# Patient Record
Sex: FEMALE | Race: WHITE | Employment: OTHER | URBAN - NONMETROPOLITAN AREA
[De-identification: names, ages, dates, MRNs, and addresses within clinical notes are randomized per-mention and may not be internally consistent; named-entity substitution may affect disease eponyms.]

---

## 2017-01-25 ENCOUNTER — CARE COORDINATION (OUTPATIENT)
Dept: CARE COORDINATION | Age: 67
End: 2017-01-25

## 2017-02-22 ENCOUNTER — CARE COORDINATION (OUTPATIENT)
Dept: CARE COORDINATION | Age: 67
End: 2017-02-22

## 2017-03-23 ENCOUNTER — CARE COORDINATION (OUTPATIENT)
Dept: CARE COORDINATION | Age: 67
End: 2017-03-23

## 2017-04-25 ENCOUNTER — CARE COORDINATION (OUTPATIENT)
Dept: CARE COORDINATION | Age: 67
End: 2017-04-25

## 2017-05-17 ENCOUNTER — TELEPHONE (OUTPATIENT)
Dept: FAMILY MEDICINE CLINIC | Age: 67
End: 2017-05-17

## 2017-05-22 ENCOUNTER — OFFICE VISIT (OUTPATIENT)
Dept: FAMILY MEDICINE CLINIC | Age: 67
End: 2017-05-22

## 2017-05-22 VITALS
WEIGHT: 156.5 LBS | BODY MASS INDEX: 26.08 KG/M2 | RESPIRATION RATE: 12 BRPM | HEIGHT: 65 IN | HEART RATE: 80 BPM | DIASTOLIC BLOOD PRESSURE: 68 MMHG | TEMPERATURE: 97.7 F | SYSTOLIC BLOOD PRESSURE: 138 MMHG

## 2017-05-22 DIAGNOSIS — F32.0 MILD SINGLE CURRENT EPISODE OF MAJOR DEPRESSIVE DISORDER (HCC): ICD-10-CM

## 2017-05-22 DIAGNOSIS — F17.210 HEAVY SMOKER (MORE THAN 20 CIGARETTES PER DAY): ICD-10-CM

## 2017-05-22 DIAGNOSIS — Z13.31 POSITIVE DEPRESSION SCREENING: ICD-10-CM

## 2017-05-22 DIAGNOSIS — R22.1 NECK MASS: ICD-10-CM

## 2017-05-22 DIAGNOSIS — G45.9 TRANSIENT CEREBRAL ISCHEMIA, UNSPECIFIED TYPE: Primary | ICD-10-CM

## 2017-05-22 DIAGNOSIS — Z11.4 SCREENING FOR HIV WITHOUT PRESENCE OF RISK FACTORS: ICD-10-CM

## 2017-05-22 DIAGNOSIS — Z12.31 ENCOUNTER FOR SCREENING MAMMOGRAM FOR BREAST CANCER: ICD-10-CM

## 2017-05-22 DIAGNOSIS — Z11.59 NEED FOR HEPATITIS C SCREENING TEST: ICD-10-CM

## 2017-05-22 DIAGNOSIS — Z91.81 AT HIGH RISK FOR FALLS: ICD-10-CM

## 2017-05-22 PROCEDURE — 99495 TRANSJ CARE MGMT MOD F2F 14D: CPT | Performed by: FAMILY MEDICINE

## 2017-05-22 PROCEDURE — G8431 POS CLIN DEPRES SCRN F/U DOC: HCPCS | Performed by: FAMILY MEDICINE

## 2017-05-22 PROCEDURE — G0444 DEPRESSION SCREEN ANNUAL: HCPCS | Performed by: FAMILY MEDICINE

## 2017-05-22 RX ORDER — ESCITALOPRAM OXALATE 10 MG/1
10 TABLET ORAL DAILY
Qty: 30 TABLET | Refills: 3 | Status: SHIPPED | OUTPATIENT
Start: 2017-05-22 | End: 2017-07-25

## 2017-05-22 ASSESSMENT — PATIENT HEALTH QUESTIONNAIRE - PHQ9
5. POOR APPETITE OR OVEREATING: 2
6. FEELING BAD ABOUT YOURSELF - OR THAT YOU ARE A FAILURE OR HAVE LET YOURSELF OR YOUR FAMILY DOWN: 3
4. FEELING TIRED OR HAVING LITTLE ENERGY: 3
1. LITTLE INTEREST OR PLEASURE IN DOING THINGS: 3
10. IF YOU CHECKED OFF ANY PROBLEMS, HOW DIFFICULT HAVE THESE PROBLEMS MADE IT FOR YOU TO DO YOUR WORK, TAKE CARE OF THINGS AT HOME, OR GET ALONG WITH OTHER PEOPLE: 3
7. TROUBLE CONCENTRATING ON THINGS, SUCH AS READING THE NEWSPAPER OR WATCHING TELEVISION: 1
SUM OF ALL RESPONSES TO PHQ QUESTIONS 1-9: 20
SUM OF ALL RESPONSES TO PHQ9 QUESTIONS 1 & 2: 6
8. MOVING OR SPEAKING SO SLOWLY THAT OTHER PEOPLE COULD HAVE NOTICED. OR THE OPPOSITE, BEING SO FIGETY OR RESTLESS THAT YOU HAVE BEEN MOVING AROUND A LOT MORE THAN USUAL: 1
2. FEELING DOWN, DEPRESSED OR HOPELESS: 3
3. TROUBLE FALLING OR STAYING ASLEEP: 3
9. THOUGHTS THAT YOU WOULD BE BETTER OFF DEAD, OR OF HURTING YOURSELF: 1

## 2017-05-30 ENCOUNTER — CARE COORDINATION (OUTPATIENT)
Dept: CARE COORDINATION | Age: 67
End: 2017-05-30

## 2017-06-05 ENCOUNTER — TELEPHONE (OUTPATIENT)
Dept: FAMILY MEDICINE CLINIC | Age: 67
End: 2017-06-05

## 2017-06-14 ENCOUNTER — TELEPHONE (OUTPATIENT)
Dept: FAMILY MEDICINE CLINIC | Age: 67
End: 2017-06-14

## 2017-06-14 DIAGNOSIS — J98.4 LUNG ABNORMALITY: ICD-10-CM

## 2017-06-14 DIAGNOSIS — R22.1 MASS IN NECK: Primary | ICD-10-CM

## 2017-06-15 ENCOUNTER — TELEPHONE (OUTPATIENT)
Dept: OTOLARYNGOLOGY | Age: 67
End: 2017-06-15

## 2017-06-15 ENCOUNTER — TELEPHONE (OUTPATIENT)
Dept: FAMILY MEDICINE CLINIC | Age: 67
End: 2017-06-15

## 2017-06-16 ENCOUNTER — TELEPHONE (OUTPATIENT)
Dept: FAMILY MEDICINE CLINIC | Age: 67
End: 2017-06-16

## 2017-06-16 ENCOUNTER — OFFICE VISIT (OUTPATIENT)
Dept: OTOLARYNGOLOGY | Age: 67
End: 2017-06-16

## 2017-06-16 VITALS
RESPIRATION RATE: 16 BRPM | HEIGHT: 64 IN | HEART RATE: 78 BPM | TEMPERATURE: 98.3 F | WEIGHT: 153.8 LBS | BODY MASS INDEX: 26.26 KG/M2 | SYSTOLIC BLOOD PRESSURE: 126 MMHG | DIASTOLIC BLOOD PRESSURE: 78 MMHG

## 2017-06-16 DIAGNOSIS — R13.12 OROPHARYNGEAL DYSPHAGIA: ICD-10-CM

## 2017-06-16 DIAGNOSIS — K21.9 GASTROESOPHAGEAL REFLUX DISEASE WITHOUT ESOPHAGITIS: ICD-10-CM

## 2017-06-16 DIAGNOSIS — E89.0 H/O THYROIDECTOMY: ICD-10-CM

## 2017-06-16 DIAGNOSIS — R22.1 NECK MASS: Primary | ICD-10-CM

## 2017-06-16 PROCEDURE — 99203 OFFICE O/P NEW LOW 30 MIN: CPT | Performed by: PHYSICIAN ASSISTANT

## 2017-06-16 PROCEDURE — G8598 ASA/ANTIPLAT THER USED: HCPCS | Performed by: PHYSICIAN ASSISTANT

## 2017-06-16 PROCEDURE — 4040F PNEUMOC VAC/ADMIN/RCVD: CPT | Performed by: PHYSICIAN ASSISTANT

## 2017-06-16 PROCEDURE — 3014F SCREEN MAMMO DOC REV: CPT | Performed by: PHYSICIAN ASSISTANT

## 2017-06-16 PROCEDURE — G8400 PT W/DXA NO RESULTS DOC: HCPCS | Performed by: PHYSICIAN ASSISTANT

## 2017-06-16 PROCEDURE — 1123F ACP DISCUSS/DSCN MKR DOCD: CPT | Performed by: PHYSICIAN ASSISTANT

## 2017-06-16 PROCEDURE — G8419 CALC BMI OUT NRM PARAM NOF/U: HCPCS | Performed by: PHYSICIAN ASSISTANT

## 2017-06-16 PROCEDURE — 4004F PT TOBACCO SCREEN RCVD TLK: CPT | Performed by: PHYSICIAN ASSISTANT

## 2017-06-16 PROCEDURE — 1090F PRES/ABSN URINE INCON ASSESS: CPT | Performed by: PHYSICIAN ASSISTANT

## 2017-06-16 PROCEDURE — G8427 DOCREV CUR MEDS BY ELIG CLIN: HCPCS | Performed by: PHYSICIAN ASSISTANT

## 2017-06-16 PROCEDURE — 1111F DSCHRG MED/CURRENT MED MERGE: CPT | Performed by: PHYSICIAN ASSISTANT

## 2017-06-16 PROCEDURE — 3017F COLORECTAL CA SCREEN DOC REV: CPT | Performed by: PHYSICIAN ASSISTANT

## 2017-06-16 ASSESSMENT — ENCOUNTER SYMPTOMS
SHORTNESS OF BREATH: 0
VOMITING: 0
RECTAL PAIN: 0
EYE ITCHING: 0
EYE REDNESS: 0
RHINORRHEA: 0
SORE THROAT: 0
VOICE CHANGE: 0
DIARRHEA: 0
COUGH: 0
ABDOMINAL PAIN: 0
SINUS PRESSURE: 0
CHOKING: 0
COLOR CHANGE: 0
ANAL BLEEDING: 0
WHEEZING: 0
BLOOD IN STOOL: 0
TROUBLE SWALLOWING: 0
APNEA: 0
EYE PAIN: 0
STRIDOR: 0
ABDOMINAL DISTENTION: 0
CHEST TIGHTNESS: 0
EYE DISCHARGE: 0
CONSTIPATION: 0
BACK PAIN: 0
PHOTOPHOBIA: 0
FACIAL SWELLING: 0
NAUSEA: 0

## 2017-06-20 ENCOUNTER — TELEPHONE (OUTPATIENT)
Dept: OTOLARYNGOLOGY | Age: 67
End: 2017-06-20

## 2017-06-21 ENCOUNTER — TELEPHONE (OUTPATIENT)
Dept: PULMONOLOGY | Age: 67
End: 2017-06-21

## 2017-06-21 ENCOUNTER — CARE COORDINATION (OUTPATIENT)
Dept: CARE COORDINATION | Age: 67
End: 2017-06-21

## 2017-06-21 ENCOUNTER — TELEPHONE (OUTPATIENT)
Dept: FAMILY MEDICINE CLINIC | Age: 67
End: 2017-06-21

## 2017-06-21 ASSESSMENT — ENCOUNTER SYMPTOMS: DYSPNEA ASSOCIATED WITH: EXERTION

## 2017-07-19 ENCOUNTER — CARE COORDINATION (OUTPATIENT)
Dept: CARE COORDINATION | Age: 67
End: 2017-07-19

## 2017-07-19 ASSESSMENT — ENCOUNTER SYMPTOMS: DYSPNEA ASSOCIATED WITH: EXERTION

## 2017-07-20 ENCOUNTER — TELEPHONE (OUTPATIENT)
Dept: ENT CLINIC | Age: 67
End: 2017-07-20

## 2017-07-20 ENCOUNTER — OFFICE VISIT (OUTPATIENT)
Dept: ENT CLINIC | Age: 67
End: 2017-07-20
Payer: MEDICARE

## 2017-07-20 VITALS
HEIGHT: 64 IN | HEART RATE: 72 BPM | SYSTOLIC BLOOD PRESSURE: 120 MMHG | DIASTOLIC BLOOD PRESSURE: 70 MMHG | RESPIRATION RATE: 18 BRPM | WEIGHT: 155.8 LBS | BODY MASS INDEX: 26.6 KG/M2 | TEMPERATURE: 97.8 F

## 2017-07-20 DIAGNOSIS — E89.0 POSTOPERATIVE HYPOTHYROIDISM: ICD-10-CM

## 2017-07-20 DIAGNOSIS — E04.1 THYROID NODULE: Primary | ICD-10-CM

## 2017-07-20 DIAGNOSIS — E89.0 S/P PARTIAL THYROIDECTOMY: ICD-10-CM

## 2017-07-20 PROCEDURE — G8427 DOCREV CUR MEDS BY ELIG CLIN: HCPCS | Performed by: OTOLARYNGOLOGY

## 2017-07-20 PROCEDURE — 1123F ACP DISCUSS/DSCN MKR DOCD: CPT | Performed by: OTOLARYNGOLOGY

## 2017-07-20 PROCEDURE — 3014F SCREEN MAMMO DOC REV: CPT | Performed by: OTOLARYNGOLOGY

## 2017-07-20 PROCEDURE — 4040F PNEUMOC VAC/ADMIN/RCVD: CPT | Performed by: OTOLARYNGOLOGY

## 2017-07-20 PROCEDURE — 99212 OFFICE O/P EST SF 10 MIN: CPT | Performed by: OTOLARYNGOLOGY

## 2017-07-20 PROCEDURE — G8419 CALC BMI OUT NRM PARAM NOF/U: HCPCS | Performed by: OTOLARYNGOLOGY

## 2017-07-20 PROCEDURE — G8598 ASA/ANTIPLAT THER USED: HCPCS | Performed by: OTOLARYNGOLOGY

## 2017-07-20 PROCEDURE — 1090F PRES/ABSN URINE INCON ASSESS: CPT | Performed by: OTOLARYNGOLOGY

## 2017-07-20 PROCEDURE — 3017F COLORECTAL CA SCREEN DOC REV: CPT | Performed by: OTOLARYNGOLOGY

## 2017-07-20 PROCEDURE — G8400 PT W/DXA NO RESULTS DOC: HCPCS | Performed by: OTOLARYNGOLOGY

## 2017-07-20 PROCEDURE — 4004F PT TOBACCO SCREEN RCVD TLK: CPT | Performed by: OTOLARYNGOLOGY

## 2017-07-20 ASSESSMENT — ENCOUNTER SYMPTOMS
CHEST TIGHTNESS: 0
COUGH: 0
SHORTNESS OF BREATH: 0
VOICE CHANGE: 0
SORE THROAT: 0
TROUBLE SWALLOWING: 0
VOMITING: 0
APNEA: 0
CHOKING: 0
NAUSEA: 0
STRIDOR: 0
WHEEZING: 0
RHINORRHEA: 0
ABDOMINAL PAIN: 0
SINUS PRESSURE: 0
DIARRHEA: 0
FACIAL SWELLING: 0
COLOR CHANGE: 0

## 2017-07-25 ENCOUNTER — OFFICE VISIT (OUTPATIENT)
Dept: FAMILY MEDICINE CLINIC | Age: 67
End: 2017-07-25
Payer: MEDICARE

## 2017-07-25 VITALS
BODY MASS INDEX: 26.03 KG/M2 | OXYGEN SATURATION: 96 % | HEART RATE: 67 BPM | HEIGHT: 64 IN | TEMPERATURE: 97.7 F | DIASTOLIC BLOOD PRESSURE: 62 MMHG | SYSTOLIC BLOOD PRESSURE: 154 MMHG | WEIGHT: 152.5 LBS | RESPIRATION RATE: 10 BRPM

## 2017-07-25 DIAGNOSIS — Z13.6 SCREENING FOR CARDIOVASCULAR CONDITION: ICD-10-CM

## 2017-07-25 DIAGNOSIS — Z23 NEED FOR SHINGLES VACCINE: ICD-10-CM

## 2017-07-25 DIAGNOSIS — Z00.00 ROUTINE GENERAL MEDICAL EXAMINATION AT A HEALTH CARE FACILITY: ICD-10-CM

## 2017-07-25 DIAGNOSIS — Z23 NEED FOR PROPHYLACTIC VACCINATION WITH COMBINED DIPHTHERIA-TETANUS-PERTUSSIS (DTP) VACCINE: ICD-10-CM

## 2017-07-25 DIAGNOSIS — Z13.1 SCREENING FOR DIABETES MELLITUS (DM): ICD-10-CM

## 2017-07-25 DIAGNOSIS — Z78.0 ASYMPTOMATIC MENOPAUSAL STATE: ICD-10-CM

## 2017-07-25 PROCEDURE — G0438 PPPS, INITIAL VISIT: HCPCS | Performed by: FAMILY MEDICINE

## 2017-07-25 PROCEDURE — 93000 ELECTROCARDIOGRAM COMPLETE: CPT | Performed by: FAMILY MEDICINE

## 2017-07-25 RX ORDER — LEVOTHYROXINE SODIUM 0.07 MG/1
75 TABLET ORAL DAILY
Qty: 30 TABLET | Refills: 3 | Status: CANCELLED | OUTPATIENT
Start: 2017-07-25

## 2017-07-25 RX ORDER — ATORVASTATIN CALCIUM 40 MG/1
40 TABLET, FILM COATED ORAL NIGHTLY
Qty: 30 TABLET | Refills: 5 | Status: SHIPPED | OUTPATIENT
Start: 2017-07-25 | End: 2021-03-08

## 2017-07-25 RX ORDER — ESCITALOPRAM OXALATE 20 MG/1
20 TABLET ORAL DAILY
Qty: 30 TABLET | Refills: 5 | Status: SHIPPED | OUTPATIENT
Start: 2017-07-25 | End: 2021-03-08

## 2017-07-25 RX ORDER — ISOSORBIDE DINITRATE 10 MG/1
10 TABLET ORAL 2 TIMES DAILY
Qty: 90 TABLET | Refills: 3 | Status: CANCELLED | OUTPATIENT
Start: 2017-07-25

## 2017-07-25 RX ORDER — LEVOTHYROXINE SODIUM 88 UG/1
88 TABLET ORAL DAILY
Qty: 30 TABLET | Refills: 5 | Status: SHIPPED | OUTPATIENT
Start: 2017-07-25 | End: 2022-08-16 | Stop reason: DRUGHIGH

## 2017-07-25 RX ORDER — ESCITALOPRAM OXALATE 10 MG/1
10 TABLET ORAL DAILY
Qty: 30 TABLET | Refills: 3 | Status: CANCELLED | OUTPATIENT
Start: 2017-07-25

## 2017-07-25 ASSESSMENT — PATIENT HEALTH QUESTIONNAIRE - PHQ9: SUM OF ALL RESPONSES TO PHQ QUESTIONS 1-9: 2

## 2017-07-25 ASSESSMENT — LIFESTYLE VARIABLES: HOW OFTEN DO YOU HAVE A DRINK CONTAINING ALCOHOL: 0

## 2017-07-25 ASSESSMENT — ANXIETY QUESTIONNAIRES: GAD7 TOTAL SCORE: 6

## 2017-07-31 ENCOUNTER — TELEPHONE (OUTPATIENT)
Dept: ENT CLINIC | Age: 67
End: 2017-07-31

## 2017-08-08 ENCOUNTER — TELEPHONE (OUTPATIENT)
Dept: ENT CLINIC | Age: 67
End: 2017-08-08

## 2017-08-30 ENCOUNTER — CARE COORDINATION (OUTPATIENT)
Dept: CARE COORDINATION | Age: 67
End: 2017-08-30

## 2017-08-30 ASSESSMENT — ENCOUNTER SYMPTOMS: DYSPNEA ASSOCIATED WITH: EXERTION

## 2017-09-14 ENCOUNTER — TELEPHONE (OUTPATIENT)
Dept: PULMONOLOGY | Age: 67
End: 2017-09-14

## 2017-10-02 ENCOUNTER — CARE COORDINATION (OUTPATIENT)
Dept: CARE COORDINATION | Age: 67
End: 2017-10-02

## 2017-10-05 ENCOUNTER — HOSPITAL ENCOUNTER (EMERGENCY)
Age: 67
Discharge: HOME | End: 2017-10-05
Attending: FAMILY MEDICINE
Payer: MEDICARE

## 2017-10-05 ENCOUNTER — APPOINTMENT (OUTPATIENT)
Dept: CT IMAGING | Age: 67
End: 2017-10-05
Payer: MEDICARE

## 2017-10-05 VITALS
SYSTOLIC BLOOD PRESSURE: 141 MMHG | WEIGHT: 154 LBS | DIASTOLIC BLOOD PRESSURE: 78 MMHG | BODY MASS INDEX: 26.29 KG/M2 | RESPIRATION RATE: 16 BRPM | HEART RATE: 73 BPM | HEIGHT: 64 IN | TEMPERATURE: 97.9 F | OXYGEN SATURATION: 96 %

## 2017-10-05 DIAGNOSIS — S09.90XA HEAD TRAUMA, INITIAL ENCOUNTER: Primary | ICD-10-CM

## 2017-10-05 DIAGNOSIS — S06.0X0A CONCUSSION, WITHOUT LOC, INITIAL ENCOUNTER: ICD-10-CM

## 2017-10-05 PROCEDURE — 70450 CT HEAD/BRAIN W/O DYE: CPT

## 2017-10-05 PROCEDURE — 6360000002 HC RX W HCPCS: Performed by: FAMILY MEDICINE

## 2017-10-05 PROCEDURE — 99284 EMERGENCY DEPT VISIT MOD MDM: CPT

## 2017-10-05 PROCEDURE — 72125 CT NECK SPINE W/O DYE: CPT

## 2017-10-05 RX ORDER — ONDANSETRON 4 MG/1
4 TABLET, ORALLY DISINTEGRATING ORAL ONCE
Status: COMPLETED | OUTPATIENT
Start: 2017-10-05 | End: 2017-10-05

## 2017-10-05 RX ADMIN — ONDANSETRON 4 MG: 4 TABLET, ORALLY DISINTEGRATING ORAL at 16:05

## 2017-10-05 ASSESSMENT — ENCOUNTER SYMPTOMS
COUGH: 0
ABDOMINAL PAIN: 0
VOMITING: 0
DIARRHEA: 0
EYE DISCHARGE: 0
SHORTNESS OF BREATH: 0
WHEEZING: 0
EYE PAIN: 0
BACK PAIN: 0
SORE THROAT: 0
NAUSEA: 0
RHINORRHEA: 0

## 2017-10-05 ASSESSMENT — PAIN DESCRIPTION - LOCATION: LOCATION: HEAD

## 2017-10-05 ASSESSMENT — PAIN DESCRIPTION - DESCRIPTORS: DESCRIPTORS: THROBBING

## 2017-10-05 ASSESSMENT — PAIN DESCRIPTION - FREQUENCY: FREQUENCY: CONTINUOUS

## 2017-10-05 ASSESSMENT — PAIN SCALES - GENERAL: PAINLEVEL_OUTOF10: 10

## 2017-10-05 ASSESSMENT — PAIN DESCRIPTION - PAIN TYPE: TYPE: ACUTE PAIN

## 2017-10-05 ASSESSMENT — PAIN DESCRIPTION - ORIENTATION: ORIENTATION: UPPER
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
## 2017-10-10 ENCOUNTER — OFFICE VISIT (OUTPATIENT)
Dept: FAMILY MEDICINE CLINIC | Age: 67
End: 2017-10-10
Payer: MEDICARE

## 2017-10-10 VITALS
DIASTOLIC BLOOD PRESSURE: 102 MMHG | OXYGEN SATURATION: 94 % | BODY MASS INDEX: 24.01 KG/M2 | HEART RATE: 78 BPM | TEMPERATURE: 97.5 F | WEIGHT: 140.6 LBS | HEIGHT: 64 IN | SYSTOLIC BLOOD PRESSURE: 156 MMHG

## 2017-10-10 DIAGNOSIS — I10 ESSENTIAL HYPERTENSION: ICD-10-CM

## 2017-10-10 DIAGNOSIS — Z87.820 HX OF CONCUSSION: ICD-10-CM

## 2017-10-10 DIAGNOSIS — F17.210 HEAVY SMOKER (MORE THAN 20 CIGARETTES PER DAY): ICD-10-CM

## 2017-10-10 DIAGNOSIS — Z87.828 HX OF HEAD INJURY: Primary | ICD-10-CM

## 2017-10-10 DIAGNOSIS — R11.0 NAUSEA: ICD-10-CM

## 2017-10-10 DIAGNOSIS — J43.9 PULMONARY EMPHYSEMA, UNSPECIFIED EMPHYSEMA TYPE (HCC): ICD-10-CM

## 2017-10-10 PROCEDURE — 4040F PNEUMOC VAC/ADMIN/RCVD: CPT | Performed by: NURSE PRACTITIONER

## 2017-10-10 PROCEDURE — 99214 OFFICE O/P EST MOD 30 MIN: CPT | Performed by: NURSE PRACTITIONER

## 2017-10-10 PROCEDURE — 4004F PT TOBACCO SCREEN RCVD TLK: CPT | Performed by: NURSE PRACTITIONER

## 2017-10-10 PROCEDURE — 1123F ACP DISCUSS/DSCN MKR DOCD: CPT | Performed by: NURSE PRACTITIONER

## 2017-10-10 PROCEDURE — G8926 SPIRO NO PERF OR DOC: HCPCS | Performed by: NURSE PRACTITIONER

## 2017-10-10 PROCEDURE — 3017F COLORECTAL CA SCREEN DOC REV: CPT | Performed by: NURSE PRACTITIONER

## 2017-10-10 PROCEDURE — G8427 DOCREV CUR MEDS BY ELIG CLIN: HCPCS | Performed by: NURSE PRACTITIONER

## 2017-10-10 PROCEDURE — G8420 CALC BMI NORM PARAMETERS: HCPCS | Performed by: NURSE PRACTITIONER

## 2017-10-10 PROCEDURE — 3023F SPIROM DOC REV: CPT | Performed by: NURSE PRACTITIONER

## 2017-10-10 PROCEDURE — 1090F PRES/ABSN URINE INCON ASSESS: CPT | Performed by: NURSE PRACTITIONER

## 2017-10-10 PROCEDURE — G8484 FLU IMMUNIZE NO ADMIN: HCPCS | Performed by: NURSE PRACTITIONER

## 2017-10-10 PROCEDURE — 3014F SCREEN MAMMO DOC REV: CPT | Performed by: NURSE PRACTITIONER

## 2017-10-10 PROCEDURE — G8400 PT W/DXA NO RESULTS DOC: HCPCS | Performed by: NURSE PRACTITIONER

## 2017-10-10 PROCEDURE — G8598 ASA/ANTIPLAT THER USED: HCPCS | Performed by: NURSE PRACTITIONER

## 2017-10-10 RX ORDER — ONDANSETRON 4 MG/1
4 TABLET, ORALLY DISINTEGRATING ORAL EVERY 8 HOURS PRN
Qty: 30 TABLET | Refills: 2 | Status: SHIPPED | OUTPATIENT
Start: 2017-10-10 | End: 2018-01-22 | Stop reason: SDUPTHER

## 2017-10-10 NOTE — PATIENT INSTRUCTIONS
stop-smoking programs and medicines. These can increase your chances of quitting for good. · Avoid colds and flu. Get a pneumococcal vaccine shot. If you have had one before, ask your doctor whether you need a second dose. Get the flu vaccine every fall. If you must be around people with colds or the flu, wash your hands often. · Avoid secondhand smoke, air pollution, and high altitudes. Also avoid cold, dry air and hot, humid air. Stay at home with your windows closed when air pollution is bad. Medicines and oxygen therapy  · Take your medicines exactly as prescribed. Call your doctor if you think you are having a problem with your medicine. · You may be taking medicines such as:  ¨ Bronchodilators. These help open your airways and make breathing easier. Bronchodilators are either short-acting (work for 6 to 9 hours) or long-acting (work for 24 hours). You inhale most bronchodilators, so they start to act quickly. Always carry your quick-relief inhaler with you in case you need it while you are away from home. ¨ Corticosteroids (prednisone, budesonide). These reduce airway inflammation. They come in pill or inhaled form. You must take these medicines every day for them to work well. · A spacer may help you get more inhaled medicine to your lungs. Ask your doctor or pharmacist if a spacer is right for you. If it is, ask how to use it properly. · Do not take any vitamins, over-the-counter medicine, or herbal products without talking to your doctor first.  · If your doctor prescribed antibiotics, take them as directed. Do not stop taking them just because you feel better. You need to take the full course of antibiotics. · Oxygen therapy boosts the amount of oxygen in your blood and helps you breathe easier. Use the flow rate your doctor has recommended, and do not change it without talking to your doctor first.  Activity  · Get regular exercise. Walking is an easy way to get exercise.  Start out slowly, and walk a little more each day. · Pay attention to your breathing. You are exercising too hard if you cannot talk while you are exercising. · Take short rest breaks when doing household chores and other activities. · Learn breathing methods--such as breathing through pursed lips--to help you become less short of breath. · If your doctor has not set you up with a pulmonary rehabilitation program, talk to him or her about whether rehab is right for you. Rehab includes exercise programs, education about your disease and how to manage it, help with diet and other changes, and emotional support. Diet  · Eat regular, healthy meals. Use bronchodilators about 1 hour before you eat to make it easier to eat. Eat several small meals instead of three large ones. Drink beverages at the end of the meal. Avoid foods that are hard to chew. · Eat foods that contain protein so that you do not lose muscle mass. · Talk with your doctor if you gain too much weight or if you lose weight without trying. Mental health  · Talk to your family, friends, or a therapist about your feelings. It is normal to feel frightened, angry, hopeless, helpless, and even guilty. Talking openly about bad feelings can help you cope. If these feelings last, talk to your doctor. When should you call for help? Call 911 anytime you think you may need emergency care. For example, call if:  · You have severe trouble breathing. Call your doctor now or seek immediate medical care if:  · You have new or worse trouble breathing. · You cough up blood. · You have a fever. Watch closely for changes in your health, and be sure to contact your doctor if:  · You cough more deeply or more often, especially if you notice more mucus or a change in the color of your mucus. · You have new or worse swelling in your legs or belly. · You are not getting better as expected. Where can you learn more? Go to https://chpemarceb.health-partners. org and sign in to your Jobyourlife account. Enter H707 in the [facility] box to learn more about \"Chronic Obstructive Pulmonary Disease (COPD): Care Instructions. \"     If you do not have an account, please click on the \"Sign Up Now\" link. Current as of: March 25, 2017  Content Version: 11.3  © 4911-7550 [employer]. Care instructions adapted under license by [facility] ([facility]). If you have questions about a medical condition or this instruction, always ask your healthcare professional. [address] any warranty or liability for your use of this information. Patient Education        Chronic Obstructive Pulmonary Disease (COPD) Flare-Ups: Care Instructions  Your Care Instructions    Chronic obstructive pulmonary disease (COPD) is a lung disease that makes it hard to breathe. It is caused by damage to the lungs over many years, usually from smoking. COPD is often a mix of two diseases:  · Chronic bronchitis: The airways that carry air to the lungs (bronchial tubes) get inflamed and make a lot of mucus. This can narrow or block the airways. · Emphysema: In a healthy person, the tiny air sacs in the lungs are like balloons. As you breathe in and out, they get bigger and smaller to move air through your lungs. But with emphysema, these air sacs are damaged and lose their stretch. Less air gets in and out of the lungs. Many people with COPD have attacks called flare-ups or exacerbations. This is when your usual symptoms quickly get worse and stay worse. The doctor has checked you carefully. But problems can develop later. If you notice any problems or new symptoms, get medical treatment right away. Follow-up care is a key part of your treatment and safety. Be sure to make and go to all appointments, and call your doctor if you are having problems. It's also a good idea to know your test results and keep a list of the medicines you take. How can you care for yourself at home?   · Be safe with medicines. Take your medicines exactly as prescribed. Call your doctor if you think you are having a problem with your medicine. You may be taking medicines such as:  ¨ Bronchodilators. These help open your airways and make breathing easier. ¨ Corticosteroids. These reduce airway inflammation. They may be given as pills, in a vein, or in an inhaled form. You may go home with pills in addition to an inhaler that you already use. · A spacer may help you get more inhaled medicine to your lungs. Ask your doctor or pharmacist if a spacer is right for you. If it is, ask how to use it properly. · If your doctor prescribed antibiotics, take them as directed. Do not stop taking them just because you feel better. You need to take the full course of antibiotics. · If your doctor prescribed oxygen, use the flow rate your doctor has recommended. Do not change it without talking to your doctor first.  · Do not smoke. Smoking makes COPD worse. If you need help quitting, talk to your doctor about stop-smoking programs and medicines. These can increase your chances of quitting for good. When should you call for help? Call 911 anytime you think you may need emergency care. For example, call if:  · You have severe trouble breathing. Call your doctor now or seek immediate medical care if:  · You have new or worse trouble breathing. · Your coughing or wheezing gets worse. · You cough up dark brown or bloody mucus (sputum). · You have a new or higher fever. Watch closely for changes in your health, and be sure to contact your doctor if:  · You notice more mucus or a change in the color of your mucus. · You need to use your antibiotic or steroid pills. · You do not get better as expected. Where can you learn more? Go to https://irene.Textingly. org and sign in to your Crack account.  Enter F313 in the OpenChime box to learn more about \"Chronic Obstructive Pulmonary Disease (COPD) Flare-Ups: Care \"egg-ROSENDO-mariah-BAY-shun\"). When this happens, your usual symptoms quickly get worse and stay bad. This can be dangerous. You may have to go to the hospital.  How can you keep COPD from getting worse? Don't smoke. That is the best way to keep COPD from getting worse. If you already smoke, it is never too late to stop. If you need help quitting, talk to your doctor about stop-smoking programs and medicines. These can increase your chances of quitting for good. You can do other things to keep COPD from getting worse:  · Avoid bad air. Air pollution, chemical fumes, and dust also can make COPD worse. · Get a flu shot every year. A shot may keep the flu from turning into something more serious, like pneumonia. A flu shot also may lower your chances of having a COPD flare-up. · Get a pneumococcal shot. Most people need only one shot to prevent pneumonia, but doctors sometimes recommend a second shot for some people who got their first shot before they turned 72. Talk with your doctor about whether you need a second shot. How is COPD treated? COPD is treated with medicines and oxygen. You also can take steps at home to stay healthy and keep your COPD from getting worse. Medicines and oxygen therapy  · You may be taking medicines such as:  ¨ Bronchodilators. These help open your airways and make breathing easier. Bronchodilators are either short-acting (work for 6 to 9 hours) or long-acting (work for 24 hours). You inhale most bronchodilators, so they start to act quickly. Always carry your quick-relief inhaler with you in case you need it while you are away from home. ¨ Corticosteroids. These reduce airway inflammation. They come in pill or inhaled form. You must take these medicines every day for them to work well. · Take your medicines exactly as prescribed. Call your doctor if you think you are having a problem with your medicine.   · Oxygen therapy boosts the amount of oxygen in your blood and helps you breathe easier. Use the flow rate your doctor has recommended, and do not change it without talking to your doctor first.  Other care at home  · If your doctor recommends it, get more exercise. Walking is a good choice. Bit by bit, increase the amount you walk every day. Try for at least 30 minutes on most days of the week. · Learn breathing methods--such as breathing through pursed lips--to help you become less short of breath. · If your doctor has not set you up with a pulmonary rehabilitation program, talk to him or her about whether rehab is right for you. Rehab includes exercise programs, education about your disease and how to manage it, help with diet and other changes, and emotional support. · Eat regular, healthy meals. Use bronchodilators about 1 hour before you eat to make it easier to eat. Eat several small meals instead of three large ones. Drink beverages at the end of the meal. Avoid foods that are hard to chew. Follow-up care is a key part of your treatment and safety. Be sure to make and go to all appointments, and call your doctor if you are having problems. It's also a good idea to know your test results and keep a list of the medicines you take. Where can you learn more? Go to https://FittingRoompeThe One World Doll Project.afterBOT. org and sign in to your Multimedia Plus | QuizScore account. Enter V314 in the [facility] box to learn more about \"Learning About COPD. \"     If you do not have an account, please click on the \"Sign Up Now\" link. Current as of: March 25, 2017  Content Version: 11.3  © 1859-3168 [employer]ISS[employer]. Care instructions adapted under license by [facility] [place] ([facility]). If you have questions about a medical condition or this instruction, always ask your healthcare professional. [place] [address] any warranty or liability for your use of this information.        Patient Education        Low Sodium Diet (2,000 Milligram): Care Instructions  Your Care Instructions  Too much sodium causes your body to hold on to extra water. This can raise your blood pressure and force your heart and kidneys to work harder. In very serious cases, this could cause you to be put in the hospital. It might even be life-threatening. By limiting sodium, you will feel better and lower your risk of serious problems. The most common source of sodium is salt. People get most of the salt in their diet from canned, prepared, and packaged foods. Fast food and restaurant meals also are very high in sodium. Your doctor will probably limit your sodium to less than 2,000 milligrams (mg) a day. This limit counts all the sodium in prepared and packaged foods and any salt you add to your food. Follow-up care is a key part of your treatment and safety. Be sure to make and go to all appointments, and call your doctor if you are having problems. It's also a good idea to know your test results and keep a list of the medicines you take. How can you care for yourself at home? Read food labels  · Read labels on cans and food packages. The labels tell you how much sodium is in each serving. Make sure that you look at the serving size. If you eat more than the serving size, you have eaten more sodium. · Food labels also tell you the Percent Daily Value for sodium. Choose products with low Percent Daily Values for sodium. · Be aware that sodium can come in forms other than salt, including monosodium glutamate (MSG), sodium citrate, and sodium bicarbonate (baking soda). MSG is often added to Asian food. When you eat out, you can sometimes ask for food without MSG or added salt. Buy low-sodium foods  · Buy foods that are labeled \"unsalted\" (no salt added), \"sodium-free\" (less than 5 mg of sodium per serving), or \"low-sodium\" (less than 140 mg of sodium per serving). Foods labeled \"reduced-sodium\" and \"light sodium\" may still have too much sodium. Be sure to read the label to see how much sodium you are getting.   · Buy fresh vegetables, or frozen vegetables without added sauces. Buy low-sodium versions of canned vegetables, soups, and other canned goods. Prepare low-sodium meals  · Cut back on the amount of salt you use in cooking. This will help you adjust to the taste. Do not add salt after cooking. One teaspoon of salt has about 2,300 mg of sodium. · Take the salt shaker off the table. · Flavor your food with garlic, lemon juice, onion, vinegar, herbs, and spices. Do not use soy sauce, lite soy sauce, steak sauce, onion salt, garlic salt, celery salt, mustard, or ketchup on your food. · Use low-sodium salad dressings, sauces, and ketchup. Or make your own salad dressings and sauces without adding salt. · Use less salt (or none) when recipes call for it. You can often use half the salt a recipe calls for without losing flavor. Other foods such as rice, pasta, and grains do not need added salt. · Rinse canned vegetables, and cook them in fresh water. This removes some--but not all--of the salt. · Avoid water that is naturally high in sodium or that has been treated with water softeners, which add sodium. Call your local water company to find out the sodium content of your water supply. If you buy bottled water, read the label and choose a sodium-free brand. Avoid high-sodium foods  · Avoid eating:  ¨ Smoked, cured, salted, and canned meat, fish, and poultry. ¨ Ham, gandhi, hot dogs, and luncheon meats. ¨ Regular, hard, and processed cheese and regular peanut butter. ¨ Crackers with salted tops, and other salted snack foods such as pretzels, chips, and salted popcorn. ¨ Frozen prepared meals, unless labeled low-sodium. ¨ Canned and dried soups, broths, and bouillon, unless labeled sodium-free or low-sodium. ¨ Canned vegetables, unless labeled sodium-free or low-sodium. ¨ Western Carla fries, pizza, tacos, and other fast foods. ¨ Pickles, olives, ketchup, and other condiments, especially soy sauce, unless labeled sodium-free or low-sodium.   Where can you learn more? Go to https://chpepiceweb.Seafarer [email or web address] org and sign in to your Mentegram account. Enter T034 in the [facility] box to learn more about \"Low Sodium Diet (2,000 Milligram): Care Instructions. \"     If you do not have an account, please click on the \"Sign Up Now\" link. Current as of: July 26, 2016  Content Version: 11.3  © 0560-6932 [employer]. Care instructions adapted under license by [facility] ([facility]). If you have questions about a medical condition or this instruction, always ask your healthcare professional. [address] any warranty or liability for your use of this information. Patient Education        DASH Diet: Care Instructions  Your Care Instructions  The DASH diet is an eating plan that can help lower your blood pressure. DASH stands for Dietary Approaches to Stop Hypertension. Hypertension is high blood pressure. The DASH diet focuses on eating foods that are high in calcium, potassium, and magnesium. These nutrients can lower blood pressure. The foods that are highest in these nutrients are fruits, vegetables, low-fat dairy products, nuts, seeds, and legumes. But taking calcium, potassium, and magnesium supplements instead of eating foods that are high in those nutrients does not have the same effect. The DASH diet also includes whole grains, fish, and poultry. The DASH diet is one of several lifestyle changes your doctor may recommend to lower your high blood pressure. Your doctor may also want you to decrease the amount of sodium in your diet. Lowering sodium while following the DASH diet can lower blood pressure even further than just the DASH diet alone. Follow-up care is a key part of your treatment and safety. Be sure to make and go to all appointments, and call your doctor if you are having problems. It's also a good idea to know your test results and keep a list of the medicines you take.   How can you care for yourself at home?  Following the DASH diet  · Eat 4 to 5 servings of fruit each day. A serving is 1 medium-sized piece of fruit, ½ cup chopped or canned fruit, 1/4 cup dried fruit, or 4 ounces (½ cup) of fruit juice. Choose fruit more often than fruit juice. · Eat 4 to 5 servings of vegetables each day. A serving is 1 cup of lettuce or raw leafy vegetables, ½ cup of chopped or cooked vegetables, or 4 ounces (½ cup) of vegetable juice. Choose vegetables more often than vegetable juice. · Get 2 to 3 servings of low-fat and fat-free dairy each day. A serving is 8 ounces of milk, 1 cup of yogurt, or 1 ½ ounces of cheese. · Eat 6 to 8 servings of grains each day. A serving is 1 slice of bread, 1 ounce of dry cereal, or ½ cup of cooked rice, pasta, or cooked cereal. Try to choose whole-grain products as much as possible. · Limit lean meat, poultry, and fish to 2 servings each day. A serving is 3 ounces, about the size of a deck of cards. · Eat 4 to 5 servings of nuts, seeds, and legumes (cooked dried beans, lentils, and split peas) each week. A serving is 1/3 cup of nuts, 2 tablespoons of seeds, or ½ cup of cooked beans or peas. · Limit fats and oils to 2 to 3 servings each day. A serving is 1 teaspoon of vegetable oil or 2 tablespoons of salad dressing. · Limit sweets and added sugars to 5 servings or less a week. A serving is 1 tablespoon jelly or jam, ½ cup sorbet, or 1 cup of lemonade. · Eat less than 2,300 milligrams (mg) of sodium a day. If you limit your sodium to 1,500 mg a day, you can lower your blood pressure even more. Tips for success  · Start small. Do not try to make dramatic changes to your diet all at once. You might feel that you are missing out on your favorite foods and then be more likely to not follow the plan. Make small changes, and stick with them. Once those changes become habit, add a few more changes.   · Try some of the following:  ¨ Make it a goal to eat a fruit or vegetable at every meal and at sure to make and go to all appointments, and call your doctor if you are having problems. It's also a good idea to know your test results and keep a list of the medicines you take. Where can you learn more? Go to https://chnkechi.Coopkanics. org and sign in to your AxisMobile account. Enter [id number] in the [facility] box to learn more about \"Learning About a Closed Head Injury. \"     If you do not have an account, please click on the \"Sign Up Now\" link. Current as of: October 14, 2016  Content Version: 11.3  © 9322-5057 [employer]. Care instructions adapted under license by [facility] ([facility]). If you have questions about a medical condition or this instruction, always ask your healthcare professional. [address] any warranty or liability for your use of this information. Patient Education        Head Injury: Care Instructions  Your Care Instructions  Most injuries to the head are minor. Bumps, cuts, and scrapes on the head and face usually heal well and can be treated the same as injuries to other parts of the body. Although it's rare, once in a while a more serious problem shows up after you are home. So it's good to be on the lookout for symptoms for a day or two. Follow-up care is a key part of your treatment and safety. Be sure to make and go to all appointments, and call your doctor if you are having problems. It's also a good idea to know your test results and keep a list of the medicines you take. How can you care for yourself at home? · Follow your doctor's instructions. He or she will tell you if you need someone to watch you closely for the next 24 hours or longer. · Take it easy for the next few days or more if you are not feeling well. · Ask your doctor when it's okay for you to go back to activities like driving a car, riding a bike, or operating machinery. When should you call for help?   Call 911 anytime you think you may need emergency care. For example, call if:  · You have a seizure. · You passed out (lost consciousness). · You are confused or can't stay awake. Call your doctor now or seek immediate medical care if:  · You have new or worse vomiting. · You feel less alert. · You have new weakness or numbness in any part of your body. Watch closely for changes in your health, and be sure to contact your doctor if:  · You do not get better as expected. · You have new symptoms, such as headaches, trouble concentrating, or changes in mood. Where can you learn more? Go to https://Sirnaomicspepiceweb.IdenTrust. org and sign in to your menuvox account. Enter R520 in the [place] box to learn more about \"Head Injury: Care Instructions. \"     If you do not have an account, please click on the \"Sign Up Now\" link. Current as of: October 14, 2016  Content Version: 11.3  © 0887-8165 [employer], [employer]. Care instructions adapted under license by [facility] ([facility]). If you have questions about a medical condition or this instruction, always ask your healthcare professional. [place] [address] any warranty or liability for your use of this information.
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
## 2017-10-11 ASSESSMENT — ENCOUNTER SYMPTOMS
DIARRHEA: 0
ABDOMINAL PAIN: 0
SORE THROAT: 0
BLOOD IN STOOL: 0
COLOR CHANGE: 0
CONSTIPATION: 0
ANAL BLEEDING: 0
EYE DISCHARGE: 0
SHORTNESS OF BREATH: 0
RHINORRHEA: 0
NAUSEA: 0
EYE REDNESS: 0
ABDOMINAL DISTENTION: 0
COUGH: 0

## 2017-11-13 ENCOUNTER — CARE COORDINATION (OUTPATIENT)
Dept: CARE COORDINATION | Age: 67
End: 2017-11-13

## 2017-11-17 ENCOUNTER — TELEPHONE (OUTPATIENT)
Dept: PULMONOLOGY | Age: 67
End: 2017-11-17

## 2017-11-17 NOTE — TELEPHONE ENCOUNTER
Kat Claude was a no show today 11/17/17. I called and spoke with patient, she was no aware of appointment. Rescheduled for 12/12/17.

## 2017-12-12 ENCOUNTER — TELEPHONE (OUTPATIENT)
Dept: PULMONOLOGY | Age: 67
End: 2017-12-12

## 2017-12-12 ENCOUNTER — OFFICE VISIT (OUTPATIENT)
Dept: PULMONOLOGY | Age: 67
End: 2017-12-12
Payer: MEDICARE

## 2017-12-12 VITALS
HEIGHT: 64 IN | DIASTOLIC BLOOD PRESSURE: 86 MMHG | TEMPERATURE: 97.7 F | HEART RATE: 75 BPM | WEIGHT: 155.4 LBS | OXYGEN SATURATION: 97 % | SYSTOLIC BLOOD PRESSURE: 136 MMHG | BODY MASS INDEX: 26.53 KG/M2

## 2017-12-12 DIAGNOSIS — R91.1 INCIDENTAL LUNG NODULE, > 3MM AND < 8MM: ICD-10-CM

## 2017-12-12 DIAGNOSIS — Z72.0 TOBACCO ABUSE: ICD-10-CM

## 2017-12-12 DIAGNOSIS — R91.1 INCIDENTAL LUNG NODULE, > 3MM AND < 8MM: Primary | ICD-10-CM

## 2017-12-12 DIAGNOSIS — J43.9 PULMONARY EMPHYSEMA, UNSPECIFIED EMPHYSEMA TYPE (HCC): Primary | ICD-10-CM

## 2017-12-12 PROCEDURE — 99214 OFFICE O/P EST MOD 30 MIN: CPT | Performed by: NURSE PRACTITIONER

## 2017-12-12 PROCEDURE — 99406 BEHAV CHNG SMOKING 3-10 MIN: CPT | Performed by: NURSE PRACTITIONER

## 2017-12-12 RX ORDER — FLUTICASONE FUROATE AND VILANTEROL 100; 25 UG/1; UG/1
1 POWDER RESPIRATORY (INHALATION) DAILY
Qty: 30 EACH | Refills: 2 | Status: SHIPPED | OUTPATIENT
Start: 2017-12-12 | End: 2018-03-05 | Stop reason: SDUPTHER

## 2017-12-12 ASSESSMENT — ENCOUNTER SYMPTOMS
COUGH: 1
VOMITING: 0
DIARRHEA: 0
BLURRED VISION: 0
HEMOPTYSIS: 1
SHORTNESS OF BREATH: 1
DOUBLE VISION: 0
SPUTUM PRODUCTION: 1
NAUSEA: 0
WHEEZING: 1

## 2017-12-12 NOTE — PROGRESS NOTES
[facility]    Patient: Arturo Leo, 74 y.o.   : 2017    Pt of Dr. Griselda Ventura   Patient presents with    Abnormal CT     f/u from [facility] CT Chest per AL Ricketts, CT 17    Medication Refill     Spir[employer] Handihaler        Shortness of Breath   This is a chronic problem. The current episode started more than 1 year ago. The problem occurs daily. The problem has been gradually worsening. Associated symptoms include hemoptysis, sputum production and wheezing. Pertinent negatives include no chest pain, fever or vomiting. The symptoms are aggravated by fumes, lying flat, weather changes and any activity. Associated symptoms comments: Intermittent chest pain not currently having any chest pain last time was on 17  2 weeks ago patient reports having brought up small flecks of blood in her sputum when coughing . Risk factors include smoking. She has tried rest and beta agonist inhalers (has used husbands albuterol nebulizer and noticed improvement in SOB) for the symptoms. The treatment provided moderate relief. Her past medical history is significant for COPD and DVT. Alan Denton is here for follow up for Abnormal CT. Down to 1.5 PPD as of 17    Progress History:   Since last visit any new medical issues? Yes  New ER or hospital visits? Yes  For SOB and chest pain  Any new or changes in medicines? No  Using inhalers? Yes   Are they helpful? Yes   Flu vaccine? No does not want   Pneumonia vaccine?  No does not want   Past Medical hx   PMH:  Past Medical History:   Diagnosis Date    Anxiety 2007    Arthritis     Breast cancer [facility]) 2005    right mastectomy, chemo and radiation history    CAD (coronary artery disease) 2001    Cancer of the skin 2004    Cerebral artery occlusion with cerebral infarction ([address]     Chronic UTI     COPD [facility] disease) [facility]     CVA (cerebral infarction) 2007, 2008    Depression 2007    DVT of lower extremity (deep venous thrombosis) [facility] 1976    Facial injury 2005    Fall on greasy ground, striking left periorbital region    History of stroke 2007, 2008, 2009    Patient relates a stroke with right weakness and speech in 2007; another in 2010 or 2012 (unsure), both with need to rehabilitation.   Also \"2 mini-strokes\" (?)    Hyperlipidemia 2005    Hypertension 2005    Hypothyroidism     IBS (irritable bowel syndrome)     Low HDL (under 40) 2014    Recurrent UTI (urinary tract infection) 2015    Dr Edin Waldrop finding difficulty 05/16/2017    work-up in progress     SURGICAL HISTORY:  Past Surgical History:   Procedure Laterality Date   [address]    COLONOSCOPY  2009   [facility] HIP SURGERY  jan 19, 2015    HYSTERECTOMY  1976    JOINT REPLACEMENT Left 2011    HIP    JOINT REPLACEMENT Right 1/19/15    Right Total Hip Replacement - Dr. Olimpia Baum, PARTIAL  2005    right    [address], 2001    ovaries    THYROID SURGERY  2007    right lobe     SOCIAL HISTORY:  Social History   Substance Use Topics    Smoking status: Current Every Day Smoker     Packs/day: 2.00     Years: 48.00     Types: Cigarettes     Start date: 11/13/1967    Smokeless tobacco: Never Used      Comment: 1.5ppd 12/12/17    Alcohol use No     ALLERGIES:  Allergies   Allergen Reactions    Cipro Xr     Vicodin [Hydrocodone-Acetaminophen]      Weird dreams       FAMILY HISTORY:  Family History   Problem Relation Age of Onset    Cancer Mother     Osteoporosis Mother     Hypertension Mother     High Cholesterol Mother     Stroke Mother     Cancer Father      lung cancer    Heart Disease Father     Hypertension Father     High Cholesterol Father     Heart Disease Sister     High Cholesterol Sister     Hypertension Sister     Asthma Sister     Other Other      high arched feet    Lung Cancer Son      CURRENT MEDICATIONS:  Current Outpatient Prescriptions   Medication Sig Dispense Refill    fluticasone-vilanterol (BREO ELLIPTA) 100-25 MCG/INH AEPB inhaler Inhale 1 puff into the lungs daily Rinse mouth after its use. 30 each 2    ondansetron (ZOFRAN-ODT) 4 MG disintegrating tablet Take 1 tablet by mouth every 8 hours as needed for Nausea or Vomiting 30 tablet 2    metoprolol tartrate (LOPRESSOR) 25 MG tablet Take 1 tablet by mouth 2 times daily 60 tablet 5    Blood Pressure Monitoring (BLOOD PRESSURE MONITOR/M CUFF) MISC Check blood pressure twice daily 1 each 0    atorvastatin (LIPITOR) 40 MG tablet Take 1 tablet by mouth nightly 30 tablet 5    escitalopram (LEXAPRO) 20 MG tablet Take 1 tablet by mouth daily 30 tablet 5    levothyroxine (LEVOTHROID) 88 MCG tablet Take 1 tablet by mouth daily 30 tablet 5    aspirin 81 MG EC tablet Take 1 tablet by mouth daily 30 tablet 3    isosorbide dinitrate (ISORDIL) 10 MG tablet Take 1 tablet by mouth 2 times daily 90 tablet 3    tiotropium (SPIRIVA HANDIHALER) 18 MCG inhalation capsule Inhale 1 capsule into the lungs daily 30 capsule 3    clopidogrel (PLAVIX) 75 MG tablet Take 1 tablet by mouth daily 30 tablet 3     No current facility-administered medications for this visit. Jignesh MACDONALD   Review of Systems   Constitutional: Positive for weight loss. Negative for chills and fever. HENT: Negative for hearing loss and tinnitus. Eyes: Negative for blurred vision and double vision. Respiratory: Positive for cough, hemoptysis, sputum production, shortness of breath and wheezing. Cardiovascular: Negative for chest pain. Gastrointestinal: Negative for diarrhea, nausea and vomiting. Genitourinary: Negative for dysuria.         Physical exam   /86   Pulse 75   Temp 97.7 °F (36.5 °C) (Oral)   Ht 5' 4\" (1.626 m)   Wt 155 lb 6.4 oz (70.5 kg)   SpO2 97% Comment: RA at rest  BMI 26.67 kg/m²    Used to have weigh around 178 currently weighing 155 today 23lb

## 2017-12-12 NOTE — PATIENT INSTRUCTIONS
Health Maintenance Due   Topic Date Due    DTaP/Tdap/Td vaccine (1 - Tdap) 01/17/1971    Colon cancer screen colonoscopy  01/17/2002    Flu vaccine (1) 09/01/2017       Patient Education        Learning About Benefits From Quitting Smoking  How does quitting smoking make you healthier? If you're thinking about quitting smoking, you may have a few reasons to be smoke-free. Your health may be one of them. · When you quit smoking, you lower your risks for cancer, lung disease, heart attack, stroke, blood vessel disease, and blindness from macular degeneration. · When you're smoke-free, you get sick less often, and you heal faster. You are less likely to get colds, flu, bronchitis, and pneumonia. · As a nonsmoker, you may find that your mood is better and you are less stressed. When and how will you feel healthier? Quitting has real health benefits that start from day 1 of being smoke-free. And the longer you stay smoke-free, the healthier you get and the better you feel. The first hours  · After just 20 minutes, your blood pressure and heart rate go down. That means there's less stress on your heart and blood vessels. · Within 12 hours, the level of carbon monoxide in your blood drops back to normal. That makes room for more oxygen. With more oxygen in your body, you may notice that you have more energy than when you smoked. After 2 weeks  · Your lungs start to work better. · Your risk of heart attack starts to drop. After 1 month  · When your lungs are clear, you cough less and breathe deeper, so it's easier to be active. · Your sense of taste and smell return. That means you can enjoy food more than you have since you started smoking. Over the years  · After 1 year, your risk of heart disease is half what it would be if you kept smoking. · After 5 years, your risk of stroke starts to shrink. Within a few years after that, it's about the same as if you'd never smoked.   · After 10 years, your risk of dying from lung cancer is cut by about half. And your risk for many other types of cancer is lower too. How would quitting help others in your life? When you quit smoking, you improve the health of everyone who now breathes in your smoke. · Their heart, lung, and cancer risks drop, much like yours. · They are sick less. For babies and small children, living smoke-free means they're less likely to have ear infections, pneumonia, and bronchitis. · If you're a woman who is or will be pregnant someday, quitting smoking means a healthier . · Children who are close to you are less likely to become adult smokers. Where can you learn more? Go to https://[employer]. org and sign in to your [employer] account. Enter [phone] 72 44 in the [employer] box to learn more about \"Learning About Benefits From Quitting Smoking. \"     If you do not have an account, please click on the \"Sign Up Now\" link. Current as of: 2017  Content Version: 11.4  © 9938-1879 [employer]wise, Incorporated. Care instructions adapted under license by [facility] ([facility]). If you have questions about a medical condition or this instruction, always ask your healthcare professional. [place] [address] any warranty or liability for your use of this information.

## 2017-12-27 ENCOUNTER — CARE COORDINATION (OUTPATIENT)
Dept: CARE COORDINATION | Age: 67
End: 2017-12-27

## 2017-12-27 ASSESSMENT — ENCOUNTER SYMPTOMS: DYSPNEA ASSOCIATED WITH: EXERTION

## 2017-12-27 NOTE — CARE COORDINATION
Spoke with [place]. Blood tinged sputum, increase cough, SOB, wheezing increasing over last couple weeks. Declined appointment today as states back hurting and doesn't feel well. No coughing or SOB during call. Denies fever. Agreed to appointment tomorrow. Scheduled with Brian Gates at 1 pm.  Was seen at pulmonology office 12/12/17. Started inhaler [employer], daily.

## 2017-12-28 ENCOUNTER — OFFICE VISIT (OUTPATIENT)
Dept: FAMILY MEDICINE CLINIC | Age: 67
End: 2017-12-28
Payer: MEDICARE

## 2017-12-28 ENCOUNTER — TELEPHONE (OUTPATIENT)
Dept: FAMILY MEDICINE CLINIC | Age: 67
End: 2017-12-28

## 2017-12-28 VITALS
SYSTOLIC BLOOD PRESSURE: 131 MMHG | WEIGHT: 154.6 LBS | HEART RATE: 85 BPM | BODY MASS INDEX: 26.4 KG/M2 | OXYGEN SATURATION: 95 % | TEMPERATURE: 97.5 F | DIASTOLIC BLOOD PRESSURE: 60 MMHG | HEIGHT: 64 IN

## 2017-12-28 DIAGNOSIS — M54.41 ACUTE RIGHT-SIDED LOW BACK PAIN WITH RIGHT-SIDED SCIATICA: ICD-10-CM

## 2017-12-28 DIAGNOSIS — Z72.0 TOBACCO ABUSE: ICD-10-CM

## 2017-12-28 DIAGNOSIS — J44.1 CHRONIC OBSTRUCTIVE PULMONARY DISEASE WITH ACUTE EXACERBATION (HCC): Primary | ICD-10-CM

## 2017-12-28 PROCEDURE — 3023F SPIROM DOC REV: CPT | Performed by: NURSE PRACTITIONER

## 2017-12-28 PROCEDURE — 3014F SCREEN MAMMO DOC REV: CPT | Performed by: NURSE PRACTITIONER

## 2017-12-28 PROCEDURE — G8484 FLU IMMUNIZE NO ADMIN: HCPCS | Performed by: NURSE PRACTITIONER

## 2017-12-28 PROCEDURE — G8926 SPIRO NO PERF OR DOC: HCPCS | Performed by: NURSE PRACTITIONER

## 2017-12-28 PROCEDURE — G8598 ASA/ANTIPLAT THER USED: HCPCS | Performed by: NURSE PRACTITIONER

## 2017-12-28 PROCEDURE — G8417 CALC BMI ABV UP PARAM F/U: HCPCS | Performed by: NURSE PRACTITIONER

## 2017-12-28 PROCEDURE — G8400 PT W/DXA NO RESULTS DOC: HCPCS | Performed by: NURSE PRACTITIONER

## 2017-12-28 PROCEDURE — 3017F COLORECTAL CA SCREEN DOC REV: CPT | Performed by: NURSE PRACTITIONER

## 2017-12-28 PROCEDURE — G8427 DOCREV CUR MEDS BY ELIG CLIN: HCPCS | Performed by: NURSE PRACTITIONER

## 2017-12-28 PROCEDURE — 99214 OFFICE O/P EST MOD 30 MIN: CPT | Performed by: NURSE PRACTITIONER

## 2017-12-28 PROCEDURE — 1090F PRES/ABSN URINE INCON ASSESS: CPT | Performed by: NURSE PRACTITIONER

## 2017-12-28 PROCEDURE — 4040F PNEUMOC VAC/ADMIN/RCVD: CPT | Performed by: NURSE PRACTITIONER

## 2017-12-28 PROCEDURE — 1123F ACP DISCUSS/DSCN MKR DOCD: CPT | Performed by: NURSE PRACTITIONER

## 2017-12-28 PROCEDURE — 4004F PT TOBACCO SCREEN RCVD TLK: CPT | Performed by: NURSE PRACTITIONER

## 2017-12-28 RX ORDER — ACETAMINOPHEN 500 MG
500 TABLET ORAL EVERY 6 HOURS PRN
Qty: 120 TABLET | Refills: 3 | Status: SHIPPED | OUTPATIENT
Start: 2017-12-28 | End: 2021-03-08

## 2017-12-28 RX ORDER — FLUTICASONE FUROATE AND VILANTEROL 100; 25 UG/1; UG/1
1 POWDER RESPIRATORY (INHALATION) DAILY
Qty: 2 EACH | Refills: 0 | COMMUNITY
Start: 2017-12-28 | End: 2018-03-05 | Stop reason: SDUPTHER

## 2017-12-28 RX ORDER — AMOXICILLIN AND CLAVULANATE POTASSIUM 875; 125 MG/1; MG/1
1 TABLET, FILM COATED ORAL 2 TIMES DAILY
Qty: 20 TABLET | Refills: 0 | Status: SHIPPED | OUTPATIENT
Start: 2017-12-28 | End: 2018-01-07

## 2017-12-28 RX ORDER — IPRATROPIUM BROMIDE AND ALBUTEROL SULFATE 2.5; .5 MG/3ML; MG/3ML
1 SOLUTION RESPIRATORY (INHALATION) EVERY 6 HOURS PRN
Qty: 360 ML | Refills: 2 | Status: SHIPPED | OUTPATIENT
Start: 2017-12-28 | End: 2020-07-24

## 2017-12-28 RX ORDER — NEBULIZER ACCESSORIES
1 KIT MISCELLANEOUS DAILY PRN
Qty: 1 KIT | Refills: 0 | Status: SHIPPED | OUTPATIENT
Start: 2017-12-28 | End: 2018-04-09

## 2017-12-28 ASSESSMENT — ENCOUNTER SYMPTOMS
ABDOMINAL DISTENTION: 0
ANAL BLEEDING: 0
EYE REDNESS: 0
CONSTIPATION: 0
SORE THROAT: 0
WHEEZING: 1
EYE DISCHARGE: 0
DIARRHEA: 0
COLOR CHANGE: 0
SHORTNESS OF BREATH: 1
BLOOD IN STOOL: 0
NAUSEA: 0
RHINORRHEA: 0
BACK PAIN: 1
ABDOMINAL PAIN: 0
COUGH: 1

## 2017-12-28 NOTE — PATIENT INSTRUCTIONS
You may receive a survey about your visit with us today. The feedback from our patients helps us identify what is working well and where the service to all patients can be enhanced. Thank you! · Will give Breo samples  · Will start Augmentin - take twice daily for 10 days  · Will send in Duoneb for the nebulizer machine  · Will order you your own tubing for the machine  · Get plenty of rest  · Increase fluids  · Avoid secondhand smoke  · Can use the [place] to rinse the sinuses as needed  · Cool-mist humidifier at night   · Robitussin liquid for congestion  · Low back stretching   · Moist heat locally. · Back protective techniques reviewed, along with sleep positioning. · Will get XRay studies if not improving. · Call or return to clinic as needed if these symptoms worsen or fail to improve as anticipated. Patient Education        Chronic Obstructive Pulmonary Disease (COPD): Care Instructions  Your Care Instructions    Chronic obstructive pulmonary disease (COPD) is a general term for a group of lung diseases, including emphysema and chronic bronchitis. People with COPD have decreased airflow in and out of the lungs, which makes it hard to breathe. The airways also can get clogged with thick mucus. Cigarette smoking is a major cause of COPD. Although there is no cure for COPD, you can slow its progress. Following your treatment plan and taking care of yourself can help you feel better and live longer. Follow-up care is a key part of your treatment and safety. Be sure to make and go to all appointments, and call your doctor if you are having problems. It's also a good idea to know your test results and keep a list of the medicines you take. How can you care for yourself at home? ?Staying healthy  ? · Do not smoke. This is the most important step you can take to prevent more damage to your lungs. If you need help quitting, talk to your doctor about stop-smoking programs and medicines.  These can increase your chances of quitting for good. ? · Avoid colds and flu. Get a pneumococcal vaccine shot. If you have had one before, ask your doctor whether you need a second dose. Get the flu vaccine every fall. If you must be around people with colds or the flu, wash your hands often. ? · Avoid secondhand smoke, air pollution, and high altitudes. Also avoid cold, dry air and hot, humid air. Stay at home with your windows closed when air pollution is bad. ?Medicines and oxygen therapy  ? · Take your medicines exactly as prescribed. Call your doctor if you think you are having a problem with your medicine. ? · You may be taking medicines such as:  ¨ Bronchodilators. These help open your airways and make breathing easier. Bronchodilators are either short-acting (work for 6 to 9 hours) or long-acting (work for 24 hours). You inhale most bronchodilators, so they start to act quickly. Always carry your quick-relief inhaler with you in case you need it while you are away from home. ¨ Corticosteroids (prednisone, budesonide). These reduce airway inflammation. They come in pill or inhaled form. You must take these medicines every day for them to work well. ? · A spacer may help you get more inhaled medicine to your lungs. Ask your doctor or pharmacist if a spacer is right for you. If it is, ask how to use it properly. ? · Do not take any vitamins, over-the-counter medicine, or herbal products without talking to your doctor first.   ? · If your doctor prescribed antibiotics, take them as directed. Do not stop taking them just because you feel better. You need to take the full course of antibiotics. ? · Oxygen therapy boosts the amount of oxygen in your blood and helps you breathe easier. Use the flow rate your doctor has recommended, and do not change it without talking to your doctor first.   Activity  ? · Get regular exercise. Walking is an easy way to get exercise.  Start out slowly, and walk a little more This is a called an exacerbation (say \"egg-ZASS-er-BAY-shun\"). When this happens, your usual symptoms quickly get worse and stay bad. This can be dangerous. You may have to go to the hospital.  How can you keep chronic bronchitis from getting worse? Don't smoke. That is the best way to keep chronic bronchitis from getting worse. If you already smoke, it is never too late to stop. If you need help quitting, talk to your doctor about stop-smoking programs and medicines. These can increase your chances of quitting for good. You can do other things to keep chronic bronchitis from getting worse:  · Avoid bad air. Air pollution, chemical fumes, and dust also can make chronic bronchitis worse. · Get a flu shot every year. A shot may keep the flu from turning into something more serious, like pneumonia. A flu shot also may lower your chances of having a flare-up. · Get a pneumococcal shot. A shot can prevent some of the serious complications of pneumonia. Ask your doctor how often you should get this shot. How is chronic bronchitis treated? Chronic bronchitis is treated with medicines and oxygen. You also can take steps at home to stay healthy and keep your condition from getting worse. Medicines and oxygen therapy  · You may be taking medicines such as:  ¨ Bronchodilators. These help open your airways and make breathing easier. Bronchodilators are either short-acting (work for 6 to 9 hours) or long-acting (work for 24 hours). You inhale most bronchodilators, so they start to act quickly. Always carry your quick-relief inhaler with you in case you need it while you are away from home. ¨ Corticosteroids. These reduce airway inflammation. They come in pill or inhaled form. You must take these medicines every day for them to work well. ¨ Antibiotics. These medicines are used when you have a bacterial lung infection. · Take your medicines exactly as prescribed.  Call your doctor if you think you are having a problem with your medicine. · Oxygen therapy boosts the amount of oxygen in your blood and helps you breathe easier. Use the flow rate your doctor has recommended, and do not change it without talking to your doctor first.  Other care at home  · If your doctor recommends it, get more exercise. Walking is a good choice. Bit by bit, increase the amount you walk every day. Try for at least 30 minutes on most days of the week. · Learn breathing methods-such as breathing through pursed lips-to help you become less short of breath. · If your doctor has not set you up with a pulmonary rehabilitation program, talk to him or her about whether rehab is right for you. Rehab includes exercise programs, education about your disease and how to manage it, help with diet and other changes, and emotional support. · Eat regular, healthy meals. Use bronchodilators about 1 hour before you eat to make it easier to eat. Eat several small meals instead of three large ones. Drink beverages at the end of the meal. Avoid foods that are hard to chew. Follow-up care is a key part of your treatment and safety. Be sure to make and go to all appointments, and call your doctor if you are having problems. It's also a good idea to know your test results and keep a list of the medicines you take. Where can you learn more? Go to https://Excelera.ShopSavvy. org and sign in to your enercast account. Enter Y628 in the [facility] box to learn more about \"Learning About Chronic Bronchitis. \"     If you do not have an account, please click on the \"Sign Up Now\" link. Current as of: May 12, 2017  Content Version: 11.4  © 8996-6465 Healthwise, Incorporated. Care instructions adapted under license by [facility] ([facility]). If you have questions about a medical condition or this instruction, always ask your healthcare professional. [place] [address] any warranty or liability for your use of this information.        Patient Education Back Pain: Care Instructions  Your Care Instructions    Back pain has many possible causes. It is often related to problems with muscles and ligaments of the back. It may also be related to problems with the nerves, discs, or bones of the back. Moving, lifting, standing, sitting, or sleeping in an awkward way can strain the back. Sometimes you don't notice the injury until later. Arthritis is another common cause of back pain. Although it may hurt a lot, back pain usually improves on its own within several weeks. Most people recover in 12 weeks or less. Using good home treatment and being careful not to stress your back can help you feel better sooner. Follow-up care is a key part of your treatment and safety. Be sure to make and go to all appointments, and call your doctor if you are having problems. It's also a good idea to know your test results and keep a list of the medicines you take. How can you care for yourself at home? · Sit or lie in positions that are most comfortable and reduce your pain. Try one of these positions when you lie down:  ¨ Lie on your back with your knees bent and supported by large pillows. ¨ Lie on the floor with your legs on the seat of a sofa or chair. Cherylyn [place] on your side with your knees and hips bent and a pillow between your legs. ¨ Lie on your stomach if it does not make pain worse. · Do not sit up in bed, and avoid soft couches and twisted positions. Bed rest can help relieve pain at first, but it delays healing. Avoid bed rest after the first day of back pain. · Change positions every 30 minutes. If you must sit for long periods of time, take breaks from sitting. Get up and walk around, or lie in a comfortable position. · Try using a heating pad on a low or medium setting for 15 to 20 minutes every 2 or 3 hours. Try a warm shower in place of one session with the heating pad. · You can also try an ice pack for 10 to 15 minutes every 2 to 3 hours.  Put a thin cloth Instructions  Your Care Instructions    If you have chronic obstructive pulmonary disease (COPD), your usual shortness of breath could suddenly get worse. You may start coughing more and have more mucus. This flare-up is called a COPD exacerbation (say \"de-KKI-jt-BAY-shun\"). A lung infection or air pollution could set off an exacerbation. Sometimes it can happen after a quick change in temperature or being around chemicals. Work with your doctor to make a plan for dealing with an exacerbation. You can better manage it if you plan ahead. Follow-up care is a key part of your treatment and safety. Be sure to make and go to all appointments, and call your doctor if you are having problems. It's also a good idea to know your test results and keep a list of the medicines you take. How can you care for yourself at home? During an exacerbation  · Do not panic if you start to have one. Quick treatment at home may help you prevent serious breathing problems. If you have a COPD exacerbation plan that you developed with your doctor, follow it. · Take your medicines exactly as your doctor tells you. ¨ Use your inhaler as directed by your doctor. If your symptoms do not get better after you use your medicine, have someone take you to the emergency room. Call an ambulance if necessary. ¨ With inhaled medicines, a spacer or a nebulizer may help you get more medicine to your lungs. Ask your doctor or pharmacist how to use them properly. Practice using the spacer in front of a mirror before you have an exacerbation. This may help you get the medicine into your lungs quickly. ¨ If your doctor has given you steroid pills, take them as directed. ¨ Your doctor may have given you a prescription for antibiotics, which you can fill if you need it. ¨ Talk to your doctor if you have any problems with your medicine. And call your doctor if you have to use your antibiotic or steroid pills. Preventing an exacerbation  · Do not smoke. This is the most important step you can take to prevent more damage to your lungs and prevent problems. If you already smoke, it is never too late to stop. If you need help quitting, talk to your doctor about stop-smoking programs and medicines. These can increase your chances of quitting for good. · Take your daily medicines as prescribed. · Avoid colds and flu. ¨ Get a pneumococcal vaccine. ¨ Get a flu vaccine each year, as soon as it is available. Ask those you live or work with to do the same, so they will not get the flu and infect you. ¨ Try to stay away from people with colds or the flu. ¨ Wash your hands often. · Avoid secondhand smoke; air pollution; cold, dry air; hot, humid air; and high altitudes. Stay at home with your windows closed when air pollution is bad. · Learn breathing techniques for COPD, such as breathing through pursed lips. These techniques can help you breathe easier during an exacerbation. When should you call for help? Call 911 anytime you think you may need emergency care. For example, call if:  ? · You have severe trouble breathing. ? · You have severe chest pain. ?Call your doctor now or seek immediate medical care if:  ? · You have new or worse shortness of breath. ? · You develop new chest pain. ? · You are coughing more deeply or more often, especially if you notice more mucus or a change in the color of your mucus. ? · You cough up blood. ? · You have new or increased swelling in your legs or belly. ? · You have a fever. ? Watch closely for changes in your health, and be sure to contact your doctor if:  ? · You need to use your antibiotic or steroid pills. ? · Your symptoms are getting worse. Where can you learn more? Go to https://AstroloMenkcehi.51fanli. org and sign in to your Lawdingo account. Enter U280 in the [employer] box to learn more about \"COPD Exacerbation Plan: Care Instructions. \"     If you do not have an account, please should feel the hand on your belly move out, while the hand on your chest does not move. ¨ When you breathe out, you should feel the hand on your belly move in. When you can do this type of breathing well while lying down, learn to do it while sitting or standing. Many people with COPD find this breathing method helpful. ¨ Practice diaphragmatic breathing for 20 minutes, 2 or 3 times a day. · Breathing while bending forward at the waist may make breathing easier. It can reduce shortness of breath while you exercise or rest. It helps the diaphragm move more easily. The diaphragm is a large muscle that separates your lungs from your belly. It helps draw air into your lungs as you breathe. · Do not smoke. Smoking makes COPD worse. If you need help quitting, talk to your doctor about stop-smoking programs and medicines. These can increase your chances of quitting for good. When should you call for help? Call your doctor now or seek immediate medical care if:  ? · Your breathing methods do not help. ? · Your shortness of breath gets worse. ? · You cough up blood. ? · You have swelling in your belly and legs. ? · You have severe chest pain. ? Watch closely for changes in your health, and be sure to contact your doctor if you have any problems. Where can you learn more? Go to https://Phico Therapeutics.Synthetic Biologics. org and sign in to your [employer] account. Enter X172 in the [employer] box to learn more about \"Breathing Techniques for COPD: Care Instructions. \"     If you do not have an account, please click on the \"Sign Up Now\" link. Current as of: May 12, 2017  Content Version: 11.4  © 7729-5493 Healthwise, Incorporated. Care instructions adapted under license by [facility] ([facility]). If you have questions about a medical condition or this instruction, always ask your healthcare professional. [place] [address] any warranty or liability for your use of this information.        Patient hips. Use a small pillow under your head. Keep your arms at your sides. Then follow these instructions for breathing:  ¨ Breathe in: With one hand on your belly and the other on your chest, breathe in. Push your belly out as far as possible. You should be able to feel the hand on your belly move out, while the hand on your chest should not move. ¨ Breathe out: When you breathe out, you should be able to feel the hand on your belly move in. This is called diaphragmatic breathing. You will use it in the other drainage positions too. 2. To drain the sides of your lungs: Place two or three pillows under your hips. Use a small pillow under your head. Make sure your chest is lower than your hips. Use diaphragmatic breathing. After 5 or 10 minutes, switch sides. 3. To drain the back of your lungs: Place two or three pillows under your hips. Use a small pillow under your head. Kneel over the pillows. Place your arms by your head. Use diaphragmatic breathing. How do you do chest percussion? Chest percussion means that you lightly tap your chest and back. The tapping loosens the mucus in your lungs. · Cup your hand, and lightly tap your chest and back. · Ask your doctor where the best spots are to tap. Avoid your spine and breastbone. · It may be easier to have someone do the tapping for you. Follow-up care is a key part of your treatment and safety. Be sure to make and go to all appointments, and call your doctor if you are having problems. It's also a good idea to know your test results and keep a list of the medicines you take. Where can you learn more? Go to https://Blueheath Holdingspeflorencioeweb.Viroblock. org and sign in to your OvaScience account. Enter G974 in the [employer] box to learn more about \"Learning About COPD and Clearing Your Lungs. \"     If you do not have an account, please click on the \"Sign Up Now\" link. Current as of: May 12, 2017  Content Version: 11.4  © 4164-2754 Healthwise, SMX. sleep. Follow-up care is a key part of your treatment and safety. Be sure to make and go to all appointments, and call your doctor if you are having problems. It's also a good idea to know your test results and keep a list of the medicines you take. Where can you learn more? Go to https://chpepiceweb.Nobex Technologies. org and sign in to your Altruik account. Enter Y037 in the [employer] box to learn more about \"Learning About COPD and How to Prevent Lung Infections. \"     If you do not have an account, please click on the \"Sign Up Now\" link. Current as of: May 12, 2017  Content Version: 11.4  © 1289-7854 [employer]. Care instructions adapted under license by [facility] ([facility]). If you have questions about a medical condition or this instruction, always ask your healthcare professional. [place] [address] any warranty or liability for your use of this information. Patient Education        Low Back Pain: Exercises  Your Care Instructions  Here are some examples of typical rehabilitation exercises for your condition. Start each exercise slowly. Ease off the exercise if you start to have pain. Your doctor or physical therapist will tell you when you can start these exercises and which ones will work best for you. How to do the exercises  Press-up    6. Lie on your stomach, supporting your body with your forearms. 7. Press your elbows down into the floor to raise your upper back. As you do this, relax your stomach muscles and allow your back to arch without using your back muscles. As your press up, do not let your hips or pelvis come off the floor. 8. Hold for 15 to 30 seconds, then relax. 9. Repeat 2 to 4 times. Alternate arm and leg (bird dog) exercise    Do this exercise slowly. Try to keep your body straight at all times, and do not let one hip drop lower than the other. 11. Start on the floor, on your hands and knees. 12. Tighten your belly muscles.   13. Raise one leg off the floor, and hold it straight out behind you. Be careful not to let your hip drop down, because that will twist your trunk. 14. Hold for about 6 seconds, then lower your leg and switch to the other leg. 15. Repeat 8 to 12 times on each leg. 16. Over time, work up to holding for 10 to 30 seconds each time. 17. If you feel stable and secure with your leg raised, try raising the opposite arm straight out in front of you at the same time. Knee-to-chest exercise    6. Lie on your back with your knees bent and your feet flat on the floor. 7. Bring one knee to your chest, keeping the other foot flat on the floor (or keeping the other leg straight, whichever feels better on your lower back). 8. Keep your lower back pressed to the floor. Hold for at least 15 to 30 seconds. 9. Relax, and lower the knee to the starting position. 10. Repeat with the other leg. Repeat 2 to 4 times with each leg. 11. To get more stretch, put your other leg flat on the floor while pulling your knee to your chest.  Curl-ups    1. Lie on the floor on your back with your knees bent at a 90-degree angle. Your feet should be flat on the floor, about 12 inches from your buttocks. 2. Cross your arms over your chest. If this bothers your neck, try putting your hands behind your neck (not your head), with your elbows spread apart. 3. Slowly tighten your belly muscles and raise your shoulder blades off the floor. 4. Keep your head in line with your body, and do not press your chin to your chest.  5. Hold this position for 1 or 2 seconds, then slowly lower yourself back down to the floor. 6. Repeat 8 to 12 times. Pelvic tilt exercise    1. Lie on your back with your knees bent. 2. \"Brace\" your stomach. This means to tighten your muscles by pulling in and imagining your belly button moving toward your spine. You should feel like your back is pressing to the floor and your hips and pelvis are rocking back.   3. Hold for about 6 seconds while you breathe smoothly. 4. Repeat 8 to 12 times. Heel dig bridging    1. Lie on your back with both knees bent and your ankles bent so that only your heels are digging into the floor. Your knees should be bent about 90 degrees. 2. Then push your heels into the floor, squeeze your buttocks, and lift your hips off the floor until your shoulders, hips, and knees are all in a straight line. 3. Hold for about 6 seconds as you continue to breathe normally, and then slowly lower your hips back down to the floor and rest for up to 10 seconds. 4. Do 8 to 12 repetitions. Hamstring stretch in doorway    1. Lie on your back in a doorway, with one leg through the open door. 2. Slide your leg up the wall to straighten your knee. You should feel a gentle stretch down the back of your leg. 3. Hold the stretch for at least 15 to 30 seconds. Do not arch your back, point your toes, or bend either knee. Keep one heel touching the floor and the other heel touching the wall. 4. Repeat with your other leg. 5. Do 2 to 4 times for each leg. Hip flexor stretch    1. Kneel on the floor with one knee bent and one leg behind you. Place your forward knee over your foot. Keep your other knee touching the floor. 2. Slowly push your hips forward until you feel a stretch in the upper thigh of your rear leg. 3. Hold the stretch for at least 15 to 30 seconds. Repeat with your other leg. 4. Do 2 to 4 times on each side. Wall sit    2. Stand with your back 10 to 12 inches away from a wall. 3. Lean into the wall until your back is flat against it. 4. Slowly slide down until your knees are slightly bent, pressing your lower back into the wall. 5. Hold for about 6 seconds, then slide back up the wall. 6. Repeat 8 to 12 times. Follow-up care is a key part of your treatment and safety. Be sure to make and go to all appointments, and call your doctor if you are having problems.  It's also a good idea to know your test results hands and knees. 13. Let your back sway by pressing your stomach toward the floor. Lift your buttocks toward the ceiling. 14. Hold this position for 15 to 30 seconds. 15. Repeat 2 to 4 times. Follow-up care is a key part of your treatment and safety. Be sure to make and go to all appointments, and call your doctor if you are having problems. It's also a good idea to know your test results and keep a list of the medicines you take. Where can you learn more? Go to https://NeuralievepeAppNexus.Earth Renewable [email or web address] org and sign in to your Squabbler account. Enter B329 in the [employer] box to learn more about \"Acute Low Back Pain: Exercises. \"     If you do not have an account, please click on the \"Sign Up Now\" link. Current as of: March 21, 2017  Content Version: 11.4  © 8200-9871 [employer]. Care instructions adapted under license by [facility] ([facility]). If you have questions about a medical condition or this instruction, always ask your healthcare professional. [place] [address] any warranty or liability for your use of this information. Patient Education        Getting Back to Normal After Low Back Pain: Care Instructions  Your Care Instructions  Almost everyone has low back pain at some time. The good news is that most low back pain will go away in a few days or weeks with some basic self-care. Some people are afraid that doing too much may make their pain worse. In the past, people stayed in bed, thinking this would help their backs. Now doctors think that, in most cases, getting back to your normal activities is good for your back, as long as you avoid doing things that make your pain worse. Follow-up care is a key part of your treatment and safety. Be sure to make and go to all appointments, and call your doctor if you are having problems. It's also a good idea to know your test results and keep a list of the medicines you take. How can you care for yourself at home?   Ease back into daily activities  · For the first day or two of pain, take it easy. But as soon as possible, get back to your normal daily life and activities. · Get gentle exercise, such as walking. Movement keeps your spine flexible and helps your muscles stay strong. · If you are an athlete, return to your activity carefully. Choose a low-impact option until your pain is under control. Avoid or change activities that cause pain  · Try to avoid too much bending, heavy lifting, or reaching. These movements put extra stress on your back. · In bed, try lying on your side with a pillow between your knees. Or lie on your back on the floor with a pillow under your knees. · When you sit, place a small pillow, a rolled-up towel, or a lumbar roll in the curve of your back for extra support. · Try putting one foot up on a stool or changing positions every few minutes if you have to stand still for a period of time. Pay attention to body mechanics and posture  Body mechanics are the way you use your body. Posture is the way you sit or stand. · Take extra care when you lift. When you must lift, bend your knees and keep your back straight. Avoid twisting, and keep the load close to your body. · Stand or sit tall, with your shoulders back and your stomach pulled in to support your back. Get support when you need it  · Let people know when you need a helping hand. Get family members or friends to help out with tasks you cannot do right now. · Be honest with your doctor about how the pain affects you. · If you have had to take time off work, talk to your doctor and boss about a gradual [id number] plan. Find out if there are other ways you could do your job to avoid hurting your back again. Reduce stress  Worrying about the pain can cause you to tense the muscles in your lower back. This in turn causes more pain.  Here are a few things you can do to relax your mind and your muscles:  · Take 10 to 15 minutes to sit quietly and breathe deeply. Try to focus only on your breathing. If you cannot keep thoughts away, think about things that make you feel good. · Get involved in your favorite hobby, or try something new. · Talk to a friend, read a book, or listen to your favorite music. · Find a counselor you like and trust. Talk openly and honestly about your problems. Be willing to make some changes. When should you call for help? Call 911 anytime you think you may need emergency care. For example, call if:  ? · You are unable to move a leg at all. ?Call your doctor now or seek immediate medical care if:  ? · You have new or worse symptoms in your legs, belly, or buttocks. Symptoms may include:  ¨ Numbness or tingling. ¨ Weakness. ¨ Pain. ? · You lose bladder or bowel control. ? Watch closely for changes in your health, and be sure to contact your doctor if you are not getting better as expected. Where can you learn more? Go to [email or web address] and sign in to your [employer] account. Enter C760 in the [employer] box to learn more about \"Getting Back to Normal After Low Back Pain: Care Instructions. \"     If you do not have an account, please click on the \"Sign Up Now\" link. Current as of: March 21, 2017  Content Version: 11.4  © 6110-1980 [employer]. Care instructions adapted under license by [facility] ([facility]). If you have questions about a medical condition or this instruction, always ask your healthcare professional. [place] [address] any warranty or liability for your use of this information. Patient Education        Stopping Smoking: Care Instructions  Your Care Instructions    Cigarette smokers crave the nicotine in cigarettes. Giving it up is much harder than simply changing a habit. Your body has to stop craving the nicotine. It is hard to quit, but you can do it. There are many tools that people use to quit smoking.  You may find that combining Instructions. \"     If you do not have an account, please click on the \"Sign Up Now\" link. Current as of: March 20, 2017  Content Version: 11.4  © 0395-1510 [employer]. Care instructions adapted under license by [facility] ([facility]). If you have questions about a medical condition or this instruction, always ask your healthcare professional. [address] any warranty or liability for your use of this information.

## 2017-12-28 NOTE — PROGRESS NOTES
[address]  [facility]  [facility]  [address] [place] [address] [address] [address]  Dept: [phone]  Dept Fax: [phone]: [phone]    Visit Date: 12/28/2017    Tristin Salazar is a 74 y.o. female who presents today for:  Chief Complaint   Patient presents with    Cough     3 weeks / yellow and bloody / smoker    Shortness of Breath    Wheezing     HPI:     Essence Wilkes is a 74year old female who presents today with complaints of a cough that is productive of thick yellow mucus that has some blood in it. She is having some shortness of breath ans wheezing. [facility], last seen 12/12/17. She reported similar symptoms and was prescribed Breo but was unable to fill it due to cost. Cough is still present, has been progressively getting worse over the last 3 weeks. Essence Wilkes is still smoking, 2 packs per day. She has been a smoker for 48 years. She has been using her husbands albuterol which seems to help. CT of the chest 6/2017:  FINDINGS: Lung volumes are satisfactory. There are similar compared to both prior CT scans of the chest.       Once again there are small nodular densities present in the lungs, predominantly along the peripheral aspects of both lungs, symmetric, with relative sparing of the perihilar aspects of the lungs. These are very similar to the appearance of the chest CT    of 2016, though they have developed in the interval since 2007. They represent the areas of concern on the recent soft tissue neck CT of June 2017.       There is an area of focal nodularity in the right lower lobe, axial image 25, 5 mm diameter. This has developed since the prior CT of 2016. The remaining nodules are very similar overall. No evidence of interval improvement.       There is no pneumothorax or pleural effusion.       Perihilar bronchi have mildly prominent wall. Characteristics suspicious for chronic bronchitis change.  Poorly defined minimal centrilobular emphysematous characteristics are present along the anterior bilateral upper lobes.       There are no definite pathologically enlarged hilar or mediastinal lymph nodes. Thoracic aorta is of normal caliber, with very minimal amounts of grossly stable atherosclerotic disease. No acute cardiac process.       Images of the superior abdomen suggest possible mild hepatic steatosis. No discrete abnormal mass. Likely Bosniak class I cyst of the left kidney. Prior cholecystectomy again demonstrated.       Osseous framework has no evidence of acute or aggressive findings. Mild degenerative changes are present. Overlying soft tissues are satisfactory.           Impression   FINDINGS ARE AGAIN CONSISTENT WITH MULTIPLE NODULAR DENSITIES OF BOTH LUNGS, SOME HAVING A CHARACTERISTIC \"TREE-IN-BUD\" APPEARANCE. DIFFERENTIAL DIAGNOSIS IS WIDE, INCLUDING BOTH NEOPLASTIC AND INFECTIOUS-INFLAMMATORY PROCESSES AS WELL AS    GRANULOMATOUS DISEASE. STABILITY IMPLIES A BENIGN PROCESS, THOUGH THERE HAS BEEN INTERVAL DEVELOPMENT OF A 5 MM NODULE IN THE RIGHT MID LUNG. SHORT-TERM PROGRESS IMAGING IS RECOMMENDED, WITHIN 6-12 MONTHS, TO FURTHER CONFIRM STABILITY.       BACKGROUND COPD CHARACTERISTICS, MILD.       OTHER INCIDENTAL FINDINGS AS DISCUSSED. She is scheduled to have a repeat CT of the chest 2/26/17 along with a repeat PFT. Another complaint is lower back pain x 2 weeks. She feels that the forceful coughing has triggered a \"pulled muscle\". Pain is mostly on the right side and radiates into her buttocks. She has not done anything at home for the pain. Denies loss of bowel or bladder control, denies numbness or tingling in the extremities.      HPI  Health Maintenance   Topic Date Due    DTaP/Tdap/Td vaccine (1 - Tdap) 01/17/1971    Colon cancer screen colonoscopy  01/17/2002    Flu vaccine (1) 09/01/2017    Breast cancer screen  06/13/2018    Smoker: low dose lung CT screening  06/20/2018    Pneumococcal low/med risk (2 of 2  High Cholesterol Sister     Hypertension Sister     Asthma Sister     Other Other      high arched feet    Lung Cancer Son      Social History   Substance Use Topics    Smoking status: Current Every Day Smoker     Packs/day: 2.00     Years: 48.00     Types: Cigarettes     Start date: 11/13/1967    Smokeless tobacco: Never Used      Comment: 1.5ppd 12/12/17    Alcohol use No      Current Outpatient Prescriptions   Medication Sig Dispense Refill    amoxicillin-clavulanate (AUGMENTIN) 875-125 MG per tablet Take 1 tablet by mouth 2 times daily for 10 days 20 tablet 0    ipratropium-albuterol (DUONEB) 0.5-2.5 (3) MG/3ML SOLN nebulizer solution Inhale 3 mLs into the lungs every 6 hours as needed for Shortness of Breath or Other (Wheezing) 360 mL 2    Respiratory Therapy Supplies (NEBULIZER/TUBING/MOUTHPIECE) KIT 1 kit by Does not apply route daily as needed (wheezing) 1 kit 0    acetaminophen (TYLENOL) 500 MG tablet Take 1 tablet by mouth every 6 hours as needed for Pain 120 tablet 3    guaiFENesin (ROBITUSSIN) 100 MG/5ML liquid Take 10 mLs by mouth 3 times daily as needed for Cough 1 Bottle 1    fluticasone-vilanterol (BREO ELLIPTA) 100-25 MCG/INH AEPB inhaler Inhale 1 puff into the lungs daily Rinse mouth after its use.  30 each 2    ondansetron (ZOFRAN-ODT) 4 MG disintegrating tablet Take 1 tablet by mouth every 8 hours as needed for Nausea or Vomiting 30 tablet 2    metoprolol tartrate (LOPRESSOR) 25 MG tablet Take 1 tablet by mouth 2 times daily 60 tablet 5    Blood Pressure Monitoring (BLOOD PRESSURE MONITOR/M CUFF) MISC Check blood pressure twice daily 1 each 0    atorvastatin (LIPITOR) 40 MG tablet Take 1 tablet by mouth nightly 30 tablet 5    escitalopram (LEXAPRO) 20 MG tablet Take 1 tablet by mouth daily 30 tablet 5    levothyroxine (LEVOTHROID) 88 MCG tablet Take 1 tablet by mouth daily 30 tablet 5    aspirin 81 MG EC tablet Take 1 tablet by mouth daily 30 tablet 3    isosorbide oriented to person, place, and time. She appears well-developed and well-nourished. No distress. HENT:   Head: Normocephalic and atraumatic. Right Ear: External ear normal.   Left Ear: External ear normal.   Eyes: Conjunctivae are normal. Right eye exhibits no discharge. Left eye exhibits no discharge. Neck: Normal range of motion. Pulmonary/Chest: Effort normal. No respiratory distress. She has wheezes (Fine expiratory wheeze). She has rales. Musculoskeletal: She exhibits no deformity. Lumbar back: She exhibits tenderness. Neurological: She is alert and oriented to person, place, and time. Skin: Skin is warm and dry. No rash noted. She is not diaphoretic. Psychiatric: She has a normal mood and affect. Her speech is normal and behavior is normal. Judgment and thought content normal. Cognition and memory are normal.     Lab Results   Component Value Date    WBC 6.2 05/17/2017    HGB 13.8 05/17/2017    HCT 41.4 05/17/2017     05/17/2017    CHOL 185 05/17/2017    TRIG 122 05/17/2017    HDL 33 05/17/2017    LDLCALC 128 05/17/2017    AST 12 05/16/2017     05/17/2017    K 4.3 05/17/2017     05/17/2017    CREATININE 0.8 05/17/2017    BUN 8 05/17/2017    CO2 21 (L) 05/17/2017    TSH 3.630 05/16/2017    INR 0.94 10/04/2015    LABA1C 5.5 10/12/2012    LABGLOM 72 (A) 05/17/2017    MG 2.1 11/04/2012    CALCIUM 8.1 (L) 05/17/2017    VITD25 8 (L) 08/04/2014       Assessment:      1. Chronic obstructive pulmonary disease with acute exacerbation (HCC)  amoxicillin-clavulanate (AUGMENTIN) 875-125 MG per tablet    ipratropium-albuterol (DUONEB) 0.5-2.5 (3) MG/3ML SOLN nebulizer solution    Respiratory Therapy Supplies (NEBULIZER/TUBING/MOUTHPIECE) KIT   2. Tobacco abuse     3.  Acute right-sided low back pain with right-sided sciatica  acetaminophen (TYLENOL) 500 MG tablet     Plan:   · Will give Breo samples  · Will start Augmentin - take twice daily for 10 days  · Will send in Duoneb for the nebulizer machine  · Will order you your own tubing for the machine  · Get plenty of rest  · Increase fluids  · Avoid secondhand smoke  · Can use the [place] to rinse the sinuses as needed  · Cool-mist humidifier at night   · Robitussin liquid for congestion  · Low back stretching   · Moist heat locally. · Back protective techniques reviewed, along with sleep positioning. · Will get XRay studies if not improving. · Call or return to clinic as needed if these symptoms worsen or fail to improve as anticipated. Return if symptoms worsen or fail to improve. Orders Placed:  No orders of the defined types were placed in this encounter. Medications Prescribed:  Orders Placed This Encounter   Medications    amoxicillin-clavulanate (AUGMENTIN) 875-125 MG per tablet     Sig: Take 1 tablet by mouth 2 times daily for 10 days     Dispense:  20 tablet     Refill:  0    ipratropium-albuterol (DUONEB) 0.5-2.5 (3) MG/3ML SOLN nebulizer solution     Sig: Inhale 3 mLs into the lungs every 6 hours as needed for Shortness of Breath or Other (Wheezing)     Dispense:  360 mL     Refill:  2    Respiratory Therapy Supplies (NEBULIZER/TUBING/MOUTHPIECE) KIT     Si kit by Does not apply route daily as needed (wheezing)     Dispense:  1 kit     Refill:  0    acetaminophen (TYLENOL) 500 MG tablet     Sig: Take 1 tablet by mouth every 6 hours as needed for Pain     Dispense:  120 tablet     Refill:  3    guaiFENesin (ROBITUSSIN) 100 MG/5ML liquid     Sig: Take 10 mLs by mouth 3 times daily as needed for Cough     Dispense:  1 Bottle     Refill:  1        Patient given educational materials - see patient instructions. Discussed use, benefit, and side effects of prescribed medications. All patient questions answered. Pt voiced understanding. Reviewed health maintenance. Instructed to continue current medications, diet and exercise. Patient agreed with treatment plan. Follow up as directed.      Electronically signed

## 2018-01-02 NOTE — TELEPHONE ENCOUNTER
Prior Authorization: Denied      Other Medication not covered under Part D. Please bill Part B. Case ID: [id number]      Payer: [employer] ([employer]) - Medicare          Electronic appeal:  Not supported   View History   ipratropium-albuterol (DUONEB) 0.5-2.5 (3) MG/3ML SOLN nebulizer solution  Inhale 3 mLs into the lungs every 6 hours as needed for Shortness of Breath or Other (Wheezing)  Dispense:  360 mL   Refills:  2  Start:  12/28/2017     Class:  Normal  Diagnoses:  Chronic obstructive pulmonary disease with acute exacerbation ([facility] [address]  This order has been released to its destination. To be filled at: [place] [address], [address] [facility] [phone] - F [phone]: [phone]      Called and LM for patient to return call. PA was denied. States it is not covered under part D and to bill part B. Patient will have to give that ok to pharmacy if she wants it billed that way.

## 2018-01-18 ENCOUNTER — TELEPHONE (OUTPATIENT)
Dept: PHARMACY | Facility: CLINIC | Age: 68
End: 2018-01-18

## 2018-01-18 NOTE — TELEPHONE ENCOUNTER
CLINICAL PHARMACY NOTE - Medication Review    Josie Bundy is a 79 y.o. female referred to a clinical pharmacy specialist given their history of COPD and number of home medications. Attempt made to reach patient by telephone for medication review. Patient answered, but asked for me to call back this afternoon. Will continue to attempt to contact patient by telephone as appropriate.     Yannick Mcgraw, PharmD  [address]

## 2018-01-22 ENCOUNTER — HOSPITAL ENCOUNTER (OUTPATIENT)
Dept: ULTRASOUND IMAGING | Age: 68
Discharge: HOME | End: 2018-01-22
Payer: MEDICARE

## 2018-01-22 ENCOUNTER — OFFICE VISIT (OUTPATIENT)
Dept: FAMILY MEDICINE CLINIC | Age: 68
End: 2018-01-22
Payer: MEDICARE

## 2018-01-22 VITALS
SYSTOLIC BLOOD PRESSURE: 136 MMHG | HEART RATE: 73 BPM | RESPIRATION RATE: 16 BRPM | BODY MASS INDEX: 27.01 KG/M2 | HEIGHT: 64 IN | WEIGHT: 158.2 LBS | OXYGEN SATURATION: 97 % | DIASTOLIC BLOOD PRESSURE: 60 MMHG | TEMPERATURE: 97.6 F

## 2018-01-22 DIAGNOSIS — F17.210 HEAVY SMOKER (MORE THAN 20 CIGARETTES PER DAY): ICD-10-CM

## 2018-01-22 DIAGNOSIS — E53.9 VITAMIN B DEFICIENCY: ICD-10-CM

## 2018-01-22 DIAGNOSIS — M51.36 LUMBAR DEGENERATIVE DISC DISEASE: Primary | ICD-10-CM

## 2018-01-22 DIAGNOSIS — E89.0 S/P PARTIAL THYROIDECTOMY: ICD-10-CM

## 2018-01-22 DIAGNOSIS — I25.10 MILD CAD: ICD-10-CM

## 2018-01-22 DIAGNOSIS — E78.00 HYPERCHOLESTEROLEMIA: ICD-10-CM

## 2018-01-22 DIAGNOSIS — I10 ESSENTIAL HYPERTENSION: ICD-10-CM

## 2018-01-22 DIAGNOSIS — E04.1 THYROID NODULE: ICD-10-CM

## 2018-01-22 DIAGNOSIS — E89.0 POSTOPERATIVE HYPOTHYROIDISM: ICD-10-CM

## 2018-01-22 DIAGNOSIS — E03.9 ACQUIRED HYPOTHYROIDISM: ICD-10-CM

## 2018-01-22 PROCEDURE — 90662 IIV NO PRSV INCREASED AG IM: CPT | Performed by: FAMILY MEDICINE

## 2018-01-22 PROCEDURE — 99214 OFFICE O/P EST MOD 30 MIN: CPT | Performed by: FAMILY MEDICINE

## 2018-01-22 PROCEDURE — 76536 US EXAM OF HEAD AND NECK: CPT

## 2018-01-22 PROCEDURE — 4040F PNEUMOC VAC/ADMIN/RCVD: CPT | Performed by: FAMILY MEDICINE

## 2018-01-22 PROCEDURE — G8484 FLU IMMUNIZE NO ADMIN: HCPCS | Performed by: FAMILY MEDICINE

## 2018-01-22 PROCEDURE — G0008 ADMIN INFLUENZA VIRUS VAC: HCPCS | Performed by: FAMILY MEDICINE

## 2018-01-22 PROCEDURE — G8427 DOCREV CUR MEDS BY ELIG CLIN: HCPCS | Performed by: FAMILY MEDICINE

## 2018-01-22 PROCEDURE — G8417 CALC BMI ABV UP PARAM F/U: HCPCS | Performed by: FAMILY MEDICINE

## 2018-01-22 PROCEDURE — 3014F SCREEN MAMMO DOC REV: CPT | Performed by: FAMILY MEDICINE

## 2018-01-22 PROCEDURE — 3017F COLORECTAL CA SCREEN DOC REV: CPT | Performed by: FAMILY MEDICINE

## 2018-01-22 PROCEDURE — G8400 PT W/DXA NO RESULTS DOC: HCPCS | Performed by: FAMILY MEDICINE

## 2018-01-22 PROCEDURE — G8599 NO ASA/ANTIPLAT THER USE RNG: HCPCS | Performed by: FAMILY MEDICINE

## 2018-01-22 PROCEDURE — 4004F PT TOBACCO SCREEN RCVD TLK: CPT | Performed by: FAMILY MEDICINE

## 2018-01-22 PROCEDURE — 1090F PRES/ABSN URINE INCON ASSESS: CPT | Performed by: FAMILY MEDICINE

## 2018-01-22 PROCEDURE — 1123F ACP DISCUSS/DSCN MKR DOCD: CPT | Performed by: FAMILY MEDICINE

## 2018-01-22 RX ORDER — ONDANSETRON 4 MG/1
4 TABLET, ORALLY DISINTEGRATING ORAL EVERY 8 HOURS PRN
Qty: 30 TABLET | Refills: 2 | Status: SHIPPED | OUTPATIENT
Start: 2018-01-22 | End: 2020-07-24

## 2018-01-22 NOTE — PROGRESS NOTES
After obtaining consent, and per orders of Dr. Juan Jenkins, injection of flu vaccine given in Left deltoid by Aster Hamilton. Patient instructed to remain in clinic for 20 minutes afterwards, and to report any adverse reaction to me immediately.

## 2018-01-22 NOTE — PROGRESS NOTES
the skin 2004    Cerebral artery occlusion with cerebral infarction ([facility] [address]     Chronic UTI     COPD (chronic obstructive pulmonary disease) ([facility] [address]     CVA (cerebral infarction) 2007, 2008    Depression 2007    DVT of lower extremity (deep venous thrombosis) ([facility] [address] 1976    Facial injury 2005    Fall on greasy ground, striking left periorbital region    History of stroke 2007, 2008, 2009    Patient relates a stroke with right weakness and speech in 2007; another in 2010 or 2012 (unsure), both with need to rehabilitation. Also \"2 mini-strokes\" (?)    Hyperlipidemia 2005    Hypertension 2005    Hypothyroidism     IBS (irritable bowel syndrome)     Low HDL (under 40) 2014    Recurrent UTI (urinary tract infection) 2015    Dr Jong Bean finding difficulty 05/16/2017    work-up in progress           Allergies:  is allergic to cipro xr and vicodin [hydrocodone-acetaminophen]. Medications:   Current Outpatient Prescriptions   Medication Sig Dispense Refill    ondansetron (ZOFRAN-ODT) 4 MG disintegrating tablet Take 1 tablet by mouth every 8 hours as needed for Nausea or Vomiting 30 tablet 2    ipratropium-albuterol (DUONEB) 0.5-2.5 (3) MG/3ML SOLN nebulizer solution Inhale 3 mLs into the lungs every 6 hours as needed for Shortness of Breath or Other (Wheezing) 360 mL 2    Respiratory Therapy Supplies (NEBULIZER/TUBING/MOUTHPIECE) KIT 1 kit by Does not apply route daily as needed (wheezing) 1 kit 0    acetaminophen (TYLENOL) 500 MG tablet Take 1 tablet by mouth every 6 hours as needed for Pain 120 tablet 3    fluticasone-vilanterol (BREO ELLIPTA) 100-25 MCG/INH AEPB inhaler Inhale 1 puff into the lungs daily LOT # [id number] EXP # 10/30/18 2 each 0    fluticasone-vilanterol (BREO ELLIPTA) 100-25 MCG/INH AEPB inhaler Inhale 1 puff into the lungs daily Rinse mouth after its use.  30 each 2    metoprolol tartrate (LOPRESSOR) 25 MG tablet Take 1 tablet by mouth 2 times daily 60 tablet 5    Blood Pressure PERRLA, EOMI, anicteric sclerae  Ears: Normal tympanic membranes bilaterally  Nose: patent, no lesions  Mouth: no lesions, moist mucosas  Neck - supple, no significant adenopathy, no thyromegaly, no JVD  Chest - clear to auscultation, no wheezes, rales or rhonchi, symmetric air entry  Heart - normal rate, regular rhythm, normal S1, S2, no murmurs, rubs, clicks or gallops  Abdomen - soft, nontender, nondistended, no masses or organomegaly  Back - normal inspection. Range of motion limited secondary to the pain, straight leg mildly positive bilaterally  Extremities - peripheral pulses normal, no pedal edema, no clubbing or cyanosis, normal upper and lower extremities  Skin - normal, no rashes    Impression:  1. Lumbar degenerative disc disease  MRI LUMBAR SPINE WO CONTRAST   2. Essential hypertension  Noy Perez MD    Urinalysis with Microscopic    CBC Auto Differential    Comprehensive Metabolic Panel   3. Hypercholesterolemia  Noy Desai MD    Lipid Panel   4. Acquired hypothyroidism  TSH without Reflex   5. Heavy smoker (more than 20 cigarettes per day)  Noy Perze MD   6. Vitamin B deficiency  Vitamin B12   7.  Mild CAD  [facility] Desai MD       Plan:  Orders Placed This Encounter   Procedures    MRI LUMBAR SPINE WO CONTRAST     Standing Status:   Future     Standing Expiration Date:   1/22/2019    INFLUENZA, HIGH DOSE, 65 YRS +, IM, PF, PREFILL SYR, 0.5ML (FLUZONE HD)    Lipid Panel     Standing Status:   Future     Standing Expiration Date:   1/22/2019     Order Specific Question:   Is Patient Fasting?/# of Hours     Answer:   12 hours    TSH without Reflex     Standing Status:   Future     Standing Expiration Date:   1/22/2019    Urinalysis with Microscopic     Standing Status:   Future     Standing Expiration Date:   1/22/2019     Order

## 2018-01-26 ENCOUNTER — OFFICE VISIT (OUTPATIENT)
Dept: ENT CLINIC | Age: 68
End: 2018-01-26
Payer: MEDICARE

## 2018-01-26 VITALS
HEART RATE: 76 BPM | BODY MASS INDEX: 26.72 KG/M2 | DIASTOLIC BLOOD PRESSURE: 64 MMHG | RESPIRATION RATE: 16 BRPM | WEIGHT: 156.5 LBS | TEMPERATURE: 97.8 F | SYSTOLIC BLOOD PRESSURE: 128 MMHG | HEIGHT: 64 IN

## 2018-01-26 DIAGNOSIS — E89.0 S/P PARTIAL THYROIDECTOMY: ICD-10-CM

## 2018-01-26 DIAGNOSIS — J02.9 ACUTE PHARYNGITIS, UNSPECIFIED ETIOLOGY: ICD-10-CM

## 2018-01-26 DIAGNOSIS — R49.0 HOARSENESS: ICD-10-CM

## 2018-01-26 DIAGNOSIS — E04.1 THYROID NODULE: Primary | ICD-10-CM

## 2018-01-26 DIAGNOSIS — R53.83 FATIGUE, UNSPECIFIED TYPE: ICD-10-CM

## 2018-01-26 DIAGNOSIS — R13.12 OROPHARYNGEAL DYSPHAGIA: ICD-10-CM

## 2018-01-26 DIAGNOSIS — K21.9 GASTROESOPHAGEAL REFLUX DISEASE WITHOUT ESOPHAGITIS: ICD-10-CM

## 2018-01-26 DIAGNOSIS — E89.0 POSTOPERATIVE HYPOTHYROIDISM: ICD-10-CM

## 2018-01-26 PROCEDURE — 4040F PNEUMOC VAC/ADMIN/RCVD: CPT | Performed by: OTOLARYNGOLOGY

## 2018-01-26 PROCEDURE — 3017F COLORECTAL CA SCREEN DOC REV: CPT | Performed by: OTOLARYNGOLOGY

## 2018-01-26 PROCEDURE — 31575 DIAGNOSTIC LARYNGOSCOPY: CPT | Performed by: OTOLARYNGOLOGY

## 2018-01-26 PROCEDURE — 3014F SCREEN MAMMO DOC REV: CPT | Performed by: OTOLARYNGOLOGY

## 2018-01-26 PROCEDURE — 1123F ACP DISCUSS/DSCN MKR DOCD: CPT | Performed by: OTOLARYNGOLOGY

## 2018-01-26 PROCEDURE — G8484 FLU IMMUNIZE NO ADMIN: HCPCS | Performed by: OTOLARYNGOLOGY

## 2018-01-26 PROCEDURE — G8399 PT W/DXA RESULTS DOCUMENT: HCPCS | Performed by: OTOLARYNGOLOGY

## 2018-01-26 PROCEDURE — G8427 DOCREV CUR MEDS BY ELIG CLIN: HCPCS | Performed by: OTOLARYNGOLOGY

## 2018-01-26 PROCEDURE — G8417 CALC BMI ABV UP PARAM F/U: HCPCS | Performed by: OTOLARYNGOLOGY

## 2018-01-26 PROCEDURE — 99213 OFFICE O/P EST LOW 20 MIN: CPT | Performed by: OTOLARYNGOLOGY

## 2018-01-26 PROCEDURE — 1090F PRES/ABSN URINE INCON ASSESS: CPT | Performed by: OTOLARYNGOLOGY

## 2018-01-26 PROCEDURE — G8598 ASA/ANTIPLAT THER USED: HCPCS | Performed by: OTOLARYNGOLOGY

## 2018-01-26 PROCEDURE — 4004F PT TOBACCO SCREEN RCVD TLK: CPT | Performed by: OTOLARYNGOLOGY

## 2018-01-26 RX ORDER — AMOXICILLIN 875 MG/1
875 TABLET, COATED ORAL 2 TIMES DAILY
Qty: 20 TABLET | Refills: 0 | Status: SHIPPED | OUTPATIENT
Start: 2018-01-26 | End: 2018-02-05

## 2018-01-26 ASSESSMENT — ENCOUNTER SYMPTOMS
FACIAL SWELLING: 0
NAUSEA: 0
SINUS PRESSURE: 0
VOMITING: 0
TROUBLE SWALLOWING: 0
DIARRHEA: 0
VOICE CHANGE: 0
WHEEZING: 0
COLOR CHANGE: 0
COUGH: 0
RHINORRHEA: 0
APNEA: 0
CHEST TIGHTNESS: 0
STRIDOR: 0
SHORTNESS OF BREATH: 0
ABDOMINAL PAIN: 0
CHOKING: 0
SORE THROAT: 0

## 2018-01-26 NOTE — PROGRESS NOTES
[facility]  [facility]  [address] [facility]  [address] [place] [address] Road [address]  Dept: [phone]  Dept Fax: [phone]  Loc: [phone]    Erick Peacock is a 79 y.o. female who was referred by No ref. provider found for:  Chief Complaint   Patient presents with    Results     Patient is here for ultrasound thyroid results   . HPI:     Erick Peacock is a 79 y.o. female who presents today for Thyroid Ultrasound results. Results were reviewed with patient. Thyroid Ultrasound:       Impression   1. Prior thyroidectomy. 2. Several small ovoid nodules both sides of the neck, consistent with lymph nodes. 3. The previously biopsied nodule in the anterior neck midline is again seen and has not changed appreciably in size from prior study. States that she is having a sore throat but her neck area on the outside does not hurt. She has had a sore throat this whole week. She does not have aches or fever. She has smoked for quite a while. She had breast cancer and carcinoma. She has been feeling tired and weak. She sleeps a lot. Nettie Sparks has been checking her thyroid levels. She is taking Lipitor. History:      Allergies   Allergen Reactions    Cipro Xr     Vicodin [Hydrocodone-Acetaminophen]      Weird dreams       Current Outpatient Prescriptions   Medication Sig Dispense Refill    ondansetron (ZOFRAN-ODT) 4 MG disintegrating tablet Take 1 tablet by mouth every 8 hours as needed for Nausea or Vomiting 30 tablet 2    ipratropium-albuterol (DUONEB) 0.5-2.5 (3) MG/3ML SOLN nebulizer solution Inhale 3 mLs into the lungs every 6 hours as needed for Shortness of Breath or Other (Wheezing) 360 mL 2    Respiratory Therapy Supplies (NEBULIZER/TUBING/MOUTHPIECE) KIT 1 kit by Does not apply route daily as needed (wheezing) 1 kit 0    acetaminophen (TYLENOL) 500 MG tablet Take 1 tablet by mouth every 6 hours as needed for Pain 120 tablet 3    for chest pain, palpitations and leg swelling. Gastrointestinal: Negative for abdominal pain, diarrhea, nausea and vomiting. Endocrine: Negative for cold intolerance, heat intolerance, polydipsia and polyuria. Genitourinary: Negative for dysuria, enuresis and hematuria. Musculoskeletal: Negative for arthralgias, gait problem, neck pain and neck stiffness. Skin: Negative for color change and rash. Allergic/Immunologic: Negative for environmental allergies, food allergies and immunocompromised state. Neurological: Negative for dizziness, syncope, facial asymmetry, speech difficulty, light-headedness and headaches. Hematological: Negative for adenopathy. Does not bruise/bleed easily. Psychiatric/Behavioral: Negative for confusion and sleep disturbance. The patient is not nervous/anxious. Objective:     /64   Pulse 76   Temp 97.8 °F (36.6 °C) (Oral)   Resp 16   Ht 5' 4.02\" (1.626 m)   Wt 156 lb 8 oz (71 kg)   BMI 26.85 kg/m²     Physical Exam  Neck:Nodule in the isthmus is palpated. The other nodules are too small to feel. Throat:erythema of posterior pharynx  throat culture taken    PROCEDURE: FIBEROPTIC LARYNGOSCOPY    A fiberoptic laryngoscopy was performed under topical anesthesia, after using Afrin and Lidocaine spray in the nasal fossa. The nasal fossa, nasopharynx, hypopharynx and larynx were carefully examined. Base of tongue was symmetrical.  There was erythema of the oropharynx and hypopharynx. Epiglottis appeared normal and was not retrodisplaced. True vocal cords had normal mobility. There was erythema of the arytenoid area bilaterally. No masses or mucosal lesions were noted. No pooling in the pyriform sinuses. Data:  All of the past medical history, past surgical history, family history, social history, allergies and current medications were reviewed with the patient. Assessment & Plan   Diagnoses and all orders for this visit:    1.  Thyroid nodule  NV LARYNGOSCOPY FLEXIBLE DIAGNOSTIC    US Thyroid   2. S/P partial thyroidectomy     3. Postoperative hypothyroidism  T3, Free    T4, Free    TSH without Reflex   4. Gastroesophageal reflux disease without esophagitis     5. Oropharyngeal dysphagia     6. Hoarseness  CO LARYNGOSCOPY FLEXIBLE DIAGNOSTIC   7. Fatigue, unspecified type  T3, Free    T4, Free    TSH without Reflex   8. Acute pharyngitis, unspecified etiology  Throat culture    amoxicillin (AMOXIL) 875 MG tablet       The findings were explained and her questions were answered. Options were discussed including getting a thyroid ultrasound in a year, We will check thyroid functions andsee her in a year. I will send an antibiotic for her sore throat. She agreed. ADDENDUM: The throat culture grew Candida tropicalis and we dispensed Mycelex troches for 2 weeks. Thyroid function testing shows that she was hypothyroid. She is taking levothyroxine 88 µg daily. Suggest she be switched to Synthroid and the dose increased. I assume Dr. Tyrel Moralez is handling that and we will call that to her attention. Because of a variability in dosing of generic medications of % per the FDA, I suggest perhaps Synthroid 125 µg daily with follow-up labs 6 weeks later. This might explain her extreme fatigue. Return in about 1 year (around 1/26/2019). I, Marget Lennox, CMA ([facility]), am scribing for, and in the presence of Dr. Joyce Davis. Electronically signed by Saranya Mueller CMA ([facility]) on 1/26/18 at 1:20 PM.     (Please note that portions of this note were completed with a voice recognition program. Efforts were made to edit the dictations but occasionally words are mis-transcribed.)    I agree to the above documentation placed by my scribe. I have personally evaluated this patient. Additional findings are as noted. I reviewed the scribe's note and agree with the documented findings and plan of care. Any areas of disagreement are corrected.  I agree with

## 2018-01-26 NOTE — LETTER
[facility]  [facility]  [address]  [address]  Phone: [phone]  Fax: [phone]    Abilio Medina MD        March 4, 2018    Yanni Velarde, [address] [place] [address] Road [facility]    Patient: Shyla Joseph   MR Number: [id number]   YOB: 1950   Date of Visit: 1/26/2018     Dear Yanni Velarde,    I recently saw your patient, Shyla Joseph, regarding Her thyroid ultrasound. She is extremely tired and had a sore throat. This turned out to be a yeast infection and I prescribed Mycelex troches. Her thyroid ultrasound is stable and she does not need another one for about a year. Labs did show she was hypothyroid. See my recommendations below. I have made a suggestion for an action on your part. Below are the relevant portions of my assessment and plan of care. Assessment & Plan   Diagnoses and all orders for this visit:    1. Thyroid nodule  NM LARYNGOSCOPY FLEXIBLE DIAGNOSTIC    US Thyroid   2. S/P partial thyroidectomy     3. Postoperative hypothyroidism  T3, Free    T4, Free    TSH without Reflex   4. Gastroesophageal reflux disease without esophagitis     5. Oropharyngeal dysphagia     6. Hoarseness  NM LARYNGOSCOPY FLEXIBLE DIAGNOSTIC   7. Fatigue, unspecified type  T3, Free    T4, Free    TSH without Reflex   8. Acute pharyngitis, unspecified etiology  Throat culture    amoxicillin (AMOXIL) 875 MG tablet       The findings were explained and her questions were answered. Options were discussed including getting a thyroid ultrasound in a year, We will check thyroid functions andsee her in a year. I will send an antibiotic for her sore throat. She agreed. ADDENDUM: The throat culture grew Candida tropicalis and we dispensed Mycelex troches for 2 weeks. Thyroid function testing shows that she was hypothyroid. She is taking levothyroxine 88 µg daily.   Suggest she be switched to Synthroid and the dose increased. I assume Dr. Gabriel Zuniga is handling that and we will call that to her attention. Because of a variability in dosing of generic medications of % per the FDA, I suggest perhaps Synthroid 125 µg daily with follow-up labs 6 weeks later. This might explain her extreme fatigue. Return in about 1 year (around 1/26/2019). If you have questions, please do not hesitate to call me. I look forward to following Jenelle Wellington along with you.     Sincerely,          Wagner Guevara MD

## 2018-01-28 LAB — THROAT/NOSE CULTURE: NORMAL

## 2018-02-01 ENCOUNTER — CARE COORDINATION (OUTPATIENT)
Dept: CARE COORDINATION | Age: 68
End: 2018-02-01

## 2018-02-01 DIAGNOSIS — B37.0 CANDIDA INFECTION, ORAL: Primary | ICD-10-CM

## 2018-02-01 LAB
FUNGUS SMEAR: ABNORMAL
ORGANISM: ABNORMAL

## 2018-02-01 RX ORDER — CLOTRIMAZOLE 10 MG/1
10 LOZENGE ORAL; TOPICAL 4 TIMES DAILY
Qty: 28 TABLET | Refills: 0 | Status: SHIPPED | OUTPATIENT
Start: 2018-02-01 | End: 2018-02-08

## 2018-02-01 ASSESSMENT — ENCOUNTER SYMPTOMS: DYSPNEA ASSOCIATED WITH: EXERTION

## 2018-02-01 NOTE — CARE COORDINATION
Erum Bender MD   Respiratory Therapy Supplies (NEBULIZER/TUBING/MOUTHPIECE) KIT 1 kit by Does not apply route daily as needed (wheezing) 12/28/17   Thelma Larsen CNP   acetaminophen (TYLENOL) 500 MG tablet Take 1 tablet by mouth every 6 hours as needed for Pain 12/28/17   Thelma Larsen CNP   Blood Pressure Monitoring (BLOOD PRESSURE MONITOR/M CUFF) MISC Check blood pressure twice daily 10/10/17   Thelma Larsen CNP       Future Appointments  Date Time Provider Zayra Alexsandra   2/2/2018 11:30 AM STR MRI RM1 STRZ MRI STR Radiolog   2/6/2018 1:15 PM Ainbal Gamino MD SRPX Heart [facility] [place] [address]   2/26/2018 2:00 PM STR PULMONARY FUNCTION ROOM 1 STRZ PFT None   2/26/2018 3:00 PM STR CT IMAGING RM1  OP EXPRESS STRZ OUT EXP STR Radiolog   3/5/2018 2:30 PM Truong Simmons CNP Pulm Med [facility] Lim   4/25/2018 9:30 AM Frances Quinteros MD SRPX [facility] [place] [address]   1/9/2019 12:30 PM STR ULTRASOUND RM 2 STRZ US STR Radiolog   1/15/2019 2:00 PM Aloysius Severe, MD ENT [facility] ELIJAH [address]

## 2018-02-02 ENCOUNTER — HOSPITAL ENCOUNTER (OUTPATIENT)
Age: 68
Discharge: HOME | End: 2018-02-02
Payer: MEDICARE

## 2018-02-02 ENCOUNTER — HOSPITAL ENCOUNTER (OUTPATIENT)
Dept: MRI IMAGING | Age: 68
Discharge: HOME | End: 2018-02-02
Payer: MEDICARE

## 2018-02-02 DIAGNOSIS — R53.83 FATIGUE, UNSPECIFIED TYPE: ICD-10-CM

## 2018-02-02 DIAGNOSIS — E78.00 HYPERCHOLESTEROLEMIA: ICD-10-CM

## 2018-02-02 DIAGNOSIS — E53.9 VITAMIN B DEFICIENCY: ICD-10-CM

## 2018-02-02 DIAGNOSIS — E89.0 POSTOPERATIVE HYPOTHYROIDISM: ICD-10-CM

## 2018-02-02 DIAGNOSIS — M51.36 LUMBAR DEGENERATIVE DISC DISEASE: ICD-10-CM

## 2018-02-02 DIAGNOSIS — I10 ESSENTIAL HYPERTENSION: ICD-10-CM

## 2018-02-02 LAB
ALBUMIN SERPL-MCNC: 3.9 G/DL (ref 3.5–5.1)
ALP BLD-CCNC: 125 U/L (ref 38–126)
ALT SERPL-CCNC: 6 U/L (ref 11–66)
ANION GAP SERPL CALCULATED.3IONS-SCNC: 11 MEQ/L (ref 8–16)
ANISOCYTOSIS: ABNORMAL
AST SERPL-CCNC: 11 U/L (ref 5–40)
BACTERIA: ABNORMAL
BASOPHILS # BLD: 0.7 %
BASOPHILS ABSOLUTE: 0.1 THOU/MM3 (ref 0–0.1)
BILIRUB SERPL-MCNC: 0.3 MG/DL (ref 0.3–1.2)
BILIRUBIN URINE: NEGATIVE
BLOOD, URINE: ABNORMAL
BUN BLDV-MCNC: 8 MG/DL (ref 7–22)
CALCIUM SERPL-MCNC: 8.8 MG/DL (ref 8.5–10.5)
CASTS: ABNORMAL /LPF
CASTS: ABNORMAL /LPF
CHARACTER, URINE: ABNORMAL
CHLORIDE BLD-SCNC: 100 MEQ/L (ref 98–111)
CHOLESTEROL, TOTAL: 185 MG/DL (ref 100–199)
CO2: 28 MEQ/L (ref 23–33)
COLOR: YELLOW
CREAT SERPL-MCNC: 0.9 MG/DL (ref 0.4–1.2)
CRYSTALS: ABNORMAL
EOSINOPHIL # BLD: 2.5 %
EOSINOPHILS ABSOLUTE: 0.2 THOU/MM3 (ref 0–0.4)
EPITHELIAL CELLS, UA: ABNORMAL /HPF
GFR SERPL CREATININE-BSD FRML MDRD: 63 ML/MIN/1.73M2
GLUCOSE BLD-MCNC: 109 MG/DL (ref 70–108)
GLUCOSE, URINE: NEGATIVE MG/DL
HCT VFR BLD CALC: 45.4 % (ref 37–47)
HDLC SERPL-MCNC: 37 MG/DL
HEMOGLOBIN: 15.4 GM/DL (ref 12–16)
KETONES, URINE: NEGATIVE
LDL CHOLESTEROL CALCULATED: 114 MG/DL
LEUKOCYTE ESTERASE, URINE: ABNORMAL
LYMPHOCYTES # BLD: 21.7 %
LYMPHOCYTES ABSOLUTE: 1.7 THOU/MM3 (ref 1–4.8)
MCH RBC QN AUTO: 33.6 PG (ref 27–31)
MCHC RBC AUTO-ENTMCNC: 34 GM/DL (ref 33–37)
MCV RBC AUTO: 98.9 FL (ref 81–99)
MISCELLANEOUS LAB TEST RESULT: ABNORMAL
MONOCYTES # BLD: 3.8 %
MONOCYTES ABSOLUTE: 0.3 THOU/MM3 (ref 0.4–1.3)
NITRITE, URINE: NEGATIVE
NUCLEATED RED BLOOD CELLS: 0 /100 WBC
PDW BLD-RTO: 14.7 % (ref 11.5–14.5)
PH UA: 6.5
PLATELET # BLD: 221 THOU/MM3 (ref 130–400)
PMV BLD AUTO: 9.6 FL (ref 7.4–10.4)
POTASSIUM SERPL-SCNC: 4.2 MEQ/L (ref 3.5–5.2)
PROTEIN UA: NEGATIVE MG/DL
RBC # BLD: 4.59 MILL/MM3 (ref 4.2–5.4)
RBC URINE: ABNORMAL /HPF
RENAL EPITHELIAL, UA: ABNORMAL
SEG NEUTROPHILS: 71.3 %
SEGMENTED NEUTROPHILS ABSOLUTE COUNT: 5.6 THOU/MM3 (ref 1.8–7.7)
SODIUM BLD-SCNC: 139 MEQ/L (ref 135–145)
SPECIFIC GRAVITY UA: < 1.005 (ref 1–1.03)
T3 FREE: 3 PG/ML (ref 2.02–4.43)
T4 FREE: 0.83 NG/DL (ref 0.93–1.76)
TOTAL PROTEIN: 7.3 G/DL (ref 6.1–8)
TRIGL SERPL-MCNC: 172 MG/DL (ref 0–199)
TSH SERPL DL<=0.05 MIU/L-ACNC: 4.14 UIU/ML (ref 0.4–4.2)
UROBILINOGEN, URINE: 0.2 EU/DL
VITAMIN B-12: 117 PG/ML (ref 211–911)
WBC # BLD: 7.9 THOU/MM3 (ref 4.8–10.8)
WBC UA: ABNORMAL /HPF
YEAST: ABNORMAL

## 2018-02-02 PROCEDURE — 81001 URINALYSIS AUTO W/SCOPE: CPT

## 2018-02-02 PROCEDURE — 84439 ASSAY OF FREE THYROXINE: CPT

## 2018-02-02 PROCEDURE — 85025 COMPLETE CBC W/AUTO DIFF WBC: CPT

## 2018-02-02 PROCEDURE — 36415 COLL VENOUS BLD VENIPUNCTURE: CPT

## 2018-02-02 PROCEDURE — 80061 LIPID PANEL: CPT

## 2018-02-02 PROCEDURE — 84481 FREE ASSAY (FT-3): CPT

## 2018-02-02 PROCEDURE — 84443 ASSAY THYROID STIM HORMONE: CPT

## 2018-02-02 PROCEDURE — 82607 VITAMIN B-12: CPT

## 2018-02-02 PROCEDURE — 72148 MRI LUMBAR SPINE W/O DYE: CPT

## 2018-02-02 PROCEDURE — 80053 COMPREHEN METABOLIC PANEL: CPT

## 2018-02-05 ENCOUNTER — TELEPHONE (OUTPATIENT)
Dept: FAMILY MEDICINE CLINIC | Age: 68
End: 2018-02-05

## 2018-02-05 DIAGNOSIS — R93.7 ABNORMAL MRI, LUMBAR SPINE: Primary | ICD-10-CM

## 2018-02-05 DIAGNOSIS — M54.5 LOW BACK PAIN, UNSPECIFIED BACK PAIN LATERALITY, UNSPECIFIED CHRONICITY, WITH SCIATICA PRESENCE UNSPECIFIED: ICD-10-CM

## 2018-02-05 DIAGNOSIS — E53.8 LOW VITAMIN B12 LEVEL: Primary | ICD-10-CM

## 2018-02-05 NOTE — TELEPHONE ENCOUNTER
----- Message from Cheri Bloch, MD sent at 2/2/2018  5:56 PM EST -----  Her vitamin B 12 is very low  Start replacement with cyanocobalamin 1 g IM dauly x 1 week, weekly for 4 weeks then monthly. Repeat level in 3 months.

## 2018-02-05 NOTE — TELEPHONE ENCOUNTER
Called and advised patient on result note. Verbalized understanding. Aware Pain Management will call her to schedule, patient would like Dexa scan scheduled at [facility] whenever.

## 2018-02-06 ENCOUNTER — TELEPHONE (OUTPATIENT)
Dept: FAMILY MEDICINE CLINIC | Age: 68
End: 2018-02-06

## 2018-02-06 ENCOUNTER — OFFICE VISIT (OUTPATIENT)
Dept: CARDIOLOGY CLINIC | Age: 68
End: 2018-02-06
Payer: MEDICARE

## 2018-02-06 VITALS
HEART RATE: 68 BPM | HEIGHT: 64 IN | SYSTOLIC BLOOD PRESSURE: 122 MMHG | BODY MASS INDEX: 27.93 KG/M2 | WEIGHT: 163.6 LBS | DIASTOLIC BLOOD PRESSURE: 62 MMHG

## 2018-02-06 DIAGNOSIS — E78.00 PURE HYPERCHOLESTEROLEMIA: ICD-10-CM

## 2018-02-06 DIAGNOSIS — I10 ESSENTIAL HYPERTENSION: ICD-10-CM

## 2018-02-06 DIAGNOSIS — R07.89 CHEST PAIN, ATYPICAL: Primary | ICD-10-CM

## 2018-02-06 DIAGNOSIS — J44.9 CHRONIC OBSTRUCTIVE PULMONARY DISEASE, UNSPECIFIED COPD TYPE (HCC): ICD-10-CM

## 2018-02-06 DIAGNOSIS — Z72.0 TOBACCO ABUSE: ICD-10-CM

## 2018-02-06 DIAGNOSIS — R06.02 SOB (SHORTNESS OF BREATH) ON EXERTION: ICD-10-CM

## 2018-02-06 PROCEDURE — 3023F SPIROM DOC REV: CPT | Performed by: INTERNAL MEDICINE

## 2018-02-06 PROCEDURE — 3014F SCREEN MAMMO DOC REV: CPT | Performed by: INTERNAL MEDICINE

## 2018-02-06 PROCEDURE — 93000 ELECTROCARDIOGRAM COMPLETE: CPT | Performed by: INTERNAL MEDICINE

## 2018-02-06 PROCEDURE — G8417 CALC BMI ABV UP PARAM F/U: HCPCS | Performed by: INTERNAL MEDICINE

## 2018-02-06 PROCEDURE — 4004F PT TOBACCO SCREEN RCVD TLK: CPT | Performed by: INTERNAL MEDICINE

## 2018-02-06 PROCEDURE — G8400 PT W/DXA NO RESULTS DOC: HCPCS | Performed by: INTERNAL MEDICINE

## 2018-02-06 PROCEDURE — 1123F ACP DISCUSS/DSCN MKR DOCD: CPT | Performed by: INTERNAL MEDICINE

## 2018-02-06 PROCEDURE — 99204 OFFICE O/P NEW MOD 45 MIN: CPT | Performed by: INTERNAL MEDICINE

## 2018-02-06 PROCEDURE — 3017F COLORECTAL CA SCREEN DOC REV: CPT | Performed by: INTERNAL MEDICINE

## 2018-02-06 PROCEDURE — 4040F PNEUMOC VAC/ADMIN/RCVD: CPT | Performed by: INTERNAL MEDICINE

## 2018-02-06 PROCEDURE — G8926 SPIRO NO PERF OR DOC: HCPCS | Performed by: INTERNAL MEDICINE

## 2018-02-06 PROCEDURE — G8484 FLU IMMUNIZE NO ADMIN: HCPCS | Performed by: INTERNAL MEDICINE

## 2018-02-06 PROCEDURE — G8599 NO ASA/ANTIPLAT THER USE RNG: HCPCS | Performed by: INTERNAL MEDICINE

## 2018-02-06 PROCEDURE — 1090F PRES/ABSN URINE INCON ASSESS: CPT | Performed by: INTERNAL MEDICINE

## 2018-02-06 PROCEDURE — G8427 DOCREV CUR MEDS BY ELIG CLIN: HCPCS | Performed by: INTERNAL MEDICINE

## 2018-02-06 RX ORDER — CLOPIDOGREL BISULFATE 75 MG/1
75 TABLET ORAL DAILY
Qty: 30 TABLET | Refills: 0 | Status: SHIPPED | OUTPATIENT
Start: 2018-02-06 | End: 2021-03-08

## 2018-02-06 NOTE — TELEPHONE ENCOUNTER
Left message with  to please have patient call our office back in regards to dexa scan that is scheduled on 2/9/18. Patient is scheduled for DEXA scan on 2/9/18 @ 1:30 p.m. At [facility] [address]. Patient is to hold multi vitamin and calcium tablet for 24 hours prior to testing.

## 2018-02-06 NOTE — PROGRESS NOTES
every 8 hours as needed for Nausea or Vomiting 30 tablet 2    ipratropium-albuterol (DUONEB) 0.5-2.5 (3) MG/3ML SOLN nebulizer solution Inhale 3 mLs into the lungs every 6 hours as needed for Shortness of Breath or Other (Wheezing) 360 mL 2    Respiratory Therapy Supplies (NEBULIZER/TUBING/MOUTHPIECE) KIT 1 kit by Does not apply route daily as needed (wheezing) 1 kit 0    acetaminophen (TYLENOL) 500 MG tablet Take 1 tablet by mouth every 6 hours as needed for Pain 120 tablet 3    fluticasone-vilanterol (BREO ELLIPTA) 100-25 MCG/INH AEPB inhaler Inhale 1 puff into the lungs daily LOT # [id number] EXP # 10/30/18 2 each 0    fluticasone-vilanterol (BREO ELLIPTA) 100-25 MCG/INH AEPB inhaler Inhale 1 puff into the lungs daily Rinse mouth after its use. 30 each 2    metoprolol tartrate (LOPRESSOR) 25 MG tablet Take 1 tablet by mouth 2 times daily 60 tablet 5    Blood Pressure Monitoring (BLOOD PRESSURE MONITOR/M CUFF) MISC Check blood pressure twice daily 1 each 0    atorvastatin (LIPITOR) 40 MG tablet Take 1 tablet by mouth nightly 30 tablet 5    escitalopram (LEXAPRO) 20 MG tablet Take 1 tablet by mouth daily 30 tablet 5    levothyroxine (LEVOTHROID) 88 MCG tablet Take 1 tablet by mouth daily 30 tablet 5    aspirin 81 MG EC tablet Take 1 tablet by mouth daily 30 tablet 3    isosorbide dinitrate (ISORDIL) 10 MG tablet Take 1 tablet by mouth 2 times daily 90 tablet 3    tiotropium (SPIRIVA HANDIHALER) 18 MCG inhalation capsule Inhale 1 capsule into the lungs daily 30 capsule 3    clopidogrel (PLAVIX) 75 MG tablet Take 1 tablet by mouth daily 30 tablet 3     No current facility-administered medications for this visit. Review of Systems -     General ROS: negative  Psychological ROS: negative  Hematological and Lymphatic ROS: No history of blood clots or bleeding disorder.    Respiratory ROS: no cough, shortness of breath, or wheezing  Cardiovascular ROS: no chest pain or dyspnea on Essential hypertension  ECHO Complete 2D W Doppler W Color    Stress test, lexiscan   4. Pure hypercholesterolemia  ECHO Complete 2D W Doppler W Color    Stress test, lexiscan   5. Tobacco abuse  ECHO Complete 2D W Doppler W Color    Stress test, lexiscan   6. Chronic obstructive pulmonary disease, unspecified COPD type ([address]  ECHO Complete 2D W Doppler W Color    Stress test, lexiscan       Plan   Continue the current treatment and with constant vigilance to changes in symptoms and also any potential side effects. Return for care or seek medical attention immediately if symptoms got worse and/or develop new symptoms. Hypertension, on medical treatment. Seems to be under good control. Patient is compliant with medical treatment. Hyperlipidemia: on statins, followed periodically. Patient need periodic lipid and liver profile. Chest pain and sob  Need echo  nuc stress    Smoking: discussed with the patient the importance of smoke cessation especially with the risk of CAD.   Anirudh Bruce does not want to quit and she stated she love to smoke       rtc in 3 weeks        Aden Owens

## 2018-02-19 ENCOUNTER — TELEPHONE (OUTPATIENT)
Dept: CARDIOLOGY CLINIC | Age: 68
End: 2018-02-19

## 2018-02-19 ENCOUNTER — HOSPITAL ENCOUNTER (OUTPATIENT)
Dept: WOMENS IMAGING | Age: 68
Discharge: HOME | End: 2018-02-19
Payer: MEDICARE

## 2018-02-19 DIAGNOSIS — R93.7 ABNORMAL MRI, LUMBAR SPINE: ICD-10-CM

## 2018-02-19 PROCEDURE — 77080 DXA BONE DENSITY AXIAL: CPT

## 2018-02-20 ENCOUNTER — TELEPHONE (OUTPATIENT)
Dept: FAMILY MEDICINE CLINIC | Age: 68
End: 2018-02-20

## 2018-02-20 NOTE — TELEPHONE ENCOUNTER
----- Message from Luis Felipe Liang MD sent at 2/20/2018  5:13 PM EST -----  Her bone density showed osteopenia which places her at intermediate risk for fracture.   Make sure she is taking vitamin D 2000 IU daily and calcium 1200 mg PO daily

## 2018-02-26 ENCOUNTER — HOSPITAL ENCOUNTER (OUTPATIENT)
Dept: PULMONOLOGY | Age: 68
Discharge: HOME | End: 2018-02-26
Payer: MEDICARE

## 2018-02-26 ENCOUNTER — HOSPITAL ENCOUNTER (OUTPATIENT)
Dept: CT IMAGING | Age: 68
Discharge: HOME | End: 2018-02-26
Payer: MEDICARE

## 2018-02-26 DIAGNOSIS — J43.9 PULMONARY EMPHYSEMA, UNSPECIFIED EMPHYSEMA TYPE (HCC): ICD-10-CM

## 2018-02-26 DIAGNOSIS — R91.1 INCIDENTAL LUNG NODULE, > 3MM AND < 8MM: ICD-10-CM

## 2018-02-26 PROCEDURE — 94010 BREATHING CAPACITY TEST: CPT

## 2018-02-26 PROCEDURE — 6360000004 HC RX CONTRAST MEDICATION: Performed by: NURSE PRACTITIONER

## 2018-02-26 PROCEDURE — 71260 CT THORAX DX C+: CPT

## 2018-02-26 RX ADMIN — IOPAMIDOL 85 ML: 755 INJECTION, SOLUTION INTRAVENOUS at 15:23

## 2018-03-02 ENCOUNTER — HOSPITAL ENCOUNTER (OUTPATIENT)
Dept: NON INVASIVE DIAGNOSTICS | Age: 68
Discharge: HOME | End: 2018-03-02
Payer: MEDICARE

## 2018-03-02 VITALS — WEIGHT: 158 LBS | BODY MASS INDEX: 26.98 KG/M2 | HEIGHT: 64 IN

## 2018-03-02 DIAGNOSIS — J44.9 CHRONIC OBSTRUCTIVE PULMONARY DISEASE, UNSPECIFIED COPD TYPE (HCC): ICD-10-CM

## 2018-03-02 DIAGNOSIS — Z72.0 TOBACCO ABUSE: ICD-10-CM

## 2018-03-02 DIAGNOSIS — R07.89 CHEST PAIN, ATYPICAL: ICD-10-CM

## 2018-03-02 DIAGNOSIS — E78.00 PURE HYPERCHOLESTEROLEMIA: ICD-10-CM

## 2018-03-02 DIAGNOSIS — I10 ESSENTIAL HYPERTENSION: ICD-10-CM

## 2018-03-02 DIAGNOSIS — R06.02 SOB (SHORTNESS OF BREATH) ON EXERTION: ICD-10-CM

## 2018-03-02 LAB
LV EF: 58 %
LVEF MODALITY: NORMAL

## 2018-03-02 PROCEDURE — A9500 TC99M SESTAMIBI: HCPCS | Performed by: INTERNAL MEDICINE

## 2018-03-02 PROCEDURE — 93017 CV STRESS TEST TRACING ONLY: CPT | Performed by: INTERNAL MEDICINE

## 2018-03-02 PROCEDURE — 93306 TTE W/DOPPLER COMPLETE: CPT

## 2018-03-02 PROCEDURE — 78452 HT MUSCLE IMAGE SPECT MULT: CPT

## 2018-03-02 PROCEDURE — 3430000000 HC RX DIAGNOSTIC RADIOPHARMACEUTICAL: Performed by: INTERNAL MEDICINE

## 2018-03-02 PROCEDURE — 6360000002 HC RX W HCPCS

## 2018-03-02 RX ADMIN — Medication 35 MILLICURIE: at 12:55

## 2018-03-02 RX ADMIN — Medication 11 MILLICURIE: at 12:00

## 2018-03-05 ENCOUNTER — OFFICE VISIT (OUTPATIENT)
Dept: PULMONOLOGY | Age: 68
End: 2018-03-05
Payer: MEDICARE

## 2018-03-05 VITALS
OXYGEN SATURATION: 98 % | BODY MASS INDEX: 28 KG/M2 | SYSTOLIC BLOOD PRESSURE: 138 MMHG | TEMPERATURE: 99.8 F | HEART RATE: 72 BPM | DIASTOLIC BLOOD PRESSURE: 72 MMHG | HEIGHT: 64 IN | RESPIRATION RATE: 18 BRPM | WEIGHT: 164 LBS

## 2018-03-05 DIAGNOSIS — R91.8 MULTIPLE LUNG NODULES ON CT: Primary | ICD-10-CM

## 2018-03-05 DIAGNOSIS — J43.9 PULMONARY EMPHYSEMA, UNSPECIFIED EMPHYSEMA TYPE (HCC): ICD-10-CM

## 2018-03-05 DIAGNOSIS — Z72.0 TOBACCO ABUSE: ICD-10-CM

## 2018-03-05 PROCEDURE — 99406 BEHAV CHNG SMOKING 3-10 MIN: CPT | Performed by: NURSE PRACTITIONER

## 2018-03-05 PROCEDURE — G8417 CALC BMI ABV UP PARAM F/U: HCPCS | Performed by: NURSE PRACTITIONER

## 2018-03-05 PROCEDURE — G8926 SPIRO NO PERF OR DOC: HCPCS | Performed by: NURSE PRACTITIONER

## 2018-03-05 PROCEDURE — G8399 PT W/DXA RESULTS DOCUMENT: HCPCS | Performed by: NURSE PRACTITIONER

## 2018-03-05 PROCEDURE — G8484 FLU IMMUNIZE NO ADMIN: HCPCS | Performed by: NURSE PRACTITIONER

## 2018-03-05 PROCEDURE — 4040F PNEUMOC VAC/ADMIN/RCVD: CPT | Performed by: NURSE PRACTITIONER

## 2018-03-05 PROCEDURE — G8427 DOCREV CUR MEDS BY ELIG CLIN: HCPCS | Performed by: NURSE PRACTITIONER

## 2018-03-05 PROCEDURE — 3017F COLORECTAL CA SCREEN DOC REV: CPT | Performed by: NURSE PRACTITIONER

## 2018-03-05 PROCEDURE — 1090F PRES/ABSN URINE INCON ASSESS: CPT | Performed by: NURSE PRACTITIONER

## 2018-03-05 PROCEDURE — 3023F SPIROM DOC REV: CPT | Performed by: NURSE PRACTITIONER

## 2018-03-05 PROCEDURE — 1123F ACP DISCUSS/DSCN MKR DOCD: CPT | Performed by: NURSE PRACTITIONER

## 2018-03-05 PROCEDURE — 4004F PT TOBACCO SCREEN RCVD TLK: CPT | Performed by: NURSE PRACTITIONER

## 2018-03-05 PROCEDURE — 99214 OFFICE O/P EST MOD 30 MIN: CPT | Performed by: NURSE PRACTITIONER

## 2018-03-05 PROCEDURE — G8598 ASA/ANTIPLAT THER USED: HCPCS | Performed by: NURSE PRACTITIONER

## 2018-03-05 PROCEDURE — 3014F SCREEN MAMMO DOC REV: CPT | Performed by: NURSE PRACTITIONER

## 2018-03-05 RX ORDER — FLUTICASONE FUROATE AND VILANTEROL 100; 25 UG/1; UG/1
1 POWDER RESPIRATORY (INHALATION) DAILY
Qty: 30 EACH | Refills: 2 | Status: SHIPPED | OUTPATIENT
Start: 2018-03-05 | End: 2020-07-24

## 2018-03-05 ASSESSMENT — ENCOUNTER SYMPTOMS
BLURRED VISION: 0
VOMITING: 0
DIARRHEA: 0
NAUSEA: 0
SPUTUM PRODUCTION: 1
SHORTNESS OF BREATH: 1
COUGH: 1
WHEEZING: 1
HEMOPTYSIS: 0
DOUBLE VISION: 0

## 2018-03-05 ASSESSMENT — COPD QUESTIONNAIRES: COPD: 1

## 2018-03-05 NOTE — PROGRESS NOTES
[facility]    Patient: Will Ang, 79 y.o.   : 1950  3/5/2018    Pt of Dr. Trev Lawrence   Patient presents with    COPD     3 mo pulm f/u with Aaliyah Ballesteros 18, CT Chest 18        COPD   She complains of cough, shortness of breath, sputum production and wheezing. There is no hemoptysis. This is a chronic problem. The current episode started more than 1 year ago. The problem occurs 2 to 4 times per day. The problem has been unchanged. The cough is productive of sputum. Associated symptoms include dyspnea on exertion and orthopnea. Pertinent negatives include no chest pain, fever, PND or weight loss. Associated symptoms comments: Lower rib pain occasionally. Her symptoms are aggravated by strenuous activity, exercise, change in weather and climbing stairs. Her symptoms are alleviated by beta-agonist and rest. She reports significant improvement on treatment. Risk factors for lung disease include smoking/tobacco exposure. Her past medical history is significant for COPD. Jordon Loredo is here for follow up for COPD and to review spirometry and CT of chest. She continues to smoke 2 PPD. Progress History:   Since last visit any new medical issues? Ear pain scheduled to see Dr. Pablo Rangel on Wednesday  New ER or hospital visits? No  Any new or changes in medicines? No  Using inhalers? Yes spiriva, and breo  Are they helpful? Yes using albuterol 2 times per day has not needed rescue inhaler at all between visits  Flu vaccine? Yes  Pneumonia vaccine?  Yes within the past 5 years  Past Medical hx   PMH:  Past Medical History:   Diagnosis Date    Anxiety 2007    Arthritis     Breast cancer [facility]) 2005    right mastectomy, chemo and radiation history    CAD (coronary artery disease) 2001    Cancer of the skin 2004    [facility]bral [facility] occlusion [facility]     Chronic UTI     COPD (chronic obstructive pulmonary disease) ([facility]  CVA (cerebral infarction) 2007, 2008    Depression 2007    DVT of lower extremity (deep venous thrombosis) ([facility] 1976    Facial injury 2005    Fall on greasy ground, striking left periorbital region    History of stroke 2007, 2008, 2009    Patient relates a stroke with right weakness and speech in 2007; another in 2010 or 2012 (unsure), both with need to rehabilitation.   Also \"2 mini-strokes\" (?)    Hyperlipidemia 2005    Hypertension 2005    Hypothyroidism     IBS (irritable bowel syndrome)     Low HDL (under 40) 2014    Recurrent UTI (urinary tract infection) 2015    Dr Dayanara Munoz finding difficulty 05/16/2017    work-up in progress     SURGICAL HISTORY:  Past Surgical History:   Procedure Laterality Date   [address]    COLONOSCOPY  2009   [facility] HIP SURGERY  jan 19, 2015    HYSTERECTOMY  1976    JOINT REPLACEMENT Left 2011    HIP    JOINT REPLACEMENT Right 1/19/15    Right Total Hip Replacement - Dr. Kel Lopez, PARTIAL  2005    right    [address], 2001    ovaries    THYROID SURGERY  2007    right lobe     SOCIAL HISTORY:  Social History   Substance Use Topics    Smoking status: Current Every Day Smoker     Packs/day: 2.00     Years: 48.00     Types: Cigarettes     Start date: 11/13/1967    Smokeless tobacco: Never Used      Comment: 1.5ppd 12/12/17    Alcohol use No     ALLERGIES:  Allergies   Allergen Reactions    Cipro Xr     Vicodin [Hydrocodone-Acetaminophen]      Weird dreams       FAMILY HISTORY:  Family History   Problem Relation Age of Onset    Cancer Mother     Osteoporosis Mother     Hypertension Mother     High Cholesterol Mother     Stroke Mother     Cancer Father      lung cancer    Heart Disease Father     Hypertension Father     High Cholesterol Father     Heart Disease Sister     High Cholesterol Sister     Hypertension Sister     Asthma Sister     Other Other      high arched feet    Lung Cancer Son      CURRENT MEDICATIONS:  Current Outpatient Prescriptions   Medication Sig Dispense Refill    fluticasone-vilanterol (BREO ELLIPTA) 100-25 MCG/INH AEPB inhaler Inhale 1 puff into the lungs daily Rinse mouth after its use. 30 each 2    tiotropium (SPIRIVA HANDIHALER) 18 MCG inhalation capsule Inhale 1 capsule into the lungs daily 30 capsule 3    clopidogrel (PLAVIX) 75 MG tablet Take 1 tablet by mouth daily 30 tablet 0    ondansetron (ZOFRAN-ODT) 4 MG disintegrating tablet Take 1 tablet by mouth every 8 hours as needed for Nausea or Vomiting 30 tablet 2    ipratropium-albuterol (DUONEB) 0.5-2.5 (3) MG/3ML SOLN nebulizer solution Inhale 3 mLs into the lungs every 6 hours as needed for Shortness of Breath or Other (Wheezing) 360 mL 2    Respiratory Therapy Supplies (NEBULIZER/TUBING/MOUTHPIECE) KIT 1 kit by Does not apply route daily as needed (wheezing) 1 kit 0    acetaminophen (TYLENOL) 500 MG tablet Take 1 tablet by mouth every 6 hours as needed for Pain 120 tablet 3    metoprolol tartrate (LOPRESSOR) 25 MG tablet Take 1 tablet by mouth 2 times daily 60 tablet 5    atorvastatin (LIPITOR) 40 MG tablet Take 1 tablet by mouth nightly 30 tablet 5    escitalopram (LEXAPRO) 20 MG tablet Take 1 tablet by mouth daily 30 tablet 5    levothyroxine (LEVOTHROID) 88 MCG tablet Take 1 tablet by mouth daily 30 tablet 5    aspirin 81 MG EC tablet Take 1 tablet by mouth daily 30 tablet 3    isosorbide dinitrate (ISORDIL) 10 MG tablet Take 1 tablet by mouth 2 times daily 90 tablet 3    Blood Pressure Monitoring (BLOOD PRESSURE MONITOR/M CUFF) MISC Check blood pressure twice daily 1 each 0     No current facility-administered medications for this visit. Jennifer MACDONALD   Review of Systems   Constitutional: Negative for chills, fever and weight loss. HENT: Negative for tinnitus. Eyes: Negative for blurred vision and double vision.    Respiratory: Positive for cough, sputum production, shortness of breath

## 2018-03-15 ENCOUNTER — OFFICE VISIT (OUTPATIENT)
Dept: CARDIOLOGY CLINIC | Age: 68
End: 2018-03-15
Payer: MEDICARE

## 2018-03-15 ENCOUNTER — CARE COORDINATION (OUTPATIENT)
Dept: CARE COORDINATION | Age: 68
End: 2018-03-15

## 2018-03-15 VITALS
HEART RATE: 68 BPM | DIASTOLIC BLOOD PRESSURE: 62 MMHG | SYSTOLIC BLOOD PRESSURE: 142 MMHG | WEIGHT: 163 LBS | HEIGHT: 64 IN | BODY MASS INDEX: 27.83 KG/M2

## 2018-03-15 DIAGNOSIS — Z87.898 HISTORY OF CHEST PAIN: ICD-10-CM

## 2018-03-15 DIAGNOSIS — Z72.0 TOBACCO ABUSE: ICD-10-CM

## 2018-03-15 DIAGNOSIS — R06.02 SOB (SHORTNESS OF BREATH) ON EXERTION: ICD-10-CM

## 2018-03-15 DIAGNOSIS — E78.00 PURE HYPERCHOLESTEROLEMIA: Primary | ICD-10-CM

## 2018-03-15 DIAGNOSIS — I10 ESSENTIAL HYPERTENSION: ICD-10-CM

## 2018-03-15 DIAGNOSIS — Z98.890 S/P CARDIAC CATH: ICD-10-CM

## 2018-03-15 PROCEDURE — 4040F PNEUMOC VAC/ADMIN/RCVD: CPT | Performed by: INTERNAL MEDICINE

## 2018-03-15 PROCEDURE — G8598 ASA/ANTIPLAT THER USED: HCPCS | Performed by: INTERNAL MEDICINE

## 2018-03-15 PROCEDURE — 3014F SCREEN MAMMO DOC REV: CPT | Performed by: INTERNAL MEDICINE

## 2018-03-15 PROCEDURE — G8399 PT W/DXA RESULTS DOCUMENT: HCPCS | Performed by: INTERNAL MEDICINE

## 2018-03-15 PROCEDURE — 1123F ACP DISCUSS/DSCN MKR DOCD: CPT | Performed by: INTERNAL MEDICINE

## 2018-03-15 PROCEDURE — G8482 FLU IMMUNIZE ORDER/ADMIN: HCPCS | Performed by: INTERNAL MEDICINE

## 2018-03-15 PROCEDURE — G8427 DOCREV CUR MEDS BY ELIG CLIN: HCPCS | Performed by: INTERNAL MEDICINE

## 2018-03-15 PROCEDURE — 3017F COLORECTAL CA SCREEN DOC REV: CPT | Performed by: INTERNAL MEDICINE

## 2018-03-15 PROCEDURE — 4004F PT TOBACCO SCREEN RCVD TLK: CPT | Performed by: INTERNAL MEDICINE

## 2018-03-15 PROCEDURE — G8417 CALC BMI ABV UP PARAM F/U: HCPCS | Performed by: INTERNAL MEDICINE

## 2018-03-15 PROCEDURE — 1090F PRES/ABSN URINE INCON ASSESS: CPT | Performed by: INTERNAL MEDICINE

## 2018-03-15 PROCEDURE — 99213 OFFICE O/P EST LOW 20 MIN: CPT | Performed by: INTERNAL MEDICINE

## 2018-03-15 RX ORDER — AMLODIPINE BESYLATE 5 MG/1
5 TABLET ORAL DAILY
Qty: 30 TABLET | Refills: 5 | Status: SHIPPED | OUTPATIENT
Start: 2018-03-15 | End: 2021-03-08

## 2018-03-15 NOTE — PROGRESS NOTES
Vomiting 30 tablet 2    ipratropium-albuterol (DUONEB) 0.5-2.5 (3) MG/3ML SOLN nebulizer solution Inhale 3 mLs into the lungs every 6 hours as needed for Shortness of Breath or Other (Wheezing) 360 mL 2    Respiratory Therapy Supplies (NEBULIZER/TUBING/MOUTHPIECE) KIT 1 kit by Does not apply route daily as needed (wheezing) 1 kit 0    acetaminophen (TYLENOL) 500 MG tablet Take 1 tablet by mouth every 6 hours as needed for Pain 120 tablet 3    metoprolol tartrate (LOPRESSOR) 25 MG tablet Take 1 tablet by mouth 2 times daily 60 tablet 5    Blood Pressure Monitoring (BLOOD PRESSURE MONITOR/M CUFF) MISC Check blood pressure twice daily 1 each 0    atorvastatin (LIPITOR) 40 MG tablet Take 1 tablet by mouth nightly 30 tablet 5    escitalopram (LEXAPRO) 20 MG tablet Take 1 tablet by mouth daily 30 tablet 5    levothyroxine (LEVOTHROID) 88 MCG tablet Take 1 tablet by mouth daily 30 tablet 5    aspirin 81 MG EC tablet Take 1 tablet by mouth daily 30 tablet 3    isosorbide dinitrate (ISORDIL) 10 MG tablet Take 1 tablet by mouth 2 times daily 90 tablet 3     No current facility-administered medications for this visit. Review of Systems -     General ROS: negative  Psychological ROS: negative  Hematological and Lymphatic ROS: No history of blood clots or bleeding disorder. Respiratory ROS: no cough, shortness of breath, or wheezing  Cardiovascular ROS: no chest pain or dyspnea on exertion  Gastrointestinal ROS: negative  Genito-Urinary ROS: no dysuria, trouble voiding, or hematuria  Musculoskeletal ROS: negative  Neurological ROS: no TIA or stroke symptoms  Dermatological ROS: negative      Blood pressure (!) 142/62, pulse 68, height 5' 4\" (1.626 m), weight 163 lb (73.9 kg), not currently breastfeeding.         Physical Examination:    General appearance - alert, well appearing, and in no distress  Mental status - alert, oriented to person, place, and time  Neck - supple, no significant adenopathy, no JVD, or 02/02/2018     CONCLUSION:    1. Left main is a large caliber vessel, distally has about 20 percent          disease distally before the bifurcation. 2.    The circumflex about 20 percent proximal lesion and appears to be a          dominant vessel. 3.    The LAD has about 20 percent diffuse disease and large caliber          vessel. 4.    Right coronary artery is medium caliber vessel, nondominant vessel,          at least based on my views and there is no __________ critical          lesion, has mild lumen irregularity in it. 5.    Left ventriculography demonstrates ejection fraction of about 50 to          55 percent with mild global hypokinesia. 6.    No complications occurred. Overall impression now is that it is possible that the patient has evidence  of syndrome X because of the nonobstructive disease, but the LVEDP was 19,  indicative of mild impaired relaxation of ventricle. Again, aggressive risk  factor modification is strongly encouraged. The patient is to avoid driving,  lifting for the next two days. Veronica Loaiza D.O.        D: 03/07/2014 12:15       EKG 2/6/18  NSR, no acute abn nonsp T wave abn                                       Assessment    1. Pure hypercholesterolemia     2. Essential hypertension     3. SOB (shortness of breath) on exertion     4. History of chest pain atypical     5. Tobacco abuse     6. S/P cardiac cath nonobstructive lesion 2014         Plan   Continue the current treatment and with constant vigilance to changes in symptoms and also any potential side effects. Return for care or seek medical attention immediately if symptoms got worse and/or develop new symptoms. Hypertension, on medical treatment. Seems to be under poor control. Patient is compliant with medical treatment. Need better control  norvasc 5 mg po qd     Hyperlipidemia: on statins, followed periodically. Patient need periodic lipid and liver profile.       Has been free of

## 2018-03-23 ENCOUNTER — TELEPHONE (OUTPATIENT)
Dept: CARDIOLOGY CLINIC | Age: 68
End: 2018-03-23

## 2018-03-23 ENCOUNTER — OFFICE VISIT (OUTPATIENT)
Dept: PHYSICAL MEDICINE AND REHAB | Age: 68
End: 2018-03-23
Payer: MEDICARE

## 2018-03-23 VITALS
HEIGHT: 64 IN | HEART RATE: 59 BPM | WEIGHT: 159.4 LBS | BODY MASS INDEX: 27.21 KG/M2 | SYSTOLIC BLOOD PRESSURE: 113 MMHG | DIASTOLIC BLOOD PRESSURE: 54 MMHG

## 2018-03-23 DIAGNOSIS — G89.4 CHRONIC PAIN SYNDROME: ICD-10-CM

## 2018-03-23 DIAGNOSIS — M47.816 LUMBAR FACET ARTHROPATHY: ICD-10-CM

## 2018-03-23 DIAGNOSIS — M47.816 LUMBAR SPONDYLOSIS: ICD-10-CM

## 2018-03-23 DIAGNOSIS — M54.16 LUMBAR RADICULITIS: Primary | ICD-10-CM

## 2018-03-23 DIAGNOSIS — M48.061 SPINAL STENOSIS OF LUMBAR REGION WITHOUT NEUROGENIC CLAUDICATION: ICD-10-CM

## 2018-03-23 PROCEDURE — 4040F PNEUMOC VAC/ADMIN/RCVD: CPT | Performed by: PAIN MEDICINE

## 2018-03-23 PROCEDURE — 1123F ACP DISCUSS/DSCN MKR DOCD: CPT | Performed by: PAIN MEDICINE

## 2018-03-23 PROCEDURE — G8399 PT W/DXA RESULTS DOCUMENT: HCPCS | Performed by: PAIN MEDICINE

## 2018-03-23 PROCEDURE — 3017F COLORECTAL CA SCREEN DOC REV: CPT | Performed by: PAIN MEDICINE

## 2018-03-23 PROCEDURE — G8417 CALC BMI ABV UP PARAM F/U: HCPCS | Performed by: PAIN MEDICINE

## 2018-03-23 PROCEDURE — 1090F PRES/ABSN URINE INCON ASSESS: CPT | Performed by: PAIN MEDICINE

## 2018-03-23 PROCEDURE — G8482 FLU IMMUNIZE ORDER/ADMIN: HCPCS | Performed by: PAIN MEDICINE

## 2018-03-23 PROCEDURE — 4004F PT TOBACCO SCREEN RCVD TLK: CPT | Performed by: PAIN MEDICINE

## 2018-03-23 PROCEDURE — 3014F SCREEN MAMMO DOC REV: CPT | Performed by: PAIN MEDICINE

## 2018-03-23 PROCEDURE — G8427 DOCREV CUR MEDS BY ELIG CLIN: HCPCS | Performed by: PAIN MEDICINE

## 2018-03-23 PROCEDURE — G8598 ASA/ANTIPLAT THER USED: HCPCS | Performed by: PAIN MEDICINE

## 2018-03-23 PROCEDURE — 99205 OFFICE O/P NEW HI 60 MIN: CPT | Performed by: PAIN MEDICINE

## 2018-03-23 RX ORDER — ACETAMINOPHEN AND CODEINE PHOSPHATE 300; 30 MG/1; MG/1
1 TABLET ORAL 2 TIMES DAILY PRN
Qty: 30 TABLET | Refills: 0 | Status: SHIPPED | OUTPATIENT
Start: 2018-03-23 | End: 2018-04-07

## 2018-03-23 ASSESSMENT — ENCOUNTER SYMPTOMS
BACK PAIN: 1
COUGH: 0
SORE THROAT: 0
VOMITING: 0
RHINORRHEA: 0
CHEST TIGHTNESS: 0
CONSTIPATION: 0
WHEEZING: 0
SHORTNESS OF BREATH: 1
COLOR CHANGE: 0
ABDOMINAL PAIN: 0
SINUS PRESSURE: 0
PHOTOPHOBIA: 0
DIARRHEA: 1
NAUSEA: 0
EYE PAIN: 0

## 2018-03-23 NOTE — TELEPHONE ENCOUNTER
Pre op Risk Assessment    Procedure LESI  Physician Dr. Blair Crocker  Date of surgery/procedure TBD    Last OV 03/15/18  Last Stress 03/02/18  Last Echo 03/02/18  Last Cath 03/11/14  Last Stent None in EPIC  Is patient on blood thinners Plavix and ASA  Hold Meds/how many days Plavix 5 days and ASA 5 days

## 2018-03-23 NOTE — PROGRESS NOTES
[facility] PROFESSIONAL SERVS  [facility] PM[facility]  [address]  Dept: [phone]  Dept Fax: [phone]: [phone]    Visit Date: 3/23/2018    Isa Villanueva is a 79 y.o. female who is referred for pain management evaluation and treatment per Dr. Vikas Jordan. CAGE and CAGE-AID Questions   1. In the last three months, have you felt you should cut down or stop drinking or using drugs? Yes []        No [x]     2. In the last three months, has anyone annoyed you or gotten on your nerves by telling you to cut down or stop drinking or using drugs? Yes []        No [x]     3. In the last three months, have you felt guilty or bad about how much you drink or use drugs? Yes []        No [x]     4. In the last three months, have you been waking up wanting to have an alcoholic drink or use drugs? Yes []        No [x]        Opioid Risk Tool:  Clinician Form       1. Family History of Substance Abuse: Female Male    Alcohol   []1   []3    Illegal drugs   []2   []3    Prescription drugs     []4   []4   2. Personal History of Substance Abuse:          Alcohol   []3   []3    Illegal drugs   []4   []4    Prescription drugs     []5   []5   3. Age (jose box if between 12 and 39):     []1   []1   4. History of Preadolescent Sexual Abuse:     []3   []0   5. Psychological Disease:      Attention deficit disorder, obsessive-compulsive disorder, bipolar, schizophrenia   []2   []2      Depression     []1   []1    Scoring Totals 0 0     Total Score  Low Risk  Moderate Risk  High Risk   Risk Category   0 - 3   4 - 7   8 or Above      Patient states symptoms interfere with:  A.  General Activity:  yes   B. Mood: yes    C. Walking Ability:   yes   D. Normal Work (Includes both work outside the home and housework):   yes    E.  Relations with Other People:  no   F. Sleep:   no   G.  Enjoyment of Life:  no       HPI:     Chief Complaint:     Lower back pain    HPI      Patient is a 79 neg   Skin: Skin is warm. No rash noted. She is not diaphoretic. No erythema. No pallor. Psychiatric: Her mood appears not anxious. Her affect is not angry, not blunt, not labile and not inappropriate. Her speech is not rapid and/or pressured, not delayed, not tangential and not slurred. She is not agitated, not aggressive, not hyperactive, not slowed, not withdrawn, not actively hallucinating and not combative. Thought content is not paranoid and not delusional. Cognition and memory are not impaired. She does not express impulsivity or inappropriate judgment. She does not exhibit a depressed mood. She expresses no homicidal and no suicidal ideation. She expresses no suicidal plans and no homicidal plans. She is communicative. She exhibits normal recent memory and normal remote memory. She is attentive. Nursing note and vitals reviewed. STU test: neg  Yeoman's test: neg  Gaenslen test: neg     Assessment:     1. Lumbar radiculitis    2. Spinal stenosis of lumbar region without neurogenic claudication    3. Lumbar spondylosis    4. Lumbar facet arthropathy    5. Chronic pain syndrome            Plan:      · Patient read and signed orientation and opioid agreement. · OARRS reviewed. · Discussed long term side effects of medications. · Discussed tolerance, dependency and addiction. .UDS preformed today. · Patient told can not receive any pain medications from any other source. · Discussed with pt.may not be pain free. · No evidence of abuse, diversion or aberrant behavior. · Medications and/or procedures improve function and quality of life. · MRI Lumbar Spine reviewed in detail with patient. Model used to explain pathology  · Recommend LESI under fluoroscopy guidance. Risks and procedure reviewed in detail. · In good donato will give Rx of Tylenol # 3 until UDS results back.   · Need Clearance from Dr Tyler Garrison to stop Plavix and ASA for 5 days prior to LESI    Previous Treatments tried:  · PT: No,

## 2018-04-08 NOTE — TELEPHONE ENCOUNTER
Called and advised patient on result note. Verbalized understanding. Patient will start coming into office \"hopefully tomorrow\" to start B12 injections. Will mail lab order to get B12 lab draw completed again in 3 months. Oanh Walker

## 2018-04-10 ENCOUNTER — HOSPITAL ENCOUNTER (OUTPATIENT)
Age: 68
Setting detail: OUTPATIENT SURGERY
Discharge: HOME | End: 2018-04-10
Attending: PAIN MEDICINE | Admitting: PAIN MEDICINE
Payer: MEDICARE

## 2018-04-10 ENCOUNTER — APPOINTMENT (OUTPATIENT)
Dept: GENERAL RADIOLOGY | Age: 68
End: 2018-04-10
Attending: PAIN MEDICINE
Payer: MEDICARE

## 2018-04-10 ENCOUNTER — ANESTHESIA EVENT (OUTPATIENT)
Dept: OPERATING ROOM | Age: 68
End: 2018-04-10
Payer: MEDICARE

## 2018-04-10 ENCOUNTER — ANESTHESIA (OUTPATIENT)
Dept: OPERATING ROOM | Age: 68
End: 2018-04-10
Payer: MEDICARE

## 2018-04-10 VITALS
TEMPERATURE: 98.6 F | HEART RATE: 65 BPM | OXYGEN SATURATION: 97 % | SYSTOLIC BLOOD PRESSURE: 131 MMHG | HEIGHT: 64 IN | DIASTOLIC BLOOD PRESSURE: 62 MMHG | RESPIRATION RATE: 14 BRPM | WEIGHT: 161.2 LBS | BODY MASS INDEX: 27.52 KG/M2

## 2018-04-10 VITALS
OXYGEN SATURATION: 100 % | RESPIRATION RATE: 8 BRPM | DIASTOLIC BLOOD PRESSURE: 65 MMHG | SYSTOLIC BLOOD PRESSURE: 146 MMHG

## 2018-04-10 PROCEDURE — 2500000003 HC RX 250 WO HCPCS: Performed by: PAIN MEDICINE

## 2018-04-10 PROCEDURE — 3600000054 HC PAIN LEVEL 3 BASE: Performed by: PAIN MEDICINE

## 2018-04-10 PROCEDURE — 7100000011 HC PHASE II RECOVERY - ADDTL 15 MIN: Performed by: PAIN MEDICINE

## 2018-04-10 PROCEDURE — 2580000003 HC RX 258: Performed by: PAIN MEDICINE

## 2018-04-10 PROCEDURE — 6360000004 HC RX CONTRAST MEDICATION: Performed by: PAIN MEDICINE

## 2018-04-10 PROCEDURE — 7100000010 HC PHASE II RECOVERY - FIRST 15 MIN: Performed by: PAIN MEDICINE

## 2018-04-10 PROCEDURE — 3700000000 HC ANESTHESIA ATTENDED CARE: Performed by: PAIN MEDICINE

## 2018-04-10 PROCEDURE — 6360000002 HC RX W HCPCS: Performed by: PAIN MEDICINE

## 2018-04-10 PROCEDURE — 62323 NJX INTERLAMINAR LMBR/SAC: CPT | Performed by: PAIN MEDICINE

## 2018-04-10 PROCEDURE — 6360000002 HC RX W HCPCS: Performed by: ANESTHESIOLOGY

## 2018-04-10 PROCEDURE — 3209999900 FLUORO FOR SURGICAL PROCEDURES

## 2018-04-10 RX ORDER — 0.9 % SODIUM CHLORIDE 0.9 %
VIAL (ML) INJECTION PRN
Status: DISCONTINUED | OUTPATIENT
Start: 2018-04-10 | End: 2018-04-10 | Stop reason: HOSPADM

## 2018-04-10 RX ORDER — DEXAMETHASONE SODIUM PHOSPHATE 4 MG/ML
INJECTION, SOLUTION INTRA-ARTICULAR; INTRALESIONAL; INTRAMUSCULAR; INTRAVENOUS; SOFT TISSUE PRN
Status: DISCONTINUED | OUTPATIENT
Start: 2018-04-10 | End: 2018-04-10 | Stop reason: HOSPADM

## 2018-04-10 RX ORDER — LIDOCAINE HYDROCHLORIDE 10 MG/ML
INJECTION, SOLUTION INFILTRATION; PERINEURAL PRN
Status: DISCONTINUED | OUTPATIENT
Start: 2018-04-10 | End: 2018-04-10 | Stop reason: HOSPADM

## 2018-04-10 RX ADMIN — PROPOFOL 30 MG: 10 INJECTION, EMULSION INTRAVENOUS at 12:44

## 2018-04-10 RX ADMIN — PROPOFOL 20 MG: 10 INJECTION, EMULSION INTRAVENOUS at 12:46

## 2018-04-10 ASSESSMENT — PAIN SCALES - GENERAL: PAINLEVEL_OUTOF10: 0

## 2018-04-10 ASSESSMENT — PULMONARY FUNCTION TESTS
PIF_VALUE: 0

## 2018-04-10 ASSESSMENT — PAIN - FUNCTIONAL ASSESSMENT: PAIN_FUNCTIONAL_ASSESSMENT: 0-10

## 2018-04-10 ASSESSMENT — LIFESTYLE VARIABLES: SMOKING_STATUS: 1

## 2018-04-25 ENCOUNTER — OFFICE VISIT (OUTPATIENT)
Dept: FAMILY MEDICINE CLINIC | Age: 68
End: 2018-04-25
Payer: MEDICARE

## 2018-04-25 ENCOUNTER — CARE COORDINATION (OUTPATIENT)
Dept: CARE COORDINATION | Age: 68
End: 2018-04-25

## 2018-04-25 VITALS
RESPIRATION RATE: 12 BRPM | OXYGEN SATURATION: 98 % | HEIGHT: 64 IN | HEART RATE: 71 BPM | WEIGHT: 163 LBS | SYSTOLIC BLOOD PRESSURE: 126 MMHG | BODY MASS INDEX: 27.83 KG/M2 | DIASTOLIC BLOOD PRESSURE: 78 MMHG

## 2018-04-25 DIAGNOSIS — R42 DIZZINESS OF UNKNOWN CAUSE: ICD-10-CM

## 2018-04-25 DIAGNOSIS — E86.0 DEHYDRATION, MILD: ICD-10-CM

## 2018-04-25 DIAGNOSIS — R19.7 NAUSEA VOMITING AND DIARRHEA: Primary | ICD-10-CM

## 2018-04-25 DIAGNOSIS — F43.9 STRESS AT HOME: ICD-10-CM

## 2018-04-25 DIAGNOSIS — E53.9 VITAMIN B DEFICIENCY: ICD-10-CM

## 2018-04-25 DIAGNOSIS — Z63.9 FAMILY DISTRESS: ICD-10-CM

## 2018-04-25 DIAGNOSIS — R11.2 NAUSEA VOMITING AND DIARRHEA: Primary | ICD-10-CM

## 2018-04-25 PROCEDURE — 4040F PNEUMOC VAC/ADMIN/RCVD: CPT | Performed by: FAMILY MEDICINE

## 2018-04-25 PROCEDURE — G8427 DOCREV CUR MEDS BY ELIG CLIN: HCPCS | Performed by: FAMILY MEDICINE

## 2018-04-25 PROCEDURE — 4004F PT TOBACCO SCREEN RCVD TLK: CPT | Performed by: FAMILY MEDICINE

## 2018-04-25 PROCEDURE — 1123F ACP DISCUSS/DSCN MKR DOCD: CPT | Performed by: FAMILY MEDICINE

## 2018-04-25 PROCEDURE — 3014F SCREEN MAMMO DOC REV: CPT | Performed by: FAMILY MEDICINE

## 2018-04-25 PROCEDURE — G8598 ASA/ANTIPLAT THER USED: HCPCS | Performed by: FAMILY MEDICINE

## 2018-04-25 PROCEDURE — 99214 OFFICE O/P EST MOD 30 MIN: CPT | Performed by: FAMILY MEDICINE

## 2018-04-25 PROCEDURE — G8399 PT W/DXA RESULTS DOCUMENT: HCPCS | Performed by: FAMILY MEDICINE

## 2018-04-25 PROCEDURE — 96372 THER/PROPH/DIAG INJ SC/IM: CPT | Performed by: FAMILY MEDICINE

## 2018-04-25 PROCEDURE — 3017F COLORECTAL CA SCREEN DOC REV: CPT | Performed by: FAMILY MEDICINE

## 2018-04-25 PROCEDURE — G8417 CALC BMI ABV UP PARAM F/U: HCPCS | Performed by: FAMILY MEDICINE

## 2018-04-25 PROCEDURE — 1090F PRES/ABSN URINE INCON ASSESS: CPT | Performed by: FAMILY MEDICINE

## 2018-04-25 RX ORDER — CYANOCOBALAMIN 1000 UG/ML
1000 INJECTION INTRAMUSCULAR; SUBCUTANEOUS ONCE
Status: COMPLETED | OUTPATIENT
Start: 2018-04-25 | End: 2018-04-25

## 2018-04-25 RX ORDER — CYANOCOBALAMIN 1000 UG/ML
INJECTION INTRAMUSCULAR; SUBCUTANEOUS
Qty: 10 ML | Refills: 1 | Status: SHIPPED | OUTPATIENT
Start: 2018-04-25 | End: 2019-02-22 | Stop reason: SDUPTHER

## 2018-04-25 RX ADMIN — CYANOCOBALAMIN 1000 MCG: 1000 INJECTION INTRAMUSCULAR; SUBCUTANEOUS at 11:20

## 2018-04-25 SDOH — SOCIAL STABILITY - SOCIAL INSECURITY: PROBLEM RELATED TO PRIMARY SUPPORT GROUP, UNSPECIFIED: Z63.9

## 2018-04-25 ASSESSMENT — ENCOUNTER SYMPTOMS: DYSPNEA ASSOCIATED WITH: EXERTION

## 2018-04-26 ENCOUNTER — TELEPHONE (OUTPATIENT)
Dept: FAMILY MEDICINE CLINIC | Age: 68
End: 2018-04-26

## 2018-04-30 ENCOUNTER — NURSE ONLY (OUTPATIENT)
Dept: FAMILY MEDICINE CLINIC | Age: 68
End: 2018-04-30
Payer: MEDICARE

## 2018-04-30 DIAGNOSIS — E53.8 VITAMIN B12 DEFICIENCY: Primary | ICD-10-CM

## 2018-04-30 PROCEDURE — 96372 THER/PROPH/DIAG INJ SC/IM: CPT | Performed by: FAMILY MEDICINE

## 2018-04-30 RX ORDER — CYANOCOBALAMIN 1000 UG/ML
1000 INJECTION INTRAMUSCULAR; SUBCUTANEOUS ONCE
Status: COMPLETED | OUTPATIENT
Start: 2018-04-30 | End: 2018-04-30

## 2018-04-30 RX ADMIN — CYANOCOBALAMIN 1000 MCG: 1000 INJECTION INTRAMUSCULAR; SUBCUTANEOUS at 14:26

## 2018-05-02 ENCOUNTER — NURSE ONLY (OUTPATIENT)
Dept: FAMILY MEDICINE CLINIC | Age: 68
End: 2018-05-02
Payer: MEDICARE

## 2018-05-02 DIAGNOSIS — E53.8 VITAMIN B12 DEFICIENCY: Primary | ICD-10-CM

## 2018-05-02 PROCEDURE — 96372 THER/PROPH/DIAG INJ SC/IM: CPT | Performed by: FAMILY MEDICINE

## 2018-05-02 RX ORDER — CYANOCOBALAMIN 1000 UG/ML
1000 INJECTION INTRAMUSCULAR; SUBCUTANEOUS ONCE
Status: COMPLETED | OUTPATIENT
Start: 2018-05-02 | End: 2018-05-02

## 2018-05-02 RX ADMIN — CYANOCOBALAMIN 1000 MCG: 1000 INJECTION INTRAMUSCULAR; SUBCUTANEOUS at 12:01

## 2018-05-03 ENCOUNTER — NURSE ONLY (OUTPATIENT)
Dept: FAMILY MEDICINE CLINIC | Age: 68
End: 2018-05-03
Payer: MEDICARE

## 2018-05-03 DIAGNOSIS — E53.8 VITAMIN B12 DEFICIENCY: Primary | ICD-10-CM

## 2018-05-03 PROCEDURE — 96372 THER/PROPH/DIAG INJ SC/IM: CPT | Performed by: FAMILY MEDICINE

## 2018-05-03 RX ORDER — CYANOCOBALAMIN 1000 UG/ML
1000 INJECTION INTRAMUSCULAR; SUBCUTANEOUS ONCE
Status: COMPLETED | OUTPATIENT
Start: 2018-05-03 | End: 2018-05-03

## 2018-05-03 RX ADMIN — CYANOCOBALAMIN 1000 MCG: 1000 INJECTION INTRAMUSCULAR; SUBCUTANEOUS at 09:11

## 2018-05-04 ENCOUNTER — NURSE ONLY (OUTPATIENT)
Dept: FAMILY MEDICINE CLINIC | Age: 68
End: 2018-05-04

## 2018-05-04 DIAGNOSIS — E53.9 VITAMIN B DEFICIENCY: Primary | ICD-10-CM

## 2018-05-04 RX ORDER — CYANOCOBALAMIN 1000 UG/ML
1000 INJECTION INTRAMUSCULAR; SUBCUTANEOUS ONCE
Qty: 1 ML | Refills: 0 | Status: SHIPPED | OUTPATIENT
Start: 2018-05-04 | End: 2018-06-07 | Stop reason: ALTCHOICE

## 2018-05-24 ENCOUNTER — CARE COORDINATION (OUTPATIENT)
Dept: CARE COORDINATION | Age: 68
End: 2018-05-24

## 2018-05-24 ASSESSMENT — ENCOUNTER SYMPTOMS: DYSPNEA ASSOCIATED WITH: EXERTION

## 2018-06-04 ENCOUNTER — OFFICE VISIT (OUTPATIENT)
Dept: FAMILY MEDICINE CLINIC | Age: 68
End: 2018-06-04
Payer: MEDICARE

## 2018-06-04 ENCOUNTER — TELEPHONE (OUTPATIENT)
Dept: FAMILY MEDICINE CLINIC | Age: 68
End: 2018-06-04

## 2018-06-04 VITALS
WEIGHT: 163 LBS | RESPIRATION RATE: 20 BRPM | HEART RATE: 68 BPM | TEMPERATURE: 97.7 F | BODY MASS INDEX: 27.83 KG/M2 | HEIGHT: 64 IN | SYSTOLIC BLOOD PRESSURE: 159 MMHG | DIASTOLIC BLOOD PRESSURE: 66 MMHG | OXYGEN SATURATION: 97 %

## 2018-06-04 DIAGNOSIS — Z12.11 COLON CANCER SCREENING: ICD-10-CM

## 2018-06-04 DIAGNOSIS — Z12.31 ENCOUNTER FOR SCREENING MAMMOGRAM FOR BREAST CANCER: ICD-10-CM

## 2018-06-04 DIAGNOSIS — R21 RASH OF NECK: ICD-10-CM

## 2018-06-04 DIAGNOSIS — Z23 NEED FOR PROPHYLACTIC VACCINATION AND INOCULATION AGAINST VARICELLA: ICD-10-CM

## 2018-06-04 DIAGNOSIS — Z13.1 ENCOUNTER FOR SCREENING FOR DIABETES MELLITUS: ICD-10-CM

## 2018-06-04 DIAGNOSIS — L29.9 PRURITUS: Primary | ICD-10-CM

## 2018-06-04 PROCEDURE — 1123F ACP DISCUSS/DSCN MKR DOCD: CPT | Performed by: FAMILY MEDICINE

## 2018-06-04 PROCEDURE — G8427 DOCREV CUR MEDS BY ELIG CLIN: HCPCS | Performed by: FAMILY MEDICINE

## 2018-06-04 PROCEDURE — 3014F SCREEN MAMMO DOC REV: CPT | Performed by: FAMILY MEDICINE

## 2018-06-04 PROCEDURE — 4004F PT TOBACCO SCREEN RCVD TLK: CPT | Performed by: FAMILY MEDICINE

## 2018-06-04 PROCEDURE — G8417 CALC BMI ABV UP PARAM F/U: HCPCS | Performed by: FAMILY MEDICINE

## 2018-06-04 PROCEDURE — 4040F PNEUMOC VAC/ADMIN/RCVD: CPT | Performed by: FAMILY MEDICINE

## 2018-06-04 PROCEDURE — G8598 ASA/ANTIPLAT THER USED: HCPCS | Performed by: FAMILY MEDICINE

## 2018-06-04 PROCEDURE — 99214 OFFICE O/P EST MOD 30 MIN: CPT | Performed by: FAMILY MEDICINE

## 2018-06-04 PROCEDURE — G8399 PT W/DXA RESULTS DOCUMENT: HCPCS | Performed by: FAMILY MEDICINE

## 2018-06-04 PROCEDURE — 1090F PRES/ABSN URINE INCON ASSESS: CPT | Performed by: FAMILY MEDICINE

## 2018-06-04 PROCEDURE — 3017F COLORECTAL CA SCREEN DOC REV: CPT | Performed by: FAMILY MEDICINE

## 2018-06-05 RX ORDER — PANTOPRAZOLE SODIUM 40 MG/1
40 TABLET, DELAYED RELEASE ORAL DAILY
Qty: 30 TABLET | Refills: 3 | Status: SHIPPED | OUTPATIENT
Start: 2018-06-05 | End: 2020-07-24

## 2018-06-07 RX ORDER — SYRINGE W-NEEDLE,DISPOSAB,3 ML 25GX5/8"
SYRINGE, EMPTY DISPOSABLE MISCELLANEOUS
Qty: 30 EACH | Refills: 1 | Status: SHIPPED | OUTPATIENT
Start: 2018-06-07 | End: 2020-07-24

## 2018-06-13 ENCOUNTER — HOSPITAL ENCOUNTER (OUTPATIENT)
Dept: WOMENS IMAGING | Age: 68
Discharge: HOME | End: 2018-06-13
Payer: MEDICARE

## 2018-06-13 DIAGNOSIS — Z12.31 ENCOUNTER FOR SCREENING MAMMOGRAM FOR BREAST CANCER: ICD-10-CM

## 2018-06-13 PROCEDURE — 77063 BREAST TOMOSYNTHESIS BI: CPT

## 2018-06-14 ENCOUNTER — TELEPHONE (OUTPATIENT)
Dept: CARDIOLOGY CLINIC | Age: 68
End: 2018-06-14

## 2018-06-28 ENCOUNTER — CARE COORDINATION (OUTPATIENT)
Dept: CARE COORDINATION | Age: 68
End: 2018-06-28

## 2018-07-18 ENCOUNTER — CARE COORDINATION (OUTPATIENT)
Dept: CARE COORDINATION | Age: 68
End: 2018-07-18

## 2018-07-18 ASSESSMENT — ENCOUNTER SYMPTOMS: DYSPNEA ASSOCIATED WITH: EXERTION

## 2018-07-18 NOTE — CARE COORDINATION
tool to seek urgent or emergent care. I will notify my provider of any symptoms that indicate a worsening of my condition. Barriers: none  Plan for overcoming my barriers: N/A  Confidence: 8/10  Anticipated Goal Completion Date: 8/21/17         Stop Cigarette/Tobacco use   Not on track (7/18/2018)     Use a nicotine replacement product or medication   Not on track (7/18/2018)          Prior to Admission medications    Medication Sig Start Date End Date Taking? Authorizing Provider   fluticasone-vilanterol (BREO ELLIPTA) 100-25 MCG/INH AEPB inhaler Inhale 1 puff into the lungs daily Rinse mouth after its use. 3/5/18 3/5/19 Yes NITO Sunshine CNP   tiotropium (SPIRIVA HANDIHALER) 18 MCG inhalation capsule Inhale 1 capsule into the lungs daily 3/5/18  Yes NITO Sunshine CNP   ipratropium-albuterol (DUONEB) 0.5-2.5 (3) MG/3ML SOLN nebulizer solution Inhale 3 mLs into the lungs every 6 hours as needed for Shortness of Breath or Other (Wheezing) 12/28/17  Yes NITO Argueta CNP   polyethylene glycol (GLYCOLAX) powder Colonoscopy Prep Dispense 255 Gram Bottle.   Use as Directed 6/7/18   NITO Cain CNP   Syringe/Needle, Disp, (SYRINGE 3CC/25GX1\") 25G X 1\" 3 ML MISC To be used with B12 injections 6/7/18   Rosana Lawrence MD   pantoprazole (PROTONIX) 40 MG tablet Take 1 tablet by mouth daily 6/5/18   Rosana Lawrence MD   cyanocobalamin 1000 MCG/ML injection Inject 1 CC IM daily x 7 days, then 1 CC IM weekly for 1 months, then 1 CC monthly 4/25/18   Rosana Lawrence MD   amLODIPine (NORVASC) 5 MG tablet Take 1 tablet by mouth daily 3/15/18   Jason Palomino MD   clopidogrel (PLAVIX) 75 MG tablet Take 1 tablet by mouth daily 2/6/18   Jason Palomino MD   ondansetron (ZOFRAN-ODT) 4 MG disintegrating tablet Take 1 tablet by mouth every 8 hours as needed for Nausea or Vomiting 1/22/18   Rosana Lawrence MD   acetaminophen (TYLENOL) 500 MG tablet Take 1 tablet by mouth every 6 hours as needed for Pain 12/28/17   NITO Dumont CNP   metoprolol tartrate (LOPRESSOR) 25 MG tablet Take 1 tablet by mouth 2 times daily 10/10/17   NITO Dumont CNP   atorvastatin (LIPITOR) 40 MG tablet Take 1 tablet by mouth nightly 7/25/17   Sadia Hodges MD   escitalopram (LEXAPRO) 20 MG tablet Take 1 tablet by mouth daily 7/25/17   Sadia Hodges MD   levothyroxine (LEVOTHROID) 88 MCG tablet Take 1 tablet by mouth daily 7/25/17   Sadia Hodges MD   aspirin 81 MG EC tablet Take 1 tablet by mouth daily 5/17/17   Zehra Quiñones MD   isosorbide dinitrate (ISORDIL) 10 MG tablet Take 1 tablet by mouth 2 times daily 5/17/17   Zehra Quiñones MD       Future Appointments  Date Time Provider Zayra Cheryi   7/23/2018 1:45 PM NITO Sorenson CNP [facility]   9/5/2018 1:00 PM STR CT IMAGING RM1 STRZ CT SCAN STR Radiolog   9/17/2018 2:00 PM NITO Velazquez CNP Pulm Med [facility] [address]   1/9/2019 12:30 PM STR ULTRASOUND RM 2 STRZ US STR Radiolog   1/15/2019 2:00 PM [facility] [place] [address]

## 2018-08-17 ENCOUNTER — CARE COORDINATION (OUTPATIENT)
Dept: CARE COORDINATION | Age: 68
End: 2018-08-17

## 2018-08-17 ASSESSMENT — ENCOUNTER SYMPTOMS: DYSPNEA ASSOCIATED WITH: EXERTION

## 2018-08-17 NOTE — CARE COORDINATION
none  Plan for overcoming my barriers: N/A  Confidence: 8/10  Anticipated Goal Completion Date: 8/21/17         Stop Cigarette/Tobacco use   Not on track (8/17/2018)     Use a nicotine replacement product or medication   Not on track (8/17/2018)          Prior to Admission medications    Medication Sig Start Date End Date Taking? Authorizing Provider   pantoprazole (PROTONIX) 40 MG tablet Take 1 tablet by mouth daily 6/5/18  Yes Elieser Turpin MD   cyanocobalamin 1000 MCG/ML injection Inject 1 CC IM daily x 7 days, then 1 CC IM weekly for 1 months, then 1 CC monthly 4/25/18  Yes Elieser Turpin MD   amLODIPine (NORVASC) 5 MG tablet Take 1 tablet by mouth daily 3/15/18  Yes Sandee Zhou MD   fluticasone-vilanterol (BREO ELLIPTA) 100-25 MCG/INH AEPB inhaler Inhale 1 puff into the lungs daily Rinse mouth after its use.  3/5/18 3/5/19 Yes NITO Sunshine CNP   tiotropium (Marc Keep) 18 MCG inhalation capsule Inhale 1 capsule into the lungs daily 3/5/18  Yes NITO Olivarez CNP   clopidogrel (PLAVIX) 75 MG tablet Take 1 tablet by mouth daily 2/6/18  Yes Sandee Zhou MD   ondansetron (ZOFRAN-ODT) 4 MG disintegrating tablet Take 1 tablet by mouth every 8 hours as needed for Nausea or Vomiting 1/22/18  Yes Elieser Turpin MD   ipratropium-albuterol (DUONEB) 0.5-2.5 (3) MG/3ML SOLN nebulizer solution Inhale 3 mLs into the lungs every 6 hours as needed for Shortness of Breath or Other (Wheezing) 12/28/17  Yes NITO Nicholas CNP   metoprolol tartrate (LOPRESSOR) 25 MG tablet Take 1 tablet by mouth 2 times daily 10/10/17  Yes NITO Nicholas CNP   atorvastatin (LIPITOR) 40 MG tablet Take 1 tablet by mouth nightly 7/25/17  Yes Elieser Turpin MD   escitalopram (LEXAPRO) 20 MG tablet Take 1 tablet by mouth daily 7/25/17  Yes Elieser Turpin MD   levothyroxine (LEVOTHROID) 88 MCG tablet Take 1 tablet by mouth daily 7/25/17  Yes Elieser Turpin MD   aspirin 81 MG EC tablet Take 1 tablet by mouth daily 5/17/17  Yes Karime Hebert MD   isosorbide dinitrate (ISORDIL) 10 MG tablet Take 1 tablet by mouth 2 times daily 5/17/17  Yes Karime Hebert MD   polyethylene glycol (GLYCOLAX) powder Colonoscopy Prep Dispense 255 Gram Bottle.   Use as Directed 6/7/18   NITO Lucas CNP   Syringe/Needle, Disp, (SYRINGE 3CC/25GX1\") 25G X 1\" 3 ML MISC To be used with B12 injections 6/7/18   April Roberts MD   acetaminophen (TYLENOL) 500 MG tablet Take 1 tablet by mouth every 6 hours as needed for Pain 12/28/17   NITO Cisneros CNP       Future Appointments  Date Time Provider Zayra Upton   9/5/2018 1:00 PM STR CT IMAGING RM1 STRZ CT SCAN STR Radiolog   10/2/2018 1:15 PM [facility] [place]   1/9/2019 12:30 PM STR ULTRASOUND RM 2 STRZ US STR Radiolog   1/15/2019 2:00 PM Pedro Luis Wilson MD ENT [facility] [place]

## 2018-09-25 ENCOUNTER — CARE COORDINATION (OUTPATIENT)
Dept: CARE COORDINATION | Age: 68
End: 2018-09-25

## 2018-09-25 ASSESSMENT — ENCOUNTER SYMPTOMS: DYSPNEA ASSOCIATED WITH: EXERTION

## 2018-09-25 NOTE — CARE COORDINATION
 Stop Cigarette/Tobacco use   Not on track (9/25/2018)     Use a nicotine replacement product or medication   Not on track (9/25/2018)          Prior to Admission medications    Medication Sig Start Date End Date Taking? Authorizing Provider   polyethylene glycol (GLYCOLAX) powder Colonoscopy Prep Dispense 255 Gram Bottle. Use as Directed 6/7/18  Yes NITO Cain CNP   pantoprazole (PROTONIX) 40 MG tablet Take 1 tablet by mouth daily 6/5/18  Yes Rosana Lawrence MD   cyanocobalamin 1000 MCG/ML injection Inject 1 CC IM daily x 7 days, then 1 CC IM weekly for 1 months, then 1 CC monthly 4/25/18  Yes Rosana Lawrence MD   amLODIPine (NORVASC) 5 MG tablet Take 1 tablet by mouth daily 3/15/18  Yes Jason Palomino MD   fluticasone-vilanterol (BREO ELLIPTA) 100-25 MCG/INH AEPB inhaler Inhale 1 puff into the lungs daily Rinse mouth after its use.  3/5/18 3/5/19 Yes NITO Sunshine CNP   tiotropium (SPIRIVA HANDIHALER) 18 MCG inhalation capsule Inhale 1 capsule into the lungs daily 3/5/18  Yes NITO Granados CNP   clopidogrel (PLAVIX) 75 MG tablet Take 1 tablet by mouth daily 2/6/18  Yes Jason Palomino MD   ondansetron (ZOFRAN-ODT) 4 MG disintegrating tablet Take 1 tablet by mouth every 8 hours as needed for Nausea or Vomiting 1/22/18  Yes Rosana Lawrence MD   ipratropium-albuterol (DUONEB) 0.5-2.5 (3) MG/3ML SOLN nebulizer solution Inhale 3 mLs into the lungs every 6 hours as needed for Shortness of Breath or Other (Wheezing) 12/28/17  Yes NITO Smallwood CNP   acetaminophen (TYLENOL) 500 MG tablet Take 1 tablet by mouth every 6 hours as needed for Pain 12/28/17  Yes NITO Smallwood CNP   metoprolol tartrate (LOPRESSOR) 25 MG tablet Take 1 tablet by mouth 2 times daily 10/10/17  Yes NITO Smallwood CNP   atorvastatin (LIPITOR) 40 MG tablet Take 1 tablet by mouth nightly 7/25/17  Yes Rosana Lawrence MD   escitalopram (LEXAPRO) 20 MG tablet Take 1 tablet by mouth daily 7/25/17  Yes Elia Rosado MD   levothyroxine (LEVOTHROID) 88 MCG tablet Take 1 tablet by mouth daily 7/25/17  Yes Elia Rosado MD   aspirin 81 MG EC tablet Take 1 tablet by mouth daily 5/17/17  Yes Dg Emerson MD   isosorbide dinitrate (ISORDIL) 10 MG tablet Take 1 tablet by mouth 2 times daily 5/17/17  Yes Dg Emerson MD   Syringe/Needle, Disp, (SYRINGE 3CC/25GX1\") 25G X 1\" 3 ML MISC To be used with B12 injections 6/7/18   Elia Rosado MD       Future Appointments  Date Time Provider Zayra Alexsandra   10/2/2018 1:15 PM Janet Simmons, APRN - CNP Pul Med [facility] - [place] [address]   1/9/2019 12:30 PM STR ULTRASOUND RM 2 STRZ US STR Radiolog   1/15/2019 2:00 PM Haile Valdovinos MD ENT [facility] - [place] [address]

## 2018-10-01 ENCOUNTER — TELEPHONE (OUTPATIENT)
Dept: PULMONOLOGY | Age: 68
End: 2018-10-01

## 2018-10-06 ENCOUNTER — CARE COORDINATION (OUTPATIENT)
Dept: CASE MANAGEMENT | Age: 68
End: 2018-10-06

## 2018-10-31 ENCOUNTER — CARE COORDINATION (OUTPATIENT)
Dept: CARE COORDINATION | Age: 68
End: 2018-10-31

## 2018-10-31 ASSESSMENT — ENCOUNTER SYMPTOMS: DYSPNEA ASSOCIATED WITH: EXERTION

## 2018-10-31 NOTE — CARE COORDINATION
MCG tablet Take 1 tablet by mouth daily 7/25/17  Yes Rc Hawkins MD   aspirin 81 MG EC tablet Take 1 tablet by mouth daily 5/17/17  Yes Meghan Kramer MD   isosorbide dinitrate (ISORDIL) 10 MG tablet Take 1 tablet by mouth 2 times daily 5/17/17  Yes Meghan Kramer MD   polyethylene glycol (GLYCOLAX) powder Colonoscopy Prep Dispense 255 Gram Bottle.   Use as Directed 6/7/18   NITO Kingston CNP   Syringe/Needle, Disp, (SYRINGE 3CC/25GX1\") 25G X 1\" 3 ML MISC To be used with B12 injections 6/7/18   Rc Hawkins MD   acetaminophen (TYLENOL) 500 MG tablet Take 1 tablet by mouth every 6 hours as needed for Pain 12/28/17   NITO Anthony CNP       Future Appointments  Date Time Provider Zayra Upton   11/6/2018 9:15 AM Rc Hawkins MD [facility] [place] [address]   1/9/2019 12:30 PM STR ULTRASOUND RM 2 STRZ US STR Radiolog   1/15/2019 2:00 PM Evelyn Go MD ENT [facility] - [place] [address]

## 2018-12-06 ENCOUNTER — CARE COORDINATION (OUTPATIENT)
Dept: CARE COORDINATION | Age: 68
End: 2018-12-06

## 2019-01-08 ENCOUNTER — CARE COORDINATION (OUTPATIENT)
Dept: CARE COORDINATION | Age: 69
End: 2019-01-08

## 2019-01-09 ENCOUNTER — HOSPITAL ENCOUNTER (OUTPATIENT)
Age: 69
Discharge: HOME | End: 2019-01-09
Payer: MEDICARE

## 2019-01-09 DIAGNOSIS — Z13.1 ENCOUNTER FOR SCREENING FOR DIABETES MELLITUS: ICD-10-CM

## 2019-01-09 DIAGNOSIS — L29.9 PRURITUS: ICD-10-CM

## 2019-01-09 LAB
ALBUMIN SERPL-MCNC: 3.8 G/DL (ref 3.5–5.1)
ALP BLD-CCNC: 132 U/L (ref 38–126)
ALT SERPL-CCNC: 8 U/L (ref 11–66)
ANION GAP SERPL CALCULATED.3IONS-SCNC: 12 MEQ/L (ref 8–16)
AST SERPL-CCNC: 12 U/L (ref 5–40)
BASOPHILS # BLD: 0.4 %
BASOPHILS ABSOLUTE: 0 THOU/MM3 (ref 0–0.1)
BILIRUB SERPL-MCNC: 0.5 MG/DL (ref 0.3–1.2)
BUN BLDV-MCNC: 6 MG/DL (ref 7–22)
CALCIUM SERPL-MCNC: 9 MG/DL (ref 8.5–10.5)
CHLORIDE BLD-SCNC: 105 MEQ/L (ref 98–111)
CO2: 25 MEQ/L (ref 23–33)
CREAT SERPL-MCNC: 1 MG/DL (ref 0.4–1.2)
EOSINOPHIL # BLD: 2.1 %
EOSINOPHILS ABSOLUTE: 0.2 THOU/MM3 (ref 0–0.4)
ERYTHROCYTE [DISTWIDTH] IN BLOOD BY AUTOMATED COUNT: 14.1 % (ref 11.5–14.5)
ERYTHROCYTE [DISTWIDTH] IN BLOOD BY AUTOMATED COUNT: 53.1 FL (ref 35–45)
GFR SERPL CREATININE-BSD FRML MDRD: 55 ML/MIN/1.73M2
GLUCOSE BLD-MCNC: 99 MG/DL (ref 70–108)
GLUCOSE FASTING: 99 MG/DL (ref 70–108)
HCT VFR BLD CALC: 47.7 % (ref 37–47)
HEMOGLOBIN: 15.4 GM/DL (ref 12–16)
IMMATURE GRANS (ABS): 0.03 THOU/MM3 (ref 0–0.07)
IMMATURE GRANULOCYTES: 0.4 %
LYMPHOCYTES # BLD: 21.1 %
LYMPHOCYTES ABSOLUTE: 1.7 THOU/MM3 (ref 1–4.8)
MCH RBC QN AUTO: 32.9 PG (ref 26–33)
MCHC RBC AUTO-ENTMCNC: 32.3 GM/DL (ref 32.2–35.5)
MCV RBC AUTO: 101.9 FL (ref 81–99)
MONOCYTES # BLD: 4.3 %
MONOCYTES ABSOLUTE: 0.3 THOU/MM3 (ref 0.4–1.3)
NUCLEATED RED BLOOD CELLS: 0 /100 WBC
PLATELET # BLD: 226 THOU/MM3 (ref 130–400)
PMV BLD AUTO: 10.9 FL (ref 9.4–12.4)
POTASSIUM SERPL-SCNC: 4.4 MEQ/L (ref 3.5–5.2)
RBC # BLD: 4.68 MILL/MM3 (ref 4.2–5.4)
SEG NEUTROPHILS: 71.7 %
SEGMENTED NEUTROPHILS ABSOLUTE COUNT: 5.7 THOU/MM3 (ref 1.8–7.7)
SODIUM BLD-SCNC: 142 MEQ/L (ref 135–145)
TOTAL PROTEIN: 7.1 G/DL (ref 6.1–8)
TSH SERPL DL<=0.05 MIU/L-ACNC: 4.51 UIU/ML (ref 0.4–4.2)
WBC # BLD: 7.9 THOU/MM3 (ref 4.8–10.8)

## 2019-01-09 PROCEDURE — 83516 IMMUNOASSAY NONANTIBODY: CPT

## 2019-01-09 PROCEDURE — 80053 COMPREHEN METABOLIC PANEL: CPT

## 2019-01-09 PROCEDURE — 82947 ASSAY GLUCOSE BLOOD QUANT: CPT

## 2019-01-09 PROCEDURE — 36415 COLL VENOUS BLD VENIPUNCTURE: CPT

## 2019-01-09 PROCEDURE — 85025 COMPLETE CBC W/AUTO DIFF WBC: CPT

## 2019-01-09 PROCEDURE — 84443 ASSAY THYROID STIM HORMONE: CPT

## 2019-01-10 ENCOUNTER — OFFICE VISIT (OUTPATIENT)
Dept: FAMILY MEDICINE CLINIC | Age: 69
End: 2019-01-10
Payer: MEDICARE

## 2019-01-10 VITALS
BODY MASS INDEX: 27.14 KG/M2 | WEIGHT: 159 LBS | RESPIRATION RATE: 10 BRPM | HEART RATE: 72 BPM | SYSTOLIC BLOOD PRESSURE: 169 MMHG | DIASTOLIC BLOOD PRESSURE: 72 MMHG | HEIGHT: 64 IN | TEMPERATURE: 97.8 F

## 2019-01-10 DIAGNOSIS — R74.8 ELEVATED ALKALINE PHOSPHATASE LEVEL: ICD-10-CM

## 2019-01-10 DIAGNOSIS — G62.9 PERIPHERAL POLYNEUROPATHY: ICD-10-CM

## 2019-01-10 DIAGNOSIS — R93.89 ABNORMAL CT OF THE CHEST: ICD-10-CM

## 2019-01-10 DIAGNOSIS — E55.9 VITAMIN D DEFICIENCY: ICD-10-CM

## 2019-01-10 DIAGNOSIS — E53.8 VITAMIN B 12 DEFICIENCY: ICD-10-CM

## 2019-01-10 DIAGNOSIS — E56.8 DEFICIENCY OF OTHER VITAMINS: ICD-10-CM

## 2019-01-10 DIAGNOSIS — L29.9 PRURITUS: Primary | ICD-10-CM

## 2019-01-10 PROCEDURE — 4004F PT TOBACCO SCREEN RCVD TLK: CPT | Performed by: FAMILY MEDICINE

## 2019-01-10 PROCEDURE — 90662 IIV NO PRSV INCREASED AG IM: CPT | Performed by: FAMILY MEDICINE

## 2019-01-10 PROCEDURE — G8599 NO ASA/ANTIPLAT THER USE RNG: HCPCS | Performed by: FAMILY MEDICINE

## 2019-01-10 PROCEDURE — G0008 ADMIN INFLUENZA VIRUS VAC: HCPCS | Performed by: FAMILY MEDICINE

## 2019-01-10 PROCEDURE — 1090F PRES/ABSN URINE INCON ASSESS: CPT | Performed by: FAMILY MEDICINE

## 2019-01-10 PROCEDURE — 3017F COLORECTAL CA SCREEN DOC REV: CPT | Performed by: FAMILY MEDICINE

## 2019-01-10 PROCEDURE — 99214 OFFICE O/P EST MOD 30 MIN: CPT | Performed by: FAMILY MEDICINE

## 2019-01-10 PROCEDURE — 1123F ACP DISCUSS/DSCN MKR DOCD: CPT | Performed by: FAMILY MEDICINE

## 2019-01-10 PROCEDURE — 1101F PT FALLS ASSESS-DOCD LE1/YR: CPT | Performed by: FAMILY MEDICINE

## 2019-01-10 PROCEDURE — G8399 PT W/DXA RESULTS DOCUMENT: HCPCS | Performed by: FAMILY MEDICINE

## 2019-01-10 PROCEDURE — G8427 DOCREV CUR MEDS BY ELIG CLIN: HCPCS | Performed by: FAMILY MEDICINE

## 2019-01-10 PROCEDURE — G8417 CALC BMI ABV UP PARAM F/U: HCPCS | Performed by: FAMILY MEDICINE

## 2019-01-10 PROCEDURE — G8482 FLU IMMUNIZE ORDER/ADMIN: HCPCS | Performed by: FAMILY MEDICINE

## 2019-01-10 PROCEDURE — 4040F PNEUMOC VAC/ADMIN/RCVD: CPT | Performed by: FAMILY MEDICINE

## 2019-01-10 RX ORDER — CETIRIZINE HYDROCHLORIDE 10 MG/1
TABLET ORAL
Qty: 60 TABLET | Refills: 2 | Status: SHIPPED | OUTPATIENT
Start: 2019-01-10 | End: 2021-03-08

## 2019-01-10 RX ORDER — CHOLECALCIFEROL (VITAMIN D3) 50 MCG
4000 TABLET ORAL DAILY
Qty: 60 TABLET | Refills: 5 | Status: SHIPPED | OUTPATIENT
Start: 2019-01-10 | End: 2021-03-08

## 2019-01-10 ASSESSMENT — PATIENT HEALTH QUESTIONNAIRE - PHQ9
SUM OF ALL RESPONSES TO PHQ9 QUESTIONS 1 & 2: 1
1. LITTLE INTEREST OR PLEASURE IN DOING THINGS: 0
SUM OF ALL RESPONSES TO PHQ QUESTIONS 1-9: 1
SUM OF ALL RESPONSES TO PHQ QUESTIONS 1-9: 1
2. FEELING DOWN, DEPRESSED OR HOPELESS: 1

## 2019-01-11 LAB — MITOCHONDRIAL ANTIBODY: 3.7 UNITS (ref 0–20)

## 2019-02-11 ENCOUNTER — CARE COORDINATION (OUTPATIENT)
Dept: CARE COORDINATION | Age: 69
End: 2019-02-11

## 2019-02-21 ENCOUNTER — HOSPITAL ENCOUNTER (OUTPATIENT)
Dept: CT IMAGING | Age: 69
Discharge: HOME | End: 2019-02-21
Payer: MEDICARE

## 2019-02-21 ENCOUNTER — HOSPITAL ENCOUNTER (OUTPATIENT)
Age: 69
Discharge: HOME | End: 2019-02-21
Payer: MEDICARE

## 2019-02-21 DIAGNOSIS — E53.8 VITAMIN B 12 DEFICIENCY: ICD-10-CM

## 2019-02-21 DIAGNOSIS — E55.9 VITAMIN D DEFICIENCY: ICD-10-CM

## 2019-02-21 DIAGNOSIS — L29.9 PRURITUS: ICD-10-CM

## 2019-02-21 DIAGNOSIS — R93.89 ABNORMAL CT OF THE CHEST: ICD-10-CM

## 2019-02-21 DIAGNOSIS — R74.8 ELEVATED ALKALINE PHOSPHATASE LEVEL: ICD-10-CM

## 2019-02-21 LAB
BILIRUBIN DIRECT: < 0.2 MG/DL (ref 0–0.3)
GAMMA GLUTAMYL TRANSFERASE: 13 U/L (ref 8–69)
POC CREATININE WHOLE BLOOD: 1 MG/DL (ref 0.5–1.2)
VITAMIN B-12: 187 PG/ML (ref 211–911)
VITAMIN D 25-HYDROXY: 7 NG/ML (ref 30–100)

## 2019-02-21 PROCEDURE — 83915 ASSAY OF NUCLEOTIDASE: CPT

## 2019-02-21 PROCEDURE — 82248 BILIRUBIN DIRECT: CPT

## 2019-02-21 PROCEDURE — 6360000004 HC RX CONTRAST MEDICATION: Performed by: FAMILY MEDICINE

## 2019-02-21 PROCEDURE — 82977 ASSAY OF GGT: CPT

## 2019-02-21 PROCEDURE — 36415 COLL VENOUS BLD VENIPUNCTURE: CPT

## 2019-02-21 PROCEDURE — 82607 VITAMIN B-12: CPT

## 2019-02-21 PROCEDURE — 82306 VITAMIN D 25 HYDROXY: CPT

## 2019-02-21 PROCEDURE — 71260 CT THORAX DX C+: CPT

## 2019-02-21 PROCEDURE — 82565 ASSAY OF CREATININE: CPT

## 2019-02-21 RX ADMIN — IOPAMIDOL 85 ML: 755 INJECTION, SOLUTION INTRAVENOUS at 15:17

## 2019-02-22 ENCOUNTER — TELEPHONE (OUTPATIENT)
Dept: FAMILY MEDICINE CLINIC | Age: 69
End: 2019-02-22

## 2019-02-22 ENCOUNTER — HOSPITAL ENCOUNTER (OUTPATIENT)
Dept: ULTRASOUND IMAGING | Age: 69
Discharge: HOME | End: 2019-02-22
Payer: MEDICARE

## 2019-02-22 DIAGNOSIS — E53.8 LOW VITAMIN B12 LEVEL: ICD-10-CM

## 2019-02-22 DIAGNOSIS — E55.9 VITAMIN D DEFICIENCY: ICD-10-CM

## 2019-02-22 DIAGNOSIS — E04.1 THYROID NODULE: ICD-10-CM

## 2019-02-22 DIAGNOSIS — R79.89 LOW VITAMIN D LEVEL: Primary | ICD-10-CM

## 2019-02-22 PROCEDURE — 76536 US EXAM OF HEAD AND NECK: CPT

## 2019-02-22 RX ORDER — CYANOCOBALAMIN 1000 UG/ML
INJECTION INTRAMUSCULAR; SUBCUTANEOUS
Qty: 10 ML | Refills: 1 | Status: SHIPPED | OUTPATIENT
Start: 2019-02-22 | End: 2020-07-24

## 2019-02-24 LAB — 5' NUCLEOTIDASE: NORMAL

## 2019-02-25 ENCOUNTER — TELEPHONE (OUTPATIENT)
Dept: FAMILY MEDICINE CLINIC | Age: 69
End: 2019-02-25

## 2019-02-26 ENCOUNTER — TELEPHONE (OUTPATIENT)
Dept: FAMILY MEDICINE CLINIC | Age: 69
End: 2019-02-26

## 2019-05-28 DIAGNOSIS — E04.1 THYROID NODULE: Primary | ICD-10-CM

## 2019-08-05 ENCOUNTER — HOSPITAL ENCOUNTER (OUTPATIENT)
Dept: ULTRASOUND IMAGING | Age: 69
Discharge: HOME | End: 2019-08-05
Payer: MEDICARE

## 2019-08-05 DIAGNOSIS — E04.1 THYROID NODULE: ICD-10-CM

## 2019-08-05 PROCEDURE — 76536 US EXAM OF HEAD AND NECK: CPT

## 2019-08-13 ENCOUNTER — OFFICE VISIT (OUTPATIENT)
Dept: ENT CLINIC | Age: 69
End: 2019-08-13
Payer: MEDICARE

## 2019-08-13 VITALS
HEART RATE: 64 BPM | BODY MASS INDEX: 26.5 KG/M2 | DIASTOLIC BLOOD PRESSURE: 68 MMHG | SYSTOLIC BLOOD PRESSURE: 134 MMHG | HEIGHT: 64 IN | RESPIRATION RATE: 12 BRPM | WEIGHT: 155.2 LBS

## 2019-08-13 DIAGNOSIS — E04.1 THYROID NODULE: Primary | ICD-10-CM

## 2019-08-13 DIAGNOSIS — E04.2 MULTINODULAR GOITER: ICD-10-CM

## 2019-08-13 PROCEDURE — 1123F ACP DISCUSS/DSCN MKR DOCD: CPT | Performed by: OTOLARYNGOLOGY

## 2019-08-13 PROCEDURE — 99212 OFFICE O/P EST SF 10 MIN: CPT | Performed by: OTOLARYNGOLOGY

## 2019-08-13 PROCEDURE — 4004F PT TOBACCO SCREEN RCVD TLK: CPT | Performed by: OTOLARYNGOLOGY

## 2019-08-13 PROCEDURE — 3017F COLORECTAL CA SCREEN DOC REV: CPT | Performed by: OTOLARYNGOLOGY

## 2019-08-13 PROCEDURE — G8417 CALC BMI ABV UP PARAM F/U: HCPCS | Performed by: OTOLARYNGOLOGY

## 2019-08-13 PROCEDURE — 1090F PRES/ABSN URINE INCON ASSESS: CPT | Performed by: OTOLARYNGOLOGY

## 2019-08-13 PROCEDURE — 4040F PNEUMOC VAC/ADMIN/RCVD: CPT | Performed by: OTOLARYNGOLOGY

## 2019-08-13 PROCEDURE — G8599 NO ASA/ANTIPLAT THER USE RNG: HCPCS | Performed by: OTOLARYNGOLOGY

## 2019-08-13 PROCEDURE — G8399 PT W/DXA RESULTS DOCUMENT: HCPCS | Performed by: OTOLARYNGOLOGY

## 2019-08-13 PROCEDURE — G8427 DOCREV CUR MEDS BY ELIG CLIN: HCPCS | Performed by: OTOLARYNGOLOGY

## 2019-08-13 ASSESSMENT — ENCOUNTER SYMPTOMS
COUGH: 0
CHEST TIGHTNESS: 1
FACIAL SWELLING: 0
DIARRHEA: 1
RHINORRHEA: 0
VOMITING: 0
CHOKING: 0
COLOR CHANGE: 0
APNEA: 0
ABDOMINAL PAIN: 0
STRIDOR: 0
TROUBLE SWALLOWING: 0
SHORTNESS OF BREATH: 1
VOICE CHANGE: 0
SINUS PRESSURE: 0
NAUSEA: 1
WHEEZING: 1
SORE THROAT: 1

## 2019-08-13 NOTE — PROGRESS NOTES
mouth every 6 hours as needed for Pain 120 tablet 3    metoprolol tartrate (LOPRESSOR) 25 MG tablet Take 1 tablet by mouth 2 times daily 60 tablet 5    atorvastatin (LIPITOR) 40 MG tablet Take 1 tablet by mouth nightly 30 tablet 5    escitalopram (LEXAPRO) 20 MG tablet Take 1 tablet by mouth daily 30 tablet 5    levothyroxine (LEVOTHROID) 88 MCG tablet Take 1 tablet by mouth daily 30 tablet 5    aspirin 81 MG EC tablet Take 1 tablet by mouth daily 30 tablet 3    isosorbide dinitrate (ISORDIL) 10 MG tablet Take 1 tablet by mouth 2 times daily 90 tablet 3    fluticasone-vilanterol (BREO ELLIPTA) 100-25 MCG/INH AEPB inhaler Inhale 1 puff into the lungs daily Rinse mouth after its use. 30 each 2     No current facility-administered medications for this visit. Past Medical History:   Diagnosis Date    Anxiety 2007    Arthritis     Breast cancer [facility]) 2005    right mastectomy, chemo and radiation history    CAD (coronary artery disease) 2001    Cancer of the skin 2004    Cerebral artery occlusion with cerebral infarction ([address]     Chronic UTI     COPD (chronic obstructive pulmonary disease) ([address]     CVA (cerebral infarction) 2007, 2008    Depression 2007    DVT of lower extremity (deep venous thrombosis) ([address] 1976    Facial injury 2005    Fall on greasy ground, striking left periorbital region    History of stroke 2007, 2008, 2009    Patient relates a stroke with right weakness and speech in 2007; another in 2010 or 2012 (unsure), both with need to rehabilitation.   Also \"2 mini-strokes\" (?)    Hx of blood clots 1977    hip, leg    Hyperlipidemia 2005    Hypertension 2005    Hypothyroidism     IBS (irritable bowel syndrome)     Low HDL (under 40) 2014    Prolonged emergence from general anesthesia     Recurrent UTI (urinary tract infection) 2015    Dr Justin Berger finding difficulty 05/16/2017    work-up in progress      Past Surgical History:   Procedure Laterality Date   

## 2019-12-31 ENCOUNTER — TELEPHONE (OUTPATIENT)
Dept: FAMILY MEDICINE CLINIC | Age: 69
End: 2019-12-31

## 2019-12-31 NOTE — TELEPHONE ENCOUNTER
Called pt regarding forms from [employer] cardiac conditions hereditary assessment testing. She states this company called her and asked her if she was interested. It also includes some type of cancer screening. Advised pt I will show  on Thursday when she is back in the office. Advised pt I will contact her. She voiced understanding.

## 2020-05-17 ENCOUNTER — APPOINTMENT (OUTPATIENT)
Dept: GENERAL RADIOLOGY | Age: 70
End: 2020-05-17
Payer: MEDICARE

## 2020-05-17 ENCOUNTER — HOSPITAL ENCOUNTER (EMERGENCY)
Age: 70
Discharge: HOME | End: 2020-05-17
Attending: EMERGENCY MEDICINE
Payer: MEDICARE

## 2020-05-17 VITALS
DIASTOLIC BLOOD PRESSURE: 52 MMHG | BODY MASS INDEX: 26.29 KG/M2 | SYSTOLIC BLOOD PRESSURE: 157 MMHG | RESPIRATION RATE: 18 BRPM | HEART RATE: 73 BPM | OXYGEN SATURATION: 95 % | TEMPERATURE: 97.9 F | HEIGHT: 64 IN | WEIGHT: 154 LBS

## 2020-05-17 PROCEDURE — 6360000002 HC RX W HCPCS: Performed by: EMERGENCY MEDICINE

## 2020-05-17 PROCEDURE — 73130 X-RAY EXAM OF HAND: CPT

## 2020-05-17 PROCEDURE — 73110 X-RAY EXAM OF WRIST: CPT

## 2020-05-17 PROCEDURE — 99282 EMERGENCY DEPT VISIT SF MDM: CPT

## 2020-05-17 PROCEDURE — 96372 THER/PROPH/DIAG INJ SC/IM: CPT

## 2020-05-17 RX ORDER — TRAMADOL HYDROCHLORIDE 50 MG/1
50 TABLET ORAL 2 TIMES DAILY PRN
Qty: 6 TABLET | Refills: 0 | Status: SHIPPED | OUTPATIENT
Start: 2020-05-17 | End: 2020-05-20

## 2020-05-17 RX ORDER — KETOROLAC TROMETHAMINE 30 MG/ML
15 INJECTION, SOLUTION INTRAMUSCULAR; INTRAVENOUS ONCE
Status: COMPLETED | OUTPATIENT
Start: 2020-05-17 | End: 2020-05-17

## 2020-05-17 RX ORDER — MORPHINE SULFATE 2 MG/ML
2 INJECTION, SOLUTION INTRAMUSCULAR; INTRAVENOUS ONCE
Status: COMPLETED | OUTPATIENT
Start: 2020-05-17 | End: 2020-05-17

## 2020-05-17 RX ADMIN — KETOROLAC TROMETHAMINE 15 MG: 30 INJECTION, SOLUTION INTRAMUSCULAR at 19:13

## 2020-05-17 RX ADMIN — MORPHINE SULFATE 2 MG: 2 INJECTION, SOLUTION INTRAMUSCULAR; INTRAVENOUS at 19:14

## 2020-05-17 ASSESSMENT — PAIN DESCRIPTION - ORIENTATION: ORIENTATION: LEFT

## 2020-05-17 ASSESSMENT — PAIN SCALES - GENERAL
PAINLEVEL_OUTOF10: 10
PAINLEVEL_OUTOF10: 10

## 2020-05-17 ASSESSMENT — PAIN DESCRIPTION - LOCATION: LOCATION: ARM;WRIST

## 2020-05-17 ASSESSMENT — PAIN DESCRIPTION - FREQUENCY: FREQUENCY: CONTINUOUS

## 2020-05-17 ASSESSMENT — PAIN DESCRIPTION - ONSET: ONSET: SUDDEN

## 2020-05-17 ASSESSMENT — PAIN DESCRIPTION - PAIN TYPE: TYPE: ACUTE PAIN

## 2020-05-17 ASSESSMENT — PAIN DESCRIPTION - DESCRIPTORS: DESCRIPTORS: ACHING;THROBBING

## 2020-05-17 ASSESSMENT — PAIN DESCRIPTION - PROGRESSION: CLINICAL_PROGRESSION: NOT CHANGED

## 2020-05-17 NOTE — ED PROVIDER NOTES
drink alcohol or use drugs. PHYSICAL EXAM     INITIAL VITALS:  height is 5' 4\" (1.626 m) and weight is 154 lb (69.9 kg). Her oral temperature is 97.9 °F (36.6 °C). Her blood pressure is 157/52 (abnormal) and her pulse is 73. Her respiration is 18 and oxygen saturation is 95%. Physical Exam   Constitutional:  well-developed and well-nourished. HENT: Head: Normocephalic, atraumatic, Bilateral external ears normal, Oropharynx mosit, No oral exudates, Nose normal.   Eyes: PERRL, EOMI, Conjunctiva normal, No discharge. No scleral icterus  Neck: Normal range of motion, No tenderness, Supple  Lympatics: No lymphadenopathy. Cardiovascular: Normal rate, regular rhythm, S1 normal and S2 normal.  Exam reveals no gallop. Pulmonary/Chest: Effort normal and breath sounds normal. No accessory muscle usage or stridor. No respiratory distress. no wheezes. has no rales. exhibits no tenderness. Abdominal: Soft. Bowel sounds are normal.  exhibits no distension. There is no tenderness. There is no rebound and no guarding. Extremities: Tenderness with palpation of left wrist, no deformity, no edema, no abrasion. Neurovascularly intact distally. Limited range of motion of fingers and wrist due to pain. Musculoskeletal: Good range of motion in major joints is observed. No major deformities noted. Neurological: Alert and oriented ×3, normal motor function, normal sensory function, no focal deficits. GCS 15  Skin: Skin is warm, dry and intact. No rash noted. No erythema.    Psychiatric: Affect normal, judgment normal, mood normal.  DIFFERENTIAL DIAGNOSIS:   Wrist fracture versus hand fracture    DIAGNOSTIC RESULTS     EKG: All EKG's are interpreted by the Emergency Department Physician who either signs or Co-signs this chart in the absence of a cardiologist.      RADIOLOGY: non-plain film images(s) such as CT, Ultrasound and MRI are read by the radiologist.  Plain radiographic images are visualized and preliminarily interpreted by the emergency physician unless otherwise stated below. LABS:   Labs Reviewed - No data to display    EMERGENCY DEPARTMENT COURSE:   Vitals:    Vitals:    05/17/20 1857   BP: (!) 157/52   Pulse: 73   Resp: 18   Temp: 97.9 °F (36.6 °C)   TempSrc: Oral   SpO2: 95%   Weight: 154 lb (69.9 kg)   Height: 5' 4\" (1.626 m)     Patient presenting with complaint of left wrist pain, status post medical mechanical fall. Neurovascularly intact distally, did not hit her head, no neck pain, no loss of consciousness. No fracture noted on x-ray. Splinted, referred home with expectant management. CRITICAL CARE:       CONSULTS:  None    PROCEDURES:  None    FINAL IMPRESSION      1. Contusion of left wrist, initial encounter          DISPOSITION/PLAN   Decision To Discharge    PATIENT REFERRED TO:  Michelle Raymundo MD  [address]  [phone]    Schedule an appointment as soon as possible for a visit in 3 days  RE-CHECK AND FURTHER TESTING AS NEEDED      DISCHARGE MEDICATIONS:  Discharge Medication List as of 5/17/2020  7:58 PM      START taking these medications    Details   traMADol (ULTRAM) 50 MG tablet Take 1 tablet by mouth 2 times daily as needed for Pain for up to 3 days. Intended supply: 3 days.  Take lowest dose possible to manage pain, Disp-6 tablet, R-0Print             (Please note that portions of this note were completed with a voice recognition program.  Efforts were made to edit the dictations but occasionally words are mis-transcribed.)    Princess Weems, [address] [place] [address],   05/17/20 0123

## 2020-05-18 ENCOUNTER — TELEPHONE (OUTPATIENT)
Dept: FAMILY MEDICINE CLINIC | Age: 70
End: 2020-05-18

## 2020-05-18 ENCOUNTER — CARE COORDINATION (OUTPATIENT)
Dept: CARE COORDINATION | Age: 70
End: 2020-05-18

## 2020-05-18 NOTE — CARE COORDINATION
Patient contacted regarding recent visit for viral symptoms. This Alexey Kerr contacted the patient by telephone to perform post discharge call. Verified name and  with patient as identifiers. Provided introduction to self, and reason for call due to viral symptoms of infection and/or exposure to COVID-19. Patient presented to emergency department/flu clinic with complaints of viral symptoms/exposure to COVID. Patient reports symptoms are the same. Due to no new or worsening symptoms the RN CTN/ACM was not notified for escalation. Discussed exposure protocols and quarantine with CDC Guidelines What To Do If You Are Sick    Patient was given an opportunity for questions and concerns. Stay home except to get medical care    Separate yourself from other people and animals in your home    Call ahead before visiting your doctor    Wear a facemask    Cover your coughs and sneezes    Clean your hands often    Avoid sharing personal household items    Clean all high-touch surfaces everyday    Monitor your symptoms  Seek prompt medical attention if your illness is worsening (e.g., difficulty breathing). Before seeking care, call your healthcare provider and tell them that you have, or are being evaluated for, COVID-19. Put on a facemask before you enter the facility. These steps will help the healthcare provider's office to keep other people in the office or waiting room from getting infected or exposed. Ask your healthcare provider to call the local or [facility] health department. Persons who are placed under If you have a medical emergency and need to call 911, notify the dispatch personnel that you have, or are being evaluated for COVID-19. If possible, put on a facemask before emergency medical services arrive. The patient agrees to contact the [facility] exposure line [phone], local health department [place] [facility]: ([phone]) and PCP office for questions related to their healthcare.  Author

## 2020-06-01 ENCOUNTER — CARE COORDINATION (OUTPATIENT)
Dept: CARE COORDINATION | Age: 70
End: 2020-06-01

## 2020-07-20 ENCOUNTER — TELEPHONE (OUTPATIENT)
Dept: FAMILY MEDICINE CLINIC | Age: 70
End: 2020-07-20

## 2020-07-24 ENCOUNTER — OFFICE VISIT (OUTPATIENT)
Dept: BEHAVIORAL/MENTAL HEALTH CLINIC | Age: 70
End: 2020-07-24
Payer: MEDICARE

## 2020-07-24 ENCOUNTER — OFFICE VISIT (OUTPATIENT)
Dept: FAMILY MEDICINE CLINIC | Age: 70
End: 2020-07-24
Payer: MEDICARE

## 2020-07-24 VITALS
BODY MASS INDEX: 25.06 KG/M2 | SYSTOLIC BLOOD PRESSURE: 160 MMHG | WEIGHT: 146.8 LBS | HEART RATE: 76 BPM | DIASTOLIC BLOOD PRESSURE: 62 MMHG | HEIGHT: 64 IN | TEMPERATURE: 97.2 F | RESPIRATION RATE: 20 BRPM

## 2020-07-24 PROBLEM — R06.02 SOB (SHORTNESS OF BREATH) ON EXERTION: Status: RESOLVED | Noted: 2018-02-06 | Resolved: 2020-07-24

## 2020-07-24 PROBLEM — R07.89 CHEST PAIN, ATYPICAL: Status: RESOLVED | Noted: 2018-02-06 | Resolved: 2020-07-24

## 2020-07-24 PROCEDURE — 4004F PT TOBACCO SCREEN RCVD TLK: CPT | Performed by: NURSE PRACTITIONER

## 2020-07-24 PROCEDURE — 1123F ACP DISCUSS/DSCN MKR DOCD: CPT | Performed by: NURSE PRACTITIONER

## 2020-07-24 PROCEDURE — G8417 CALC BMI ABV UP PARAM F/U: HCPCS | Performed by: NURSE PRACTITIONER

## 2020-07-24 PROCEDURE — 4040F PNEUMOC VAC/ADMIN/RCVD: CPT | Performed by: NURSE PRACTITIONER

## 2020-07-24 PROCEDURE — 99214 OFFICE O/P EST MOD 30 MIN: CPT | Performed by: NURSE PRACTITIONER

## 2020-07-24 PROCEDURE — G8427 DOCREV CUR MEDS BY ELIG CLIN: HCPCS | Performed by: NURSE PRACTITIONER

## 2020-07-24 PROCEDURE — G8399 PT W/DXA RESULTS DOCUMENT: HCPCS | Performed by: NURSE PRACTITIONER

## 2020-07-24 PROCEDURE — G8926 SPIRO NO PERF OR DOC: HCPCS | Performed by: NURSE PRACTITIONER

## 2020-07-24 PROCEDURE — 3017F COLORECTAL CA SCREEN DOC REV: CPT | Performed by: NURSE PRACTITIONER

## 2020-07-24 PROCEDURE — 1090F PRES/ABSN URINE INCON ASSESS: CPT | Performed by: NURSE PRACTITIONER

## 2020-07-24 PROCEDURE — 90791 PSYCH DIAGNOSTIC EVALUATION: CPT | Performed by: SOCIAL WORKER

## 2020-07-24 PROCEDURE — 3023F SPIROM DOC REV: CPT | Performed by: NURSE PRACTITIONER

## 2020-07-24 RX ORDER — VENLAFAXINE HYDROCHLORIDE 37.5 MG/1
37.5 CAPSULE, EXTENDED RELEASE ORAL DAILY
Qty: 30 CAPSULE | Refills: 0 | Status: SHIPPED | OUTPATIENT
Start: 2020-07-24 | End: 2020-08-12 | Stop reason: SDUPTHER

## 2020-07-24 RX ORDER — HYDROXYZINE HYDROCHLORIDE 25 MG/1
25 TABLET, FILM COATED ORAL EVERY 8 HOURS PRN
Qty: 30 TABLET | Refills: 0 | Status: SHIPPED | OUTPATIENT
Start: 2020-07-24 | End: 2020-08-12 | Stop reason: ALTCHOICE

## 2020-07-24 ASSESSMENT — ENCOUNTER SYMPTOMS
TROUBLE SWALLOWING: 0
BACK PAIN: 0
WHEEZING: 0
DIARRHEA: 0
VOMITING: 0
COUGH: 0
COLOR CHANGE: 0
SORE THROAT: 0
EYE PAIN: 0
NAUSEA: 0
ROS SKIN COMMENTS: ITCHING
ABDOMINAL PAIN: 0
SINUS PAIN: 0
SHORTNESS OF BREATH: 0
FACIAL SWELLING: 0

## 2020-07-24 NOTE — PATIENT INSTRUCTIONS
heartbeat that is faster than normal.  · A hard time focusing. Phobias  Symptoms may include:  · More fear than most people of being around an object, being in a situation, or doing an activity. You might also be stressed about the chance of being around the thing you fear. · Worry about losing control, panicking, fainting, or having physical symptoms like a faster heartbeat when you are around the situation or object. How are these disorders treated? Anxiety disorders can be treated with medicines or counseling. A combination of both may be used. Medicines may include:  · Antidepressants. These may help your symptoms by keeping chemicals in your brain in balance. · Benzodiazepines. These may give you short-term relief of your symptoms. Some people use cognitive-behavioral therapy. A therapist helps you learn to change stressful or bad thoughts into helpful thoughts. Lead a healthy lifestyle  A healthy lifestyle may help you feel better. · Get at least 30 minutes of exercise on most days of the week. Walking is a good choice. · Eat a healthy diet. Include fruits, vegetables, lean proteins, and whole grains in your diet each day. · Try to go to bed at the same time every night. Try for 8 hours of sleep a night. · Find ways to manage stress. Try relaxation exercises. · Avoid alcohol and illegal drugs. Follow-up care is a key part of your treatment and safety. Be sure to make and go to all appointments, and call your doctor if you are having problems. It's also a good idea to know your test results and keep a list of the medicines you take. Where can you learn more? Go to https://irene.[employer]. org and sign in to your [employer] account. Enter B533 in the [facility] box to learn more about \"Learning About Anxiety Disorders. \"     If you do not have an account, please click on the \"Sign Up Now\" link.   Current as of: January 31, 2020               Content Version: 12.5  © 8951-8557 Healthwise, Incorporated. Care instructions adapted under license by [facility] ([facility]). If you have questions about a medical condition or this instruction, always ask your healthcare professional. [address] any warranty or liability for your use of this information.

## 2020-07-24 NOTE — PROGRESS NOTES
[facility]  [address] [facility] [address]  Dept: [phone]  Dept Fax: [phone]: [phone]     2020    Murray Charles (:  1950) is a 78 y.o. female, here for evaluation of the following medical concerns:  Chief Complaint   Patient presents with   Patience Bishop Doctor     prev PCP Dr Sue Abbott. pt has been out of meds for \"quite awhile\"    Depression     recently lost 2 family members       HPI  Pt presents to the office today as a new patient. She has not been taking any medications for a few years. Pt is having a difficult time with her depression. Her  of 40 years  a few weeks ago and she feels really bad lately. She also lost her sister in March, she was always a big support system for her as well. Her sons are trying to help, but they are grieving as well. Treatment Adherence:   Medication compliance:  noncompliant: has not been on medication for \"a long time\"     Had 4 strokes in the past- was on Plavix, but has not been on it for 2 years. Hypertension:  Home blood pressure monitoring: No. Patient denies chest pain, shortness of breath, headache, lightheadedness and blurred vision. Antihypertensive medication side effects: no medication side effects noted. Use of agents associated with hypertension: none. Sodium (meq/L)   Date Value   2019 142    BUN (mg/dL)   Date Value   2019 6 (L)    Glucose   Date Value   2019 99 mg/dL   2012 94 mg/dl      Potassium (meq/L)   Date Value   2019 4.4    CREATININE (mg/dL)   Date Value   2019 1.0         Hyperlipidemia:  No new myalgias or GI upset on no medications.      Lab Results   Component Value Date    CHOL 185 2018    TRIG 172 2018    HDL 37 2018    LDLCALC 114 2018     Lab Results   Component Value Date    ALT 8 (L) 2019    AST 12 2019 Hypothyroidism: Recent symptoms: none. She denies weight gain, weight loss, cold intolerance and heat intolerance. Patient is not taking her medication consistently on an empty stomach (no meds in 2 years). No results found for: [facility]  Lab Results   Component Value Date    TSH 4.510 (H) 01/09/2019    TSH 4.140 02/02/2018    TSH 3.630 05/16/2017     Pt is here for depression. Has been off lexapro for approx 2 years. All she does is sleep, nothing sounds good to eat. She is crying a lot. Sleep-less  Interest- OK  Guilt- yes  Energy- less  Concentration- less  Appetite- OK  Psychomotor Retardation- NO  Suicidal/ Homicidal Ideations- NO    Anxiety- increased       Review of Systems   Constitutional: Negative for chills, fatigue and fever. HENT: Negative for congestion, facial swelling, sinus pain, sore throat and trouble swallowing. Eyes: Negative for pain and visual disturbance. Respiratory: Negative for cough, shortness of breath and wheezing. Cardiovascular: Negative for chest pain and palpitations. Gastrointestinal: Negative for abdominal pain, diarrhea, nausea and vomiting. Genitourinary: Negative for difficulty urinating, dysuria and urgency. Musculoskeletal: Negative for back pain, gait problem and neck pain. Skin: Negative for color change and rash. itching   Neurological: Negative for dizziness, weakness and headaches. Psychiatric/Behavioral: Positive for decreased concentration, dysphoric mood and sleep disturbance. Negative for agitation, self-injury and suicidal ideas. The patient is nervous/anxious. Prior to Visit Medications    Medication Sig Taking?  Authorizing Provider   hydrOXYzine (ATARAX) 25 MG tablet Take 1 tablet by mouth every 8 hours as needed for Itching or Anxiety Yes Areta Rides, APRN - CNP   venlafaxine (EFFEXOR XR) 37.5 MG extended release capsule Take 1 capsule by mouth daily Yes Areta Rides, APRN - CNP   acetaminophen (TYLENOL) 500 MG tablet Take 1 tablet by mouth every 6 hours as needed for Pain Yes Corinn Sever, APRN - CNP   cetirizine (ZYRTEC) 10 MG tablet Take one tablet BID  Patient not taking: Reported on 7/24/2020  Anny Land MD   Cholecalciferol (VITAMIN D) 2000 units TABS tablet Take 2 tablets by mouth daily  Patient not taking: Reported on 7/24/2020  Anny Land MD   amLODIPine (NORVASC) 5 MG tablet Take 1 tablet by mouth daily  Patient not taking: Reported on 7/24/2020  Nilsa Meza MD   clopidogrel (PLAVIX) 75 MG tablet Take 1 tablet by mouth daily  Patient not taking: Reported on 7/24/2020  Nilsa Meza MD   metoprolol tartrate (LOPRESSOR) 25 MG tablet Take 1 tablet by mouth 2 times daily  Patient not taking: Reported on 7/24/2020  Corinn Sever, APRN - CNP   atorvastatin (LIPITOR) 40 MG tablet Take 1 tablet by mouth nightly  Patient not taking: Reported on 7/24/2020  Anny Land MD   escitalopram (LEXAPRO) 20 MG tablet Take 1 tablet by mouth daily  Patient not taking: Reported on 7/24/2020  Anny Land MD   levothyroxine (LEVOTHROID) 88 MCG tablet Take 1 tablet by mouth daily  Patient not taking: Reported on 7/24/2020  Anny Land MD   aspirin 81 MG EC tablet Take 1 tablet by mouth daily  Patient not taking: Reported on 7/24/2020  Cony Olsen MD   isosorbide dinitrate (ISORDIL) 10 MG tablet Take 1 tablet by mouth 2 times daily  Patient not taking: Reported on 7/24/2020  Cony Olsen MD        Allergies   Allergen Reactions    Cipro Xr     Vicodin [Hydrocodone-Acetaminophen]      Weird dreams         Past Medical History:   Diagnosis Date    Anxiety 2007    Arthritis     Breast cancer ([facility] 2005    right mastectomy, chemo and radiation history    CAD (coronary artery disease) 2001    Cancer of the skin 2004    Cerebral artery occlusion with cerebral infarction ([facility]     Chronic UTI     COPD (chronic obstructive pulmonary disease) ([facility]     CVA (cerebral infarction) 2007, 2008    Depression 2007    DVT of lower extremity (deep venous thrombosis) ([facility] [facility] 1976    Facial injury 2005    Fall on greasy ground, striking left periorbital region    History of stroke 2007, 2008, 2009    Patient relates a stroke with right weakness and speech in 2007; another in 2010 or 2012 (unsure), both with need to rehabilitation. Also \"2 mini-strokes\" (?)    Hx of blood clots 1977    hip, leg    Hyperlipidemia 2005    Hypertension 2005    Hypothyroidism     IBS (irritable bowel syndrome)     Low HDL (under 40) 2014    Prolonged emergence from general anesthesia     Recurrent UTI (urinary tract infection) 2015    Dr Claudia Jin finding difficulty 05/16/2017    work-up in progress       Past Surgical History:   Procedure Laterality Date   [address]    COLONOSCOPY  2009   [facility] HIP SURGERY  jan 19, 2015    HYSTERECTOMY  1976    JOINT REPLACEMENT Left 2011    HIP    JOINT REPLACEMENT Right 1/19/15    Right Total Hip Replacement - Dr. Shivani Mi Right 2005    OTHER SURGICAL HISTORY  04/10/2018    Lumbar epidural steroid injection at L4    NE NJX DX/THER SBST INTRLMNR LMBR/SAC W/IMG GDN N/A 4/10/2018    LESI  @ L4 performed by Lorrie Jordan MD at [address] [facility], 2001    ovaries    THYROID SURGERY  2007    right lobe and then later left removed       Social History     Socioeconomic History    Marital status:       Spouse name: Beverly Pittman    Number of children: 3    Years of education: 5    Highest education level: Not on file   Occupational History    Occupation: Disabled   Social Needs    Financial resource strain: Not on file    Food insecurity     Worry: Not on file     Inability: Not on file   [employer] needs     Medical: Not on file     Non-medical: Not on file   Tobacco Use    Smoking status: Current Every Day Smoker     Packs/day: 2.00     Years: 48.00     Pack years: 96.00     Types: Cigarettes Start date: 11/13/1967    Smokeless tobacco: Never Used   Substance and Sexual Activity    Alcohol use: No     Alcohol/week: 0.0 standard drinks    Drug use: No    Sexual activity: Yes     Partners: Male   Lifestyle    Physical activity     Days per week: Not on file     Minutes per session: Not on file    Stress: Not on file   Relationships    Social connections     Talks on phone: Not on file     Gets together: Not on file     Attends Voodoo service: Not on file     Active member of club or organization: Not on file     Attends meetings of clubs or organizations: Not on file     Relationship status: Not on file    Intimate partner violence     Fear of current or ex partner: Not on file     Emotionally abused: Not on file     Physically abused: Not on file     Forced sexual activity: Not on file   Other Topics Concern    Not on file   Social History Narrative    Not on file        Family History   Problem Relation Age of Onset    Osteoporosis Mother     Hypertension Mother     High Cholesterol Mother     Stroke Mother     Colon Cancer Mother     Cancer Father         lung cancer    Heart Disease Father     Hypertension Father     High Cholesterol Father     Heart Disease Sister     High Cholesterol Sister     Hypertension Sister     Asthma Sister     Other Other         high arched feet    Lung Cancer Son        Vitals:    07/24/20 1311   BP: (!) 160/62   Pulse: 76   Resp: 20   Temp: 97.2 °F (36.2 °C)   Weight: 146 lb 12.8 oz (66.6 kg)   Height: 5' 3.5\" (1.613 m)     Estimated body mass index is 25.6 kg/m² as calculated from the following:    Height as of this encounter: 5' 3.5\" (1.613 m). Weight as of this encounter: 146 lb 12.8 oz (66.6 kg). Physical Exam  Vitals signs reviewed. Constitutional:       General: She is not in acute distress. Appearance: Normal appearance. She is well-developed. HENT:      Head: Normocephalic and atraumatic.       Right Ear: Hearing, ear canal and external ear normal.      Left Ear: Hearing, ear canal and external ear normal.      Nose: Nose normal. No nasal tenderness. Mouth/Throat:      Lips: Pink. Mouth: Mucous membranes are moist. No oral lesions. Pharynx: Oropharynx is clear. Uvula midline. Eyes:      General:         Right eye: No discharge. Left eye: No discharge. Conjunctiva/sclera: Conjunctivae normal.   Neck:      Musculoskeletal: Full passive range of motion without pain and neck supple. Vascular: No carotid bruit. Trachea: No tracheal deviation. Cardiovascular:      Rate and Rhythm: Normal rate and regular rhythm. Pulses: Normal pulses. Heart sounds: Normal heart sounds. No murmur. Pulmonary:      Effort: Pulmonary effort is normal. No respiratory distress. Breath sounds: Normal breath sounds. Comments: Harsh cough  Abdominal:      General: Bowel sounds are normal.      Palpations: Abdomen is soft. Tenderness: There is no abdominal tenderness. Musculoskeletal: Normal range of motion. Lymphadenopathy:      Head:      Right side of head: No submental, submandibular, tonsillar, preauricular, posterior auricular or occipital adenopathy. Left side of head: No submental, submandibular, tonsillar, preauricular, posterior auricular or occipital adenopathy. Cervical: No cervical adenopathy. Skin:     General: Skin is warm and dry. Findings: No rash. Neurological:      General: No focal deficit present. Mental Status: She is alert and oriented to person, place, and time. Coordination: Coordination normal.   Psychiatric:         Mood and Affect: Affect is tearful. Speech: Speech normal.         Behavior: Behavior normal. Behavior is cooperative. Thought Content: Thought content normal.         Judgment: Judgment normal.         ASSESSMENT/PLAN:  1. Essential hypertension  - Comprehensive Metabolic Panel; Future    2.  Pulmonary emphysema, unspecified emphysema type ([facility]    3. Coronary artery disease involving native coronary artery of native heart without angina pectoris  - CBC Auto Differential; Future    4. Chronic obstructive pulmonary disease, unspecified COPD type ([facility]    5. Acquired hypothyroidism  - TSH without Reflex; Future  - T4, Free; Future    6. Pure hypercholesterolemia  - Lipid Panel; Future    7. Depression, unspecified depression type  - [address], [address], [place]    8. [address], [place], [address], [address], [place]    9. IFG (impaired fasting glucose)  - Hemoglobin A1C; Future    - Will start pt back on Effexor, she has had good results with this in the past.  Pt to take atarax as needed for anxiety and itching of hands. Made an appointment with [facility] for right after this appointment. Pt was agreeable to follow up. We will start the medications, and pt will have labs drawn before next appt and we can see what she needs to be on.    - Call office with any questions or concerns, or if symptoms are getting worse or changing    Return in about 2 weeks (around 8/7/2020), or if symptoms worsen or fail to improve. Patient given educational materials - see patient instructions. Discussed use, benefit, and side effects of prescribed medications. All patient questions answered. Pt voiced understanding. Reviewed health maintenance. An  electronic signature was used to authenticate this note.     --NITO Pitts - CNP on 7/24/2020 at 3:14 PM

## 2020-07-24 NOTE — PROGRESS NOTES
Behavioral Health Consultation  Gianfranco HENRY Tadeo  7/24/2020  1:44 PM EDT      Time spent with Patient: 55 minutes  This is patient's first  [facility] appointment. Reason for Consult:    Chief Complaint   Patient presents with    Other     grief reaction     Referring Provider: Leila Liu, APRN - CNP  [address], [address]    Pt provided informed consent for the behavioral health program. Discussed with patient model of service to include the limits of confidentiality (i.e. abuse reporting, suicide intervention, etc.) and short-term intervention focused approach. Pt indicated understanding. Feedback given to PCP. S:  Pt identified the presenting problem as symptoms attributed to grief and loss and indicated this as a primary need  to address through behavioral health services. The history of the problem was explored with pt in sharing her experience of loss of her  and sister this year. In the last two weeks,  pt described her mood as overwhelming sadness . Pt acknowledged that the loss of her  had been traumatic, in anticipating she would have more time with him following his discharge from the hospital.  The current situation was processed with pt in examining  thoughts, feelings, and impairment on daily functioning. Pt indicated symptoms of  sleeping more, poor concentration,  shifts in mood, and being on the brink of tears. Pt was guided in exploring areas of behavioral change, development of effective coping strategies, and assessing the management of her self care needs. Pt was advised of indications of depressive symptoms and instructed to monitor if symptoms worsen or interfere with daily functioning, to consider following-up with either your primary care team or a behavioral health provider for possible counseling or medication management. If suicidal thoughts are experienced, call 911.  An additional 24/7 resource is the [address] at [phone]). Pt will attend a follow up appointment next week. O:  MSE:    Appearance    crying  Appetite abnormal: poor  Sleep disturbance Yes  Fatigue Yes  Loss of pleasure Yes  Impulsive behavior No  Speech    normal rate  Mood    Depressed   Affect    depressed affect  Thought Content    intact  Thought Process    coherent  Associations    logical connections  Insight    Fair  Judgment    Intact  Orientation    oriented to person, place, time, and general circumstances  Memory    recent and remote memory intact  Attention/Concentration    intact  Morbid ideation No  Suicide Assessment    no suicidal ideation    History:    TOBACCO:   reports that she has been smoking cigarettes. She started smoking about 52 years ago. She has a 96.00 pack-year smoking history. She has never used smokeless tobacco.  ETOH:   reports no history of alcohol use. Family History:   Family History   Problem Relation Age of Onset    Osteoporosis Mother     Hypertension Mother     High Cholesterol Mother     Stroke Mother     Colon Cancer Mother     Cancer Father         lung cancer    Heart Disease Father     Hypertension Father     High Cholesterol Father     Heart Disease Sister     High Cholesterol Sister     Hypertension Sister     Asthma Sister     Other Other         high arched feet    Lung Cancer Son        A:       Diagnosis:    1.  Grief reaction          Diagnosis Date    Anxiety 2007    Arthritis     [facility]) 2005    right mastectomy, chemo and radiation history    CAD (coronary artery disease) 2001    Cancer of the skin 2004    Cerebral artery occlusion with cerebral infarction ([address]     Chronic UTI     COPD (chronic obstructive pulmonary disease) ([address]     CVA (cerebral infarction) 2007, 2008    Depression 2007    DVT of lower extremity (deep venous thrombosis) ([address] 1976    Facial injury 2005    Fall on greasy ground, striking left periorbital region    History of stroke 2007, 2008, 2009    Patient relates a stroke with right weakness and speech in 2007; another in 2010 or 2012 (unsure), both with need to rehabilitation. Also \"2 mini-strokes\" (?)    Hx of blood clots 1977    hip, leg    Hyperlipidemia 2005    Hypertension 2005    Hypothyroidism     IBS (irritable bowel syndrome)     Low HDL (under 40) 2014    Prolonged emergence from general anesthesia     Recurrent UTI (urinary tract infection) 2015    Dr Deb Trent finding difficulty 05/16/2017    work-up in progress       Plan: Pt will attend a follow up appointment next week to assess symptoms and evaluate effectiveness of coping skills. Pt interventions:  Provided handout on  grief and loss, Provided education and Discussed self-care (sleep, nutrition, rewarding activities, social support, exercise)    Pt Behavioral Change Plan:   1. Pt will read handout about grief and loss. 2. Pt will prioritize effective self care daily-sleep hygiene, supportive relationships, increase physical outlet, enjoyable activities. 3. Pt will follow up with HENRY Granados next week.

## 2020-07-24 NOTE — PATIENT INSTRUCTIONS
1..Bereavement, Grief & Mourning        Bereavement is the state of having lost a significant other to death. Grief is the personal response to the loss. Mourning is the public expression of that loss. What is Normal Grief? Grief reactions vary depending on who we are, who we lost, our relationship with that person, the circumstances around their passing, and how much their loss affects our day-to-day functioning. Different people may express grief differently and you may even have different grief responses between one loss and another. Reactions to grief and loss include not just emotional symptoms, but also behavioral and physical symptoms. These reactions can often change over time. All are normal for a short period of time. Emotional Behavioral Physical   Shock, denial,                   numbness Crying unexpectedly Exhaustion/Fatigue      Sadness, anxiety, guilt,       fear Sleep changes (increase or decrease) Decreased energy        Anger (at others or        God), Not eating/Weight changes Memory problems        Irritability, frustration Withdrawing from others Stomach and intestinal upset    Restlessness, difficulty concentrating, trouble making decisions Pain and headaches     Symptoms that are not normal and may signal the need to talk to a professional include:  Use of drugs, alcohol, violence, and thoughts of killing oneself. The duration of grief varies from person to person. Current research shows that the average recovery time is 18 to 24 months. Also, grief reactions can be stronger around anniversary or other significant dates, such as the anniversary of the persons death, birthdays, and holidays. The Stages of Grief   Grief therapists often describe stages of grief outlined by the research of Dr. Gumaro Latham. These stages do not always go in order. You may move back and forth among some of the stages and may even skip some of them.   These stages are meant as a guide to help you understand your reactions and those of others who are grieving.  - Denial:  Denial (not acknowledging the loss) can help contain the shock of loss. Denial can act as a safety mechanism to block out grief until we are ready to handle it. - Sadness and depression:  Deep, intense grief and mourning appear during this stage. When the full understanding of our loss comes, it can seem overwhelming. During this stage, you may cry often and unexpectedly. You may not want to be around people or to do things that you normally enjoy. During this stage, it is best to remain as active as possible and to seek supportive people who will allow you to say what you need to or to cry when you need to. It is important to allow yourself to work through your full range and experience of emotions. - Anger:  Rage and anger can be intense toward the person who , toward friends and relatives, and even toward God. It is important to have an outlet to release anger through activities such as exercise, hobbies, or through therapy. Guilt, shame, and blame are feelings that need to be addressed, especially if it is toward you. - Acceptance: This stage includes coming to terms with the loss. It does not mean that you have found the answers to your questions or that you stop thinking about the person who is gone. It does signify a reinvestment in life and a willingness to readjust to your new circumstances while carrying the memory of your loved one with you. How to Help Yourself  1. Give yourself time to grieve. It is normal and important to express your grief and to work through the concerns that arise for you at this time. Stuffing your feelings may not be helpful and may delay or prolong your grief. 2. Find supportive people to reach out to during your grief. This is the time when the support of others may be the most helpful.   Dont be afraid to tell them how they can best help, even if it means just listening. It is often very helpful to talk about your loss with people who will allow you to express your emotions. 3. Take care of your health. Often after a loss, we stop doing the things we need to for health care, such as exercising, eating correctly, keeping Dr. appointments, or taking prescribed medications. If you are on a health care regimen, it is important to continue to adhere to your treatment. 4. Postpone major life changes. Give yourself time to adjust to your loss before making plans to change jobs, move or sell your home, remarry, etc.  Grief can sometimes cloud your judgment and ability to make decisions. 5. Consider keeping a journal.  It is often helpful to write or tell the story of your loss and what it means to you as a way to work through your feelings. 6. Participate in activities. Staying active through exercise, enjoyable activities, outings with supportive others, or even starting new hobbies can help us get through tough times while providing opportunities for constructive development and use of energy. 7. Find a way to memorialize your loved one. Planting a tree or garden in the name of your loved one, dedicating a work to their memory, contributing to a james in their name, and other such activities can be helpful. 8. Consider joining grief-support groups or contacting a grief counselor for additional support and help. Remember that depressive symptoms (feeling sad) are a fundamental part of normal bereavement. Staying active and finding support from others can help you to work through the grief process. Myths and facts about grief  MYTH: The pain will go away faster if you ignore it. Fact: Trying to ignore your pain or keep it from surfacing will only make it worse in the long run. For real healing it is necessary to face your grief and actively deal with it. MYTH: Its important to be be strong in the face of loss.    Fact: Feeling sad, frightened, or lonely is a normal reaction to loss. Crying doesnt mean you are weak. You dont need to protect your family or friends by putting on a brave front. Showing your true feelings can help them and you. MYTH: If you dont cry, it means you arent sorry about the loss. Fact: Crying is a normal response to sadness, but its not the only one. Those who dont cry may feel the pain just as deeply as others. They may simply have other ways of showing it. MYTH: Grief should last about a year. Fact: There is no right or wrong time frame for grieving. How long it takes can differ from person to person. Source: [facility]       References  JOSE Mcgrath, Ange Ovalles T, REBECCA Weir, Janie, HC, & MARK Beckett. (2005). Does widowhood affect memory performance of older persons? Psychological Medicine, 35(2), 217-226. Mishel Kang, MAGALY Mccormack, & VIDYA Campos. (2005). Health behaviors associated with better quality of life for older bereaved persons. Journal of Palliative Medicine, 8(1), . Bora Singh.  (2004). Working with grieving adults. Advances in Psychiatric Treatment, 10, 164-170. Audrey Costello JT, Ziyad, [address] [place] [address], & Brandie Mckeon. (2004). Life after death: Greif therapy after the suddent traumatic death of a family member. Perspectives in Psychiatric Care, 40(4), 304-555. Mimi, [facility], , OSWALD Parry, & Sam Ganser, PJ.  (2000). Bereavement services in acute care settings. Death Studies, 24, 51-64. Verna Bullard E G Paden, [place]  (2000). Psychotherapy of traumatic grief:  A review of evidence for psychotherapeutic treatment. Death Studies, 24, 479-495. SANDRA Vasquez. (2004). Connections between counseling theories and current theories of grief and mourning. Journal of Mental Health Counseling, 26(2), 125-145.  2. Pt will prioritze good self care habits daily/weekly-sleep, increased physical outlets, supportive people, enjoyable/meaningful activities.   3. Pt will follow up with HENRY Houser next week.

## 2020-08-11 ENCOUNTER — HOSPITAL ENCOUNTER (OUTPATIENT)
Age: 70
Discharge: HOME | End: 2020-08-11
Payer: MEDICARE

## 2020-08-11 LAB
ALBUMIN SERPL-MCNC: 3.7 G/DL (ref 3.5–5.1)
ALP BLD-CCNC: 156 U/L (ref 38–126)
ALT SERPL-CCNC: < 5 U/L (ref 11–66)
ANION GAP SERPL CALCULATED.3IONS-SCNC: 11 MEQ/L (ref 8–16)
AST SERPL-CCNC: 11 U/L (ref 5–40)
AVERAGE GLUCOSE: 96 MG/DL (ref 70–126)
BASOPHILS # BLD: 0.5 %
BASOPHILS ABSOLUTE: 0 THOU/MM3 (ref 0–0.1)
BILIRUB SERPL-MCNC: 0.6 MG/DL (ref 0.3–1.2)
BUN BLDV-MCNC: 9 MG/DL (ref 7–22)
CALCIUM SERPL-MCNC: 8.9 MG/DL (ref 8.5–10.5)
CHLORIDE BLD-SCNC: 102 MEQ/L (ref 98–111)
CHOLESTEROL, TOTAL: 175 MG/DL (ref 100–199)
CO2: 28 MEQ/L (ref 23–33)
CREAT SERPL-MCNC: 0.9 MG/DL (ref 0.4–1.2)
EOSINOPHIL # BLD: 3.7 %
EOSINOPHILS ABSOLUTE: 0.3 THOU/MM3 (ref 0–0.4)
ERYTHROCYTE [DISTWIDTH] IN BLOOD BY AUTOMATED COUNT: 13.4 % (ref 11.5–14.5)
ERYTHROCYTE [DISTWIDTH] IN BLOOD BY AUTOMATED COUNT: 54.3 FL (ref 35–45)
GFR SERPL CREATININE-BSD FRML MDRD: 62 ML/MIN/1.73M2
GLUCOSE BLD-MCNC: 98 MG/DL (ref 70–108)
HBA1C MFR BLD: 5.2 % (ref 4.4–6.4)
HCT VFR BLD CALC: 48.8 % (ref 37–47)
HDLC SERPL-MCNC: 40 MG/DL
HEMOGLOBIN: 15.6 GM/DL (ref 12–16)
IMMATURE GRANS (ABS): 0.03 THOU/MM3 (ref 0–0.07)
IMMATURE GRANULOCYTES: 0.4 %
LDL CHOLESTEROL CALCULATED: 115 MG/DL
LYMPHOCYTES # BLD: 21.8 %
LYMPHOCYTES ABSOLUTE: 1.6 THOU/MM3 (ref 1–4.8)
MCH RBC QN AUTO: 34.4 PG (ref 26–33)
MCHC RBC AUTO-ENTMCNC: 32 GM/DL (ref 32.2–35.5)
MCV RBC AUTO: 107.7 FL (ref 81–99)
MONOCYTES # BLD: 4.5 %
MONOCYTES ABSOLUTE: 0.3 THOU/MM3 (ref 0.4–1.3)
NUCLEATED RED BLOOD CELLS: 0 /100 WBC
PLATELET # BLD: 218 THOU/MM3 (ref 130–400)
PMV BLD AUTO: 10.5 FL (ref 9.4–12.4)
POTASSIUM SERPL-SCNC: 4 MEQ/L (ref 3.5–5.2)
RBC # BLD: 4.53 MILL/MM3 (ref 4.2–5.4)
SEG NEUTROPHILS: 69.1 %
SEGMENTED NEUTROPHILS ABSOLUTE COUNT: 5.2 THOU/MM3 (ref 1.8–7.7)
SODIUM BLD-SCNC: 141 MEQ/L (ref 135–145)
T4 FREE: 0.85 NG/DL (ref 0.93–1.76)
TOTAL PROTEIN: 6.8 G/DL (ref 6.1–8)
TRIGL SERPL-MCNC: 99 MG/DL (ref 0–199)
TSH SERPL DL<=0.05 MIU/L-ACNC: 3.81 UIU/ML (ref 0.4–4.2)
WBC # BLD: 7.5 THOU/MM3 (ref 4.8–10.8)

## 2020-08-11 PROCEDURE — 84439 ASSAY OF FREE THYROXINE: CPT

## 2020-08-11 PROCEDURE — 80061 LIPID PANEL: CPT

## 2020-08-11 PROCEDURE — 84443 ASSAY THYROID STIM HORMONE: CPT

## 2020-08-11 PROCEDURE — 36415 COLL VENOUS BLD VENIPUNCTURE: CPT

## 2020-08-11 PROCEDURE — 85025 COMPLETE CBC W/AUTO DIFF WBC: CPT

## 2020-08-11 PROCEDURE — 83036 HEMOGLOBIN GLYCOSYLATED A1C: CPT

## 2020-08-11 PROCEDURE — 80053 COMPREHEN METABOLIC PANEL: CPT

## 2020-08-12 ENCOUNTER — OFFICE VISIT (OUTPATIENT)
Dept: FAMILY MEDICINE CLINIC | Age: 70
End: 2020-08-12
Payer: MEDICARE

## 2020-08-12 VITALS
RESPIRATION RATE: 24 BRPM | TEMPERATURE: 97.2 F | DIASTOLIC BLOOD PRESSURE: 58 MMHG | SYSTOLIC BLOOD PRESSURE: 136 MMHG | BODY MASS INDEX: 25.63 KG/M2 | HEART RATE: 88 BPM | WEIGHT: 147 LBS

## 2020-08-12 PROCEDURE — G8417 CALC BMI ABV UP PARAM F/U: HCPCS | Performed by: NURSE PRACTITIONER

## 2020-08-12 PROCEDURE — G8427 DOCREV CUR MEDS BY ELIG CLIN: HCPCS | Performed by: NURSE PRACTITIONER

## 2020-08-12 PROCEDURE — 1123F ACP DISCUSS/DSCN MKR DOCD: CPT | Performed by: NURSE PRACTITIONER

## 2020-08-12 PROCEDURE — 4004F PT TOBACCO SCREEN RCVD TLK: CPT | Performed by: NURSE PRACTITIONER

## 2020-08-12 PROCEDURE — 3017F COLORECTAL CA SCREEN DOC REV: CPT | Performed by: NURSE PRACTITIONER

## 2020-08-12 PROCEDURE — 1090F PRES/ABSN URINE INCON ASSESS: CPT | Performed by: NURSE PRACTITIONER

## 2020-08-12 PROCEDURE — 99214 OFFICE O/P EST MOD 30 MIN: CPT | Performed by: NURSE PRACTITIONER

## 2020-08-12 PROCEDURE — G8399 PT W/DXA RESULTS DOCUMENT: HCPCS | Performed by: NURSE PRACTITIONER

## 2020-08-12 PROCEDURE — 4040F PNEUMOC VAC/ADMIN/RCVD: CPT | Performed by: NURSE PRACTITIONER

## 2020-08-12 RX ORDER — AMOXICILLIN AND CLAVULANATE POTASSIUM 875; 125 MG/1; MG/1
1 TABLET, FILM COATED ORAL 2 TIMES DAILY
Qty: 20 TABLET | Refills: 0 | Status: SHIPPED | OUTPATIENT
Start: 2020-08-12 | End: 2020-08-22

## 2020-08-12 RX ORDER — HYDROXYZINE HYDROCHLORIDE 10 MG/1
10-20 TABLET, FILM COATED ORAL EVERY 8 HOURS PRN
Qty: 40 TABLET | Refills: 1 | Status: SHIPPED | OUTPATIENT
Start: 2020-08-12 | End: 2020-12-13

## 2020-08-12 RX ORDER — VENLAFAXINE HYDROCHLORIDE 37.5 MG/1
37.5 CAPSULE, EXTENDED RELEASE ORAL DAILY
Qty: 90 CAPSULE | Refills: 1 | Status: ON HOLD | OUTPATIENT
Start: 2020-08-12 | End: 2021-06-15

## 2020-08-12 ASSESSMENT — ENCOUNTER SYMPTOMS
ABDOMINAL DISTENTION: 0
ABDOMINAL PAIN: 0
DIARRHEA: 0
NAUSEA: 0
PHOTOPHOBIA: 0
SHORTNESS OF BREATH: 0
BLOOD IN STOOL: 0
COUGH: 0
COLOR CHANGE: 0
SINUS PAIN: 0
SINUS PRESSURE: 0

## 2020-08-12 NOTE — PROGRESS NOTES
[facility]  [address] [place] [id number]  Dept: [phone]  Dept Fax: [phone]  Loc: [phone]    2020     Isabell Gates (:  1950) is a 78 y.o. female, here for evaluation of the following medical concerns:    Patient presents to the office today for a follow up of depression and anxiety. She stated that she feels like all she does is sleep. She said that she has been taking the Effexor daily and the Atarax twice a day. She says she is more anxious at night when she is getting ready to sleep. She stated that she only takes her aspirin if she feels like it and she takes tylenol if needed for pain. She has been taking tylenol occasionally for her ear pain. She is complaining of her ears draining and pain and itching on the inside that has been going on for the last 2 weeks. She stated that she is still smoking and does not plan to stop. 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 Otalgia    There is pain in the right ear. This is a recurrent problem. The current episode started 1 to 4 weeks ago. The problem occurs constantly. The problem has been gradually worsening. There has been no fever. The pain is at a severity of 8/10. The pain is moderate. Associated symptoms include ear discharge. Pertinent negatives include no abdominal pain, coughing, diarrhea, headaches or rash. She has tried acetaminophen for the symptoms. The treatment provided mild relief. Allergic Rhinitis:  Current treatment includes nothing. Residual symptoms: none. She denies purulent nasal discharge and sputum, fevers, lymphadenopathy, and sore throat.     Treatment Adherence:   Medication compliance:  compliant all of the time  Diet compliance:  compliant all of the time  Weight trend: stable  Current exercise: no regular exercise  Barriers: none  Wt Readings from Last 3 Encounters:   08/12/20 147 lb (66.7 kg)   07/24/20 146 lb 12.8 oz (66.6 kg)   05/17/20 154 lb (69.9 kg)     Diabetes Mellitus Type 2: Current symptoms/problems include none. Home blood sugar records: patient does not test  Any episodes of hypoglycemia? no  Eye exam current (within one year): unknown, patient is going to make an appointment  Tobacco history: She  reports that she has been smoking cigarettes. She started smoking about 52 years ago. She has a 96.00 pack-year smoking history. She has never used smokeless tobacco.   Daily Aspirin? No: does not take daily    Hypertension:  Home blood pressure monitoring: No.  She is not adherent to a low sodium diet. Patient complains of shortness of breath( due to COPD). Antihypertensive medication side effects: no medication side effects noted. Use of agents associated with hypertension: none. BP Readings from Last 3 Encounters:   08/12/20 (!) 136/58   07/24/20 (!) 160/62   05/17/20 (!) 157/52     Hyperlipidemia:  No new myalgias or GI upset not taking medication      Lab Results   Component Value Date    LABA1C 5.2 08/11/2020    LABA1C 5.5 10/12/2012     Lab Results   Component Value Date    CREATININE 0.9 08/11/2020     Lab Results   Component Value Date    ALT <5 (L) 08/11/2020    AST 11 08/11/2020     Lab Results   Component Value Date    CHOL 175 08/11/2020    TRIG 99 08/11/2020    HDL 40 08/11/2020    LDLCALC 115 08/11/2020        Hypothyroidism: Recent symptoms: none. She denies weight gain, weight loss, cold intolerance and heat intolerance. Patient is  taking her medication consistently on an empty stomach. No results found for: [facility]  Lab Results   Component Value Date    TSH 3.810 08/11/2020    TSH 4.510 (H) 01/09/2019    TSH 4.140 02/02/2018     Pt is here for depression.     Pt currently taking  Effexor  Side effects noted: dizziness and drowsiness      Sleep-more   Interest-feels like she is getting back to doing things she likes to do  Guilt- OK  Energy- less  Concentration- OK  Appetite- no change not a big eater      Anxiety- up and down    Employer? Lost work days?- does not work      Review of Systems   Constitutional: Negative for chills, fatigue and fever. HENT: Positive for ear discharge and ear pain. Negative for congestion, sinus pressure and sinus pain. Eyes: Negative for photophobia and visual disturbance. Respiratory: Negative for cough and shortness of breath. Cardiovascular: Negative for chest pain and leg swelling. Gastrointestinal: Negative for abdominal distention, abdominal pain, blood in stool, diarrhea and nausea. Endocrine: Negative. Genitourinary: Negative for frequency, hematuria and urgency. Musculoskeletal: Negative for arthralgias and joint swelling. Skin: Negative for color change and rash. Allergic/Immunologic: Positive for environmental allergies. Neurological: Negative for dizziness, light-headedness and headaches. Hematological: Negative for adenopathy. Does not bruise/bleed easily. Psychiatric/Behavioral: Negative for behavioral problems and sleep disturbance. Prior to Visit Medications    Medication Sig Taking?  Authorizing Provider   venlafaxine (EFFEXOR XR) 37.5 MG extended release capsule Take 1 capsule by mouth daily Yes NITO Romero CNP   hydrOXYzine (ATARAX) 10 MG tablet Take 1-2 tablets by mouth every 8 hours as needed for Anxiety Yes NITO Romero CNP   amoxicillin-clavulanate (AUGMENTIN) 875-125 MG per tablet Take 1 tablet by mouth 2 times daily for 10 days Yes NITO Romero CNP   acetaminophen (TYLENOL) 500 MG tablet Take 1 tablet by mouth every 6 hours as needed for Pain Yes NITO Thakkar CNP   cetirizine (ZYRTEC) 10 MG tablet Take one tablet BID  Patient not taking: Reported on 7/24/2020  Jose Villanueva MD   Cholecalciferol (VITAMIN D) 2000 units TABS tablet Take 2 tablets by mouth daily  Patient not taking: Reported on 7/24/2020 Mignon Justice MD   amLODIPine (NORVASC) 5 MG tablet Take 1 tablet by mouth daily  Patient not taking: Reported on 7/24/2020  Lynette Brumfield MD   clopidogrel (PLAVIX) 75 MG tablet Take 1 tablet by mouth daily  Patient not taking: Reported on 7/24/2020  Lynette Brumfield MD   metoprolol tartrate (LOPRESSOR) 25 MG tablet Take 1 tablet by mouth 2 times daily  Patient not taking: Reported on 7/24/2020  NITO Rosa CNP   atorvastatin (LIPITOR) 40 MG tablet Take 1 tablet by mouth nightly  Patient not taking: Reported on 7/24/2020  Mignon Justice MD   escitalopram (LEXAPRO) 20 MG tablet Take 1 tablet by mouth daily  Patient not taking: Reported on 7/24/2020  Mignon Justice MD   levothyroxine (LEVOTHROID) 88 MCG tablet Take 1 tablet by mouth daily  Patient not taking: Reported on 7/24/2020  Mignon Justice MD   aspirin 81 MG EC tablet Take 1 tablet by mouth daily  Patient not taking: Reported on 7/24/2020  Tolu Moe MD   isosorbide dinitrate (ISORDIL) 10 MG tablet Take 1 tablet by mouth 2 times daily  Patient not taking: Reported on 7/24/2020  Tolu Moe MD        Social History     Tobacco Use    Smoking status: Current Every Day Smoker     Packs/day: 2.00     Years: 48.00     Pack years: 96.00     Types: Cigarettes     Start date: 11/13/1967    Smokeless tobacco: Never Used   Substance Use Topics    Alcohol use: No     Alcohol/week: 0.0 standard drinks        Vitals:    08/12/20 1354   BP: (!) 136/58   Site: Left Upper Arm   Pulse: 88   Resp: 24   Temp: 97.2 °F (36.2 °C)   TempSrc: Temporal   Weight: 147 lb (66.7 kg)     Estimated body mass index is 25.63 kg/m² as calculated from the following:    Height as of 7/24/20: 5' 3.5\" (1.613 m). Weight as of this encounter: 147 lb (66.7 kg). Physical Exam  Constitutional:       General: She is not in acute distress. Appearance: Normal appearance. She is not ill-appearing. HENT:      Head: Normocephalic and atraumatic. Right Ear: Tenderness present. A middle ear effusion is present. There is no impacted cerumen. Left Ear: There is no impacted cerumen. Nose: No congestion or rhinorrhea. Mouth/Throat:      Pharynx: No oropharyngeal exudate or posterior oropharyngeal erythema. Eyes:      General:         Right eye: No discharge. Left eye: No discharge. Neck:      Musculoskeletal: No muscular tenderness. Vascular: No carotid bruit. Cardiovascular:      Rate and Rhythm: Normal rate and regular rhythm. Pulses: Normal pulses. Heart sounds: Normal heart sounds. Pulmonary:      Effort: No respiratory distress. Breath sounds: Normal breath sounds. Abdominal:      General: Bowel sounds are normal. There is no distension. Palpations: Abdomen is soft. There is no mass. Tenderness: There is no abdominal tenderness. Musculoskeletal: Normal range of motion. General: No swelling or tenderness. Lymphadenopathy:      Cervical: No cervical adenopathy. Skin:     General: Skin is warm and dry. Findings: No rash. Neurological:      General: No focal deficit present. Mental Status: She is alert and oriented to person, place, and time. Motor: No weakness. Psychiatric:         Mood and Affect: Mood normal.         Behavior: Behavior normal.         Thought Content: Thought content normal.         Judgment: Judgment normal.         ASSESSMENT/PLAN:  1. Depression, unspecified depression type  - venlafaxine (EFFEXOR XR) 37.5 MG extended release capsule; Take 1 capsule by mouth daily  Dispense: 90 capsule; Refill: 1  -Continue to follow with Autumn Ashley for counseling    2. Anxiety  - venlafaxine (EFFEXOR XR) 37.5 MG extended release capsule; Take 1 capsule by mouth daily  Dispense: 90 capsule; Refill: 1  - hydrOXYzine (ATARAX) 10 MG tablet; Take 1-2 tablets by mouth every 8 hours as needed for Anxiety  Dispense: 40 tablet; Refill: 1    3.  Non-recurrent acute suppurative otitis media of both ears without spontaneous rupture of tympanic membranes  - amoxicillin-clavulanate (AUGMENTIN) 875-125 MG per tablet; Take 1 tablet by mouth 2 times daily for 10 days  Dispense: 20 tablet; Refill: 0  - Avoid using Q-tips   -Discussed smoking cessation, Patient stated that she has no plans to stop smoking    -Call office with any questions or concerns, or if symptoms are getting worse or changing  -Follow up in 1 month  -Patient given educational materials - see patient instructions. Discussed use, benefit, and side effects of prescribed medications. All patient questions answered. Pt voiced understanding. Reviewed Health Maintenance. electronic signature was used to authenticate this note.     --NITO Abbott CNP on 8/12/2020 at 4:00 PM     Supervising NP GELY Ohara [id number]  I was present with the nurse practitioner during the history and exam. I discussed the findings and plans with the nurse practitioner and agree as documented in her note     Electronically signed by NITO Abbott CNP on 8/12/20 at 3:59 PM EDT

## 2020-08-12 NOTE — PATIENT INSTRUCTIONS
Patient Education        Recovering From Depression: Care Instructions  Your Care Instructions     Taking good care of yourself is important as you recover from depression. In time, your symptoms will fade as your treatment takes hold. Do not give up. Instead, focus your energy on getting better. Your mood will improve. It just takes some time. Focus on things that can help you feel better, such as being with friends and family, eating well, and getting enough rest. But take things slowly. Do not do too much too soon. You will begin to feel better gradually. Follow-up care is a key part of your treatment and safety. Be sure to make and go to all appointments, and call your doctor if you are having problems. It's also a good idea to know your test results and keep a list of the medicines you take. How can you care for yourself at home? Be realistic  · If you have a large task to do, break it up into smaller steps you can handle, and just do what you can. · You may want to put off important decisions until your depression has lifted. If you have plans that will have a major impact on your life, such as marriage, divorce, or a job change, try to wait a bit. Talk it over with friends and loved ones who can help you look at the overall picture first.  · Reaching out to people for help is important. Do not isolate yourself. Let your family and friends help you. Find someone you can trust and confide in, and talk to that person. · Be patient, and be kind to yourself. Remember that depression is not your fault and is not something you can overcome with willpower alone. Treatment is necessary for depression, just like for any other illness. Feeling better takes time, and your mood will improve little by little. Stay active  · Stay busy and get outside. Take a walk, or try some other light exercise. · Talk with your doctor about an exercise program. Exercise can help with mild depression.   · Go to a movie or concert. Take part in a Advent activity or other social gathering. Go to a Codefied game. · Ask a friend to have dinner with you. Take care of yourself  · Eat a balanced diet with plenty of fresh fruits and vegetables, whole grains, and lean protein. If you have lost your appetite, eat small snacks rather than large meals. · Avoid drinking alcohol or using illegal drugs. Do not take medicines that have not been prescribed for you. They may interfere with medicines you may be taking for depression, or they may make your depression worse. · Take your medicines exactly as they are prescribed. You may start to feel better within 1 to 3 weeks of taking antidepressant medicine. But it can take as many as 6 to 8 weeks to see more improvement. If you have questions or concerns about your medicines, or if you do not notice any improvement by 3 weeks, talk to your doctor. · If you have any side effects from your medicine, tell your doctor. Antidepressants can make you feel tired, dizzy, or nervous. Some people have dry mouth, constipation, headaches, sexual problems, or diarrhea. Many of these side effects are mild and will go away on their own after you have been taking the medicine for a few weeks. Some may last longer. Talk to your doctor if side effects are bothering you too much. You might be able to try a different medicine. · Get enough sleep. If you have problems sleeping:  ? Go to bed at the same time every night, and get up at the same time every morning. ? Keep your bedroom dark and quiet. ? Do not exercise after 5:00 p.m.  ? Avoid drinks with caffeine after 5:00 p.m. · Avoid sleeping pills unless they are prescribed by the doctor treating your depression. Sleeping pills may make you groggy during the day, and they may interact with other medicine you are taking. · If you have any other illnesses, such as diabetes, heart disease, or high blood pressure, make sure to continue with your treatment.  Tell your doctor about all of the medicines you take, including those with or without a prescription. · Keep the numbers for these national suicide hotlines: 1-891-831-TALK (6-386.803.5002) and 5-438-PUXTAZN (5-108.882.2424). If you or someone you know talks about suicide or feeling hopeless, get help right away. When should you call for help? [id number] anytime you think you may need emergency care. For example, call if:  · You feel like hurting yourself or someone else. · Someone you know has depression and is about to attempt or is attempting suicide. Call your doctor now or seek immediate medical care if:  · You hear voices. · Someone you know has depression and:  ? Starts to give away his or her possessions. ? Uses illegal drugs or drinks alcohol heavily. ? Talks or writes about death, including writing suicide notes or talking about guns, knives, or pills. ? Starts to spend a lot of time alone. ? Acts very aggressively or suddenly appears calm. Watch closely for changes in your health, and be sure to contact your doctor if:  · You do not get better as expected. Where can you learn more? Go to [email or web address]://[email or web address].ALKALINE [email or web address] org and sign in to your Acteavo account. Enter M713 in the [facility] box to learn more about \"Recovering From Depression: Care Instructions. \"     If you do not have an account, please click on the \"Sign Up Now\" link. Current as of: January 31, 2020               Content Version: 12.5  © 2006-2020 [employer]. Care instructions adapted under license by [employer]. If you have questions about a medical condition or this instruction, always ask your healthcare professional. [place] [address] any warranty or liability for your use of this information. Patient Education        Ear Infection (Otitis Media): Care Instructions  Your Care Instructions     An ear infection may start with a cold and affect the middle ear (otitis media). learn more? Go to https://chpepiceweb.healthWater [facility] [email or web address] org and sign in to your EachNet account. Enter F734 in the [facility] box to learn more about \"Ear Infection (Otitis Media): Care Instructions. \"     If you do not have an account, please click on the \"Sign Up Now\" link. Current as of: July 29, 2019               Content Version: 12.5  © 9119-6690 Healthwise, Incorporated. Care instructions adapted under license by [facility] ([facility]). If you have questions about a medical condition or this instruction, always ask your healthcare professional. [address] any warranty or liability for your use of this information.

## 2020-12-13 ENCOUNTER — HOSPITAL ENCOUNTER (EMERGENCY)
Age: 70
Discharge: HOME | End: 2020-12-13
Attending: EMERGENCY MEDICINE
Payer: MEDICARE

## 2020-12-13 ENCOUNTER — APPOINTMENT (OUTPATIENT)
Dept: GENERAL RADIOLOGY | Age: 70
End: 2020-12-13
Payer: MEDICARE

## 2020-12-13 ENCOUNTER — APPOINTMENT (OUTPATIENT)
Dept: CT IMAGING | Age: 70
End: 2020-12-13
Payer: MEDICARE

## 2020-12-13 VITALS
HEART RATE: 77 BPM | OXYGEN SATURATION: 98 % | RESPIRATION RATE: 22 BRPM | SYSTOLIC BLOOD PRESSURE: 123 MMHG | DIASTOLIC BLOOD PRESSURE: 103 MMHG | BODY MASS INDEX: 25.63 KG/M2 | WEIGHT: 147 LBS | TEMPERATURE: 98.2 F

## 2020-12-13 LAB
ALBUMIN SERPL-MCNC: 4 G/DL (ref 3.5–5.1)
ALP BLD-CCNC: 184 U/L (ref 38–126)
ALT SERPL-CCNC: 6 U/L (ref 11–66)
ANION GAP SERPL CALCULATED.3IONS-SCNC: 13 MEQ/L (ref 8–16)
AST SERPL-CCNC: 13 U/L (ref 5–40)
BASOPHILS # BLD: 0.2 %
BASOPHILS ABSOLUTE: 0 THOU/MM3 (ref 0–0.1)
BILIRUB SERPL-MCNC: 0.5 MG/DL (ref 0.3–1.2)
BUN BLDV-MCNC: 10 MG/DL (ref 7–22)
CALCIUM SERPL-MCNC: 9.4 MG/DL (ref 8.5–10.5)
CHLORIDE BLD-SCNC: 100 MEQ/L (ref 98–111)
CO2: 27 MEQ/L (ref 23–33)
CREAT SERPL-MCNC: 0.9 MG/DL (ref 0.4–1.2)
D-DIMER QUANTITATIVE: 2248 NG/ML FEU (ref 0–500)
EKG ATRIAL RATE: 88 BPM
EKG P AXIS: 76 DEGREES
EKG P-R INTERVAL: 140 MS
EKG Q-T INTERVAL: 384 MS
EKG QRS DURATION: 78 MS
EKG QTC CALCULATION (BAZETT): 464 MS
EKG R AXIS: 66 DEGREES
EKG T AXIS: 60 DEGREES
EKG VENTRICULAR RATE: 88 BPM
EOSINOPHIL # BLD: 3.1 %
EOSINOPHILS ABSOLUTE: 0.3 THOU/MM3 (ref 0–0.4)
ERYTHROCYTE [DISTWIDTH] IN BLOOD BY AUTOMATED COUNT: 12.9 % (ref 11.5–14.5)
ERYTHROCYTE [DISTWIDTH] IN BLOOD BY AUTOMATED COUNT: 49.5 FL (ref 35–45)
GFR SERPL CREATININE-BSD FRML MDRD: 62 ML/MIN/1.73M2
GLUCOSE BLD-MCNC: 99 MG/DL (ref 70–108)
HCT VFR BLD CALC: 50.2 % (ref 37–47)
HEMOGLOBIN: 16.3 GM/DL (ref 12–16)
IMMATURE GRANS (ABS): 0.04 THOU/MM3 (ref 0–0.07)
IMMATURE GRANULOCYTES: 0.5 %
LYMPHOCYTES # BLD: 16.7 %
LYMPHOCYTES ABSOLUTE: 1.4 THOU/MM3 (ref 1–4.8)
MCH RBC QN AUTO: 33.2 PG (ref 26–33)
MCHC RBC AUTO-ENTMCNC: 32.5 GM/DL (ref 32.2–35.5)
MCV RBC AUTO: 102.2 FL (ref 81–99)
MONOCYTES # BLD: 3.7 %
MONOCYTES ABSOLUTE: 0.3 THOU/MM3 (ref 0.4–1.3)
NUCLEATED RED BLOOD CELLS: 0 /100 WBC
OSMOLALITY CALCULATION: 278.5 MOSMOL/KG (ref 275–300)
PLATELET # BLD: 215 THOU/MM3 (ref 130–400)
PMV BLD AUTO: 11.2 FL (ref 9.4–12.4)
POTASSIUM REFLEX MAGNESIUM: 3.7 MEQ/L (ref 3.5–5.2)
PRO-BNP: 995.5 PG/ML (ref 0–900)
RBC # BLD: 4.91 MILL/MM3 (ref 4.2–5.4)
SEG NEUTROPHILS: 75.8 %
SEGMENTED NEUTROPHILS ABSOLUTE COUNT: 6.4 THOU/MM3 (ref 1.8–7.7)
SODIUM BLD-SCNC: 140 MEQ/L (ref 135–145)
TOTAL PROTEIN: 7.1 G/DL (ref 6.1–8)
TROPONIN T: < 0.01 NG/ML
WBC # BLD: 8.4 THOU/MM3 (ref 4.8–10.8)

## 2020-12-13 PROCEDURE — 84484 ASSAY OF TROPONIN QUANT: CPT

## 2020-12-13 PROCEDURE — 36415 COLL VENOUS BLD VENIPUNCTURE: CPT

## 2020-12-13 PROCEDURE — 6370000000 HC RX 637 (ALT 250 FOR IP): Performed by: EMERGENCY MEDICINE

## 2020-12-13 PROCEDURE — 83880 ASSAY OF NATRIURETIC PEPTIDE: CPT

## 2020-12-13 PROCEDURE — 99284 EMERGENCY DEPT VISIT MOD MDM: CPT

## 2020-12-13 PROCEDURE — 6360000002 HC RX W HCPCS: Performed by: EMERGENCY MEDICINE

## 2020-12-13 PROCEDURE — 96374 THER/PROPH/DIAG INJ IV PUSH: CPT

## 2020-12-13 PROCEDURE — 2580000003 HC RX 258: Performed by: EMERGENCY MEDICINE

## 2020-12-13 PROCEDURE — U0003 INFECTIOUS AGENT DETECTION BY NUCLEIC ACID (DNA OR RNA); SEVERE ACUTE RESPIRATORY SYNDROME CORONAVIRUS 2 (SARS-COV-2) (CORONAVIRUS DISEASE [COVID-19]), AMPLIFIED PROBE TECHNIQUE, MAKING USE OF HIGH THROUGHPUT TECHNOLOGIES AS DESCRIBED BY CMS-2020-01-R: HCPCS

## 2020-12-13 PROCEDURE — 80053 COMPREHEN METABOLIC PANEL: CPT

## 2020-12-13 PROCEDURE — 6360000004 HC RX CONTRAST MEDICATION: Performed by: EMERGENCY MEDICINE

## 2020-12-13 PROCEDURE — 85025 COMPLETE CBC W/AUTO DIFF WBC: CPT

## 2020-12-13 PROCEDURE — 71045 X-RAY EXAM CHEST 1 VIEW: CPT

## 2020-12-13 PROCEDURE — 85379 FIBRIN DEGRADATION QUANT: CPT

## 2020-12-13 PROCEDURE — 93005 ELECTROCARDIOGRAM TRACING: CPT

## 2020-12-13 PROCEDURE — 71275 CT ANGIOGRAPHY CHEST: CPT

## 2020-12-13 RX ORDER — 0.9 % SODIUM CHLORIDE 0.9 %
1000 INTRAVENOUS SOLUTION INTRAVENOUS ONCE
Status: COMPLETED | OUTPATIENT
Start: 2020-12-13 | End: 2020-12-13

## 2020-12-13 RX ORDER — DIPHENHYDRAMINE HYDROCHLORIDE 50 MG/ML
25 INJECTION INTRAMUSCULAR; INTRAVENOUS ONCE
Status: COMPLETED | OUTPATIENT
Start: 2020-12-13 | End: 2020-12-13

## 2020-12-13 RX ORDER — DIAPER,BRIEF,INFANT-TODD,DISP
EACH MISCELLANEOUS
Qty: 1 TUBE | Refills: 1 | Status: SHIPPED | OUTPATIENT
Start: 2020-12-13 | End: 2020-12-20

## 2020-12-13 RX ORDER — BENZONATATE 100 MG/1
100 CAPSULE ORAL 3 TIMES DAILY PRN
Status: DISCONTINUED | OUTPATIENT
Start: 2020-12-13 | End: 2020-12-13 | Stop reason: HOSPADM

## 2020-12-13 RX ORDER — DIPHENHYDRAMINE HCL 25 MG
25 CAPSULE ORAL EVERY 6 HOURS PRN
Qty: 28 CAPSULE | Refills: 0 | Status: SHIPPED | OUTPATIENT
Start: 2020-12-13 | End: 2020-12-20

## 2020-12-13 RX ADMIN — SODIUM CHLORIDE 1000 ML: 9 INJECTION, SOLUTION INTRAVENOUS at 15:12

## 2020-12-13 RX ADMIN — BENZONATATE 100 MG: 100 CAPSULE ORAL at 15:11

## 2020-12-13 RX ADMIN — IOPAMIDOL 80 ML: 755 INJECTION, SOLUTION INTRAVENOUS at 15:46

## 2020-12-13 RX ADMIN — DIPHENHYDRAMINE HYDROCHLORIDE 25 MG: 50 INJECTION INTRAMUSCULAR; INTRAVENOUS at 15:11

## 2020-12-13 ASSESSMENT — PAIN DESCRIPTION - PAIN TYPE: TYPE: ACUTE PAIN

## 2020-12-13 ASSESSMENT — PAIN DESCRIPTION - DESCRIPTORS: DESCRIPTORS: ITCHING

## 2020-12-13 ASSESSMENT — ENCOUNTER SYMPTOMS
SHORTNESS OF BREATH: 1
BLOOD IN STOOL: 0
RHINORRHEA: 1
ABDOMINAL PAIN: 0
VOMITING: 0
COUGH: 1
DIARRHEA: 0
CONSTIPATION: 0
SORE THROAT: 1
NAUSEA: 0

## 2020-12-13 ASSESSMENT — PAIN DESCRIPTION - FREQUENCY: FREQUENCY: CONTINUOUS

## 2020-12-13 ASSESSMENT — PAIN DESCRIPTION - PROGRESSION: CLINICAL_PROGRESSION: GRADUALLY WORSENING

## 2020-12-13 ASSESSMENT — PAIN DESCRIPTION - ONSET: ONSET: ON-GOING

## 2020-12-13 NOTE — ED PROVIDER NOTES
[address] COMPLAINT       Chief Complaint   Patient presents with    Shortness of Breath       Nurses Notes reviewed and I agree except as noted in the HPI. HISTORY OF PRESENT ILLNESS    Fuad Corona is a 78 y.o. female who presents to the emergency department with multiple concerns. She states that she has been having ongoing intermittent difficulty with her breathing which she states is due to her COPD. She does continue to smoke. She states for the last week or so she feels as though has her breathing has become worse, and that she has been having more cough and difficulty breathing. Cough has been productive of \"a milky white\" sputum. Patient denies hemoptysis or fever. She also reports recent sore throat, runny nose, loss of sense of taste and smell for the last week. She states that she has had \"a knot that hurts\" and points to her xiphoid area for the last week. She denies nausea, vomiting, or diarrhea. She also reports having an itchy rash over her arms, trunk, and back but has not taken any over-the-counter medications because \"I was not sure what I could take for it\". She also denies using any over-the-counter medications for cough for the same reason. She does have a PCP, Orlando Greer, with [facility] ([facility]) but has not contacted their office for any of these concerns. She also reports generalized headache and fatigue. Denies abdominal pain, palpitations or syncope. REVIEW OF SYSTEMS     Review of Systems   Constitutional: Positive for fatigue. Negative for diaphoresis and fever. HENT: Positive for congestion, rhinorrhea and sore throat. Eyes: Negative for visual disturbance. Respiratory: Positive for cough and shortness of breath. Cardiovascular: Negative for chest pain, palpitations and leg swelling. Gastrointestinal: Negative for abdominal pain, blood in stool, constipation, diarrhea, nausea and vomiting. Endocrine: Negative for polyuria. Genitourinary: Negative for difficulty urinating and dysuria. Musculoskeletal: Positive for myalgias. Negative for joint swelling. Skin: Positive for rash. Neurological: Positive for headaches. Negative for seizures, syncope, facial asymmetry, speech difficulty and weakness. Hematological: Negative for adenopathy. Psychiatric/Behavioral: Negative for confusion. All other systems reviewed and are negative. PAST MEDICAL HISTORY    has a past medical history of Anxiety, Arthritis, Breast cancer ([address] CAD (coronary artery disease), Cancer of the skin, Cerebral artery occlusion with cerebral infarction ([address] Chronic UTI, COPD (chronic obstructive pulmonary disease) ([address] CVA (cerebral infarction), Depression, DVT of lower extremity (deep venous thrombosis) ([address] Facial injury, History of stroke, Hx of blood clots, Hyperlipidemia, Hypertension, Hypothyroidism, IBS (irritable bowel syndrome), Low HDL (under 40), Prolonged emergence from general anesthesia, Recurrent UTI (urinary tract infection), and Word finding difficulty. SURGICAL HISTORY      has a past surgical history that includes Appendectomy (1975); Cholecystectomy (1992); moira and bso (cervix removed) (1976, 2001); Colonoscopy (2009); Thyroid surgery (2007); Hysterectomy (1976); joint replacement (Left, 2011); joint replacement (Right, 1/19/15); hip surgery (jan 19, 2015); Mastectomy (Right, 2005); other surgical history (04/10/2018); and pr njx dx/ther sbst intrlmnr lmbr/sac w/img gdn (N/A, 4/10/2018).     CURRENT MEDICATIONS       Discharge Medication List as of 12/13/2020  5:21 PM      CONTINUE these medications which have NOT CHANGED    Details   venlafaxine (EFFEXOR XR) 37.5 MG extended release capsule Take 1 capsule by mouth daily, Disp-90 capsule,R-1Normal      cetirizine (ZYRTEC) 10 MG tablet Take one tablet BID, Disp-60 tablet, R-2Normal      Cholecalciferol (VITAMIN D) 2000 units TABS tablet INITIAL VITALS:  weight is 147 lb (66.7 kg). Her oral temperature is 98.2 °F (36.8 °C). Her blood pressure is 123/103 (abnormal) and her pulse is 77. Her respiration is 22 and oxygen saturation is 98%. CONSTITUTIONAL: [Awake, alert, non toxic, well developed, well nourished, no acute distress]  HEAD: [Normocephalic, atraumatic]  EYES: [Pupils equal, round & reactive to light, extraocular movements intact, no nystagmus, clear conjunctiva, non-icteric sclera]  ENT: [External ear canal clear without evidence of cerumen impaction or foreign body, TM's clear without erythema or bulging. Nares patent without drainage, septum appears midline. Moist mucus membranes, oropharynx clear without exudate, erythema, or mass. Uvula midline]  NECK: [Nontender and supple. No meningismus, no appreciated lymphadenopathy. Intact full range of motion. C-spine midline without vertebral tenderness. Trachea midline.]  CHEST: [Inspection normal, no lesions, equal rise. No crepitus or tenderness upon palpation.]  CARDIOVASCULAR: [Regular rate, rhythm, normal S1 and S2. No appreciated murmurs, rubs, or gallops. No pulse deficits appreciated. Intact distal perfusion. JVD not appreciated.]  PULMONARY: [Respiratory distress absent. Respiratory effort normal. Breath sounds clear to auscultation without rhonchi, rales, or wheezing. No accessory muscle use. No stridor]  ABDOMEN: [Inspection normal, without surgical scars. Soft, non-tender, non-distended, with normoactive bowel sounds. No palpable masses, rebound, or guarding]  BACK: [Intact ROM. No midline vertebral tenderness, step off, or crepitus. No CVA tenderness.]  MUSCULOSKELETAL: [Extremities nontender to palpation. No gross deformity or evidence of external trauma. Intact range of motion. Sensation intact. No clubbing, cyanosis, or edema.]  SKIN: [Warm, dry. No jaundice, urticaria, or petechiae.   Multiple areas of excoriation on the arms, trunk, and back with multiple areas in various stages of healing, with scabs and areas which appear to have scabs which have been picked off and rebleeding. No vesicles or pustules. No sloughing/desquamation. Nikolsky sign is negative. No bullae or purpura.]  NEUROLOGIC: [Alert and oriented x 3, GCS 15, normal mentation for age. Moves all four extremities. No gross sensory deficit. Cerebellar function grossly normal.]  PSYCHIATRIC: [Normal mood and affect, thought process is clear and linear]     DIFFERENTIAL DIAGNOSIS:   ?covid 19, ?PE, ? pneumonia, ?anemia, ?and others    DIAGNOSTIC RESULTS     EKG: All EKG's are interpreted by the Emergency Department Physician who either signs or Co-signsthis chart in the absence of a cardiologist.  EKG shows normal sinus rhythm and rate of 88 bpm, OR interval 140 ms, QRS duration 78 ms,  ms. No ST elevation or depression, my interpretation. RADIOLOGY: non-plain film images(s) such as CT,Ultrasound and MRI are read by the radiologist.    CTA [address]   Final Result   1. No PE. 2. No acute findings. 3. Interval increase in size of the patient's right lung nodule now measures 14 mm with spiculated borders and is highly suspicious for malignancy. **This report has been created using voice recognition software. It may contain minor errors which are inherent in voice recognition technology. **      Final report electronically signed by Dr. Emily Hart on 12/13/2020 4:08 PM      XR CHEST PORTABLE   Final Result   Possible developing infiltrate right midlung            **This report has been created using voice recognition software. It may contain minor errors which are inherent in voice recognition technology. **      Final report electronically signed by Dr. Emily Hart on 12/13/2020 3:08 PM        [] Visualized and interpreted by me   [x] Radiologist's Wet Read Report Reviewed   [] Discussed withRadiologist.    LABS:   Labs Reviewed   CBC WITH AUTO DIFFERENTIAL - Abnormal; Notable for the following components:       Result Value    Hemoglobin 16.3 (*)     Hematocrit 50.2 (*)     .2 (*)     MCH 33.2 (*)     RDW-SD 49.5 (*)     Monocytes Absolute 0.3 (*)     All other components within normal limits   COMPREHENSIVE METABOLIC PANEL W/ REFLEX TO MG FOR LOW K - Abnormal; Notable for the following components:    Alkaline Phosphatase 184 (*)     ALT 6 (*)     All other components within normal limits   BRAIN NATRIURETIC PEPTIDE - Abnormal; Notable for the following components:    Pro-.5 (*)     All other components within normal limits   D-DIMER, QUANTITATIVE - Abnormal; Notable for the following components:    D-Dimer, Quant 2248.00 (*)     All other components within normal limits   GLOMERULAR FILTRATION RATE, ESTIMATED - Abnormal; Notable for the following components:    Est, Glom Filt Rate 62 (*)     All other components within normal limits   TROPONIN   ANION GAP   OSMOLALITY   COVID-19       EMERGENCY DEPARTMENT COURSE:   Vitals:    Vitals:    12/13/20 1412 12/13/20 1604   BP: (!) 151/68 (!) 123/103   Pulse: 90 77   Resp: 24 22   Temp: 98.2 °F (36.8 °C)    TempSrc: Oral    SpO2: 100% 98%   Weight: 147 lb (66.7 kg)      I discussed in detail patient's CT reading and concern for increase in nodule size and spiculated appearance is worrisome for malignancy and cancer. Patient states she cannot always obtain a timely appointment with PCP therefore was offered close follow up with [facility] clinic here at hospital which she accepts. Staff instructed to schedule her follow up appointment in system for close further outpatient work up. The necessity of keeping her appointment and follow up with discussed with patient who voices understanding & agreement with plan. The results of pertinent diagnostic studies and exam findings were discussed. The patients provisional diagnosis and plan of care were discussed with the patient and present family.  The patient and/or present family expressed understanding of the diagnosis and plan. The nurse was instructed to provide written instructions and appropriate follow-up information. The patient understands their need and responsibility to obtain additional follow-up as instructed. The patient is comfortable with the plan and discharge. The risks of medications administered and prescribed were discussed with the patient and family present. CRITICAL CARE:   none    CONSULTS:  None    PROCEDURES:  None    FINAL IMPRESSION      1. Dyspnea, unspecified type    2. Abnormal chest CT    3. Pruritic rash          DISPOSITION/PLAN   Discharge    PATIENT REFERRED TO:  [address] [facility] [address] [facility]. [address] [facility] [place] [address] [facility]  Schedule an appointment as soon as possible for a visit   Please be sure to keep your appointment as scheduled. DISCHARGE MEDICATIONS:  Discharge Medication List as of 12/13/2020  5:21 PM      START taking these medications    Details   diphenhydrAMINE (BENADRYL) 25 MG capsule Take 1 capsule by mouth every 6 hours as needed for Itching, Disp-28 capsule, R-0Print      hydrocortisone 1 % cream Apply topically 2 -3 times daily for 5 - 7 days. , Disp-1 Tube, R-1, Print             (Please note that portions of this note were completed with a voice recognition program.  Efforts were made to edit the dictations but occasionally words are mis-transcribed.)    Provider:  I personally performed the services described in the documentation, reviewed and edited the documentation which was dictated, and it accurately records my words and actions.     Jared Nicolas MD 12/13/20 10:49 PM                Jared Nicolas MD  12/13/20 [id number]

## 2020-12-13 NOTE — ED NOTES
Patient is resting in bed with easy and unlabored respirations. Son at bedside. Call light in reach. Side rails up x2. Patient denies further complaints or concerns. Will monitor.         Jefferson Fink RN  12/13/20 [id number]

## 2020-12-14 ENCOUNTER — TELEPHONE (OUTPATIENT)
Dept: FAMILY MEDICINE CLINIC | Age: 70
End: 2020-12-14

## 2020-12-14 ENCOUNTER — CARE COORDINATION (OUTPATIENT)
Dept: CARE COORDINATION | Age: 70
End: 2020-12-14

## 2020-12-14 ENCOUNTER — OFFICE VISIT (OUTPATIENT)
Dept: FAMILY MEDICINE CLINIC | Age: 70
End: 2020-12-14
Payer: MEDICARE

## 2020-12-14 VITALS
BODY MASS INDEX: 26.02 KG/M2 | TEMPERATURE: 97 F | HEART RATE: 68 BPM | WEIGHT: 149.2 LBS | SYSTOLIC BLOOD PRESSURE: 156 MMHG | RESPIRATION RATE: 20 BRPM | DIASTOLIC BLOOD PRESSURE: 60 MMHG

## 2020-12-14 LAB
PERFORMING LAB: NORMAL
REPORT: NORMAL
SARS-COV-2: NOT DETECTED

## 2020-12-14 PROCEDURE — 1090F PRES/ABSN URINE INCON ASSESS: CPT | Performed by: NURSE PRACTITIONER

## 2020-12-14 PROCEDURE — 4040F PNEUMOC VAC/ADMIN/RCVD: CPT | Performed by: NURSE PRACTITIONER

## 2020-12-14 PROCEDURE — G8417 CALC BMI ABV UP PARAM F/U: HCPCS | Performed by: NURSE PRACTITIONER

## 2020-12-14 PROCEDURE — 3017F COLORECTAL CA SCREEN DOC REV: CPT | Performed by: NURSE PRACTITIONER

## 2020-12-14 PROCEDURE — G8484 FLU IMMUNIZE NO ADMIN: HCPCS | Performed by: NURSE PRACTITIONER

## 2020-12-14 PROCEDURE — G8399 PT W/DXA RESULTS DOCUMENT: HCPCS | Performed by: NURSE PRACTITIONER

## 2020-12-14 PROCEDURE — 4004F PT TOBACCO SCREEN RCVD TLK: CPT | Performed by: NURSE PRACTITIONER

## 2020-12-14 PROCEDURE — G8427 DOCREV CUR MEDS BY ELIG CLIN: HCPCS | Performed by: NURSE PRACTITIONER

## 2020-12-14 PROCEDURE — 1123F ACP DISCUSS/DSCN MKR DOCD: CPT | Performed by: NURSE PRACTITIONER

## 2020-12-14 PROCEDURE — 99214 OFFICE O/P EST MOD 30 MIN: CPT | Performed by: NURSE PRACTITIONER

## 2020-12-14 RX ORDER — PREDNISONE 10 MG/1
TABLET ORAL
Qty: 30 TABLET | Refills: 0 | Status: SHIPPED | OUTPATIENT
Start: 2020-12-14 | End: 2020-12-24

## 2020-12-14 RX ORDER — TIZANIDINE 4 MG/1
4 TABLET ORAL EVERY 8 HOURS PRN
Qty: 30 TABLET | Refills: 2 | Status: SHIPPED | OUTPATIENT
Start: 2020-12-14 | End: 2021-01-13

## 2020-12-14 ASSESSMENT — ENCOUNTER SYMPTOMS
COUGH: 0
SHORTNESS OF BREATH: 0
DIARRHEA: 0
SORE THROAT: 0
RHINORRHEA: 0

## 2020-12-14 NOTE — TELEPHONE ENCOUNTER
[facility] received a call from:    Name of Caller: Erick Castillo     Relationship to patient: Self    Best contact number: [phone]    Reason for call: Patient was seen in [address] [facility] last night for SOB and rash covering entire body. Patient stated it looks like hives on her legs. Chest scan was abnormal and they recommended she devon seen immediately by PCP for follow up test. Hospital advised pt that it could be cancer. Unable to schedule VV as she does not have a cell phone. Please follow up to schedule.

## 2020-12-14 NOTE — TELEPHONE ENCOUNTER
[facility]) ED Follow up Call    Reason for ED visit:  Rash, dyspnea    Did you receive discharge instructions? Yes  Do you understand the discharge instructions? Yes  Did the ED give you any new prescriptions? Yes  Were you able to fill your prescriptions? Yes    Do you have one of our red, yellow and green  Zone sheets that help you to determine when you should go to the ED? Not Applicable    Do you need or want to make a follow up appt with your PCP? Yes but unable do to being tested for COVID and results are not back yet. Do you have any further needs in the home i.e. Equipment? No      Spoke to the pt, states rash is not much better as the medication she was given at the hospital has worn off. Is taking Benadryl and using Hydrocortisone cream as prescribed. Will monitor. Pt is also very concerned about the CTA result showing an enlarged lung nodule. She was told it may be cancerous and would like to have this treated/evaluated ASAP. Has seen Dr. Diogo Collier in the past. Please advise. Please also review the CXR report. Pt is not currently taking any antibiotics. Does states SOB is better today.

## 2020-12-14 NOTE — PROGRESS NOTES
pain is described as aching. The pain is moderate. Pertinent negatives include no inability to bear weight, loss of motion, loss of sensation, muscle weakness, numbness or tingling. The symptoms are aggravated by movement and palpation. She has tried rest for the symptoms. Narrative   PROCEDURE: CTA CHEST W WO CONTRAST       CLINICAL INFORMATION: cough, sob, elevated d-dimer, PE .       COMPARISON: 2/21/2019       TECHNIQUE: Postcontrast images of the chest with 3-D MIPS Isovue-370 IV contrast   All CT scans at this facility use dose modulation, iterative reconstruction, and/or weight-based dosing when appropriate to reduce radiation dose to as low as reasonably achievable.       FINDINGS: No PE. There is no aneurysm or dissection. No mediastinal or hilar adenopathy by size criteria. Postsurgical changes right axilla. No axillary lymphadenopathy   Heart size normal.   No infiltrates or pleural effusions. The 8 mm nodule seen on the prior CT now measures 14 mm and has spiculated borders highly suspicious of malignancy. There however are no other additional new masses. Scattered nodules are present in both lungs which are previously seen. There is a    cluster of nodular the anterior right upper lung stable. Small nodules posterior right lower lung stable. Tiny triangular nodular density lingula is stable.       Upper abdomen   Stable bilateral adrenal fullness likely hyperplasia this is stable dating back to June 2017.       Bones   No suspicious bone lesions           Impression   1. No PE. 2. No acute findings. 3. Interval increase in size of the patient's right lung nodule now measures 14 mm with spiculated borders and is highly suspicious for malignancy.               **This report has been created using voice recognition software.  It may contain minor errors which are inherent in voice recognition technology. **       Final report electronically signed by Dr. Chetan Olivera on 12/13/2020 4:08 PM Medications    Current Outpatient Medications:     predniSONE (DELTASONE) 10 MG tablet, 4 po qd for 3 days, then 3 po qd for 3 days, then 2 po qd for 3 days, then 1 po qd for 3 days, Disp: 30 tablet, Rfl: 0    tiZANidine (ZANAFLEX) 4 MG tablet, Take 1 tablet by mouth every 8 hours as needed (right hip pain), Disp: 30 tablet, Rfl: 2    diphenhydrAMINE (BENADRYL) 25 MG capsule, Take 1 capsule by mouth every 6 hours as needed for Itching, Disp: 28 capsule, Rfl: 0    hydrocortisone 1 % cream, Apply topically 2 -3 times daily for 5 - 7 days. , Disp: 1 Tube, Rfl: 1    venlafaxine (EFFEXOR XR) 37.5 MG extended release capsule, Take 1 capsule by mouth daily, Disp: 90 capsule, Rfl: 1    cetirizine (ZYRTEC) 10 MG tablet, Take one tablet BID, Disp: 60 tablet, Rfl: 2    Cholecalciferol (VITAMIN D) 2000 units TABS tablet, Take 2 tablets by mouth daily, Disp: 60 tablet, Rfl: 5    amLODIPine (NORVASC) 5 MG tablet, Take 1 tablet by mouth daily, Disp: 30 tablet, Rfl: 5    clopidogrel (PLAVIX) 75 MG tablet, Take 1 tablet by mouth daily, Disp: 30 tablet, Rfl: 0    acetaminophen (TYLENOL) 500 MG tablet, Take 1 tablet by mouth every 6 hours as needed for Pain, Disp: 120 tablet, Rfl: 3    metoprolol tartrate (LOPRESSOR) 25 MG tablet, Take 1 tablet by mouth 2 times daily, Disp: 60 tablet, Rfl: 5    atorvastatin (LIPITOR) 40 MG tablet, Take 1 tablet by mouth nightly, Disp: 30 tablet, Rfl: 5    escitalopram (LEXAPRO) 20 MG tablet, Take 1 tablet by mouth daily, Disp: 30 tablet, Rfl: 5    levothyroxine (LEVOTHROID) 88 MCG tablet, Take 1 tablet by mouth daily, Disp: 30 tablet, Rfl: 5    aspirin 81 MG EC tablet, Take 1 tablet by mouth daily, Disp: 30 tablet, Rfl: 3    isosorbide dinitrate (ISORDIL) 10 MG tablet, Take 1 tablet by mouth 2 times daily, Disp: 90 tablet, Rfl: 3    The patient is allergic to cipro xr and vicodin [hydrocodone-acetaminophen].     Past Medical History  [place]  has a past medical history of Anxiety, Arthritis, Breast cancer ([address] CAD (coronary artery disease), Cancer of the skin, Cerebral artery occlusion with cerebral infarction ([address] Chronic UTI, COPD (chronic obstructive pulmonary disease) ([address] CVA (cerebral infarction), Depression, DVT of lower extremity (deep venous thrombosis) ([address] Facial injury, History of stroke, Hx of blood clots, Hyperlipidemia, Hypertension, Hypothyroidism, IBS (irritable bowel syndrome), Low HDL (under 40), Prolonged emergence from general anesthesia, Recurrent UTI (urinary tract infection), and Word finding difficulty. Subjective:      Review of Systems   Constitutional: Negative for fatigue and fever. HENT: Negative for congestion, rhinorrhea and sore throat. Respiratory: Negative for cough and shortness of breath. Gastrointestinal: Negative for diarrhea. Musculoskeletal: Negative for joint pain. Skin: Positive for rash. Neurological: Negative for tingling and numbness. Objective:     BP (!) 156/60   Pulse 68   Temp 97 °F (36.1 °C)   Resp 20   Wt 149 lb 3.2 oz (67.7 kg)   BMI 26.02 kg/m²     Physical Exam  Vitals signs reviewed. Constitutional:       General: She is not in acute distress. Appearance: She is well-developed. HENT:      Head: Normocephalic and atraumatic. Mouth/Throat:      Pharynx: Oropharynx is clear. Cardiovascular:      Rate and Rhythm: Normal rate and regular rhythm. Pulses: Normal pulses. Heart sounds: Normal heart sounds. Pulmonary:      Effort: Pulmonary effort is normal. No respiratory distress. Breath sounds: Normal breath sounds. Musculoskeletal:      Right hip: She exhibits decreased range of motion and tenderness. She exhibits normal strength and no swelling. Lumbar back: She exhibits decreased range of motion, tenderness and pain. She exhibits no swelling, no edema and no spasm. Skin:     General: Skin is warm and dry.       Comments: Mild excoriation marks all over back, arms and

## 2020-12-14 NOTE — TELEPHONE ENCOUNTER
Pt unable to do VV. Per WS OK for in office visit even though COVID test pending.     Pt scheduled for an in office appt today with WS at 2:30 PM.

## 2020-12-14 NOTE — CARE COORDINATION
Patient contacted regarding recent discharge and COVID-19 risk. Discussed COVID-19 related testing which was pending at this time. Test results were pending. Patient informed of results, if available? Results pending     Care Transition Nurse/ Ambulatory Care Manager contacted the patient by telephone to perform post discharge assessment. Verified name and  with patient as identifiers. Patient has following risk factors of: COPD. CTN/ACM reviewed discharge instructions, medical action plan and red flags related to discharge diagnosis. Reviewed and educated them on any new and changed medications related to discharge diagnosis. Advised obtaining a 90-day supply of all daily and as-needed medications. Education provided regarding infection prevention, and signs and symptoms of COVID-19 and when to seek medical attention with patient who verbalized understanding. Discussed exposure protocols and quarantine from [address] you at higher risk for severe illness  and given an opportunity for questions and concerns. The patient agrees to contact the COVID-19 hotline [phone] or PCP office for questions related to their healthcare. CTN/ACM provided contact information for future reference. From CDC: Are you at higher risk for severe illness?  Wash your hands often.  Avoid close contact (6 feet, which is about two arm lengths) with people who are sick.  Put distance between yourself and other people if COVID-19 is spreading in your community.  Clean and disinfect frequently touched surfaces.  Avoid all cruise travel and non-essential air travel.  Call your healthcare professional if you have concerns about COVID-19 and your underlying condition or if you are sick. For more information on steps you can take to protect yourself, see CDC's How to Protect Yourself    Pt will be f/u in 1-2 days  based on severity of symptoms and risk factors. Spoke with Jevon Price.  She reports that she has an appt today with her PCP. Pt reports that they are aware that her COVID test is still pending at this time. Pt continues to feel SOB. Denies worsening of symptoms. Unable to participate in Loop. Pt has face to face visit with PCP today. Verified that this has been okayed by office.

## 2020-12-15 ENCOUNTER — TELEPHONE (OUTPATIENT)
Dept: FAMILY MEDICINE CLINIC | Age: 70
End: 2020-12-15

## 2020-12-15 ENCOUNTER — CARE COORDINATION (OUTPATIENT)
Dept: CARE COORDINATION | Age: 70
End: 2020-12-15

## 2020-12-15 NOTE — TELEPHONE ENCOUNTER
[facility] received a call from:    Name of Caller: Carmen Barbour    Relationship to patient:self    Best contact number: [phone]    Reason for call: Pt was given a referral to the ortho clinic and she was told that she has a  old bill so she is not able to be seen there is is wanting got know if there somewhere else she could.

## 2020-12-15 NOTE — TELEPHONE ENCOUNTER
Spoke to pt about below message she is waiting for a lady that handles the bills to call her  Back at [facility] to see how much the bill is.

## 2021-02-05 ENCOUNTER — TELEPHONE (OUTPATIENT)
Dept: PULMONOLOGY | Age: 71
End: 2021-02-05

## 2021-02-05 NOTE — TELEPHONE ENCOUNTER
Patient was a no show for her appt. Phone has calling restrictions and will not accept my call. Mailed no show letter.

## 2021-02-26 ENCOUNTER — TELEPHONE (OUTPATIENT)
Dept: PULMONOLOGY | Age: 71
End: 2021-02-26

## 2021-02-26 NOTE — TELEPHONE ENCOUNTER
Received voice message on our answering machine. Filemon Carr asking our office to return her call did not say what it was regarding at 11:03 am. I called her back at 1:25 pm and unable to speak with her and unable to leave message, mailbox full.

## 2021-03-08 ENCOUNTER — OFFICE VISIT (OUTPATIENT)
Dept: PULMONOLOGY | Age: 71
End: 2021-03-08
Payer: MEDICARE

## 2021-03-08 VITALS
TEMPERATURE: 98.1 F | HEIGHT: 64 IN | HEART RATE: 87 BPM | BODY MASS INDEX: 24.21 KG/M2 | WEIGHT: 141.8 LBS | SYSTOLIC BLOOD PRESSURE: 140 MMHG | OXYGEN SATURATION: 98 % | DIASTOLIC BLOOD PRESSURE: 60 MMHG

## 2021-03-08 DIAGNOSIS — R91.1 NODULE OF RIGHT LUNG: ICD-10-CM

## 2021-03-08 DIAGNOSIS — J44.1 COPD EXACERBATION (HCC): Primary | ICD-10-CM

## 2021-03-08 DIAGNOSIS — Z87.891 PERSONAL HISTORY OF TOBACCO USE: ICD-10-CM

## 2021-03-08 PROCEDURE — 1090F PRES/ABSN URINE INCON ASSESS: CPT | Performed by: NURSE PRACTITIONER

## 2021-03-08 PROCEDURE — G8484 FLU IMMUNIZE NO ADMIN: HCPCS | Performed by: NURSE PRACTITIONER

## 2021-03-08 PROCEDURE — 99214 OFFICE O/P EST MOD 30 MIN: CPT | Performed by: NURSE PRACTITIONER

## 2021-03-08 PROCEDURE — G8926 SPIRO NO PERF OR DOC: HCPCS | Performed by: NURSE PRACTITIONER

## 2021-03-08 PROCEDURE — G8420 CALC BMI NORM PARAMETERS: HCPCS | Performed by: NURSE PRACTITIONER

## 2021-03-08 PROCEDURE — 99406 BEHAV CHNG SMOKING 3-10 MIN: CPT | Performed by: NURSE PRACTITIONER

## 2021-03-08 PROCEDURE — G8427 DOCREV CUR MEDS BY ELIG CLIN: HCPCS | Performed by: NURSE PRACTITIONER

## 2021-03-08 PROCEDURE — 4004F PT TOBACCO SCREEN RCVD TLK: CPT | Performed by: NURSE PRACTITIONER

## 2021-03-08 PROCEDURE — 3017F COLORECTAL CA SCREEN DOC REV: CPT | Performed by: NURSE PRACTITIONER

## 2021-03-08 PROCEDURE — G8399 PT W/DXA RESULTS DOCUMENT: HCPCS | Performed by: NURSE PRACTITIONER

## 2021-03-08 PROCEDURE — 3023F SPIROM DOC REV: CPT | Performed by: NURSE PRACTITIONER

## 2021-03-08 PROCEDURE — 4040F PNEUMOC VAC/ADMIN/RCVD: CPT | Performed by: NURSE PRACTITIONER

## 2021-03-08 PROCEDURE — 1123F ACP DISCUSS/DSCN MKR DOCD: CPT | Performed by: NURSE PRACTITIONER

## 2021-03-08 RX ORDER — AZITHROMYCIN 500 MG/1
500 TABLET, FILM COATED ORAL DAILY
Qty: 3 TABLET | Refills: 0 | Status: SHIPPED | OUTPATIENT
Start: 2021-03-08 | End: 2021-03-11

## 2021-03-08 RX ORDER — PREDNISONE 20 MG/1
40 TABLET ORAL DAILY
Qty: 10 TABLET | Refills: 0 | Status: ON HOLD | OUTPATIENT
Start: 2021-03-08 | End: 2021-03-17 | Stop reason: HOSPADM

## 2021-03-08 RX ORDER — FLUTICASONE FUROATE, UMECLIDINIUM BROMIDE AND VILANTEROL TRIFENATATE 100; 62.5; 25 UG/1; UG/1; UG/1
1 POWDER RESPIRATORY (INHALATION) DAILY
Qty: 30 EACH | Refills: 11 | Status: ON HOLD | OUTPATIENT
Start: 2021-03-08 | End: 2021-06-15

## 2021-03-08 RX ORDER — ALBUTEROL SULFATE 90 UG/1
2 AEROSOL, METERED RESPIRATORY (INHALATION) EVERY 6 HOURS PRN
Qty: 1 INHALER | Refills: 3 | Status: SHIPPED | OUTPATIENT
Start: 2021-03-08 | End: 2022-08-16

## 2021-03-08 ASSESSMENT — ENCOUNTER SYMPTOMS
VOMITING: 0
WHEEZING: 1
SHORTNESS OF BREATH: 1
SPUTUM PRODUCTION: 1
NAUSEA: 0
CHEST TIGHTNESS: 1
COUGH: 1
DIARRHEA: 0
CHEST TIGHTNESS: 0
HEMOPTYSIS: 0
STRIDOR: 0

## 2021-03-08 ASSESSMENT — COPD QUESTIONNAIRES: COPD: 1

## 2021-03-08 NOTE — PROGRESS NOTES
[facility]    Patient: Melonie Howell, 73 y.o.   : 1950  3/8/2021    Pt of Dr. Maddy Chapman   Patient presents with    New Patient     Last seen 3/5/2018, was a pt of Dr. Rylie Lau, had recent CT chest portable on 2020        COPD  She complains of chest tightness, cough, shortness of breath, sputum production and wheezing. There is no hemoptysis. This is a chronic problem. The current episode started more than 1 year ago. The problem occurs daily. The problem has been gradually worsening. The cough is productive of sputum. Associated symptoms include chest pain, dyspnea on exertion, nasal congestion and postnasal drip. Pertinent negatives include no fever. Her symptoms are aggravated by change in weather, minimal activity, lying down, exposure to fumes and exposure to smoke. Her symptoms are alleviated by beta-agonist and rest. She reports significant improvement on treatment. Risk factors for lung disease include smoking/tobacco exposure. Her past medical history is significant for COPD. Luan Florez is here for follow up for COPD. Patient previously seen in clinic in 2018 lost to follow-up since that time. Patient has a history of COPD with 8 mm right lung nodule was following up with PCP Dr. Leroy Teran with CT of chest. Recently stopped following and is now established with Regina BEAUCHAMP. On 2020 patient went to [facility] for dyspnea underwent CTA which showed Right lung nodule 14 mm see below for further details. Patient was discharged home with close follow-up with PCP. Progress history  Overall patient reports respiratory symptoms have been worse since last appointment. Patient reports non compliance with inhaled medications (not taking any inhalers). Patient reports mild physical limitation due to respiratory symptoms.     Able to complete ADLs if she takes her time recovers quickly with rest.  Retired   Still smoking 2 PPD On no supplemental oxygen     Progress History:   Since last visit any new medical issues? No  New ER or hospital visits? Yes dyspnea  Any new or changes in medicines? Not taking any scheduled medications  Using inhalers? No not used any meds in awhile    Flu vaccine? Pneumonia vaccine? Last dose 2015  Past Medical hx   PMH:  Past Medical History:   Diagnosis Date    Anxiety 2007    Arthritis     Breast cancer [facility]) 2005    right mastectomy, chemo and radiation history    CAD (coronary artery disease) 2001    Cancer of the skin 2004    Cerebral artery occlusion with cerebral infarction ([address]     Chronic UTI     COPD (chronic obstructive pulmonary disease) ([address]     CVA (cerebral infarction) 2007, 2008    Depression 2007    DVT of lower extremity (deep venous thrombosis) ([address] 1976    Facial injury 2005    Fall on greasy ground, striking left periorbital region    History of stroke 2007, 2008, 2009    Patient relates a stroke with right weakness and speech in 2007; another in 2010 or 2012 (unsure), both with need to rehabilitation.   Also \"2 mini-strokes\" (?)    Hx of blood clots 1977    hip, leg    Hyperlipidemia 2005    Hypertension 2005    Hypothyroidism     IBS (irritable bowel syndrome)     Low HDL (under 40) 2014    Prolonged emergence from general anesthesia     Recurrent UTI (urinary tract infection) 2015    Dr [place] finding difficulty 05/16/2017    work-up in progress     SURGICAL HISTORY:  Past Surgical History:   Procedure Laterality Date   [address]    COLONOSCOPY  2009   [facility] HIP SURGERY  jan 19, 2015    HYSTERECTOMY  1976    JOINT REPLACEMENT Left 2011    HIP    JOINT REPLACEMENT Right 1/19/15    Right Total Hip Replacement - Dr. Lucius Chavarria Right 2005    OTHER SURGICAL HISTORY  04/10/2018    Lumbar epidural steroid injection at L4    MO NJX DX/THER SBST INTRLMNR LMBR/SAC W/IMG GDN N/A 4/10/2018    LESI  @ L4 performed by Bard Charles MD at [facility], [facility]    ovaries    THYROID SURGERY  2007    right lobe and then later left removed     SOCIAL HISTORY:  Social History     Tobacco Use    Smoking status: Current Every Day Smoker     Packs/day: 2.00     Years: 48.00     Pack years: 96.00     Types: Cigarettes     Start date: 11/13/1967    Smokeless tobacco: Never Used   Substance Use Topics    Alcohol use: No     Alcohol/week: 0.0 standard drinks    Drug use: No     ALLERGIES:  Allergies   Allergen Reactions    Cipro Xr     Vicodin [Hydrocodone-Acetaminophen]      Weird dreams       FAMILY HISTORY:  Family History   Problem Relation Age of Onset    Osteoporosis Mother     Hypertension Mother     High Cholesterol Mother     Stroke Mother     Colon Cancer Mother     Cancer Father         lung cancer    Heart Disease Father     Hypertension Father     High Cholesterol Father     Heart Disease Sister     High Cholesterol Sister     Hypertension Sister     Asthma Sister     Other Other         high arched feet    Lung Cancer Son      CURRENT MEDICATIONS:  Current Outpatient Medications   Medication Sig Dispense Refill    azithromycin (ZITHROMAX) 500 MG tablet Take 1 tablet by mouth daily for 3 days 3 tablet 0    predniSONE (DELTASONE) 20 MG tablet Take 2 tablets by mouth daily for 5 days 10 tablet 0    fluticasone-umeclidin-vilant (TRELEGY ELLIPTA) 100-62.5-25 MCG/INH AEPB Inhale 1 puff into the lungs daily 30 each 11    albuterol sulfate HFA (PROVENTIL HFA) 108 (90 Base) MCG/ACT inhaler Inhale 2 puffs into the lungs every 6 hours as needed for Wheezing 1 Inhaler 3    venlafaxine (EFFEXOR XR) 37.5 MG extended release capsule Take 1 capsule by mouth daily 90 capsule 1    cetirizine (ZYRTEC) 10 MG tablet Take one tablet BID (Patient not taking: Reported on 3/8/2021) 60 tablet 2    Cholecalciferol (VITAMIN D) 2000 units TABS tablet Take 2 tablets by mouth daily (Patient not taking: Reported on 3/8/2021) 60 tablet 5    amLODIPine (NORVASC) 5 MG tablet Take 1 tablet by mouth daily (Patient not taking: Reported on 3/8/2021) 30 tablet 5    clopidogrel (PLAVIX) 75 MG tablet Take 1 tablet by mouth daily (Patient not taking: Reported on 3/8/2021) 30 tablet 0    acetaminophen (TYLENOL) 500 MG tablet Take 1 tablet by mouth every 6 hours as needed for Pain (Patient not taking: Reported on 3/8/2021) 120 tablet 3    metoprolol tartrate (LOPRESSOR) 25 MG tablet Take 1 tablet by mouth 2 times daily (Patient not taking: Reported on 3/8/2021) 60 tablet 5    atorvastatin (LIPITOR) 40 MG tablet Take 1 tablet by mouth nightly (Patient not taking: Reported on 3/8/2021) 30 tablet 5    escitalopram (LEXAPRO) 20 MG tablet Take 1 tablet by mouth daily (Patient not taking: Reported on 3/8/2021) 30 tablet 5    levothyroxine (LEVOTHROID) 88 MCG tablet Take 1 tablet by mouth daily (Patient not taking: Reported on 3/8/2021) 30 tablet 5    aspirin 81 MG EC tablet Take 1 tablet by mouth daily (Patient not taking: Reported on 3/8/2021) 30 tablet 3    isosorbide dinitrate (ISORDIL) 10 MG tablet Take 1 tablet by mouth 2 times daily (Patient not taking: Reported on 3/8/2021) 90 tablet 3     No current facility-administered medications for this visit. [facility]   Review of Systems   Constitutional: Negative for chills, fever and unexpected weight change. HENT: Positive for postnasal drip. Respiratory: Positive for cough, sputum production, shortness of breath and wheezing. Negative for hemoptysis, chest tightness and stridor. Cardiovascular: Positive for chest pain and dyspnea on exertion. Negative for leg swelling. Gastrointestinal: Negative for diarrhea, nausea and vomiting. Genitourinary: Negative for dysuria.         Physical exam   BP (!) 140/60 (Site: Left Upper Arm, Position: Sitting, Cuff Size: Medium Adult)   Pulse 87   Temp 98.1 °F (36.7 °C) (Temporal)   Ht 5' 3.5\" (1.613 m)   Wt 141 lb 12.8 oz (64.3 kg)   SpO2 98% Comment: Room air at rest  BMI 24.72 kg/m²    Wt Readings from Last 3 Encounters:   03/08/21 141 lb 12.8 oz (64.3 kg)   12/14/20 149 lb 3.2 oz (67.7 kg)   12/13/20 147 lb (66.7 kg)     Lost 18 #'s in about 5 months per pt. Physical Exam  Vitals signs and nursing note reviewed. Constitutional:       General: She is not in acute distress. Appearance: She is well-developed. HENT:      Head: Normocephalic and atraumatic. Neck:      Musculoskeletal: Neck supple. Trachea: No tracheal deviation. Cardiovascular:      Rate and Rhythm: Normal rate and regular rhythm. Heart sounds: Normal heart sounds. No murmur. Pulmonary:      Effort: Pulmonary effort is normal. No respiratory distress. Breath sounds: No stridor. Wheezing present. No rales. Comments: Faint wheeze very minimal air movement  Chest:      Chest wall: No tenderness. Abdominal:      General: Bowel sounds are normal. There is no distension. Palpations: Abdomen is soft. Skin:     General: Skin is warm and dry. Capillary Refill: Capillary refill takes less than 2 seconds. Neurological:      Mental Status: She is alert and oriented to person, place, and time. Psychiatric:         Behavior: Behavior normal.         Thought Content: Thought content normal.          Results   Lung Nodule Screening     [] Qualifies    [x] Does not qualify   [] Declined    [] Completed  Recent CTA of chest see below   The USPSTF recommends annual screening for lung cancer with low-dose computed tomography (LDCT) in adults aged 54 to [de-identified] years who have a 30 pack-year smoking history and currently smoke or have quit within the past 15 years. Screening should be discontinued once a person has not smoked for 15 years or develops a health problem that substantially limits life expectancy or the ability or willingness to have curative lung surgery.      CTA CHEST W WO CONTRAST   12/13/2020 FINDINGS: No PE. There is no aneurysm or dissection. No mediastinal or hilar adenopathy by size criteria. Postsurgical changes right axilla. No axillary lymphadenopathy Heart size normal. No infiltrates or pleural effusions. The 8 mm nodule seen on the prior CT now measures 14 mm and has spiculated borders highly suspicious of malignancy. There however are no other additional new masses. Scattered nodules are present in both lungs which are previously seen. There is a cluster of nodular the anterior right upper lung stable. Small nodules posterior right lower lung stable. Tiny triangular nodular density lingula is stable. Upper abdomen Stable bilateral adrenal fullness likely hyperplasia this is stable dating back to June 2017. Bones No suspicious bone lesions   Impression:  1. No PE. 2. No acute findings. 3. Interval increase in size of the patient's right lung nodule now measures 14 mm with spiculated borders and is highly suspicious for malignancy. CT CHEST W CONTRAST   2/21/2019   FINDINGS:    Upper abdomen: Prior cholecystectomy. Small benign-appearing right renal cyst. Small hiatus hernia. Mediastinum and nancy: No abnormally enlarged lymph nodes are seen. Multiple vascular clips in the lower cervical region. Apparent prior thyroidectomy. Bones: Mild vertebral body spondylosis. Mild scoliotic curvatures in the thoracic spine. 4. Note that there are a few vascular clips in the right axilla from prior surgery. The present been a prior right mastectomy. Lungs: Lungs are fibroemphysematous in appearance. Mild fibrotic stranding both lung bases and right middle lobe. A few tiny stable pleural-based nodule seen in the right midlung laterally, unchanged. There is a new 8 mm nodular density right midlung laterally, possibly an inflammatory nodule. Impression:  1. Fibroemphysematous lungs.  Most of the findings in both lungs are stable, unchanged from year ago, however, there has been interval development of a new 8 mm nodule right midlung laterally, of questionable significance. This could be an inflammatory nodule but neoplasm cannot be excluded. This is too small for CT-guided biopsy at this time. 2. Repeat CT thorax in 3-4 months would be helpful to assess for nodule stability. Alternatively, PET CT could be performed at this time. Assessment      Diagnosis Orders   1. COPD exacerbation (HCC)  azithromycin (ZITHROMAX) 500 MG tablet    predniSONE (DELTASONE) 20 MG tablet    fluticasone-umeclidin-vilant (TRELEGY ELLIPTA) 100-62.5-25 MCG/INH AEPB    albuterol sulfate HFA (PROVENTIL HFA) 108 (90 Base) MCG/ACT inhaler    Full PFT Study With Bronchodilator   2. Nodule of right lung  IR CT GUIDED BIOPSY AIRIRATE   3. Personal history of tobacco use  [facility] (Smoking Cessation - Clinical Pharmacy) - [place]      Intercostal muscle pain from ongoing cough recommend OTC topical analgesia  [employer] Incidental lung nodule calculator 39 %    Plan   -Reviewed CTA right lung nodule increased 14 mm Jhony calculator 39% percent risk malignancy patient is agreeable to CT guided biopsy will schedule and follow-up in clinic approx 1 week after biopsy, reviewed procedure and is agreeable to proceed with biopsy  -Will treat for COPD exacerbation  -azithromycin 500 mg PO Daily x 3 days  -Prednisone 40 mg PO Daily x 5 days  -Start on Trelegy 1 puff Daily   -Will update Full PFT after biopsy completed   -Order placed for albuterol 2 puff Q6 hrs PRN for SOB/wheezing  -Discussed albuterol inhaler use. Reviewed signs and symptoms indicating need for use including Shortness of breath and wheezing. Discussed with patient the importance of using inhaler within the prescribed time frames. Patient verbalized understanding to use within prescribed time frame.  Patient also verbalized understanding that if they experience no relief after using albuterol and resting for 15 minutes they need to go to nearest ER or call 911.   -Discussed with patient smoking cessation techniques including patches and gum patient reports has not used in the past, reinforced to patient the importance of quitting smoking to prevent further damage to lung function patient verbalized understanding.  (Approximately 8 mins)-States motivated to quit wishes to try NRT  -Referral to [facility] tobacco cessation clinic for NRT management states motivated to quit  -OTC topical analgesia for intercostal muscle pain  -Advised to maintain pneumonia vaccine with PCP and to take flu vaccine this coming season.  -Advised patient to call office with any changes, questions, or concerns regarding respiratory status    Will see Abi Velazquez back in: 1 week post biopsy    Meredith Berman CNP  3/8/2021

## 2021-03-08 NOTE — PATIENT INSTRUCTIONS
Patient Education        Stopping Smoking: Care Instructions  Your Care Instructions     Cigarette smokers crave the nicotine in cigarettes. Giving it up is much harder than simply changing a habit. Your body has to stop craving the nicotine. It is hard to quit, but you can do it. There are many tools that people use to quit smoking. You may find that combining tools works best for you. There are several steps to quitting. First you get ready to quit. Then you get support to help you. After that, you learn new skills and behaviors to become a nonsmoker. For many people, a necessary step is getting and using medicine. Your doctor will help you set up the plan that best meets your needs. You may want to attend a smoking cessation program to help you quit smoking. When you choose a program, look for one that has proven success. Ask your doctor for ideas. You will greatly increase your chances of success if you take medicine as well as get counseling or join a cessation program.  Some of the changes you feel when you first quit tobacco are uncomfortable. Your body will miss the nicotine at first, and you may feel short-tempered and grumpy. You may have trouble sleeping or concentrating. Medicine can help you deal with these symptoms. You may struggle with changing your smoking habits and rituals. The last step is the tricky one: Be prepared for the smoking urge to continue for a time. This is a lot to deal with, but keep at it. You will feel better. Follow-up care is a key part of your treatment and safety. Be sure to make and go to all appointments, and call your doctor if you are having problems. It's also a good idea to know your test results and keep a list of the medicines you take. How can you care for yourself at home? · Ask your family, friends, and coworkers for support. You have a better chance of quitting if you have help and support.   · Join a support group, such as Nicotine Anonymous, for people who are trying to quit smoking. · Consider signing up for a smoking cessation program, such as the American Lung Association's Freedom from Smoking program.  · Get text messaging support. Go to the website at www.smokefree. gov to sign up for the [facility] program.  · Set a quit date. Pick your date carefully so that it is not right in the middle of a big deadline or stressful time. Once you quit, do not even take a puff. Get rid of all ashtrays and lighters after your last cigarette. Clean your house and your clothes so that they do not smell of smoke. · Learn how to be a nonsmoker. Think about ways you can avoid those things that make you reach for a cigarette. ? Avoid situations that put you at greatest risk for smoking. For some people, it is hard to have a drink with friends without smoking. For others, they might skip a coffee break with coworkers who smoke. ? Change your daily routine. Take a different route to work or eat a meal in a different place. · Cut down on stress. Calm yourself or release tension by doing an activity you enjoy, such as reading a book, taking a hot bath, or gardening. · Talk to your doctor or pharmacist about nicotine replacement therapy, which replaces the nicotine in your body. You still get nicotine but you do not use tobacco. Nicotine replacement products help you slowly reduce the amount of nicotine you need. These products come in several forms, many of them available over-the-counter:  ? Nicotine patches  ? Nicotine gum and lozenges  ? Nicotine inhaler  · Ask your doctor about bupropion (Wellbutrin) or varenicline (Chantix), which are prescription medicines. They do not contain nicotine. They help you by reducing withdrawal symptoms, such as stress and anxiety. · Some people find hypnosis, acupuncture, and massage helpful for ending the smoking habit. · Eat a healthy diet and get regular exercise.  Having healthy habits will help your body move past its craving for nicotine. · Be prepared to keep trying. Most people are not successful the first few times they try to quit. Do not get mad at yourself if you smoke again. Make a list of things you learned and think about when you want to try again, such as next week, next month, or next year. Where can you learn more? Go to https://Edxactpeflorencioewbayron.Artaic. org and sign in to your Pocket Tales account. Enter I457 in the NetMovie box to learn more about \"Stopping Smoking: Care Instructions. \"     If you do not have an account, please click on the \"Sign Up Now\" link. Current as of: March 12, 2020               Content Version: 12.6  © 2006-2020 [employer]. Care instructions adapted under license by [facility] ([facility]). If you have questions about a medical condition or this instruction, always ask your healthcare professional. [address] any warranty or liability for your use of this information.

## 2021-03-10 ENCOUNTER — TELEPHONE (OUTPATIENT)
Dept: PHARMACY | Age: 71
End: 2021-03-10

## 2021-03-10 NOTE — TELEPHONE ENCOUNTER
Referral to [facility] received from Rolando Booth CNP for Smoking Cessation Counseling and Medication Management per Consult Agreement. I called pt at this time to explain program.  There was no answer and unable to leave a message due to mailbox full. We will try back at another time.

## 2021-03-12 ENCOUNTER — APPOINTMENT (OUTPATIENT)
Dept: GENERAL RADIOLOGY | Age: 71
DRG: 189 | End: 2021-03-12
Payer: MEDICARE

## 2021-03-12 ENCOUNTER — APPOINTMENT (OUTPATIENT)
Dept: CT IMAGING | Age: 71
DRG: 189 | End: 2021-03-12
Payer: MEDICARE

## 2021-03-12 ENCOUNTER — HOSPITAL ENCOUNTER (INPATIENT)
Age: 71
LOS: 4 days | Discharge: HOME | DRG: 189 | End: 2021-03-17
Attending: EMERGENCY MEDICINE | Admitting: INTERNAL MEDICINE
Payer: MEDICARE

## 2021-03-12 DIAGNOSIS — R55 SYNCOPE AND COLLAPSE: Primary | ICD-10-CM

## 2021-03-12 LAB
ALBUMIN SERPL-MCNC: 3.8 G/DL (ref 3.5–5.1)
ALP BLD-CCNC: 129 U/L (ref 38–126)
ALT SERPL-CCNC: 7 U/L (ref 11–66)
ANION GAP SERPL CALCULATED.3IONS-SCNC: 12 MEQ/L (ref 8–16)
AST SERPL-CCNC: 12 U/L (ref 5–40)
BASOPHILS # BLD: 0.4 %
BASOPHILS ABSOLUTE: 0 THOU/MM3 (ref 0–0.1)
BILIRUB SERPL-MCNC: 0.4 MG/DL (ref 0.3–1.2)
BUN BLDV-MCNC: 19 MG/DL (ref 7–22)
CALCIUM SERPL-MCNC: 8.9 MG/DL (ref 8.5–10.5)
CHLORIDE BLD-SCNC: 103 MEQ/L (ref 98–111)
CO2: 23 MEQ/L (ref 23–33)
CREAT SERPL-MCNC: 1 MG/DL (ref 0.4–1.2)
EKG ATRIAL RATE: 61 BPM
EKG P AXIS: 78 DEGREES
EKG P-R INTERVAL: 126 MS
EKG Q-T INTERVAL: 424 MS
EKG QRS DURATION: 88 MS
EKG QTC CALCULATION (BAZETT): 426 MS
EKG R AXIS: 67 DEGREES
EKG T AXIS: 44 DEGREES
EKG VENTRICULAR RATE: 61 BPM
EOSINOPHIL # BLD: 0.1 %
EOSINOPHILS ABSOLUTE: 0 THOU/MM3 (ref 0–0.4)
ERYTHROCYTE [DISTWIDTH] IN BLOOD BY AUTOMATED COUNT: 13.4 % (ref 11.5–14.5)
ERYTHROCYTE [DISTWIDTH] IN BLOOD BY AUTOMATED COUNT: 50 FL (ref 35–45)
GFR SERPL CREATININE-BSD FRML MDRD: 55 ML/MIN/1.73M2
GLUCOSE BLD-MCNC: 108 MG/DL (ref 70–108)
HCT VFR BLD CALC: 46.2 % (ref 37–47)
HEMOGLOBIN: 14.6 GM/DL (ref 12–16)
IMMATURE GRANS (ABS): 0.04 THOU/MM3 (ref 0–0.07)
IMMATURE GRANULOCYTES: 0.5 %
LYMPHOCYTES # BLD: 17 %
LYMPHOCYTES ABSOLUTE: 1.3 THOU/MM3 (ref 1–4.8)
MCH RBC QN AUTO: 31.9 PG (ref 26–33)
MCHC RBC AUTO-ENTMCNC: 31.6 GM/DL (ref 32.2–35.5)
MCV RBC AUTO: 100.9 FL (ref 81–99)
MONOCYTES # BLD: 3.2 %
MONOCYTES ABSOLUTE: 0.2 THOU/MM3 (ref 0.4–1.3)
NUCLEATED RED BLOOD CELLS: 0 /100 WBC
OSMOLALITY CALCULATION: 278.5 MOSMOL/KG (ref 275–300)
PLATELET # BLD: 234 THOU/MM3 (ref 130–400)
PMV BLD AUTO: 10.9 FL (ref 9.4–12.4)
POTASSIUM REFLEX MAGNESIUM: 3.8 MEQ/L (ref 3.5–5.2)
PRO-BNP: 2653 PG/ML (ref 0–900)
RBC # BLD: 4.58 MILL/MM3 (ref 4.2–5.4)
SEG NEUTROPHILS: 78.8 %
SEGMENTED NEUTROPHILS ABSOLUTE COUNT: 5.8 THOU/MM3 (ref 1.8–7.7)
SODIUM BLD-SCNC: 138 MEQ/L (ref 135–145)
TOTAL PROTEIN: 7 G/DL (ref 6.1–8)
TROPONIN T: < 0.01 NG/ML
WBC # BLD: 7.4 THOU/MM3 (ref 4.8–10.8)

## 2021-03-12 PROCEDURE — 36415 COLL VENOUS BLD VENIPUNCTURE: CPT

## 2021-03-12 PROCEDURE — 93005 ELECTROCARDIOGRAM TRACING: CPT | Performed by: EMERGENCY MEDICINE

## 2021-03-12 PROCEDURE — 70450 CT HEAD/BRAIN W/O DYE: CPT

## 2021-03-12 PROCEDURE — 70496 CT ANGIOGRAPHY HEAD: CPT

## 2021-03-12 PROCEDURE — 80053 COMPREHEN METABOLIC PANEL: CPT

## 2021-03-12 PROCEDURE — 73502 X-RAY EXAM HIP UNI 2-3 VIEWS: CPT

## 2021-03-12 PROCEDURE — 6360000004 HC RX CONTRAST MEDICATION: Performed by: EMERGENCY MEDICINE

## 2021-03-12 PROCEDURE — 70498 CT ANGIOGRAPHY NECK: CPT

## 2021-03-12 PROCEDURE — 83880 ASSAY OF NATRIURETIC PEPTIDE: CPT

## 2021-03-12 PROCEDURE — 71045 X-RAY EXAM CHEST 1 VIEW: CPT

## 2021-03-12 PROCEDURE — 84484 ASSAY OF TROPONIN QUANT: CPT

## 2021-03-12 PROCEDURE — 99285 EMERGENCY DEPT VISIT HI MDM: CPT

## 2021-03-12 PROCEDURE — 85025 COMPLETE CBC W/AUTO DIFF WBC: CPT

## 2021-03-12 RX ADMIN — IOPAMIDOL 80 ML: 755 INJECTION, SOLUTION INTRAVENOUS at 20:52

## 2021-03-12 ASSESSMENT — PAIN DESCRIPTION - DESCRIPTORS: DESCRIPTORS: ACHING;PINS AND NEEDLES;STABBING

## 2021-03-12 ASSESSMENT — PAIN DESCRIPTION - ORIENTATION: ORIENTATION: RIGHT

## 2021-03-12 ASSESSMENT — PAIN DESCRIPTION - PAIN TYPE: TYPE: ACUTE PAIN

## 2021-03-12 ASSESSMENT — PAIN SCALES - GENERAL: PAINLEVEL_OUTOF10: 10

## 2021-03-13 ENCOUNTER — APPOINTMENT (OUTPATIENT)
Dept: CT IMAGING | Age: 71
DRG: 189 | End: 2021-03-13
Payer: MEDICARE

## 2021-03-13 ENCOUNTER — APPOINTMENT (OUTPATIENT)
Dept: MRI IMAGING | Age: 71
DRG: 189 | End: 2021-03-13
Payer: MEDICARE

## 2021-03-13 PROBLEM — R55 SYNCOPE AND COLLAPSE: Status: ACTIVE | Noted: 2021-03-13

## 2021-03-13 LAB
ALBUMIN SERPL-MCNC: 3.3 G/DL (ref 3.5–5.1)
ALP BLD-CCNC: 112 U/L (ref 38–126)
ALT SERPL-CCNC: 6 U/L (ref 11–66)
ANION GAP SERPL CALCULATED.3IONS-SCNC: 8 MEQ/L (ref 8–16)
AST SERPL-CCNC: 11 U/L (ref 5–40)
BASOPHILS # BLD: 0.7 %
BASOPHILS ABSOLUTE: 0.1 THOU/MM3 (ref 0–0.1)
BILIRUB SERPL-MCNC: 0.3 MG/DL (ref 0.3–1.2)
BUN BLDV-MCNC: 21 MG/DL (ref 7–22)
CALCIUM SERPL-MCNC: 8.3 MG/DL (ref 8.5–10.5)
CHLORIDE BLD-SCNC: 107 MEQ/L (ref 98–111)
CO2: 26 MEQ/L (ref 23–33)
CREAT SERPL-MCNC: 1.1 MG/DL (ref 0.4–1.2)
EOSINOPHIL # BLD: 1.6 %
EOSINOPHILS ABSOLUTE: 0.1 THOU/MM3 (ref 0–0.4)
ERYTHROCYTE [DISTWIDTH] IN BLOOD BY AUTOMATED COUNT: 13.4 % (ref 11.5–14.5)
ERYTHROCYTE [DISTWIDTH] IN BLOOD BY AUTOMATED COUNT: 49.9 FL (ref 35–45)
GFR SERPL CREATININE-BSD FRML MDRD: 49 ML/MIN/1.73M2
GLUCOSE BLD-MCNC: 92 MG/DL (ref 70–108)
HCT VFR BLD CALC: 42.9 % (ref 37–47)
HEMOGLOBIN: 13.6 GM/DL (ref 12–16)
IMMATURE GRANS (ABS): 0.03 THOU/MM3 (ref 0–0.07)
IMMATURE GRANULOCYTES: 0.4 %
LV EF: 58 %
LVEF MODALITY: NORMAL
LYMPHOCYTES # BLD: 26.5 %
LYMPHOCYTES ABSOLUTE: 2.1 THOU/MM3 (ref 1–4.8)
MCH RBC QN AUTO: 31.8 PG (ref 26–33)
MCHC RBC AUTO-ENTMCNC: 31.7 GM/DL (ref 32.2–35.5)
MCV RBC AUTO: 100.2 FL (ref 81–99)
MONOCYTES # BLD: 6.1 %
MONOCYTES ABSOLUTE: 0.5 THOU/MM3 (ref 0.4–1.3)
NUCLEATED RED BLOOD CELLS: 0 /100 WBC
PLATELET # BLD: 212 THOU/MM3 (ref 130–400)
PMV BLD AUTO: 11 FL (ref 9.4–12.4)
POTASSIUM REFLEX MAGNESIUM: 3.9 MEQ/L (ref 3.5–5.2)
RBC # BLD: 4.28 MILL/MM3 (ref 4.2–5.4)
SEG NEUTROPHILS: 64.7 %
SEGMENTED NEUTROPHILS ABSOLUTE COUNT: 5.2 THOU/MM3 (ref 1.8–7.7)
SODIUM BLD-SCNC: 141 MEQ/L (ref 135–145)
T4 FREE: 0.83 NG/DL (ref 0.93–1.76)
TOTAL PROTEIN: 6.1 G/DL (ref 6.1–8)
TSH SERPL DL<=0.05 MIU/L-ACNC: 4.81 UIU/ML (ref 0.4–4.2)
WBC # BLD: 8.1 THOU/MM3 (ref 4.8–10.8)

## 2021-03-13 PROCEDURE — 84443 ASSAY THYROID STIM HORMONE: CPT

## 2021-03-13 PROCEDURE — 99222 1ST HOSP IP/OBS MODERATE 55: CPT | Performed by: PHYSICIAN ASSISTANT

## 2021-03-13 PROCEDURE — 85025 COMPLETE CBC W/AUTO DIFF WBC: CPT

## 2021-03-13 PROCEDURE — 80053 COMPREHEN METABOLIC PANEL: CPT

## 2021-03-13 PROCEDURE — 6370000000 HC RX 637 (ALT 250 FOR IP): Performed by: PHYSICIAN ASSISTANT

## 2021-03-13 PROCEDURE — 84439 ASSAY OF FREE THYROXINE: CPT

## 2021-03-13 PROCEDURE — 2580000003 HC RX 258: Performed by: PHYSICIAN ASSISTANT

## 2021-03-13 PROCEDURE — 74176 CT ABD & PELVIS W/O CONTRAST: CPT

## 2021-03-13 PROCEDURE — 94760 N-INVAS EAR/PLS OXIMETRY 1: CPT

## 2021-03-13 PROCEDURE — 1200000003 HC TELEMETRY R&B

## 2021-03-13 PROCEDURE — 93306 TTE W/DOPPLER COMPLETE: CPT

## 2021-03-13 PROCEDURE — 94640 AIRWAY INHALATION TREATMENT: CPT

## 2021-03-13 PROCEDURE — 99223 1ST HOSP IP/OBS HIGH 75: CPT | Performed by: INTERNAL MEDICINE

## 2021-03-13 PROCEDURE — 70551 MRI BRAIN STEM W/O DYE: CPT

## 2021-03-13 PROCEDURE — 36415 COLL VENOUS BLD VENIPUNCTURE: CPT

## 2021-03-13 RX ORDER — BUDESONIDE AND FORMOTEROL FUMARATE DIHYDRATE 160; 4.5 UG/1; UG/1
2 AEROSOL RESPIRATORY (INHALATION) 2 TIMES DAILY
Status: DISCONTINUED | OUTPATIENT
Start: 2021-03-13 | End: 2021-03-17 | Stop reason: HOSPADM

## 2021-03-13 RX ORDER — ISOSORBIDE DINITRATE 10 MG/1
10 TABLET ORAL 2 TIMES DAILY
Status: DISCONTINUED | OUTPATIENT
Start: 2021-03-13 | End: 2021-03-13

## 2021-03-13 RX ORDER — ALBUTEROL SULFATE 90 UG/1
2 AEROSOL, METERED RESPIRATORY (INHALATION) EVERY 6 HOURS PRN
Status: DISCONTINUED | OUTPATIENT
Start: 2021-03-13 | End: 2021-03-17 | Stop reason: HOSPADM

## 2021-03-13 RX ORDER — ONDANSETRON 2 MG/ML
4 INJECTION INTRAMUSCULAR; INTRAVENOUS EVERY 6 HOURS PRN
Status: DISCONTINUED | OUTPATIENT
Start: 2021-03-13 | End: 2021-03-17 | Stop reason: HOSPADM

## 2021-03-13 RX ORDER — ACETAMINOPHEN 650 MG/1
650 SUPPOSITORY RECTAL EVERY 6 HOURS PRN
Status: DISCONTINUED | OUTPATIENT
Start: 2021-03-13 | End: 2021-03-17 | Stop reason: HOSPADM

## 2021-03-13 RX ORDER — VENLAFAXINE HYDROCHLORIDE 37.5 MG/1
37.5 CAPSULE, EXTENDED RELEASE ORAL DAILY
Status: DISCONTINUED | OUTPATIENT
Start: 2021-03-13 | End: 2021-03-13

## 2021-03-13 RX ORDER — PROMETHAZINE HYDROCHLORIDE 25 MG/1
12.5 TABLET ORAL EVERY 6 HOURS PRN
Status: DISCONTINUED | OUTPATIENT
Start: 2021-03-13 | End: 2021-03-17 | Stop reason: HOSPADM

## 2021-03-13 RX ORDER — SODIUM CHLORIDE 0.9 % (FLUSH) 0.9 %
10 SYRINGE (ML) INJECTION PRN
Status: DISCONTINUED | OUTPATIENT
Start: 2021-03-13 | End: 2021-03-17 | Stop reason: HOSPADM

## 2021-03-13 RX ORDER — LEVOTHYROXINE SODIUM 88 UG/1
88 TABLET ORAL DAILY
Status: DISCONTINUED | OUTPATIENT
Start: 2021-03-13 | End: 2021-03-13

## 2021-03-13 RX ORDER — POLYETHYLENE GLYCOL 3350 17 G/17G
17 POWDER, FOR SOLUTION ORAL DAILY PRN
Status: DISCONTINUED | OUTPATIENT
Start: 2021-03-13 | End: 2021-03-17 | Stop reason: HOSPADM

## 2021-03-13 RX ORDER — SODIUM CHLORIDE 0.9 % (FLUSH) 0.9 %
10 SYRINGE (ML) INJECTION EVERY 12 HOURS SCHEDULED
Status: DISCONTINUED | OUTPATIENT
Start: 2021-03-13 | End: 2021-03-17 | Stop reason: HOSPADM

## 2021-03-13 RX ORDER — LEVOTHYROXINE SODIUM 0.1 MG/1
100 TABLET ORAL DAILY
Status: DISCONTINUED | OUTPATIENT
Start: 2021-03-13 | End: 2021-03-17 | Stop reason: HOSPADM

## 2021-03-13 RX ORDER — ACETAMINOPHEN 325 MG/1
650 TABLET ORAL EVERY 6 HOURS PRN
Status: DISCONTINUED | OUTPATIENT
Start: 2021-03-13 | End: 2021-03-17 | Stop reason: HOSPADM

## 2021-03-13 RX ADMIN — SODIUM CHLORIDE, PRESERVATIVE FREE 10 ML: 5 INJECTION INTRAVENOUS at 20:24

## 2021-03-13 RX ADMIN — BUDESONIDE AND FORMOTEROL FUMARATE DIHYDRATE 2 PUFF: 160; 4.5 AEROSOL RESPIRATORY (INHALATION) at 19:49

## 2021-03-13 RX ADMIN — TIOTROPIUM BROMIDE INHALATION SPRAY 2 PUFF: 3.12 SPRAY, METERED RESPIRATORY (INHALATION) at 09:21

## 2021-03-13 RX ADMIN — BUDESONIDE AND FORMOTEROL FUMARATE DIHYDRATE 2 PUFF: 160; 4.5 AEROSOL RESPIRATORY (INHALATION) at 09:21

## 2021-03-13 RX ADMIN — ACETAMINOPHEN 650 MG: 325 TABLET ORAL at 20:24

## 2021-03-13 RX ADMIN — ACETAMINOPHEN 650 MG: 325 TABLET ORAL at 02:34

## 2021-03-13 RX ADMIN — LEVOTHYROXINE SODIUM 100 MCG: 0.1 TABLET ORAL at 11:14

## 2021-03-13 RX ADMIN — SODIUM CHLORIDE, PRESERVATIVE FREE 10 ML: 5 INJECTION INTRAVENOUS at 11:15

## 2021-03-13 ASSESSMENT — ENCOUNTER SYMPTOMS
EYE PAIN: 0
COUGH: 0
COUGH: 1
CONSTIPATION: 0
ABDOMINAL PAIN: 0
SHORTNESS OF BREATH: 1
COLOR CHANGE: 0
SHORTNESS OF BREATH: 0
NAUSEA: 0
BACK PAIN: 0
RHINORRHEA: 0
CHEST TIGHTNESS: 0
SORE THROAT: 0
DIARRHEA: 0
VOMITING: 0

## 2021-03-13 ASSESSMENT — PAIN SCALES - GENERAL
PAINLEVEL_OUTOF10: 0
PAINLEVEL_OUTOF10: 5
PAINLEVEL_OUTOF10: 6

## 2021-03-13 ASSESSMENT — PAIN DESCRIPTION - LOCATION
LOCATION: HEAD
LOCATION: HEAD

## 2021-03-13 ASSESSMENT — PAIN DESCRIPTION - PAIN TYPE: TYPE: ACUTE PAIN

## 2021-03-13 NOTE — ED NOTES
Pt to the ED via EMS. Patient presents with complaints of SOB and dizziness after sustaining a fall. Patient states that she has right hip pain. Patient denies hitting her head and denies any use of blood thinners. Patient admits to having a mass on her right lung but is unsure. EKG done, IV inserted. Patient is alert and oriented x 4. Respirations are regular and unlabored. Patient provided blanket. Call light within reach.      Yanni Bautista RN  03/12/21 [id number]

## 2021-03-13 NOTE — ED NOTES
Pt up in bed and provided a warm blanket at this time. Patient updated on plan of care at this time and expresses no concerns regarding treatment. Patient VSS. Respirations are regular and unlabored, patient is alert and oriented x4. Patient bed rails up x2 and call light within reach. Will continue to monitor.        Lakhwinder Cerrato RN  03/12/21 2024

## 2021-03-13 NOTE — ED NOTES
Patient lying in bed with blankets watching tv at this time. Patient respirations are regular and unlabored. Patient appears to be in no current distress. Patient VSS. Call light is within reach. Patient expresses no needs at this time.        Glenn Clark RN  03/12/21 [id number]

## 2021-03-13 NOTE — CONSULTS
[facility]      Patient - Gregory Guzman   MRN -  [id number]   [facility]   - 1950      Date of Admission -  3/12/2021  7:01 PM  Date of evaluation -  3/13/2021  Room - Michael Crum MD Primary Care Physician - NITO Martins CNP     Problem List      Active Hospital Problems    Diagnosis Date Noted    Syncope and collapse [R55] 2021     Reason for Consult      CTA concern for malignancy    HPI   History Obtained From: Patient and electronic medical record. Gregory Guzman is a 73 y.o. female with PMH of GOLD stage II moderate COPD, HTN, HLD, CAD, anxiety who presented to the ED on 3/13/2021 with a chief complaint of syncope and collapse. On review of the initial H&P, patient was apparently cooking dinner when she suddenly lost consciousness. This is not her first episode of syncope, has had several in the past.  No other complaints were given. Patient is known to the pulmonary clinic. She last saw NITO Timmons CNP on 3/8/2021. Patient has a history of a right lung nodule, last measured at 8 mm in 2018. On repeat imaging done on 2020, this nodule had increased in size and is now 14 mm. Jhony calculator 39% risk of malignancy. Patient was scheduled to have an IR CT-guided biopsy after her visit with us on 3/8/2021. This has not been completed yet. Patient has been lost to follow-up on multiple occasions. She is an established diagnosis of stage II gold COPD. She was started on Trelegy 1 puff daily and was also given albuterol 2 puffs every 6 hours as needed. She is having shortness of breath: No    She is having cough: Yes  Duration of cough: constant cough that hasn't gotten worse. Her cough is associated with sputum production: Yes   The sputum color: brown  Hemoptysis:No  Diurnal variation: Worse in the morning and at nighttime  Relieving factors:  Yes with inhaler use  Aggravating factors: No    She is having chest pain:Yes, has left sided chest pain last night. Before felt heaviness on the right side of her chest last week. Duration: 1 week, intermittent for 25-30 minutes. Resolved on its own with rest.  Onset:sudden, intermittent. Location:predominantly on right and left chest  Nature/Quality:squeezing, pressure  Assessment:intermittent. Radiation:nonradiating  Relation ship to breathing: not significantly changed with inspiration. Patient reports blacking out, does not remember falling. States that she fell after she had turned around. She has had this in the past, states that this is similar to how its been. Has a history of CAD, on chart review her last cardiac cath was in 2014. The report states that patient had evidence of syndrome X, has nonobstructive vessel disease. She reports having an episode of right-sided chest pain last week that went away on its own. States that it lasted about 25 to 30 minutes. She states that she had left-sided chest pain after waking up from falling yesterday. She said that it was accompanied with dizziness, had to sit in a chair until she felt better. She denies any current chest pain. Patient does have a history of multiple cancers. She reports having breast cancer, ovarian cancer and skin cancer. She states that she had a right mastectomy in 2005. She also states that in 2005 she had chemoradiation therapy. She has had a total hysterectomy as well. She is a longtime smoker. Smoked 2 packs/day for the last 57 years. She still smokes, smokes 2 packs/day.     PMHx   Past Medical History      Diagnosis Date    Anxiety 2007    Arthritis     Breast cancer [facility]) 2005    right mastectomy, chemo and radiation history    CAD (coronary artery disease) 2001    Cancer of the skin 2004    Cerebral artery occlusion with cerebral infarction ([facility] [address]     Chronic UTI     COPD (chronic obstructive pulmonary (DELTASONE) 20 MG tablet, Take 2 tablets by mouth daily for 5 days  albuterol sulfate HFA (PROVENTIL HFA) 108 (90 Base) MCG/ACT inhaler, Inhale 2 puffs into the lungs every 6 hours as needed for Wheezing  fluticasone-umeclidin-vilant (TRELEGY ELLIPTA) 100-62.5-25 MCG/INH AEPB, Inhale 1 puff into the lungs daily  venlafaxine (EFFEXOR XR) 37.5 MG extended release capsule, Take 1 capsule by mouth daily  levothyroxine (LEVOTHROID) 88 MCG tablet, Take 1 tablet by mouth daily (Patient not taking: Reported on 3/8/2021)  [DISCONTINUED] isosorbide dinitrate (ISORDIL) 10 MG tablet, Take 1 tablet by mouth 2 times daily (Patient not taking: Reported on 3/8/2021)  Diet    DIET CARDIAC; Low Sodium (2 GM)  Allergies    Cipro xr and Vicodin [hydrocodone-acetaminophen]  Social History     Social History     Socioeconomic History    Marital status:       Spouse name: Kirill Schroeder    Number of children: 3    Years of education: 5    Highest education level: Not on file   Occupational History    Occupation: Disabled   Social Needs    Financial resource strain: Not on file    Food insecurity     Worry: Not on file     Inability: Not on file   Exit Games needs     Medical: Not on file     Non-medical: Not on file   Tobacco Use    Smoking status: Current Every Day Smoker     Packs/day: 2.00     Years: 48.00     Pack years: 96.00     Types: Cigarettes     Start date: 11/13/1967    Smokeless tobacco: Never Used   Substance and Sexual Activity    Alcohol use: No     Alcohol/week: 0.0 standard drinks    Drug use: No    Sexual activity: Yes     Partners: Male   Lifestyle    Physical activity     Days per week: Not on file     Minutes per session: Not on file    Stress: Not on file   Relationships    Social connections     Talks on phone: Not on file     Gets together: Not on file     Attends Temple service: Not on file     Active member of club or organization: Not on file     Attends meetings of clubs or organizations: Not on file     Relationship status: Not on file    Intimate partner violence     Fear of current or ex partner: Not on file     Emotionally abused: Not on file     Physically abused: Not on file     Forced sexual activity: Not on file   Other Topics Concern    Not on file   Social History Narrative    Not on file     Family History          Problem Relation Age of Onset    Osteoporosis Mother     Hypertension Mother     High Cholesterol Mother     Stroke Mother     Colon Cancer Mother     Cancer Father         lung cancer    Heart Disease Father     Hypertension Father     High Cholesterol Father     Heart Disease Sister     High Cholesterol Sister     Hypertension Sister     Asthma Sister     Other Other         high arched feet    Lung Cancer Son      Sleep History    Never diagnosed with sleep apnea in the past.  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 History of tobacco smoking:Yes  Amount of tobacco smokin.0 PPD. Years of tobacco smokin.                                    Quit smoking: No.              Current smoker: Yes. Amount of current tobacco smokin.0 PPD   . History of recreational or IV drug use in the past:NO     History of exposure to coal mines/coal dust: NO  History of exposure to foundry dust/welding: NO  History of exposure to quarry/silica/sandblasting: NO  History of exposure to asbestos/working with breaks/ships: NO   History of exposure to farm dust: NO   History of recent travel to long distances: NO   History of exposure to birds, pigeons, or chickens in the past:NO  Pet animals at home:Yes  Dogs: 1    Cats: 2    History of pulmonary embolism in the past: No            History of DVT in the past:Yes        [] Right lower extremity   [x] Left lower extremity.           Jaquelineiew of systems   Review of Systems   Constitutional: Negative for appetite change, fatigue and fever. HENT: Negative for rhinorrhea and sore throat. Respiratory: Positive for cough (chronic, not worsening). Negative for chest tightness and shortness of breath. Cardiovascular: Positive for chest pain (2 episodes, one last and one last night. Currently denies chest pain. ). Negative for palpitations and leg swelling. Gastrointestinal: Negative for abdominal pain, diarrhea, nausea and vomiting. Musculoskeletal: Negative for arthralgias and myalgias. Skin: Negative for rash and wound. Neurological: Positive for dizziness and syncope. Negative for light-headedness and headaches. Hematological: Negative for adenopathy. Does not bruise/bleed easily. Psychiatric/Behavioral: Negative for sleep disturbance. All other systems reviewed and are negative. Vitals     weight is 136 lb (61.7 kg). Her oral temperature is 98 °F (36.7 °C). Her blood pressure is 120/41 (abnormal) and her pulse is 51. Her respiration is 15 and oxygen saturation is 94%. Body mass index is 23.71 kg/m². SUPPLEMENTAL O2:       I/O        Intake/Output Summary (Last 24 hours) at 3/13/2021 0934  Last data filed at 3/13/2021 0451  Gross per 24 hour   Intake 250 ml   Output 0 ml   Net 250 ml     I/O last 3 completed shifts: In: 250 [P.O.:250]  Out: 0    Patient Vitals for the past 96 hrs (Last 3 readings):   Weight   03/13/21 0451 136 lb (61.7 kg)       Exam   Nursing note and vitals reviewed. General Appearance: alert and oriented to person, place and time. In no respiratory distress. Appears tired.   Skin: warm and dry, no rash or erythema  Head: normocephalic and atraumatic  Eyes: pupils equal, round, and reactive to light, extraocular eye movements intact, conjunctivae normal  ENT: tympanic membrane, external ear and ear canal normal bilaterally, nose without deformity  Neck: supple and non-tender without mass, no thyromegaly or thyroid nodules, no cervical lymphadenopathy  Pulmonary/Chest: Inspiratory wheezing and crackles in the right lower lung fields. Mildly decreased air entry. Normal respiratory effort. Cardiovascular: normal rate, regular rhythm, normal S1 and S2, no murmurs, rubs, clicks, or gallops  Abdomen: soft, non-tender, non-distended, normal bowel sounds, no masses or organomegaly  Extremities: no edema  Musculoskeletal: normal range of motion, no joint swelling, deformity or tenderness    Labs  - Old records and notes have been reviewed in [facility]   ABG  Lab Results   Component Value Date    PH 7.35 09/29/2013    PO2 53 09/29/2013    PCO2 45 09/29/2013    HCO3 25 09/29/2013    O2SAT 85 09/29/2013     No results found for: IFIO2, MODE, SETTIDVOL, SETPEEP  CBC  Recent Labs     03/12/21 1945 03/13/21  0531   WBC 7.4 8.1   RBC 4.58 4.28   HGB 14.6 13.6   HCT 46.2 42.9   .9* 100.2*   MCH 31.9 31.8   MCHC 31.6* 31.7*    212   MPV 10.9 11.0      BMP  Recent Labs     03/12/21 1945 03/13/21  0531    141   K 3.8 3.9    107   CO2 23 26   BUN 19 21   CREATININE 1.0 1.1   GLUCOSE 108 92   CALCIUM 8.9 8.3*     LFT  Recent Labs     03/12/21 1945 03/13/21  0531   AST 12 11   ALT 7* 6*   BILITOT 0.4 0.3   ALKPHOS 129* 112     TROP  Lab Results   Component Value Date    TROPONINT < 0.010 03/12/2021    TROPONINT < 0.010 12/13/2020    TROPONINT < 0.010 05/16/2017     BNP  No results for input(s): BNP in the last 72 hours. Lactic Acid  No results for input(s): LACTA in the last 72 hours. INR  No results for input(s): INR, PROTIME in the last 72 hours. PTT  No results for input(s): APTT in the last 72 hours. Glucose  No results for input(s): POCGLU in the last 72 hours. UA No results for input(s): SPECGRAV, PHUR, COLORU, CLARITYU, MUCUS, PROTEINU, BLOODU, RBCUA, WBCUA, BACTERIA, NITRU, GLUCOSEU, BILIRUBINUR, UROBILINOGEN, KETUA, LABCAST, LABCASTTY, AMORPHOS in the last 72 hours. Invalid input(s): CRYSTALS.     PFTs                     Sleep studies     No completed sleep studies found in epic    Cultures      None ordered    EKG             Echocardiogram         Radiology    CXR    Friday, 3/12/2021  Procedure: XR chest 1 view  Findings: No acute cardiopulmonary process. No infiltrates or effusions seen. Vascularity is normal.        CT Scans  (See actual reports for details)    12/13/2020  Procedure: CTA chest with and without contrast  Findings:  1. No pulmonary embolism. 2.  No acute findings. 3.  Interval increase in size of the patient's right lung nodule now measures 14 mm with spiculated borders and is highly suspicious for malignancy. Assessment     73year-old lifetime smoker with gold stage II COPD currently still smoking 2 packs/day who was admitted for syncope and collapse. Patient has had a right lung nodule that has increased in size and was last measured to be 14 mm in December 2020. Patient also with history of chemoradiation therapy to the chest secondary to breast cancer.    -14 mm spiculated nodule in the right midlung periphery found on imaging in 12/2020. Was 8 mm on 2/2019.  -Given patient's smoking history, as well as history of chemoradiation therapy there is a high likelihood that the nodule is cancerous. [facility] cancer prediction 39.84%   -Was scheduled to have CT-guided biopsy on Tuesday, 3/16/2021  -Gold stage II COPD. No evidence of acute exacerbation. On RA, doesn't use O2 supplementation at home  -Syncopal episode, suspicious for cardiogenic syncope  -History of HTN  -History of anxiety    Plan     -CT Needle Biopsy ordered.   No contraindication to biopsy seen.  -Avoid antiplatelet medication unless strong indication for it   -Continue with Spiriva and Symbicort  -Monitor SPO2 closely, apply O2 if SpO2 < 88%  -Titrate FiO2/O2 to goal SPO2 between 88 and 92% if O2 therapy is needed  -DVT prophylaxis: Lovenox 40 mg currently on hold  -Aspiration precautions    \"Thank you for asking us to see this patient\"    Questions and concerns addressed.     Case, assessment and plan discussed with Dr. Alivia Jeronimo    Electronically signed by   Celina Neville MD on 3/13/2021 at 9:34 AM

## 2021-03-13 NOTE — H&P
Hospitalist - History & Physical      Patient: Mickie Schmidt    Unit/Bed:02/002A  YOB: 1950  MRN: [id number]   Acct: [de-identified]   PCP: NITO Duran CNP      Assessment and Plan:        1. Syncope and collaps: CTA head and neck show occluded skull base left ICA, present previously with patent left posterior communicating artery. Ordered Echo (previous 2018 shows EF 55-60% with normal aortic valve), orthostatic vitals. 2. Elevated pro-BNP: 2653.0 on presentation. No evidence of volume overload on exam of CXR. Echo ordered for #1.  3. COPD: Restart home inhalers  4. Anxiety: Confirm with patient if taking Effexor? Restart if taking  5. HTN: Well controlled on admission  6. HLD: Restart home  7. Hypothyroidism: TSH elevated 4.810. Ordered levothyroxine to 100mcg  8. CAD: Restart home isosorbide dinitrate    CC:  Syncope and collaps  HPI: Mrs. Anthony Monroe is a 74 Y/O female with past medical history of COPD, anxiety, HTN, HLD hypothyroidism, and CAD, presents to ED for syncope and collaps. Per patient report she was cooking dinner when she suddenly lost consciousness. Patient was found by son on floor. She states she has had several syncopal episodes in the past, most recent being 8 months ago, she. She has no other complaints on exam. Patient denies chest pain, shortness of breath, cough, headache, dizziness, lightheadedness, numbness, paraesthesias, weakness, chills, fevers, abdominal pain, nausea, vomiting, diarrhea, blood in stool, neck pain, or back pain. ROS: Review of Systems   Constitutional: Negative for chills and fever. Respiratory: Negative for cough and shortness of breath. Cardiovascular: Negative for chest pain. Gastrointestinal: Negative for abdominal pain, nausea and vomiting. Endocrine: Positive for cold intolerance. Musculoskeletal: Negative for back pain and neck pain. Skin: Negative for color change, pallor and wound. Neurological: Positive for syncope. and Sexual Activity    Alcohol use: No     Alcohol/week: 0.0 standard drinks    Drug use: No    Sexual activity: Yes     Partners: Male   Lifestyle    Physical activity     Days per week: Not on file     Minutes per session: Not on file    Stress: Not on file   Relationships    Social connections     Talks on phone: Not on file     Gets together: Not on file     Attends [employer] service: Not on file     Active member of club or organization: Not on file     Attends meetings of clubs or organizations: Not on file     Relationship status: Not on file    Intimate partner violence     Fear of current or ex partner: Not on file     Emotionally abused: Not on file     Physically abused: Not on file     Forced sexual activity: Not on file   Other Topics Concern    Not on file   Social History Narrative    Not on file     FHX:   Family History   Problem Relation Age of Onset    Osteoporosis Mother     Hypertension Mother     High Cholesterol Mother     Stroke Mother     Colon Cancer Mother     Cancer Father         lung cancer    Heart Disease Father     Hypertension Father     High Cholesterol Father     Heart Disease Sister     High Cholesterol Sister     Hypertension Sister     Asthma Sister     Other Other         high arched feet    Lung Cancer Son      Allergies:    Allergies   Allergen Reactions    Cipro Xr     Vicodin [Hydrocodone-Acetaminophen]      Weird dreams       Medications:            Labs:   Recent Results (from the past 24 hour(s))   EKG 12 Lead    Collection Time: 03/12/21  7:04 PM   Result Value Ref Range    Ventricular Rate 61 BPM    Atrial Rate 61 BPM    P-R Interval 126 ms    QRS Duration 88 ms    Q-T Interval 424 ms    QTc Calculation (Bazett) 426 ms    P Axis 78 degrees    R Axis 67 degrees    T Axis 44 degrees   Brain Natriuretic Peptide    Collection Time: 03/12/21  7:45 PM   Result Value Ref Range    Pro-BNP 2653.0 (H) 0.0 - 900.0 pg/mL   CBC Auto Differential Collection Time: 03/12/21  7:45 PM   Result Value Ref Range    WBC 7.4 4.8 - 10.8 thou/mm3    RBC 4.58 4.20 - 5.40 mill/mm3    Hemoglobin 14.6 12.0 - 16.0 gm/dl    Hematocrit 46.2 37.0 - 47.0 %    .9 (H) 81.0 - 99.0 fL    MCH 31.9 26.0 - 33.0 pg    MCHC 31.6 (L) 32.2 - 35.5 gm/dl    RDW-CV 13.4 11.5 - 14.5 %    RDW-SD 50.0 (H) 35.0 - 45.0 fL    Platelets 787 441 - 914 thou/mm3    MPV 10.9 9.4 - 12.4 fL    Seg Neutrophils 78.8 %    Lymphocytes 17.0 %    Monocytes 3.2 %    Eosinophils 0.1 %    Basophils 0.4 %    Immature Granulocytes 0.5 %    Segs Absolute 5.8 1 - 7 thou/mm3    Lymphocytes Absolute 1.3 1.0 - 4.8 thou/mm3    Monocytes Absolute 0.2 (L) 0.4 - 1.3 thou/mm3    Eosinophils Absolute 0.0 0.0 - 0.4 thou/mm3    Basophils Absolute 0.0 0.0 - 0.1 thou/mm3    Immature Grans (Abs) 0.04 0.00 - 0.07 thou/mm3    nRBC 0 /100 wbc   Troponin    Collection Time: 03/12/21  7:45 PM   Result Value Ref Range    Troponin T < 0.010 ng/ml   Comprehensive Metabolic Panel w/ Reflex to MG    Collection Time: 03/12/21  7:45 PM   Result Value Ref Range    Glucose 108 70 - 108 mg/dL    CREATININE 1.0 0.4 - 1.2 mg/dL    BUN 19 7 - 22 mg/dL    Sodium 138 135 - 145 meq/L    Potassium reflex Magnesium 3.8 3.5 - 5.2 meq/L    Chloride 103 98 - 111 meq/L    CO2 23 23 - 33 meq/L    Calcium 8.9 8.5 - 10.5 mg/dL    AST 12 5 - 40 U/L    Alkaline Phosphatase 129 (H) 38 - 126 U/L    Total Protein 7.0 6.1 - 8.0 g/dL    Albumin 3.8 3.5 - 5.1 g/dL    Total Bilirubin 0.4 0.3 - 1.2 mg/dL    ALT 7 (L) 11 - 66 U/L   Anion Gap    Collection Time: 03/12/21  7:45 PM   Result Value Ref Range    Anion Gap 12.0 8.0 - 16.0 meq/L   Glomerular Filtration Rate, Estimated    Collection Time: 03/12/21  7:45 PM   Result Value Ref Range    Est, Glom Filt Rate 55 (A) ml/min/1.73m2   Osmolality    Collection Time: 03/12/21  7:45 PM   Result Value Ref Range    Osmolality Calc 278.5 275.0 - 300.0 mOsmol/kg         Vital Signs: BP: (!) 155/52, Temp: 98.4 °F (36.9 °C), Pulse: 55, Resp: 20, SpO2: 96 %    GENERAL: Presents sitting upright in bed unassisted, Awake and alert; In no acute distress and well nourished. SKIN: Appropriate for ethnicity, warm and dry. EYES: No apparent trauma, discharge, or hematoma bilaterally. PERRL at 3mm  CARDIO: No visible chest wall deformity. No heaves or lifts. Strong/regular S1/S2 rate and rhythm. No rubs murmurs, or gallops. 2+ radial and dorsalis pedal pulses. Capillary refill <2 sec. No extremity edema noted. PULMONARY:  Trachea midline, no audible wheezing, increased respiratory effort, accessory muscle use, or asymmetrical chest wall movement. Lung sounds are clear to ascultation in superior and inferior fields. No wheezing, crackles, or rhonchi. ABDOMEN: Abdomen is non distended. Bowel sounds present in all four quadrants. Soft and non tender to palpation in all quadrants. NEURO: Follows commands. PMS intact, moves limbs freely. No focal neurological deficits  MSK: Extremities intact and present. No new bruising, swelling, deformity, discoloration or bleeding. No new tenderness to muscular or bony structure palpation.  strength 5/5 and equal bilaterally. Data: (All radiographs, tracings, PFTs, and imaging are personally viewed and interpreted unless otherwise noted).       EKG: rhythm: NSR, moderate voltage criteria for LVH, rate=61 bpm, wa=459 ms, qrs=88 ms, kp=412 ms, axis=P 78, R 67, T 44 degrees                                          Electronically signed by Fabi Lara PA-C on 3/13/2021 at 1:37 AM

## 2021-03-13 NOTE — PROGRESS NOTES
Patient arrived to room from ED. Pt walked from wheelchair to bathroom to bed without any difficulties. RN at bedside with patient. Patient c/o headache, RN will give Tylenol when able. Pt denies any other needs at this time. Fall risk precautions in place, bedside table and call light within reach of patient. Will continue to monitor.

## 2021-03-13 NOTE — ED PROVIDER NOTES

## 2021-03-13 NOTE — ED NOTES
ED nurse-to-nurse bedside report    Chief Complaint   Patient presents with    Shortness of Breath    Fall     no blood thinners, did not hit head      LOC: alert and orientated to name, place, date  Vital signs   Vitals:    03/12/21 2149 03/12/21 2253 03/13/21 0000 03/13/21 0054   BP: (!) 145/50 (!) 147/50 (!) 151/54 (!) 155/52   Pulse: 55 55 53 55   Resp: 16 19 19 20   Temp:       TempSrc:       SpO2: 98% 96% 96% 96%      Pain:    Pain Interventions: NA  Pain Goal: NA  Oxygen: No    Current needs required RA   Telemetry: Yes  LDAs:   Peripheral IV 03/12/21 Left Antecubital (Active)   Site Assessment Clean;Dry; Intact 03/12/21 1905   Line Status Blood return noted;Normal saline locked; Flushed 03/12/21 1905   Dressing Status Clean;Dry; Intact 03/12/21 1905     Continuous Infusions:   Mobility: Independent  Guerrero Fall Risk Score:    Fall Risk 8/12/2020 1/10/2019 7/25/2017 6/21/2017 5/22/2017   2 or more falls in past year? no no no no yes   Fall with injury in past year? no no no no yes     Fall Interventions: Side rials and call light  Report given to: GABBY Padron RN  03/13/21 0120

## 2021-03-13 NOTE — ED PROVIDER NOTES
[facility] ENCOUNTER          Pt Name: Vitaly Baumann  MRN: [id number]  [place] 1950  Date of evaluation: 3/12/2021  Treating Resident Physician: Ender Kingsley MD  Supervising Physician: Dr. Ricardo Wright       Chief Complaint   Patient presents with    Shortness of Breath    Fall     no blood thinners, did not hit head     History obtained from the patient. HISTORY OF PRESENT ILLNESS    HPI  Vitaly Baumann is a 73 y.o. female who presents to the emergency department for evaluation of Fall. Patient reports that she was making dinner this evening when she felt dizzy and passed out. Patient reports that her son found her on the floor. Patient and son are unsure if the patient hit her head or not. Patient reports that she has been having a headache since her fall. She reports that the dizziness is gotten worse over the past 2 to 3 weeks. Patient states that she has been having problems with dizziness and passing out for several years now. She reports that the most recent episode was 1 month ago. Patient states she has never had a work-up for syncope before. 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 She also reports that she was told there is stenosis of her right internal carotid artery. Patient denied any recent fever, chills, chest pain, numbness or weakness. Patient reports that her shortness of breath is worsened over the past week. She states that she was recently seen by pulmonology who prescribed prednisone and Trelegy. Patient is complaining of right hip pain. The patient has no other acute complaints at this time. REVIEW OF SYSTEMS   Review of Systems   Constitutional: Negative for chills and fever. HENT: Negative for congestion, rhinorrhea and sore throat. Eyes: Negative for pain and visual disturbance. Respiratory: Positive for shortness of breath. Negative for cough. Cardiovascular: Negative for chest pain and palpitations. Gastrointestinal: Negative for abdominal pain, constipation, diarrhea, nausea and vomiting. Genitourinary: Negative for difficulty urinating and dysuria. Musculoskeletal: Negative for back pain and neck pain. Skin: Negative for color change and rash. Neurological: Positive for dizziness, syncope and headaches. Negative for weakness, light-headedness and numbness. PAST MEDICAL AND SURGICAL HISTORY     Past Medical History:   Diagnosis Date    Anxiety 2007    Arthritis     Breast cancer [facility]) 2005    right mastectomy, chemo and radiation history    CAD (coronary artery disease) 2001    Cancer of the skin 2004    Cerebral artery occlusion with cerebral infarction ([address]     Chronic UTI     COPD (chronic obstructive pulmonary disease) ([address]     CVA (cerebral infarction) 2007, 2008    Depression 2007    DVT of lower extremity (deep venous thrombosis) [facility] [facility] 1976    Facial injury 2005    Fall on greasy ground, striking left periorbital region    History of stroke 2007, 2008, 2009    Patient relates a stroke with right weakness and speech in 2007; another in 2010 or 2012 (unsure), both with need to rehabilitation.   Also \"2 mini-strokes\" (?)    Hx of blood clots 1977    hip, leg    Hyperlipidemia 2005    Hypertension 2005    Hypothyroidism     IBS (irritable bowel syndrome)     Low HDL (under 40) 2014    Prolonged emergence from general anesthesia     Rec[facility] U[facility] (urinary tract infection) 2015    [facility]    work-up in progress     Past Surgical History:   Procedure Laterality Date   [address]    COLONOSCOPY  2009   Brett Thrasher HIP SURGERY  jan 19, 2015    HYSTERECTOMY  1976    JOINT REPLACEMENT Left 2011    HIP    JOINT REPLACEMENT Right 1/19/15    Right Total Hip Replacement - Dr. Ramon Virk Right 2005   76[address] [facility] Mother     Stroke Mother     Colon Cancer Mother     Cancer Father         lung cancer    Heart Disease Father     Hypertension Father     High Cholesterol Father     Heart Disease Sister     High Cholesterol Sister     Hypertension Sister     Asthma Sister     Other Other         high arched feet    Lung Cancer Son          PREVIOUS RECORDS   Previous records reviewed:  Patient was seen in the ED on 12/13/2020 for Retia Sandoval and was found to have abnormal Abnormal Chest CT. PHYSICAL EXAM     ED Triage Vitals [03/12/21 1906]   BP Temp Temp Source Pulse Resp SpO2 Height Weight   (!) 143/69 98.4 °F (36.9 °C) Oral 73 22 100 % -- --     Initial vital signs and nursing assessment reviewed and Patient is hypertensive. There is no height or weight on file to calculate BMI. Pulsoximetry is normal per my interpretation. Additional Vital Signs:  Vitals:    03/12/21 2023   BP: (!) 137/46   Pulse: 73   Resp: 16   Temp:    SpO2: 97%       Physical Exam  Vitals signs and nursing note reviewed. Constitutional:       General: She is not in acute distress. Appearance: She is well-developed. She is not ill-appearing. HENT:      Head: Normocephalic and atraumatic. Mouth/Throat:      Mouth: Mucous membranes are moist.      Pharynx: Oropharynx is clear. No oropharyngeal exudate. Eyes:      Extraocular Movements: Extraocular movements intact. Pupils: Pupils are equal, round, and reactive to light. Neck:      Musculoskeletal: Normal range of motion and neck supple. Vascular: No JVD. Cardiovascular:      Rate and Rhythm: Normal rate and regular rhythm. Pulses: Normal pulses. Heart sounds: No murmur. Pulmonary:      Effort: Pulmonary effort is normal. No respiratory distress. Breath sounds: Normal breath sounds. No decreased breath sounds, wheezing, rhonchi or rales. Abdominal:      General: Bowel sounds are normal.      Palpations: Abdomen is soft. Tenderness:  There is no abdominal tenderness. Musculoskeletal:      Right lower leg: No edema. Left lower leg: No edema. Skin:     General: Skin is warm and dry. Capillary Refill: Capillary refill takes less than 2 seconds. Findings: No erythema or rash. Neurological:      General: No focal deficit present. Mental Status: She is alert and oriented to person, place, and time. Cranial Nerves: No cranial nerve deficit. Motor: No weakness. Psychiatric:         Mood and Affect: Mood normal.         Behavior: Behavior normal.       MEDICAL DECISION MAKING   Initial Assessment:   Mulugeta Purcell is a 59-year-old female with past medical history of left internal carotid stenosis, coronary artery disease, COPD, CVA, DVT, and hypertension who presents to the emergency department following a fall. Given the patient story is likely patient had a syncopal episode with collapse at home. 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 This is been known for many years and her most recent CTA was in 2017. Patient reports that she has never had a syncopal work-up. On exam patient demonstrates no focal neurologic deficits. Differential for the syncopal episode includes increased stenosis of the right carotid artery, cardiac dysrhythmia, and hypotention. Plan:   X-rays of the chest and hip showed no acute abnormalities. CBC and CMP showed no anemia no electrolyte abnormalities. Troponin was negative. proBNP was elevated. CT head without contrast showed no acute intracranial abnormalities. CTA head and neck showed redemonstration of complete occlusion of left internal carotid artery with otherwise stable findings. Plan is to admit the patient for syncopal work-up. Patient is in agreement with this plan.     ED RESULTS   Laboratory results:  Labs Reviewed   BRAIN NATRIURETIC PEPTIDE - Abnormal; Notable for the following components:       Result Value    Pro-BNP 2653.0 (*)     All other components within normal limits   CBC WITH AUTO DIFFERENTIAL - Abnormal; Notable for the following components:    .9 (*)     MCHC 31.6 (*)     RDW-SD 50.0 (*)     Monocytes Absolute 0.2 (*)     All other components within normal limits   COMPREHENSIVE METABOLIC PANEL W/ REFLEX TO MG FOR LOW K - Abnormal; Notable for the following components:    Alkaline Phosphatase 129 (*)     ALT 7 (*)     All other components within normal limits   GLOMERULAR FILTRATION RATE, ESTIMATED - Abnormal; Notable for the following components:    Est, Glom Filt Rate 55 (*)     All other components within normal limits   TROPONIN   ANION GAP   OSMOLALITY       Radiologic studies results:  CT Head WO Contrast   Final Result   Impression:   1. Negative for acute intracranial abnormality. 2.  Sinusitis. 3.  Stable exam.      This document has been electronically signed by: Chilango Mariscal MD on    03/12/2021 09:40 PM      All CTs at this facility use dose modulation techniques and iterative    reconstructions, and/or weight-based dosing   when appropriate to reduce radiation to a low as reasonably achievable. CTA HEAD W WO CONTRAST   Final Result   Impression:   1. Occluded skull base left ICA, present previously. Patent left    posterior communicating artery. 2.  Intact anterior, posterior, and middle cerebral circulations. 3.  No intracranial aneurysm. This document has been electronically signed by: Chilango Mariscal MD on    03/12/2021 10:17 PM      All CTs at this facility use dose modulation techniques and iterative    reconstructions, and/or weight-based dosing   when appropriate to reduce radiation to a low as reasonably achievable. CTA NECK W WO CONTRAST   Final Result   Impression:   1. Patent right carotid and bilateral vertebral arteries without    significant stenosis. Occluded left ICA at its origin, present previously.       This document has been electronically signed by: Chilango Mariscal MD on 03/12/2021 10:05 PM      All CTs at this facility use dose modulation techniques and iterative    reconstructions, and/or weight-based dosing   when appropriate to reduce radiation to a low as reasonably achievable. Carotid stenosis and measurements are in accordance with NASCET criteria. XR HIP 2-3 VW W PELVIS RIGHT   Final Result   No acute osseous abnormality            **This report has been created using voice recognition software. It may contain minor errors which are inherent in voice recognition technology. **      Final report electronically signed by Dr. Tish Pittman on 3/12/2021 7:36 PM      XR CHEST 1 VIEW   Final Result   No acute cardiopulmonary disease            **This report has been created using voice recognition software. It may contain minor errors which are inherent in voice recognition technology. **      Final report electronically signed by Dr. Tish Pittman on 3/12/2021 7:38 PM          ED Medications administered this visit:   Medications   iopamidol (ISOVUE-370) 76 % injection 80 mL (80 mLs Intravenous Given 3/12/21 2052)         ED COURSE     ED Course as of Mar 13 0157   Fri Mar 12, 2021   1954 No acute osseous findings   XR HIP 2-3 VW W PELVIS RIGHT [MF]   1954 No acute cardiopulmonary disease   XR CHEST 1 VIEW [MF]   2154 Negative for acute intracranial abnormality   CT Head WO Contrast [MF]   2215 Negative   Troponin:    Troponin T < 0.010 [MF]   2215 Elevated   Brain Natriuretic Peptide(!):    Pro-BNP 2653.0(!) [MF]   2215 Elevated alk phos otherwise within normal limits   Comprehensive Metabolic Panel w/ Reflex to MG(!):    Glucose 108   Creatinine 1.0   BUN 19   Sodium 138   Potassium 3.8   Chloride 103   CO2 23   Calcium 8.9   AST 12   Alk Phos 129(!)   Total Protein 7.0   Albumin 3.8   Bilirubin 0.4   ALT 7(!) [MF]   2215 Within normal limits   CBC Auto Differential(!):    WBC 7.4   RBC 4.58   Hemoglobin Quant 14.6   Hematocrit 46.2   .9(!)   MCH 31.9   MCHC 31.6(!)   RDW-CV 13.4   RDW-SD 50.0(!)   Platelet Count 844   MPV 10.9   Seg Neutrophils 78.8   Lymphocytes 17.0   Monocytes 3.2   Eosinophils 0.1   Basophils 0.4   Immature Granulocytes 0.5   Segs Absolute 5.8   Lymphocytes Absolute 1.3   Monocytes Absolute 0.2(!)   Eosinophils Absolute 0.0   Basophils Absolute 0.0   Immature Grans (Abs) 0.04   Nucleated Red Blood Cells 0 [MF]   2303 Hospitalist was paged for admission    []      ED Course User Index  [] Susanne Louis MD     MEDICATION CHANGES     New Prescriptions    No medications on file         FINAL DISPOSITION     Final diagnoses:   Syncope and collapse     Condition: condition: stable  Dispo: Admit to med/surg floor    This transcription was electronically signed. Parts of this transcriptions may have been dictated by use of voice recognition software and electronically transcribed, and parts may have been transcribed with the assistance of an ED scribe. The transcription may contain errors not detected in proofreading. Please refer to my supervising physician's documentation if my documentation differs.     Electronically Signed: Susanne Louis, 03/12/21, 9:01 PM         Susanne Louis MD  Resident  03/13/21 3747

## 2021-03-14 LAB
ANION GAP SERPL CALCULATED.3IONS-SCNC: 10 MEQ/L (ref 8–16)
BASOPHILS # BLD: 0.7 %
BASOPHILS ABSOLUTE: 0 THOU/MM3 (ref 0–0.1)
BUN BLDV-MCNC: 16 MG/DL (ref 7–22)
CALCIUM SERPL-MCNC: 8.5 MG/DL (ref 8.5–10.5)
CHLORIDE BLD-SCNC: 108 MEQ/L (ref 98–111)
CO2: 24 MEQ/L (ref 23–33)
CREAT SERPL-MCNC: 0.9 MG/DL (ref 0.4–1.2)
EOSINOPHIL # BLD: 4.5 %
EOSINOPHILS ABSOLUTE: 0.3 THOU/MM3 (ref 0–0.4)
ERYTHROCYTE [DISTWIDTH] IN BLOOD BY AUTOMATED COUNT: 13.6 % (ref 11.5–14.5)
ERYTHROCYTE [DISTWIDTH] IN BLOOD BY AUTOMATED COUNT: 51.7 FL (ref 35–45)
GFR SERPL CREATININE-BSD FRML MDRD: 62 ML/MIN/1.73M2
GLUCOSE BLD-MCNC: 80 MG/DL (ref 70–108)
HCT VFR BLD CALC: 48.1 % (ref 37–47)
HEMOGLOBIN: 14.9 GM/DL (ref 12–16)
IMMATURE GRANS (ABS): 0.03 THOU/MM3 (ref 0–0.07)
IMMATURE GRANULOCYTES: 0.4 %
LYMPHOCYTES # BLD: 29.6 %
LYMPHOCYTES ABSOLUTE: 2 THOU/MM3 (ref 1–4.8)
MAGNESIUM: 2.4 MG/DL (ref 1.6–2.4)
MCH RBC QN AUTO: 31.3 PG (ref 26–33)
MCHC RBC AUTO-ENTMCNC: 31 GM/DL (ref 32.2–35.5)
MCV RBC AUTO: 101.1 FL (ref 81–99)
MONOCYTES # BLD: 5.1 %
MONOCYTES ABSOLUTE: 0.3 THOU/MM3 (ref 0.4–1.3)
NUCLEATED RED BLOOD CELLS: 0 /100 WBC
PLATELET # BLD: 201 THOU/MM3 (ref 130–400)
PMV BLD AUTO: 10.9 FL (ref 9.4–12.4)
POTASSIUM SERPL-SCNC: 4 MEQ/L (ref 3.5–5.2)
RBC # BLD: 4.76 MILL/MM3 (ref 4.2–5.4)
SEG NEUTROPHILS: 59.7 %
SEGMENTED NEUTROPHILS ABSOLUTE COUNT: 4 THOU/MM3 (ref 1.8–7.7)
SODIUM BLD-SCNC: 142 MEQ/L (ref 135–145)
WBC # BLD: 6.7 THOU/MM3 (ref 4.8–10.8)

## 2021-03-14 PROCEDURE — 2500000003 HC RX 250 WO HCPCS: Performed by: INTERNAL MEDICINE

## 2021-03-14 PROCEDURE — 6360000002 HC RX W HCPCS

## 2021-03-14 PROCEDURE — 83735 ASSAY OF MAGNESIUM: CPT

## 2021-03-14 PROCEDURE — 94640 AIRWAY INHALATION TREATMENT: CPT

## 2021-03-14 PROCEDURE — 2700000000 HC OXYGEN THERAPY PER DAY

## 2021-03-14 PROCEDURE — 6360000002 HC RX W HCPCS: Performed by: INTERNAL MEDICINE

## 2021-03-14 PROCEDURE — 6370000000 HC RX 637 (ALT 250 FOR IP): Performed by: INTERNAL MEDICINE

## 2021-03-14 PROCEDURE — 1200000003 HC TELEMETRY R&B

## 2021-03-14 PROCEDURE — 99232 SBSQ HOSP IP/OBS MODERATE 35: CPT | Performed by: INTERNAL MEDICINE

## 2021-03-14 PROCEDURE — 80048 BASIC METABOLIC PNL TOTAL CA: CPT

## 2021-03-14 PROCEDURE — 36415 COLL VENOUS BLD VENIPUNCTURE: CPT

## 2021-03-14 PROCEDURE — 94760 N-INVAS EAR/PLS OXIMETRY 1: CPT

## 2021-03-14 PROCEDURE — 2580000003 HC RX 258: Performed by: PHYSICIAN ASSISTANT

## 2021-03-14 PROCEDURE — 85025 COMPLETE CBC W/AUTO DIFF WBC: CPT

## 2021-03-14 PROCEDURE — 99233 SBSQ HOSP IP/OBS HIGH 50: CPT | Performed by: INTERNAL MEDICINE

## 2021-03-14 RX ORDER — METHYLPREDNISOLONE SODIUM SUCCINATE 40 MG/ML
40 INJECTION, POWDER, LYOPHILIZED, FOR SOLUTION INTRAMUSCULAR; INTRAVENOUS EVERY 12 HOURS
Status: DISCONTINUED | OUTPATIENT
Start: 2021-03-14 | End: 2021-03-17 | Stop reason: HOSPADM

## 2021-03-14 RX ORDER — METHYLPREDNISOLONE SODIUM SUCCINATE 125 MG/2ML
INJECTION, POWDER, LYOPHILIZED, FOR SOLUTION INTRAMUSCULAR; INTRAVENOUS
Status: DISCONTINUED
Start: 2021-03-14 | End: 2021-03-15

## 2021-03-14 RX ORDER — DIPHENHYDRAMINE HYDROCHLORIDE 50 MG/ML
50 INJECTION INTRAMUSCULAR; INTRAVENOUS ONCE
Status: COMPLETED | OUTPATIENT
Start: 2021-03-14 | End: 2021-03-14

## 2021-03-14 RX ORDER — IPRATROPIUM BROMIDE AND ALBUTEROL SULFATE 2.5; .5 MG/3ML; MG/3ML
1 SOLUTION RESPIRATORY (INHALATION) EVERY 6 HOURS
Status: DISCONTINUED | OUTPATIENT
Start: 2021-03-14 | End: 2021-03-17 | Stop reason: HOSPADM

## 2021-03-14 RX ADMIN — IPRATROPIUM BROMIDE AND ALBUTEROL SULFATE 1 AMPULE: .5; 3 SOLUTION RESPIRATORY (INHALATION) at 17:57

## 2021-03-14 RX ADMIN — BUDESONIDE AND FORMOTEROL FUMARATE DIHYDRATE 2 PUFF: 160; 4.5 AEROSOL RESPIRATORY (INHALATION) at 08:18

## 2021-03-14 RX ADMIN — METHYLPREDNISOLONE SODIUM SUCCINATE 40 MG: 40 INJECTION, POWDER, FOR SOLUTION INTRAMUSCULAR; INTRAVENOUS at 18:04

## 2021-03-14 RX ADMIN — DIPHENHYDRAMINE HYDROCHLORIDE 50 MG: 50 INJECTION, SOLUTION INTRAMUSCULAR; INTRAVENOUS at 17:50

## 2021-03-14 RX ADMIN — IPRATROPIUM BROMIDE AND ALBUTEROL SULFATE 1 AMPULE: .5; 3 SOLUTION RESPIRATORY (INHALATION) at 12:48

## 2021-03-14 RX ADMIN — LEVOTHYROXINE SODIUM 100 MCG: 0.1 TABLET ORAL at 11:34

## 2021-03-14 RX ADMIN — SODIUM CHLORIDE, PRESERVATIVE FREE 10 ML: 5 INJECTION INTRAVENOUS at 21:39

## 2021-03-14 RX ADMIN — BUDESONIDE AND FORMOTEROL FUMARATE DIHYDRATE 2 PUFF: 160; 4.5 AEROSOL RESPIRATORY (INHALATION) at 18:00

## 2021-03-14 RX ADMIN — FAMOTIDINE 20 MG: 10 INJECTION INTRAVENOUS at 21:39

## 2021-03-14 RX ADMIN — SODIUM CHLORIDE, PRESERVATIVE FREE 10 ML: 5 INJECTION INTRAVENOUS at 11:33

## 2021-03-14 RX ADMIN — TIOTROPIUM BROMIDE INHALATION SPRAY 2 PUFF: 3.12 SPRAY, METERED RESPIRATORY (INHALATION) at 08:18

## 2021-03-14 ASSESSMENT — PAIN SCALES - GENERAL: PAINLEVEL_OUTOF10: 0

## 2021-03-14 NOTE — PROGRESS NOTES
[facility]    Patient - Isela Scott,  Age - 73 y.o.    - 1950      Room Number - 8A-20/020-A   Consulting - Franchesca Ornelas MD Primary Care Physician - NITO Garduno CNP   MRN -  [id number]   Formerly [facility] # - [de-identified]  Date of Admission -  3/12/2021  [address] Day - 1    Chief Complaint   Lung nodul  HPI     Isela Scott is a 73 y.o. female with PMH of GOLD stage II moderate COPD, HTN, HLD, CAD, anxiety who presented to the ED on 3/13/2021 with a chief complaint of syncope and collapse. On review of the initial H&P, patient was apparently cooking dinner when she suddenly lost consciousness. This is not her first episode of syncope, has had several in the past.  No other complaints were given. Patient is known to the pulmonary clinic. She last saw NITO Thomas CNP on 3/8/2021. Patient has a history of a right lung nodule, last measured at 8 mm in 2018. On repeat imaging done on 2020, this nodule had increased in size and is now 14 mm. Jhony calculator 39% risk of malignancy. Patient was scheduled to have an IR CT-guided biopsy after her visit with us on 3/8/2021. This has not been completed yet. Patient has been lost to follow-up on multiple occasions. She is an established diagnosis of stage II gold COPD. She was started on Trelegy 1 puff daily and was also given albuterol 2 puffs every 6 hours as needed. Past 24 hours, ROS   No changes in condition  o distress, a little apprehensive   On NC---sats 98%  Afebrile    All other systems reviewed  Objective    Vitals    weight is 136 lb 9.6 oz (62 kg). Her oral temperature is 98 °F (36.7 °C). Her blood pressure is 108/46 (abnormal) and her pulse is 70. Her respiration is 16 and oxygen saturation is 98%.      O2 Flow Rate (L/min): 2 L/min  I/O    Intake/Output Summary (Last 24 hours) at 3/14/2021 1346  Last data filed at 3/14/2021 1336  Gross per 24 hour   Intake 1400 ml   Output 0 ml   Net 1400 ml     Patient Vitals for the past 96 hrs (Last 3 readings):   Weight   03/14/21 0319 136 lb 9.6 oz (62 kg)   03/13/21 0451 136 lb (61.7 kg)     Exam    Physical Exam  Nursing note and vitals reviewed.     General Appearance: alert and oriented to person, place and time. In no respiratory distress. Appears tired. Skin: warm and dry, no rash or erythema  Neck: supple and non-tender without mass, no thyromegaly or thyroid nodules, no cervical lymphadenopathy  Pulmonary/Chest: Inspiratory wheezing and crackles in the right lower lung fields. Mildly decreased air entry. Normal respiratory effort.   Cardiovascular: normal rate, regular rhythm, normal S1 and S2, no murmurs, rubs, clicks, or gallops  Abdomen: soft, non-tender, non-distended, normal bowel sounds, no masses or organomegaly  Extremities: no edema  Musculoskeletal: normal range of motion, no joint swelling, deformity or tenderness       Meds       methylPREDNISolone  40 mg Intravenous Q12H    ipratropium-albuterol  1 ampule Inhalation Q6H    sodium chloride flush  10 mL Intravenous 2 times per day    [Held by provider] enoxaparin  40 mg Subcutaneous Daily    levothyroxine  100 mcg Oral Daily    budesonide-formoterol  2 puff Inhalation BID       sodium chloride flush, promethazine **OR** ondansetron, polyethylene glycol, acetaminophen **OR** acetaminophen, albuterol sulfate HFA  Labs   ABG  Lab Results   Component Value Date    PH 7.35 09/29/2013    PO2 53 09/29/2013    PCO2 45 09/29/2013    HCO3 25 09/29/2013    O2SAT 85 09/29/2013     No results found for: IFIO2, MODE, SETTIDVOL, SETPEEP  CBC  Recent Labs     03/12/21 1945 03/13/21  0531 03/14/21  0628   WBC 7.4 8.1 6.7   RBC 4.58 4.28 4.76   HGB 14.6 13.6 14.9   HCT 46.2 42.9 48.1*   .9* 100.2* 101.1*   MCH 31.9 31.8 31.3   MCHC 31.6* 31.7* 31.0*    212 201   MPV 10.9 11.0 10.9      BMP  Recent Labs     03/12/21 1945 03/13/21  0531 03/14/21  0628    141 142   K 3.8 3.9 4.0    107 108   CO2 23 26 24   BUN 19 21 16   CREATININE 1.0 1.1 0.9   GLUCOSE 108 92 80   MG  --   --  2.4   CALCIUM 8.9 8.3* 8.5     LFT  Recent Labs     03/12/21  1945 03/13/21  0531   AST 12 11   ALT 7* 6*   BILITOT 0.4 0.3   ALKPHOS 129* 112     TROP  Lab Results   Component Value Date    TROPONINT < 0.010 03/12/2021    TROPONINT < 0.010 12/13/2020    TROPONINT < 0.010 05/16/2017     BNP  Lab Results   Component Value Date    PROBNP 2653.0 03/12/2021    PROBNP 995.5 12/13/2020    PROBNP 260.0 05/16/2017     D-Dimer  Lab Results   Component Value Date    DDIMER 2248.00 12/13/2020     Lactic Acid  No results for input(s): LACTA in the last 72 hours. INR  No results for input(s): INR, PROTIME in the last 72 hours. PTT  No results for input(s): APTT in the last 72 hours. Glucose  No results for input(s): POCGLU in the last 72 hours. UA No results for input(s): SPECGRAV, PHUR, COLORU, CLARITYU, MUCUS, PROTEINU, BLOODU, RBCUA, WBCUA, BACTERIA, NITRU, GLUCOSEU, BILIRUBINUR, UROBILINOGEN, KETUA, LABCAST, LABCASTTY, AMORPHOS in the last 72 hours. Invalid input(s): CRYSTALS. Radiology        CT Scans    (See actual reports for details)  [address] Problems    Diagnosis Date Noted    Syncope and collapse [R55] 03/13/2021    Solitary pulmonary nodule on lung CT [R91.1]     Stage 2 moderate COPD by GOLD classification ([facility] [address] [J44.9] 09/30/2013     Assessment and Plan     Current everyday smoker 2 ppd   Enlarging 14 mm RLL spiculated lung nodule malignant until proven otherwise. Gold 2 COPD. No clear contraindication for CT guided lung biopsy, will schedule and discuss with primary team.  Smoking cessation consult  Change to oral prednisone  Case discussed with nurse and patient/family. Questions and concerns addressed. Meds and Orders reviewed.     Electronically signed by     Israel Kolb MD on 3/14/2021 at 1:46 PM

## 2021-03-14 NOTE — PLAN OF CARE
Patient started on nebulizer txs; tolerating well.   Will continue to monitor patients respiratory status

## 2021-03-14 NOTE — PROGRESS NOTES
Hospitalist Progress Note    Patient:  Alfred Kruse      Unit/Bed:8A-20/020-A    YOB: 1950    MRN: [id number]       Acct: [de-identified]     PCP: NITO Salgado CNP    Date of Admission: 3/12/2021    Active Hospital Problems    Diagnosis Date Noted    Syncope and collapse [R55] 03/13/2021    Solitary pulmonary nodule on lung CT [R91.1]     Stage 2 moderate COPD by GOLD classification ([address] [J44.9] 09/30/2013     Assessment/Plan:    1. Syncope and Collapse: likely 2/2 to Hypoxic Resp. Failure from COPD and Lung Mass. Troponin non elevated, no ECG changes. 2D Echo EF 55-60% with no LV wall motion abnormalities. No concerns for CVA or seizure, MRI no acute CVA. 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 Acute Hypoxic Resp. Failure: 2/2 acute COPD and Lung mass. Cont IV Solumedrol, duo-nebs. Cont to wean as tolerated. 3. Spiculated Lung Mass: 14 mm spiculated nodule in the right midlung periphery found on imaging in 12/2020. Pulmonary consulted. Plan for biopsy tomorrow. NPO after midnight  4. Acute COPD Exacerbation: Cont IV Solumedrol, duo-nebs. Cont to wean as tolerated. Resume home inhalers  5. Tobacco Abuse: smoke 2 ppd. Educate and  on smoking cessation. Nicotine patch. 6. Hypothyroidism: TSH elevated 4.810. Ordered levothyroxine to 100mcg        Chief Complaint: syncope     Hospital Course: Mrs. Dallin Buckner is a 72 Y/O female with past medical history of COPD, anxiety, HTN, HLD hypothyroidism, and CAD, presents to ED for syncope and collaps. Per patient report she was cooking dinner when she suddenly lost consciousness. Patient was found by son on floor. She states she has had several syncopal episodes in the past, most recent being 8 months ago, she.  She has no other complaints on exam. Patient denies chest pain, shortness of breath, cough, headache, dizziness, lightheadedness, numbness, paraesthesias, weakness, chills, fevers, abdominal pain, nausea, vomiting, diarrhea, blood in stool, neck pain, or back pain.        Subjective: no acute events overnight. Pt sitting up in side of bed, reports feeling better this morning. Still sob. No fevers or chills. No chest pain. Tolerating PO intake. No diarrhea. Medications:  Reviewed    Infusion Medications   Scheduled Medications    sodium chloride flush  10 mL Intravenous 2 times per day    [Held by provider] enoxaparin  40 mg Subcutaneous Daily    levothyroxine  100 mcg Oral Daily    budesonide-formoterol  2 puff Inhalation BID    tiotropium  2 puff Inhalation Daily     PRN Meds: sodium chloride flush, promethazine **OR** ondansetron, polyethylene glycol, acetaminophen **OR** acetaminophen, albuterol sulfate HFA      Intake/Output Summary (Last 24 hours) at 3/14/2021 1108  Last data filed at 3/14/2021 0615  Gross per 24 hour   Intake 1200 ml   Output 0 ml   Net 1200 ml       Diet:  DIET CARDIAC; Low Sodium (2 GM)  Diet NPO, After Midnight    Exam:  BP (!) 119/47   Pulse 59   Temp 97.4 °F (36.3 °C)   Resp 16   Wt 136 lb 9.6 oz (62 kg)   SpO2 93%   BMI 23.82 kg/m²     General appearance: No apparent distress, appears stated age and cooperative on 2L NC.  HEENT: Pupils equal, round, and reactive to light. Conjunctivae/corneas clear. Neck: Supple, with full range of motion. No jugular venous distention. Trachea midline. Respiratory:  Normal respiratory effort. Expiratory wheezing   Cardiovascular: Regular rate and rhythm with normal S1/S2 without murmurs, rubs or gallops. Abdomen: Soft, non-tender, non-distended with normal bowel sounds. Musculoskeletal: passive and active ROM x 4 extremities. Skin: Skin color, texture, turgor normal.  No rashes or lesions. Neurologic:  Neurovascularly intact without any focal sensory/motor deficits.  Cranial nerves: II-XII intact, grossly non-focal.  Psychiatric: Alert and oriented, thought content appropriate, normal insight  Capillary Refill: Brisk,< 3 seconds   Peripheral Pulses: +2 palpable, equal bilaterally       Labs:   Recent Labs     03/14/21  0628   WBC 6.7   HGB 14.9   HCT 48.1*        Recent Labs     03/14/21  0628      K 4.0      CO2 24   BUN 16   CREATININE 0.9   CALCIUM 8.5     Recent Labs     03/13/21  0531   AST 11   ALT 6*   BILITOT 0.3   ALKPHOS 112     No results for input(s): INR in the last 72 hours. No results for input(s): Musa Francisco in the last 72 hours. Urinalysis:      Lab Results   Component Value Date    NITRU NEGATIVE 02/02/2018    WBCUA 5-10 02/02/2018    WBCUA 15-25 05/16/2012    BACTERIA MANY 02/02/2018    RBCUA 0-2 02/02/2018    BLOODU TRACE 02/02/2018    SPECGRAV <1.005 02/02/2018    GLUCOSEU NEGATIVE 10/30/2015       Radiology:   All imaging reviewed     Diet: DIET CARDIAC; Low Sodium (2 GM)  Diet NPO, After Midnight      Code Status: Full Code            Electronically signed by Elisha Hughes MD on 3/14/2021 at 11:08 AM

## 2021-03-15 PROBLEM — E43 SEVERE MALNUTRITION (HCC): Chronic | Status: ACTIVE | Noted: 2021-03-15

## 2021-03-15 LAB
ANION GAP SERPL CALCULATED.3IONS-SCNC: 9 MEQ/L (ref 8–16)
BASOPHILS # BLD: 0.2 %
BASOPHILS ABSOLUTE: 0 THOU/MM3 (ref 0–0.1)
BUN BLDV-MCNC: 17 MG/DL (ref 7–22)
CALCIUM SERPL-MCNC: 8.7 MG/DL (ref 8.5–10.5)
CHLORIDE BLD-SCNC: 107 MEQ/L (ref 98–111)
CO2: 22 MEQ/L (ref 23–33)
CREAT SERPL-MCNC: 0.9 MG/DL (ref 0.4–1.2)
EOSINOPHIL # BLD: 0 %
EOSINOPHILS ABSOLUTE: 0 THOU/MM3 (ref 0–0.4)
ERYTHROCYTE [DISTWIDTH] IN BLOOD BY AUTOMATED COUNT: 13.2 % (ref 11.5–14.5)
ERYTHROCYTE [DISTWIDTH] IN BLOOD BY AUTOMATED COUNT: 49.7 FL (ref 35–45)
GFR SERPL CREATININE-BSD FRML MDRD: 62 ML/MIN/1.73M2
GLUCOSE BLD-MCNC: 147 MG/DL (ref 70–108)
HCT VFR BLD CALC: 49.1 % (ref 37–47)
HEMOGLOBIN: 15.3 GM/DL (ref 12–16)
IMMATURE GRANS (ABS): 0.03 THOU/MM3 (ref 0–0.07)
IMMATURE GRANULOCYTES: 0.5 %
LYMPHOCYTES # BLD: 9.1 %
LYMPHOCYTES ABSOLUTE: 0.6 THOU/MM3 (ref 1–4.8)
MAGNESIUM: 2.5 MG/DL (ref 1.6–2.4)
MCH RBC QN AUTO: 31.5 PG (ref 26–33)
MCHC RBC AUTO-ENTMCNC: 31.2 GM/DL (ref 32.2–35.5)
MCV RBC AUTO: 101 FL (ref 81–99)
MONOCYTES # BLD: 0.8 %
MONOCYTES ABSOLUTE: 0.1 THOU/MM3 (ref 0.4–1.3)
NUCLEATED RED BLOOD CELLS: 0 /100 WBC
PLATELET # BLD: 188 THOU/MM3 (ref 130–400)
PMV BLD AUTO: 10.8 FL (ref 9.4–12.4)
POTASSIUM SERPL-SCNC: 5.4 MEQ/L (ref 3.5–5.2)
RBC # BLD: 4.86 MILL/MM3 (ref 4.2–5.4)
SEG NEUTROPHILS: 89.4 %
SEGMENTED NEUTROPHILS ABSOLUTE COUNT: 5.8 THOU/MM3 (ref 1.8–7.7)
SODIUM BLD-SCNC: 138 MEQ/L (ref 135–145)
WBC # BLD: 6.5 THOU/MM3 (ref 4.8–10.8)

## 2021-03-15 PROCEDURE — 99232 SBSQ HOSP IP/OBS MODERATE 35: CPT | Performed by: INTERNAL MEDICINE

## 2021-03-15 PROCEDURE — 6370000000 HC RX 637 (ALT 250 FOR IP): Performed by: INTERNAL MEDICINE

## 2021-03-15 PROCEDURE — 97110 THERAPEUTIC EXERCISES: CPT

## 2021-03-15 PROCEDURE — 97166 OT EVAL MOD COMPLEX 45 MIN: CPT

## 2021-03-15 PROCEDURE — 2580000003 HC RX 258: Performed by: PHYSICIAN ASSISTANT

## 2021-03-15 PROCEDURE — 83735 ASSAY OF MAGNESIUM: CPT

## 2021-03-15 PROCEDURE — 36415 COLL VENOUS BLD VENIPUNCTURE: CPT

## 2021-03-15 PROCEDURE — 2700000000 HC OXYGEN THERAPY PER DAY

## 2021-03-15 PROCEDURE — 6360000002 HC RX W HCPCS: Performed by: INTERNAL MEDICINE

## 2021-03-15 PROCEDURE — 97535 SELF CARE MNGMENT TRAINING: CPT

## 2021-03-15 PROCEDURE — 80048 BASIC METABOLIC PNL TOTAL CA: CPT

## 2021-03-15 PROCEDURE — 94761 N-INVAS EAR/PLS OXIMETRY MLT: CPT

## 2021-03-15 PROCEDURE — 94640 AIRWAY INHALATION TREATMENT: CPT

## 2021-03-15 PROCEDURE — 85025 COMPLETE CBC W/AUTO DIFF WBC: CPT

## 2021-03-15 PROCEDURE — 2500000003 HC RX 250 WO HCPCS: Performed by: INTERNAL MEDICINE

## 2021-03-15 PROCEDURE — 1200000003 HC TELEMETRY R&B

## 2021-03-15 PROCEDURE — 93005 ELECTROCARDIOGRAM TRACING: CPT | Performed by: INTERNAL MEDICINE

## 2021-03-15 RX ORDER — MIDAZOLAM HYDROCHLORIDE 2 MG/2ML
1 INJECTION, SOLUTION INTRAMUSCULAR; INTRAVENOUS ONCE
Status: CANCELLED | OUTPATIENT
Start: 2021-03-15 | End: 2021-03-15

## 2021-03-15 RX ORDER — FENTANYL CITRATE 50 UG/ML
50 INJECTION, SOLUTION INTRAMUSCULAR; INTRAVENOUS ONCE
Status: CANCELLED | OUTPATIENT
Start: 2021-03-15 | End: 2021-03-15

## 2021-03-15 RX ORDER — SODIUM CHLORIDE 450 MG/100ML
INJECTION, SOLUTION INTRAVENOUS CONTINUOUS
Status: CANCELLED | OUTPATIENT
Start: 2021-03-15

## 2021-03-15 RX ORDER — SODIUM POLYSTYRENE SULFONATE 15 G/60ML
15 SUSPENSION ORAL; RECTAL ONCE
Status: COMPLETED | OUTPATIENT
Start: 2021-03-15 | End: 2021-03-15

## 2021-03-15 RX ADMIN — METHYLPREDNISOLONE SODIUM SUCCINATE 40 MG: 40 INJECTION, POWDER, FOR SOLUTION INTRAMUSCULAR; INTRAVENOUS at 00:12

## 2021-03-15 RX ADMIN — IPRATROPIUM BROMIDE AND ALBUTEROL SULFATE 1 AMPULE: .5; 3 SOLUTION RESPIRATORY (INHALATION) at 23:49

## 2021-03-15 RX ADMIN — LEVOTHYROXINE SODIUM 100 MCG: 0.1 TABLET ORAL at 10:26

## 2021-03-15 RX ADMIN — IPRATROPIUM BROMIDE AND ALBUTEROL SULFATE 1 AMPULE: .5; 3 SOLUTION RESPIRATORY (INHALATION) at 11:32

## 2021-03-15 RX ADMIN — SODIUM CHLORIDE, PRESERVATIVE FREE 10 ML: 5 INJECTION INTRAVENOUS at 21:01

## 2021-03-15 RX ADMIN — METHYLPREDNISOLONE SODIUM SUCCINATE 40 MG: 40 INJECTION, POWDER, FOR SOLUTION INTRAMUSCULAR; INTRAVENOUS at 17:23

## 2021-03-15 RX ADMIN — IPRATROPIUM BROMIDE AND ALBUTEROL SULFATE 1 AMPULE: .5; 3 SOLUTION RESPIRATORY (INHALATION) at 16:29

## 2021-03-15 RX ADMIN — FAMOTIDINE 20 MG: 10 INJECTION INTRAVENOUS at 10:28

## 2021-03-15 RX ADMIN — SODIUM CHLORIDE, PRESERVATIVE FREE 10 ML: 5 INJECTION INTRAVENOUS at 10:28

## 2021-03-15 RX ADMIN — SODIUM POLYSTYRENE SULFONATE 15 G: 15 SUSPENSION ORAL; RECTAL at 10:33

## 2021-03-15 RX ADMIN — BUDESONIDE AND FORMOTEROL FUMARATE DIHYDRATE 2 PUFF: 160; 4.5 AEROSOL RESPIRATORY (INHALATION) at 16:29

## 2021-03-15 ASSESSMENT — PAIN SCALES - GENERAL: PAINLEVEL_OUTOF10: 0

## 2021-03-15 ASSESSMENT — PAIN DESCRIPTION - PROGRESSION: CLINICAL_PROGRESSION: RAPIDLY IMPROVING

## 2021-03-15 ASSESSMENT — PAIN DESCRIPTION - PAIN TYPE: TYPE: ACUTE PAIN

## 2021-03-15 ASSESSMENT — PAIN DESCRIPTION - ORIENTATION: ORIENTATION: MID

## 2021-03-15 ASSESSMENT — PAIN DESCRIPTION - LOCATION: LOCATION: CHEST;BACK

## 2021-03-15 ASSESSMENT — PAIN DESCRIPTION - DESCRIPTORS: DESCRIPTORS: DULL

## 2021-03-15 NOTE — PLAN OF CARE
Problem: Falls - Risk of:  Goal: Will remain free from falls  Description: Will remain free from falls  Outcome: Ongoing  Goal: Absence of physical injury  Description: Absence of physical injury  Outcome: Ongoing     Problem: Pain:  Goal: Pain level will decrease  Description: Pain level will decrease  Outcome: Ongoing  Goal: Control of acute pain  Description: Control of acute pain  Outcome: Ongoing  Goal: Control of chronic pain  Description: Control of chronic pain  Outcome: Ongoing     Problem: RESPIRATORY  Goal: Clear lung sounds  Description: Clear lung sounds  3/14/2021 1253 by Marcelino Cotter RCP  Outcome: Ongoing

## 2021-03-15 NOTE — PROGRESS NOTES
Hospitalist Progress Note    Patient:  Mickie Schmidt      Unit/Bed:8A-20/020-A    YOB: 1950    MRN: [id number]       Acct: [de-identified]     PCP: NITO Duran CNP    Date of Admission: 3/12/2021    Active Hospital Problems    Diagnosis Date Noted    [facility] [E43] 03/15/2021     Class: Chronic    Syncope and collapse [R55] 03/13/2021    Solitary pulmonary nodule on lung CT [R91.1]     Stage 2 moderate COPD by GOLD classification ([facility] [address] [J44.9] 09/30/2013     Assessment/Plan:    1. Syncope and Collapse: likely 2/2 to Hypoxic Resp. Failure from COPD and Lung Mass. Troponin non elevated, no ECG changes. 2D Echo EF 55-60% with no LV wall motion abnormalities. No concerns for CVA or seizure, MRI no acute CVA. 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 Acute Hypoxic Resp. Failure: 2/2 acute COPD and Lung mass. Cont IV Solumedrol, duo-nebs. Cont to wean as tolerated. 3. Spiculated Lung Mass: 14 mm spiculated nodule in the right midlung periphery found on imaging in 12/2020. Pulmonary consulted. Plan for biopsy tomorrow. 4. Acute COPD Exacerbation: Cont IV Solumedrol, duo-nebs. Cont to wean as tolerated. Resume home inhalers    5. Tobacco Abuse: smoke 2 ppd. Educate and  on smoking cessation. Nicotine patch. 6. Hypothyroidism: TSH elevated 4.810. Ordered levothyroxine to 100mcg    7. Hyperkalemia: 5.4 today, no ECG changes. Will give Kayexalate. Dispo: plan for lung biopsy tomorrow. Cont IV Solumedrol and Duo-Nebs. NPO after midnight. Chief Complaint: syncope     Hospital Course: Mrs. Anthony Monroe is a 74 Y/O female with past medical history of COPD, anxiety, HTN, HLD hypothyroidism, and CAD, presents to ED for syncope and collaps. Per patient report she was cooking dinner when she suddenly lost consciousness. Patient was found by son on floor.  She states she has had several syncopal episodes in the past, most recent being 8 months ago, she. She has no other complaints on exam. Patient denies chest pain, shortness of breath, cough, headache, dizziness, lightheadedness, numbness, paraesthesias, weakness, chills, fevers, abdominal pain, nausea, vomiting, diarrhea, blood in stool, neck pain, or back pain.        Subjective: no acute events overnight. Pt laying in bed. Still sob. No fevers or chills. No chest pain. Tolerating PO intake. No diarrhea. Medications:  Reviewed    Infusion Medications   Scheduled Medications    sodium polystyrene  15 g Oral Once    methylPREDNISolone  40 mg Intravenous Q12H    ipratropium-albuterol  1 ampule Inhalation Q6H    famotidine (PEPCID) injection  20 mg Intravenous BID    sodium chloride flush  10 mL Intravenous 2 times per day    [Held by provider] enoxaparin  40 mg Subcutaneous Daily    levothyroxine  100 mcg Oral Daily    budesonide-formoterol  2 puff Inhalation BID     PRN Meds: sodium chloride flush, promethazine **OR** ondansetron, polyethylene glycol, acetaminophen **OR** acetaminophen, albuterol sulfate HFA      Intake/Output Summary (Last 24 hours) at 3/15/2021 1005  Last data filed at 3/15/2021 0618  Gross per 24 hour   Intake 1190 ml   Output 0 ml   Net 1190 ml       Diet:  Diet NPO, After Midnight    Exam:  BP (!) 107/44   Pulse 56   Temp 97.5 °F (36.4 °C) (Oral)   Resp 16   Ht 5' 4\" (1.626 m)   Wt 134 lb 6.4 oz (61 kg)   SpO2 96%   BMI 23.07 kg/m²     General appearance: No apparent distress, appears stated age and cooperative on 2L NC.  HEENT: Pupils equal, round, and reactive to light. Conjunctivae/corneas clear. Neck: Supple, with full range of motion. No jugular venous distention. Trachea midline. Respiratory:  Normal respiratory effort. Expiratory wheezing   Cardiovascular: Regular rate and rhythm with normal S1/S2 without murmurs, rubs or gallops.   Abdomen: Soft, non-tender, non-distended with normal bowel sounds. Musculoskeletal: passive and active ROM x 4 extremities. Skin: Skin color, texture, turgor normal.  No rashes or lesions. Neurologic:  Neurovascularly intact without any focal sensory/motor deficits. Cranial nerves: II-XII intact, grossly non-focal.  Psychiatric: Alert and oriented, thought content appropriate, normal insight  Capillary Refill: Brisk,< 3 seconds   Peripheral Pulses: +2 palpable, equal bilaterally       Labs:   Recent Labs     03/15/21  0558   WBC 6.5   HGB 15.3   HCT 49.1*        Recent Labs     03/15/21  0558      K 5.4*      CO2 22*   BUN 17   CREATININE 0.9   CALCIUM 8.7     Recent Labs     03/13/21  0531   AST 11   ALT 6*   BILITOT 0.3   ALKPHOS 112     No results for input(s): INR in the last 72 hours. No results for input(s): Gwenette Martyr in the last 72 hours. Urinalysis:      Lab Results   Component Value Date    NITRU NEGATIVE 02/02/2018    WBCUA 5-10 02/02/2018    WBCUA 15-25 05/16/2012    BACTERIA MANY 02/02/2018    RBCUA 0-2 02/02/2018    BLOODU TRACE 02/02/2018    SPECGRAV <1.005 02/02/2018    GLUCOSEU NEGATIVE 10/30/2015       Radiology:   All imaging reviewed     Diet: Diet NPO, After Midnight      Code Status: Full Code            Electronically signed by Tracey Mittal MD on 3/15/2021 at 10:05 AM

## 2021-03-15 NOTE — PROGRESS NOTES
[facility]    Patient - Gregory Guzman,  Age - 73 y.o.    - 1950      Room Number - 8A-20/020-A   Consulting - Grace Corona MD Primary Care Physician - NITO Martins CNP   MRN -  [id number]   [place] # - [de-identified]  Date of Admission -  3/12/2021  [facility] Day - 2    Chief Complaint   Lung nodule  HPI     Gregory Guzman is a 73 y.o. female with PMH of GOLD stage II moderate COPD, HTN, HLD, CAD, anxiety who presented to the ED on 3/13/2021 with a chief complaint of syncope and collapse. On review of the initial H&P, patient was apparently cooking dinner when she suddenly lost consciousness. This is not her first episode of syncope, has had several in the past.  No other complaints were given. Patient is known to the pulmonary clinic. She last saw NITO Timmons CNP on 3/8/2021. Patient has a history of a right lung nodule, last measured at 8 mm in 2018. On repeat imaging done on 2020, this nodule had increased in size and is now 14 mm. Jhony calculator 39% risk of malignancy. Patient was scheduled to have an IR CT-guided biopsy after her visit with us on 3/8/2021. This has not been completed yet. Patient has been lost to follow-up on multiple occasions. She is an established diagnosis of stage II gold COPD. She was started on Trelegy 1 puff daily and was also given albuterol 2 puffs every 6 hours as needed. Past 24 hours, ROS     Patient reports feeling better today  Feels less anxious about biopsy  States her breathing has gotten a lot better  Denies worsening shortness of breath, denies chest pain. No respiratory distress. Currently on 1 L of O2 via NC    All other systems reviewed  Objective    Vitals    height is 5' 4\" (1.626 m) and weight is 134 lb 6.4 oz (61 kg). Her oral temperature is 97.5 °F (36.4 °C). Her blood pressure is 107/44 (abnormal) and her pulse is 56.  Her respiration is 16 and oxygen saturation is 96%. O2 Flow Rate (L/min): 2 L/min  I/O    Intake/Output Summary (Last 24 hours) at 3/15/2021 0956  Last data filed at 3/15/2021 0618  Gross per 24 hour   Intake 1190 ml   Output 0 ml   Net 1190 ml     Patient Vitals for the past 96 hrs (Last 3 readings):   Weight   03/15/21 0600 134 lb 6.4 oz (61 kg)   03/14/21 0319 136 lb 9.6 oz (62 kg)   03/13/21 0451 136 lb (61.7 kg)     Exam    Physical Exam  Nursing note and vitals reviewed.     General Appearance: alert and oriented to person, place and time. In no respiratory distress. Skin: warm and dry, no rash or erythema  Neck: supple and non-tender without mass, no thyromegaly or thyroid nodules, no cervical lymphadenopathy  Pulmonary/Chest: Inspiratory wheezing and crackles in the right lower lung fields. Mildly decreased air entry. Normal respiratory effort.   Cardiovascular: normal rate, regular rhythm, normal S1 and S2, no murmurs, rubs, clicks, or gallops  Abdomen: soft, non-tender, non-distended, normal bowel sounds, no masses or organomegaly  Extremities: no edema  Musculoskeletal: normal range of motion, no joint swelling, deformity or tenderness       Meds       sodium polystyrene  15 g Oral Once    methylPREDNISolone  40 mg Intravenous Q12H    ipratropium-albuterol  1 ampule Inhalation Q6H    famotidine (PEPCID) injection  20 mg Intravenous BID    sodium chloride flush  10 mL Intravenous 2 times per day    [Held by provider] enoxaparin  40 mg Subcutaneous Daily    levothyroxine  100 mcg Oral Daily    budesonide-formoterol  2 puff Inhalation BID       sodium chloride flush, promethazine **OR** ondansetron, polyethylene glycol, acetaminophen **OR** acetaminophen, albuterol sulfate HFA  Labs   ABG  Lab Results   Component Value Date    PH 7.35 09/29/2013    PO2 53 09/29/2013    PCO2 45 09/29/2013    HCO3 25 09/29/2013    O2SAT 85 09/29/2013     No results found for: IFIO2, MODE, SETTIDVOL, SETPEEP  CBC  Recent Labs     03/13/21  1555 of procedure including but not limited to development of pneumothorax with requirement of chest tube placement. She verbalizes understanding. Current everyday smoker 2 ppd   Enlarging 14 mm RLL spiculated lung nodule malignant until proven otherwise. Gold 2 COPD. Smoking cessation consult  Change to oral prednisone  Case discussed with nurse and patient/family. Questions and concerns addressed. Meds and Orders reviewed.     Electronically signed by     Albino Villanueva MD on 3/15/2021 at 9:56 AM

## 2021-03-15 NOTE — PROGRESS NOTES
Pharmacist - Provider Communication    The following stress ulcer prophylaxis agent has been selected for discontinuation based on not meeting the criteria listed below: famotidine IV    Stress Ulcer Prophylaxis Criteria  Any one of the following independent risk factors:  1) Coagulopathy (INR >1.5 [not on anticoagulation], platelets <44,788, QCDJ>0K normal)  2) Mechanical ventilation >48 hours    Any two or more of the followin) Occult or overt bleeding for >6 days  2) ICU admission >1 week  3) Glucocorticoid therapy (>250 mg/day hydrocortisone or equivalent)  4) History of ulcers/bleeding within 1 year  5) Campos covering >35% BSA  6) Head/spinal trauma  7) Organ transplantation   8) Sepsis/septic shock    If the prescriber doesn't feel this discontinuation is appropriate, please re-order the medication and place \"SUP appropriate per prescriber\" in comments of the order. If you would like further assistance or have questions, please contact pharmacy at .     Marcy Ramirez PharmD 3/15/2021 2:29 PM

## 2021-03-15 NOTE — PROGRESS NOTES
This pt was scheduled for a lung bx as an outpatient for 3/16/21 at 0900. With the inpatient order she will keep her spot tomorrow. Night shift RN needs to release orders at 0001 on 3/16/21. Pt needs to be NPO 4 hours prior to the procedure. GABBY Palomino notified.

## 2021-03-15 NOTE — PROGRESS NOTES
Comprehensive Nutrition Assessment    Type and Reason for Visit:  Initial, Positive Nutrition Screen(Weight Loss/Decreased Appetite/Intake)    Nutrition Recommendations/Plan:   *Recommend a Multivitamin w/minerals daily. *Advance diet as tolerated. *Will send Ensure Enlive TID. Nutrition Assessment: Pt. severely malnourished AEB criteria listed below. At risk for further nutritional compromise r/t admit d/t syncope and collapse, NPO at present, underlying medical condition (hx COPD, THN, HLD, CAD, Anxiety, Breast Cancer, Tobacco Abuse, IBS) and need for nutrition support. Nutrition recommendations/interventions as per above. Malnutrition Assessment:  Malnutrition Status:  Severe malnutrition    Context:  Chronic Illness     Findings of the 6 clinical characteristics of malnutrition:  Energy Intake:  7 - 75% or less estimated energy requirements for 1 month or longer  Weight Loss:  7 - 7.5% over 3 months(10% weight loss in 3 months per EMR)     Body Fat Loss:  1 - Mild body fat loss Orbital   Muscle Mass Loss:  1 - Mild muscle mass loss Temples (temporalis), Clavicles (pectoralis & deltoids)  Fluid Accumulation:  No significant fluid accumulation Extremities   Strength:  Not Performed    Estimated Daily Nutrient Needs:  Energy (kcal):  7651-3621 kcal/day (25-30 kcal/kg); Weight Used for Energy Requirements:  (61 kg on 3/15)     Protein (g):  73+ g/day (1.2+ g/kg); Weight Used for Protein Requirements:  (61 kg on 3/15)          Nutrition Related Findings: admit d/t syncope & collapse; pt seen; she reports decreased intake over the past 7-8 months consuming 75% or less of 2 meals daily with unplanned weight loss and -10% in 3 months 2/2 her  passed away; pt denies N/V; x3 on 3/14. Labs: Glucose 147, K+ 5.4, Mg 2.5. Rx includes: Synthroid, Kayexalate.       Wounds:  None       Current Nutrition Therapies:    Diet NPO, After Midnight    Anthropometric Measures:  · Height: 5' 4\" (162.6 cm)  · Current Body Weight: 134 lb 6.4 oz (61 kg)(3/15; no edema noted)   · Admission Body Weight: 136 lb (61.7 kg)(3/13; no edema noted)    · Usual Body Weight: (Per EMR: 146 lb 12.8 oz on 7/24/20; 149 lb 3.2 oz on 12/14/20)     · Ideal Body Weight: 120 lbs; % Ideal Body Weight 112 %   · BMI: 23.1  · BMI Categories: Normal Weight (BMI 18.5-24. 9)       Nutrition Diagnosis:   · Severe malnutrition, In context of chronic illness related to inadequate protein-energy intake as evidenced by poor intake prior to admission, weight loss      Nutrition Interventions:   Food and/or Nutrient Delivery:  Continue Current Diet, Start Oral Nutrition Supplement, Vitamin Supplement  Nutrition Education/Counseling:  Education initiated   Coordination of Nutrition Care:  Continue to monitor while inpatient    Goals:  Pt will consume 75% or more of meals during LOS to aid in weight maintenance       Nutrition Monitoring and Evaluation:   Behavioral-Environmental Outcomes:  None Identified   Food/Nutrient Intake Outcomes:  Food and Nutrient Intake, Supplement Intake, Vitamin/Mineral Intake  Physical Signs/Symptoms Outcomes:  Biochemical Data, Weight, Nutrition Focused Physical Findings, Skin, Fluid Status or Edema     Discharge Planning:     Too soon to determine     Electronically signed by Suha Pinedo RD, LD on 3/15/21 at 9:44 AM EDT    Contact: [phone]

## 2021-03-15 NOTE — PROGRESS NOTES
their kids.)  Type of Home: House  Home Layout: Two level, Bed/Bath upstairs  Home Access: Stairs to enter with rails  Entrance Stairs - Number of Steps: 2   Bathroom Shower/Tub: Tub/Shower unit  Bathroom Toilet: Standard  Bathroom Accessibility: Accessible  IADL Comments: Pt stating she does all the homemaking including cooking, cleaning and laundry       ADL Assistance: Independent  Homemaking Assistance: Independent  Ambulation Assistance: Independent  Transfer Assistance: Independent    Active : Yes     Additional Comments: Pt reporting she was indep with aher ADL tasks. Pt stating she had right hip replaced in 2015 and feels it hasn't been correct since surgery. Pt stating she has diffiuclty at time with ambulating. Pt has 24 hour assist at home if needed. VISION:WNL    HEARING:  WNL    COGNITION: WNL    RANGE OF MOTION:  Bilateral Upper Extremity:  WNL    STRENGTH:  Bilateral Upper Extremity:  WNL    SENSATION:   WFL    ADL:   Grooming: Contact Guard Assistance. standing at sink   Lower Extremity Dressing: Stand By Assistance. seated eOB  Toileting: Stand By Assistance. Toilet Transfer: Stand By Assistance. [facility] BALANCE:  Standing Balance: Contact Guard Assistance. progressing to SBA during dynamic standing ADL tasks    BED MOBILITY:  Supine to Sit: Stand By Assistance    Sit to Supine: Stand By Assistance      TRANSFERS:  Sit to Stand:  Stand By Assistance. from EOB  Stand to Sit: Stand By Assistance. onto EOB    FUNCTIONAL MOBILITY:  Assistive Device: None  Assist Level:  Contact Guard Assistance. Progressing to SBA  Distance: To and from bathroom and into hallways  Pt ambulating with no O2 per Harleen Mosquera RN request. O2 sats upon return to room after ambulation at 92%. RN requesting to allow O2 to remain off         Activity Tolerance:  Patient tolerance of  treatment: good. Assessment:  Assessment: Pt demo decreased endurance while completing her ADL asks .  Pt would benefti from skilled OT Services to increase indep with aDL tasks after education on safety awareness during. Performance deficits / Impairments: Decreased ADL status, Decreased balance, Decreased safe awareness, Decreased endurance  Prognosis: Good  REQUIRES OT FOLLOW UP: Yes  Decision Making: Medium Complexity    Treatment Initiated: Treatment and education initiated within context of evaluation. Evaluation time included review of current medical information, gathering information related to past medical, social and functional history, completion of standardized testing, formal and informal observation of tasks, assessment of data and development of plan of care and goals. Treatment time included skilled education and facilitation of tasks to increase safety and independence with ADL's for improved functional independence and quality of life. Discharge Recommendations:  Patient would benefit from continued therapy after discharge, Home with assist PRN    Patient Education:  OT Education: OT Role, Plan of Care  Patient Education: safety with mobility    Equipment Recommendations:  Equipment Needed: No    Plan:  Times per week: 3-5x  Current Treatment Recommendations: Patient/Caregiver Education & Training, Balance Training, Endurance Training, Safety Education & Training, Self-Care / ADL. See long-term goal time frame for expected duration of plan of care. If no long-term goals established, a short length of stay is anticipated. Goals:  Patient goals : go home  Short term goals  Time Frame for Short term goals: by discharge  Short term goal 1: Pt to complete her BADL routine with SBA and no vcs for safety  Short term goal 2: Pt demo dynamic standing balance > 5 min with no UE support and SBA while reaching outside base of support in prep for simple homemaking tasks         Following session, patient left in safe position with all fall risk precautions in place.

## 2021-03-15 NOTE — FLOWSHEET NOTE
Pt developed rash between legs bilaterally with large areas of induration erythemia and warmth. Hospitalist notified with new orders for benedryl. Pt with relief from itching, Suha Ferrer are residing but still present.

## 2021-03-15 NOTE — CARE COORDINATION
3/15/21, 7:02 AM EDT  DISCHARGE PLANNING EVALUATION:    Mickie Schmidt       Admitted: 3/12/2021/ Viral day: 2   Location: 8A-/020-A Reason for admit: Syncope and collapse [R55]   PMH:  has a past medical history of Anxiety, Arthritis, Breast cancer ([address] CAD (coronary artery disease), Cancer of the skin, Cerebral artery occlusion with cerebral infarction ([address] Chronic UTI, COPD (chronic obstructive pulmonary disease) ([address] CVA (cerebral infarction), Depression, DVT of lower extremity (deep venous thrombosis) ([address] Facial injury, History of stroke, Hx of blood clots, Hyperlipidemia, Hypertension, Hypothyroidism, IBS (irritable bowel syndrome), Low HDL (under 40), Prolonged emergence from general anesthesia, Recurrent UTI (urinary tract infection), and Word finding difficulty. Procedure: No  Barriers to Discharge: To ER following fall, dizzy and reported passed out. Historical issues with dizziness for several years. Reports no previous workup for this. Reports known carotid stenosis however. Pt is a smoker. Nicotine patch and smoking cessation. Hx of COPD, CVA, DVT and HTN. BNP elevated. Consults to Pulmonology and Neurology. Ortho VS. Echo done. Noted to have pulmonary nodule on lung CT. Bx planned for tomorrow. IV Solumedrol, Duo nebs. PCP: NITO Duran CNP  Readmission Risk Score: 10%    Patient Goals/Plan/Treatment Preferences: Met with pt today. She is from home where she currently resides with a friend and multiple other family members. She reports that in May she will be moving into her new apt. She is typically self sufficient. No home services at present. She has multiple DME that she has collected ( spouse). She has a PCP, she drives and denies issues getting meds. If pt requires O2 at discharge, she prefers [facility] to supply. CM will follow for possible needs at discharge. Transportation/Food Security/Housekeeping Addressed:  No issues identified.

## 2021-03-15 NOTE — PLAN OF CARE
Problem: RESPIRATORY  Goal: Clear lung sounds  Description: Clear lung sounds  Outcome: Ongoing  Note: Cont aerosol to improve aeration

## 2021-03-16 ENCOUNTER — APPOINTMENT (OUTPATIENT)
Dept: GENERAL RADIOLOGY | Age: 71
DRG: 189 | End: 2021-03-16
Payer: MEDICARE

## 2021-03-16 ENCOUNTER — APPOINTMENT (OUTPATIENT)
Dept: CT IMAGING | Age: 71
DRG: 189 | End: 2021-03-16
Payer: MEDICARE

## 2021-03-16 LAB
ANION GAP SERPL CALCULATED.3IONS-SCNC: 9 MEQ/L (ref 8–16)
APTT: 26.5 SECONDS (ref 22–38)
BASOPHILS # BLD: 0.1 %
BASOPHILS ABSOLUTE: 0 THOU/MM3 (ref 0–0.1)
BUN BLDV-MCNC: 27 MG/DL (ref 7–22)
CALCIUM SERPL-MCNC: 8.5 MG/DL (ref 8.5–10.5)
CHLORIDE BLD-SCNC: 104 MEQ/L (ref 98–111)
CO2: 23 MEQ/L (ref 23–33)
CREAT SERPL-MCNC: 1 MG/DL (ref 0.4–1.2)
CREAT SERPL-MCNC: 1.1 MG/DL (ref 0.4–1.2)
EKG ATRIAL RATE: 61 BPM
EKG P AXIS: 74 DEGREES
EKG P-R INTERVAL: 152 MS
EKG Q-T INTERVAL: 450 MS
EKG QRS DURATION: 76 MS
EKG QTC CALCULATION (BAZETT): 453 MS
EKG R AXIS: 60 DEGREES
EKG T AXIS: 90 DEGREES
EKG VENTRICULAR RATE: 61 BPM
EOSINOPHIL # BLD: 0 %
EOSINOPHILS ABSOLUTE: 0 THOU/MM3 (ref 0–0.4)
ERYTHROCYTE [DISTWIDTH] IN BLOOD BY AUTOMATED COUNT: 13.4 % (ref 11.5–14.5)
ERYTHROCYTE [DISTWIDTH] IN BLOOD BY AUTOMATED COUNT: 13.5 % (ref 11.5–14.5)
ERYTHROCYTE [DISTWIDTH] IN BLOOD BY AUTOMATED COUNT: 49.5 FL (ref 35–45)
ERYTHROCYTE [DISTWIDTH] IN BLOOD BY AUTOMATED COUNT: 51.5 FL (ref 35–45)
GFR SERPL CREATININE-BSD FRML MDRD: 49 ML/MIN/1.73M2
GFR SERPL CREATININE-BSD FRML MDRD: 55 ML/MIN/1.73M2
GLUCOSE BLD-MCNC: 141 MG/DL (ref 70–108)
HCT VFR BLD CALC: 46 % (ref 37–47)
HCT VFR BLD CALC: 46 % (ref 37–47)
HEMOGLOBIN: 14.2 GM/DL (ref 12–16)
HEMOGLOBIN: 14.6 GM/DL (ref 12–16)
IMMATURE GRANS (ABS): 0.08 THOU/MM3 (ref 0–0.07)
IMMATURE GRANULOCYTES: 0.8 %
INR BLD: 0.97 (ref 0.85–1.13)
LYMPHOCYTES # BLD: 6.7 %
LYMPHOCYTES ABSOLUTE: 0.7 THOU/MM3 (ref 1–4.8)
MAGNESIUM: 2.5 MG/DL (ref 1.6–2.4)
MCH RBC QN AUTO: 31.6 PG (ref 26–33)
MCH RBC QN AUTO: 31.9 PG (ref 26–33)
MCHC RBC AUTO-ENTMCNC: 30.9 GM/DL (ref 32.2–35.5)
MCHC RBC AUTO-ENTMCNC: 31.7 GM/DL (ref 32.2–35.5)
MCV RBC AUTO: 100.4 FL (ref 81–99)
MCV RBC AUTO: 102.2 FL (ref 81–99)
MONOCYTES # BLD: 0.6 %
MONOCYTES ABSOLUTE: 0.1 THOU/MM3 (ref 0.4–1.3)
NUCLEATED RED BLOOD CELLS: 0 /100 WBC
PLATELET # BLD: 181 THOU/MM3 (ref 130–400)
PLATELET # BLD: 186 THOU/MM3 (ref 130–400)
PMV BLD AUTO: 11.1 FL (ref 9.4–12.4)
PMV BLD AUTO: 11.6 FL (ref 9.4–12.4)
POTASSIUM SERPL-SCNC: 4.7 MEQ/L (ref 3.5–5.2)
RBC # BLD: 4.5 MILL/MM3 (ref 4.2–5.4)
RBC # BLD: 4.58 MILL/MM3 (ref 4.2–5.4)
SEG NEUTROPHILS: 91.8 %
SEGMENTED NEUTROPHILS ABSOLUTE COUNT: 9.6 THOU/MM3 (ref 1.8–7.7)
SODIUM BLD-SCNC: 136 MEQ/L (ref 135–145)
WBC # BLD: 10.5 THOU/MM3 (ref 4.8–10.8)
WBC # BLD: 9.5 THOU/MM3 (ref 4.8–10.8)

## 2021-03-16 PROCEDURE — 85730 THROMBOPLASTIN TIME PARTIAL: CPT

## 2021-03-16 PROCEDURE — 88342 IMHCHEM/IMCYTCHM 1ST ANTB: CPT

## 2021-03-16 PROCEDURE — 94640 AIRWAY INHALATION TREATMENT: CPT

## 2021-03-16 PROCEDURE — 85025 COMPLETE CBC W/AUTO DIFF WBC: CPT

## 2021-03-16 PROCEDURE — 85610 PROTHROMBIN TIME: CPT

## 2021-03-16 PROCEDURE — 2580000003 HC RX 258: Performed by: RADIOLOGY

## 2021-03-16 PROCEDURE — 6360000002 HC RX W HCPCS: Performed by: INTERNAL MEDICINE

## 2021-03-16 PROCEDURE — 2580000003 HC RX 258: Performed by: PHYSICIAN ASSISTANT

## 2021-03-16 PROCEDURE — 88334 PATH CONSLTJ SURG CYTO XM EA: CPT

## 2021-03-16 PROCEDURE — 6360000002 HC RX W HCPCS: Performed by: RADIOLOGY

## 2021-03-16 PROCEDURE — 6360000002 HC RX W HCPCS

## 2021-03-16 PROCEDURE — 99232 SBSQ HOSP IP/OBS MODERATE 35: CPT | Performed by: INTERNAL MEDICINE

## 2021-03-16 PROCEDURE — 32408 CORE NDL BX LNG/MED PERQ: CPT

## 2021-03-16 PROCEDURE — 71045 X-RAY EXAM CHEST 1 VIEW: CPT

## 2021-03-16 PROCEDURE — 93010 ELECTROCARDIOGRAM REPORT: CPT | Performed by: INTERNAL MEDICINE

## 2021-03-16 PROCEDURE — 82565 ASSAY OF CREATININE: CPT

## 2021-03-16 PROCEDURE — 85027 COMPLETE CBC AUTOMATED: CPT

## 2021-03-16 PROCEDURE — 1200000003 HC TELEMETRY R&B

## 2021-03-16 PROCEDURE — 83735 ASSAY OF MAGNESIUM: CPT

## 2021-03-16 PROCEDURE — 94760 N-INVAS EAR/PLS OXIMETRY 1: CPT

## 2021-03-16 PROCEDURE — 2700000000 HC OXYGEN THERAPY PER DAY

## 2021-03-16 PROCEDURE — 0BBF3ZX EXCISION OF RIGHT LOWER LUNG LOBE, PERCUTANEOUS APPROACH, DIAGNOSTIC: ICD-10-PCS | Performed by: INTERNAL MEDICINE

## 2021-03-16 PROCEDURE — 36415 COLL VENOUS BLD VENIPUNCTURE: CPT

## 2021-03-16 PROCEDURE — 88333 PATH CONSLTJ SURG CYTO XM 1: CPT

## 2021-03-16 PROCEDURE — 80048 BASIC METABOLIC PNL TOTAL CA: CPT

## 2021-03-16 PROCEDURE — 88305 TISSUE EXAM BY PATHOLOGIST: CPT

## 2021-03-16 PROCEDURE — 94761 N-INVAS EAR/PLS OXIMETRY MLT: CPT

## 2021-03-16 PROCEDURE — 77012 CT SCAN FOR NEEDLE BIOPSY: CPT

## 2021-03-16 PROCEDURE — 6370000000 HC RX 637 (ALT 250 FOR IP): Performed by: INTERNAL MEDICINE

## 2021-03-16 PROCEDURE — 88341 IMHCHEM/IMCYTCHM EA ADD ANTB: CPT

## 2021-03-16 RX ORDER — IBUPROFEN 200 MG
CAPSULE ORAL ONCE
Status: COMPLETED | OUTPATIENT
Start: 2021-03-16 | End: 2021-03-16

## 2021-03-16 RX ORDER — MIDAZOLAM HYDROCHLORIDE 2 MG/2ML
1 INJECTION, SOLUTION INTRAMUSCULAR; INTRAVENOUS ONCE
Status: COMPLETED | OUTPATIENT
Start: 2021-03-16 | End: 2021-03-16

## 2021-03-16 RX ORDER — FENTANYL CITRATE 50 UG/ML
25 INJECTION, SOLUTION INTRAMUSCULAR; INTRAVENOUS ONCE
Status: COMPLETED | OUTPATIENT
Start: 2021-03-16 | End: 2021-03-16

## 2021-03-16 RX ORDER — SODIUM CHLORIDE 450 MG/100ML
INJECTION, SOLUTION INTRAVENOUS CONTINUOUS
Status: DISCONTINUED | OUTPATIENT
Start: 2021-03-16 | End: 2021-03-17 | Stop reason: HOSPADM

## 2021-03-16 RX ORDER — MIDAZOLAM HYDROCHLORIDE 2 MG/2ML
0.5 INJECTION, SOLUTION INTRAMUSCULAR; INTRAVENOUS ONCE
Status: COMPLETED | OUTPATIENT
Start: 2021-03-16 | End: 2021-03-16

## 2021-03-16 RX ORDER — FENTANYL CITRATE 50 UG/ML
50 INJECTION, SOLUTION INTRAMUSCULAR; INTRAVENOUS ONCE
Status: COMPLETED | OUTPATIENT
Start: 2021-03-16 | End: 2021-03-16

## 2021-03-16 RX ADMIN — METHYLPREDNISOLONE SODIUM SUCCINATE 40 MG: 40 INJECTION, POWDER, FOR SOLUTION INTRAMUSCULAR; INTRAVENOUS at 11:06

## 2021-03-16 RX ADMIN — Medication: at 10:18

## 2021-03-16 RX ADMIN — MIDAZOLAM 0.5 MG: 1 INJECTION INTRAMUSCULAR; INTRAVENOUS at 09:50

## 2021-03-16 RX ADMIN — METHYLPREDNISOLONE SODIUM SUCCINATE 40 MG: 40 INJECTION, POWDER, FOR SOLUTION INTRAMUSCULAR; INTRAVENOUS at 01:23

## 2021-03-16 RX ADMIN — SODIUM CHLORIDE, PRESERVATIVE FREE 10 ML: 5 INJECTION INTRAVENOUS at 20:56

## 2021-03-16 RX ADMIN — BUDESONIDE AND FORMOTEROL FUMARATE DIHYDRATE 2 PUFF: 160; 4.5 AEROSOL RESPIRATORY (INHALATION) at 17:15

## 2021-03-16 RX ADMIN — FENTANYL CITRATE 25 MCG: 50 INJECTION, SOLUTION INTRAMUSCULAR; INTRAVENOUS at 10:22

## 2021-03-16 RX ADMIN — BUDESONIDE AND FORMOTEROL FUMARATE DIHYDRATE 2 PUFF: 160; 4.5 AEROSOL RESPIRATORY (INHALATION) at 06:27

## 2021-03-16 RX ADMIN — IPRATROPIUM BROMIDE AND ALBUTEROL SULFATE 1 AMPULE: .5; 3 SOLUTION RESPIRATORY (INHALATION) at 23:54

## 2021-03-16 RX ADMIN — IPRATROPIUM BROMIDE AND ALBUTEROL SULFATE 1 AMPULE: .5; 3 SOLUTION RESPIRATORY (INHALATION) at 11:30

## 2021-03-16 RX ADMIN — FENTANYL CITRATE 25 MCG: 50 INJECTION, SOLUTION INTRAMUSCULAR; INTRAVENOUS at 09:45

## 2021-03-16 RX ADMIN — IPRATROPIUM BROMIDE AND ALBUTEROL SULFATE 1 AMPULE: .5; 3 SOLUTION RESPIRATORY (INHALATION) at 06:27

## 2021-03-16 RX ADMIN — IPRATROPIUM BROMIDE AND ALBUTEROL SULFATE 1 AMPULE: .5; 3 SOLUTION RESPIRATORY (INHALATION) at 17:15

## 2021-03-16 RX ADMIN — FENTANYL CITRATE 25 MCG: 50 INJECTION, SOLUTION INTRAMUSCULAR; INTRAVENOUS at 09:50

## 2021-03-16 RX ADMIN — SODIUM CHLORIDE, PRESERVATIVE FREE 10 ML: 5 INJECTION INTRAVENOUS at 11:06

## 2021-03-16 RX ADMIN — SODIUM CHLORIDE: 4.5 INJECTION, SOLUTION INTRAVENOUS at 08:13

## 2021-03-16 RX ADMIN — MIDAZOLAM 0.5 MG: 1 INJECTION INTRAMUSCULAR; INTRAVENOUS at 09:45

## 2021-03-16 ASSESSMENT — PAIN DESCRIPTION - ONSET: ONSET: ON-GOING

## 2021-03-16 ASSESSMENT — PAIN SCALES - GENERAL
PAINLEVEL_OUTOF10: 10
PAINLEVEL_OUTOF10: 0
PAINLEVEL_OUTOF10: 7

## 2021-03-16 ASSESSMENT — PAIN DESCRIPTION - LOCATION: LOCATION: CHEST;BACK

## 2021-03-16 ASSESSMENT — PAIN DESCRIPTION - ORIENTATION: ORIENTATION: RIGHT

## 2021-03-16 ASSESSMENT — PAIN DESCRIPTION - DESCRIPTORS: DESCRIPTORS: DULL

## 2021-03-16 NOTE — CARE COORDINATION
3/16/21, 1:13 PM EDT    DISCHARGE ON GOING EVALUATION    Cody Bella day: 3  Location: 8A-20/020-A Reason for admit: Syncope and collapse [R55]   Procedure: No  Barriers to Discharge: CT with lung biopsy this morning. PT/OT following. Plan to monitor overnight for pneumothorax. Plan possible discharge. Will likely need home O2 eval prior to discharge. PCP: NITO Aguilar CNP  Readmission Risk Score: 12%  Patient Goals/Plan/Treatment Preferences: Plans return home with friends and family as prior to admission.

## 2021-03-16 NOTE — PROGRESS NOTES
0910 Pt arrived to CT holding. Skin natural for race, warm, dry. Respirations even and unlabored. Pain none. 0505 Dr. Bala Del Angel in to evaluate pt and explain procedure. Consent obtained. 0934 Pt positioned on CT table. Monitor applied. 8007 Pre biopsy CT scan started. 0945 Procedure started. [address] First biopsy sample obtained from the right lung nodule. Pathology reviewing. 1010 Second lung nodule biopsy obtained. 1011 Third lung nodule biopsy obtained. 1015 Procedure complete. Biopsy needle pulled and no bleeding present. 1018 Triple antibiotic ointment applied to the biopsy site and bandaid. 1022 Pt. complaining of pain at the biopsy site. Rating it a 10 on scale 1-10. Gave Fentanyl IV. See MAR.  1023 Pt transported to [facility] via bed. Report called to floor nurse. Skin natural for race, warm, dry. Respirations even and unlabored.

## 2021-03-16 NOTE — H&P
[facility]  Sedation/Analgesia History & Physical    Pt Name: Rafael Moreira  MRN: [id number]  YOB: 1950  Provider Performing Procedure: Sukhdepe Perkins MD  Primary Care Physician: NITO Cat CNP    PRE-PROCEDURE   DNR-CCA/DNR-CC []Yes [x]No  Brief History/Pre-Procedure Diagnosis: RLL nodule          MEDICAL HISTORY  [x]CAD/Valve  []Liver Disease  [x]Lung Disease []Diabetes  [x]Hypertension []Renal Disease  []Additional information:       has a past medical history of Anxiety, Arthritis, Breast cancer ([facility] [address] CAD (coronary artery disease), Cancer of the skin, Cerebral artery occlusion with cerebral infarction [facility]), Chronic UTI, COPD (chronic obstructive pulmonary disease) ([facility] CVA (cerebral infarction), Depression, DVT of lower extremity (deep venous thrombosis) ([facility] [address] [facility] Facial injury, History of stroke, Hx of blood clots, Hyperlipidemia, Hypertension, Hypothyroidism, IBS (irritable bowel syndrome), Low HDL (under 40), Prolonged emergence from general anesthesia, Recurrent UTI (urinary tract infection), and Word finding difficulty. SURGICAL HISTORY   has a past surgical history that includes Appendectomy (1975); Cholecystectomy (1992); moira and bso (cervix removed) (1976, 2001); Colonoscopy (2009); Thyroid surgery (2007); Hysterectomy (1976); joint replacement (Left, 2011); joint replacement (Right, 1/19/15); hip surgery (jan 19, 2015); Mastectomy (Right, 2005); other surgical history (04/10/2018); and pr njx dx/ther sbst intrlmnr lmbr/sac w/img gdn (N/A, 4/10/2018).   Additional information:       ALLERGIES   Allergies as of 03/12/2021 - Review Complete 03/12/2021   Allergen Reaction Noted    Cipro xr  09/14/2011    Vicodin [hydrocodone-acetaminophen]  09/14/2011     Additional information:       MEDICATIONS   Coumadin Use Last 5 Days [x]No []Yes  Antiplatelet drug therapy use last 5 days  [x]No []Yes  Other anticoagulant use last 5 days  [x]No []Yes    Current Facility-Administered Medications:     0.45 % sodium chloride infusion, , Intravenous, Continuous, Sukhdeep Perkins DO, Last Rate: 20 mL/hr at 03/16/21 0813, New Bag at 03/16/21 0813    fentaNYL (SUBLIMAZE) injection 50 mcg, 50 mcg, Intravenous, Once, Sukhdeep Perkins DO    midazolam PF (VERSED) injection 1 mg, 1 mg, Intravenous, Once, Sukhdeep Perkins DO    methylPREDNISolone sodium (SOLU-MEDROL) injection 40 mg, 40 mg, Intravenous, Q12H, Yamilka Dawkins MD, 40 mg at 03/16/21 0123    ipratropium-albuterol (DUONEB) nebulizer solution 1 ampule, 1 ampule, Inhalation, Q6H, Yamilka Dawkins MD, 1 ampule at 03/16/21 0627    sodium chloride flush 0.9 % injection 10 mL, 10 mL, Intravenous, 2 times per day, ZEENAT Law, 10 mL at 03/15/21 2101    sodium chloride flush 0.9 % injection 10 mL, 10 mL, Intravenous, PRN, ZEENAT Johnson    [Held by provider] enoxaparin (LOVENOX) injection 40 mg, 40 mg, Subcutaneous, Daily, ZEENAT Law, Stopped at 03/13/21 0900    promethazine (PHENERGAN) tablet 12.5 mg, 12.5 mg, Oral, Q6H PRN **OR** ondansetron (ZOFRAN) injection 4 mg, 4 mg, Intravenous, Q6H PRN, ZEENAT Law    polyethylene glycol (GLYCOLAX) packet 17 g, 17 g, Oral, Daily PRN, ZEENAT Law    acetaminophen (TYLENOL) tablet 650 mg, 650 mg, Oral, Q6H PRN, 650 mg at 03/13/21 2024 **OR** acetaminophen (TYLENOL) suppository 650 mg, 650 mg, Rectal, Q6H PRN, ZEENAT Law    albuterol sulfate  (90 Base) MCG/ACT inhaler 2 puff, 2 puff, Inhalation, Q6H PRN, ZEENAT Law    levothyroxine (SYNTHROID) tablet 100 mcg, 100 mcg, Oral, Daily, ZEENAT Saavedra, 100 mcg at 03/15/21 1026    budesonide-formoterol (SYMBICORT) 160-4.5 MCG/ACT inhaler 2 puff, 2 puff, Inhalation, BID, ZEENAT Saavedra, 2 puff at 03/16/21 3403  Prior to Admission medications    Medication Sig Start Date End Date Taking?  Authorizing Provider   albuterol sulfate HFA (PROVENTIL HFA) 108 (90 Base) MCG/ACT inhaler Inhale 2 puffs into the lungs every 6 hours as needed for Wheezing 3/8/21  Yes Summer December, APRN - CNP   fluticasone-umeclidin-vilant (Edson Clore) 100-62.5-25 MCG/INH AEPB Inhale 1 puff into the lungs daily 3/8/21 3/8/22  Unknown Sero Iris, APRN - CNP   venlafaxine (EFFEXOR XR) 37.5 MG extended release capsule Take 1 capsule by mouth daily 8/12/20 2/8/21  Daphneyilia Ravi APRN - CNP   levothyroxine (LEVOTHROID) 88 MCG tablet Take 1 tablet by mouth daily  Patient not taking: Reported on 3/8/2021 7/25/17   Bobbi Curran MD     Additional information:       VITAL SIGNS   Vitals:    03/16/21 0756   BP: (!) 123/56   Pulse: 66   Resp: 16   Temp: 98.3 °F (36.8 °C)   SpO2: 94%       PHYSICAL:   Heart:  [x]Regular rate and rhythm  []Other:    Lungs:  [x]Clear    []Other:    Abdomen: [x]Soft    []Other:    Mental Status: [x]Alert & Oriented  []Other:      PLANNED PROCEDURE   [x]Biospy []Arteriogram              []Drainage   []Mediport Insertion  []Fistulogram []IV access       []Vertebroplasty / Augmentation  []IVC filter []Dialysis catheter []Biliary drainage  []Other: []CAPD Catheter []Nephrostomy Tube / Stent  SEDATION  Planned agent:[x]Midazolam []Meperidine [x]Sublimaze []Dilaudid []Morphine     []Diazepam  []Other:     ASA Classification:  []1 [x]2 []3 []4 []5  Class 1: A normal healthy patient  Class 2: Pt with mild to moderate systemic disease  Class 3: Severe systemic disease or disturbance  Class 4: Severe systemic disorders that are already life threatening. Class 5: Moribund pt with little chances of survival, for more than 24 hours. Mallampati I Airway Classification:   []1 []2 [x]3 []4    [x]Pre-procedure diagnostic studies complete and results available. Comment:    [x]Previous sedation/anesthesia experiences assessed. Comment:    [x]The patient is an appropriate candidate to undergo the planned procedure sedation and anesthesia.  (Refer to nursing sedation/analgesia documentation record)  [x]Formulation and

## 2021-03-16 NOTE — PLAN OF CARE
Problem: Falls - Risk of:  Goal: Will remain free from falls  Description: Will remain free from falls  3/16/2021 1057 by Eleanor Medina RN  Outcome: Ongoing  3/16/2021 0401 by Freeda Apgar, RN  Outcome: Ongoing  Goal: Absence of physical injury  Description: Absence of physical injury  3/16/2021 1057 by Eleanor Medina RN  Outcome: Ongoing  3/16/2021 0401 by Freeda Apgar, RN  Outcome: Ongoing     Problem: Pain:  Goal: Pain level will decrease  Description: Pain level will decrease  3/16/2021 1057 by Eleanor Medina RN  Outcome: Ongoing  3/16/2021 0401 by Freeda Apgar, RN  Outcome: Ongoing  Goal: Control of acute pain  Description: Control of acute pain  3/16/2021 1057 by Eleanor Medina RN  Outcome: Ongoing  3/16/2021 0401 by Freeda Apgar, RN  Outcome: Ongoing  Goal: Control of chronic pain  Description: Control of chronic pain  3/16/2021 1057 by Eleanor Medina RN  Outcome: Ongoing  3/16/2021 0401 by Freeda Apgar, RN  Outcome: Ongoing     Problem: Nutrition  Goal: Optimal nutrition therapy  Outcome: Ongoing     Problem: Musculor/Skeletal Functional Status  Goal: Highest potential functional level  3/16/2021 1057 by Eleanor Medina RN  Outcome: Ongoing  3/16/2021 0401 by Freeda Apgar, RN  Outcome: Ongoing  Goal: Absence of falls  3/16/2021 1057 by Eleanor Medina RN  Outcome: Ongoing  3/16/2021 0401 by Freeda Apgar, RN  Outcome: Ongoing

## 2021-03-16 NOTE — PLAN OF CARE
Problem: RESPIRATORY  Goal: Clear lung sounds  Description: Clear lung sounds  Outcome: Ongoing   Improve breath sounds, increase aeration and decrease WOB.

## 2021-03-16 NOTE — PROGRESS NOTES
[place] [facility]    Patient - Luigi Riddle,  Age - 73 y.o.    - 1950      Room Number - 8A-20/020-A   Consulting - Terrell Flowers MD Primary Care Physician - NITO Shelton CNP    MRN -  [id number]   Kelly # - [de-identified]  Date of Admission -  3/12/2021  [facility] Day - 3     Chief Complaint   Lung nodule  HPI     Luigi Riddle is a 73 y.o. female with PMH of GOLD stage II moderate COPD, HTN, HLD, CAD, anxiety who presented to the ED on 3/13/2021 with a chief complaint of syncope and collapse. On review of the initial H&P, patient was apparently cooking dinner when she suddenly lost consciousness. This is not her first episode of syncope, has had several in the past.  No other complaints were given. Patient is known to the pulmonary clinic. She last saw NITO Rose CNP on 3/8/2021. Patient has a history of a right lung nodule, last measured at 8 mm in 2018. On repeat imaging done on 2020, this nodule had increased in size and is now 14 mm. Jhony calculator 39% risk of malignancy. Patient was scheduled to have an IR CT-guided biopsy after her visit with us on 3/8/2021. This has not been completed yet. Patient has been lost to follow-up on multiple occasions. She is an established diagnosis of stage II gold COPD. She was started on Trelegy 1 puff daily and was also given albuterol 2 puffs every 6 hours as needed. Past 24 hours, ROS     Patient already down to radiology when attempted to see   Stable overnight, no respiratory events    All other systems reviewed  Objective    Vitals    height is 5' 4\" (1.626 m) and weight is 134 lb 6.4 oz (61 kg). Her oral temperature is 98.3 °F (36.8 °C). Her blood pressure is 159/61 (abnormal) and her pulse is 62. Her respiration is 14 and oxygen saturation is 95%.      O2 Flow Rate (L/min): 1 L/min  I/O    Intake/Output Summary (Last 24 hours) at 3/16/2021 1041  Last data filed at 3/16/2021 0342  Gross per 24 hour   Intake 450 ml   Output 0 ml   Net 450 ml     Patient Vitals for the past 96 hrs (Last 3 readings):   Weight   03/15/21 0600 134 lb 6.4 oz (61 kg)   03/14/21 0319 136 lb 9.6 oz (62 kg)   03/13/21 0451 136 lb (61.7 kg)     Exam    Physical Exam  Nursing note and vitals reviewed.     General Appearance: alert and oriented to person, place and time. In no respiratory distress. Skin: warm and dry, no rash or erythema  Neck: supple and non-tender without mass, no thyromegaly or thyroid nodules, no cervical lymphadenopathy  Pulmonary/Chest: Inspiratory wheezing and crackles in the right lower lung fields. Mildly decreased air entry. Normal respiratory effort.   Cardiovascular: normal rate, regular rhythm, normal S1 and S2, no murmurs, rubs, clicks, or gallops  Abdomen: soft, non-tender, non-distended, normal bowel sounds, no masses or organomegaly  Extremities: no edema  Musculoskeletal: normal range of motion, no joint swelling, deformity or tenderness       Meds       methylPREDNISolone  40 mg Intravenous Q12H    ipratropium-albuterol  1 ampule Inhalation Q6H    sodium chloride flush  10 mL Intravenous 2 times per day    [Held by provider] enoxaparin  40 mg Subcutaneous Daily    levothyroxine  100 mcg Oral Daily    budesonide-formoterol  2 puff Inhalation BID      sodium chloride 20 mL/hr at 03/16/21 0813     sodium chloride flush, promethazine **OR** ondansetron, polyethylene glycol, acetaminophen **OR** acetaminophen, albuterol sulfate HFA  Labs   ABG  Lab Results   Component Value Date    PH 7.35 09/29/2013    PO2 53 09/29/2013    PCO2 45 09/29/2013    HCO3 25 09/29/2013    O2SAT 85 09/29/2013     No results found for: LUIS Shore  CBC  Recent Labs     03/15/21  0558 03/16/21  0534 03/16/21  0731   WBC 6.5 10.5 9.5   RBC 4.86 4.50 4.58   HGB 15.3 14.2 14.6   HCT 49.1* 46.0 46.0   .0* 102.2* 100.4*   MCH 31.5 31.6 31.9   MCHC 31.2* 30.9* 31.7*  181 186   MPV 10.8 11.6 11.1      BMP  Recent Labs     03/14/21  0628 03/15/21  0558 03/16/21  0534 03/16/21  0731    138 136  --    K 4.0 5.4* 4.7  --     107 104  --    CO2 24 22* 23  --    BUN 16 17 27*  --    CREATININE 0.9 0.9 1.0 1.1   GLUCOSE 80 147* 141*  --    MG 2.4 2.5* 2.5*  --    CALCIUM 8.5 8.7 8.5  --      LFT  No results for input(s): AST, ALT, ALB, BILITOT, ALKPHOS, LIPASE in the last 72 hours. Invalid input(s): AMYLASE  TROP  Lab Results   Component Value Date    TROPONINT < 0.010 03/12/2021    TROPONINT < 0.010 12/13/2020    TROPONINT < 0.010 05/16/2017     BNP  Lab Results   Component Value Date    PROBNP 2653.0 03/12/2021    PROBNP 995.5 12/13/2020    PROBNP 260.0 05/16/2017     D-Dimer  Lab Results   Component Value Date    DDIMER 2248.00 12/13/2020     Lactic Acid  No results for input(s): LACTA in the last 72 hours. INR  Recent Labs     03/16/21  0731   INR 0.97     PTT  Recent Labs     03/16/21  0731   APTT 26.5     Glucose  No results for input(s): POCGLU in the last 72 hours. UA No results for input(s): SPECGRAV, PHUR, COLORU, CLARITYU, MUCUS, PROTEINU, BLOODU, RBCUA, WBCUA, BACTERIA, NITRU, GLUCOSEU, BILIRUBINUR, UROBILINOGEN, KETUA, LABCAST, LABCASTTY, AMORPHOS in the last 72 hours. Invalid input(s): CRYSTALS. Radiology        CT Scans    (See actual reports for details)  [address] Problems    Diagnosis Date Noted    Severe malnutrition ([facility] [E43] 03/15/2021     Class: Chronic    Syncope and collapse [R55] 03/13/2021    Solitary pulmonary nodule on lung CT [R91.1]     Stage 2 moderate COPD by GOLD classification ([facility] [J44.9] 09/30/2013     Assessment and Plan     CT guided biopsy today, will follow up after procedure   Current everyday smoker 2 ppd   Enlarging 14 mm RLL spiculated lung nodule malignant until proven otherwise. Gold 2 COPD.   Smoking cessation consult  Change to oral prednisone  Case discussed with nurse and patient/family. Questions and concerns addressed. Meds and Orders reviewed.     Electronically signed by     Jameson Raines MD on 3/16/2021 at 10:41 AM

## 2021-03-16 NOTE — PROGRESS NOTES
Formulation and discussion of sedation / procedure plans, risks, benefits, side effects and alternatives with patient and/or responsible adult completed.     Electronically signed by Matthew Vanegas DO on 3/16/2021 at 9:33 AM

## 2021-03-16 NOTE — PROGRESS NOTES
[facility]  PHYSICAL THERAPY MISSED TREATMENT NOTE  [facility] 8A    Date: 3/16/2021  Patient Name: Martine Santillan        MRN: [id number]   : 1950  (73 y.o.)  Gender: female        REASON FOR MISSED TREATMENT:  Pt just returned from CT Biopsy of Lung nodule.  Hold per RN request.

## 2021-03-16 NOTE — PROGRESS NOTES
Received call back from Dr. Jabari Santiago. Notified of most recent chest x-ray results. Received new order for chest x-ray in the morning. See chart for new orders.

## 2021-03-16 NOTE — PROGRESS NOTES
Paged Dr. Jenny Fleischer to notify of most recent chest x-ray and see if physician wants a repeat chest x-ray ordered tomorrow. Awaiting response. Patient remains resting in bed with no acute respiratory distress. Breath sounds diminished. Breathing is even and unlabored. Patient denies chest pain or shortness of breath.  Patient remains on supplemental O2 at 1 L/M via NC.

## 2021-03-16 NOTE — PROGRESS NOTES
Department of Radiology  Post Procedure Progress Note      Pre-Procedure Diagnosis:  Lung nodule RLL    Procedure Performed:  CT guided lung biopsy    Anesthesia: local / versed and fentanyl    Findings: successful    Immediate Complications:  None    Estimated Blood Loss: minimal    SEE DICTATED PROCEDURE NOTE FOR COMPLETE DETAILS.     Aldo Cobos DO, MD  3/16/2021 10:32 AM

## 2021-03-16 NOTE — FLOWSHEET NOTE
[address] [facility] THERAPY MISSED TREATMENT NOTE  [facility] 8A  8A-20/020-A      Date: 3/16/2021  Patient Name: Curly Rodney        CSN: [id number]   : 1950  (73 y.o.)  Gender: female   Referring Practitioner: Dr. Yvonne Qureshi  Diagnosis: syncope and collapse         REASON FOR MISSED TREATMENT:   Hold per nursing request. Pt just returned from CT- biopsy of lung nodule. Will check back as time allows.

## 2021-03-16 NOTE — PROGRESS NOTES
Hospitalist Progress Note    Patient:  Jenny Clemente      Unit/Bed:8A-20/020-A    YOB: 1950    MRN: [id number]       Acct: [de-identified]     PCP: NITO Henson CNP    Date of Admission: 3/12/2021    Active Hospital Problems    Diagnosis Date Noted    Severe malnutrition ([facility] [E43] 03/15/2021     Class: Chronic    Syncope and collapse [R55] 03/13/2021    Solitary pulmonary nodule on lung CT [R91.1]     Stage 2 moderate COPD by GOLD classification ([facility] [J44.9] 09/30/2013     Assessment/Plan:    1. Syncope and Collapse: likely 2/2 to Hypoxic Resp. Failure from COPD and Lung Mass. Troponin non elevated, no ECG changes. 2D Echo EF 55-60% with no LV wall motion abnormalities. No concerns for CVA or seizure, MRI no acute CVA. 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 Acute Hypoxic Resp. Failure: 2/2 acute COPD and Lung mass. Cont IV Solumedrol, duo-nebs. Cont to wean as tolerated. 3. Spiculated Lung Mass: 14 mm spiculated nodule in the right midlung periphery found on imaging in 12/2020. Pulmonary consulted. Plan for biopsy today. 4. Acute COPD Exacerbation: Cont IV Solumedrol, duo-nebs. Cont to wean as tolerated. Resume home inhalers    5. Tobacco Abuse: smoke 2 ppd. Educate and  on smoking cessation. Nicotine patch. 6. Hypothyroidism: TSH elevated 4.810. Ordered levothyroxine to 100mcg    7. Hyperkalemia: 5.4 today, no ECG changes. Will give Kayexalate. Dispo: getting lung biopsy today. Will monitor overnight for any PTX. Plan discharge home tomorrow with home O2 eval.         Chief Complaint: syncope     Hospital Course: Mrs. Filemon June is a 72 Y/O female with past medical history of COPD, anxiety, HTN, HLD hypothyroidism, and CAD, presents to ED for syncope and collaps. Per patient report she was cooking dinner when she suddenly lost consciousness.  Patient was found by son on floor. She states she has had several syncopal episodes in the past, most recent being 8 months ago, she. She has no other complaints on exam. Patient denies chest pain, shortness of breath, cough, headache, dizziness, lightheadedness, numbness, paraesthesias, weakness, chills, fevers, abdominal pain, nausea, vomiting, diarrhea, blood in stool, neck pain, or back pain.        Subjective: no acute events overnight. Still sob. No fevers or chills. No chest pain. Tolerating PO intake. No diarrhea. Medications:  Reviewed    Infusion Medications    sodium chloride 20 mL/hr at 03/16/21 0813     Scheduled Medications    methylPREDNISolone  40 mg Intravenous Q12H    ipratropium-albuterol  1 ampule Inhalation Q6H    sodium chloride flush  10 mL Intravenous 2 times per day    [Held by provider] enoxaparin  40 mg Subcutaneous Daily    levothyroxine  100 mcg Oral Daily    budesonide-formoterol  2 puff Inhalation BID     PRN Meds: sodium chloride flush, promethazine **OR** ondansetron, polyethylene glycol, acetaminophen **OR** acetaminophen, albuterol sulfate HFA      Intake/Output Summary (Last 24 hours) at 3/16/2021 1054  Last data filed at 3/16/2021 0342  Gross per 24 hour   Intake 450 ml   Output 0 ml   Net 450 ml       Diet:  Diet NPO Time Specified Exceptions are: Other (See Comment)    Exam:  /60   Pulse 58   Temp 98.7 °F (37.1 °C) (Oral)   Resp 16   Ht 5' 4\" (1.626 m)   Wt 134 lb 6.4 oz (61 kg)   SpO2 95%   BMI 23.07 kg/m²     General appearance: No apparent distress, appears stated age and cooperative on RA   HEENT: Pupils equal, round, and reactive to light. Conjunctivae/corneas clear. Neck: Supple, with full range of motion. No jugular venous distention. Trachea midline. Respiratory:  Normal respiratory effort. Expiratory wheezing   Cardiovascular: Regular rate and rhythm with normal S1/S2 without murmurs, rubs or gallops.   Abdomen: Soft, non-tender, non-distended with normal bowel sounds. Musculoskeletal: passive and active ROM x 4 extremities. Skin: Skin color, texture, turgor normal.  No rashes or lesions. Neurologic:  Neurovascularly intact without any focal sensory/motor deficits. Cranial nerves: II-XII intact, grossly non-focal.  Psychiatric: Alert and oriented, thought content appropriate, normal insight  Capillary Refill: Brisk,< 3 seconds   Peripheral Pulses: +2 palpable, equal bilaterally       Labs:   Recent Labs     03/16/21  0731   WBC 9.5   HGB 14.6   HCT 46.0        Recent Labs     03/16/21  0534 03/16/21  0731     --    K 4.7  --      --    CO2 23  --    BUN 27*  --    CREATININE 1.0 1.1   CALCIUM 8.5  --      No results for input(s): AST, ALT, BILIDIR, BILITOT, ALKPHOS in the last 72 hours. Recent Labs     03/16/21  0731   INR 0.97     No results for input(s): Jaswant Dalal in the last 72 hours. Urinalysis:      Lab Results   Component Value Date    NITRU NEGATIVE 02/02/2018    WBCUA 5-10 02/02/2018    WBCUA 15-25 05/16/2012    BACTERIA MANY 02/02/2018    RBCUA 0-2 02/02/2018    BLOODU TRACE 02/02/2018    SPECGRAV <1.005 02/02/2018    GLUCOSEU NEGATIVE 10/30/2015       Radiology: All imaging reviewed     Diet: Diet NPO Time Specified Exceptions are:  Other (See Comment)      Code Status: Full Code            Electronically signed by Milton Baldwin MD on 3/16/2021 at 10:54 AM

## 2021-03-17 ENCOUNTER — APPOINTMENT (OUTPATIENT)
Dept: GENERAL RADIOLOGY | Age: 71
DRG: 189 | End: 2021-03-17
Payer: MEDICARE

## 2021-03-17 ENCOUNTER — TELEPHONE (OUTPATIENT)
Dept: ADMINISTRATIVE | Age: 71
End: 2021-03-17

## 2021-03-17 VITALS
RESPIRATION RATE: 18 BRPM | DIASTOLIC BLOOD PRESSURE: 60 MMHG | WEIGHT: 135.4 LBS | HEART RATE: 55 BPM | BODY MASS INDEX: 23.12 KG/M2 | TEMPERATURE: 98.1 F | SYSTOLIC BLOOD PRESSURE: 133 MMHG | HEIGHT: 64 IN | OXYGEN SATURATION: 94 %

## 2021-03-17 PROCEDURE — 94761 N-INVAS EAR/PLS OXIMETRY MLT: CPT

## 2021-03-17 PROCEDURE — 71046 X-RAY EXAM CHEST 2 VIEWS: CPT

## 2021-03-17 PROCEDURE — 2580000003 HC RX 258: Performed by: PHYSICIAN ASSISTANT

## 2021-03-17 PROCEDURE — 6360000002 HC RX W HCPCS: Performed by: INTERNAL MEDICINE

## 2021-03-17 PROCEDURE — 6370000000 HC RX 637 (ALT 250 FOR IP): Performed by: INTERNAL MEDICINE

## 2021-03-17 PROCEDURE — 97116 GAIT TRAINING THERAPY: CPT

## 2021-03-17 PROCEDURE — 94640 AIRWAY INHALATION TREATMENT: CPT

## 2021-03-17 PROCEDURE — 99239 HOSP IP/OBS DSCHRG MGMT >30: CPT | Performed by: INTERNAL MEDICINE

## 2021-03-17 PROCEDURE — 97161 PT EVAL LOW COMPLEX 20 MIN: CPT

## 2021-03-17 RX ORDER — PREDNISONE 20 MG/1
40 TABLET ORAL DAILY
Qty: 6 TABLET | Refills: 0 | Status: SHIPPED | OUTPATIENT
Start: 2021-03-17 | End: 2021-03-20

## 2021-03-17 RX ADMIN — IPRATROPIUM BROMIDE AND ALBUTEROL SULFATE 1 AMPULE: .5; 3 SOLUTION RESPIRATORY (INHALATION) at 05:20

## 2021-03-17 RX ADMIN — BUDESONIDE AND FORMOTEROL FUMARATE DIHYDRATE 2 PUFF: 160; 4.5 AEROSOL RESPIRATORY (INHALATION) at 05:20

## 2021-03-17 RX ADMIN — METHYLPREDNISOLONE SODIUM SUCCINATE 40 MG: 40 INJECTION, POWDER, FOR SOLUTION INTRAMUSCULAR; INTRAVENOUS at 00:24

## 2021-03-17 RX ADMIN — SODIUM CHLORIDE, PRESERVATIVE FREE 10 ML: 5 INJECTION INTRAVENOUS at 08:22

## 2021-03-17 RX ADMIN — IPRATROPIUM BROMIDE AND ALBUTEROL SULFATE 1 AMPULE: .5; 3 SOLUTION RESPIRATORY (INHALATION) at 11:12

## 2021-03-17 RX ADMIN — LEVOTHYROXINE SODIUM 100 MCG: 0.1 TABLET ORAL at 08:22

## 2021-03-17 ASSESSMENT — PAIN DESCRIPTION - PAIN TYPE: TYPE: ACUTE PAIN

## 2021-03-17 NOTE — TELEPHONE ENCOUNTER
Called pt. Man answered the phone and stated pt was unavailable, after very brief conversation, line when dead. Will attempt to contact pt at a later date.

## 2021-03-17 NOTE — FLOWSHEET NOTE
[facility] PROGRESS NOTE      Patient: Olivia Pollack  Room #: 4C-88/673-E            YOB: 1950  Age: 73 y.o. Gender: female            Admit Date & Time: 3/12/2021  7:01 PM    Assessment:  Spiritual Care Follow Up:    Josse Peterson is a 73year old female who is sitting up in a chair on 8B. She is in good spirits and stated she is doing much better than when she first came in. She recently has lost both her  and her sister. We talked about grief, how it is a process      Interventions: Active listening, prayer and words of encoruagement    Outcomes:  Comforted and encouraged. Plan:    1. Care Plan:  Continue spiritual and emotional care for patient and family. Including prayers.      Electronically signed by Ke Méndez, on 3/17/2021 at 10:15 AM.  [facility]  [phone]

## 2021-03-17 NOTE — PLAN OF CARE
Problem: Falls - Risk of:  Goal: Will remain free from falls  Description: Will remain free from falls  Outcome: Ongoing  Note: Absence of falls this shift. Fall prevention in place. Fall band applied. Bed alarm activated as needed. Problem: Falls - Risk of:  Goal: Absence of physical injury  Description: Absence of physical injury  Outcome: Ongoing  Note: Absence of physical injury. Problem: Pain:  Goal: Pain level will decrease  Description: Pain level will decrease  Outcome: Ongoing  Note: Pt complains of pain at a 0/10. Non pharmacological interventions completed which include repositioning and rest. Pt states pain goal at a 0/10. Pain assessed every 4 hours, and as needed. PRN pain medications given as ordered. Problem: Pain:  Goal: Control of acute pain  Description: Control of acute pain  Outcome: Ongoing  Note: Pt complains of pain at a 0/10. Non pharmacological interventions completed which include repositioning and rest. Pt states pain goal at a 0/10. Pain assessed every 4 hours, and as needed. PRN pain medications given as ordered. Problem: Pain:  Goal: Control of chronic pain  Description: Control of chronic pain  Outcome: Ongoing  Note: Pt complains of pain at a 0/10. Non pharmacological interventions completed which include repositioning and rest. Pt states pain goal at a 0/10. Pain assessed every 4 hours, and as needed. PRN pain medications given as ordered. Problem: Nutrition  Goal: Optimal nutrition therapy  Outcome: Ongoing  Note: Pt continues with poor appetite. Problem: Musculor/Skeletal Functional Status  Goal: Highest potential functional level  Outcome: Ongoing  Note: PT/OT evaluating patient. Problem: Musculor/Skeletal Functional Status  Goal: Absence of falls  Outcome: Ongoing  Note: Absence of falls this shift. Fall prevention in place. Fall band applied. Bed alarm activated as needed. Care plan reviewed with patient.   Patient verbalize understanding of the

## 2021-03-17 NOTE — PROGRESS NOTES
A home oxygen evaluation has been completed. [x]Patient is an inpatient. It is expected that the patient will be discharged within the next 48 hours. Qualified provider to write orders for possible sleep study or home oxygen prescription. Social service/care managers will arrange for home oxygen if ordered. If patient is active, arrange for Home Medical supplier to assess for Oxygen Conserving Device per pulse oximetry. []Patient is an outpatient. Results will be faxed to the ordering provider. Qualified provider to write orders for possible sleep study or home oxygen prescription. Patient was placed on room air for 60 minutes. SpO2 was 94 % on room air at rest. Patients SpO2 was 89% or above and did not qualify for home oxygen. Patient was walked for 5 minutes. SpO2 was 92 % during walking. Patients SpO2 was 89% or above and did not qualify for home oxygen. Results will be given to qualified provider. Note: For any SpO2 at 19% see policy and procedure for possible qualifications.

## 2021-03-17 NOTE — CARE COORDINATION
3/17/21, 8:57 AM EDT    Patient goals/plan/ treatment preferences discussed by  and . Patient goals/plan/ treatment preferences reviewed with patient/ family. Patient/ family verbalize understanding of discharge plan and are in agreement with goal/plan/treatment preferences. Understanding was demonstrated using the teach back method. AVS provided by RN at time of discharge, which includes all necessary medical information pertaining to the patients current course of illness, treatment, post-discharge goals of care, and treatment preferences. IMM Letter  IMM Letter given to Patient/Family/Significant other/Guardian/POA/by[de-identified] Nuzhat PIERRE Case Manager. IMM Letter date given[de-identified] 03/17/21  IMM Letter time given[de-identified] 3776       Lung biopsy yesterday. No pneumothorax noted this am on CXR. Plan for discharge home today. Await home O2 eval. Pt denies need for other services and plans return home as prior to admission with friends/family. O2 eval complete and pt does not need home O2 at this time.

## 2021-03-17 NOTE — PROGRESS NOTES
[facility]  INPATIENT PHYSICAL THERAPY  EVALUATION  [facility] MED SURG 8B - 8B-22/022-A    Time In: 0740  Time Out: 1263  Timed Code Treatment Minutes: 8 Minutes  Minutes: 15          Date: 3/17/2021  Patient Name: Leela Velazquez,  Gender:  female        MRN: [id number]  : 1950  (73 y.o.)      Referring Practitioner: Dr Beronica Yeung  Diagnosis: Syncope and collapse  Additional Pertinent Hx: Pt admitted 3/12 after syncope and collapse at home. 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 Pt had a lung biopsy on 3/16 with post pneumothorax. Restrictions/Precautions:  Restrictions/Precautions: Fall Risk, General Precautions    Subjective:  Chart Reviewed: Yes  Patient assessed for rehabilitation services?: Yes  Family / Caregiver Present: No       General:  Overall Orientation Status: Within Normal Limits  Follows Commands: Within Functional Limits    Vision: Impaired  Vision Exceptions: Wears glasses at all times    Hearing: Within functional limits         Pain: 2/10: right chest/back from lung biopsy yesterday    Vitals: Vitals not assessed per clinical judgement, see nursing flowsheet    Social/Functional History:    Lives With: Family  Type of Home: House  Home Layout: Two level, Able to Live on Main level with bedroom/bathroom  Home Access: Stairs to enter with rails  Entrance Stairs - Number of Steps: 2     Bathroom Shower/Tub: Tub/Shower unit  Bathroom Toilet: Standard  Bathroom Accessibility: Accessible  IADL Comments: Pt stating she does all the homemaking including cooking, cleaning and laundry       ADL Assistance: Independent  Homemaking Assistance: Independent  Ambulation Assistance: Independent  Transfer Assistance: Independent    Active : Yes     Additional Comments: Pt reporting she was indep with aher ADL tasks. Pt stating she had right hip replaced in  and feels it hasn't been correct since surgery. Pt stating she has diffiuclty at time with ambulating.  Pt has 24 Recommendations:  Equipment Needed: No    Plan:  Times per week: NA  Specific instructions for Next Treatment: NA    Goals:  Patient goals : Go home  Short term goals  Time Frame for Short term goals: NA- pt moving at baseline. Pt planning home today  Long term goals  Time Frame for Long term goals : NA    Following session, patient left in safe position with all fall risk precautions in place.

## 2021-03-17 NOTE — DISCHARGE SUMMARY
Hospital Medicine Discharge Summary      Patient Identification:   Isela Scott   : 1950  MRN: [id number]   Account: [de-identified]      Patient's PCP: NITO Garduno CNP    Admit Date: 3/12/2021     Discharge Date:   21    Admitting Physician: Rodrigo Brooks MD     Discharge Physician: Franchesca Ornelas MD     Discharge Diagnoses: Syncope and Collapse;  Acute Hypoxic Resp. Failure 2/2 COPD Exacerbation and Lung Mass; Tobacco Abuse     Active Hospital Problems    Diagnosis Date Noted    Severe malnutrition ([facility] [address] [E43] 03/15/2021     Class: Chronic    Syncope and collapse [R55] 2021    Solitary pulmonary nodule on lung CT [R91.1]     Stage 2 moderate COPD by GOLD classification ([facility] [address] [J44.9] 2013       The patient was seen and examined on day of discharge and this discharge summary is in conjunction with any daily progress note from day of discharge. Hospital Course:   Isela Scott is a 73 y.o. female admitted to [address] [place] [address] on 3/12/2021 for syncopal episode. Has a past medical history of COPD, anxiety, HTN, HLD, hypothyroidism and lung mass. Per patient report she was cooking dinner when she suddenly lost consciousness. Patient was found by son on floor. She states she has had several syncopal episodes in the past, most recent being 8 months ago. Reports feeling sob, dizzy and lightheaded prior to syncopizing. No chest pain, confusion, tongue biting. Pt follows up with Dr. Elías Estrada, was supposed to get a lung biopsy for a suspicious lung mass which was never done. Troponin non elevated, no ECG changes. 2D Echo EF 55-60% with no LV wall motion abnormalities. No concerns for CVA or seizure, MRI no acute CVA. CTA head and neck show occluded skull base left ICA, present previously with patent left posterior communicating artery. Pt was hypoxic initially requiring O2, started on steroids and duo-nebs for COPD exacerbation. Pt underwent successful lung biopsy. Pt weaned down to RA. Improved, HD stable. Tolerating PO intake. Ambulating with no difficulty. Pt will be discharged with close follow-up with Pulmonary to go over biopsy results. Exam:     Vitals:  Vitals:    03/16/21 2354 03/17/21 0317 03/17/21 0520 03/17/21 0819   BP:  (!) 154/68  133/60   Pulse:  56  55   Resp: 18 18 20 18   Temp:  97.8 °F (36.6 °C)  98.1 °F (36.7 °C)   TempSrc:  Oral  Oral   SpO2: 98% 96% 97% 95%   Weight:  135 lb 6.4 oz (61.4 kg)     Height:         Weight: Weight: 135 lb 6.4 oz (61.4 kg)     24 hour intake/output:    Intake/Output Summary (Last 24 hours) at 3/17/2021 0838  Last data filed at 3/17/2021 7523  Gross per 24 hour   Intake 600 ml   Output --   Net 600 ml         Labs: For convenience and continuity at follow-up the following most recent labs are provided:      CBC:    Lab Results   Component Value Date    WBC 9.5 03/16/2021    HGB 14.6 03/16/2021    HCT 46.0 03/16/2021     03/16/2021       Renal:    Lab Results   Component Value Date     03/16/2021    K 4.7 03/16/2021    K 3.9 03/13/2021     03/16/2021    CO2 23 03/16/2021    BUN 27 03/16/2021    CREATININE 1.1 03/16/2021    CALCIUM 8.5 03/16/2021         Significant Diagnostic Studies    Radiology:   XR CHEST (2 VW)   Final Result   1. Unremarkable PA and lateral views of the chest.            **This report has been created using voice recognition software. It may contain minor errors which are inherent in voice recognition technology. **      Final report electronically signed by Dr. Kristin Liu on 3/17/2021 8:08 AM      XR CHEST PA INSPIRATION 1 VW   Final Result   1. The previously seen subtle linear lucency along the medial aspect of the right lung is less apparent. Although cannot exclude a tiny intermedial right pneumothorax. Recommend follow-up chest radiograph in 24 hours to document stability. **This report has been created using voice recognition software.   It may contain minor errors which are inherent in voice recognition technology. **      Final report electronically signed by Dr. Elizabeth Coffman on 3/16/2021 2:46 PM      XR CHEST PA INSPIRATION 1 VW   Final Result   1. There is a subtle linear area of lucency at the medial aspect of the right lung base which may represent a tiny anterior medial pneumothorax. Recommend continued follow-up. 2. There is ill-defined opacity overlying the lateral left mid to lower lung zone corresponding with previously biopsied lesion/postbiopsy changes. **This report has been created using voice recognition software. It may contain minor errors which are inherent in voice recognition technology. **      Final report electronically signed by Dr. Elizabeth Coffman on 3/16/2021 12:53 PM      CT NEEDLE BIOPSY LUNG/MEDIASTINUM PERCUTANEOUS   Final Result    Successful CT-guided right lower lobe lung nodule biopsy with small postprocedural pneumothorax. Follow-up chest radiographs will be obtained. **This report has been created using voice recognition software. It may contain minor errors which are inherent in voice recognition technology. **      Final report electronically signed by Dr. Jennifer Rodas MD on 3/16/2021 10:50 AM      CT GUIDED NEEDLE PLACEMENT   Final Result    Successful CT-guided right lower lobe lung nodule biopsy with small postprocedural pneumothorax. Follow-up chest radiographs will be obtained. **This report has been created using voice recognition software. It may contain minor errors which are inherent in voice recognition technology. **      Final report electronically signed by Dr. Jennifer Rodas MD on 3/16/2021 10:50 AM      MRI BRAIN WO CONTRAST   Final Result       1. No evidence of an acute infarct. 2. No antegrade flow in the left internal carotid artery. 3. Mild atrophy and probable ischemic changes in the white matter. 4. Inflammatory changes in the right mastoid air cells. **This report has been created using voice recognition software. It may contain minor errors which are inherent in voice recognition technology. **      Final report electronically signed by DR Magaly Smith on 3/13/2021 9:22 AM      CT ABDOMEN PELVIS WO CONTRAST Additional Contrast? Radiologist Recommendation   Final Result       1. Enlarged low-attenuation right and left adrenal glands, unchanged since remote CT scan dated 14 April 2016 suggestive of adrenal hyperplasia or benign nodules. 2. Status post cholecystectomy and hysterectomy. 3. Low-attenuation lesion arising from the right kidney anteriorly measuring 17 x 15 mm increased in size. Probable left renal cyst measuring 14 x 13 mm and 12 x 9 mm in size. Repeat CT scan in 6 months time may be helpful for follow-up. 4. Atherosclerotic calcification and mild dilatation of the abdominal aorta. 5. Bilateral hip replacements. 6. Otherwise negative CT scan of the abdomen and pelvis. .               **This report has been created using voice recognition software. It may contain minor errors which are inherent in voice recognition technology. **      Final report electronically signed by DR Magaly Smith on 3/13/2021 10:11 AM      CT Head WO Contrast   Final Result   Impression:   1. Negative for acute intracranial abnormality. 2.  Sinusitis. 3.  Stable exam.      This document has been electronically signed by: Keke Marr MD on    03/12/2021 09:40 PM      All CTs at this facility use dose modulation techniques and iterative    reconstructions, and/or weight-based dosing   when appropriate to reduce radiation to a low as reasonably achievable. CTA HEAD W WO CONTRAST   Final Result   Impression:   1. Occluded skull base left ICA, present previously. Patent left    posterior communicating artery. 2.  Intact anterior, posterior, and middle cerebral circulations. 3.  No intracranial aneurysm.       This document has been electronically signed by: Keke Marr MD on    03/12/2021 10:17 PM      All CTs at this facility use dose modulation techniques and iterative    reconstructions, and/or weight-based dosing   when appropriate to reduce radiation to a low as reasonably achievable. CTA NECK W WO CONTRAST   Final Result   Impression:   1. Patent right carotid and bilateral vertebral arteries without    significant stenosis. Occluded left ICA at its origin, present previously. This document has been electronically signed by: Keke Marr MD on    03/12/2021 10:05 PM      All CTs at this facility use dose modulation techniques and iterative    reconstructions, and/or weight-based dosing   when appropriate to reduce radiation to a low as reasonably achievable. Carotid stenosis and measurements are in accordance with NASCET criteria. XR HIP 2-3 VW W PELVIS RIGHT   Final Result   No acute osseous abnormality            **This report has been created using voice recognition software. It may contain minor errors which are inherent in voice recognition technology. **      Final report electronically signed by Dr. Christine Eric on 3/12/2021 7:36 PM      XR CHEST 1 VIEW   Final Result   No acute cardiopulmonary disease            **This report has been created using voice recognition software. It may contain minor errors which are inherent in voice recognition technology. **      Final report electronically signed by Dr. Christine Eric on 3/12/2021 7:38 PM             Consults:     IP CONSULT TO PULMONOLOGY  IP CONSULT TO SMOKING CESSATION PROGRAM    Disposition:    [x] Home       [] TCU       [] Rehab       [] Psych       [] SNF       [] [place]       [] Other-    Condition at Discharge: Stable    Code Status:  Full Code     Patient Instructions: Activity: activity as tolerated  Diet: DIET LOW SODIUM 2 GM; Follow-up visits:   No follow-up provider specified.        Discharge Medications:      Jannette Davis

## 2021-03-17 NOTE — TELEPHONE ENCOUNTER
.Transition of Care visit scheduled.   3/24/21  Patient is being discharged to home  Date of discharge 3/17/24  Discharge from facility [facility]  Reason for admission passing out

## 2021-03-18 ENCOUNTER — CARE COORDINATION (OUTPATIENT)
Dept: CASE MANAGEMENT | Age: 71
End: 2021-03-18

## 2021-03-18 NOTE — CARE COORDINATION
[facility] Transitions Initial Follow Up Call    Call within 2 business days of discharge: Yes    Patient: Olivia Pollack Patient : 1950   MRN: [id number]  Reason for Admission: Syncope and collapse / Shortness of Breath; Fall  Discharge Date: 3/17/21 RARS: Readmission Risk Score: 12      Last Discharge [facility]       Complaint Diagnosis Description Type Department Provider    3/12/21 Shortness of Breath; Fall Syncope and collapse ED to Hosp-Admission (Discharged) (ADMITTED) NICOLASA Abdullahi MD; Briana Cruz. ..           1st Attempt to contact patient for Initial 24 Hour Transition from hospital to home Call:   Patient not reached. Mail Box Full - Unable to leave message at this time. Will continue to follow.       Everette Tripathi LPN    [phone]  Moustapha Rose / [facility] [address] [place], [address] Transitions 24 Hour Call    Do you have all of your prescriptions and are they filled?: Yes  Patient DME: Kellie Olivas chair  Do you have support at home?: Partner/Spouse/SO  Are you an active caregiver in your home?: No  Care Transitions Interventions         Follow Up  Future Appointments   Date Time Provider Zayra Upton   3/24/2021  9:30 AM NITO Coburn - CNP [facility] [address] [facility]   3/31/2021  1:30 PM Sourav Campbell LPN

## 2021-03-19 ENCOUNTER — CARE COORDINATION (OUTPATIENT)
Dept: CASE MANAGEMENT | Age: 71
End: 2021-03-19

## 2021-03-19 NOTE — CARE COORDINATION
[facility] Transitions Initial Follow Up Call    Call within 2 business days of discharge: Yes    Patient: Alfred Kruse Patient : 1950   MRN: [id number]  Reason for Admission:   Discharge Date: 3/17/21 RARS: Readmission Risk Score: 12      Last Discharge [facility]       Complaint Diagnosis Description Type Department Provider    3/12/21 Shortness of Breath; Fall Syncope and collapse ED to Hosp-Admission (Discharged) (ADMITTED) NICOLASA Alcazar MD; [employer]. ..           2nd Attempt to contact patient for 24 Hour Transition Call - (Discharged from Hospital to Home)  Patient was not reached. Mail Box Full - Unable to leave message at this time.      FINAL CALL    Cade Darden LPN    [phone]  [facility] / [address] [place] [address] Transitions 24 Hour Call    Do you have all of your prescriptions and are they filled?: Yes  Patient DME: Awilda Owusu chair  Do you have support at home?: Partner/Spouse/SO  Are you an active caregiver in your home?: No  Care Transitions Interventions         Follow Up  Future Appointments   Date Time Provider Zayra Upton   3/24/2021  9:30 AM NITO Medeiros - CNP [facility] - [facility]   3/31/2021  1:30 PM Sourav Quintana LPN

## 2021-03-24 NOTE — TELEPHONE ENCOUNTER
Again attempted to contact pt. Unable to leave msg, as mailbox is full. Will close referral, unable to reach pt. Pt can be re-referred at any time.

## 2021-03-24 NOTE — PROGRESS NOTES
Physician Progress Note      Arti Bowens  CSN #:                  [id number]  :                       1950  ADMIT DATE:       3/12/2021 7:01 PM  [address] DATE:        3/17/2021 1:32 PM  RESPONDING  PROVIDER #:        Hortensia Nation MD          QUERY TEXT:    Patient admitted with syncope. noted to have acute respiratory failure and   COPD exacerbation and lung mass. If possible, please document in progress notes   and discharge summary if you are evaluating and/or treating any of the   following: The medical record reflects the following:  Risk Factors: Elderly, hx COPD  Clinical Indicators: was 88% on Room air, dyspnea, was put on 2L NC,   diminished Lung sounds, Rhonchi  Treatment: Duonebs, steroids, 2L NC, CT guided lung biopsy done    Thank You! Kavon Rm RN  RN Clinical   (P) [phone] (d) [phone]  Options provided:  -- Syncope due to hypoxia without respiratory failure  -- Syncope due to acute respiratory failure  -- Syncope due to COPD exacerbation  -- Syncope due to lung cancer  -- Other - I will add my own diagnosis  -- Disagree - Not applicable / Not valid  -- Disagree - Clinically unable to determine / Unknown  -- Refer to Clinical Documentation Reviewer    PROVIDER RESPONSE TEXT:    The syncope is due to acute respiratory failure.     Query created by: Jamaal Tyler on 3/24/2021 11:02 AM      Electronically signed by:  Hortensia Nation MD 3/24/2021 12:11 PM

## 2021-03-31 ENCOUNTER — OFFICE VISIT (OUTPATIENT)
Dept: PULMONOLOGY | Age: 71
End: 2021-03-31
Payer: MEDICARE

## 2021-03-31 VITALS
OXYGEN SATURATION: 97 % | SYSTOLIC BLOOD PRESSURE: 138 MMHG | BODY MASS INDEX: 23.6 KG/M2 | DIASTOLIC BLOOD PRESSURE: 62 MMHG | HEART RATE: 74 BPM | HEIGHT: 64 IN | WEIGHT: 138.2 LBS | TEMPERATURE: 97.6 F

## 2021-03-31 DIAGNOSIS — C34.2 MALIGNANT NEOPLASM OF MIDDLE LOBE, BRONCHUS OR LUNG (HCC): ICD-10-CM

## 2021-03-31 DIAGNOSIS — Z87.891 PERSONAL HISTORY OF TOBACCO USE: ICD-10-CM

## 2021-03-31 DIAGNOSIS — J44.9 COPD, MODERATE (HCC): Primary | ICD-10-CM

## 2021-03-31 DIAGNOSIS — C34.91 SQUAMOUS CELL CARCINOMA OF RIGHT LUNG (HCC): ICD-10-CM

## 2021-03-31 PROCEDURE — 3017F COLORECTAL CA SCREEN DOC REV: CPT | Performed by: NURSE PRACTITIONER

## 2021-03-31 PROCEDURE — G8420 CALC BMI NORM PARAMETERS: HCPCS | Performed by: NURSE PRACTITIONER

## 2021-03-31 PROCEDURE — 1090F PRES/ABSN URINE INCON ASSESS: CPT | Performed by: NURSE PRACTITIONER

## 2021-03-31 PROCEDURE — 4004F PT TOBACCO SCREEN RCVD TLK: CPT | Performed by: NURSE PRACTITIONER

## 2021-03-31 PROCEDURE — G8427 DOCREV CUR MEDS BY ELIG CLIN: HCPCS | Performed by: NURSE PRACTITIONER

## 2021-03-31 PROCEDURE — 1123F ACP DISCUSS/DSCN MKR DOCD: CPT | Performed by: NURSE PRACTITIONER

## 2021-03-31 PROCEDURE — G8926 SPIRO NO PERF OR DOC: HCPCS | Performed by: NURSE PRACTITIONER

## 2021-03-31 PROCEDURE — 99214 OFFICE O/P EST MOD 30 MIN: CPT | Performed by: NURSE PRACTITIONER

## 2021-03-31 PROCEDURE — 3023F SPIROM DOC REV: CPT | Performed by: NURSE PRACTITIONER

## 2021-03-31 PROCEDURE — G8399 PT W/DXA RESULTS DOCUMENT: HCPCS | Performed by: NURSE PRACTITIONER

## 2021-03-31 PROCEDURE — G8484 FLU IMMUNIZE NO ADMIN: HCPCS | Performed by: NURSE PRACTITIONER

## 2021-03-31 PROCEDURE — 4040F PNEUMOC VAC/ADMIN/RCVD: CPT | Performed by: NURSE PRACTITIONER

## 2021-03-31 PROCEDURE — 1111F DSCHRG MED/CURRENT MED MERGE: CPT | Performed by: NURSE PRACTITIONER

## 2021-03-31 ASSESSMENT — COPD QUESTIONNAIRES: COPD: 1

## 2021-03-31 ASSESSMENT — ENCOUNTER SYMPTOMS
COUGH: 1
SHORTNESS OF BREATH: 1
HEMOPTYSIS: 0
WHEEZING: 1
SPUTUM PRODUCTION: 1

## 2021-03-31 NOTE — PROGRESS NOTES
[facility]    Patient: Jerri Chan, 73 y.o.   : 1950  3/31/2021    Pt of Dr. Kyle Caal   Patient presents with    Follow-up     Lung nodule 2 week pulm follow up with CT guided bx        COPD  She complains of cough, shortness of breath, sputum production and wheezing. There is no hemoptysis. This is a chronic problem. The current episode started more than 1 year ago. The problem occurs daily. The problem has been gradually improving. The cough is productive of sputum (yellowish white). Associated symptoms include dyspnea on exertion. Pertinent negatives include no chest pain, fever, nasal congestion or postnasal drip. Her symptoms are aggravated by minimal activity, change in weather and exposure to fumes. Her symptoms are alleviated by beta-agonist and rest. She reports significant improvement on treatment. Risk factors for lung disease include smoking/tobacco exposure. Her past medical history is significant for COPD. Rema Fung is here for follow up for COPD s/p biopsy in patient. Overall patient reports respiratory symptoms have been better since last appointment. Patient was admitted to [facility] 3/12/-3/17 for syncope and collapse. Troponin non elevated, no ECG changes. 2D Echo EF 55-60% with no LV wall motion abnormalities. No concerns for CVA or seizure, MRI no acute CVA. CTA head and neck show occluded skull base left ICA, present previously with patent left posterior communicating artery. Was treated for COPD exacerbation and weaned off supplemental O2 to Room Air. Patient reports good compliance with inhaled medications (Trelegy). Patient using albuterol 2-3 times per week on average. Patient reports some physical limitation due to respiratory symptoms. Was scheduled for biopsy outpatient was completed while in hospital here today for results.     Patient lost to follow-up from 2018 to 2021  2 PPD 48 yrs  On no supplemental O2    Progress History:   Since last visit any new medical issues? No  New ER or hospital visits? Yes see above  Any new or changes in medicines? No  Using inhalers? Yes   Are they helpful? Yes  Pneumonia vaccine? Last dose 2015  Past Medical hx   PMH:  Past Medical History:   Diagnosis Date    Anxiety 2007    Arthritis     Breast cancer [facility]) 2005    right mastectomy, chemo and radiation history    CAD (coronary artery disease) 2001    Cancer of the skin 2004    Cerebral artery occlusion with cerebral infarction ([address]     Chronic UTI     COPD (chronic obstructive pulmonary disease) ([address]     CVA (cerebral infarction) 2007, 2008    Depression 2007    DVT of lower extremity (deep venous thrombosis) ([address] 1976    Facial injury 2005    Fall on greasy ground, striking left periorbital region    History of stroke 2007, 2008, 2009    Patient relates a stroke with right weakness and speech in 2007; another in 2010 or 2012 (unsure), both with need to rehabilitation.   Also \"2 mini-strokes\" (?)    Hx of blood clots 1977    hip, leg    Hyperlipidemia 2005    Hypertension 2005    Hypothyroidism     IBS (irritable bowel syndrome)     Low HDL (under 40) 2014    Prolonged emergence from general anesthesia     Recurrent UTI (urinary tract infection) 2015    Dr Peña Ast finding difficulty 05/16/2017    work-up in progress     SURGICAL HISTORY:  Past Surgical History:   Procedure Laterality Date   [address]    COLONOSCOPY  2009    CT NEEDLE BIOPSY LUNG PERCUTANEOUS  3/16/2021    CT NEEDLE BIOPSY LUNG PERCUTANEOUS 3/16/2021 [facility] CT SCAN    HIP SURGERY  jan 19, 2015    HYSTERECTOMY  1976    JOINT REPLACEMENT Left 2011    HIP    JOINT REPLACEMENT Right 1/19/15    Right Total Hip Replacement - Dr. Sudeep Peña Right 2005    OTHER SURGICAL HISTORY  04/10/2018    Lumbar epidural steroid injection at L4    VT NJX DX/THER SBST INTRLMNR LMBR/SAC W/IMG GDN N/A 4/10/2018    LESI  @ L4 performed by Emili Tapia MD at [address] [facility], [facility]    ovaries    THYROID SURGERY  2007    right lobe and then later left removed     SOCIAL HISTORY:  Social History     Tobacco Use    Smoking status: Current Every Day Smoker     Packs/day: 2.00     Years: 48.00     Pack years: 96.00     Types: Cigarettes     Start date: 11/13/1967    Smokeless tobacco: Never Used   Substance Use Topics    Alcohol use: No     Alcohol/week: 0.0 standard drinks    Drug use: No     ALLERGIES:  Allergies   Allergen Reactions    Cipro Xr     Vicodin [Hydrocodone-Acetaminophen]      Weird dreams       FAMILY HISTORY:  Family History   Problem Relation Age of Onset    Osteoporosis Mother     Hypertension Mother     High Cholesterol Mother     Stroke Mother     Colon Cancer Mother     Cancer Father         lung cancer    Heart Disease Father     Hypertension Father     High Cholesterol Father     Heart Disease Sister     High Cholesterol Sister     Hypertension Sister     Asthma Sister     Other Other         high arched feet    Lung Cancer Son      CURRENT MEDICATIONS:  Current Outpatient Medications   Medication Sig Dispense Refill    fluticasone-umeclidin-vilant (TRELEGY ELLIPTA) 100-62.5-25 MCG/INH AEPB Inhale 1 puff into the lungs daily 30 each 11    albuterol sulfate HFA (PROVENTIL HFA) 108 (90 Base) MCG/ACT inhaler Inhale 2 puffs into the lungs every 6 hours as needed for Wheezing 1 Inhaler 3    levothyroxine (LEVOTHROID) 88 MCG tablet Take 1 tablet by mouth daily 30 tablet 5    venlafaxine (EFFEXOR XR) 37.5 MG extended release capsule Take 1 capsule by mouth daily 90 capsule 1     No current facility-administered medications for this visit. [facility]   Review of Systems   Constitutional: Negative for chills, fever and unexpected weight change. HENT: Negative for postnasal drip.     Respiratory: Positive for cough, sputum production, shortness of breath and wheezing. Negative for hemoptysis, chest tightness and stridor. Cardiovascular: Positive for dyspnea on exertion. Negative for chest pain and leg swelling. Gastrointestinal: Negative for diarrhea, nausea and vomiting. Genitourinary: Negative for dysuria. Physical exam   /62 (Site: Left Upper Arm, Position: Sitting, Cuff Size: Medium Adult)   Pulse 74   Temp 97.6 °F (36.4 °C) (Temporal)   Ht 5' 4\" (1.626 m)   Wt 138 lb 3.2 oz (62.7 kg)   SpO2 97% Comment: Room air at rest  BMI 23.72 kg/m²    Wt Readings from Last 3 Encounters:   03/31/21 138 lb 3.2 oz (62.7 kg)   03/17/21 135 lb 6.4 oz (61.4 kg)   03/08/21 141 lb 12.8 oz (64.3 kg)       Physical Exam  Vitals signs and nursing note reviewed. Constitutional:       General: She is not in acute distress. Appearance: She is well-developed. HENT:      Head: Normocephalic and atraumatic. Neck:      Musculoskeletal: Neck supple. Trachea: No tracheal deviation. Cardiovascular:      Rate and Rhythm: Normal rate and regular rhythm. Heart sounds: Normal heart sounds. No murmur. Pulmonary:      Effort: Pulmonary effort is normal. No respiratory distress. Breath sounds: Normal breath sounds. No stridor. No wheezing or rales. Chest:      Chest wall: No tenderness. Abdominal:      General: Bowel sounds are normal. There is no distension. Palpations: Abdomen is soft. Skin:     General: Skin is warm and dry. Capillary Refill: Capillary refill takes less than 2 seconds. Neurological:      Mental Status: She is alert and oriented to person, place, and time. Psychiatric:         Behavior: Behavior normal.         Thought Content: Thought content normal.          Results     CTA CHEST W WO CONTRAST   12/13/2020   FINDINGS: No PE. There is no aneurysm or dissection. No mediastinal or hilar adenopathy by size criteria. Postsurgical changes right axilla.  No axillary lymphadenopathy Heart size normal. No infiltrates or pleural effusions. The 8 mm nodule seen on the prior CT now measures 14 mm and has spiculated borders highly suspicious of malignancy. There however are no other additional new masses. Scattered nodules are present in both lungs which are previously seen. There is a cluster of nodular the anterior right upper lung stable. Small nodules posterior right lower lung stable. Tiny triangular nodular density lingula is stable. Upper abdomen Stable bilateral adrenal fullness likely hyperplasia this is stable dating back to June 2017. Bones No suspicious bone lesions   Impression:  1. No PE. 2. No acute findings. 3. Interval increase in size of the patient's right lung nodule now measures 14 mm with spiculated borders and is highly suspicious for malignancy. CT ABDOMEN PELVIS WO CONTRAST   3/13/2021   FINDINGS:  The visualized aspects of the lung bases are clear. The base of the heart is within appropriate limits. The liver is grossly normal. The spleen is normal. There is an enlarged low attenuation left adrenal gland measuring 19 x 15 mm in size. This is slightly enlarged low-attenuation right adrenal gland measuring 16 x 9 mm in size. These appear unchanged since remote CT scan dated 14 April 2016 suggestive of adrenal hyperplasia or benign nodules. The pancreas is grossly normal. The patient is status post cholecystectomy. There is no hydronephrosis or stones of either kidney. There is a low-attenuation lesion in the right kidney anteriorly measuring 17 x 15 mm in size in probable cysts arising from the upper pole of left kidney measuring 14 x 13 mm and the anterior aspect of the left kidney measuring in the midpole of the left kidney anteriorly measuring 12 x 9 mm in size. No abnormalities of the small bowel loops are noted. There is atherosclerotic calcification and mild dilatation of the abdominal aorta which measures 2.5 cm in diameter.  Inferior vena cava is unremarkable. There is no adenopathy. The urinary bladder is normal. The patient is status post hysterectomy. There is no pelvic free fluid. The colon is grossly normal. There is no adenopathy. There are bilateral hip replacements in place. There are degenerative changes in lumbar spine especially at L2-3. Steve  Impression:  1. Enlarged low-attenuation right and left adrenal glands, unchanged since remote CT scan dated 14 April 2016 suggestive of adrenal hyperplasia or benign nodules. 2. Status post cholecystectomy and hysterectomy. 3. Low-attenuation lesion arising from the right kidney anteriorly measuring 17 x 15 mm increased in size. Probable left renal cyst measuring 14 x 13 mm and 12 x 9 mm in size. Repeat CT scan in 6 months time may be helpful for follow-up. 4. Atherosclerotic calcification and mild dilatation of the abdominal aorta. 5. Bilateral hip replacements. 6. Otherwise negative CT scan of the abdomen and pelvis. .       XR CHEST (2 VW)   3/17/2021  FINDINGS: POSTSURGICAL CHANGES: None. LINES/TUBES/MECHANICAL DEVICES: None. TRACHEA/HEART/MEDIASTINUM/HILUM: Unremarkable. LUNG FIELDS: Ill-defined opacity is again seen projecting in the right lower lobe. No evident pneumothorax. The lungs are hyperlucent, with interstitial. No consolidation. OTHER: None. PNEUMOTHORAX: None. OSSEOUS STRUCTURES: 1. No acute osseous abnormality. Impression:  1. Unremarkable PA and lateral views of the chest.        FINAL DIAGNOSIS:   Lung, right lower lobe, biopsy:    Poorly differentiated squamous cell carcinoma. Microscopic Examination:   Sections show infiltrative nests of polygonal cells with foamy   cytoplasm and enlarged pleomorphic nuclei with variably prominent   nucleoli. Rare dyskeratotic cells are noted. Immunochemical stains   for TTF-1 and p40 are performed, with appropriate controls*. Lesional   cells express p40 and lack expression of TTF-1.   The findings are   consistent with the above diagnosis. Assessment      Diagnosis Orders   1. COPD, moderate ([address]     2. Personal history of tobacco use     3. Squamous cell carcinoma of right lung ([facility]  Ambulatory referral to Oncology    PET CT SKULL BASE TO MID THIGH   4. Malignant neoplasm of middle lobe, bronchus or lung ([facility]   PET CT SKULL BASE TO MID THIGH       Right kidney lesion possible metastasis   Plan   -continue on Trelegy   -continue albuterol   -Send for PET/CT for staging and refer to oncology   -Will send for Full PFT  -Discussed with patient smoking cessation techniques including patches and gum patient reports has not used in the past, reinforced to patient the importance of quitting smoking to prevent further damage to lung function patient verbalized understanding.  (Approximately 8 mins)-Will reissue referral to tobacco cessation clinic   -Advised to maintain pneumonia vaccine with PCP and to take flu vaccine this coming season.  -Advised patient to call office with any changes, questions, or concerns regarding respiratory status    Will see Gregory lam in: 2 months Full PFT    Eugenia Clinton CNP  3/31/2021

## 2021-04-01 ASSESSMENT — ENCOUNTER SYMPTOMS
STRIDOR: 0
DIARRHEA: 0
NAUSEA: 0
CHEST TIGHTNESS: 0
VOMITING: 0

## 2021-04-09 ENCOUNTER — TELEPHONE (OUTPATIENT)
Dept: SPIRITUAL SERVICES | Facility: CLINIC | Age: 71
End: 2021-04-09

## 2021-04-09 NOTE — TELEPHONE ENCOUNTER
made an attempt x1 to contact  Maykel Cathy   via telephone call to offer support and to check on the progress of her cancer treatment. There was no answer so  left message and my contact information. I will follow-up.

## 2021-04-12 ENCOUNTER — HOSPITAL ENCOUNTER (OUTPATIENT)
Dept: PET IMAGING | Age: 71
Discharge: HOME | End: 2021-04-12
Payer: MEDICARE

## 2021-04-12 DIAGNOSIS — C34.2 MALIGNANT NEOPLASM OF MIDDLE LOBE, BRONCHUS OR LUNG (HCC): ICD-10-CM

## 2021-04-12 DIAGNOSIS — C34.91 SQUAMOUS CELL CARCINOMA OF RIGHT LUNG (HCC): ICD-10-CM

## 2021-04-12 PROCEDURE — 3430000000 HC RX DIAGNOSTIC RADIOPHARMACEUTICAL: Performed by: NURSE PRACTITIONER

## 2021-04-12 PROCEDURE — 78815 PET IMAGE W/CT SKULL-THIGH: CPT

## 2021-04-12 PROCEDURE — A9552 F18 FDG: HCPCS | Performed by: NURSE PRACTITIONER

## 2021-04-12 RX ORDER — FLUDEOXYGLUCOSE F 18 200 MCI/ML
13.9 INJECTION, SOLUTION INTRAVENOUS
Status: COMPLETED | OUTPATIENT
Start: 2021-04-12 | End: 2021-04-12

## 2021-04-12 RX ADMIN — FLUDEOXYGLUCOSE F 18 13.9 MILLICURIE: 200 INJECTION, SOLUTION INTRAVENOUS at 08:47

## 2021-04-14 ENCOUNTER — OFFICE VISIT (OUTPATIENT)
Dept: ONCOLOGY | Age: 71
End: 2021-04-14
Payer: MEDICARE

## 2021-04-14 ENCOUNTER — HOSPITAL ENCOUNTER (OUTPATIENT)
Dept: INFUSION THERAPY | Age: 71
Discharge: HOME | End: 2021-04-14
Payer: MEDICARE

## 2021-04-14 ENCOUNTER — CLINICAL DOCUMENTATION (OUTPATIENT)
Dept: ONCOLOGY | Age: 71
End: 2021-04-14

## 2021-04-14 VITALS
RESPIRATION RATE: 18 BRPM | BODY MASS INDEX: 23.56 KG/M2 | WEIGHT: 138 LBS | HEIGHT: 64 IN | SYSTOLIC BLOOD PRESSURE: 137 MMHG | TEMPERATURE: 98.1 F | OXYGEN SATURATION: 96 % | DIASTOLIC BLOOD PRESSURE: 75 MMHG | HEART RATE: 88 BPM

## 2021-04-14 DIAGNOSIS — C34.2 MALIGNANT NEOPLASM OF MIDDLE LOBE OF RIGHT LUNG (HCC): ICD-10-CM

## 2021-04-14 DIAGNOSIS — C34.2 MALIGNANT NEOPLASM OF MIDDLE LOBE OF RIGHT LUNG (HCC): Primary | ICD-10-CM

## 2021-04-14 LAB
ABSOLUTE IMMATURE GRANULOCYTE: 0.03 THOU/MM3 (ref 0–0.07)
ALBUMIN SERPL-MCNC: 4.1 G/DL (ref 3.5–5.1)
ALP BLD-CCNC: 157 U/L (ref 38–126)
ALT SERPL-CCNC: < 5 U/L (ref 11–66)
ANION GAP SERPL CALCULATED.3IONS-SCNC: 9 MEQ/L (ref 8–16)
AST SERPL-CCNC: 13 U/L (ref 5–40)
BASINOPHIL, AUTOMATED: 0 % (ref 0–3)
BASOPHILS ABSOLUTE: 0 THOU/MM3 (ref 0–0.1)
BILIRUB SERPL-MCNC: 0.5 MG/DL (ref 0.3–1.2)
BUN BLDV-MCNC: 14 MG/DL (ref 7–22)
CALCIUM SERPL-MCNC: 9.3 MG/DL (ref 8.5–10.5)
CHLORIDE BLD-SCNC: 101 MEQ/L (ref 98–111)
CO2: 28 MEQ/L (ref 23–33)
CREAT SERPL-MCNC: 1.1 MG/DL (ref 0.4–1.2)
EOSINOPHILS ABSOLUTE: 0.2 THOU/MM3 (ref 0–0.4)
EOSINOPHILS RELATIVE PERCENT: 3 % (ref 0–4)
GFR SERPL CREATININE-BSD FRML MDRD: 49 ML/MIN/1.73M2
GLUCOSE BLD-MCNC: 100 MG/DL (ref 70–108)
HCT VFR BLD CALC: 50.5 % (ref 37–47)
HEMOGLOBIN: 16.2 GM/DL (ref 12–16)
IMMATURE GRANULOCYTES: 0 %
LYMPHOCYTES # BLD: 19 % (ref 15–47)
LYMPHOCYTES ABSOLUTE: 1.5 THOU/MM3 (ref 1–4.8)
MCH RBC QN AUTO: 31.9 PG (ref 26–33)
MCHC RBC AUTO-ENTMCNC: 32.1 GM/DL (ref 32.2–35.5)
MCV RBC AUTO: 99 FL (ref 81–99)
MONOCYTES ABSOLUTE: 0.3 THOU/MM3 (ref 0.4–1.3)
MONOCYTES: 4 % (ref 0–12)
PDW BLD-RTO: 13.7 % (ref 11.5–14.5)
PLATELET # BLD: 233 THOU/MM3 (ref 130–400)
PMV BLD AUTO: 10.4 FL (ref 9.4–12.4)
POTASSIUM SERPL-SCNC: 4.2 MEQ/L (ref 3.5–5.2)
RBC # BLD: 5.08 MILL/MM3 (ref 4.2–5.4)
SEG NEUTROPHILS: 74 % (ref 43–75)
SEGMENTED NEUTROPHILS ABSOLUTE COUNT: 5.7 THOU/MM3 (ref 1.8–7.7)
SODIUM BLD-SCNC: 138 MEQ/L (ref 135–145)
TOTAL PROTEIN: 7.2 G/DL (ref 6.1–8)
WBC # BLD: 7.7 THOU/MM3 (ref 4.8–10.8)

## 2021-04-14 PROCEDURE — 4040F PNEUMOC VAC/ADMIN/RCVD: CPT | Performed by: INTERNAL MEDICINE

## 2021-04-14 PROCEDURE — 3017F COLORECTAL CA SCREEN DOC REV: CPT | Performed by: INTERNAL MEDICINE

## 2021-04-14 PROCEDURE — 99204 OFFICE O/P NEW MOD 45 MIN: CPT | Performed by: INTERNAL MEDICINE

## 2021-04-14 PROCEDURE — 99211 OFF/OP EST MAY X REQ PHY/QHP: CPT

## 2021-04-14 PROCEDURE — 80053 COMPREHEN METABOLIC PANEL: CPT

## 2021-04-14 PROCEDURE — 85025 COMPLETE CBC W/AUTO DIFF WBC: CPT

## 2021-04-14 PROCEDURE — G8427 DOCREV CUR MEDS BY ELIG CLIN: HCPCS | Performed by: INTERNAL MEDICINE

## 2021-04-14 PROCEDURE — 1123F ACP DISCUSS/DSCN MKR DOCD: CPT | Performed by: INTERNAL MEDICINE

## 2021-04-14 PROCEDURE — 4004F PT TOBACCO SCREEN RCVD TLK: CPT | Performed by: INTERNAL MEDICINE

## 2021-04-14 PROCEDURE — G8399 PT W/DXA RESULTS DOCUMENT: HCPCS | Performed by: INTERNAL MEDICINE

## 2021-04-14 PROCEDURE — 1090F PRES/ABSN URINE INCON ASSESS: CPT | Performed by: INTERNAL MEDICINE

## 2021-04-14 PROCEDURE — 36415 COLL VENOUS BLD VENIPUNCTURE: CPT

## 2021-04-14 PROCEDURE — G8420 CALC BMI NORM PARAMETERS: HCPCS | Performed by: INTERNAL MEDICINE

## 2021-04-14 PROCEDURE — 1111F DSCHRG MED/CURRENT MED MERGE: CPT | Performed by: INTERNAL MEDICINE

## 2021-04-14 NOTE — PROGRESS NOTES
[address] CANCER [address] [place] [address]  Dept: [phone]  Loc: [phone]   Hematology/Oncology Consult (Clinic)        4/14/21     Jenniffer Wheelert   1950     No ref. provider found   NITO Oviedo - CNP       Reason: Squamous cell carcinoma right middle lung; referred for evaluation and treatment  No chief complaint on file. HPI: Eduardo Lott is a 68-year-old female who is at her baseline in terms of being symptomatic with  COPD . She denies hemoptysis but does admit to perhaps slight increased shortness of breath with activity over the past several months. Recently hospitalized at [facility] 3/12 through the 17th for syncope with no evidence of stroke or cardiac event with a CTA of the head and neck showed an occluded skull base left ICA. Smoking habit includes 2 pack/day for 48 years, ongoing and not on home O2. Still at 2ppd . Weight loss of 179 last November to 138 today; unintentional x much stress . Sister and  passed last year. On 12/13/2020 she had a CTA of the chest showed no PE but did show a 14 mm spiculated nodule that increased from 8 mm on the prior exam February 2019. No adenopathy reported. Biopsy of the right lower lobe lung nodule revealed a poorly differentiated squamous cell carcinoma. P40 positive and TTF-1 negative. Remote history of breast cancer approximately 15 years ago (poor historian). Treated with upfront chemotherapy then a right mastectomy followed by radiation to the chest wall and several years of adjuvant hormone therapy. No history of recurrence. No close oncologic follow-up in recent history. ROS:  Review of Systems 14 point negative except as detailed above.     PMH:   Past Medical History:   Diagnosis Date    Anxiety 2007    Arthritis     Breast cancer [facility]) 2005    right mastectomy, chemo and radiation history    CAD (coronary artery disease) 2001  Cancer of the skin 2004    Cerebral artery occlusion with cerebral infarction [facility]     Chronic UTI     COPD (chronic obstructive pulmonary disease) ([facility]     CVA (cerebral infarction) 2007, 2008    Depression 2007    DVT of lower extremity (deep venous thrombosis) ([facility] 1976    Facial injury 2005    Fall on greasy ground, striking left periorbital region    History of stroke 2007, 2008, 2009    Patient relates a stroke with right weakness and speech in 2007; another in 2010 or 2012 (unsure), both with need to rehabilitation. Also \"2 mini-strokes\" (?)    Hx of blood clots 1977    hip, leg    Hyperlipidemia 2005    Hypertension 2005    Hypothyroidism     IBS (irritable bowel syndrome)     Low HDL (under 40) 2014    Prolonged emergence from general anesthesia     Recurrent UTI (urinary tract infection) 2015    Dr Long Rodriguez finding difficulty 05/16/2017    work-up in progress    CVA x 3. Last in 2008    Denies MI,CHF ,arrythmia. .. Jasmnie León Jasmine León Jasmine León Social HX:   Social History     Socioeconomic History    Marital status:       Spouse name: Orly Garcia    Number of children: 3    Years of education: 5    Highest education level: Not on file   Occupational History    Occupation: Disabled   Social Needs    Financial resource strain: Not on file    Food insecurity     Worry: Not on file     Inability: Not on file   [employer] needs     Medical: Not on file     Non-medical: Not on file   Tobacco Use    Smoking status: Current Every Day Smoker     Packs/day: 2.00     Years: 48.00     Pack years: 96.00     Types: Cigarettes     Start date: 11/13/1967    Smokeless tobacco: Never Used   Substance and Sexual Activity    Alcohol use: No     Alcohol/week: 0.0 standard drinks    Drug use: No    Sexual activity: Yes     Partners: Male   Lifestyle    Physical activity     Days per week: Not on file     Minutes per session: Not on file    Stress: Not on file   Relationships    Social Heart Disease Sister     High Cholesterol Sister     Hypertension Sister     Asthma Sister     Other Other         high arched feet    Lung Cancer Son    Dad- lung ca 61  Mom- colon ca  80  Son- lung ca      Hospitalizations:   Syncope March 2021    Allergies: Allergies   Allergen Reactions    Cipro Xr     Vicodin [Hydrocodone-Acetaminophen]      Weird dreams          Adult Illness:  Patient Active Problem List   Diagnosis    Hyperlipidemia    History of breast cancer    Vitamin D deficiency    Chronic headachess/p head injjury s/p fall    Depression    Smoker    Hypothyroidism    Allergic rhinitis    Incontinence    Noncompliance    Vasovagal syncope    History of CVA (cerebrovascular accident)    Stage 2 moderate COPD by GOLD classification ([address]    Carotid occlusion, left    OA (osteoarthritis) of hip    Essential hypertension    CAD (coronary artery disease)    Transient cerebral ischemia    Aortic insufficiency    Tobacco abuse    History of chest pain atypical    S/P cardiac cath nonobstructive lesion 2014    Lumbar radiculitis    Spinal stenosis of lumbar region without neurogenic claudication    Syncope and collapse    Solitary pulmonary nodule on lung CT    Severe malnutrition ([address]    Breast ca 2004 R MRM . Chemo first monthly x 3, MRM then XRT then 1 pill x few years. We will attempt to get records.       Surgery:  Past Surgical History:   Procedure Laterality Date    APPENDECTOMY  1975    CHOLECYSTECTOMY  1992    COLONOSCOPY  2009    CT NEEDLE BIOPSY LUNG PERCUTANEOUS  3/16/2021    CT NEEDLE BIOPSY LUNG PERCUTANEOUS 3/16/2021 [facility] CT SCAN    HIP SURGERY  jan 19, 2015    HYSTERECTOMY  1976    JOINT REPLACEMENT Left 2011    HIP    JOINT REPLACEMENT Right 1/19/15    Right Total Hip Replacement - Dr. Brower [place] Right 2005    OTHER SURGICAL HISTORY  04/10/2018    Lumbar epidural steroid injection at L4    PA NJX DX/THER SBST INTRLMNR LMBR/SAC W/IMG GDN N/A 4/10/2018    LESI  @ L4 performed by Suzy Cruz MD at [facility], 2001    ovaries    THYROID SURGERY  2007    right lobe and then later left removed        Medications:  Current Outpatient Medications   Medication Sig Dispense Refill    fluticasone-umeclidin-vilant (TRELEGY ELLIPTA) 100-62.5-25 MCG/INH AEPB Inhale 1 puff into the lungs daily 30 each 11    albuterol sulfate HFA (PROVENTIL HFA) 108 (90 Base) MCG/ACT inhaler Inhale 2 puffs into the lungs every 6 hours as needed for Wheezing 1 Inhaler 3    venlafaxine (EFFEXOR XR) 37.5 MG extended release capsule Take 1 capsule by mouth daily 90 capsule 1    levothyroxine (LEVOTHROID) 88 MCG tablet Take 1 tablet by mouth daily 30 tablet 5     No current facility-administered medications for this visit. EXAM:    There were no vitals taken for this visit. ECO  General: Non-ill appearing. Thin, mildly anxious and tearful at times. She is alone today. HEENT: NC/AT,nonicteric, perrla,eom intact, no mucosal lesions wearing upper dentures, no lesions seen. Neck: normal thyroid, no masses. pulses nl, no bruits,   Nodes: No adenopathy  Lungs/chest: clear, no rales,rhonchi or wheezing, lung bases clear, overall very diminished breath sounds throughout. CV: rrr, no rubs ,gallops or murmurs  Breasts: Right mastectomy site unremarkable, left breast atrophic without masses. (Exam chaperoned by Enio Pritchett)  Abd/Rectal: soft, non-tender,bowel sounds normal , no HSM,no masses  Back: normal curvature, No midline tenderness. flanks nontender  : Not Examined  Extremities: no cyanosis,clubbing or edema. Skin: unremarkable  Neuro: A and O x 4, CN exam nonfocal, Motor- no deficits, Sensory- no deficits, gait-nl, speech- fluent, no ataxia.   Devices: none      DATA:    LAB:   CBC, CMP today  CBC with Differential:      Lab Results   Component Value Date    WBC 7.7 2021    RBC 5.08 2021    RBC 4.49 05/16/2012    HGB 16.2 04/14/2021    HCT 50.5 04/14/2021     04/14/2021    MCV 99 04/14/2021    MCH 31.9 04/14/2021    MCHC 32.1 04/14/2021    RDW 13.7 04/14/2021    NRBC 0 03/16/2021    NRBC 0 05/16/2012    SEGSPCT 91.8 03/16/2021    MONOPCT 0.6 03/16/2021    MONOSABS 0.3 04/14/2021    LYMPHSABS 1.5 04/14/2021    EOSABS 0.2 04/14/2021    BASOSABS 0.0 04/14/2021       CMP:    Lab Results   Component Value Date     03/16/2021    K 4.7 03/16/2021    K 3.9 03/13/2021     03/16/2021    CO2 23 03/16/2021    BUN 27 03/16/2021    CREATININE 1.1 03/16/2021    LABGLOM 49 03/16/2021    GLUCOSE 141 03/16/2021    GLUCOSE 94 05/16/2012    PROT 6.1 03/13/2021    LABALBU 3.3 03/13/2021    LABALBU 4.4 05/16/2012    CALCIUM 8.5 03/16/2021    BILITOT 0.3 03/13/2021    ALKPHOS 112 03/13/2021    AST 11 03/13/2021    ALT 6 03/13/2021       BMP:    Lab Results   Component Value Date     03/16/2021    K 4.7 03/16/2021    K 3.9 03/13/2021     03/16/2021    CO2 23 03/16/2021    BUN 27 03/16/2021    LABALBU 3.3 03/13/2021    LABALBU 4.4 05/16/2012    CREATININE 1.1 03/16/2021    CALCIUM 8.5 03/16/2021    LABGLOM 49 03/16/2021    GLUCOSE 141 03/16/2021    GLUCOSE 94 05/16/2012       Magnesium:    Lab Results   Component Value Date    MG 2.5 03/16/2021     PT/INR:    Lab Results   Component Value Date    INR 0.97 03/16/2021     TSH:    Lab Results   Component Value Date    TSH 4.810 03/13/2021     VITAMIN B12: No components found for: B12  FOLATE:    Lab Results   Component Value Date    FOLATE 7.3 08/31/2016       IMAGING:  PROCEDURE: CTA CHEST W WO CONTRAST 12/13/2020       CLINICAL INFORMATION: cough, sob, elevated d-dimer, PE .       COMPARISON: 2/21/2019       TECHNIQUE: Postcontrast images of the chest with 3-D MIPS Isovue-370 IV contrast   All CT scans at this facility use dose modulation, iterative reconstruction, and/or weight-based dosing when appropriate to reduce radiation dose to as low as reasonably achievable.       FINDINGS: No PE. There is no aneurysm or dissection. No mediastinal or hilar adenopathy by size criteria. Postsurgical changes right axilla. No axillary lymphadenopathy   Heart size normal.   No infiltrates or pleural effusions. The 8 mm nodule seen on the prior CT now measures 14 mm and has spiculated borders highly suspicious of malignancy. There however are no other additional new masses. Scattered nodules are present in both lungs which are previously seen. There is a    cluster of nodular the anterior right upper lung stable. Small nodules posterior right lower lung stable. Tiny triangular nodular density lingula is stable.       Upper abdomen   Stable bilateral adrenal fullness likely hyperplasia this is stable dating back to June 2017.       Bones   No suspicious bone lesions           Impression   1. No PE. 2. No acute findings. 3. Interval increase in size of the patient's right lung nodule now measures 14 mm with spiculated borders and is highly suspicious for malignancy.               **This report has been created using voice recognition software.  It may contain minor errors which are inherent in voice recognition technology. **       Final report electronically signed by Dr. Soco Frances on 12/13/2020 4:08 PM       Impression:  1. No PE. 2. No acute findings. 3. Interval increase in size of the patient's right lung nodule now measures 14 mm with spiculated borders and is highly suspicious for malignancy.        CT CHEST W CONTRAST       CTA head with contrast 3/12/2021/syncope evaluation  Impression   Impression:   1.  Occluded skull base left ICA, present previously.  Patent left    posterior communicating artery. 2.  Intact anterior, posterior, and middle cerebral circulations. 3.  No intracranial aneurysm.         Brain MRI without contrast 3/13/2021      Impression       1. No evidence of an acute infarct. 2. No antegrade flow in the left internal carotid artery. 3. Mild atrophy and probable ischemic changes in the white matter. 4. Inflammatory changes in the right mastoid air cells.             Ct Needle Biopsy Lung/mediastinum Percutaneous  Result Date: 3/16/2021   Successful CT-guided right lower lobe lung nodule biopsy with small postprocedural pneumothorax. Follow-up chest radiographs will be obtained. **This report has been created using voice recognition software. It may contain minor errors which are inherent in voice recognition technology. ** Final report electronically signed by Dr. Concha Fraser MD on 3/16/2021 10:50 AM    Pet Ct Skull Base To Mid Thigh 4/12/2021  FINDINGS:        Neck: A 0.7 cm nodule in the left parotid gland has a maximum SUV of 4.3 (image 39).     Chest: Cardiac size is normal. Atherosclerotic calcifications are present in the thoracic aorta and coronary arteries without evidence of aneurysm. There is no pleural or pericardial effusion. Emphysematous changes are noted in the bilateral lungs. A 1.5    cm nodule at the lateral right midlung is stable in size is associated with a maximum SUV of 9.5 (image 118). There is no FDG avid mediastinal, hilar or axillary lymphadenopathy. Surgical clips are noted in the right axilla. There are surgical changes    of prior right mastectomy. Activity associated with a small hiatal hernia may be infectious or inflammatory. Degenerative changes are seen in the thoracic spine without evidence of hypermetabolic osseous metastatic disease.       Abdomen/pelvis: Physiologic activity is present in the liver, spleen, urinary collecting system and gastrointestinal tract. The gallbladder is surgically absent. There is fatty replacement of the pancreas. Hypoattenuating lesions in the bilateral kidneys    are likely cysts but are incompletely characterized on the current study. A 1.5 cm hypoattenuating nodule in the right adrenal gland is associated with a maximum SUV of 7 (image 159).  A 1.4 cm nodule left adrenal gland has a maximum SUV of 2.4 (image    154). Atherosclerotic calcifications are present in the abdominal aorta without evidence of aneurysm or the uterus is surgically absent. There is no FDG avid mesenteric, retroperitoneal, pelvic or inguinal lymphadenopathy. Degenerative changes are    present in the lumbar spine and pelvis without evidence of hypermetabolic osseous metastatic disease. There is extensive metallic artifact in the lower pelvis from bilateral hip prostheses.           Impression   1. FDG avid right mid lung nodule corresponding to known malignancy. 2. 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 Metastatic disease is considered less likely but not excluded. 3. Mildly FDG avid bilateral adrenal nodules, likely benign adenomas.             PROCEDURES:  3/16/2021 IR lung biopsy    PATHOLOGY:   The needle biopsy lung 3/16/2021  Clinical Information: RIGHT LOWER LOBE LUNG NODULE; PT HAS REMOTE   HISTORY OF BREAST CANCER 2005     FINAL DIAGNOSIS:   Lung, right lower lobe, biopsy:    Poorly differentiated squamous cell carcinoma.      Specimen:   BIOPSY OF LUNG, RIGHT LOWER LOBE      Intraoperative Consultation:   Touch Prep DX:  1.  Atypical epithelial cells present.  2.  Malignant   epithelial cells present.  [facility]     Gross Examination:   The container is labeled [facility], [facility], lung.  Received in   formalin are several tiny bits of tan tissue, each less than 0.1 cm in   diameter.  The longest fragment measures about 0.3 cm.  The specimen is   entirely submitted in two cassettes.  ns.  PCF/DKR:[id number]     Microscopic Examination:   Sections show infiltrative nests of polygonal cells with foamy   cytoplasm and enlarged pleomorphic nuclei with variably prominent   nucleoli.  Rare dyskeratotic cells are noted.  Immunochemical stains   for TTF-1 and p40 are performed, with appropriate controls*.  Lesional   cells express p40 and lack expression of TTF-1.  The findings are   consistent with the above diagnosis. PFT: Ordered ASAP and will be done Monday 4/19  ( if she gets her Covid testing tomorrow )    GENETICS:  na    MOLECULAR:  na    ASSESSMENT/PLAN:    1: Diagnosis: 65-year-old female with significant COPD and comorbidities as detailed above with a fairly decent performance status. A lateral right middle lung nodule is increased since 2019 a recent biopsy of this 15 mm lesion shows a poorly differentiated squamous cell. Staging including PET scan shows no other hypermetabolic foci. She did have a syncopal episode and reports slight increase in headaches from her baseline. Not sure why her MRI was done without contrast and will order a MRI of the brain with and without contrast to complete her staging. Clinical stage [id number]  /  Stage I A2 . Right middle lobe squamous cell carcinoma, poorly differentiated. 2) Prognosis / Disease Status: Very good. 3) Work-up:    Labs: CBC, CMP today   Imaging: Brain MRI with and without contrast   Procedures: No additional procedures needed   Consults: Radiation oncology; depending on PFTs may be a candidate for SRT   Other: PFTs/spirometry ASAP                         Staging mediastinum by [facility] EBUS; consider CT surgery consultation. 4) Symptom Management: None needed. Does report baseline minor neuropathy in her fingertips. 5) Supportive care provided. Level of care is appropriate. Teaching done today. Emphasized that she appears to have stage I squamous cell cancer of the right middle lobe. Prognosis overall very good. Treatment options to include surgery versus SBRT which would largely be dependent on her pulmonary function. 6) Treatment goal:      Treatment plan:     Pending results of PFTs. Surgery i.e. lobectomy versus SRT      7) Medications reviewed. Prescriptions today: None            No orders of the defined types were placed in this encounter. [place]ARRS:  Controlled Substance Monitoring:    Acute and Chronic Pain Monitoring:   RX Monitoring 3/23/2018   Attestation The Prescription Monitoring Report for this patient was reviewed today. Periodic Controlled Substance Monitoring Possible medication side effects, risk of tolerance/dependence & alternative treatments discussed. ;Random urine drug screen sent today. ;No signs of potential drug abuse or diversion identified. 8) Research Options:       Not applicable      9) Other:       Strongly encouraged to attempt to stop smoking. Attempt to get records detailing her breast cancer diagnosis 15 years ago and treatment. 10) Follow Up: Follow-up with me in 10 days. Depending on results of PFTs will then consider referral to CT surgery    I spent 60 minutes face-to-face with the patient and family. Greater than 50% of time spent on counseling and coordinating the patient's care. Face-to-face counseling included prognosis, review of risks and benefits of any treatment along with compliance and risk reduction.     Berta Agarwal MD

## 2021-04-14 NOTE — PROGRESS NOTES
[address] CANCER [address] [place] [address]  Dept: [phone]  Loc: [phone]   Hematology/Oncology Consult (Clinic)        4/14/21     Chapin Liter   1950     Frederick Kim, INTO *   Alicja Rueda, APRN - CNP       Reason: Squamous cell carcinoma right middle lung; referred for evaluation and treatment  Chief Complaint   Patient presents with    New Patient     Squamous Cell Carcinoma Rt Lung          HPI: Alireza Vyas is a 72-year-old female who is at her baseline in terms of being symptomatic with  COPD . She denies hemoptysis but does admit to perhaps slight increased shortness of breath with activity over the past several months. Recently hospitalized at [facility] 3/12 through the 17th for syncope with no evidence of stroke or cardiac event with a CTA of the head and neck showed an occluded skull base left ICA. Smoking habit includes 2 pack/day for 48 years, ongoing and not on home O2. Still at 2ppd . Weight loss of 179 last November to 138 today; unintentional x much stress . Sister and  passed last year. On 12/13/2020 she had a CTA of the chest showed no PE but did show a 14 mm spiculated nodule that increased from 8 mm on the prior exam February 2019. No adenopathy reported. Biopsy of the right lower lobe lung nodule revealed a poorly differentiated squamous cell carcinoma. P40 positive and TTF-1 negative. Remote history of breast cancer approximately 15 years ago (poor historian). Treated with upfront chemotherapy then a right mastectomy followed by radiation to the chest wall and several years of adjuvant hormone therapy. No history of recurrence. No close oncologic follow-up in recent history. ROS:  Review of Systems 14 point negative except as detailed above.     PMH:   Past Medical History:   Diagnosis Date    Anxiety 2007    Arthritis     Breast cancer [facility]) 2005    right mastectomy, steroid injection at L4    ME NJX DX/THER SBST INTRLMNR LMBR/SAC W/IMG GDN N/A 4/10/2018    LESI  @ L4 performed by Connie Drummond MD at [facility],     ovaries    THYROID SURGERY  2007    right lobe and then later left removed        Medications:  Current Outpatient Medications   Medication Sig Dispense Refill    fluticasone-umeclidin-vilant (TRELEGY ELLIPTA) 100-62.5-25 MCG/INH AEPB Inhale 1 puff into the lungs daily 30 each 11    albuterol sulfate HFA (PROVENTIL HFA) 108 (90 Base) MCG/ACT inhaler Inhale 2 puffs into the lungs every 6 hours as needed for Wheezing 1 Inhaler 3    venlafaxine (EFFEXOR XR) 37.5 MG extended release capsule Take 1 capsule by mouth daily 90 capsule 1    levothyroxine (LEVOTHROID) 88 MCG tablet Take 1 tablet by mouth daily 30 tablet 5     No current facility-administered medications for this visit. EXAM:    /75 (Site: Right Upper Arm, Position: Sitting, Cuff Size: Medium Adult)   Pulse 88   Temp 98.1 °F (36.7 °C) (Infrared)   Resp 18   Ht 5' 4\" (1.626 m)   Wt 138 lb (62.6 kg)   SpO2 96%   BMI 23.69 kg/m²      ECO  General: Non-ill appearing. Thin, mildly anxious and tearful at times. She is alone today. HEENT: NC/AT,nonicteric, perrla,eom intact, no mucosal lesions wearing upper dentures, no lesions seen. Neck: normal thyroid, no masses. pulses nl, no bruits,   Nodes: No adenopathy  Lungs/chest: clear, no rales,rhonchi or wheezing, lung bases clear, overall very diminished breath sounds throughout. CV: rrr, no rubs ,gallops or murmurs  Breasts: Right mastectomy site unremarkable, left breast atrophic without masses. (Exam chaperoned by Nevin Arita)  Abd/Rectal: soft, non-tender,bowel sounds normal , no HSM,no masses  Back: normal curvature, No midline tenderness. flanks nontender  : Not Examined  Extremities: no cyanosis,clubbing or edema.   Skin: unremarkable  Neuro: A and O x 4, CN exam nonfocal, Motor- no deficits, Sensory- no deficits, gait-nl, speech- fluent, no ataxia.   Devices: none      DATA:    LAB:   CBC, CMP today  CBC with Differential:      Lab Results   Component Value Date    WBC 9.5 03/16/2021    RBC 4.58 03/16/2021    RBC 4.49 05/16/2012    HGB 14.6 03/16/2021    HCT 46.0 03/16/2021     03/16/2021    .4 03/16/2021    MCH 31.9 03/16/2021    MCHC 31.7 03/16/2021    RDW 14.7 02/02/2018    NRBC 0 03/16/2021    NRBC 0 05/16/2012    SEGSPCT 91.8 03/16/2021    MONOPCT 0.6 03/16/2021    MONOSABS 0.1 03/16/2021    LYMPHSABS 0.7 03/16/2021    EOSABS 0.0 03/16/2021    BASOSABS 0.0 03/16/2021       CMP:    Lab Results   Component Value Date     03/16/2021    K 4.7 03/16/2021    K 3.9 03/13/2021     03/16/2021    CO2 23 03/16/2021    BUN 27 03/16/2021    CREATININE 1.1 03/16/2021    LABGLOM 49 03/16/2021    GLUCOSE 141 03/16/2021    GLUCOSE 94 05/16/2012    PROT 6.1 03/13/2021    LABALBU 3.3 03/13/2021    LABALBU 4.4 05/16/2012    CALCIUM 8.5 03/16/2021    BILITOT 0.3 03/13/2021    ALKPHOS 112 03/13/2021    AST 11 03/13/2021    ALT 6 03/13/2021       BMP:    Lab Results   Component Value Date     03/16/2021    K 4.7 03/16/2021    K 3.9 03/13/2021     03/16/2021    CO2 23 03/16/2021    BUN 27 03/16/2021    LABALBU 3.3 03/13/2021    LABALBU 4.4 05/16/2012    CREATININE 1.1 03/16/2021    CALCIUM 8.5 03/16/2021    LABGLOM 49 03/16/2021    GLUCOSE 141 03/16/2021    GLUCOSE 94 05/16/2012       Magnesium:    Lab Results   Component Value Date    MG 2.5 03/16/2021     PT/INR:    Lab Results   Component Value Date    INR 0.97 03/16/2021     TSH:    Lab Results   Component Value Date    TSH 4.810 03/13/2021     VITAMIN B12: No components found for: B12  FOLATE:    Lab Results   Component Value Date    FOLATE 7.3 08/31/2016       IMAGING:  PROCEDURE: CTA CHEST W WO CONTRAST 12/13/2020       CLINICAL INFORMATION: cough, sob, elevated d-dimer, PE .       COMPARISON: 2/21/2019     TECHNIQUE: Postcontrast images of the chest with 3-D MIPS Isovue-370 IV contrast   All CT scans at this facility use dose modulation, iterative reconstruction, and/or weight-based dosing when appropriate to reduce radiation dose to as low as reasonably achievable.       FINDINGS: No PE. There is no aneurysm or dissection. No mediastinal or hilar adenopathy by size criteria. Postsurgical changes right axilla. No axillary lymphadenopathy   Heart size normal.   No infiltrates or pleural effusions. The 8 mm nodule seen on the prior CT now measures 14 mm and has spiculated borders highly suspicious of malignancy. There however are no other additional new masses. Scattered nodules are present in both lungs which are previously seen. There is a    cluster of nodular the anterior right upper lung stable. Small nodules posterior right lower lung stable. Tiny triangular nodular density lingula is stable.       Upper abdomen   Stable bilateral adrenal fullness likely hyperplasia this is stable dating back to June 2017.       Bones   No suspicious bone lesions           Impression   1. No PE. 2. No acute findings. 3. Interval increase in size of the patient's right lung nodule now measures 14 mm with spiculated borders and is highly suspicious for malignancy.               **This report has been created using voice recognition software.  It may contain minor errors which are inherent in voice recognition technology. **       Final report electronically signed by Dr. Deirdre Bose on 12/13/2020 4:08 PM       Impression:  1. No PE. 2. No acute findings. 3. Interval increase in size of the patient's right lung nodule now measures 14 mm with spiculated borders and is highly suspicious for malignancy.        CT CHEST W CONTRAST       CTA head with contrast 3/12/2021/syncope evaluation  Impression   Impression:   1.  Occluded skull base left ICA, present previously.  Patent left    posterior communicating artery.    2.  Intact anterior, posterior, and middle cerebral circulations. 3.  No intracranial aneurysm.         Brain MRI without contrast 3/13/2021      Impression       1. No evidence of an acute infarct. 2. No antegrade flow in the left internal carotid artery. 3. Mild atrophy and probable ischemic changes in the white matter. 4. Inflammatory changes in the right mastoid air cells.             Ct Needle Biopsy Lung/mediastinum Percutaneous  Result Date: 3/16/2021   Successful CT-guided right lower lobe lung nodule biopsy with small postprocedural pneumothorax. Follow-up chest radiographs will be obtained. **This report has been created using voice recognition software. It may contain minor errors which are inherent in voice recognition technology. ** Final report electronically signed by Dr. Teodora Mario MD on 3/16/2021 10:50 AM    Pet Ct Skull Base To Mid Thigh 4/12/2021  FINDINGS:        Neck: A 0.7 cm nodule in the left parotid gland has a maximum SUV of 4.3 (image 39).     Chest: Cardiac size is normal. Atherosclerotic calcifications are present in the thoracic aorta and coronary arteries without evidence of aneurysm. There is no pleural or pericardial effusion. Emphysematous changes are noted in the bilateral lungs. A 1.5    cm nodule at the lateral right midlung is stable in size is associated with a maximum SUV of 9.5 (image 118). There is no FDG avid mediastinal, hilar or axillary lymphadenopathy. Surgical clips are noted in the right axilla. There are surgical changes    of prior right mastectomy. Activity associated with a small hiatal hernia may be infectious or inflammatory. Degenerative changes are seen in the thoracic spine without evidence of hypermetabolic osseous metastatic disease.       Abdomen/pelvis: Physiologic activity is present in the liver, spleen, urinary collecting system and gastrointestinal tract. The gallbladder is surgically absent. There is fatty replacement of the pancreas. Hypoattenuating lesions in the bilateral kidneys    are likely cysts but are incompletely characterized on the current study. A 1.5 cm hypoattenuating nodule in the right adrenal gland is associated with a maximum SUV of 7 (image 159). A 1.4 cm nodule left adrenal gland has a maximum SUV of 2.4 (image    154). Atherosclerotic calcifications are present in the abdominal aorta without evidence of aneurysm or the uterus is surgically absent. There is no FDG avid mesenteric, retroperitoneal, pelvic or inguinal lymphadenopathy. Degenerative changes are    present in the lumbar spine and pelvis without evidence of hypermetabolic osseous metastatic disease. There is extensive metallic artifact in the lower pelvis from bilateral hip prostheses.           Impression   1. FDG avid right mid lung nodule corresponding to known malignancy. 2. 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 Metastatic disease is considered less likely but not excluded. 3. Mildly FDG avid bilateral adrenal nodules, likely benign adenomas.             PROCEDURES:  3/16/2021 IR lung biopsy    PATHOLOGY:   The needle biopsy lung 3/16/2021  Clinical Information: RIGHT LOWER LOBE LUNG NODULE; PT HAS REMOTE   HISTORY OF BREAST CANCER 2005     FINAL DIAGNOSIS:   Lung, right lower lobe, biopsy:    Poorly differentiated squamous cell carcinoma.      Specimen:   BIOPSY OF LUNG, RIGHT LOWER LOBE      Intraoperative Consultation:   Touch Prep DX:  1.  Atypical epithelial cells present.  2.  Malignant   epithelial cells present.  [facility]     Gross Examination:   [facility] [place].  Received in   formalin are several tiny bits of tan tissue, each less than 0.1 cm in   diameter.  The longest fragment measures about 0.3 cm.  The specimen is   entirely submitted in two cassettes.  ns.  PCF/DKR:[id number]     Microscopic Examination:   Sections show infiltrative nests of polygonal cells with foamy lobectomy versus SRT      7) Medications reviewed. Prescriptions today: None            No orders of the defined types were placed in this encounter. OARRS:  Controlled Substance Monitoring:    Acute and Chronic Pain Monitoring:   RX Monitoring 3/23/2018   Attestation The Prescription Monitoring Report for this patient was reviewed today. Periodic Controlled Substance Monitoring Possible medication side effects, risk of tolerance/dependence & alternative treatments discussed. ;Random urine drug screen sent today. ;No signs of potential drug abuse or diversion identified. 8) Research Options:       Not applicable      9) Other:       Strongly encouraged to attempt to stop smoking. Attempt to get records detailing her breast cancer diagnosis 15 years ago and treatment. 10) Follow Up: Follow-up with me in 10 days. Depending on results of PFTs will then consider referral to CT surgery    I spent 60 minutes face-to-face with the patient and family. Greater than 50% of time spent on counseling and coordinating the patient's care. Face-to-face counseling included prognosis, review of risks and benefits of any treatment along with compliance and risk reduction.     Hector Alcantara MD

## 2021-04-15 ENCOUNTER — HOSPITAL ENCOUNTER (OUTPATIENT)
Age: 71
Discharge: HOME | End: 2021-04-15
Payer: MEDICARE

## 2021-04-15 ENCOUNTER — CLINICAL DOCUMENTATION (OUTPATIENT)
Dept: CASE MANAGEMENT | Age: 71
End: 2021-04-15

## 2021-04-15 PROCEDURE — 87636 SARSCOV2 & INF A&B AMP PRB: CPT

## 2021-04-15 NOTE — PROGRESS NOTES
Name: Jody Michaels  : 1950  MRN: [id number]    Oncology Navigation- Initial Note:     Intake-  Contact Type: Medical Oncology    Diagnosis: Thoracic- malignant    Home Disposition: Lives with other who is able to assist    Patient needs and barriers to care: Knowledge deficit and Symptom Management     Referral Source: Outpatient    Receptive to Advanced Care Planning/ Palliative Care:  Deferred at present    Interventions-   General Interventions: Navigation TXU Joseph given and program explained. Referrals: Financial navigator       Continuum of Care: Diagnosis/Active Treatment    Notes:   Met with Vincent Diaz during consultation with Dr. Stefan Ramirez for her Stage I squamous cell lung cancer. Diagnosis when inpatient hospital stay for syncope. Small lung nodule under surveillance for a couple of years, now larger. Has had over 20 pound weight loss in past several months. Attributed it due to stress as she has lost both  and sister within past year. Early Stage-good prognosis-see Dr. Stefan Ramirez notes for details. Surgery vrs SBRT.  Breast Cancer-radiation, chemo, masectomy and AI oral.    Current smoker-urged to quit. Has information, not quite ready to proceed as of yet, but understands need to do so. Labs drawn today. MRI with and without contrast of Brain for staging [id number]  PFT studies-  Consultation with Dr. Alphonso Monroy in Radiology   Follow up with Dr. Stefan Ramirez . Vincent Diaz has 3 sons who all live in [facility]. One son lives with her. Family support. She is retired, drives and is independent. Contact information given and she knows to call with any questions or concerns.   Electronically signed by Robby Clark RN on 4/15/2021 at 12:16 PM

## 2021-04-16 LAB
INFLUENZA A: NOT DETECTED
INFLUENZA B: NOT DETECTED
SARS-COV-2 RNA, RT PCR: NOT DETECTED

## 2021-04-19 ENCOUNTER — HOSPITAL ENCOUNTER (OUTPATIENT)
Dept: PULMONOLOGY | Age: 71
Discharge: HOME | End: 2021-04-19
Payer: MEDICARE

## 2021-04-19 ENCOUNTER — HOSPITAL ENCOUNTER (OUTPATIENT)
Dept: MRI IMAGING | Age: 71
Discharge: HOME | End: 2021-04-19
Payer: MEDICARE

## 2021-04-19 DIAGNOSIS — C34.2 MALIGNANT NEOPLASM OF MIDDLE LOBE OF RIGHT LUNG (HCC): ICD-10-CM

## 2021-04-19 DIAGNOSIS — J44.1 COPD EXACERBATION (HCC): ICD-10-CM

## 2021-04-19 PROCEDURE — 6360000004 HC RX CONTRAST MEDICATION: Performed by: INTERNAL MEDICINE

## 2021-04-19 PROCEDURE — 94729 DIFFUSING CAPACITY: CPT

## 2021-04-19 PROCEDURE — A9579 GAD-BASE MR CONTRAST NOS,1ML: HCPCS | Performed by: INTERNAL MEDICINE

## 2021-04-19 PROCEDURE — 94060 EVALUATION OF WHEEZING: CPT

## 2021-04-19 PROCEDURE — 94726 PLETHYSMOGRAPHY LUNG VOLUMES: CPT

## 2021-04-19 PROCEDURE — 70553 MRI BRAIN STEM W/O & W/DYE: CPT

## 2021-04-19 RX ADMIN — GADOTERIDOL 10 ML: 279.3 INJECTION, SOLUTION INTRAVENOUS at 11:11

## 2021-04-20 ENCOUNTER — HOSPITAL ENCOUNTER (OUTPATIENT)
Dept: RADIATION ONCOLOGY | Age: 71
Discharge: HOME | End: 2021-04-20
Payer: MEDICARE

## 2021-04-20 ENCOUNTER — CLINICAL DOCUMENTATION (OUTPATIENT)
Dept: ONCOLOGY | Age: 71
End: 2021-04-20

## 2021-04-20 VITALS
DIASTOLIC BLOOD PRESSURE: 67 MMHG | SYSTOLIC BLOOD PRESSURE: 143 MMHG | HEIGHT: 64 IN | HEART RATE: 73 BPM | OXYGEN SATURATION: 95 % | TEMPERATURE: 98.7 F | WEIGHT: 140.6 LBS | RESPIRATION RATE: 18 BRPM | BODY MASS INDEX: 24.01 KG/M2

## 2021-04-20 DIAGNOSIS — C34.2 PRIMARY MALIGNANT NEOPLASM OF RIGHT MIDDLE LOBE OF LUNG (HCC): ICD-10-CM

## 2021-04-20 PROCEDURE — 99202 OFFICE O/P NEW SF 15 MIN: CPT | Performed by: RADIOLOGY

## 2021-04-20 PROCEDURE — 99204 OFFICE O/P NEW MOD 45 MIN: CPT | Performed by: RADIOLOGY

## 2021-04-20 NOTE — PROGRESS NOTES
[address] [facility] [place]           [address], [address]        ROD[place] ELIJAH RIVAS II.[place],  [facility]        Priyank James: [phone]        F: [phone]       [email or web address]     Date of Service: 2021  Patient ID: Latoya Yuen   : 1950  MRN: [id number]   Acct Number: [de-identified]         Requesting Provider: Dr. Babatunde Riley [facility])  Reason for request: Evaluation for the potential role of definitive radiation therapy. CONSULTANT: Mannie Creston    CHIEF COMPLAINT: Evaluation for the potential role of definitive radiation therapy. ASSESSMENT:  Cancer Staging  Primary malignant neoplasm of right middle lobe of lung (HCC)  Staging form: Lung, AJCC 8th Edition  - Clinical stage from 2021: Stage IA2 (cT1b, cN0, cM0) - Signed by Az Perez MD on 2021    PLAN:  -Plan for referral to [facility] thoracic surgery for treatment recommendations  -If not considered a good surgical candidate, will consider definitive SBRT  -Patient noted chest pain/tightness w/ associated diaphoresis and nausea  -Recent cardiac work up during ED admission in 2021 was negative  -Will alert PCP for referral to cardiology if not already done, patient instructed to seek care immediately if symptoms recur  -Plan for patient to continue to follow with Dr. Andre Lawrence of Medical Oncology  -Patient to return to our clinic for further discussion of radiation treatment options if no surgical treatment options are available. -Plan of care discussed with Dr. Babatunde Riley and he is agreeable    The patient will return to clinic as needed or sooner if clinically indicated. They have our clinic number to call with any questions or concerns if needed. Thank you for allowing us to be a part of their care. HISTORY OF PRESENT ILLNESS:  Oncology History Overview Note   Latoya Yuen is a 73 y.o. female who is at her baseline in terms of being symptomatic with COPD.   She denies hemoptysis but does admit to perhaps slight increased shortness of breath with activity over the past several months. Recently hospitalized at [place] [address] 3/12 through the 17th for syncope with no evidence of stroke or cardiac event with a CTA of the head and neck showed an occluded skull base left ICA. Smoking habit includes 2 pack/day for 48 years, ongoing and not on home O2. Still at 2ppd . Unintentional weight loss noted over the past several months. Sister and  passed last year. On 12/13/2020 she had a CTA of the chest showed no PE but did show a 14 mm spiculated nodule that increased from 8 mm on the prior exam February 2019. No adenopathy reported. Biopsy of the right lower lobe lung nodule revealed a poorly differentiated squamous cell carcinoma. P40 positive and TTF-1 negative. Remote history of breast cancer approximately 15 years ago (poor historian). Treated with upfront chemotherapy then a right mastectomy followed by radiation to the chest wall and several years of adjuvant hormone therapy. No history of recurrence. No close oncologic follow-up in recent history. Primary malignant neoplasm of right middle lobe of lung ([facility] [address]   4/20/2021 Initial Diagnosis    Primary malignant neoplasm of right middle lobe of lung [facility])         Ms. Ivone Antonio is a 73 y.o. female with above mentioned oncologic history presenting today for initial consultation regarding the potential role for radiation therapy. She presents today with chronic but stable complaints of shortness of breath, intermittent, with associated cough productive of yellow sputum. She has complaints of intermittent headaches, recent MRI brain negative for intracranial disease. She noted recent episode of mild chest pain/pressure with associated sweating/nausea. Recent ED admission for syncopal episode, found to be a result of COPD exacerbation, cardiac work up negative as per EMR.  She also has complaints of itchy hands bilaterally. She is otherwise well with no other general complaints at this time. REVIEW OF SYSTEMS: AS per HPI above. PHYSICAL EXAMINATION:   VITAL SIGNS: BP (!) 143/67   Pulse 73   Temp 98.7 °F (37.1 °C) (Infrared)   Resp 18   Ht 5' 4.02\" (1.626 m)   Wt 140 lb 9.6 oz (63.8 kg)   SpO2 95%   BMI 24.12 kg/m²     ECO - Symptomatic but completely ambulatory (Restricted in physically strenuous activity but ambulatory and able to carry out work of a light or sedentary nature. For example, light housework, office work)    PAIN: 0/10    GENERAL: Pleasant well-developed adult. In no acute distress. Alert and oriented ×3  HEENT: Normocephalic, atraumatic. LUNGS: Distant breath sounds in apices, otherwise clear bilaterally. Good inspiratory effort, no accessory muscle use. CARDIAC: Regular rate and rhythm  ABDOMEN: Soft, non tender, non distended  EXTREMITIES: No significant LE edema b/l  NEUROLOGIC: No grossly apparent focal deficits. Cranial nerves grossly intact    Past Medical History:   Diagnosis Date    Anxiety 2007    Arthritis     Breast cancer [facility]) 2005    right mastectomy, chemo and radiation history    CAD (coronary artery disease) 2001    Cancer of the skin 2004    Cerebral artery occlusion with cerebral infarction ([address]     Chronic UTI     COPD (chronic obstructive pulmonary disease) ([address]     CVA (cerebral infarction) ,     Depression     DVT of lower extremity (deep venous thrombosis) ([address] 1976    Facial injury 2005    Fall on greasy ground, striking left periorbital region    History of stroke , ,     Patient relates a stroke with right weakness and speech in ; another in  or  (unsure), both with need to rehabilitation.   Also \"2 mini-strokes\" (?)    Hx of blood clots 1977    hip, leg    Hyperlipidemia 2005    Hypertension 2005    Hypothyroidism     IBS (irritable bowel syndrome)     Low HDL (under 40) 2014    Prolonged emergence from general anesthesia     Recurrent UTI (urinary tract infection) 2015    Dr Hien Huertas finding difficulty 05/16/2017    work-up in progress        Past Surgical History:   Procedure Laterality Date   [address]    COLONOSCOPY  2009    CT NEEDLE BIOPSY LUNG PERCUTANEOUS  3/16/2021    CT NEEDLE BIOPSY LUNG PERCUTANEOUS 3/16/2021 [facility] CT SCAN    HIP SURGERY  jan 19, 2015    HYSTERECTOMY  1976    JOINT REPLACEMENT Left 2011    HIP    JOINT REPLACEMENT Right 1/19/15    Right Total Hip Replacement - Dr. Deborah Acharya Right 2005    OTHER SURGICAL HISTORY  04/10/2018    Lumbar epidural steroid injection at L4    NM NJX DX/THER SBST INTRLMNR LMBR/SAC W/IMG GDN N/A 4/10/2018    LESI  @ L4 performed by Antony Rosa MD at [facility]  1976, 2001    ovaries    THYROID SURGERY  2007    right lobe and then later left removed       Family History   Problem Relation Age of Onset    Osteoporosis Mother     Hypertension Mother     High Cholesterol Mother     Stroke Mother     Colon Cancer Mother     Cancer Father         lung cancer    Heart Disease Father     Hypertension Father     High Cholesterol Father     Heart Disease Sister     High Cholesterol Sister     Hypertension Sister     Asthma Sister     Other Other         high arched feet    Lung Cancer Son        Social History     Socioeconomic History    Marital status:       Spouse name: Michelet Camarena    Number of children: 3    Years of education: 5    Highest education level: Not on file   Occupational History    Occupation: Disabled   Social Needs    Financial resource strain: Not on file    Food insecurity     Worry: Not on file     Inability: Not on file   [employer] needs     Medical: Not on file     Non-medical: Not on file   Tobacco Use    Smoking status: Current Every Day Smoker     Packs/day: 2.00     Years: 48.00     Pack years: 96.00     Types: Cigarettes     Start date: 11/13/1967    Smokeless tobacco: Never Used   Substance and Sexual Activity    Alcohol use: No     Alcohol/week: 0.0 standard drinks    Drug use: No    Sexual activity: Yes     Partners: Male   Lifestyle    Physical activity     Days per week: Not on file     Minutes per session: Not on file    Stress: Not on file   Relationships    Social connections     Talks on phone: Not on file     Gets together: Not on file     Attends Nondenominational service: Not on file     Active member of club or organization: Not on file     Attends meetings of clubs or organizations: Not on file     Relationship status: Not on file    Intimate partner violence     Fear of current or ex partner: Not on file     Emotionally abused: Not on file     Physically abused: Not on file     Forced sexual activity: Not on file   Other Topics Concern    Not on file   Social History Narrative    Not on file       Allergies   Allergen Reactions    Cipro Xr     Vicodin [Hydrocodone-Acetaminophen]      Weird dreams          Current Outpatient Medications   Medication Sig Dispense Refill    fluticasone-umeclidin-vilant (TRELEGY ELLIPTA) 100-62.5-25 MCG/INH AEPB Inhale 1 puff into the lungs daily 30 each 11    albuterol sulfate HFA (PROVENTIL HFA) 108 (90 Base) MCG/ACT inhaler Inhale 2 puffs into the lungs every 6 hours as needed for Wheezing 1 Inhaler 3    venlafaxine (EFFEXOR XR) 37.5 MG extended release capsule Take 1 capsule by mouth daily 90 capsule 1    levothyroxine (LEVOTHROID) 88 MCG tablet Take 1 tablet by mouth daily 30 tablet 5     No current facility-administered medications for this encounter. No outpatient medications have been marked as taking for the 4/20/21 encounter [facility] Encounter) with Mary Sanchez MD.       LABORATORY STUDIES:   Onc labs: No results found for: PSA, CEA, LDH, AFP    PATHOLOGY: As per HPI above.     RADIOLOGIC STUDIES: As per HPI above.      Electronically signed by Eve Garcia MD on 4/20/21 at 11:43 AM EDT     ATTESTATION: 45 minutes were spent with the patient at today's visit reviewing pertinent information related to their oncologic diagnosis, including any recent labs, imaging, follow ups and plan of care going forward.     CC:Dr. Navid Cruz ([facility])   ACC:. [facility]'s Cancer Registry

## 2021-04-20 NOTE — PROGRESS NOTES
 Cancer of the skin 2004    Cerebral artery occlusion with cerebral infarction [facility]     Chronic UTI     COPD (chronic obstructive pulmonary disease) ([facility]     CVA (cerebral infarction) 2007, 2008    Depression 2007    DVT of lower extremity (deep venous thrombosis) ([facility] 1976    Facial injury 2005    Fall on greasy ground, striking left periorbital region    History of stroke 2007, 2008, 2009    Patient relates a stroke with right weakness and speech in 2007; another in 2010 or 2012 (unsure), both with need to rehabilitation. Also \"2 mini-strokes\" (?)    Hx of blood clots 1977    hip, leg    Hyperlipidemia 2005    Hypertension 2005    Hypothyroidism     IBS (irritable bowel syndrome)     Low HDL (under 40) 2014    Prolonged emergence from general anesthesia     Recurrent UTI (urinary tract infection) 2015    Dr Kim Hooper finding difficulty 05/16/2017    work-up in progress    CVA x 3. Last in 2008    Denies MI,CHF ,arrythmia. .. Chani Maid Chani Maid Chani Maid Social HX:   Social History     Socioeconomic History    Marital status:       Spouse name: Hakeem Joe    Number of children: 3    Years of education: 5    Highest education level: Not on file   Occupational History    Occupation: Disabled   Social Needs    Financial resource strain: Not on file    Food insecurity     Worry: Not on file     Inability: Not on file   [employer] needs     Medical: Not on file     Non-medical: Not on file   Tobacco Use    Smoking status: Current Every Day Smoker     Packs/day: 2.00     Years: 48.00     Pack years: 96.00     Types: Cigarettes     Start date: 11/13/1967    Smokeless tobacco: Never Used   Substance and Sexual Activity    Alcohol use: No     Alcohol/week: 0.0 standard drinks    Drug use: No    Sexual activity: Yes     Partners: Male   Lifestyle    Physical activity     Days per week: Not on file     Minutes per session: Not on file    Stress: Not on file   Relationships    Social connections     Talks on phone: Not on file     Gets together: Not on file     Attends Caodaism service: Not on file     Active member of club or organization: Not on file     Attends meetings of clubs or organizations: Not on file     Relationship status: Not on file    Intimate partner violence     Fear of current or ex partner: Not on file     Emotionally abused: Not on file     Physically abused: Not on file     Forced sexual activity: Not on file   Other Topics Concern    Not on file   Social History Narrative    Not on file        Spouse: in   3 sons nearby and supportive  Joel Apple [phone]    Phone: [phone] [facility] [address] [place] [address]     Employment:  Ret [employer]    Immunizations:  Immunization History   Administered Date(s) Administered    Influenza Virus Vaccine 10/08/2014, 10/05/2015    Influenza, High Dose (Fluzone 65 yrs and older) 2018, 01/10/2019    Pneumococcal Conjugate 13-valent ([id number]) 2015    Pneumococcal Polysaccharide ([id number]) 2014        Health Screenings:  Mammogram:   2018-benign  Pap / Pelvic: Status post hysterectomy followed by bilateral oophorectomy  C-Scope: 2018-benign      Gyn HX:   GPA:  2 BARBRA/BSO: Done  LMP: No LMP recorded. Patient has had a hysterectomy.      Health Maintenance Due   Topic Date Due    COVID-19 Vaccine (1) Never done    DTaP/Tdap/Td vaccine (1 - Tdap) Never done    Shingles Vaccine (1 of 2) Never done    Pneumococcal 65+ years Vaccine (2 of 2 - PPSV23) 2019    Annual Wellness Visit (AWV)  Never done    Breast cancer screen  2019        Interests:   Family time    Fam HX:   Family History   Problem Relation Age of Onset    Osteoporosis Mother     Hypertension Mother     High Cholesterol Mother     Stroke Mother     Colon Cancer Mother     Cancer Father         lung cancer    Heart Disease Father     Hypertension Father     High Cholesterol Father     Heart Disease Sister     High Cholesterol Sister     Hypertension Sister     Asthma Sister     Other Other         high arched feet    Lung Cancer Son    Dad- lung ca 61  Mom- colon ca  [address]  Son- lung ca      Hospitalizations:   [facility] March 2021    Allergies: Allergies   Allergen Reactions    Cipro Xr     Vicodin [Hydrocodone-Acetaminophen]      Weird dreams          Adult Illness:  Patient Active Problem List   Diagnosis    Hyperlipidemia    History of breast cancer    Vitamin D deficiency    Chronic headachess/p head injjury s/p fall    Depression    Smoker    Hypothyroidism    Allergic rhinitis    Incontinence    Noncompliance    Vasovagal syncope    History of CVA (cerebrovascular accident)    Stage 2 moderate COPD by GOLD classification ([address]    Carotid occlusion, left    OA (osteoarthritis) of hip    Essential hypertension    CAD (coronary artery disease)    Transient cerebral ischemia    Aortic insufficiency    Tobacco abuse    History of chest pain atypical    S/P cardiac cath nonobstructive lesion 2014    Lumbar radiculitis    Spinal stenosis of lumbar region without neurogenic claudication    Syncope and collapse    Solitary pulmonary nodule on lung CT    Severe malnutrition ([address]    Breast ca 2004 R MRM . Chemo first monthly x 3, MRM then XRT then 1 pill x few years. We will attempt to get records.       Surgery:  Past Surgical History:   Procedure Laterality Date    APPENDECTOMY  1975    CHOLECYSTECTOMY  1992    COLONOSCOPY  2009    CT NEEDLE BIOPSY LUNG PERCUTANEOUS  3/16/2021    CT NEEDLE BIOPSY LUNG PERCUTANEOUS 3/16/2021 [facility] CT SCAN    HIP SURGERY  jan 19, 2015    HYSTERECTOMY  1976    JOINT REPLACEMENT Left 2011    HIP    JOINT REPLACEMENT Right 1/19/15    Right Total Hip Replacement - Dr. Jori Pederson Right 2005    OTHER SURGICAL HISTORY  04/10/2018    Lumbar epidural steroid injection at L4    GA NJX DX/THER SBST INTRLMNR LMBR/SAC 05/16/2012    HGB 16.2 04/14/2021    HCT 50.5 04/14/2021     04/14/2021    MCV 99 04/14/2021    MCH 31.9 04/14/2021    MCHC 32.1 04/14/2021    RDW 13.7 04/14/2021    NRBC 0 03/16/2021    NRBC 0 05/16/2012    SEGSPCT 91.8 03/16/2021    MONOPCT 0.6 03/16/2021    MONOSABS 0.3 04/14/2021    LYMPHSABS 1.5 04/14/2021    EOSABS 0.2 04/14/2021    BASOSABS 0.0 04/14/2021       CMP:    Lab Results   Component Value Date     04/14/2021    K 4.2 04/14/2021    K 3.9 03/13/2021     04/14/2021    CO2 28 04/14/2021    BUN 14 04/14/2021    CREATININE 1.1 04/14/2021    LABGLOM 49 04/14/2021    GLUCOSE 100 04/14/2021    GLUCOSE 94 05/16/2012    PROT 7.2 04/14/2021    LABALBU 4.1 04/14/2021    LABALBU 4.4 05/16/2012    CALCIUM 9.3 04/14/2021    BILITOT 0.5 04/14/2021    ALKPHOS 157 04/14/2021    AST 13 04/14/2021    ALT <5 04/14/2021       BMP:    Lab Results   Component Value Date     04/14/2021    K 4.2 04/14/2021    K 3.9 03/13/2021     04/14/2021    CO2 28 04/14/2021    BUN 14 04/14/2021    LABALBU 4.1 04/14/2021    LABALBU 4.4 05/16/2012    CREATININE 1.1 04/14/2021    CALCIUM 9.3 04/14/2021    LABGLOM 49 04/14/2021    GLUCOSE 100 04/14/2021    GLUCOSE 94 05/16/2012       Magnesium:    Lab Results   Component Value Date    MG 2.5 03/16/2021     PT/INR:    Lab Results   Component Value Date    INR 0.97 03/16/2021     TSH:    Lab Results   Component Value Date    TSH 4.810 03/13/2021     VITAMIN B12: No components found for: B12  FOLATE:    Lab Results   Component Value Date    FOLATE 7.3 08/31/2016       IMAGING:  PROCEDURE: CTA CHEST W WO CONTRAST 12/13/2020       CLINICAL INFORMATION: cough, sob, elevated d-dimer, PE .       COMPARISON: 2/21/2019       TECHNIQUE: Postcontrast images of the chest with 3-D MIPS Isovue-370 IV contrast   All CT scans at this facility use dose modulation, iterative reconstruction, and/or weight-based dosing when appropriate to reduce radiation dose to as low as carotid artery. 3. Mild atrophy and probable ischemic changes in the white matter. 4. Inflammatory changes in the right mastoid air cells.             Ct Needle Biopsy Lung/mediastinum Percutaneous  Result Date: 3/16/2021   Successful CT-guided right lower lobe lung nodule biopsy with small postprocedural pneumothorax. Follow-up chest radiographs will be obtained. **This report has been created using voice recognition software. It may contain minor errors which are inherent in voice recognition technology. ** Final report electronically signed by Dr. Authur Lombard, MD on 3/16/2021 10:50 AM    Pet Ct Skull Base To Mid Thigh 4/12/2021  FINDINGS:        Neck: A 0.7 cm nodule in the left parotid gland has a maximum SUV of 4.3 (image 39).     Chest: Cardiac size is normal. Atherosclerotic calcifications are present in the thoracic aorta and coronary arteries without evidence of aneurysm. There is no pleural or pericardial effusion. Emphysematous changes are noted in the bilateral lungs. A 1.5    cm nodule at the lateral right midlung is stable in size is associated with a maximum SUV of 9.5 (image 118). There is no FDG avid mediastinal, hilar or axillary lymphadenopathy. Surgical clips are noted in the right axilla. There are surgical changes    of prior right mastectomy. Activity associated with a small hiatal hernia may be infectious or inflammatory. Degenerative changes are seen in the thoracic spine without evidence of hypermetabolic osseous metastatic disease.       Abdomen/pelvis: Physiologic activity is present in the liver, spleen, urinary collecting system and gastrointestinal tract. The gallbladder is surgically absent. There is fatty replacement of the pancreas. Hypoattenuating lesions in the bilateral kidneys    are likely cysts but are incompletely characterized on the current study. A 1.5 cm hypoattenuating nodule in the right adrenal gland is associated with a maximum SUV of 7 (image 159).  A 1.4 cm nodule left adrenal gland has a maximum SUV of 2.4 (image    154). Atherosclerotic calcifications are present in the abdominal aorta without evidence of aneurysm or the uterus is surgically absent. There is no FDG avid mesenteric, retroperitoneal, pelvic or inguinal lymphadenopathy. Degenerative changes are    present in the lumbar spine and pelvis without evidence of hypermetabolic osseous metastatic disease. There is extensive metallic artifact in the lower pelvis from bilateral hip prostheses.           Impression   1. FDG avid right mid lung nodule corresponding to known malignancy. 2. 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 Metastatic disease is considered less likely but not excluded. 3. Mildly FDG avid bilateral adrenal nodules, likely benign adenomas.             PROCEDURES:  3/16/2021 IR lung biopsy    PATHOLOGY:   The needle biopsy lung 3/16/2021  Clinical Information: RIGHT LOWER LOBE LUNG NODULE; PT HAS REMOTE   HISTORY OF BREAST CANCER 2005     FINAL DIAGNOSIS:   Lung, right lower lobe, biopsy:    Poorly differentiated squamous cell carcinoma.      Specimen:   BIOPSY OF LUNG, RIGHT LOWER LOBE      Intraoperative Consultation:   Touch Prep DX:  1.  Atypical epithelial cells present.  2.  Malignant   epithelial cells present.  [facility]     Gross Examination:   The container is labeled [facility], [facility], lung.  Received in   formalin are several tiny bits of tan tissue, each less than 0.1 cm in   diameter.  The longest fragment measures about 0.3 cm.  The specimen is   entirely submitted in two cassettes.  ns.  PCF/DKR:[id number]     Microscopic Examination:   Sections show infiltrative nests of polygonal cells with foamy   cytoplasm and enlarged pleomorphic nuclei with variably prominent   nucleoli.  Rare dyskeratotic cells are noted.  Immunochemical stains   for TTF-1 and p40 are performed, with appropriate controls*.  Lesional   cells express p40 and lack expression of TTF-1.  The findings are   consistent with the above diagnosis. PFT: Ordered ASAP and will be done Monday 4/19  ( if she gets her Covid testing tomorrow )    GENETICS:  na    MOLECULAR:  na    ASSESSMENT/PLAN:    1: Diagnosis: 80-year-old female with significant COPD and comorbidities as detailed above with a fairly decent performance status. A lateral right middle lung nodule is increased since 2019 a recent biopsy of this 15 mm lesion shows a poorly differentiated squamous cell. Staging including PET scan shows no other hypermetabolic foci. She did have a syncopal episode and reports slight increase in headaches from her baseline. Not sure why her MRI was done without contrast and will order a MRI of the brain with and without contrast to complete her staging. Clinical stage [id number]  /  Stage I A2 . Right middle lobe squamous cell carcinoma, poorly differentiated. 2) Prognosis / Disease Status: Very good. 3) Work-up:    Labs: CBC, CMP today   Imaging: Brain MRI with and without contrast   Procedures: No additional procedures needed   Consults: Radiation oncology; depending on PFTs may be a candidate for SRT   Other: PFTs/spirometry ASAP    4) Symptom Management: None needed. Does report baseline minor neuropathy in her fingertips. 5) Supportive care provided. Level of care is appropriate. Teaching done today. Emphasized that she appears to have stage I squamous cell cancer of the right middle lobe. Prognosis overall very good. Treatment options to include surgery versus SBRT which would largely be dependent on her pulmonary function. 6) Treatment goal:      Treatment plan:     Pending results of PFTs. Surgery i.e. lobectomy versus SRT      7) Medications reviewed. Prescriptions today: None            No orders of the defined types were placed in this encounter.        [place]AR[place]:  Controlled Substance Monitoring:    Acute and Chronic Pain Monitoring:   RX Monitoring 3/23/2018   Attestation The Prescription Monitoring Report for this patient was reviewed today. Periodic Controlled Substance Monitoring Possible medication side effects, risk of tolerance/dependence & alternative treatments discussed. ;Random urine drug screen sent today. ;No signs of potential drug abuse or diversion identified. 8) Research Options:       Not applicable      9) Other:       Strongly encouraged to attempt to stop smoking. Attempt to get records detailing her breast cancer diagnosis 15 years ago and treatment. 10) Follow Up: Follow-up with me in 10 days. Depending on results of PFTs will then consider referral to CT surgery    I spent 60 minutes face-to-face with the patient and family. Greater than 50% of time spent on counseling and coordinating the patient's care. Face-to-face counseling included prognosis, review of risks and benefits of any treatment along with compliance and risk reduction. Cricket Gilliland MD     Addendum:  I obtained records regarding Shania's prior diagnosis of breast cancer. Abnormal mammogram in 2004 led to a biopsy that showed a grade 3 infiltrating ductal carcinoma (after reviewing all of the available data I believe this is a typographical error) that was ER positive and [place] and HER-2 negative. No sentinel nodes involved. Initial dimension approximately 8.5 x 6 cm. No metastatic disease. Some mass in the left breast measuring 2.3 cm suspicious for malignancy as well PET scan did not light up in the lungs left breast or axilla. She underwent neoadjuvant chemotherapy and had a dramatic decrease in the size of the mass. Patient underwent radiation to the right chest wall treated to a total dose of 5040 cGy in 28 fractions with a boost with a total dose of 6120 cGy in 34 fractions.   Interestingly however when I look at the path report from 3/11/5 the simple mastectomy specimen from the right reveals an invasive moderately differentiated squamous cell carcinoma the breast.  ER was reported as positive and 34% of cells HER-2 negative review of this pathology is confusing with the discrepancy and reports it is significant and that raises the possibility that this lung lesion could be metastatic and not a primary lesion. A fairly completely note from Kassie Guzman of radiation oncology references that the histology was squamous cell carcinoma and acknowledged the rarity of this type of histology of the breast.  Therefore I have little doubt that this in fact was squamous cell cancer of the breast.  Checked with pathology and all tissue was destroyed after 10 years therefore is not available. Patient's treatment was neoadjuvant chemotherapy by Dr. Carol Flores followed by right simple mastectomy and sentinel node procedure and radiation. I do note the path report the documents at the time of the right simple mastectomy invasive grade 2 squamous cell carcinoma of the breast tumor after neoadjuvant therapy measures 6 x 5 x 3 cm tumor is 2 mm from the deep resection margin and lymphatic invasion was present. No axillary contents identified. No ductal component to this neoplasm. This was called squamous cell carcinoma as opposed to metaplastic carcinoma. [place] procedure 12/1/2004 the right axilla showed 2 axillary nodes negative for metastatic carcinoma. Interestingly the ER was reported as positive and 34% of cells AL was negative and HER-2 was negative.

## 2021-04-20 NOTE — PROGRESS NOTES
[address] CANCER [address] [place] [address]  Dept: [phone]  Loc: [phone]   Hematology/Oncology Consult (Clinic)        4/14/21     Murray Melvin   1950     No ref. provider found   NITO Orozco - CNP       Reason: Squamous cell carcinoma right middle lung; referred for evaluation and treatment  No chief complaint on file. HPI: Marianne Bowles is a 61-year-old female who is at her baseline in terms of being symptomatic with  COPD . She denies hemoptysis but does admit to perhaps slight increased shortness of breath with activity over the past several months. Recently hospitalized at [facility] 3/12 through the 17th for syncope with no evidence of stroke or cardiac event with a CTA of the head and neck showed an occluded skull base left ICA. Smoking habit includes 2 pack/day for 48 years, ongoing and not on home O2. Still at 2ppd . Weight loss of 179 last November to 138 today; unintentional x much stress . Sister and  passed last year. On 12/13/2020 she had a CTA of the chest showed no PE but did show a 14 mm spiculated nodule that increased from 8 mm on the prior exam February 2019. No adenopathy reported. Biopsy of the right lower lobe lung nodule revealed a poorly differentiated squamous cell carcinoma. P40 positive and TTF-1 negative. Remote history of breast cancer approximately 15 years ago (poor historian). Treated with upfront chemotherapy then a right mastectomy followed by radiation to the chest wall and several years of adjuvant hormone therapy. No history of recurrence. No close oncologic follow-up in recent history. ROS:  Review of Systems 14 point negative except as detailed above.     PMH:   Past Medical History:   Diagnosis Date    Anxiety 2007    Arthritis     Breast cancer [facility]) 2005    right mastectomy, chemo and radiation history    CAD (coronary artery disease) 2001  Cancer of the skin 2004    [facility]erebral [facility] occlusion [facility] infarction [facility]     Chronic UTI     COPD (chronic obstructive pulmonary disease) [facility]     CVA (cerebral infarction) 2007, 2008    Depression 2007    DVT of lower extremity (deep venous thrombosis) ([facility] 1976    Facial injury 2005    Fall on greasy ground, striking left periorbital region    History of stroke 2007, 2008, 2009    Patient relates a stroke with right weakness and speech in 2007; another in 2010 or 2012 (unsure), both with need to rehabilitation. Also \"2 mini-strokes\" (?)    Hx of blood clots 1977    hip, leg    Hyperlipidemia 2005    Hypertension 2005    Hypothyroidism     IBS (irritable bowel syndrome)     Low HDL (under 40) 2014    Prolonged emergence from general anesthesia     Recurrent UTI (urinary tract infection) 2015    Dr Long Rodriguez finding difficulty 05/16/2017    work-up in progress    CVA x 3. Last in 2008    Denies MI,CHF ,arrythmia. .. Jasmine León Jasmine León Jasmine León Social HX:   Social History     Socioeconomic History    Marital status:       Spouse name: Orly Garcia    Number of children: 3    Years of education: 5    Highest education level: Not on file   Occupational History    Occupation: Disabled   Social Needs    Financial resource strain: Not on file    Food insecurity     Worry: Not on file     Inability: Not on file   [employer] needs     Medical: Not on file     Non-medical: Not on file   Tobacco Use    Smoking status: Current Every Day Smoker     Packs/day: 2.00     Years: 48.00     Pack years: 96.00     Types: Cigarettes     Start date: 11/13/1967    Smokeless tobacco: Never Used   Substance and Sexual Activity    Alcohol use: No     Alcohol/week: 0.0 standard drinks    Drug use: No    Sexual activity: Yes     Partners: Male   Lifestyle    Physical activity     Days per week: Not on file     Minutes per session: Not on file    Stress: Not on file   Relationships    Social Heart Disease Sister     High Cholesterol Sister     Hypertension Sister     Asthma Sister     Other Other         high arched feet    Lung Cancer Son    Dad- lung ca 61  Mom- colon ca  80  Son- lung ca      Hospitalizations:   Syncope March 2021    Allergies: Allergies   Allergen Reactions    Cipro Xr     Vicodin [Hydrocodone-Acetaminophen]      Weird dreams          Adult Illness:  Patient Active Problem List   Diagnosis    Hyperlipidemia    History of breast cancer    Vitamin D deficiency    Chronic headachess/p head injjury s/p fall    Depression    Smoker    Hypothyroidism    Allergic rhinitis    Incontinence    Noncompliance    Vasovagal syncope    History of CVA (cerebrovascular accident)    Stage 2 moderate COPD by GOLD classification ([address]    Carotid occlusion, left    OA (osteoarthritis) of hip    Essential hypertension    CAD (coronary artery disease)    Transient cerebral ischemia    Aortic insufficiency    Tobacco abuse    History of chest pain atypical    S/P cardiac cath nonobstructive lesion 2014    Lumbar radiculitis    Spinal stenosis of lumbar region without neurogenic claudication    Syncope and collapse    Solitary pulmonary nodule on lung CT    Severe malnutrition ([address]    Breast ca 2004 R MRM . Chemo first monthly x 3, MRM then XRT then 1 pill x few years. We will attempt to get records.       Surgery:  Past Surgical History:   Procedure Laterality Date    APPENDECTOMY  1975    CHOLECYSTECTOMY  1992    COLONOSCOPY  2009    CT NEEDLE BIOPSY LUNG PERCUTANEOUS  3/16/2021    CT NEEDLE BIOPSY LUNG PERCUTANEOUS 3/16/2021 [facility] CT SCAN    HIP SURGERY  jan 19, 2015    HYSTERECTOMY  1976    JOINT REPLACEMENT Left 2011    HIP    JOINT REPLACEMENT Right 1/19/15    Right Total Hip Replacement - Dr. Mao Gifford Right 2005    OTHER SURGICAL HISTORY  04/10/2018    Lumbar epidural steroid injection at L4    [place] NJX DX/THER SBST INTRLMNR LMBR/SAC W/IMG GDN N/A 4/10/2018    LESI  @ L4 performed by Lurdes Yuan MD at [facility], 2001    ovaries    THYROID SURGERY  2007    right lobe and then later left removed        Medications:  Current Outpatient Medications   Medication Sig Dispense Refill    fluticasone-umeclidin-vilant (TRELEGY ELLIPTA) 100-62.5-25 MCG/INH AEPB Inhale 1 puff into the lungs daily 30 each 11    albuterol sulfate HFA (PROVENTIL HFA) 108 (90 Base) MCG/ACT inhaler Inhale 2 puffs into the lungs every 6 hours as needed for Wheezing 1 Inhaler 3    venlafaxine (EFFEXOR XR) 37.5 MG extended release capsule Take 1 capsule by mouth daily 90 capsule 1    levothyroxine (LEVOTHROID) 88 MCG tablet Take 1 tablet by mouth daily 30 tablet 5     No current facility-administered medications for this visit. EXAM:    There were no vitals taken for this visit. ECO  General: Non-ill appearing. Thin, mildly anxious and tearful at times. She is alone today. HEENT: NC/AT,nonicteric, perrla,eom intact, no mucosal lesions wearing upper dentures, no lesions seen. Neck: normal thyroid, no masses. pulses nl, no bruits,   Nodes: No adenopathy  Lungs/chest: clear, no rales,rhonchi or wheezing, lung bases clear, overall very diminished breath sounds throughout. CV: rrr, no rubs ,gallops or murmurs  Breasts: Right mastectomy site unremarkable, left breast atrophic without masses. (Exam chaperoned by Vu Seymour)  Abd/Rectal: soft, non-tender,bowel sounds normal , no HSM,no masses  Back: normal curvature, No midline tenderness. flanks nontender  : Not Examined  Extremities: no cyanosis,clubbing or edema. Skin: unremarkable  Neuro: A and O x 4, CN exam nonfocal, Motor- no deficits, Sensory- no deficits, gait-nl, speech- fluent, no ataxia.   Devices: none      DATA:    LAB:   CBC, CMP today  CBC with Differential:      Lab Results   Component Value Date    WBC 7.7 2021    RBC 5.08 2021    RBC 4.49 05/16/2012    HGB 16.2 04/14/2021    HCT 50.5 04/14/2021     04/14/2021    MCV 99 04/14/2021    MCH 31.9 04/14/2021    MCHC 32.1 04/14/2021    RDW 13.7 04/14/2021    NRBC 0 03/16/2021    NRBC 0 05/16/2012    SEGSPCT 91.8 03/16/2021    MONOPCT 0.6 03/16/2021    MONOSABS 0.3 04/14/2021    LYMPHSABS 1.5 04/14/2021    EOSABS 0.2 04/14/2021    BASOSABS 0.0 04/14/2021       CMP:    Lab Results   Component Value Date     04/14/2021    K 4.2 04/14/2021    K 3.9 03/13/2021     04/14/2021    CO2 28 04/14/2021    BUN 14 04/14/2021    CREATININE 1.1 04/14/2021    LABGLOM 49 04/14/2021    GLUCOSE 100 04/14/2021    GLUCOSE 94 05/16/2012    PROT 7.2 04/14/2021    LABALBU 4.1 04/14/2021    LABALBU 4.4 05/16/2012    CALCIUM 9.3 04/14/2021    BILITOT 0.5 04/14/2021    ALKPHOS 157 04/14/2021    AST 13 04/14/2021    ALT <5 04/14/2021       BMP:    Lab Results   Component Value Date     04/14/2021    K 4.2 04/14/2021    K 3.9 03/13/2021     04/14/2021    CO2 28 04/14/2021    BUN 14 04/14/2021    LABALBU 4.1 04/14/2021    LABALBU 4.4 05/16/2012    CREATININE 1.1 04/14/2021    CALCIUM 9.3 04/14/2021    LABGLOM 49 04/14/2021    GLUCOSE 100 04/14/2021    GLUCOSE 94 05/16/2012       Magnesium:    Lab Results   Component Value Date    MG 2.5 03/16/2021     PT/INR:    Lab Results   Component Value Date    INR 0.97 03/16/2021     TSH:    Lab Results   Component Value Date    TSH 4.810 03/13/2021     VITAMIN B12: No components found for: B12  FOLATE:    Lab Results   Component Value Date    FOLATE 7.3 08/31/2016       IMAGING:  PROCEDURE: CTA CHEST W WO CONTRAST 12/13/2020       CLINICAL INFORMATION: cough, sob, elevated d-dimer, PE .       COMPARISON: 2/21/2019       TECHNIQUE: Postcontrast images of the chest with 3-D MIPS Isovue-370 IV contrast   All CT scans at this facility use dose modulation, iterative reconstruction, and/or weight-based dosing when appropriate to reduce radiation dose to as low as reasonably achievable.       FINDINGS: No PE. There is no aneurysm or dissection. No mediastinal or hilar adenopathy by size criteria. Postsurgical changes right axilla. No axillary lymphadenopathy   Heart size normal.   No infiltrates or pleural effusions. The 8 mm nodule seen on the prior CT now measures 14 mm and has spiculated borders highly suspicious of malignancy. There however are no other additional new masses. Scattered nodules are present in both lungs which are previously seen. There is a    cluster of nodular the anterior right upper lung stable. Small nodules posterior right lower lung stable. Tiny triangular nodular density lingula is stable.       Upper abdomen   Stable bilateral adrenal fullness likely hyperplasia this is stable dating back to June 2017.       Bones   No suspicious bone lesions           Impression   1. No PE. 2. No acute findings. 3. Interval increase in size of the patient's right lung nodule now measures 14 mm with spiculated borders and is highly suspicious for malignancy.               **This report has been created using voice recognition software.  It may contain minor errors which are inherent in voice recognition technology. **       Final report electronically signed by Dr. Ancelmo Cedeño on 12/13/2020 4:08 PM       Impression:  1. No PE. 2. No acute findings. 3. Interval increase in size of the patient's right lung nodule now measures 14 mm with spiculated borders and is highly suspicious for malignancy.        CT CHEST W CONTRAST       CTA head with contrast 3/12/2021/syncope evaluation  Impression   Impression:   1.  Occluded skull base left ICA, present previously.  Patent left    posterior communicating artery. 2.  Intact anterior, posterior, and middle cerebral circulations. 3.  No intracranial aneurysm.         Brain MRI without contrast 3/13/2021      Impression       1. No evidence of an acute infarct.    2. No antegrade flow in the left internal carotid artery. 3. Mild atrophy and probable ischemic changes in the white matter. 4. Inflammatory changes in the right mastoid air cells.             Ct Needle Biopsy Lung/mediastinum Percutaneous  Result Date: 3/16/2021   Successful CT-guided right lower lobe lung nodule biopsy with small postprocedural pneumothorax. Follow-up chest radiographs will be obtained. **This report has been created using voice recognition software. It may contain minor errors which are inherent in voice recognition technology. ** Final report electronically signed by Dr. Jeremiah Allen MD on 3/16/2021 10:50 AM    Pet Ct Skull Base To Mid Thigh 4/12/2021  FINDINGS:        Neck: A 0.7 cm nodule in the left parotid gland has a maximum SUV of 4.3 (image 39).     Chest: Cardiac size is normal. Atherosclerotic calcifications are present in the thoracic aorta and coronary arteries without evidence of aneurysm. There is no pleural or pericardial effusion. Emphysematous changes are noted in the bilateral lungs. A 1.5    cm nodule at the lateral right midlung is stable in size is associated with a maximum SUV of 9.5 (image 118). There is no FDG avid mediastinal, hilar or axillary lymphadenopathy. Surgical clips are noted in the right axilla. There are surgical changes    of prior right mastectomy. Activity associated with a small hiatal hernia may be infectious or inflammatory. Degenerative changes are seen in the thoracic spine without evidence of hypermetabolic osseous metastatic disease.       Abdomen/pelvis: Physiologic activity is present in the liver, spleen, urinary collecting system and gastrointestinal tract. The gallbladder is surgically absent. There is fatty replacement of the pancreas. Hypoattenuating lesions in the bilateral kidneys    are likely cysts but are incompletely characterized on the current study. A 1.5 cm hypoattenuating nodule in the right adrenal gland is associated with a maximum SUV of 7 (image 159).  A 1.4 cm nodule left adrenal gland has a maximum SUV of 2.4 (image    154). Atherosclerotic calcifications are present in the abdominal aorta without evidence of aneurysm or the uterus is surgically absent. There is no FDG avid mesenteric, retroperitoneal, pelvic or inguinal lymphadenopathy. Degenerative changes are    present in the lumbar spine and pelvis without evidence of hypermetabolic osseous metastatic disease. There is extensive metallic artifact in the lower pelvis from bilateral hip prostheses.           Impression   1. FDG avid right mid lung nodule corresponding to known malignancy. 2. 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 Metastatic disease is considered less likely but not excluded. 3. Mildly FDG avid bilateral adrenal nodules, likely benign adenomas.             PROCEDURES:  3/16/2021 IR lung biopsy    PATHOLOGY:   The needle biopsy lung 3/16/2021  Clinical Information: RIGHT LOWER LOBE LUNG NODULE; PT HAS REMOTE   HISTORY OF BREAST CANCER 2005     FINAL DIAGNOSIS:   Lung, right lower lobe, biopsy:    Poorly differentiated squamous cell carcinoma.      Specimen:   BIOPSY OF LUNG, RIGHT LOWER LOBE      Intraoperative Consultation:   Touch Prep DX:  1.  Atypical epithelial cells present.  2.  Malignant   epithelial cells present.  [facility]     Gross Examination:   The container is labeled [facility] Slot, RLL, lung.  Received in   formalin are several tiny bits of tan tissue, each less than 0.1 cm in   diameter.  The longest fragment measures about 0.3 cm.  The specimen is   entirely submitted in two cassettes.  ns.  PCF/DKR:[id number]     Microscopic Examination:   Sections show infiltrative nests of polygonal cells with foamy   cytoplasm and enlarged pleomorphic nuclei with variably prominent   nucleoli.  Rare dyskeratotic cells are noted.  Immunochemical stains   for TTF-1 and p40 are performed, with appropriate controls*.  Lesional   cells express p40 and lack expression of TTF-1.  The findings are   consistent with the above diagnosis. PFT: Ordered ASAP and will be done Monday 4/19  ( if she gets her Covid testing tomorrow )    GENETICS:  na    MOLECULAR:  na    ASSESSMENT/PLAN:    1: Diagnosis: 71-year-old female with significant COPD and comorbidities as detailed above with a fairly decent performance status. A lateral right middle lung nodule is increased since 2019 a recent biopsy of this 15 mm lesion shows a poorly differentiated squamous cell. Staging including PET scan shows no other hypermetabolic foci. She did have a syncopal episode and reports slight increase in headaches from her baseline. Not sure why her MRI was done without contrast and will order a MRI of the brain with and without contrast to complete her staging. Clinical stage [id number]  /  Stage I A2 . Right middle lobe squamous cell carcinoma, poorly differentiated. 2) Prognosis / Disease Status: Very good. 3) Work-up:    Labs: CBC, CMP today   Imaging: Brain MRI with and without contrast   Procedures: No additional procedures needed   Consults: Radiation oncology; depending on PFTs may be a candidate for SRT   Other: PFTs/spirometry ASAP    4) Symptom Management: None needed. Does report baseline minor neuropathy in her fingertips. 5) Supportive care provided. Level of care is appropriate. Teaching done today. Emphasized that she appears to have stage I squamous cell cancer of the right middle lobe. Prognosis overall very good. Treatment options to include surgery versus SBRT which would largely be dependent on her pulmonary function. 6) Treatment goal:      Treatment plan:     Pending results of PFTs. Surgery i.e. lobectomy versus SRT      7) Medications reviewed. Prescriptions today: None            No orders of the defined types were placed in this encounter.        [place]ARRS:  Controlled Substance Monitoring:    Acute and Chronic Pain Monitoring:   RX Monitoring 3/23/2018   Attestation The Prescription Monitoring Report for this patient was reviewed today. Periodic Controlled Substance Monitoring Possible medication side effects, risk of tolerance/dependence & alternative treatments discussed. ;Random urine drug screen sent today. ;No signs of potential drug abuse or diversion identified. 8) Research Options:       Not applicable      9) Other:       Strongly encouraged to attempt to stop smoking. Attempt to get records detailing her breast cancer diagnosis 15 years ago and treatment. 10) Follow Up: Follow-up with me in 10 days. Depending on results of PFTs will then consider referral to CT surgery    I spent 60 minutes face-to-face with the patient and family. Greater than 50% of time spent on counseling and coordinating the patient's care. Face-to-face counseling included prognosis, review of risks and benefits of any treatment along with compliance and risk reduction. Austin Phillips MD     Addendum:  I obtained records regarding Shania's prior diagnosis of breast cancer. Abnormal mammogram in 2004 led to a biopsy that showed a grade 3 infiltrating ductal carcinoma that was ER positive and NH and HER-2 negative. No sentinel nodes involved. Initial dimension approximately 8.5 x 6 cm. No metastatic disease. Some mass in the left breast measuring 2.3 cm suspicious for malignancy as well PET scan did not light up in the lungs left breast or axilla. She underwent neoadjuvant chemotherapy and had a dramatic decrease in the size of the mass. Patient underwent radiation to the right chest wall treated to a total dose of 5040 cGy in 28 fractions with a boost with a total dose of 6120 cGy in 34 fractions.   Interestingly however when I look at the path report from 3/11/5 the simple mastectomy specimen from the right reveals an invasive moderately differentiated squamous cell carcinoma the breast.  ER was reported as positive and 34% of cells HER-2 negative review of this pathology is confusing with the discrepancy and reports it is significant and that raises the possibility that this lung lesion could be metastatic and not a primary lesion.

## 2021-04-20 NOTE — PROGRESS NOTES
Klausbossman Daniele  4/20/2021    Chaperone: No    Advance Directives     Power of  Living Will ACP-Advance Directive ACP-Power of     Not on File Filed on 09/30/13 Filed Not on File          Temp Readings from Last 2 Encounters:   04/20/21 98.7 °F (37.1 °C) (Infrared)   04/14/21 98.1 °F (36.7 °C) (Infrared)     BP Readings from Last 2 Encounters:   04/20/21 (!) 143/67   04/14/21 137/75     Pulse Readings from Last 2 Encounters:   04/20/21 73   04/14/21 88        Wt Readings from Last 3 Encounters:   04/20/21 140 lb 9.6 oz (63.8 kg)   04/14/21 138 lb (62.6 kg)   03/31/21 138 lb 3.2 oz (62.7 kg)        [unfilled]       Lab Results   Component Value Date    CREATININE 1.1 04/14/2021     Lab Results   Component Value Date    BUN 14 04/14/2021       Mediport: no    Pacemaker/ICD: no    Previous XRT: yes, rt breast 2005    Past Medical History:   Diagnosis Date    Anxiety 2007    Arthritis     Breast cancer ([address] 2005    right mastectomy, chemo and radiation history    CAD (coronary artery disease) 2001    Cancer of the skin 2004    Cerebral artery occlusion with cerebral infarction ([address]     Chronic UTI     COPD (chronic obstructive pulmonary disease) ([address]     CVA (cerebral infarction) 2007, 2008    Depression 2007    DVT of lower extremity (deep venous thrombosis) ([address] 1976    Facial injury 2005    Fall on greasy ground, striking left periorbital region    History of stroke 2007, 2008, 2009    Patient relates a stroke with right weakness and speech in 2007; another in 2010 or 2012 (unsure), both with need to rehabilitation.   Also \"2 mini-strokes\" (?)    Hx of blood clots 1977    hip, leg    Hyperlipidemia 2005    Hypertension 2005    Hypothyroidism     IBS (irritable bowel syndrome)     Low HDL (under 40) 2014    Prolonged emergence from general anesthesia     Recurrent UTI (urinary tract infection) 2015    Dr Lou Mays finding difficulty 05/16/2017    work-up in progress

## 2021-05-14 ENCOUNTER — OFFICE VISIT (OUTPATIENT)
Dept: ONCOLOGY | Age: 71
End: 2021-05-14
Payer: MEDICARE

## 2021-05-14 ENCOUNTER — HOSPITAL ENCOUNTER (OUTPATIENT)
Age: 71
Setting detail: SPECIMEN
Discharge: HOME | End: 2021-05-14
Payer: MEDICARE

## 2021-05-14 ENCOUNTER — HOSPITAL ENCOUNTER (OUTPATIENT)
Dept: INFUSION THERAPY | Age: 71
Discharge: HOME | End: 2021-05-14
Payer: MEDICARE

## 2021-05-14 VITALS
HEART RATE: 64 BPM | OXYGEN SATURATION: 93 % | SYSTOLIC BLOOD PRESSURE: 137 MMHG | HEIGHT: 64 IN | RESPIRATION RATE: 18 BRPM | TEMPERATURE: 98.6 F | WEIGHT: 141.8 LBS | DIASTOLIC BLOOD PRESSURE: 62 MMHG | BODY MASS INDEX: 24.21 KG/M2

## 2021-05-14 DIAGNOSIS — C34.2 MALIGNANT NEOPLASM OF MIDDLE LOBE OF RIGHT LUNG (HCC): Primary | ICD-10-CM

## 2021-05-14 PROCEDURE — 4004F PT TOBACCO SCREEN RCVD TLK: CPT | Performed by: INTERNAL MEDICINE

## 2021-05-14 PROCEDURE — G8420 CALC BMI NORM PARAMETERS: HCPCS | Performed by: INTERNAL MEDICINE

## 2021-05-14 PROCEDURE — 99214 OFFICE O/P EST MOD 30 MIN: CPT | Performed by: INTERNAL MEDICINE

## 2021-05-14 PROCEDURE — 99211 OFF/OP EST MAY X REQ PHY/QHP: CPT

## 2021-05-14 PROCEDURE — 1123F ACP DISCUSS/DSCN MKR DOCD: CPT | Performed by: INTERNAL MEDICINE

## 2021-05-14 PROCEDURE — 3017F COLORECTAL CA SCREEN DOC REV: CPT | Performed by: INTERNAL MEDICINE

## 2021-05-14 PROCEDURE — 4040F PNEUMOC VAC/ADMIN/RCVD: CPT | Performed by: INTERNAL MEDICINE

## 2021-05-14 PROCEDURE — G8427 DOCREV CUR MEDS BY ELIG CLIN: HCPCS | Performed by: INTERNAL MEDICINE

## 2021-05-14 PROCEDURE — 1090F PRES/ABSN URINE INCON ASSESS: CPT | Performed by: INTERNAL MEDICINE

## 2021-05-14 PROCEDURE — G8399 PT W/DXA RESULTS DOCUMENT: HCPCS | Performed by: INTERNAL MEDICINE

## 2021-05-14 NOTE — PROGRESS NOTES
Never done    DTaP/Tdap/Td vaccine (1 - Tdap) Never done    Shingles Vaccine (1 of 2) Never done    Pneumococcal 65+ years Vaccine (2 of 2 - PPSV23) 05/23/2019    Annual Wellness Visit (AWV)  Never done    Breast cancer screen  06/13/2019        Interests:   Family time    Fam HX:   Family History   Problem Relation Age of Onset    Osteoporosis Mother     Hypertension Mother     High Cholesterol Mother     Stroke Mother     Colon Cancer Mother     Cancer Father         lung cancer    Heart Disease Father     Hypertension Father     High Cholesterol Father     Heart Disease Sister     High Cholesterol Sister     Hypertension Sister     Asthma Sister     Other Other         high arched feet    Lung Cancer Son    Dad- lung ca 61  Mom- colon ca  80  Son- lung ca      Hospitalizations:   [facility] March 2021    Allergies: Allergies   Allergen Reactions    Cipro Xr     Vicodin [Hydrocodone-Acetaminophen]      Weird dreams          Adult Illness:  Patient Active Problem List   Diagnosis    Hyperlipidemia    History of breast cancer    Vitamin D deficiency    Chronic headachess/p head injjury s/p fall    Depression    Smoker    Hypothyroidism    Allergic rhinitis    Incontinence    Noncompliance    Vasovagal syncope    History of CVA (cerebrovascular accident)    Stage 2 moderate COPD by GOLD classification ([address]    Carotid occlusion, left    OA (osteoarthritis) of hip    Essential hypertension    CAD (coronary artery disease)    Transient cerebral ischemia    Aortic insufficiency    Tobacco abuse    History of chest pain atypical    S/P cardiac cath nonobstructive lesion 2014    Lumbar radiculitis    Spinal stenosis of lumbar region without neurogenic claudication    Syncope and collapse    Solitary pulmonary nodule on lung CT    Severe malnutrition ([address]    Primary malignant neoplasm of right middle lobe of lung ([address]    Breast ca 2004 R MRM .  Chemo first monthly x 3, MRM lesions seen. Neck: normal thyroid, no masses. pulses nl, no bruits,   Nodes: No adenopathy  Lungs/chest: clear, no rales,rhonchi or wheezing, lung bases clear, overall very diminished breath sounds throughout. CV: rrr, no rubs ,gallops or murmurs  Breasts: Right mastectomy site unremarkable, left breast atrophic without masses. (Exam chaperoned by Nicho Moses)  Abd/Rectal: soft, non-tender,bowel sounds normal , no HSM,no masses  Back: normal curvature, No midline tenderness. flanks nontender  : Not Examined  Extremities: no cyanosis,clubbing or edema. Skin: unremarkable  Neuro: A and O x 4, CN exam nonfocal, Motor- no deficits, Sensory- no deficits, gait-nl, speech- fluent, no ataxia.   Devices: none      DATA:    LAB:   CBC, CMP today  CBC with Differential:      Lab Results   Component Value Date    WBC 7.7 04/14/2021    RBC 5.08 04/14/2021    RBC 4.49 05/16/2012    HGB 16.2 04/14/2021    HCT 50.5 04/14/2021     04/14/2021    MCV 99 04/14/2021    MCH 31.9 04/14/2021    MCHC 32.1 04/14/2021    RDW 13.7 04/14/2021    NRBC 0 03/16/2021    NRBC 0 05/16/2012    SEGSPCT 91.8 03/16/2021    MONOPCT 0.6 03/16/2021    MONOSABS 0.3 04/14/2021    LYMPHSABS 1.5 04/14/2021    EOSABS 0.2 04/14/2021    BASOSABS 0.0 04/14/2021       CMP:    Lab Results   Component Value Date     04/14/2021    K 4.2 04/14/2021    K 3.9 03/13/2021     04/14/2021    CO2 28 04/14/2021    BUN 14 04/14/2021    CREATININE 1.1 04/14/2021    LABGLOM 49 04/14/2021    GLUCOSE 100 04/14/2021    GLUCOSE 94 05/16/2012    PROT 7.2 04/14/2021    LABALBU 4.1 04/14/2021    LABALBU 4.4 05/16/2012    CALCIUM 9.3 04/14/2021    BILITOT 0.5 04/14/2021    ALKPHOS 157 04/14/2021    AST 13 04/14/2021    ALT <5 04/14/2021       BMP:    Lab Results   Component Value Date     04/14/2021    K 4.2 04/14/2021    K 3.9 03/13/2021     04/14/2021    CO2 28 04/14/2021    BUN 14 04/14/2021    LABALBU 4.1 04/14/2021    LABALBU 4.4 05/16/2012    CREATININE increase in size of the patient's right lung nodule now measures 14 mm with spiculated borders and is highly suspicious for malignancy.               **This report has been created using voice recognition software.  It may contain minor errors which are inherent in voice recognition technology. **       Final report electronically signed by Dr. Honorio Almonte on 12/13/2020 4:08 PM       Impression:  1. No PE. 2. No acute findings. 3. Interval increase in size of the patient's right lung nodule now measures 14 mm with spiculated borders and is highly suspicious for malignancy.        CT CHEST W CONTRAST       CTA head with contrast 3/12/2021/syncope evaluation  Impression   Impression:   1.  Occluded skull base left ICA, present previously.  Patent left    posterior communicating artery. 2.  Intact anterior, posterior, and middle cerebral circulations. 3.  No intracranial aneurysm.         Brain MRI without contrast 3/13/2021      Impression       1. No evidence of an acute infarct. 2. No antegrade flow in the left internal carotid artery. 3. Mild atrophy and probable ischemic changes in the white matter. 4. Inflammatory changes in the right mastoid air cells.             Ct Needle Biopsy Lung/mediastinum Percutaneous  Result Date: 3/16/2021   Successful CT-guided right lower lobe lung nodule biopsy with small postprocedural pneumothorax. Follow-up chest radiographs will be obtained. **This report has been created using voice recognition software. It may contain minor errors which are inherent in voice recognition technology. ** Final report electronically signed by Dr. Nyla Belcher MD on 3/16/2021 10:50 AM    Pet Ct Skull Base To Mid Thigh 4/12/2021  FINDINGS:        Neck: A 0.7 cm nodule in the left parotid gland has a maximum SUV of 4.3 (image 39).        Chest: Cardiac size is normal. Atherosclerotic calcifications are present in the thoracic aorta and coronary arteries without evidence of aneurysm. There is no pleural or pericardial effusion. Emphysematous changes are noted in the bilateral lungs. A 1.5    cm nodule at the lateral right midlung is stable in size is associated with a maximum SUV of 9.5 (image 118). There is no FDG avid mediastinal, hilar or axillary lymphadenopathy. Surgical clips are noted in the right axilla. There are surgical changes    of prior right mastectomy. Activity associated with a small hiatal hernia may be infectious or inflammatory. Degenerative changes are seen in the thoracic spine without evidence of hypermetabolic osseous metastatic disease.       Abdomen/pelvis: Physiologic activity is present in the liver, spleen, urinary collecting system and gastrointestinal tract. The gallbladder is surgically absent. There is fatty replacement of the pancreas. Hypoattenuating lesions in the bilateral kidneys    are likely cysts but are incompletely characterized on the current study. A 1.5 cm hypoattenuating nodule in the right adrenal gland is associated with a maximum SUV of 7 (image 159). A 1.4 cm nodule left adrenal gland has a maximum SUV of 2.4 (image    154). Atherosclerotic calcifications are present in the abdominal aorta without evidence of aneurysm or the uterus is surgically absent. There is no FDG avid mesenteric, retroperitoneal, pelvic or inguinal lymphadenopathy. Degenerative changes are    present in the lumbar spine and pelvis without evidence of hypermetabolic osseous metastatic disease. There is extensive metallic artifact in the lower pelvis from bilateral hip prostheses.           Impression   1. FDG avid right mid lung nodule corresponding to known malignancy. 2. 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 Metastatic disease is considered less likely but not excluded.    3. Mildly FDG avid bilateral adrenal nodules, likely benign adenomas.             PROCEDURES:  3/16/2021 IR lung biopsy    PATHOLOGY:   The needle biopsy lung 3/16/2021  Clinical Information: RIGHT LOWER LOBE LUNG NODULE; PT HAS REMOTE   HISTORY OF BREAST CANCER 2005     FINAL DIAGNOSIS:   Lung, right lower lobe, biopsy:    Poorly differentiated squamous cell carcinoma. Specimen:   BIOPSY OF LUNG, RIGHT LOWER LOBE      Intraoperative Consultation:   Touch Prep DX:  1.  Atypical epithelial cells present.  2.  Malignant   epithelial cells present.  MTK     Gross Examination:   The container is labeled [place], RLL, lung.  Received in   formalin are several tiny bits of tan tissue, each less than 0.1 cm in   diameter.  The longest fragment measures about 0.3 cm.  The specimen is   entirely submitted in two cassettes.  ns.  PCF/DKR:[id number]     Microscopic Examination:   Sections show infiltrative nests of polygonal cells with foamy   cytoplasm and enlarged pleomorphic nuclei with variably prominent   nucleoli.  Rare dyskeratotic cells are noted.  Immunochemical stains   for TTF-1 and p40 are performed, with appropriate controls*.  Lesional   cells express p40 and lack expression of TTF-1.  The findings are   consistent with the above diagnosis. PFT: Ordered ASAP and will be done Monday 4/19  ( if she gets her Covid testing tomorrow )  FEV1 1.720 which is 59% predicted FEV1 1.11 which is 49% predicted with significant bronchodilator response spirometry was severe obstructive defect    GENETICS:  na    MOLECULAR:  na    ASSESSMENT/PLAN:    1: Diagnosis: 51-year-old female with significant COPD and comorbidities as detailed above with a fairly decent performance status. A lateral right middle lung nodule is increased since 2019 a recent biopsy of this 15 mm lesion shows a poorly differentiated squamous cell. Staging including PET scan shows no other hypermetabolic foci. She did have a syncopal episode and reports slight increase in headaches from her baseline.   Work-up included a MRI of the brain with and without contrast summarized above oncology and surgery. I [id number] face-to-face with the patient and family. Greater than 50% of time spent on counseling and coordinating the patient's care. Face-to-face counseling included prognosis, review of risks and benefits of any treatment along with compliance and risk reduction. Jim Wray MD     Addendum:  I obtained records regarding Shania's prior diagnosis of breast cancer. Abnormal mammogram in 2004 led to a biopsy that showed a grade 3 infiltrating ductal carcinoma (after reviewing all of the available data I believe this is a typographical error) that was ER positive and MA and HER-2 negative. No sentinel nodes involved. Initial dimension approximately 8.5 x 6 cm. No metastatic disease. Some mass in the left breast measuring 2.3 cm suspicious for malignancy as well PET scan did not light up in the lungs left breast or axilla. She underwent neoadjuvant chemotherapy and had a dramatic decrease in the size of the mass. Patient underwent radiation to the right chest wall treated to a total dose of 5040 cGy in 28 fractions with a boost with a total dose of 6120 cGy in 34 fractions. Interestingly however when I look at the path report from 3/11/5 the simple mastectomy specimen from the right reveals an invasive moderately differentiated squamous cell carcinoma the breast.  ER was reported as positive and 34% of cells HER-2 negative review of this pathology is confusing with the discrepancy and reports it is significant and that raises the possibility that this lung lesion could be metastatic and not a primary lesion. A fairly completely note from Kassie Celis of radiation oncology references that the histology was squamous cell carcinoma and acknowledged the rarity of this type of histology of the breast.  Therefore I have little doubt that this in fact was squamous cell cancer of the breast.  Checked with pathology and all tissue was destroyed after 10 years therefore is not available. Patient's treatment was neoadjuvant chemotherapy by Dr. Robel Bajwa followed by right simple mastectomy and sentinel node procedure and radiation. I do note the path report the documents at the time of the right simple mastectomy invasive grade 2 squamous cell carcinoma of the breast tumor after neoadjuvant therapy measures 6 x 5 x 3 cm tumor is 2 mm from the deep resection margin and lymphatic invasion was present. No axillary contents identified. No ductal component to this neoplasm. This was called squamous cell carcinoma as opposed to metaplastic carcinoma. [place] procedure 12/1/2004 the right axilla showed 2 axillary nodes negative for metastatic carcinoma. Interestingly the ER was reported as positive and 34% of cells ID was negative and HER-2 was negative.

## 2021-05-17 ENCOUNTER — OFFICE VISIT (OUTPATIENT)
Dept: SURGERY | Age: 71
End: 2021-05-17
Payer: MEDICARE

## 2021-05-17 ENCOUNTER — TELEPHONE (OUTPATIENT)
Dept: CARDIOLOGY CLINIC | Age: 71
End: 2021-05-17

## 2021-05-17 VITALS
HEART RATE: 77 BPM | WEIGHT: 139.6 LBS | SYSTOLIC BLOOD PRESSURE: 120 MMHG | DIASTOLIC BLOOD PRESSURE: 60 MMHG | OXYGEN SATURATION: 93 % | RESPIRATION RATE: 18 BRPM | HEIGHT: 64 IN | BODY MASS INDEX: 23.83 KG/M2 | TEMPERATURE: 97.8 F

## 2021-05-17 DIAGNOSIS — C34.31 PRIMARY MALIGNANT NEOPLASM OF RIGHT LOWER LOBE OF LUNG (HCC): Primary | ICD-10-CM

## 2021-05-17 PROCEDURE — 99204 OFFICE O/P NEW MOD 45 MIN: CPT | Performed by: SURGERY

## 2021-05-17 PROCEDURE — G8427 DOCREV CUR MEDS BY ELIG CLIN: HCPCS | Performed by: SURGERY

## 2021-05-17 PROCEDURE — 3017F COLORECTAL CA SCREEN DOC REV: CPT | Performed by: SURGERY

## 2021-05-17 PROCEDURE — 1123F ACP DISCUSS/DSCN MKR DOCD: CPT | Performed by: SURGERY

## 2021-05-17 PROCEDURE — 1090F PRES/ABSN URINE INCON ASSESS: CPT | Performed by: SURGERY

## 2021-05-17 PROCEDURE — 4004F PT TOBACCO SCREEN RCVD TLK: CPT | Performed by: SURGERY

## 2021-05-17 PROCEDURE — G8399 PT W/DXA RESULTS DOCUMENT: HCPCS | Performed by: SURGERY

## 2021-05-17 PROCEDURE — G8420 CALC BMI NORM PARAMETERS: HCPCS | Performed by: SURGERY

## 2021-05-17 PROCEDURE — 4040F PNEUMOC VAC/ADMIN/RCVD: CPT | Performed by: SURGERY

## 2021-05-17 ASSESSMENT — ENCOUNTER SYMPTOMS
TROUBLE SWALLOWING: 0
EYE DISCHARGE: 0
CHOKING: 0
EYE PAIN: 0
BACK PAIN: 0
STRIDOR: 0
COUGH: 1
ABDOMINAL PAIN: 0
ANAL BLEEDING: 0
BLOOD IN STOOL: 0
COLOR CHANGE: 0
SORE THROAT: 0
RHINORRHEA: 0
CHEST TIGHTNESS: 0
RECTAL PAIN: 0
EYE REDNESS: 0
PHOTOPHOBIA: 0
VOICE CHANGE: 0
WHEEZING: 1
SINUS PRESSURE: 0
ABDOMINAL DISTENTION: 0
EYE ITCHING: 0
FACIAL SWELLING: 0
SHORTNESS OF BREATH: 1
APNEA: 0
CONSTIPATION: 0
NAUSEA: 0
DIARRHEA: 0
VOMITING: 0

## 2021-05-17 NOTE — PROGRESS NOTES
concentration, dysphoric mood, hallucinations, self-injury, sleep disturbance and suicidal ideas. The patient is not nervous/anxious and is not hyperactive.       /60 (Site: Left Upper Arm, Position: Sitting, Cuff Size: Medium Adult)   Pulse 77   Temp 97.8 °F (36.6 °C) (Temporal)   Resp 18   Ht 5' 4\" (1.626 m)   Wt 139 lb 9.6 oz (63.3 kg)   SpO2 93%   BMI 23.96 kg/m²     Objective:   Physical Exam    Assessment:            Plan:              Elvia Carlos LPN

## 2021-05-17 NOTE — PROGRESS NOTES
Leonard Headley MD [facility]  General Surgery  New Patient Evaluation in Office  Pt Name: Cate Jacobs  Date of Birth 1950   Today's Date: 5/17/2021  Medical Record Number: [id number]  Referring Provider: Jorge Alberto Hassan MD  Primary Care Provider: NITO Worthy - CNP  Chief Complaint   Patient presents with    Surgical Consult     est patient--ref by Dr. Pam Larsen for squamous cell carcinoma right middle lung     ASSESSMENT      Problem List Items Addressed This Visit     Primary malignant neoplasm of right lower lobe of lung [facility]) - Primary    Relevant Orders    6 Minute Walk Test        Past Medical History:   Diagnosis Date    Anxiety 2007    Arthritis     Breast cancer [facility]) 2005    right mastectomy, chemo and radiation history    CAD (coronary artery disease) 2001    sees Dr Jaida Slater of the skin 2004    Cerebral artery occlusion with cerebral infarction ([address]     Chronic UTI     COPD (chronic obstructive pulmonary disease) ([address]     sees RL Petersen    CVA (cerebral infarction) 2007, 2008    Depression 2007    DVT of lower extremity (deep venous thrombosis) ([address] 1976    Facial injury 2005    Fall on greasy ground, striking left periorbital region    History of stroke 2007, 2008, 2009    Patient relates a stroke with right weakness and speech in 2007; another in 2010 or 2012 (unsure), both with need to rehabilitation. Also \"2 mini-strokes\" (?)    Hx of blood clots 1977    hip, leg    Hyperlipidemia 2005    Hypertension 2005    Hypothyroidism     IBS (irritable bowel syndrome)     Low HDL (under 40) 2014    Lung cancer [facility])     sees Dr Pam Larsen    Prolonged emergence from general anesthesia     Recurrent UTI (urinary tract infection) 2015    Dr Grant Goodman finding difficulty 05/16/2017    work-up in progress          PLANS      1. Schedule Stefanie Mendez for   1. 6-minute walk test  2. Follow-up post done  3.  I want to use the 6-minute walk test to determine whether I think (coronary artery disease) 2001    sees Dr Forrest Chapman of the skin 2004    Cerebral artery occlusion with cerebral infarction [facility]     Chronic UTI     COPD (chronic obstructive pulmonary disease) ([address]     sees RL Petersen    CVA (cerebral infarction) 2007, 2008    Depression 2007    DVT of lower extremity (deep venous thrombosis) ([facility] 1976    Facial injury 2005    Fall on greasy ground, striking left periorbital region    History of stroke 2007, 2008, 2009    Patient relates a stroke with right weakness and speech in 2007; another in 2010 or 2012 (unsure), both with need to rehabilitation.   Also \"2 mini-strokes\" (?)    Hx of blood clots 1977    hip, leg    Hyperlipidemia 2005    Hypertension 2005    Hypothyroidism     IBS (irritable bowel syndrome)     Low HDL (under 40) 2014    Lung cancer [facility])     sees Dr Elmo Pedraza    Prolonged emergence from general anesthesia     Recurrent UTI (urinary tract infection) 2015    Dr Dori Alicia finding difficulty 05/16/2017    work-up in progress       Past Surgical History  Past Surgical History:   Procedure Laterality Date    APPENDECTOMY  1975    BREAST SURGERY Right 04/01/2005    evacuation of breast hematoma-Dr. Berry Andres    BREAST SURGERY Right 11/30/2004    lymphatic mapping, SLN bx x2-Dr. Frankie Moran    COLONOSCOPY  2009    Dr. Jayden Velez  03/16/2021    CT NEEDLE BIOPSY LUNG PERCUTANEOUS 3/16/2021 [facility] CT SCAN    HIP SURGERY  01/19/2015    HYSTERECTOMY  1976    JOINT REPLACEMENT Left 2011    HIP    JOINT REPLACEMENT Right 01/19/2015    Right Total Hip Replacement - Dr. Ra Rosado Right 2005    Dr. Lizbeth Abdi  04/10/2018    Lumbar epidural steroid injection at L4    [facility] NJX DX/THER SBST INTRLMNR LMBR/SAC W/IMG GDN N/A 04/10/2018    LESI  @ L4 performed by Jenniffer Manriquez MD at [address] [facility]    ovaries    THYROID LOBECTOMY Left 11/26/2010    left thyroid lobectomy with isthmusectomy-Dr. Linda Curran THYROID SURGERY  2007    right thyroid lobectomy-Dr. Ibanez       Medications  Current Outpatient Medications on File Prior to Visit   Medication Sig Dispense Refill    fluticasone-umeclidin-vilant (TRELEGY ELLIPTA) 100-62.5-25 MCG/INH AEPB Inhale 1 puff into the lungs daily 30 each 11    albuterol sulfate HFA (PROVENTIL HFA) 108 (90 Base) MCG/ACT inhaler Inhale 2 puffs into the lungs every 6 hours as needed for Wheezing 1 Inhaler 3    levothyroxine (LEVOTHROID) 88 MCG tablet Take 1 tablet by mouth daily 30 tablet 5    venlafaxine (EFFEXOR XR) 37.5 MG extended release capsule Take 1 capsule by mouth daily (Patient not taking: Reported on 5/14/2021) 90 capsule 1     No current facility-administered medications on file prior to visit. Allergies  Allergies   Allergen Reactions    Cipro Xr     Vicodin [Hydrocodone-Acetaminophen]      Weird dreams         Family History  Family History   Problem Relation Age of Onset    Osteoporosis Mother     Hypertension Mother     High Cholesterol Mother     Stroke Mother     Colon Cancer Mother     Cancer Father         lung cancer    Heart Disease Father     Hypertension Father     High Cholesterol Father     Heart Disease Sister     High Cholesterol Sister     Hypertension Sister     Asthma Sister     Other Other         high arched feet    Lung Cancer Son        Social History  Social History     Socioeconomic History    Marital status:       Spouse name: Nancy Larry    Number of children: 3    Years of education: 5    Highest education level: Not on file   Occupational History    Occupation: Disabled   Tobacco Use    Smoking status: Current Every Day Smoker     Packs/day: 2.00     Years: 48.00     Pack years: 96.00     Types: Cigarettes     Start date: 11/13/1967    Smokeless tobacco: Never Used   Vaping Use    Vaping Use: Never used Substance and Sexual Activity    Alcohol use: No     Alcohol/week: 0.0 standard drinks    Drug use: No    Sexual activity: Yes     Partners: Male   Other Topics Concern    Not on file   Social History Narrative    Not on file     Social Determinants of Health     Financial Resource Strain:     Difficulty of Paying Living Expenses:    Food Insecurity:     Worried About Running Out of Food in the Last Year:     [address] in the Last Year:    Transportation Needs:     Lack of Transportation (Medical):      Lack of Transportation (Non-Medical):    Physical Activity:     Days of Exercise per Week:     Minutes of Exercise per Session:    Stress:     Feeling of Stress :    Social Connections:     Frequency of Communication with Friends and Family:     Frequency of Social Gatherings with Friends and Family:     Attends Alevism Services:     Active Member of Clubs or Organizations:     Attends Club or Organization Meetings:     Marital Status:    Intimate Partner Violence:     Fear of Current or Ex-Partner:     Emotionally Abused:     Physically Abused:     Sexually Abused:        Health Screening Exams  Health Maintenance   Topic Date Due    COVID-19 Vaccine (1) Never done    DTaP/Tdap/Td vaccine (1 - Tdap) Never done    Shingles Vaccine (1 of 2) Never done    Pneumococcal 65+ years Vaccine (2 of 2 - PPSV23) 05/23/2019    Annual Wellness Visit (AWV)  Never done    Breast cancer screen  06/13/2019    Flu vaccine (Season Ended) 09/01/2021    TSH testing  03/13/2022    Low dose CT lung screening  04/12/2022    Potassium monitoring  04/14/2022    Creatinine monitoring  04/14/2022    Lipid screen  08/11/2025    Colon cancer screen colonoscopy  06/29/2028    DEXA (modify frequency per FRAX score)  Completed    Hepatitis C screen  Completed    Hepatitis A vaccine  Aged Out    Hepatitis B vaccine  Aged Out    Hib vaccine  Aged Out    Meningococcal (ACWY) vaccine  Aged Out Review of Systems  Constitutional: Negative for activity change, appetite change, chills, diaphoresis, fatigue, fever and unexpected weight change. HENT: Negative for congestion, dental problem, drooling, ear discharge, ear pain, facial swelling, hearing loss, mouth sores, nosebleeds, postnasal drip, rhinorrhea, sinus pressure, sneezing, sore throat, tinnitus, trouble swallowing and voice change. Eyes: Negative for photophobia, pain, discharge, redness, itching and visual disturbance. Respiratory: Positive for cough, shortness of breath and wheezing. Negative for apnea, choking, chest tightness and stridor. Cardiovascular: Negative for chest pain, palpitations and leg swelling. Gastrointestinal: Negative for abdominal distention, abdominal pain, anal bleeding, blood in stool, constipation, diarrhea, nausea, rectal pain and vomiting. Endocrine: Negative for cold intolerance, heat intolerance, polydipsia, polyphagia and polyuria. Genitourinary: Negative for decreased urine volume, difficulty urinating, dysuria, enuresis, flank pain, frequency, genital sores, hematuria and urgency. Musculoskeletal: Negative for arthralgias, back pain, gait problem, joint swelling, myalgias, neck pain and neck stiffness. Skin: Negative for color change, pallor, rash and wound. Allergic/Immunologic: Negative for environmental allergies, food allergies and immunocompromised state. Neurological: Negative for dizziness, tremors, seizures, syncope, facial asymmetry, speech difficulty, weakness, light-headedness, numbness and headaches. Hematological: Negative for adenopathy. Does not bruise/bleed easily. Psychiatric/Behavioral: Negative for agitation, behavioral problems, confusion, decreased concentration, dysphoric mood, hallucinations, self-injury, sleep disturbance and suicidal ideas.  The patient is not nervous/anxious and is not hyperactive    OBJECTIVE    VITALS:  height is 5' 4\" (1.626 m) and weight is 139 lb 9.6 oz (63.3 kg). Her temporal temperature is 97.8 °F (36.6 °C). Her blood pressure is 120/60 and her pulse is 77. Her respiration is 18 and oxygen saturation is 93%. CONSTITUTIONAL: fatigued, alert, cooperative, mild distress, appears older than stated age, thin and Alert and oriented times 3, no acute distress and cooperative to examination with proper mood and affect. SKIN: Skin color, texture, turgor normal. No rashes or lesions. LYMPH: no cervical nodes, no supraclavicular nodes, no axillary nodes  HEENT: Head is normocephalic, atraumatic. EOMI, PERRLA. NECK: Supple, symmetrical, trachea midline, no adenopathy, thyroid symmetric, not enlarged and no tenderness, skin normal.  CHEST/LUNGS: chest symmetric with normal A/P diameter, normal respiratory rate and rhythm, lungs clear to auscultation without wheezes, rales or rhonchi. No accessory muscle use. Scars RIGHT mastectomy  CARDIOVASCULAR: Heart sounds are normal.  Regular rate and rhythm without murmur, gallop or rub. Normal S1 and S2. . Carotid and femoral pulses 2+/4 and equal bilaterally. ABDOMEN: Normal shape. right lower quadrant and Laparoscopic scar(s) present. Normal bowel sounds. No bruits. Steve  \"soft, nontender, nondistended, no masses or organomegaly. no evidence of hernia. Percussion: Normal without hepatosplenomegally. Tenderness: absent. RECTAL: deferred, not clinically indicated  NEUROLOGIC: There are no focalizing motor or sensory deficits. CN II-XII are grossly intact. [employer] EXTREMITIES: no cyanosis, no clubbing and no edema.                    Electronically signed by Artemio Torres MD on 5/17/2021 at 3:27 PM

## 2021-05-17 NOTE — TELEPHONE ENCOUNTER
Pre service lm on  saying pt needs appt with Dr. Chidi Roblero # in chart not correct   Called emergency contact  lori and got a different  #    [phone]  Lm for pt to call office

## 2021-05-17 NOTE — LETTER
[facility]  Wendy Monroy MD FACS  Phone- [phone]  Fax [phone]    Pt Name: [facility] [employer] [place] [employer] Record Number: [id number]  Date of Birth 1950   Today's Date: 5/17/2021    Becca Morales was evaluated in the office today. My assessment and plans are listed below. Assessment:     Darlene Cedeño was seen today for surgical consult. Diagnoses and all orders for this visit:    Primary malignant neoplasm of right lower lobe of lung (HCC)  -     6 Minute Walk Test; Future         Plan:  1. Schedule Darlene Cedeño for   1. 6-minute walk test  2. Follow-up post done  3. I want to use the 6-minute walk test to determine whether I think she is a candidate for lobectomy or superior segmentectomy only or no surgery and SBRT. 2. Perioperative medical clearance will be scheduled with Dr. Carla Machuca for preop risk assessment   3. We will collect the data to try and determine her surgical risk and surgical ability to determine our next step. Orders Placed This Encounter:  Orders Placed This Encounter   Procedures    6 Minute Walk Test     Standing Status:   Future     Standing Expiration Date:   5/17/2022                If I can provide any additional assistance or you have any concerns, please feel free to contact me. Thank you for allowing to participate in the care of your patients. Sincerely,      Wendy Monroy MD FACS  [address]  [place], [facility]  Office: [phone]  Fax: [phone]

## 2021-05-20 ENCOUNTER — OFFICE VISIT (OUTPATIENT)
Dept: CARDIOLOGY CLINIC | Age: 71
End: 2021-05-20
Payer: MEDICARE

## 2021-05-20 VITALS
SYSTOLIC BLOOD PRESSURE: 136 MMHG | DIASTOLIC BLOOD PRESSURE: 52 MMHG | BODY MASS INDEX: 23.76 KG/M2 | WEIGHT: 138.4 LBS | HEART RATE: 69 BPM

## 2021-05-20 DIAGNOSIS — I65.22 CAROTID OCCLUSION, LEFT: ICD-10-CM

## 2021-05-20 DIAGNOSIS — Z01.818 PRE-OP EVALUATION: Primary | ICD-10-CM

## 2021-05-20 DIAGNOSIS — R06.02 SOB (SHORTNESS OF BREATH) ON EXERTION: ICD-10-CM

## 2021-05-20 DIAGNOSIS — Z98.890 S/P CARDIAC CATH: ICD-10-CM

## 2021-05-20 DIAGNOSIS — Z87.898 HISTORY OF SYNCOPE: ICD-10-CM

## 2021-05-20 DIAGNOSIS — I10 ESSENTIAL HYPERTENSION: ICD-10-CM

## 2021-05-20 DIAGNOSIS — R07.89 CHEST PAIN, ATYPICAL: ICD-10-CM

## 2021-05-20 DIAGNOSIS — R42 DIZZINESS ON STANDING: ICD-10-CM

## 2021-05-20 DIAGNOSIS — E78.00 PURE HYPERCHOLESTEROLEMIA: ICD-10-CM

## 2021-05-20 DIAGNOSIS — Z72.0 TOBACCO ABUSE: ICD-10-CM

## 2021-05-20 PROCEDURE — 1090F PRES/ABSN URINE INCON ASSESS: CPT | Performed by: INTERNAL MEDICINE

## 2021-05-20 PROCEDURE — G8420 CALC BMI NORM PARAMETERS: HCPCS | Performed by: INTERNAL MEDICINE

## 2021-05-20 PROCEDURE — 4004F PT TOBACCO SCREEN RCVD TLK: CPT | Performed by: INTERNAL MEDICINE

## 2021-05-20 PROCEDURE — 3017F COLORECTAL CA SCREEN DOC REV: CPT | Performed by: INTERNAL MEDICINE

## 2021-05-20 PROCEDURE — 99204 OFFICE O/P NEW MOD 45 MIN: CPT | Performed by: INTERNAL MEDICINE

## 2021-05-20 PROCEDURE — G8399 PT W/DXA RESULTS DOCUMENT: HCPCS | Performed by: INTERNAL MEDICINE

## 2021-05-20 PROCEDURE — 93000 ELECTROCARDIOGRAM COMPLETE: CPT | Performed by: INTERNAL MEDICINE

## 2021-05-20 PROCEDURE — 1123F ACP DISCUSS/DSCN MKR DOCD: CPT | Performed by: INTERNAL MEDICINE

## 2021-05-20 PROCEDURE — 4040F PNEUMOC VAC/ADMIN/RCVD: CPT | Performed by: INTERNAL MEDICINE

## 2021-05-20 PROCEDURE — G8427 DOCREV CUR MEDS BY ELIG CLIN: HCPCS | Performed by: INTERNAL MEDICINE

## 2021-05-20 NOTE — PROGRESS NOTES
Chief Complaint   Patient presents with    Cardiac Clearance     lobectomy    Coronary Artery Disease   New patient here - ref.  by Dr. Abraham Suarez - hypertenstion and mild CAD - last seen Dr. Karla Mandujano in 2015     Last seen 03/2018     Came today for pre op Clearance for lobectomy    Chest pain once in a while once a months 10 min  No aggravating or relieving factor    Pt denies peripheral edema, heart palpitations     Sob on exertion chronic      Occasional dizziness on standing  Had balance issue    Hx of syncope intermittent for the last  6 month    Last syncope march 2021 and evalutaed in hospital  predrom dizziness  Tired post syncope with nausea    Occasional palpitation      No leg edema    Smoke 2ppd for 48 yrs    FHX  Father had mi in his 52's  Sister had MI at 72    Patient Active Problem List   Diagnosis    Hyperlipidemia    History of breast cancer    Vitamin D deficiency    Chronic headachess/p head injjury s/p fall    Depression    Smoker    Hypothyroidism    Allergic rhinitis    Incontinence    Noncompliance    Vasovagal syncope    History of CVA (cerebrovascular accident)    Stage 2 moderate COPD by GOLD classification ([address]    Carotid occlusion, left    OA (osteoarthritis) of hip    Essential hypertension    CAD (coronary artery disease)    Transient cerebral ischemia    Aortic insufficiency    Chest pain, atypical    Tobacco abuse    History of chest pain atypical    S/P cardiac cath nonobstructive lesion 2014    Lumbar radiculitis    Spinal stenosis of lumbar region without neurogenic claudication    Syncope and collapse    Solitary pulmonary nodule on lung CT    Severe malnutrition (HCC)    Primary malignant neoplasm of right lower lobe of lung (HCC)    Pre-op evaluation for Lung ca surgery    SOB (shortness of breath) on exertion    Dizziness on standing    History of syncope       Past Surgical History:   Procedure Laterality Date    APPENDECTOMY  1975    BREAST SURGERY Right 04/01/2005    evacuation of breast hematoma-Dr. Kalie Cordero    BREAST SURGERY Right 11/30/2004    lymphatic mapping, SLN bx x2-Dr. Velia Upton    COLONOSCOPY  2009    Dr. Cheyenne Suarez  03/16/2021    CT NEEDLE BIOPSY LUNG PERCUTANEOUS 3/16/2021 [facility] CT SCAN    HIP SURGERY  01/19/2015    HYSTERECTOMY  1976    JOINT REPLACEMENT Left 2011    HIP    JOINT REPLACEMENT Right 01/19/2015    Right Total Hip Replacement - Dr. Herbert Huitron Right 2005    Dr. Matos Linden  04/10/2018    Lumbar epidural steroid injection at L4    PA NJX DX/THER SBST INTRLMNR LMBR/SAC W/IMG GDN N/A 04/10/2018    LESI  @ L4 performed by Lurdes Yuan MD at [address]    ovaries    THYROID LOBECTOMY Left 11/26/2010    left thyroid lobectomy with isthmusectomy-Dr. Stewart Antis THYROID SURGERY  2007    right thyroid lobectomy-Dr. Ibanez       Allergies   Allergen Reactions    Cipro Xr     Vicodin [Hydrocodone-Acetaminophen]      Weird dreams          Family History   Problem Relation Age of Onset    Osteoporosis Mother     Hypertension Mother     High Cholesterol Mother     Stroke Mother     Colon Cancer Mother     Cancer Father         lung cancer    Heart Disease Father     Hypertension Father     High Cholesterol Father     Heart Disease Sister     High Cholesterol Sister     Hypertension Sister     Asthma Sister     Other Other         high arched feet    Lung Cancer Son         Social History     Socioeconomic History    Marital status:       Spouse name: Hakeem Joe    Number of children: 3    Years of education: 5    Highest education level: Not on file   Occupational History    Occupation: Disabled   Tobacco Use    Smoking status: Current Every Day Smoker     Packs/day: 2.00     Years: 48.00     Pack years: 96.00     Types: Cigarettes     Start date: 11/13/1967   Osmar Funez Smokeless tobacco: Never Used   Vaping Use    Vaping Use: Never used   Substance and Sexual Activity    Alcohol use: No     Alcohol/week: 0.0 standard drinks    Drug use: No    Sexual activity: Yes     Partners: Male   Other Topics Concern    Not on file   Social History Narrative    Not on file     Social Determinants of Health     Financial Resource Strain:     Difficulty of Paying Living Expenses:    Food Insecurity:     Worried About Running Out of Food in the Last Year:     [address] in the Last Year:    Transportation Needs:     Lack of Transportation (Medical):  Lack of Transportation (Non-Medical):    Physical Activity:     Days of Exercise per Week:     Minutes of Exercise per Session:    Stress:     Feeling of Stress :    Social Connections:     Frequency of Communication with Friends and Family:     Frequency of Social Gatherings with Friends and Family:     Attends Confucianism Services:     Active Member of Clubs or Organizations:     Attends Club or Organization Meetings:     Marital Status:    Intimate Partner Violence:     Fear of Current or Ex-Partner:     Emotionally Abused:     Physically Abused:     Sexually Abused:        Current Outpatient Medications   Medication Sig Dispense Refill    fluticasone-umeclidin-vilant (TRELEGY ELLIPTA) 100-62.5-25 MCG/INH AEPB Inhale 1 puff into the lungs daily 30 each 11    albuterol sulfate HFA (PROVENTIL HFA) 108 (90 Base) MCG/ACT inhaler Inhale 2 puffs into the lungs every 6 hours as needed for Wheezing 1 Inhaler 3    levothyroxine (LEVOTHROID) 88 MCG tablet Take 1 tablet by mouth daily 30 tablet 5    venlafaxine (EFFEXOR XR) 37.5 MG extended release capsule Take 1 capsule by mouth daily (Patient not taking: Reported on 5/14/2021) 90 capsule 1     No current facility-administered medications for this visit.        Review of Systems -     General ROS: negative  Psychological ROS: negative  Hematological and Lymphatic ROS: No 100 04/14/2021    GLUCOSE 94 05/16/2012     Hepatic Function Panel:    Lab Results   Component Value Date    ALKPHOS 157 04/14/2021    ALT <5 04/14/2021    AST 13 04/14/2021    PROT 7.2 04/14/2021    BILITOT 0.5 04/14/2021    BILIDIR <0.2 02/21/2019    LABALBU 4.1 04/14/2021    LABALBU 4.4 05/16/2012     Magnesium:    Lab Results   Component Value Date    MG 2.5 03/16/2021     Warfarin PT/INR:  No components found for: PTPATWAR, PTINRWAR  HgBA1c:    Lab Results   Component Value Date    LABA1C 5.2 08/11/2020     FLP:    Lab Results   Component Value Date    TRIG 99 08/11/2020    HDL 40 08/11/2020    LDLCALC 115 08/11/2020     TSH:    Lab Results   Component Value Date    TSH 4.810 03/13/2021     CONCLUSION:    1. Left main is a large caliber vessel, distally has about 20 percent          disease distally before the bifurcation. 2.    The circumflex about 20 percent proximal lesion and appears to be a          dominant vessel. 3.    The LAD has about 20 percent diffuse disease and large caliber          vessel. 4.    Right coronary artery is medium caliber vessel, nondominant vessel,          at least based on my views and there is no __________ critical          lesion, has mild lumen irregularity in it. 5.    Left ventriculography demonstrates ejection fraction of about 50 to          55 percent with mild global hypokinesia. 6.    No complications occurred. Overall impression now is that it is possible that the patient has evidence  of syndrome X because of the nonobstructive disease, but the LVEDP was 19,  indicative of mild impaired relaxation of ventricle. Again, aggressive risk  factor modification is strongly encouraged. The patient is to avoid driving,  lifting for the next two days.            Lawrence Rajput D.O.        D: 03/07/2014 12:15       EKG 2/6/18  NSR, no acute abn nonsp T wave abn          Impression:   1.  Patent right carotid and bilateral vertebral arteries without significant stenosis.  Occluded left ICA at its origin, present previously.       This document has been electronically signed by: Ruiz Temple MD on    03/12/2021 10:05 PM       All CTs at this facility use dose modulation techniques and iterative    reconstructions, and/or weight-based dosing   when appropriate to reduce radiation to a low as reasonably achievable.       Carotid stenosis and measurements are in accordance with NASCET criteria                       Conclusions      Summary   Technically difficult examination. Left ventricular size and systolic function is normal. Ejection fraction   was estimated at 55-60%. LV wall thickness is within normal limits and   there are no obvious wall motion abnormalities. The right ventricular size was normal with normal systolic function and   wall thickness. Mild aortic regurgitation is noted. Mild mitral regurgitation is present. Signature      ----------------------------------------------------------------   Electronically signed by Ladi Cuello MD (Interpreting   physician) on 03/13/2021 at 02:48 PM                ekg 5/20/21   Sinus  Rhythm   Voltage criteria for LVH  (S(V1)+R(V6) exceeds 3.50 mV). -  Nonspecific T-abnormality. ABNORMAL                      Assessment     Diagnosis Orders   1. Pre-op evaluation for Lung ca surgery  Stress test, lexiscan   2. SOB (shortness of breath) on exertion  Stress test, lexiscan   3. Chest pain, atypical  Stress test, lexiscan   4. Tobacco abuse  EKG 12 lead    Stress test, lexiscan   5. Pure hypercholesterolemia  Stress test, lexiscan   6. Essential hypertension  Stress test, lexiscan   7. Carotid occlusion, left  EKG 12 lead    Stress test, lexiscan   8. S/P cardiac cath nonobstructive lesion 2014  Stress test, lexiscan   9. Dizziness on standing  Stress test, lexiscan   10.  History of syncope  Stress test, lexiscan       Plan     meds and labs reviewed  \  Recent admission record reviewed      Pre op eval  Hx of cp for yrs atypical  Sob  Abn ekg  Need lexisc nuc   echo wnl  If the test okay may proceed with mod risk    Hx of copd     Hx Hypertension, on medical treatment. Seems to be under good control. Off her meds for long time  Including lopressor and norvasc 5 mg po qd  Good without meds now    Hx of dizziness on standin and syncope  Off BP meds and  Stopped after syncope in march and feel better      Hx of Hyperlipidemia: off her statins, for long while      Smoking: discussed with the patient the importance of smoke cessation especially with the risk of CAD.   Rich Maldonado does not want to quit and she stated she love to smoke     D/w the patn the plan of care      RTC in 3 months        Fausto Justice, [facility]

## 2021-05-27 NOTE — PROGRESS NOTES
that time. It is unclear what happened but her neck CT scan of her chest was a CTA done on December 13 of 2020 to rule out PE where this mass was now much bigger and highly suspicious for malignancy. She then presented with syncopal spell in March of this year and the mass had gotten larger and she did have a CT-guided biopsy during that hospital stay which revealed poorly differentiated squamous cell carcinoma. During my review of the films it appears to be in the superior segment of the right lower lobe. During her recent hospital stay she was short of breath and was having difficulty with and her pulmonary functions are in the risk range with calculated predicted postop FEV1 and DLCO to be just over 40%     Calculated ppo FEV1= 1.40L x (19-5/19)= 1.03= 1.03/2.42 x 100=46%  Calculated ppo DLCO=  13.02 x (19-5/19)= 9.59/24.49 x 100=39%      We sent her on to get a 6-minute walk test in cardiac clearance for evaluation to determine her risk as well as what procedure. Both of these tests were scheduled for June 1 a stress test by Dr. Sumi Fuentes who and review of his records stated that she would be cleared but moderate risk if the stress test was negative for ischemia                Past Medical History  Past Medical History:   Diagnosis Date    Anxiety 2007    Arthritis     Breast cancer [facility]) 2005    right mastectomy, chemo and radiation history    CAD (coronary artery disease) 2001    sees Dr Forrest Chapman of the skin 2004    Cerebral artery occlusion with cerebral infarction ([address]     Chronic UTI     COPD (chronic obstructive pulmonary disease) ([address]     sees RL Petersen    CVA (cerebral infarction) 2007, 2008    Depression 2007    DVT of lower extremity (deep venous thrombosis) ([address] 1976    Facial injury 2005    Fall on greasy ground, striking left periorbital region    History of stroke 2007, 2008, 2009    Patient relates a stroke with right weakness and speech in 2007; another in 2010 or 2012 MCG tablet Take 1 tablet by mouth daily 30 tablet 5    venlafaxine (EFFEXOR XR) 37.5 MG extended release capsule Take 1 capsule by mouth daily (Patient not taking: Reported on 6/1/2021) 90 capsule 1     No current facility-administered medications on file prior to visit. Allergies  Allergies   Allergen Reactions    Cipro Xr     Vicodin [Hydrocodone-Acetaminophen]      Weird dreams         Family History  Family History   Problem Relation Age of Onset    Osteoporosis Mother     Hypertension Mother     High Cholesterol Mother     Stroke Mother     Colon Cancer Mother     Cancer Father         lung cancer    Heart Disease Father     Hypertension Father     High Cholesterol Father     Heart Disease Sister     High Cholesterol Sister     Hypertension Sister     Asthma Sister     Other Other         high arched feet    Lung Cancer Son        Social History  Social History     Socioeconomic History    Marital status:      Spouse name: Arun Omer    Number of children: 3    Years of education: 5    Highest education level: Not on file   Occupational History    Occupation: Disabled   Tobacco Use    Smoking status: Current Every Day Smoker     Packs/day: 2.00     Years: 48.00     Pack years: 96.00     Types: Cigarettes     Start date: 11/13/1967    Smokeless tobacco: Never Used   Vaping Use    Vaping Use: Never used   Substance and Sexual Activity    Alcohol use: No     Alcohol/week: 0.0 standard drinks    Drug use: No    Sexual activity: Yes     Partners: Male   Other Topics Concern    Not on file   Social History Narrative    Not on file     Social Determinants of Health     Financial Resource Strain:     Difficulty of Paying Living Expenses:    Food Insecurity:     Worried About Running Out of Food in the Last Year:     Ran Out of Food in the Last Year:    Transportation Needs:     Lack of Transportation (Medical):      Lack of Transportation (Non-Medical):    Physical Activity:     Days of Exercise per Week:     Minutes of Exercise per Session:    Stress:     Feeling of Stress :    Social Connections:     Frequency of Communication with Friends and Family:     Frequency of Social Gatherings with Friends and Family:     Attends Church Services:     Active Member of Clubs or Organizations:     Attends Club or Organization Meetings:     Marital Status:    Intimate Partner Violence:     Fear of Current or Ex-Partner:     Emotionally Abused:     Physically Abused:     Sexually Abused:        Health Screening Exams  Health Maintenance   Topic Date Due    COVID-19 Vaccine (1) Never done    DTaP/Tdap/Td vaccine (1 - Tdap) Never done    Shingles Vaccine (1 of 2) Never done    Pneumococcal 65+ years Vaccine (2 of 2 - PPSV23) 05/23/2019    Annual Wellness Visit (AWV)  Never done    Breast cancer screen  06/13/2019    Flu vaccine (Season Ended) 09/01/2021    TSH testing  03/13/2022    Low dose CT lung screening  04/12/2022    Potassium monitoring  04/14/2022    Creatinine monitoring  04/14/2022    Lipid screen  08/11/2025    Colon cancer screen colonoscopy  06/29/2028    DEXA (modify frequency per FRAX score)  Completed    Hepatitis C screen  Completed    Hepatitis A vaccine  Aged Out    Hepatitis B vaccine  Aged Out    Hib vaccine  Aged Out    Meningococcal (ACWY) vaccine  Aged Out       Review of Systems  Constitutional: Negative for activity change, appetite change, chills, diaphoresis, fatigue, fever and unexpected weight change. HENT: Negative for congestion, dental problem, drooling, ear discharge, ear pain, facial swelling, hearing loss, mouth sores, nosebleeds, postnasal drip, rhinorrhea, sinus pressure, sneezing, sore throat, tinnitus, trouble swallowing and voice change. Eyes: Negative for photophobia, pain, discharge, redness, itching and visual disturbance. Respiratory: Positive for cough, shortness of breath and wheezing.  Negative for apnea, choking, chest tightness and stridor. Cardiovascular: Negative for chest pain, palpitations and leg swelling. Gastrointestinal: Negative for abdominal distention, abdominal pain, anal bleeding, blood in stool, constipation, diarrhea, nausea, rectal pain and vomiting. Endocrine: Negative for cold intolerance, heat intolerance, polydipsia, polyphagia and polyuria. Genitourinary: Negative for decreased urine volume, difficulty urinating, dysuria, enuresis, flank pain, frequency, genital sores, hematuria and urgency. Musculoskeletal: Negative for arthralgias, back pain, gait problem, joint swelling, myalgias, neck pain and neck stiffness. Skin: Negative for color change, pallor, rash and wound. Allergic/Immunologic: Negative for environmental allergies, food allergies and immunocompromised state. Neurological: Negative for dizziness, tremors, seizures, syncope, facial asymmetry, speech difficulty, weakness, light-headedness, numbness and headaches. Hematological: Negative for adenopathy. Does not bruise/bleed easily. Psychiatric/Behavioral: Negative for agitation, behavioral problems, confusion, decreased concentration, dysphoric mood, hallucinations, self-injury, sleep disturbance and suicidal ideas. The patient is not nervous/anxious and is not hyperactive    OBJECTIVE    VITALS:  height is 5' 4\" (1.626 m) and weight is 143 lb (64.9 kg). Her temporal temperature is 97.3 °F (36.3 °C). Her blood pressure is 110/60 and her pulse is 73. Her respiration is 22 and oxygen saturation is 97%. CONSTITUTIONAL: fatigued, alert, cooperative, mild distress, appears older than stated age, thin and Alert and oriented times 3, no acute distress and cooperative to examination with proper mood and affect. SKIN: Skin color, texture, turgor normal. No rashes or lesions. LYMPH: no cervical nodes, no supraclavicular nodes, no axillary nodes  HEENT: Head is normocephalic, atraumatic. EOMI, PERRLA.   NECK: Supple, symmetrical, trachea midline, no adenopathy, thyroid symmetric, not enlarged and no tenderness, skin normal.  CHEST/LUNGS: chest symmetric with normal A/P diameter, normal respiratory rate and rhythm, lungs clear to auscultation without wheezes, rales or rhonchi. No accessory muscle use. Scars RIGHT mastectomy  CARDIOVASCULAR: Heart sounds are normal.  Regular rate and rhythm without murmur, gallop or rub. Normal S1 and S2. . Carotid and femoral pulses 2+/4 and equal bilaterally. ABDOMEN: Normal shape. right lower quadrant and Laparoscopic scar(s) present. Normal bowel sounds. No bruits. [facility] \"soft, nontender, nondistended, no masses or organomegaly. no evidence of hernia. Percussion: Normal without hepatosplenomegally. Tenderness: absent. RECTAL: deferred, not clinically indicated  NEUROLOGIC: There are no focalizing motor or sensory deficits. CN II-XII are grossly intact. [facility] EXTREMITIES: no cyanosis, no clubbing and no edema.                    Electronically signed by Chaim Garcia MD on 6/2/2021 at 1:23 PM

## 2021-06-01 ENCOUNTER — HOSPITAL ENCOUNTER (OUTPATIENT)
Dept: NON INVASIVE DIAGNOSTICS | Age: 71
Discharge: HOME | End: 2021-06-01
Payer: MEDICARE

## 2021-06-01 ENCOUNTER — OFFICE VISIT (OUTPATIENT)
Dept: PULMONOLOGY | Age: 71
End: 2021-06-01
Payer: MEDICARE

## 2021-06-01 VITALS
OXYGEN SATURATION: 98 % | TEMPERATURE: 98.2 F | HEART RATE: 59 BPM | WEIGHT: 143 LBS | SYSTOLIC BLOOD PRESSURE: 134 MMHG | HEIGHT: 64 IN | DIASTOLIC BLOOD PRESSURE: 64 MMHG | BODY MASS INDEX: 24.41 KG/M2

## 2021-06-01 DIAGNOSIS — Z87.891 PERSONAL HISTORY OF TOBACCO USE: ICD-10-CM

## 2021-06-01 DIAGNOSIS — E78.00 PURE HYPERCHOLESTEROLEMIA: ICD-10-CM

## 2021-06-01 DIAGNOSIS — R42 DIZZINESS ON STANDING: ICD-10-CM

## 2021-06-01 DIAGNOSIS — Z87.898 HISTORY OF SYNCOPE: ICD-10-CM

## 2021-06-01 DIAGNOSIS — C34.91 SQUAMOUS CELL CARCINOMA OF RIGHT LUNG (HCC): ICD-10-CM

## 2021-06-01 DIAGNOSIS — J44.9 COPD, MODERATE (HCC): Primary | ICD-10-CM

## 2021-06-01 DIAGNOSIS — R07.89 CHEST PAIN, ATYPICAL: ICD-10-CM

## 2021-06-01 DIAGNOSIS — Z98.890 S/P CARDIAC CATH: ICD-10-CM

## 2021-06-01 DIAGNOSIS — Z72.0 TOBACCO ABUSE: ICD-10-CM

## 2021-06-01 DIAGNOSIS — I65.22 CAROTID OCCLUSION, LEFT: ICD-10-CM

## 2021-06-01 DIAGNOSIS — I10 ESSENTIAL HYPERTENSION: ICD-10-CM

## 2021-06-01 DIAGNOSIS — R06.02 SOB (SHORTNESS OF BREATH) ON EXERTION: ICD-10-CM

## 2021-06-01 DIAGNOSIS — Z01.818 PRE-OP EVALUATION: ICD-10-CM

## 2021-06-01 PROCEDURE — 94618 PULMONARY STRESS TESTING: CPT | Performed by: NURSE PRACTITIONER

## 2021-06-01 PROCEDURE — 3430000000 HC RX DIAGNOSTIC RADIOPHARMACEUTICAL: Performed by: INTERNAL MEDICINE

## 2021-06-01 PROCEDURE — 4004F PT TOBACCO SCREEN RCVD TLK: CPT | Performed by: NURSE PRACTITIONER

## 2021-06-01 PROCEDURE — 1123F ACP DISCUSS/DSCN MKR DOCD: CPT | Performed by: NURSE PRACTITIONER

## 2021-06-01 PROCEDURE — 4040F PNEUMOC VAC/ADMIN/RCVD: CPT | Performed by: NURSE PRACTITIONER

## 2021-06-01 PROCEDURE — 3017F COLORECTAL CA SCREEN DOC REV: CPT | Performed by: NURSE PRACTITIONER

## 2021-06-01 PROCEDURE — 78452 HT MUSCLE IMAGE SPECT MULT: CPT

## 2021-06-01 PROCEDURE — 99213 OFFICE O/P EST LOW 20 MIN: CPT | Performed by: NURSE PRACTITIONER

## 2021-06-01 PROCEDURE — G8420 CALC BMI NORM PARAMETERS: HCPCS | Performed by: NURSE PRACTITIONER

## 2021-06-01 PROCEDURE — 1090F PRES/ABSN URINE INCON ASSESS: CPT | Performed by: NURSE PRACTITIONER

## 2021-06-01 PROCEDURE — 93017 CV STRESS TEST TRACING ONLY: CPT | Performed by: INTERNAL MEDICINE

## 2021-06-01 PROCEDURE — 3023F SPIROM DOC REV: CPT | Performed by: NURSE PRACTITIONER

## 2021-06-01 PROCEDURE — G8427 DOCREV CUR MEDS BY ELIG CLIN: HCPCS | Performed by: NURSE PRACTITIONER

## 2021-06-01 PROCEDURE — 6360000002 HC RX W HCPCS

## 2021-06-01 PROCEDURE — G8926 SPIRO NO PERF OR DOC: HCPCS | Performed by: NURSE PRACTITIONER

## 2021-06-01 PROCEDURE — A9500 TC99M SESTAMIBI: HCPCS | Performed by: INTERNAL MEDICINE

## 2021-06-01 PROCEDURE — G8399 PT W/DXA RESULTS DOCUMENT: HCPCS | Performed by: NURSE PRACTITIONER

## 2021-06-01 RX ADMIN — Medication 9.1 MILLICURIE: at 13:10

## 2021-06-01 RX ADMIN — Medication 32.3 MILLICURIE: at 14:33

## 2021-06-01 ASSESSMENT — ENCOUNTER SYMPTOMS
COUGH: 1
SHORTNESS OF BREATH: 1
WHEEZING: 1
DIARRHEA: 0
SPUTUM PRODUCTION: 1
CHEST TIGHTNESS: 0
VOMITING: 0
NAUSEA: 0
STRIDOR: 0

## 2021-06-01 ASSESSMENT — COPD QUESTIONNAIRES: COPD: 1

## 2021-06-01 NOTE — PROGRESS NOTES
[facility]    Patient: Manuel Chapman, 73 y.o.   : 1950    Pt of Dr. Isela Betancourt   Patient presents with    Follow-up     2 month f/u pet/ct 21 pft 21 Dr Tamanna Ricketts wants seen and a 6 minute walk done        COPD  She complains of cough, shortness of breath, sputum production and wheezing. This is a chronic problem. The current episode started more than 1 year ago. The problem occurs daily. The problem has been gradually improving. The cough is productive of sputum (white frothy). Associated symptoms include dyspnea on exertion. Pertinent negatives include no chest pain or fever. Her symptoms are aggravated by strenuous activity and change in weather. Her symptoms are alleviated by beta-agonist and rest. She reports significant improvement on treatment. Risk factors for lung disease include smoking/tobacco exposure. Her past medical history is significant for COPD. Chris Stewart is here for follow up for 6 MWT. PMH significant for anxiety, COPD, Squamous cell carcinoma  breast s/p right mastectomy chemo/radiation. Patient recently diagnosed with Lung cancer currently being followed by Dr. Tamanna Ricketts for possible surgical resection vs SBRT. Is also following with Dr. Jaguar Solano of Medical Oncology and Dr. Hitesh Jasmine of Radiation Oncology. Overall patient reports respiratory symptoms have been slightly improved since last appointment. Patient reports good compliance with inhaled medications (Trelegy). Patient using albuterol 1-2 times per day on average. Patient reports minimal physical limitation due to respiratory symptoms if she takes her time. Here today for 6 MWT to evaluate surgical candidacy. Patient lost to follow-up from 2018 to 2021  2 PPD 48 yrs  On no supplemental O2    Progress History:   Since last visit any new medical issues? No  New ER or hospital visits? No  Any new or changes in medicines? No  Using inhalers? Yes Trelegy  Are they helpful? Yes     Pneumonia vaccine? Last dose 2015  Past Medical hx   PMH:  Past Medical History:   Diagnosis Date    Anxiety 2007    Arthritis     Breast cancer [facility]) 2005    right mastectomy, chemo and radiation history    CAD (coronary artery disease) 2001    sees Dr Afsaneh Neville of the skin 2004    [facility]bral [facility] occlusion with [facility] [address]     Chronic UTI     COPD (chronic obstructive pulmonary disease) ([facility]     sees RL Petersen    CVA (cerebral infarction) 2007, 2008    Depression 2007    DVT of lower extremity (deep venous thrombosis) ([facility] 1976    Facial injury 2005    Fall on greasy ground, striking left periorbital region    History of stroke 2007, 2008, 2009    Patient relates a stroke with right weakness and speech in 2007; another in 2010 or 2012 (unsure), both with need to rehabilitation.   Also \"2 mini-strokes\" (?)    Hx of blood clots 1977    hip, leg    Hyperlipidemia 2005    Hypertension 2005    Hypothyroidism     IBS (irritable bowel syndrome)     Low HDL (under 40) 2014    Lung cancer [facility])     sees Dr Ebonie Thompson    Prolonged emergence from general anesthesia     Recurrent UTI (urinary tract infection) 2015    Dr Bessie Hernandez finding difficulty 05/16/2017    work-up in progress     SURGICAL HISTORY:  Past Surgical History:   Procedure Laterality Date    APPENDECTOMY  1975    BREAST SURGERY Right 04/01/2005    evacuation of breast hematoma-Dr. Lawrence Beard    BREAST SURGERY Right 11/30/2004    lymphatic mapping, SLN bx x2-Dr. Vicki Chatman    COLONOSCOPY  2009    Dr. Nithin Haskins  03/16/2021    CT NEEDLE BIOPSY LUNG PERCUTANEOUS 3/16/2021 [facility] CT SCAN    HIP SURGERY  01/19/2015    HYSTERECTOMY  1976    JOINT REPLACEMENT Left 2011    HIP    JOINT REPLACEMENT Right 01/19/2015    Right Total Hip Replacement - Dr. Saskia Thomas Right 2005    Dr. Sherice Khan OTHER SURGICAL HISTORY  04/10/2018    Lumbar epidural steroid injection at L4    AK NJX DX/THER SBST INTRLMNR LMBR/SAC W/IMG GDN N/A 04/10/2018    LESI  @ L4 performed by Bishop Windy MD at [address]    ovaries    THYROID LOBECTOMY Left 11/26/2010    left thyroid lobectomy with isthmusectomy-Dr. Jayashree Patel THYROID SURGERY  2007    right thyroid lobectomy-Dr. Ibanez     SOCIAL HISTORY:  Social History     Tobacco Use    Smoking status: Current Every Day Smoker     Packs/day: 2.00     Years: 48.00     Pack years: 96.00     Types: Cigarettes     Start date: 11/13/1967    Smokeless tobacco: Never Used   Vaping Use    Vaping Use: Never used   Substance Use Topics    Alcohol use: No     Alcohol/week: 0.0 standard drinks    Drug use: No     ALLERGIES:  Allergies   Allergen Reactions    Cipro Xr     Vicodin [Hydrocodone-Acetaminophen]      Weird dreams       FAMILY HISTORY:  Family History   Problem Relation Age of Onset    Osteoporosis Mother     Hypertension Mother     High Cholesterol Mother     Stroke Mother     Colon Cancer Mother     Cancer Father         lung cancer    Heart Disease Father     Hypertension Father     High Cholesterol Father     Heart Disease Sister     High Cholesterol Sister     Hypertension Sister     Asthma Sister     Other Other         high arched feet    Lung Cancer Son      CURRENT MEDICATIONS:  Current Outpatient Medications   Medication Sig Dispense Refill    fluticasone-umeclidin-vilant (TRELEGY ELLIPTA) 100-62.5-25 MCG/INH AEPB Inhale 1 puff into the lungs daily 30 each 11    albuterol sulfate HFA (PROVENTIL HFA) 108 (90 Base) MCG/ACT inhaler Inhale 2 puffs into the lungs every 6 hours as needed for Wheezing 1 Inhaler 3    levothyroxine (LEVOTHROID) 88 MCG tablet Take 1 tablet by mouth daily 30 tablet 5    venlafaxine (EFFEXOR XR) 37.5 MG extended release capsule Take 1 capsule by mouth daily (Patient not taking: Reported on 6/1/2021) 90 capsule 1     No current facility-administered medications for this visit. Patricia Rued ROS   Review of Systems   Constitutional: Negative for chills, fever and unexpected weight change. Respiratory: Positive for cough, sputum production, shortness of breath and wheezing. Negative for chest tightness and stridor. Cardiovascular: Positive for dyspnea on exertion. Negative for chest pain and leg swelling. Gastrointestinal: Negative for diarrhea, nausea and vomiting. Genitourinary: Negative for dysuria. Physical exam   /64 (Site: Left Upper Arm, Position: Sitting, Cuff Size: Medium Adult)   Pulse 59   Temp 98.2 °F (36.8 °C)   Ht 5' 4\" (1.626 m)   Wt 143 lb (64.9 kg)   SpO2 98% Comment: room air at rest  BMI 24.55 kg/m²    Wt Readings from Last 3 Encounters:   06/01/21 143 lb (64.9 kg)   05/20/21 138 lb 6.4 oz (62.8 kg)   05/17/21 139 lb 9.6 oz (63.3 kg)       Physical Exam  Vitals and nursing note reviewed. Constitutional:       General: She is not in acute distress. Appearance: She is well-developed. HENT:      Head: Normocephalic and atraumatic. Neck:      Trachea: No tracheal deviation. Cardiovascular:      Rate and Rhythm: Normal rate and regular rhythm. Heart sounds: Normal heart sounds. No murmur heard. Pulmonary:      Effort: Pulmonary effort is normal. No respiratory distress. Breath sounds: Normal breath sounds. No stridor. No wheezing or rales. Chest:      Chest wall: No tenderness. Abdominal:      General: Bowel sounds are normal. There is no distension. Palpations: Abdomen is soft. Musculoskeletal:      Cervical back: Neck supple. Skin:     General: Skin is warm and dry. Capillary Refill: Capillary refill takes less than 2 seconds. Neurological:      Mental Status: She is alert and oriented to person, place, and time. Psychiatric:         Behavior: Behavior normal.         Thought Content:  Thought content normal.          Results   Lung Nodule Screening     [x] Qualifies    [] Does not qualify   [] Declined    [] Completed  Known lung cancer    The USPSTF recommends annual screening for lung cancer with low-dose computed tomography (LDCT) in adults aged 48 to [de-identified] years who have a 20 pack-year smoking history and currently smoke or have quit within the past 15 years. Screening should be discontinued once a person has not smoked for 15 years or develops a health problem that substantially limits life expectancy or the ability or willingness to have curative lung surgery. 2018 FEV1 post BD 1.61L -68%, 2021 FEV1 post BD 1.40L-62%, DLCO 57%          Six Minute Walk Test    Six Minute Walk Test  Latoya Yuen 1950    Six minute walk test done in my office today by my medical assistant. Shania's oxygen saturation at rest on room air was 97%. Her oxygen saturation dropped to 93% on room air with exertion after walking 648 feet equal to 197. 5 meters in 6 minutes. The patient did not require supplemental O2, no order was generated. Assessment      Diagnosis Orders   1. COPD, moderate ([address]  6 Minute Walk Test   2. Squamous cell carcinoma of right lung (HCC)     3. Personal history of tobacco use           Plan   -6 MWT completed as part of surgical evaluation results above  -Will continue on Trelegy  -Albuterol 2 puff Q6 hrs PRN for SOB/wheezing   -Discussed albuterol inhaler use. Reviewed signs and symptoms indicating need for use including Shortness of breath and wheezing. Discussed with patient the importance of using inhaler within the prescribed time frames. Patient verbalized understanding to use within prescribed time frame.  Patient also verbalized understanding that if they experience no relief after using albuterol and resting for 15 minutes they need to go to nearest ER or call 911.  -encouraged patient to abstain from smoking   -Advised to maintain pneumonia vaccine with PCP and to take flu vaccine this coming season.  -Advised patient to call office with any changes, questions, or concerns regarding respiratory status    Will see Donavan Posada back in: 2 months    Rand Rowan CNP  6/1/2021

## 2021-06-02 ENCOUNTER — OFFICE VISIT (OUTPATIENT)
Dept: SURGERY | Age: 71
End: 2021-06-02
Payer: MEDICARE

## 2021-06-02 ENCOUNTER — TELEPHONE (OUTPATIENT)
Dept: SURGERY | Age: 71
End: 2021-06-02

## 2021-06-02 VITALS
RESPIRATION RATE: 22 BRPM | SYSTOLIC BLOOD PRESSURE: 110 MMHG | DIASTOLIC BLOOD PRESSURE: 60 MMHG | HEIGHT: 64 IN | WEIGHT: 143 LBS | OXYGEN SATURATION: 97 % | HEART RATE: 73 BPM | TEMPERATURE: 97.3 F | BODY MASS INDEX: 24.41 KG/M2

## 2021-06-02 DIAGNOSIS — C34.31 PRIMARY MALIGNANT NEOPLASM OF RIGHT LOWER LOBE OF LUNG (HCC): Primary | ICD-10-CM

## 2021-06-02 DIAGNOSIS — J44.9 STAGE 2 MODERATE COPD BY GOLD CLASSIFICATION (HCC): ICD-10-CM

## 2021-06-02 DIAGNOSIS — Z72.0 TOBACCO ABUSE: ICD-10-CM

## 2021-06-02 DIAGNOSIS — Z01.818 PRE-OP TESTING: ICD-10-CM

## 2021-06-02 DIAGNOSIS — I25.10 CORONARY ARTERY DISEASE INVOLVING NATIVE CORONARY ARTERY OF NATIVE HEART WITHOUT ANGINA PECTORIS: ICD-10-CM

## 2021-06-02 DIAGNOSIS — I10 ESSENTIAL HYPERTENSION: ICD-10-CM

## 2021-06-02 PROCEDURE — 1123F ACP DISCUSS/DSCN MKR DOCD: CPT | Performed by: SURGERY

## 2021-06-02 PROCEDURE — 1090F PRES/ABSN URINE INCON ASSESS: CPT | Performed by: SURGERY

## 2021-06-02 PROCEDURE — 4040F PNEUMOC VAC/ADMIN/RCVD: CPT | Performed by: SURGERY

## 2021-06-02 PROCEDURE — G8926 SPIRO NO PERF OR DOC: HCPCS | Performed by: SURGERY

## 2021-06-02 PROCEDURE — 4004F PT TOBACCO SCREEN RCVD TLK: CPT | Performed by: SURGERY

## 2021-06-02 PROCEDURE — G8399 PT W/DXA RESULTS DOCUMENT: HCPCS | Performed by: SURGERY

## 2021-06-02 PROCEDURE — 99214 OFFICE O/P EST MOD 30 MIN: CPT | Performed by: SURGERY

## 2021-06-02 PROCEDURE — G8420 CALC BMI NORM PARAMETERS: HCPCS | Performed by: SURGERY

## 2021-06-02 PROCEDURE — 3017F COLORECTAL CA SCREEN DOC REV: CPT | Performed by: SURGERY

## 2021-06-02 PROCEDURE — G8427 DOCREV CUR MEDS BY ELIG CLIN: HCPCS | Performed by: SURGERY

## 2021-06-02 PROCEDURE — 3023F SPIROM DOC REV: CPT | Performed by: SURGERY

## 2021-06-02 ASSESSMENT — ENCOUNTER SYMPTOMS
BACK PAIN: 0
EYE ITCHING: 0
ABDOMINAL DISTENTION: 0
EYE PAIN: 0
WHEEZING: 1
CHEST TIGHTNESS: 0
DIARRHEA: 0
EYE REDNESS: 0
STRIDOR: 0
PHOTOPHOBIA: 0
CONSTIPATION: 0
APNEA: 0
ANAL BLEEDING: 0
TROUBLE SWALLOWING: 0
CHOKING: 0
BLOOD IN STOOL: 0
ABDOMINAL PAIN: 0
SORE THROAT: 0
VOMITING: 0
COUGH: 1
SHORTNESS OF BREATH: 1
RHINORRHEA: 0
VOICE CHANGE: 0
COUGH: 0
NAUSEA: 0
EYE DISCHARGE: 0
RECTAL PAIN: 0
COLOR CHANGE: 0
SINUS PRESSURE: 0
FACIAL SWELLING: 0

## 2021-06-02 NOTE — PROGRESS NOTES
Subjective:      Patient ID: Leatha Méndez is a 73 y.o. female. Chief Complaint   Patient presents with    Results     saw Dr Rich Barrientos on 5/20/2021 and 6 min walk test-schedule surgery       HPI    Review of Systems   Constitutional: Negative for activity change, appetite change, chills, diaphoresis, fatigue, fever and unexpected weight change. HENT: Negative for congestion, dental problem, drooling, ear discharge, ear pain, facial swelling, hearing loss, mouth sores, nosebleeds, postnasal drip, rhinorrhea, sinus pressure, sneezing, sore throat, tinnitus, trouble swallowing and voice change. Eyes: Negative for photophobia, pain, discharge, redness, itching and visual disturbance. Respiratory: Positive for cough, shortness of breath and wheezing. Negative for apnea, choking, chest tightness and stridor. Cardiovascular: Negative for chest pain, palpitations and leg swelling. Gastrointestinal: Negative for abdominal distention, abdominal pain, anal bleeding, blood in stool, constipation, diarrhea, nausea, rectal pain and vomiting. Endocrine: Negative for cold intolerance, heat intolerance, polydipsia, polyphagia and polyuria. Genitourinary: Negative for decreased urine volume, difficulty urinating, dysuria, enuresis, flank pain, frequency, genital sores, hematuria and urgency. Musculoskeletal: Negative for arthralgias, back pain, gait problem, joint swelling, myalgias, neck pain and neck stiffness. Skin: Negative for color change, pallor, rash and wound. Allergic/Immunologic: Negative for environmental allergies, food allergies and immunocompromised state. Neurological: Negative for dizziness, tremors, seizures, syncope, facial asymmetry, speech difficulty, weakness, light-headedness, numbness and headaches. Hematological: Negative for adenopathy. Does not bruise/bleed easily.    Psychiatric/Behavioral: Negative for agitation, behavioral problems, confusion, decreased concentration, dysphoric mood, hallucinations, self-injury, sleep disturbance and suicidal ideas. The patient is not nervous/anxious and is not hyperactive.       /60 (Site: Right Upper Arm, Position: Sitting, Cuff Size: Medium Adult)   Pulse 73   Temp 97.3 °F (36.3 °C) (Temporal)   Resp 22   Ht 5' 4\" (1.626 m)   Wt 143 lb (64.9 kg)   SpO2 97%   BMI 24.55 kg/m²     Objective:   Physical Exam    Assessment:      ***      Plan:      ***        Susan Grajeda LPN

## 2021-06-02 NOTE — PROGRESS NOTES
mood, hallucinations, self-injury, sleep disturbance and suicidal ideas. The patient is not nervous/anxious and is not hyperactive.       /60 (Site: Right Upper Arm, Position: Sitting, Cuff Size: Medium Adult)   Pulse 73   Temp 97.3 °F (36.3 °C) (Temporal)   Resp 22   Ht 5' 4\" (1.626 m)   Wt 143 lb (64.9 kg)   SpO2 97%   BMI 24.55 kg/m²     Objective:   Physical Exam    Assessment:            Plan:              Irvin French LPN
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
## 2021-06-02 NOTE — TELEPHONE ENCOUNTER
Scheduled Rema Britojhonatanyana for a robotic assisted right lower lobe superior segmentectomy & mediastinal dissection, poss open, cryotherapy of intercostal nerves on Thurs 6/17 at 7:30. She will arrive at 5:30 & will be npo after midnight.  Consent was signed & orders were given for covid & T&C.

## 2021-06-03 ENCOUNTER — CLINICAL DOCUMENTATION (OUTPATIENT)
Dept: CASE MANAGEMENT | Age: 71
End: 2021-06-03

## 2021-06-03 ENCOUNTER — OFFICE VISIT (OUTPATIENT)
Dept: ONCOLOGY | Age: 71
End: 2021-06-03
Payer: MEDICARE

## 2021-06-03 ENCOUNTER — HOSPITAL ENCOUNTER (OUTPATIENT)
Dept: INFUSION THERAPY | Age: 71
Discharge: HOME | End: 2021-06-03
Payer: MEDICARE

## 2021-06-03 VITALS
RESPIRATION RATE: 18 BRPM | BODY MASS INDEX: 24.55 KG/M2 | WEIGHT: 143.8 LBS | HEART RATE: 70 BPM | TEMPERATURE: 97.9 F | DIASTOLIC BLOOD PRESSURE: 58 MMHG | SYSTOLIC BLOOD PRESSURE: 127 MMHG | OXYGEN SATURATION: 95 % | HEIGHT: 64 IN

## 2021-06-03 DIAGNOSIS — C34.2 MALIGNANT NEOPLASM OF MIDDLE LOBE OF RIGHT LUNG (HCC): Primary | ICD-10-CM

## 2021-06-03 PROCEDURE — 1123F ACP DISCUSS/DSCN MKR DOCD: CPT | Performed by: INTERNAL MEDICINE

## 2021-06-03 PROCEDURE — 4004F PT TOBACCO SCREEN RCVD TLK: CPT | Performed by: INTERNAL MEDICINE

## 2021-06-03 PROCEDURE — G8420 CALC BMI NORM PARAMETERS: HCPCS | Performed by: INTERNAL MEDICINE

## 2021-06-03 PROCEDURE — G8427 DOCREV CUR MEDS BY ELIG CLIN: HCPCS | Performed by: INTERNAL MEDICINE

## 2021-06-03 PROCEDURE — 3017F COLORECTAL CA SCREEN DOC REV: CPT | Performed by: INTERNAL MEDICINE

## 2021-06-03 PROCEDURE — G8399 PT W/DXA RESULTS DOCUMENT: HCPCS | Performed by: INTERNAL MEDICINE

## 2021-06-03 PROCEDURE — 1090F PRES/ABSN URINE INCON ASSESS: CPT | Performed by: INTERNAL MEDICINE

## 2021-06-03 PROCEDURE — 99214 OFFICE O/P EST MOD 30 MIN: CPT | Performed by: INTERNAL MEDICINE

## 2021-06-03 PROCEDURE — 99211 OFF/OP EST MAY X REQ PHY/QHP: CPT

## 2021-06-03 PROCEDURE — 4040F PNEUMOC VAC/ADMIN/RCVD: CPT | Performed by: INTERNAL MEDICINE

## 2021-06-03 NOTE — PATIENT INSTRUCTIONS
Scheduled for lung surgery June 17 of the Dr. Marsha Mendez at [facility] w me week of 7/19.

## 2021-06-03 NOTE — PROGRESS NOTES
[address] CANCER [address] [place] [address]  Dept: [phone]  Loc: [phone]   Hematology/Oncology Consult (Clinic)      6/3/2021    Murray Charles   1950     No ref. provider found   NITO Orozco - CNP       DIAGNOSIS:   -Squamous cell carcinoma right middle lung. Clinical stage T1BN0 stage IA2 . [place] to be a new lung primary. Question new primary versus metastatic focus from remote history of squamous cell cancer of the breast in 2005. This current tumor is receptor negative versus been receptor positive in 2005    TREATMENT:  -Surgery scheduled for 6/17 Dr Bonita York incl Med Node dissection    PARAMETERS:  -Chest CT  -PET/CT      SUBJECTIVE: Patient is scheduled for surgery by Dr. Bonita York on June 17. She understands we reviewed her case at lung conference earlier this week and the consensus was to proceed with surgery. No new complaints. HPI: Marianne Bowles is a 71-year-old female who is at her baseline in terms of being symptomatic with  COPD . She denies hemoptysis but does admit to perhaps slight increased shortness of breath with activity over the past several months. Recently hospitalized at [facility] 3/12 through the 17th for syncope with no evidence of stroke or cardiac event with a CTA of the head and neck showed an occluded skull base left ICA. Smoking habit includes 2 pack/day for 48 years, ongoing and not on home O2. Still at 2ppd . Weight loss of 179 last November to 138 today; unintentional x much stress . Sister and  passed last year. On 12/13/2020 she had a CTA of the chest showed no PE but did show a 14 mm spiculated nodule that increased from 8 mm on the prior exam February 2019. No adenopathy reported. Biopsy of the right lower lobe lung nodule revealed a poorly differentiated squamous cell carcinoma. P40 positive and TTF-1 negative.   Remote history of breast cancer approximately 15 years ago (poor historian). Treated with upfront chemotherapy then a right mastectomy followed by radiation to the chest wall and several years of adjuvant hormone therapy. No history of recurrence. No close oncologic follow-up in recent history. ROS:  Review of Systems 14 point negative except as detailed above. PMH:   Past Medical History:   Diagnosis Date    Anxiety 2007    Arthritis     Breast cancer [facility]) 2005    right mastectomy, chemo and radiation history    CAD (coronary artery disease) 2001    sees Dr Andreina Martin of the skin 2004    Cerebral artery occlusion with cerebral infarction ([address]     Chronic UTI     COPD (chronic obstructive pulmonary disease) ([address]     sees RL Petersen    CVA (cerebral infarction) 2007, 2008    Depression 2007    DVT of lower extremity (deep venous thrombosis) ([address] 1976    Facial injury 2005    Fall on greasy ground, striking left periorbital region    History of stroke 2007, 2008, 2009    Patient relates a stroke with right weakness and speech in 2007; another in 2010 or 2012 (unsure), both with need to rehabilitation. Also \"2 mini-strokes\" (?)    Hx of blood clots 1977    hip, leg    Hyperlipidemia 2005    Hypertension 2005    Hypothyroidism     IBS (irritable bowel syndrome)     Low HDL (under 40) 2014    Lung cancer [facility])     sees Dr Favian Queen    Prolonged emergence from general anesthesia     Recurrent UTI (urinary tract infection) 2015    Dr Anders Suarez finding difficulty 05/16/2017    work-up in progress    CVA x 3. Last in 2008    Denies MI,CHF ,arrythmia. .. Amie Mukherjee Social HX:   Social History     Socioeconomic History    Marital status:       Spouse name: Faby Bowen    Number of children: 3    Years of education: 5    Highest education level: Not on file   Occupational History    Occupation: Disabled   Tobacco Use    Smoking status: Current Every Day Smoker     Packs/day: 2.00     Years: 48.00     Pack years: 96.00 Types: Cigarettes     Start date: 1967    Smokeless tobacco: Never Used   Vaping Use    Vaping Use: Never used   Substance and Sexual Activity    Alcohol use: No     Alcohol/week: 0.0 standard drinks    Drug use: No    Sexual activity: Yes     Partners: Male   Other Topics Concern    Not on file   Social History Narrative    Not on file     Social Determinants of Health     Financial Resource Strain:     Difficulty of Paying Living Expenses:    Food Insecurity:     Worried About Running Out of Food in the Last Year:     Ran Out of Food in the Last Year:    Transportation Needs:     Lack of Transportation (Medical):      Lack of Transportation (Non-Medical):    Physical Activity:     Days of Exercise per Week:     Minutes of Exercise per Session:    Stress:     Feeling of Stress :    Social Connections:     Frequency of Communication with Friends and Family:     Frequency of Social Gatherings with Friends and Family:     Attends Denominational Services:     Active Member of Clubs or Organizations:     Attends Club or Organization Meetings:     Marital Status:    Intimate Partner Violence:     Fear of Current or Ex-Partner:     Emotionally Abused:     Physically Abused:     Sexually Abused:         Spouse: in   3 sons nearby and supportive  Marcio [phone]    Phone: [phone]     [address] [place] [address] Drive  [address] [place] [address]     Employment:  [employer]    Immunizations:  Immunization History   Administered Date(s) Administered    Influenza Virus Vaccine 10/08/2014, 10/05/2015    Influenza, High Dose (Fluzone 65 yrs and older) 2018, 01/10/2019    Pneumococcal Conjugate 13-valent ([id number]) 2015    Pneumococcal Polysaccharide ([id number]) 2014        Health Screenings:  Mammogram:   2018-benign  Pap / Pelvic: Status post hysterectomy followed by bilateral oophorectomy  C-Scope: 2018-benign      Gyn HX:   GPA:  2 BARBRA/BSO: Done  LMP: No LMP recorded. Patient has had a hysterectomy. Health Maintenance Due   Topic Date Due    COVID-19 Vaccine (1) Never done    DTaP/Tdap/Td vaccine (1 - Tdap) Never done    Shingles Vaccine (1 of 2) Never done    Pneumococcal 65+ years Vaccine (2 of 2 - PPSV23) 05/23/2019    Annual Wellness Visit (AWV)  Never done    Breast cancer screen  06/13/2019        Interests:   Family time    Fam HX:   Family History   Problem Relation Age of Onset    Osteoporosis Mother     Hypertension Mother     High Cholesterol Mother     Stroke Mother     Colon Cancer Mother     Cancer Father         lung cancer    Heart Disease Father     Hypertension Father     High Cholesterol Father     Heart Disease Sister     High Cholesterol Sister     Hypertension Sister     Asthma Sister     Other Other         high arched feet    Lung Cancer Son    Dad- lung ca 61  Mom- colon ca  80  Son- lung ca      Hospitalizations:   Syncope March 2021    Allergies:   Allergies   Allergen Reactions    Cipro Xr     Vicodin [Hydrocodone-Acetaminophen]      Weird dreams          Adult Illness:  Patient Active Problem List   Diagnosis    Hyperlipidemia    History of breast cancer    Vitamin D deficiency    Chronic headachess/p head injjury s/p fall    Depression    Smoker    Hypothyroidism    Allergic rhinitis    Incontinence    Noncompliance    Vasovagal syncope    History of CVA (cerebrovascular accident)    Stage 2 moderate COPD by GOLD classification ([address]    Carotid occlusion, left    OA (osteoarthritis) of hip    Essential hypertension    CAD (coronary artery disease)    Transient cerebral ischemia    Aortic insufficiency    Chest pain, atypical    Tobacco abuse    History of chest pain atypical    S/P cardiac cath nonobstructive lesion 2014    Lumbar radiculitis    Spinal stenosis of lumbar region without neurogenic claudication    Syncope and collapse    Solitary pulmonary nodule on lung CT    Severe malnutrition ([facility]    Primary malignant neoplasm of right lower lobe of lung ([facility]    Pre-op evaluation for Lung ca surgery    SOB (shortness of breath) on exertion    Dizziness on standing    History of syncope    Breast ca 2004 R MRM . Chemo first monthly x 3, MRM then XRT then 1 pill x few years. We will attempt to get records.       Surgery:  Past Surgical History:   Procedure Laterality Date    APPENDECTOMY  1975    BREAST SURGERY Right 04/01/2005    evacuation of breast hematoma-Dr. Armando Goldberg    BREAST SURGERY Right 11/30/2004    lymphatic mapping, SLN bx x2-Dr. Norbert Isaac    COLONOSCOPY  2009    Dr. Zulma Tyler  03/16/2021    CT NEEDLE BIOPSY LUNG PERCUTANEOUS 3/16/2021 [facility] CT SCAN    HIP SURGERY  01/19/2015    HYSTERECTOMY  1976    JOINT REPLACEMENT Left 2011    HIP    JOINT REPLACEMENT Right 01/19/2015    Right Total Hip Replacement - Dr. Mao Gifford Right 2005    Dr. Jailyn Cifuentes  04/10/2018    Lumbar epidural steroid injection at L4    PA NJX DX/THER SBST INTRLMNR LMBR/SAC W/IMG GDN N/A 04/10/2018    LESI  @ L4 performed by Annika Koch MD at [facility]    ovaries    THYROID LOBECTOMY Left 11/26/2010    left thyroid lobectomy with isthmusectomy-Dr. Violette Carrel THYROID SURGERY  2007    right thyroid lobectomy-Dr. Ibanez        Medications:  Current Outpatient Medications   Medication Sig Dispense Refill    fluticasone-umeclidin-vilant (TRELEGY ELLIPTA) 100-62.5-25 MCG/INH AEPB Inhale 1 puff into the lungs daily 30 each 11    albuterol sulfate HFA (PROVENTIL HFA) 108 (90 Base) MCG/ACT inhaler Inhale 2 puffs into the lungs every 6 hours as needed for Wheezing 1 Inhaler 3    levothyroxine (LEVOTHROID) 88 MCG tablet Take 1 tablet by mouth daily 30 tablet 5    venlafaxine (EFFEXOR XR) 37.5 MG extended release capsule Take 1 capsule by mouth daily (Patient not taking: Reported on 2021) 90 capsule 1     No current facility-administered medications for this visit. EXAM:    BP (!) 127/58 (Site: Left Upper Arm, Position: Sitting, Cuff Size: Medium Adult)   Pulse 70   Temp 97.9 °F (36.6 °C) (Oral)   Resp 18   Ht 5' 4\" (1.626 m)   Wt 143 lb 12.8 oz (65.2 kg)   SpO2 95%   BMI 24.68 kg/m²      ECO  General: Non-ill appearing. Thin, mildly anxious and tearful at times. She is alone today. HEENT: NC/AT,nonicteric, perrla,eom intact, no mucosal lesions wearing upper dentures, no lesions seen. Neck: normal thyroid, no masses. pulses nl, no bruits,   Nodes: No adenopathy  Lungs/chest: clear, no rales,rhonchi or wheezing, lung bases clear, overall very diminished breath sounds throughout. CV: rrr, no rubs ,gallops or murmurs  Breasts: Right mastectomy site unremarkable, left breast atrophic without masses. (Exam chaperoned by Ge Brink)  Abd/Rectal: soft, non-tender,bowel sounds normal , no HSM,no masses  Back: normal curvature, No midline tenderness. flanks nontender  : Not Examined  Extremities: no cyanosis,clubbing or edema. Skin: unremarkable  Neuro: A and O x 4, CN exam nonfocal, Motor- no deficits, Sensory- no deficits, gait-nl, speech- fluent, no ataxia.   Devices: none      DATA:    LAB:   CBC, CMP today  CBC with Differential:      Lab Results   Component Value Date    WBC 7.7 2021    RBC 5.08 2021    RBC 4.49 2012    HGB 16.2 2021    HCT 50.5 2021     2021    MCV 99 2021    MCH 31.9 2021    MCHC 32.1 2021    RDW 13.7 2021    NRBC 0 2021    NRBC 0 2012    SEGSPCT 91.8 2021    MONOPCT 0.6 2021    MONOSABS 0.3 2021    LYMPHSABS 1.5 2021    EOSABS 0.2 2021    BASOSABS 0.0 2021       CMP:    Lab Results   Component Value Date     2021    K 4.2 2021    K 3.9 2021     2021    CO2 28 2021    BUN 14 04/14/2021    CREATININE 1.1 04/14/2021    LABGLOM 49 04/14/2021    GLUCOSE 100 04/14/2021    GLUCOSE 94 05/16/2012    PROT 7.2 04/14/2021    LABALBU 4.1 04/14/2021    LABALBU 4.4 05/16/2012    CALCIUM 9.3 04/14/2021    BILITOT 0.5 04/14/2021    ALKPHOS 157 04/14/2021    AST 13 04/14/2021    ALT <5 04/14/2021       BMP:    Lab Results   Component Value Date     04/14/2021    K 4.2 04/14/2021    K 3.9 03/13/2021     04/14/2021    CO2 28 04/14/2021    BUN 14 04/14/2021    LABALBU 4.1 04/14/2021    LABALBU 4.4 05/16/2012    CREATININE 1.1 04/14/2021    CALCIUM 9.3 04/14/2021    LABGLOM 49 04/14/2021    GLUCOSE 100 04/14/2021    GLUCOSE 94 05/16/2012       Magnesium:    Lab Results   Component Value Date    MG 2.5 03/16/2021     PT/INR:    Lab Results   Component Value Date    INR 0.97 03/16/2021     TSH:    Lab Results   Component Value Date    TSH 4.810 03/13/2021     VITAMIN B12: No components found for: B12  FOLATE:    Lab Results   Component Value Date    FOLATE 7.3 08/31/2016       IMAGING:  Brain MRI with and without contrast 4/19/2021  Impression       1. No evidence of metastatic disease in the brain. 2. Stable chronic small vessel ischemic changes. 3. No acute findings. PROCEDURE: CTA CHEST W WO CONTRAST 12/13/2020       CLINICAL INFORMATION: cough, sob, elevated d-dimer, PE .       COMPARISON: 2/21/2019       TECHNIQUE: Postcontrast images of the chest with 3-D MIPS Isovue-370 IV contrast   All CT scans at this facility use dose modulation, iterative reconstruction, and/or weight-based dosing when appropriate to reduce radiation dose to as low as reasonably achievable.       FINDINGS: No PE. There is no aneurysm or dissection. No mediastinal or hilar adenopathy by size criteria. Postsurgical changes right axilla. No axillary lymphadenopathy   Heart size normal.   No infiltrates or pleural effusions.    The 8 mm nodule seen on the prior CT now measures 14 mm and has spiculated borders radiographs will be obtained. **This report has been created using voice recognition software. It may contain minor errors which are inherent in voice recognition technology. ** Final report electronically signed by Dr. Jaswant Delgado MD on 3/16/2021 10:50 AM    Pet Ct Skull Base To Mid Thigh 4/12/2021  FINDINGS:        Neck: A 0.7 cm nodule in the left parotid gland has a maximum SUV of 4.3 (image 39).     Chest: Cardiac size is normal. Atherosclerotic calcifications are present in the thoracic aorta and coronary arteries without evidence of aneurysm. There is no pleural or pericardial effusion. Emphysematous changes are noted in the bilateral lungs. A 1.5    cm nodule at the lateral right midlung is stable in size is associated with a maximum SUV of 9.5 (image 118). There is no FDG avid mediastinal, hilar or axillary lymphadenopathy. Surgical clips are noted in the right axilla. There are surgical changes    of prior right mastectomy. Activity associated with a small hiatal hernia may be infectious or inflammatory. Degenerative changes are seen in the thoracic spine without evidence of hypermetabolic osseous metastatic disease.       Abdomen/pelvis: Physiologic activity is present in the liver, spleen, urinary collecting system and gastrointestinal tract. The gallbladder is surgically absent. There is fatty replacement of the pancreas. Hypoattenuating lesions in the bilateral kidneys    are likely cysts but are incompletely characterized on the current study. A 1.5 cm hypoattenuating nodule in the right adrenal gland is associated with a maximum SUV of 7 (image 159). A 1.4 cm nodule left adrenal gland has a maximum SUV of 2.4 (image    154). Atherosclerotic calcifications are present in the abdominal aorta without evidence of aneurysm or the uterus is surgically absent. There is no FDG avid mesenteric, retroperitoneal, pelvic or inguinal lymphadenopathy.  Degenerative changes are    present in the lumbar spine and pelvis without evidence of hypermetabolic osseous metastatic disease. There is extensive metallic artifact in the lower pelvis from bilateral hip prostheses.           Impression   1. FDG avid right mid lung nodule corresponding to known malignancy. 2. 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 Metastatic disease is considered less likely but not excluded. 3. Mildly FDG avid bilateral adrenal nodules, likely benign adenomas.             PROCEDURES:  3/16/2021 IR lung biopsy    PATHOLOGY:   The needle biopsy lung 3/16/2021  Clinical Information: RIGHT LOWER LOBE LUNG NODULE; PT HAS REMOTE   HISTORY OF BREAST CANCER 2005     FINAL DIAGNOSIS:   Lung, right lower lobe, biopsy:    Poorly differentiated squamous cell carcinoma. Specimen:   BIOPSY OF LUNG, RIGHT LOWER LOBE      Intraoperative Consultation:   Touch Prep DX:  1.  Atypical epithelial cells present.  2.  Malignant   epithelial cells present.  MTK     Gross Examination:   The container is labeled Renetta White, RLL, lung.  Received in   formalin are several tiny bits of tan tissue, each less than 0.1 cm in   diameter.  The longest fragment measures about 0.3 cm.  The specimen is   entirely submitted in two cassettes.  ns.  PCF/DKR:[id number]     Microscopic Examination:   Sections show infiltrative nests of polygonal cells with foamy   cytoplasm and enlarged pleomorphic nuclei with variably prominent   nucleoli.  Rare dyskeratotic cells are noted.  Immunochemical stains   for TTF-1 and p40 are performed, with appropriate controls*.  Lesional   cells express p40 and lack expression of TTF-1.  The findings are   consistent with the above diagnosis. ER and IL both negative.     PFT: Ordered ASAP and will be done Monday 4/19  ( if she gets her Covid testing tomorrow )  FEV1 1.720 which is 59% predicted FEV1 1.11 which is 49% predicted with significant bronchodilator response spirometry was severe obstructive defect    GENETICS:  na    MOLECULAR:  na    ASSESSMENT/PLAN:    1: Diagnosis: 68-year-old female with significant COPD and comorbidities as detailed above with a fairly decent performance status. A lateral right middle lung nodule is increased since 2019 a recent biopsy of this 15 mm lesion shows a poorly differentiated squamous cell. Staging including PET scan shows no other hypermetabolic foci. She did have a syncopal episode and reports slight increase in headaches from her baseline. Work-up included a MRI of the brain with and without contrast summarized above which shows no evidence of metastatic disease. Clinical stage [id number]  /  Stage I A2 . Right middle lobe squamous cell carcinoma, poorly differentiated. Differential includes a solitary metastasis from her remote history of squamous cell cancer of the breast.  Suspect this is a new early lung primary. 2) Prognosis / Disease Status: Very good. 3) Work-up:    Labs: No additional labs   Imaging: Brain MRI with and without contrast reviewed   Procedures: No additional procedures needed   Consults: Radiation oncology and Dr. Torres Bishop of surgery   Other: PFTs/spirometry results summarized above    4) Symptom Management: None needed. Does report baseline minor neuropathy in her fingertips. 5) Supportive care provided. Level of care is appropriate. Teaching done today. Emphasized that she may have stage I squamous cell cancer of the right lung versus an unusual solitary metastasis from her unusual diagnosis of squamous cell cancer of the breast in 2005. 6) Treatment goal:      Treatment plan: Case discussed at lung conference on June 1. Surgery i.e. wedge versus segmentectomy versus lobectomy with lymph node dissection versus SRT. Surgery favored at conference. 7) Medications reviewed. Prescriptions today: None            No orders of the defined types were placed in this encounter. [place]ARRS:  Controlled Substance Monitoring:    Acute and Chronic Pain Monitoring:   RX Monitoring 3/23/2018   Attestation The Prescription Monitoring Report for this patient was reviewed today. Periodic Controlled Substance Monitoring Possible medication side effects, risk of tolerance/dependence & alternative treatments discussed. ;Random urine drug screen sent today. ;No signs of potential drug abuse or diversion identified. 8) Research Options:       Not applicable      9) Other:       Strongly encouraged to attempt to stop smoking. Reviewed records detailing her breast cancer diagnosis 15 years ago and treatment. Added ER/IL testing to the recent lung biopsy; negative. 10) Follow Up:  Proceed with surgery on June 17  Follow-up with me mid July. Spenser Adorno MD     Addendum:  I obtained records regarding Shania's prior diagnosis of breast cancer. Abnormal mammogram in 2004 led to a biopsy that showed a grade 3 infiltrating ductal carcinoma (after reviewing all of the available data I believe this is a typographical error) that was ER positive and IL and HER-2 negative. No sentinel nodes involved. Initial dimension approximately 8.5 x 6 cm. No metastatic disease. Some mass in the left breast measuring 2.3 cm suspicious for malignancy as well PET scan did not light up in the lungs left breast or axilla. She underwent neoadjuvant chemotherapy and had a dramatic decrease in the size of the mass. Patient underwent radiation to the right chest wall treated to a total dose of 5040 cGy in 28 fractions with a boost with a total dose of 6120 cGy in 34 fractions.   Interestingly however when I look at the path report from 3/11/5 the simple mastectomy specimen from the right reveals an invasive moderately differentiated squamous cell carcinoma the breast.  ER was reported as positive and 34% of cells HER-2 negative review of this pathology is confusing with the discrepancy and reports it is significant and that raises the possibility that this lung lesion could be metastatic and not a primary lesion. A fairly completely note from Kassie Neville of radiation oncology references that the histology was squamous cell carcinoma and acknowledged the rarity of this type of histology of the breast.  Therefore I have little doubt that this in fact was squamous cell cancer of the breast.  Checked with pathology and all tissue was destroyed after 10 years therefore is not available. Patient's treatment was neoadjuvant chemotherapy by Dr. Sujatha Whiting followed by right simple mastectomy and sentinel node procedure and radiation. I do note the path report the documents at the time of the right simple mastectomy invasive grade 2 squamous cell carcinoma of the breast tumor after neoadjuvant therapy measures 6 x 5 x 3 cm tumor is 2 mm from the deep resection margin and lymphatic invasion was present. No axillary contents identified. No ductal component to this neoplasm. This was called squamous cell carcinoma as opposed to metaplastic carcinoma. Jewett procedure 12/1/2004 the right axilla showed 2 axillary nodes negative for metastatic carcinoma. Interestingly the ER was reported as positive and 34% of cells NJ was negative and HER-2 was negative.

## 2021-06-09 ENCOUNTER — HOSPITAL ENCOUNTER (OUTPATIENT)
Age: 71
Discharge: HOME | End: 2021-06-09
Payer: MEDICARE

## 2021-06-09 ENCOUNTER — TELEPHONE (OUTPATIENT)
Dept: SURGERY | Age: 71
End: 2021-06-09

## 2021-06-09 LAB
INFLUENZA A: NOT DETECTED
INFLUENZA B: NOT DETECTED
SARS-COV-2 RNA, RT PCR: NOT DETECTED

## 2021-06-09 PROCEDURE — 87636 SARSCOV2 & INF A&B AMP PRB: CPT

## 2021-06-09 NOTE — TELEPHONE ENCOUNTER
6/8/2021-Notified Vincent Diaz that her surgery date was changed to Mon 6/14. She will arrive at 6:30am for surgery at 8:30.  Vincent Diaz will have the covid test done 6/9 or 6/10 & the T&C on Sat 6/12 before noon at the hospital.

## 2021-06-10 ENCOUNTER — TELEPHONE (OUTPATIENT)
Dept: FAMILY MEDICINE CLINIC | Age: 71
End: 2021-06-10

## 2021-06-10 NOTE — TELEPHONE ENCOUNTER
TS-if appropriate, will you complete since WS out of the office the remainder of today and this would be time-sensitive?

## 2021-06-10 NOTE — TELEPHONE ENCOUNTER
----- Message from Nir Guido sent at 6/10/2021  1:38 PM EDT -----  Subject: Message to Provider    QUESTIONS  Information for Provider? The patient is calling today to request letter   of medical necessity to keep her electric service on due to Lung cancer   surgery. Patient will have document faxed to office and is requesting to   have this filled out by Crystal peralta. Please call the patient to   advise  ---------------------------------------------------------------------------  --------------  CALL BACK INFO  What is the best way for the office to contact you? OK to leave message on   voicemail  Preferred Call Back Phone Number? [phone]  ---------------------------------------------------------------------------  --------------  SCRIPT ANSWERS  Relationship to Patient?  Self

## 2021-06-10 NOTE — TELEPHONE ENCOUNTER
Form on TS desk for signature--will fax when done. Attempted to call patient but no answer and VM not available.

## 2021-06-12 ENCOUNTER — HOSPITAL ENCOUNTER (OUTPATIENT)
Age: 71
Discharge: HOME | DRG: 163 | End: 2021-06-12
Payer: MEDICARE

## 2021-06-12 DIAGNOSIS — C34.31 PRIMARY MALIGNANT NEOPLASM OF RIGHT LOWER LOBE OF LUNG (HCC): ICD-10-CM

## 2021-06-12 DIAGNOSIS — Z01.818 PRE-OP TESTING: ICD-10-CM

## 2021-06-12 LAB
ABO: NORMAL
ANTIBODY SCREEN: NORMAL
RH FACTOR: NORMAL

## 2021-06-12 PROCEDURE — 86850 RBC ANTIBODY SCREEN: CPT

## 2021-06-12 PROCEDURE — 86900 BLOOD TYPING SEROLOGIC ABO: CPT

## 2021-06-12 PROCEDURE — 36415 COLL VENOUS BLD VENIPUNCTURE: CPT

## 2021-06-12 PROCEDURE — 86923 COMPATIBILITY TEST ELECTRIC: CPT

## 2021-06-12 PROCEDURE — 86901 BLOOD TYPING SEROLOGIC RH(D): CPT
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
## 2021-06-14 ENCOUNTER — ANESTHESIA EVENT (OUTPATIENT)
Dept: OPERATING ROOM | Age: 71
DRG: 163 | End: 2021-06-14
Payer: MEDICARE

## 2021-06-14 ENCOUNTER — APPOINTMENT (OUTPATIENT)
Dept: GENERAL RADIOLOGY | Age: 71
DRG: 163 | End: 2021-06-14
Attending: SURGERY
Payer: MEDICARE

## 2021-06-14 ENCOUNTER — ANESTHESIA (OUTPATIENT)
Dept: OPERATING ROOM | Age: 71
DRG: 163 | End: 2021-06-14
Payer: MEDICARE

## 2021-06-14 ENCOUNTER — HOSPITAL ENCOUNTER (INPATIENT)
Age: 71
LOS: 4 days | Discharge: HOME | DRG: 163 | End: 2021-06-18
Attending: SURGERY | Admitting: SURGERY
Payer: MEDICARE

## 2021-06-14 VITALS — TEMPERATURE: 94.3 F | DIASTOLIC BLOOD PRESSURE: 52 MMHG | OXYGEN SATURATION: 100 % | SYSTOLIC BLOOD PRESSURE: 104 MMHG

## 2021-06-14 DIAGNOSIS — J44.9 STAGE 2 MODERATE COPD BY GOLD CLASSIFICATION (HCC): ICD-10-CM

## 2021-06-14 DIAGNOSIS — J44.1 COPD EXACERBATION (HCC): ICD-10-CM

## 2021-06-14 DIAGNOSIS — C34.31 CANCER OF LOWER LOBE OF RIGHT LUNG (HCC): ICD-10-CM

## 2021-06-14 DIAGNOSIS — Z90.2 S/P PARTIAL LOBECTOMY OF LUNG: Primary | ICD-10-CM

## 2021-06-14 LAB
CREAT SERPL-MCNC: 1.1 MG/DL (ref 0.4–1.2)
GFR SERPL CREATININE-BSD FRML MDRD: 49 ML/MIN/1.73M2

## 2021-06-14 PROCEDURE — C2618 PROBE/NEEDLE, CRYO: HCPCS | Performed by: SURGERY

## 2021-06-14 PROCEDURE — 32666 THORACOSCOPY W/WEDGE RESECT: CPT | Performed by: SURGERY

## 2021-06-14 PROCEDURE — 7100000000 HC PACU RECOVERY - FIRST 15 MIN: Performed by: SURGERY

## 2021-06-14 PROCEDURE — 2060000000 HC ICU INTERMEDIATE R&B

## 2021-06-14 PROCEDURE — 6360000002 HC RX W HCPCS: Performed by: ANESTHESIOLOGY

## 2021-06-14 PROCEDURE — 2500000003 HC RX 250 WO HCPCS: Performed by: ANESTHESIOLOGY

## 2021-06-14 PROCEDURE — 3600000009 HC SURGERY ROBOT BASE: Performed by: SURGERY

## 2021-06-14 PROCEDURE — 8E0W4CZ ROBOTIC ASSISTED PROCEDURE OF TRUNK REGION, PERCUTANEOUS ENDOSCOPIC APPROACH: ICD-10-PCS | Performed by: SURGERY

## 2021-06-14 PROCEDURE — 6360000002 HC RX W HCPCS

## 2021-06-14 PROCEDURE — 2580000003 HC RX 258: Performed by: SURGERY

## 2021-06-14 PROCEDURE — 32674 THORACOSCOPY LYMPH NODE EXC: CPT | Performed by: SURGERY

## 2021-06-14 PROCEDURE — 6370000000 HC RX 637 (ALT 250 FOR IP): Performed by: SURGERY

## 2021-06-14 PROCEDURE — 88307 TISSUE EXAM BY PATHOLOGIST: CPT

## 2021-06-14 PROCEDURE — 6360000002 HC RX W HCPCS: Performed by: SURGERY

## 2021-06-14 PROCEDURE — 0BTF0ZZ RESECTION OF RIGHT LOWER LUNG LOBE, OPEN APPROACH: ICD-10-PCS | Performed by: SURGERY

## 2021-06-14 PROCEDURE — 7100000001 HC PACU RECOVERY - ADDTL 15 MIN: Performed by: SURGERY

## 2021-06-14 PROCEDURE — 3700000000 HC ANESTHESIA ATTENDED CARE: Performed by: SURGERY

## 2021-06-14 PROCEDURE — 36415 COLL VENOUS BLD VENIPUNCTURE: CPT

## 2021-06-14 PROCEDURE — 2500000003 HC RX 250 WO HCPCS: Performed by: SURGERY

## 2021-06-14 PROCEDURE — 71045 X-RAY EXAM CHEST 1 VIEW: CPT

## 2021-06-14 PROCEDURE — 64999 UNLISTED PX NERVOUS SYSTEM: CPT | Performed by: SURGERY

## 2021-06-14 PROCEDURE — 82565 ASSAY OF CREATININE: CPT

## 2021-06-14 PROCEDURE — S2900 ROBOTIC SURGICAL SYSTEM: HCPCS | Performed by: SURGERY

## 2021-06-14 PROCEDURE — C1729 CATH, DRAINAGE: HCPCS | Performed by: SURGERY

## 2021-06-14 PROCEDURE — 3600000019 HC SURGERY ROBOT ADDTL 15MIN: Performed by: SURGERY

## 2021-06-14 PROCEDURE — 88305 TISSUE EXAM BY PATHOLOGIST: CPT

## 2021-06-14 PROCEDURE — C1713 ANCHOR/SCREW BN/BN,TIS/BN: HCPCS | Performed by: SURGERY

## 2021-06-14 PROCEDURE — 2720000010 HC SURG SUPPLY STERILE: Performed by: SURGERY

## 2021-06-14 PROCEDURE — 0W9900Z DRAINAGE OF RIGHT PLEURAL CAVITY WITH DRAINAGE DEVICE, OPEN APPROACH: ICD-10-PCS | Performed by: SURGERY

## 2021-06-14 PROCEDURE — 07B70ZZ EXCISION OF THORAX LYMPHATIC, OPEN APPROACH: ICD-10-PCS | Performed by: SURGERY

## 2021-06-14 PROCEDURE — 3700000001 HC ADD 15 MINUTES (ANESTHESIA): Performed by: SURGERY

## 2021-06-14 PROCEDURE — 2709999900 HC NON-CHARGEABLE SUPPLY: Performed by: SURGERY

## 2021-06-14 RX ORDER — ACETAMINOPHEN 500 MG
1000 TABLET ORAL ONCE
Status: COMPLETED | OUTPATIENT
Start: 2021-06-14 | End: 2021-06-14

## 2021-06-14 RX ORDER — BUDESONIDE AND FORMOTEROL FUMARATE DIHYDRATE 160; 4.5 UG/1; UG/1
2 AEROSOL RESPIRATORY (INHALATION) 2 TIMES DAILY
Status: DISCONTINUED | OUTPATIENT
Start: 2021-06-14 | End: 2021-06-18 | Stop reason: HOSPADM

## 2021-06-14 RX ORDER — ROCURONIUM BROMIDE 10 MG/ML
INJECTION, SOLUTION INTRAVENOUS PRN
Status: DISCONTINUED | OUTPATIENT
Start: 2021-06-14 | End: 2021-06-14 | Stop reason: SDUPTHER

## 2021-06-14 RX ORDER — DEXAMETHASONE SODIUM PHOSPHATE 10 MG/ML
INJECTION, EMULSION INTRAMUSCULAR; INTRAVENOUS PRN
Status: DISCONTINUED | OUTPATIENT
Start: 2021-06-14 | End: 2021-06-14 | Stop reason: SDUPTHER

## 2021-06-14 RX ORDER — OXYCODONE HYDROCHLORIDE AND ACETAMINOPHEN 5; 325 MG/1; MG/1
1 TABLET ORAL EVERY 4 HOURS PRN
Status: DISCONTINUED | OUTPATIENT
Start: 2021-06-14 | End: 2021-06-18 | Stop reason: HOSPADM

## 2021-06-14 RX ORDER — ONDANSETRON 2 MG/ML
4 INJECTION INTRAMUSCULAR; INTRAVENOUS EVERY 6 HOURS PRN
Status: DISCONTINUED | OUTPATIENT
Start: 2021-06-14 | End: 2021-06-18 | Stop reason: HOSPADM

## 2021-06-14 RX ORDER — ACETAMINOPHEN 325 MG/1
650 TABLET ORAL EVERY 4 HOURS PRN
Status: DISCONTINUED | OUTPATIENT
Start: 2021-06-14 | End: 2021-06-18 | Stop reason: HOSPADM

## 2021-06-14 RX ORDER — BUPIVACAINE HYDROCHLORIDE 5 MG/ML
INJECTION, SOLUTION EPIDURAL; INTRACAUDAL PRN
Status: DISCONTINUED | OUTPATIENT
Start: 2021-06-14 | End: 2021-06-14 | Stop reason: ALTCHOICE

## 2021-06-14 RX ORDER — LIDOCAINE 4 G/G
2 PATCH TOPICAL NIGHTLY
Status: DISCONTINUED | OUTPATIENT
Start: 2021-06-14 | End: 2021-06-18 | Stop reason: HOSPADM

## 2021-06-14 RX ORDER — OXYCODONE HYDROCHLORIDE AND ACETAMINOPHEN 5; 325 MG/1; MG/1
2 TABLET ORAL EVERY 4 HOURS PRN
Status: DISCONTINUED | OUTPATIENT
Start: 2021-06-14 | End: 2021-06-18 | Stop reason: HOSPADM

## 2021-06-14 RX ORDER — ALBUTEROL SULFATE 90 UG/1
2 AEROSOL, METERED RESPIRATORY (INHALATION) EVERY 6 HOURS PRN
Status: DISCONTINUED | OUTPATIENT
Start: 2021-06-14 | End: 2021-06-18 | Stop reason: HOSPADM

## 2021-06-14 RX ORDER — SODIUM CHLORIDE 0.9 % (FLUSH) 0.9 %
5-40 SYRINGE (ML) INJECTION EVERY 12 HOURS SCHEDULED
Status: DISCONTINUED | OUTPATIENT
Start: 2021-06-14 | End: 2021-06-14 | Stop reason: HOSPADM

## 2021-06-14 RX ORDER — SODIUM CHLORIDE 0.9 % (FLUSH) 0.9 %
5-40 SYRINGE (ML) INJECTION EVERY 12 HOURS SCHEDULED
Status: DISCONTINUED | OUTPATIENT
Start: 2021-06-14 | End: 2021-06-18 | Stop reason: HOSPADM

## 2021-06-14 RX ORDER — NEOSTIGMINE METHYLSULFATE 5 MG/5 ML
SYRINGE (ML) INTRAVENOUS PRN
Status: DISCONTINUED | OUTPATIENT
Start: 2021-06-14 | End: 2021-06-14 | Stop reason: SDUPTHER

## 2021-06-14 RX ORDER — POLYETHYLENE GLYCOL 3350 17 G/17G
17 POWDER, FOR SOLUTION ORAL DAILY
Status: DISCONTINUED | OUTPATIENT
Start: 2021-06-14 | End: 2021-06-18 | Stop reason: HOSPADM

## 2021-06-14 RX ORDER — EPHEDRINE SULFATE/0.9% NACL/PF 50 MG/5 ML
SYRINGE (ML) INTRAVENOUS PRN
Status: DISCONTINUED | OUTPATIENT
Start: 2021-06-14 | End: 2021-06-14 | Stop reason: SDUPTHER

## 2021-06-14 RX ORDER — LEVOTHYROXINE SODIUM 88 UG/1
88 TABLET ORAL DAILY
Status: DISCONTINUED | OUTPATIENT
Start: 2021-06-15 | End: 2021-06-18 | Stop reason: HOSPADM

## 2021-06-14 RX ORDER — PROPOFOL 10 MG/ML
INJECTION, EMULSION INTRAVENOUS PRN
Status: DISCONTINUED | OUTPATIENT
Start: 2021-06-14 | End: 2021-06-14 | Stop reason: SDUPTHER

## 2021-06-14 RX ORDER — SODIUM CHLORIDE, SODIUM LACTATE, POTASSIUM CHLORIDE, CALCIUM CHLORIDE 600; 310; 30; 20 MG/100ML; MG/100ML; MG/100ML; MG/100ML
INJECTION, SOLUTION INTRAVENOUS CONTINUOUS
Status: DISCONTINUED | OUTPATIENT
Start: 2021-06-14 | End: 2021-06-15

## 2021-06-14 RX ORDER — GLYCOPYRROLATE 1 MG/5 ML
SYRINGE (ML) INTRAVENOUS PRN
Status: DISCONTINUED | OUTPATIENT
Start: 2021-06-14 | End: 2021-06-14 | Stop reason: SDUPTHER

## 2021-06-14 RX ORDER — SODIUM CHLORIDE 0.9 % (FLUSH) 0.9 %
5-40 SYRINGE (ML) INJECTION PRN
Status: DISCONTINUED | OUTPATIENT
Start: 2021-06-14 | End: 2021-06-14 | Stop reason: HOSPADM

## 2021-06-14 RX ORDER — SODIUM CHLORIDE 0.9 % (FLUSH) 0.9 %
5-40 SYRINGE (ML) INJECTION PRN
Status: DISCONTINUED | OUTPATIENT
Start: 2021-06-14 | End: 2021-06-18 | Stop reason: HOSPADM

## 2021-06-14 RX ORDER — FENTANYL CITRATE 50 UG/ML
INJECTION, SOLUTION INTRAMUSCULAR; INTRAVENOUS PRN
Status: DISCONTINUED | OUTPATIENT
Start: 2021-06-14 | End: 2021-06-14 | Stop reason: SDUPTHER

## 2021-06-14 RX ORDER — GINSENG 100 MG
CAPSULE ORAL DAILY
Status: DISCONTINUED | OUTPATIENT
Start: 2021-06-14 | End: 2021-06-18 | Stop reason: HOSPADM

## 2021-06-14 RX ORDER — HYDROMORPHONE HCL 110MG/55ML
PATIENT CONTROLLED ANALGESIA SYRINGE INTRAVENOUS PRN
Status: DISCONTINUED | OUTPATIENT
Start: 2021-06-14 | End: 2021-06-14 | Stop reason: SDUPTHER

## 2021-06-14 RX ORDER — SODIUM CHLORIDE 9 MG/ML
25 INJECTION, SOLUTION INTRAVENOUS PRN
Status: DISCONTINUED | OUTPATIENT
Start: 2021-06-14 | End: 2021-06-18 | Stop reason: HOSPADM

## 2021-06-14 RX ORDER — SODIUM CHLORIDE 9 MG/ML
INJECTION, SOLUTION INTRAVENOUS CONTINUOUS
Status: DISCONTINUED | OUTPATIENT
Start: 2021-06-14 | End: 2021-06-14

## 2021-06-14 RX ORDER — IBUPROFEN 400 MG/1
400 TABLET ORAL ONCE
Status: COMPLETED | OUTPATIENT
Start: 2021-06-14 | End: 2021-06-14

## 2021-06-14 RX ORDER — SODIUM CHLORIDE 9 MG/ML
25 INJECTION, SOLUTION INTRAVENOUS PRN
Status: DISCONTINUED | OUTPATIENT
Start: 2021-06-14 | End: 2021-06-14 | Stop reason: HOSPADM

## 2021-06-14 RX ORDER — MIDAZOLAM HYDROCHLORIDE 1 MG/ML
INJECTION INTRAMUSCULAR; INTRAVENOUS PRN
Status: DISCONTINUED | OUTPATIENT
Start: 2021-06-14 | End: 2021-06-14 | Stop reason: SDUPTHER

## 2021-06-14 RX ORDER — KETOROLAC TROMETHAMINE 30 MG/ML
30 INJECTION, SOLUTION INTRAMUSCULAR; INTRAVENOUS ONCE
Status: DISCONTINUED | OUTPATIENT
Start: 2021-06-14 | End: 2021-06-14

## 2021-06-14 RX ORDER — ONDANSETRON 4 MG/1
4 TABLET, ORALLY DISINTEGRATING ORAL EVERY 8 HOURS PRN
Status: DISCONTINUED | OUTPATIENT
Start: 2021-06-14 | End: 2021-06-18 | Stop reason: HOSPADM

## 2021-06-14 RX ORDER — KETOROLAC TROMETHAMINE 30 MG/ML
15 INJECTION, SOLUTION INTRAMUSCULAR; INTRAVENOUS EVERY 6 HOURS
Status: DISPENSED | OUTPATIENT
Start: 2021-06-14 | End: 2021-06-16

## 2021-06-14 RX ORDER — FENTANYL CITRATE 50 UG/ML
INJECTION, SOLUTION INTRAMUSCULAR; INTRAVENOUS
Status: COMPLETED
Start: 2021-06-14 | End: 2021-06-14

## 2021-06-14 RX ORDER — FENTANYL CITRATE 50 UG/ML
50 INJECTION, SOLUTION INTRAMUSCULAR; INTRAVENOUS EVERY 5 MIN PRN
Status: DISCONTINUED | OUTPATIENT
Start: 2021-06-14 | End: 2021-06-14 | Stop reason: HOSPADM

## 2021-06-14 RX ADMIN — FENTANYL CITRATE 50 MCG: 50 INJECTION, SOLUTION INTRAMUSCULAR; INTRAVENOUS at 17:20

## 2021-06-14 RX ADMIN — FENTANYL CITRATE 50 MCG: 50 INJECTION, SOLUTION INTRAMUSCULAR; INTRAVENOUS at 07:56

## 2021-06-14 RX ADMIN — Medication 4 MG: at 11:26

## 2021-06-14 RX ADMIN — Medication 0.6 MG: at 11:26

## 2021-06-14 RX ADMIN — PROPOFOL 140 MG: 10 INJECTION, EMULSION INTRAVENOUS at 07:56

## 2021-06-14 RX ADMIN — CEFAZOLIN 2000 MG: 10 INJECTION, POWDER, FOR SOLUTION INTRAVENOUS at 22:18

## 2021-06-14 RX ADMIN — SODIUM CHLORIDE: 9 INJECTION, SOLUTION INTRAVENOUS at 11:24

## 2021-06-14 RX ADMIN — HYDROMORPHONE HYDROCHLORIDE 0.5 MG: 2 INJECTION INTRAMUSCULAR; INTRAVENOUS; SUBCUTANEOUS at 10:32

## 2021-06-14 RX ADMIN — FENTANYL CITRATE 50 MCG: 50 INJECTION, SOLUTION INTRAMUSCULAR; INTRAVENOUS at 16:28

## 2021-06-14 RX ADMIN — Medication 10 MG: at 11:02

## 2021-06-14 RX ADMIN — MIDAZOLAM 2 MG: 1 INJECTION INTRAMUSCULAR; INTRAVENOUS at 07:51

## 2021-06-14 RX ADMIN — ROCURONIUM BROMIDE 50 MG: 10 INJECTION INTRAVENOUS at 07:56

## 2021-06-14 RX ADMIN — ACETAMINOPHEN 1000 MG: 500 TABLET ORAL at 07:24

## 2021-06-14 RX ADMIN — DEXAMETHASONE SODIUM PHOSPHATE 10 MG: 10 INJECTION, EMULSION INTRAMUSCULAR; INTRAVENOUS at 08:26

## 2021-06-14 RX ADMIN — HYDROMORPHONE HYDROCHLORIDE 0.5 MG: 2 INJECTION INTRAMUSCULAR; INTRAVENOUS; SUBCUTANEOUS at 10:04

## 2021-06-14 RX ADMIN — OXYCODONE HYDROCHLORIDE AND ACETAMINOPHEN 2 TABLET: 5; 325 TABLET ORAL at 20:38

## 2021-06-14 RX ADMIN — KETOROLAC TROMETHAMINE 15 MG: 30 INJECTION, SOLUTION INTRAMUSCULAR; INTRAVENOUS at 22:18

## 2021-06-14 RX ADMIN — SODIUM CHLORIDE: 9 INJECTION, SOLUTION INTRAVENOUS at 07:29

## 2021-06-14 RX ADMIN — POLYETHYLENE GLYCOL 3350 17 G: 17 POWDER, FOR SOLUTION ORAL at 20:39

## 2021-06-14 RX ADMIN — SODIUM CHLORIDE, POTASSIUM CHLORIDE, SODIUM LACTATE AND CALCIUM CHLORIDE: 600; 310; 30; 20 INJECTION, SOLUTION INTRAVENOUS at 20:38

## 2021-06-14 RX ADMIN — HYDROMORPHONE HYDROCHLORIDE 1 MG: 2 INJECTION INTRAMUSCULAR; INTRAVENOUS; SUBCUTANEOUS at 08:33

## 2021-06-14 RX ADMIN — Medication 10 MG: at 10:50

## 2021-06-14 RX ADMIN — BACITRACIN: 500 OINTMENT TOPICAL at 20:38

## 2021-06-14 RX ADMIN — Medication 10 MG: at 08:17

## 2021-06-14 RX ADMIN — FENTANYL CITRATE 50 MCG: 50 INJECTION, SOLUTION INTRAMUSCULAR; INTRAVENOUS at 08:29

## 2021-06-14 RX ADMIN — IBUPROFEN 400 MG: 400 TABLET, FILM COATED ORAL at 07:24

## 2021-06-14 RX ADMIN — Medication 10 MG: at 08:13

## 2021-06-14 RX ADMIN — CEFAZOLIN 2000 MG: 10 INJECTION, POWDER, FOR SOLUTION INTRAVENOUS at 08:26

## 2021-06-14 RX ADMIN — Medication 5 MG: at 10:54

## 2021-06-14 RX ADMIN — SODIUM CHLORIDE, PRESERVATIVE FREE 10 ML: 5 INJECTION INTRAVENOUS at 22:17

## 2021-06-14 ASSESSMENT — PULMONARY FUNCTION TESTS
PIF_VALUE: 24
PIF_VALUE: 24
PIF_VALUE: 0
PIF_VALUE: 27
PIF_VALUE: 30
PIF_VALUE: 13
PIF_VALUE: 26
PIF_VALUE: 23
PIF_VALUE: 12
PIF_VALUE: 26
PIF_VALUE: 23
PIF_VALUE: 30
PIF_VALUE: 27
PIF_VALUE: 24
PIF_VALUE: 31
PIF_VALUE: 23
PIF_VALUE: 15
PIF_VALUE: 25
PIF_VALUE: 10
PIF_VALUE: 3
PIF_VALUE: 26
PIF_VALUE: 27
PIF_VALUE: 15
PIF_VALUE: 26
PIF_VALUE: 23
PIF_VALUE: 14
PIF_VALUE: 27
PIF_VALUE: 9
PIF_VALUE: 27
PIF_VALUE: 10
PIF_VALUE: 29
PIF_VALUE: 20
PIF_VALUE: 23
PIF_VALUE: 26
PIF_VALUE: 25
PIF_VALUE: 10
PIF_VALUE: 3
PIF_VALUE: 3
PIF_VALUE: 10
PIF_VALUE: 24
PIF_VALUE: 13
PIF_VALUE: 13
PIF_VALUE: 27
PIF_VALUE: 21
PIF_VALUE: 13
PIF_VALUE: 23
PIF_VALUE: 3
PIF_VALUE: 3
PIF_VALUE: 26
PIF_VALUE: 28
PIF_VALUE: 0
PIF_VALUE: 27
PIF_VALUE: 5
PIF_VALUE: 30
PIF_VALUE: 24
PIF_VALUE: 11
PIF_VALUE: 20
PIF_VALUE: 26
PIF_VALUE: 24
PIF_VALUE: 30
PIF_VALUE: 27
PIF_VALUE: 24
PIF_VALUE: 24
PIF_VALUE: 29
PIF_VALUE: 29
PIF_VALUE: 13
PIF_VALUE: 13
PIF_VALUE: 3
PIF_VALUE: 27
PIF_VALUE: 24
PIF_VALUE: 23
PIF_VALUE: 18
PIF_VALUE: 10
PIF_VALUE: 27
PIF_VALUE: 3
PIF_VALUE: 10
PIF_VALUE: 3
PIF_VALUE: 18
PIF_VALUE: 15
PIF_VALUE: 28
PIF_VALUE: 37
PIF_VALUE: 3
PIF_VALUE: 24
PIF_VALUE: 30
PIF_VALUE: 23
PIF_VALUE: 25
PIF_VALUE: 25
PIF_VALUE: 27
PIF_VALUE: 28
PIF_VALUE: 13
PIF_VALUE: 15
PIF_VALUE: 23
PIF_VALUE: 8
PIF_VALUE: 15
PIF_VALUE: 9
PIF_VALUE: 28
PIF_VALUE: 29
PIF_VALUE: 25
PIF_VALUE: 11
PIF_VALUE: 1
PIF_VALUE: 23
PIF_VALUE: 28
PIF_VALUE: 21
PIF_VALUE: 22
PIF_VALUE: 27
PIF_VALUE: 23
PIF_VALUE: 13
PIF_VALUE: 1
PIF_VALUE: 0
PIF_VALUE: 30
PIF_VALUE: 30
PIF_VALUE: 11
PIF_VALUE: 29
PIF_VALUE: 3
PIF_VALUE: 29
PIF_VALUE: 22
PIF_VALUE: 28
PIF_VALUE: 29
PIF_VALUE: 27
PIF_VALUE: 25
PIF_VALUE: 23
PIF_VALUE: 28
PIF_VALUE: 29
PIF_VALUE: 26
PIF_VALUE: 7
PIF_VALUE: 13
PIF_VALUE: 13
PIF_VALUE: 29
PIF_VALUE: 27
PIF_VALUE: 3
PIF_VALUE: 24
PIF_VALUE: 3
PIF_VALUE: 27
PIF_VALUE: 20
PIF_VALUE: 3
PIF_VALUE: 22
PIF_VALUE: 28
PIF_VALUE: 23
PIF_VALUE: 27
PIF_VALUE: 29
PIF_VALUE: 29
PIF_VALUE: 7
PIF_VALUE: 13
PIF_VALUE: 24
PIF_VALUE: 3
PIF_VALUE: 23
PIF_VALUE: 27
PIF_VALUE: 10
PIF_VALUE: 13
PIF_VALUE: 23
PIF_VALUE: 30
PIF_VALUE: 24
PIF_VALUE: 30
PIF_VALUE: 27
PIF_VALUE: 24
PIF_VALUE: 25
PIF_VALUE: 25
PIF_VALUE: 23
PIF_VALUE: 11
PIF_VALUE: 28
PIF_VALUE: 28
PIF_VALUE: 23
PIF_VALUE: 3
PIF_VALUE: 26
PIF_VALUE: 23
PIF_VALUE: 28
PIF_VALUE: 15
PIF_VALUE: 12
PIF_VALUE: 15
PIF_VALUE: 26
PIF_VALUE: 27
PIF_VALUE: 3
PIF_VALUE: 12
PIF_VALUE: 3
PIF_VALUE: 3
PIF_VALUE: 23
PIF_VALUE: 30
PIF_VALUE: 26
PIF_VALUE: 24
PIF_VALUE: 22
PIF_VALUE: 23
PIF_VALUE: 3
PIF_VALUE: 27
PIF_VALUE: 13
PIF_VALUE: 30
PIF_VALUE: 3
PIF_VALUE: 15
PIF_VALUE: 24
PIF_VALUE: 12
PIF_VALUE: 3
PIF_VALUE: 26
PIF_VALUE: 23
PIF_VALUE: 26
PIF_VALUE: 27
PIF_VALUE: 23
PIF_VALUE: 25
PIF_VALUE: 27
PIF_VALUE: 29
PIF_VALUE: 15
PIF_VALUE: 18
PIF_VALUE: 26
PIF_VALUE: 32
PIF_VALUE: 29
PIF_VALUE: 27
PIF_VALUE: 0
PIF_VALUE: 24
PIF_VALUE: 13
PIF_VALUE: 23
PIF_VALUE: 25
PIF_VALUE: 3
PIF_VALUE: 27
PIF_VALUE: 25
PIF_VALUE: 25
PIF_VALUE: 29
PIF_VALUE: 24
PIF_VALUE: 24
PIF_VALUE: 28
PIF_VALUE: 29
PIF_VALUE: 29
PIF_VALUE: 28
PIF_VALUE: 25
PIF_VALUE: 25
PIF_VALUE: 29
PIF_VALUE: 15
PIF_VALUE: 23
PIF_VALUE: 10
PIF_VALUE: 27

## 2021-06-14 ASSESSMENT — PAIN DESCRIPTION - ONSET: ONSET: ON-GOING

## 2021-06-14 ASSESSMENT — PAIN SCALES - GENERAL
PAINLEVEL_OUTOF10: 7
PAINLEVEL_OUTOF10: 7
PAINLEVEL_OUTOF10: 0
PAINLEVEL_OUTOF10: 3
PAINLEVEL_OUTOF10: 7
PAINLEVEL_OUTOF10: 3
PAINLEVEL_OUTOF10: 7

## 2021-06-14 ASSESSMENT — ENCOUNTER SYMPTOMS
DYSPNEA ACTIVITY LEVEL: AFTER AMBULATING 1 FLIGHT OF STAIRS
SHORTNESS OF BREATH: 1

## 2021-06-14 ASSESSMENT — PAIN DESCRIPTION - FREQUENCY: FREQUENCY: CONTINUOUS

## 2021-06-14 ASSESSMENT — PAIN DESCRIPTION - LOCATION: LOCATION: CHEST

## 2021-06-14 ASSESSMENT — PAIN SCALES - WONG BAKER: WONGBAKER_NUMERICALRESPONSE: 0

## 2021-06-14 ASSESSMENT — PAIN DESCRIPTION - ORIENTATION: ORIENTATION: RIGHT

## 2021-06-14 ASSESSMENT — LIFESTYLE VARIABLES: SMOKING_STATUS: 1

## 2021-06-14 ASSESSMENT — PAIN DESCRIPTION - PROGRESSION: CLINICAL_PROGRESSION: GRADUALLY WORSENING

## 2021-06-14 ASSESSMENT — PAIN DESCRIPTION - DESCRIPTORS: DESCRIPTORS: SHARP

## 2021-06-14 ASSESSMENT — PAIN - FUNCTIONAL ASSESSMENT
PAIN_FUNCTIONAL_ASSESSMENT: 0-10
PAIN_FUNCTIONAL_ASSESSMENT: PREVENTS OR INTERFERES SOME ACTIVE ACTIVITIES AND ADLS

## 2021-06-14 ASSESSMENT — PAIN DESCRIPTION - PAIN TYPE: TYPE: SURGICAL PAIN

## 2021-06-14 NOTE — ANESTHESIA PRE PROCEDURE
Department of Anesthesiology  Preprocedure Note       Name:  Yenni Platt   Age:  73 y.o.  :  1950                                          MRN:  [id number]         Date:  2021      Surgeon: Mauricio Dooley):  Christina Richardson MD    Procedure: Procedure(s):  ROBOTIC ASSISTED RIGHT LOWER LOBE SUPERIOR SEGMENTECTOMY AND MEDIASTINAL DISSECTION, POSSIBLE OPEN, CRYOTHERAPY OF INTERCOSTAL NERVES    Medications prior to admission:   Prior to Admission medications    Medication Sig Start Date End Date Taking? Authorizing Provider   fluticasone-umeclidin-vilant (TRELEGY ELLIPTA) 100-62.5-25 MCG/INH AEPB Inhale 1 puff into the lungs daily 3/8/21 3/8/22 Yes NITO Sunshine CNP   albuterol sulfate HFA (PROVENTIL HFA) 108 (90 Base) MCG/ACT inhaler Inhale 2 puffs into the lungs every 6 hours as needed for Wheezing 3/8/21  Yes [place]y NITO Goldstein CNP   levothyroxine (LEVOTHROID) 88 MCG tablet Take 1 tablet by mouth daily 17  Yes Neal Pascal MD   venlafaxine (EFFEXOR XR) 37.5 MG extended release capsule Take 1 capsule by mouth daily  Patient not taking: Reported on 2021  NITO Phillips CNP       Current medications:    Current Facility-Administered Medications   Medication Dose Route Frequency Provider Last Rate Last Admin    0.9 % sodium chloride infusion   Intravenous Continuous Christina Richardson MD        sodium chloride flush 0.9 % injection 5-40 mL  5-40 mL Intravenous 2 times per day Christina Richardson MD        sodium chloride flush 0.9 % injection 5-40 mL  5-40 mL Intravenous PRN Christina Richardson MD        0.9 % sodium chloride infusion  25 mL Intravenous PRN Christina Richardson MD        ceFAZolin (ANCEF) 2000 mg in dextrose 5 % 50 mL IVPB  2,000 mg Intravenous On Call to MD Kallie           Allergies:     Allergies   Allergen Reactions    Cipro Xr     Vicodin [Hydrocodone-Acetaminophen]      Weird dreams         Problem List:    Patient Active Problem List   Diagnosis Code    Hyperlipidemia E78.5    History of breast cancer Z85.3    Vitamin D deficiency E55.9    Chronic headachess/p head injjury s/p fall R51.9, G89.29    Depression F32.9    Smoker F17.200    Hypothyroidism E03.9    Allergic rhinitis J30.9    Incontinence R32    Noncompliance Z91.19    Vasovagal syncope R55    History of CVA (cerebrovascular accident) Z80.78    Stage 2 moderate COPD by GOLD classification ([address] J44.9    Carotid occlusion, left I65.22    OA (osteoarthritis) of hip M16.9    Essential hypertension I10    CAD (coronary artery disease) I25.10    Transient cerebral ischemia G45.9    Aortic insufficiency I35.1    Chest pain, atypical R07.89    Tobacco abuse Z72.0    History of chest pain atypical Z87.898    S/P cardiac cath nonobstructive lesion 2014 Z98.890    Lumbar radiculitis M54.16    Spinal stenosis of lumbar region without neurogenic claudication M48.061    Syncope and collapse R55    Solitary pulmonary nodule on lung CT R91.1    Severe malnutrition (HCC) E43    Primary malignant neoplasm of right lower lobe of lung (HCC) C34.31    Pre-op evaluation for Lung ca surgery Z01.818    SOB (shortness of breath) on exertion R06.02    Dizziness on standing R42    History of syncope Z87.898       Past Medical History:        Diagnosis Date    Anxiety 2007    Arthritis     Breast cancer ([address] 2005    right mastectomy, chemo and radiation history    CAD (coronary artery disease) 2001    sees Dr Eloina Cotton of the skin 2004    Cerebral artery occlusion with cerebral infarction ([address]     Chronic UTI     COPD (chronic obstructive pulmonary disease) ([address]     mag Petersen    CVA (cerebral infarction) 2007, 2008    Depression 2007    DVT of lower extremity (deep venous thrombosis) ([address] 1976    Facial injury 2005    Fall on greasy ground, striking left periorbital region    History of stroke 2007, 2008, 2009    Patient relates a stroke with right weakness and speech in 2007; another in 2010 or 2012 (unsure), both with need to rehabilitation.   Also \"2 mini-strokes\" (?)    Hx of blood clots 1977    hip, leg    Hyperlipidemia 2005    Hypertension 2005    Hypothyroidism     IBS (irritable bowel syndrome)     Low HDL (under 40) 2014    Lung cancer [facility])     sees Dr Favian Queen    Prolonged emergence from general anesthesia     Recurrent UTI (urinary tract infection) 2015    Dr Anders Suarez finding difficulty 05/16/2017    work-up in progress       Past Surgical History:        Procedure Laterality Date   [address] Right 04/01/2005    evacuation of breast hematoma-Dr. Sukh Recinos    BREAST SURGERY Right 11/30/2004    lymphatic mapping, SLN bx x2-Dr. Soto Hippo    COLONOSCOPY  2009    Dr. Gamble Strong  03/16/2021    CT NEEDLE BIOPSY LUNG PERCUTANEOUS [facility]16[facility] SCAN    HIP SURGERY  01/19/2015    HYSTERECTOMY  1976    JOINT REPLACEMENT Left 2011    HIP    JOINT REPLACEMENT Right 01/19/2015    Right Total Hip Replacement - Dr. Shabbir Dorado Right 2005    Dr. Shmuel Marr  04/10/2018    Lumbar epidural steroid injection at L4    NC NJX DX/THER SBST INTRLMNR LMBR/SAC W/IMG GDN N/A 04/10/2018    LESI  @ L4 performed [facility] at [facility]    ovaries    THYROID LOBECTOMY Left 11/26/2010    left thyroid lobectomy with isthmusectomy-Dr. Rojelio Lara THYROID SURGERY  2007    right thyroid lobectomy-Dr. Milly Oneil       Social History:    Social History     Tobacco Use    Smoking status: Current Every Day Smoker     Packs/day: 2.00     Years: 48.00     Pack years: 96.00     Types: Cigarettes     Start date: 11/13/1967    Smokeless tobacco: Never Used   Substance Use Topics    Alcohol use: No     Alcohol/week: 0.0 standard drinks                                Ready to quit: Not Answered  Counseling given: Not Answered      Vital Signs (Current):   Vitals:    06/14/21 0653   BP: (!) 160/72   Pulse: 61   Resp: 16   Temp: 97.7 °F (36.5 °C)   TempSrc: Temporal   SpO2: 96%   Weight: 139 lb 3.2 oz (63.1 kg)   Height: 5' 4\" (1.626 m)                                              BP Readings from Last 3 Encounters:   06/14/21 (!) 160/72   06/03/21 (!) 127/58   06/02/21 110/60       NPO Status:                                                                                 BMI:   Wt Readings from Last 3 Encounters:   06/14/21 139 lb 3.2 oz (63.1 kg)   06/03/21 143 lb 12.8 oz (65.2 kg)   06/02/21 143 lb (64.9 kg)     Body mass index is 23.89 kg/m². CBC:   Lab Results   Component Value Date    WBC 7.7 04/14/2021    RBC 5.08 04/14/2021    RBC 4.49 05/16/2012    HGB 16.2 04/14/2021    HCT 50.5 04/14/2021    MCV 99 04/14/2021    RDW 13.7 04/14/2021     04/14/2021       CMP:   Lab Results   Component Value Date     04/14/2021    K 4.2 04/14/2021    K 3.9 03/13/2021     04/14/2021    CO2 28 04/14/2021    BUN 14 04/14/2021    CREATININE 1.1 04/14/2021    LABGLOM 49 04/14/2021    GLUCOSE 100 04/14/2021    GLUCOSE 94 05/16/2012    PROT 7.2 04/14/2021    CALCIUM 9.3 04/14/2021    BILITOT 0.5 04/14/2021    ALKPHOS 157 04/14/2021    AST 13 04/14/2021    ALT <5 04/14/2021       POC Tests: No results for input(s): POCGLU, POCNA, POCK, POCCL, POCBUN, POCHEMO, POCHCT in the last 72 hours.     Coags:   Lab Results   Component Value Date    INR 0.97 03/16/2021    APTT 26.5 03/16/2021       HCG (If Applicable): No results found for: PREGTESTUR, PREGSERUM, HCG, HCGQUANT     ABGs: No results found for: PHART, PO2ART, PSA0MYA, QIO1QJO, BEART, J4BRSQYB     Type & Screen (If Applicable):  Lab Results   Component Value Date    LABRH POS 06/12/2021       Drug/Infectious Status (If Applicable):  Lab Results   Component Value Date    HEPCAB Negative 06/13/2017       COVID-19 Screening (If Applicable): Lab Results   Component Value Date    COVID19 Not Detected 06/09/2021           Anesthesia Evaluation  Patient summary reviewed and Nursing notes reviewed no history of anesthetic complications:   Airway: Mallampati: II  TM distance: >3 FB   Neck ROM: full  Mouth opening: > = 3 FB Dental:    (+) upper dentures and lower dentures      Pulmonary:   (+) COPD:  shortness of breath:  decreased breath sounds,  current smoker          Patient did not smoke on day of surgery. ROS comment: Lung cancer   Cardiovascular:  Exercise tolerance: good (>4 METS),   (+) hypertension:, valvular problems/murmurs: AI, CAD:, AYALA: after ambulating 1 flight of stairs, murmur,       ECG reviewed  Rhythm: regular  Rate: normal    Stress test reviewed                Neuro/Psych:   (+) CVA:, neuromuscular disease:, headaches:, psychiatric history:            GI/Hepatic/Renal: Neg GI/Hepatic/Renal ROS            Endo/Other:    (+) hypothyroidism::., .                 Abdominal:           Vascular: negative vascular ROS. Anesthesia Plan      general     ASA 3     (Double lumen tube)  Induction: intravenous. arterial line  MIPS: Postoperative opioids intended and Prophylactic antiemetics administered. Anesthetic plan and risks discussed with patient.                       [address] Nhan Givens, DO   6/14/2021

## 2021-06-14 NOTE — ANESTHESIA PROCEDURE NOTES
Arterial Line:    An arterial line was placed using surface landmarks, in the OR for the following indication(s): continuous blood pressure monitoring and blood sampling needed. A 20 gauge (size), 1 and 1/4 inch (length), Angiocath (type) catheter was placed, Seldinger technique used, into the left radial artery, secured by tape and Tegaderm. Anesthesia type: General    Events:  patient tolerated procedure well with no complications and EBL < 5mL.   6/14/2021 8:12 AM6/14/2021 8:14 AM  Anesthesiologist: Brigid Givens DO  Performed: Anesthesiologist   Preanesthetic Checklist  Completed: patient identified, IV checked, site marked, risks and benefits discussed, surgical consent, monitors and equipment checked, pre-op evaluation, timeout performed, anesthesia consent given, oxygen available and patient being monitored

## 2021-06-14 NOTE — H&P
[facility]  History and Physical Update    Pt Name: Pratima Zaragoza  MRN: [id number]  YOB: 1950  Date of evaluation: 6/14/2021    [x] I have examined the patient and reviewed the H&P/Consult and there are no changes to the patient or plans.     [] I have examined the patient and reviewed the H&P/Consult and have noted the following changes:        Murphy Alexander MD  Electronically signed 6/14/2021 at 6:37 AM

## 2021-06-14 NOTE — OP NOTE
[address] [place] [address]  Operative Report    PATIENT NAME: Little Sotelo  MEDICAL RECORD NO. [id number]  SURGEON: Wendy Monroy MD MD FACS  Primary Care Physician: Dinah Owusu, APRN - CNP  Date: 6/14/2021, 11:25 AM     PROCEDURE PERFORMED: 1. Mediastinal lymph node dissection a total of 13 lymph nodes level 4R level 10 level 11 level 9 level 7  2. Robotic assisted nonanatomic wedge resection of right lower lobe lung cancer  3. Cryotherapy of intercostal nerves V, VI, VII and VIII for postoperative pain control      PREOPERATIVE DIAGNOSIS:   Active Hospital Problems    Diagnosis Date Noted    Primary malignant neoplasm of right lower lobe of lung ([facility] [address] [C34.31] 04/20/2021     Priority: High    Cancer of lower lobe of right lung (HCC) [C34.31] 06/14/2021    SOB (shortness of breath) on exertion [R06.02] 05/20/2021    Stage 2 moderate COPD by GOLD classification ([facility] [address] [J44.9] 09/30/2013      POSTOPERATIVE DIAGNOSIS: Same, path pending  SURGEON:  Wendy Monroy MD MD [facility]  ANESTHESIA:  General endotracheal - Double lumen tube and local  ANESTHESIA:  30 OF 0.5% Marcaine and 1% xylocaine in equal parts  ESTIMATED BLOOD LOSS:  10  ml  SPECIMEN: Right lower lobe lung mass, 13 lymph nodes from levels 9, 7, 10, 4R and level 11  COMPLICATIONS:  None; patient tolerated the procedure well. DRAINS: 29 Maced[place] straight chest tube  DISPOSITION: PACU - hemodynamically stable. CONDITION: stable      Narrative: The patient brought to the operating room have been correctly marked on the right side. General dual-lumen intubation was performed by anesthesia arterial line was started Reilly catheter was placed. The patient was placed in the right lateral thoracotomy position on a beanbag table was flexed axillary roll was placed. At this time anatomic landmarks were drawn out the edge of the scapula of the spinous processes the costal margin the 11th and 12th rib.   Chest was draped sterilely after prepping and waiting 3 minutes with ChloraPrep. At this point in the posterior axillary line in the longest interspace of her chest a 5 mm Optiview port was used to access the thoracic cavity. The lower lobe showed no adhesions but there were extensive adhesions between the upper lobe and the chest wall posteriorly laterally anteriorly and medially. There were also adhesions in the fissure as well. This still had a clear space that I can get the ports in the interspace that I wanted so to 12 and 2 atrial paced in the same interspace and low in front of the 11th rib we came through with a 12 mm port for the assistant right at the diaphragm chest wall junction. At this point instruments were placed the robot was docked and there was good deflation of the lower lobe the upper lobe was not inflating and then using [place] bipolar cautery all adhesions were then taken down. We freed up the fissure between the right upper lobe and right lower lobe as well as right lower lobe and right middle lobe. It was mostly developed. At this point we ensued by freeing up the inferior pulmonary ligament a level 9 lymph node was encountered and was removed. This was freed up to the inferior pulmonary vein and we dissected this out anteriorly and posteriorly we rolled the lung anteriorly and then dissected out the level 7 lymph nodes a total of 4 were found in this area and were dissected out. Cautery used for hemostasis and some Surgicel was placed in this area. Found the bronchus intermedius dissected down. But while we are back there the inferior pulmonary vein was identified and the branch going to the superior segment was dissected out I was able to get it behind it for later transection if needed. We then rolled the lung medially and and a level 10 lymph node was identified.   As we worked hard level 4R lymph nodes were identified a total of 4 were reviewed then we worked in the fissure level 11 lymph nodes were then taken off the pulmonary artery to identify the pulmonary artery branches and I identify the anterior surface of the superior segment and looking at the lesion the lesion was not really in the superior segment but rather the lateral basal segment. I could see it on the lung surface the surface was somewhat puckered in this area. In looking at the branches the pulmonary artery branches superior segment did not go to this area there were 2 more branches coming off the pulmonary artery and the central 1 which went to the lower lobe and went in this direction. Since the 4 basilar segments come off of a common trunk and she did not want to be on oxygen and I did not think she was a candidate for lobectomy I elected that I would proceed with a nonanatomic wide red resection of this area. Using serial firings of the robotic 45 blue cartridge stapler, watching the pulmonary branches we dissected out come down to avoid transecting these this was washed out with grossly clear margins. At this point we left this in the pleural cavity and then proceeded with cryotherapy the fifth 6/7 and eighth intercostal nerves were identified at the muscular oh muscular junction at least 4 cm away from the spine using the cryoprobe it was placed on the nerve with direct pressure in 2 minutes at -65degrees were performed. After cooling and on freezing it was moved each level so a total of 4 levels were done level 567 and 8. Once this was completed progel was then used to spray on the staple lines from the wedge resection. And at this point we then placed a chest tube to the most anterior port up to the apex and then used a retrieval bag were able to bring out the wedged out mass which by palpation the mass was present within it. At this point we watched the right lower and right upper lobes inflate under direct vision.   And then the 12 port sites were closed with 2-0 Vicryl deep 1 had the chest tube in it the chest tube was secured with [place] Slimmer pull suture the extraction site was closed with 0 Vicryl suture deep half percent Marcaine 30 cc was injected as intercostal nerve block as well as as the extraction port site as this was down in front of rib 11. At this point the skin affix glue was applied to the skin incisions chest tube dressing applied and the patient brought back to recovery room stable condition. June 14, 2021    Payer   Address    RE: Letter of medical necessity for ADDING cryoanalgesia, post-operative pain management, CPT 31711        Patient: Gali Peralta ID#:    Diagnosis ICD-10 G89.12, Z98.890   Primary Service:    Distinct Add-On Service  CPT 67844: Per AMNEETU, for post-operative cryoanalgesia    Date of Service: 6/14/2021     Dear Esteban Claire: The purpose of this correspondence is to provide evidence that substantiates payment for adding cryoanalgesia as a separate and distinct service. The primary surgical procedure was a Robtotiuc assisted  wide non anatomic Wedge Resection and mediastinal dissection     As you consider this request to separately reimburse me for the added time, risk and skill required to control this patients post-operative pain, please consider the complexity of the primary procedure. Although this procedure is considered \"minimally invasive\", its post-operative pain control can be quite challenging, requiring multi-modal pain management including anesthetics during  hospitalization followed by p.o. narcotics post-discharge. In view of the opioid epidemic, I have changed my surgical pain management protocol by ADDING cryoanalegesia to my VATS protocols. This cryo component should be considered a separate and distinct component that should receive  additional reimbursement. Please consider the enclosed operative dictation.   Adding cryoablation involved creating multiple levels of bilateral intercostal nerve blocks, typically adding more than 45 additional minutes to the base procedure. Per the American Society of Anesthesiologists (ASA), a provider may bill for a postoperative pain procedure as a service separate from the anesthetic if the pain procedure is employed primarily for postoperative analgesia. Per the [address] [place] [id number]), CPT 81200, Unlisted procedure, nervous system should be reported when cryotherapy/cryoablation/cryoanalgesia/ cryroneuromodulation is performed. When reporting an unlisted code to describe a procedure or service, it is necessary to submit supporting documentation (eg, a procedure report) along with the claim to provide an adequate description of the nature, extent, and need for the procedure, and the time, effort, and equipment necessary to provide the [address] [facility]. Adding cryoablation was clearly in this patients best interest. The results I am getting exceed what is reported in the peer-reviewed literature. In response to the Department of Health and [facility] 2017 declaration that the opioid epidemic was a public health emergency, every healthcare provider has reassessed how to control post-operative pain. Until the AMA issues an appropriate add-on Level 1 CPT for Intraoperative intercostal cryoablation, I would like to be paid comparable to CPT 28058 (Thoracoscopy, with thoracic sympathectomy) or CPT 96243 (Sympathectomy, thoracolumbar). Per CMS 2021 Physician Fee Schedule, the time, risk and intensity is similar to CPT [id number] or CPT 0378 [id number], therefore add on payment for CPT 81731 on my claim should be at least $1,000.     CPT Pre-Eval Time  Pre-Position Time  Pre-Service Wait Time  Median Intra-Service Time  Immediate Post Service Time    01462 90 0 0 120 30   08674 30 0 25 103 24           CPT  Phys Work RVUs Non-Facility [address] [facility]  [place]  RVUs   Mal-  Practice  RVUs     Total Non-Facility RVU     Total Facility RVUs   Global   07760 14.28 NA 7.23 3.48 NA 24.99 090   11139 14.71 NA 11.94 5.78 NA 32.43 090 Thank you for your time and consideration. Any questions, please feel free to call [phone]     Sincerely,     Ruth Ames MD    Physicians Name    Publications Available Upon Request   Use of Cryoanalgesia for pain management   Rene Dorman et al. \"Pain is significantly reduced by cryoablation therapy in patients with lateral minithoracotomy. \" Amilcar Calixto Surg 70.3 (2000): 5861-3366. Fernando BETTENCOURT et al. Postoperative pain control modalities for pectus excavatum repair: a prospective observational study of cryoablation compared to results of a randomized trial of epidural versus patient-controlled analgesia. Journal of pediatric surgery, 2019, October 26, epub. Britta RITTER et al. Intraoperative intercostals nerve cryoablation during the Davy procedure reduces length of stay and opiod requirement: a randomized clinical trial. Journal of pediatric surgery, 2019 Nov; 54 (11): 7948-9050. Jenny Cuellar et al. \"Intraoperative cryoanalgesia for managing pain after the Davy procedure. \" J Ped Surg 52.6 (2017): 132-396  Yelena MELENDEZ, et al. [facility] nerve cryoablation versus thoracic epidural catheters for postoperative analgesia following pectusexcavatum repair: Preliminary outcomes in twenty-six cryoablation patients. \" J Ped Surg 51.12 (2016): 5519-5313. Fredy Bowens, et al. Maricruz Calender of transthoracic cryoanalgesia during the Davy procedure. \" [facility] Card Surg 151.3 (5750): 061-096  Cristian Rocha et al. Suzan Mario in Patients Undergoing Davy Repair of Pectus Excavatum: Technique Modification and Early Results. \" J Lap Adv Surg Tech (2018), in press. Anuja BENITO et al. Use of cryoanalgesia for pain management for the modified ravitch procedure in children. Journal of pediatric surgery, 2019, October 25, epub. Kiran Sanchez, et al. Pravin Back role of intercostal cryoanalgesia in post-thoracotomy analgesia. \" Int Card Thor Surg 16.6 (2013): [id number].   Aiden DE ANDA et al. Intraoperative intercostals nerve cryoanalgesia improves pain control after descending and thoracoabdominal aortic aneurysm repairs. Annal of Thoracic Surgery, 2020, Jan; 109 (1): 249-254.

## 2021-06-15 ENCOUNTER — APPOINTMENT (OUTPATIENT)
Dept: GENERAL RADIOLOGY | Age: 71
DRG: 163 | End: 2021-06-15
Attending: SURGERY
Payer: MEDICARE

## 2021-06-15 LAB
ERYTHROCYTE [DISTWIDTH] IN BLOOD BY AUTOMATED COUNT: 13.4 % (ref 11.5–14.5)
ERYTHROCYTE [DISTWIDTH] IN BLOOD BY AUTOMATED COUNT: 51.8 FL (ref 35–45)
HCT VFR BLD CALC: 41.9 % (ref 37–47)
HEMOGLOBIN: 12.6 GM/DL (ref 12–16)
MCH RBC QN AUTO: 31.3 PG (ref 26–33)
MCHC RBC AUTO-ENTMCNC: 30.1 GM/DL (ref 32.2–35.5)
MCV RBC AUTO: 104 FL (ref 81–99)
PLATELET # BLD: 190 THOU/MM3 (ref 130–400)
PMV BLD AUTO: 10.9 FL (ref 9.4–12.4)
RBC # BLD: 4.03 MILL/MM3 (ref 4.2–5.4)
WBC # BLD: 10.2 THOU/MM3 (ref 4.8–10.8)

## 2021-06-15 PROCEDURE — 2580000003 HC RX 258: Performed by: SURGERY

## 2021-06-15 PROCEDURE — 6370000000 HC RX 637 (ALT 250 FOR IP): Performed by: SURGERY

## 2021-06-15 PROCEDURE — 99024 POSTOP FOLLOW-UP VISIT: CPT | Performed by: SURGERY

## 2021-06-15 PROCEDURE — 2060000000 HC ICU INTERMEDIATE R&B

## 2021-06-15 PROCEDURE — 71045 X-RAY EXAM CHEST 1 VIEW: CPT

## 2021-06-15 PROCEDURE — 85027 COMPLETE CBC AUTOMATED: CPT

## 2021-06-15 PROCEDURE — 2700000000 HC OXYGEN THERAPY PER DAY

## 2021-06-15 PROCEDURE — 51798 US URINE CAPACITY MEASURE: CPT

## 2021-06-15 PROCEDURE — 36415 COLL VENOUS BLD VENIPUNCTURE: CPT

## 2021-06-15 PROCEDURE — 51701 INSERT BLADDER CATHETER: CPT

## 2021-06-15 PROCEDURE — 99223 1ST HOSP IP/OBS HIGH 75: CPT | Performed by: INTERNAL MEDICINE

## 2021-06-15 PROCEDURE — 94640 AIRWAY INHALATION TREATMENT: CPT

## 2021-06-15 PROCEDURE — 6360000002 HC RX W HCPCS: Performed by: SURGERY

## 2021-06-15 PROCEDURE — 94761 N-INVAS EAR/PLS OXIMETRY MLT: CPT

## 2021-06-15 PROCEDURE — 94669 MECHANICAL CHEST WALL OSCILL: CPT

## 2021-06-15 RX ORDER — DIPHENHYDRAMINE HCL 25 MG
25 TABLET ORAL EVERY 6 HOURS PRN
COMMUNITY

## 2021-06-15 RX ADMIN — SODIUM CHLORIDE, PRESERVATIVE FREE 10 ML: 5 INJECTION INTRAVENOUS at 20:28

## 2021-06-15 RX ADMIN — ENOXAPARIN SODIUM 40 MG: 40 INJECTION, SOLUTION INTRAVENOUS; SUBCUTANEOUS at 09:00

## 2021-06-15 RX ADMIN — OXYCODONE HYDROCHLORIDE AND ACETAMINOPHEN 2 TABLET: 5; 325 TABLET ORAL at 23:25

## 2021-06-15 RX ADMIN — POLYETHYLENE GLYCOL 3350 17 G: 17 POWDER, FOR SOLUTION ORAL at 09:00

## 2021-06-15 RX ADMIN — SODIUM CHLORIDE, PRESERVATIVE FREE 10 ML: 5 INJECTION INTRAVENOUS at 09:01

## 2021-06-15 RX ADMIN — ACETAMINOPHEN 650 MG: 325 TABLET ORAL at 06:46

## 2021-06-15 RX ADMIN — TIOTROPIUM BROMIDE INHALATION SPRAY 2 PUFF: 3.12 SPRAY, METERED RESPIRATORY (INHALATION) at 07:51

## 2021-06-15 RX ADMIN — KETOROLAC TROMETHAMINE 15 MG: 30 INJECTION, SOLUTION INTRAMUSCULAR; INTRAVENOUS at 03:55

## 2021-06-15 RX ADMIN — BUDESONIDE AND FORMOTEROL FUMARATE DIHYDRATE 2 PUFF: 160; 4.5 AEROSOL RESPIRATORY (INHALATION) at 07:54

## 2021-06-15 RX ADMIN — CEFAZOLIN 2000 MG: 10 INJECTION, POWDER, FOR SOLUTION INTRAVENOUS at 04:46

## 2021-06-15 RX ADMIN — KETOROLAC TROMETHAMINE 15 MG: 30 INJECTION, SOLUTION INTRAMUSCULAR; INTRAVENOUS at 08:59

## 2021-06-15 RX ADMIN — LEVOTHYROXINE SODIUM 88 MCG: 0.09 TABLET ORAL at 06:46

## 2021-06-15 RX ADMIN — ONDANSETRON 4 MG: 2 INJECTION INTRAMUSCULAR; INTRAVENOUS at 18:20

## 2021-06-15 RX ADMIN — BUDESONIDE AND FORMOTEROL FUMARATE DIHYDRATE 2 PUFF: 160; 4.5 AEROSOL RESPIRATORY (INHALATION) at 17:37

## 2021-06-15 RX ADMIN — KETOROLAC TROMETHAMINE 15 MG: 30 INJECTION, SOLUTION INTRAMUSCULAR; INTRAVENOUS at 16:30

## 2021-06-15 ASSESSMENT — PAIN DESCRIPTION - ORIENTATION
ORIENTATION: RIGHT;UPPER
ORIENTATION: RIGHT

## 2021-06-15 ASSESSMENT — PAIN SCALES - GENERAL
PAINLEVEL_OUTOF10: 0
PAINLEVEL_OUTOF10: 10
PAINLEVEL_OUTOF10: 2
PAINLEVEL_OUTOF10: 0
PAINLEVEL_OUTOF10: 7
PAINLEVEL_OUTOF10: 0
PAINLEVEL_OUTOF10: 3
PAINLEVEL_OUTOF10: 0
PAINLEVEL_OUTOF10: 0

## 2021-06-15 ASSESSMENT — ENCOUNTER SYMPTOMS
ALLERGIC/IMMUNOLOGIC NEGATIVE: 1
RESPIRATORY NEGATIVE: 1
EYES NEGATIVE: 1
GASTROINTESTINAL NEGATIVE: 1

## 2021-06-15 ASSESSMENT — PAIN DESCRIPTION - LOCATION
LOCATION: BACK;SHOULDER
LOCATION: CHEST

## 2021-06-15 ASSESSMENT — PAIN DESCRIPTION - ONSET: ONSET: ON-GOING

## 2021-06-15 ASSESSMENT — PAIN DESCRIPTION - PROGRESSION: CLINICAL_PROGRESSION: GRADUALLY WORSENING

## 2021-06-15 ASSESSMENT — PAIN DESCRIPTION - PAIN TYPE
TYPE: SURGICAL PAIN
TYPE: SURGICAL PAIN

## 2021-06-15 ASSESSMENT — PAIN DESCRIPTION - FREQUENCY
FREQUENCY: CONTINUOUS
FREQUENCY: CONTINUOUS

## 2021-06-15 ASSESSMENT — PAIN - FUNCTIONAL ASSESSMENT
PAIN_FUNCTIONAL_ASSESSMENT: ACTIVITIES ARE NOT PREVENTED
PAIN_FUNCTIONAL_ASSESSMENT: ACTIVITIES ARE NOT PREVENTED

## 2021-06-15 ASSESSMENT — PAIN DESCRIPTION - DESCRIPTORS
DESCRIPTORS: SORE
DESCRIPTORS: SORE;ACHING

## 2021-06-15 NOTE — CARE COORDINATION
DISCHARGE/PLANNING EVALUATION  6/15/21, 1:34 PM EDT    Reason for Referral: Discharge planning  Mental Status: Alert and Oriented  Decision Making: Self  Family/Social/Home Environment: Patient lives with son Benito Razo in a 2 story home. She reported that her son was going to move her bed to the first level. She reported that her family is supportive and is able to assist as needed. She reported that her son is home most of the time. Current Services including food security, transportation and housekeeping: She denied any services in the home. She rpeorted that she was independent prior to hospital admission. Current Equipment: wheelchair, walker, rollator, canes  Payment Source: Medicare  Concerns or Barriers to Discharge: None  Post acute provider list with quality measures, geographic area and applicable managed care information provided. Questions regarding selection process answered: Offered    Teach Back Method used with patient regarding care plan and needs. Patient verbalize understanding of the plan of care and contribute to goal setting. Patient goals, treatment preferences and discharge plan: Patient plans to discharge home with son and HH. She reported that should like time to look over list and discuss with family before choosing an agency.      Electronically signed by TC Faye on 6/15/2021 at 1:34 PM

## 2021-06-15 NOTE — CONSULTS
[facility]      Patient - Sofie Shearer   MRN -  [id number]   [facility] - [de-identified]   - 1950      Date of Admission -  2021  6:13 AM  Date of evaluation -  6/15/2021  Room - -011-A   Hospital Day - 1  Consulting - Ivana Smith MD Primary Care Physician - Per Cortez, APRN - CNP     Problem List      Active Hospital Problems    Diagnosis Date Noted    Cancer of lower lobe of right lung [facility]) [C34.31] 2021    S/P robotic assisted nonanatomic wedge resection of lateral basilar right lower lobe lung mass and mediastinal dissection [Z90.2] 2021    SOB (shortness of breath) on exertion [R06.02] 2021    Primary malignant neoplasm of right lower lobe of lung (HCC) [C34.31] 2021    Stage 2 moderate COPD by GOLD classification ([facility] [address] [J44.9] 2013     Reason for Consult      Post Op management of malignant neoplasm    HPI   History Obtained From: Patient and electronic medical record. Sofie Shearer is a 73 y.o. female who presented to [facility] on 2021 for robotic assisted right lower lobe superior segment dissection. 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 Patient seen and evaluated at bedside following surgery. Patient reports minimal post procedural discomfort or pain. Patient does report worsening shortness of breath for the last several months unchanged at time of interview. Denies cough, shortness of breath or chest pain. Patient does admit to continued hx of smoking with last cigarette yesteday prior to surgery.  Patient reports she has been smoking 2 ppd for the past 48 years. Patient was last seen and evaluated by hematology and oncology on 6/3/2021 with Dr. Glenroy De La Rosa. Noted to have a 14 mm spiculated nodule on CTA of the chest in 12/13/2020 with interval enlargement from previous CT in 2/19. Biopsy of the right lower lobe demonstrated poorly differentiated squamous cell carcinoma. P40 positive and TTF-1 negative. Patient does have a remote history of breast cancer roughly 15 years ago (2005?) treated with chemotherapy followed by right mastectomy and radiation to the chest wall. Patient received several years of adjuvant hormonal therapy. There was no history of recurrence. This was thought to be unrelated to current tumor as previous tumor was receptor positive and this tumor is receptor negative. She is having shortness of breath: No  She is having cough: No  She is having chest pain:No          PMHx   Past Medical History      Diagnosis Date    Anxiety 2007    Arthritis     Breast cancer [facility]) 2005    right mastectomy, chemo and radiation history    CAD (coronary artery disease) 2001    sees Dr Kathee Schilder of the skin 2004    Cerebral artery occlusion with cerebral infarction ([address]     Chronic UTI     COPD (chronic obstructive pulmonary disease) ([address]     sees RL Petersen    CVA (cerebral infarction) 2007, 2008    Depression 2007    DVT of lower extremity (deep venous thrombosis) ([address] 1976    Facial injury 2005    Fall on greasy ground, striking left periorbital region    History of stroke 2007, 2008, 2009    Patient relates a stroke with right weakness and speech in 2007; another in 2010 or 2012 (unsure), both with need to rehabilitation.   Also \"2 mini-strokes\" (?)    Hx of blood clots 1977    hip, leg    Hyperlipidemia 2005    Hypertension 2005    Hypothyroidism     IBS (irritable bowel syndrome)     Low HDL (under 40) 2014    Lung cancer [facility])     sees Dr Marvin Luna    Prolonged emergence from general anesthesia     Recurrent UTI (urinary tract infection) 2015    Dr Brook Salcedo finding difficulty 05/16/2017    work-up in progress      Past Surgical History        Procedure Laterality Date    APPENDECTOMY  1975    BREAST SURGERY Right 04/01/2005    evacuation of breast hematoma-Dr. Sakshi Steinberg    BREAST SURGERY Right 11/30/2004    lymphatic mapping, SLN bx x2-Dr. Nell Forte    COLONOSCOPY  2009    Dr. Pacheco Padgett  03/16/2021    CT NEEDLE BIOPSY LUNG PERCUTANEOUS 3/16/2021 [facility] CT SCAN    HIP SURGERY  01/19/2015    HYSTERECTOMY  1976    JOINT REPLACEMENT Left 2011    HIP    JOINT REPLACEMENT Right 01/19/2015    Right Total Hip Replacement - Dr. Diana Abdi Right 2005    Dr. Barbara Fonseca  04/10/2018    Lumbar epidural steroid injection at L4    ME NJX DX/THER SBST INTRLMNR LMBR/SAC W/IMG GDN N/A 04/10/2018    LESI  @ L4 performed by Gael Landa MD at [facility], [facility] [place]    ovaries    THYROID LOBECTOMY Left 11/26/2010    left thyroid lobectomy with isthmusectomy-Dr. Aleksandar Solomon THYROID SURGERY  2007    right thyroid lobectomy-Dr. Ibanez     Meds    Current Medications    levothyroxine  88 mcg Oral Daily    sodium chloride flush  5-40 mL Intravenous 2 times per day    bacitracin   Topical Daily    enoxaparin  40 mg Subcutaneous Daily    polyethylene glycol  17 g Oral Daily    ketorolac  15 mg Intravenous Q6H    lidocaine  2 patch Transdermal Nightly    budesonide-formoterol  2 puff Inhalation BID    And    tiotropium  2 puff Inhalation Daily     albuterol sulfate HFA, sodium chloride flush, sodium chloride, acetaminophen, ondansetron **OR** ondansetron, oxyCODONE-acetaminophen **OR** oxyCODONE-acetaminophen, HYDROmorphone **OR** HYDROmorphone  IV Drips/Infusions   sodium chloride       Home Medications  Medications Prior to Admission: fluticasone-umeclidin-vilant (TRELEGY ELLIPTA) 100-62.5-25 MCG/INH AEPB, Inhale 1 puff into the lungs daily  albuterol sulfate HFA (PROVENTIL HFA) 108 (90 Base) MCG/ACT inhaler, Inhale 2 puffs into the lungs every 6 hours as needed for Wheezing  levothyroxine (LEVOTHROID) 88 MCG tablet, Take 1 tablet by mouth daily  venlafaxine (EFFEXOR XR) 37.5 MG extended release capsule, Take 1 capsule by mouth daily (Patient not taking: Reported on 6/1/2021)  Diet    ADULT DIET; Regular  Allergies    Cipro xr and Vicodin [hydrocodone-acetaminophen]  Social History     Social History     Socioeconomic History    Marital status:      Spouse name: Mariola Escalante    Number of children: 3    Years of education: 5    Highest education level: Not on file   Occupational History    Occupation: Disabled   Tobacco Use    Smoking status: Current Every Day Smoker     Packs/day: 2.00     Years: 48.00     Pack years: 96.00     Types: Cigarettes     Start date: 11/13/1967    Smokeless tobacco: Never Used   Vaping Use    Vaping Use: Never used   Substance and Sexual Activity    Alcohol use: No     Alcohol/week: 0.0 standard drinks    Drug use: No    Sexual activity: Yes     Partners: Male   Other Topics Concern    Not on file   Social History Narrative    Not on file     Social Determinants of Health     Financial Resource Strain:     Difficulty of Paying Living Expenses:    Food Insecurity:     Worried About Running Out of Food in the Last Year:     Ran Out of Food in the Last Year:    Transportation Needs:     Lack of Transportation (Medical):      Lack of Transportation (Non-Medical):    Physical Activity:     Days of Exercise per Week:     Minutes of Exercise per Session:    Stress:     Feeling of Stress :    Social Connections:     Frequency of Communication with Friends and Family:     Frequency of Social Gatherings with Friends and Family:     Attends Denominational Services:     Active Member of Clubs or Organizations:     Attends Club or Organization Meetings:     Marital Status:    Intimate Partner Violence:     Fear of Current or Ex-Partner:     Emotionally Abused:     Physically Abused:     Sexually Abused:      Family History          Problem Relation Age of Onset    Osteoporosis Mother     Hypertension Mother     High Cholesterol Mother     Stroke Mother     Colon Cancer Mother     Cancer Father         lung cancer    Heart Disease Father     Hypertension Father     High Cholesterol Father     Heart Disease Sister     High Cholesterol Sister     Hypertension Sister     Asthma Sister     Other Other         high arched feet    Lung Cancer Son      Sleep History    Never diagnosed with sleep apnea in the past.  Occupational history   Occupation:  She is current working: No  Type of profession: retired. History of tobacco smoking:Yes  Amount of tobacco smokin.0 PPD. Years of tobacco smokin. Quit smoking: No.              Quit [id number]   Current smoker: Yes. Amount of current tobacco smokin.0 PPD  . History of recreational or IV drug use in the past:NO      History of pulmonary embolism in the past: No            History of DVT in the past:Yes        [x] Right lower extremity   [] Left lower extremity. [place] of systems   Review of Systems   Constitutional: Positive for fatigue and unexpected weight change (50 Ib). Negative for activity change and fever. HENT: Negative. Eyes: Negative. Respiratory: Negative. Cardiovascular: Negative. Gastrointestinal: Negative. Endocrine: Negative. Genitourinary: Negative. Musculoskeletal: Negative. Skin: Negative. Allergic/Immunologic: Negative. Neurological: Negative. Hematological: Negative. Psychiatric/Behavioral: Negative. Vitals     height is 5' 4\" (1.626 m) and weight is 153 lb (69.4 kg). Her oral temperature is 98.1 °F (36.7 °C).  Her blood pressure is 109/51 (abnormal) and her pulse is 60. Her respiration is 18 and oxygen saturation is 93%. Body mass index is 26.26 kg/m². SUPPLEMENTAL O2: O2 Flow Rate (L/min): 1 L/min     I/O        Intake/Output Summary (Last 24 hours) at 6/15/2021 0902  Last data filed at 6/15/2021 0400  Gross per 24 hour   Intake 3728 ml   Output 1050 ml   Net 2678 ml     I/O last 3 completed shifts: In: 5650 [P.O.:800; I.V.:2928]  Out: 1050 [Urine:670; Drains:370; Blood:10]   Patient Vitals for the past 96 hrs (Last 3 readings):   Weight   06/15/21 0345 153 lb (69.4 kg)   06/14/21 0653 139 lb 3.2 oz (63.1 kg)       Exam   Nursing note and vitals reviewed. Physical Exam  Constitutional:       General: She is not in acute distress. Appearance: Normal appearance. She is normal weight. HENT:      Head: Normocephalic and atraumatic. Nose: Nose normal. No congestion or rhinorrhea. Mouth/Throat:      Mouth: Mucous membranes are dry. Pharynx: Oropharynx is clear. Posterior oropharyngeal erythema present. No oropharyngeal exudate. Eyes:      General: No scleral icterus. Right eye: No discharge. Left eye: No discharge. Extraocular Movements: Extraocular movements intact. Pupils: Pupils are equal, round, and reactive to light. Cardiovascular:      Rate and Rhythm: Normal rate and regular rhythm. Pulses: Normal pulses. Heart sounds: Normal heart sounds. No murmur heard. No gallop. Pulmonary:      Effort: Pulmonary effort is normal.      Breath sounds: No stridor. Wheezing and rales (appreciated more on right side) present. No rhonchi. Abdominal:      General: Abdomen is flat. Palpations: Abdomen is soft. Tenderness: There is no abdominal tenderness. There is no guarding or rebound. Musculoskeletal:         General: Normal range of motion. Cervical back: Neck supple. Lymphadenopathy:      Cervical: No cervical adenopathy.    Skin:     General: Skin is warm and dry.      Capillary Refill: Capillary refill takes less than 2 seconds. Neurological:      General: No focal deficit present. Mental Status: She is alert and oriented to person, place, and time. Mental status is at baseline. Sensory: No sensory deficit. Motor: No weakness. Psychiatric:         Mood and Affect: Mood normal.         Behavior: Behavior normal.         Thought Content: Thought content normal.         Judgment: Judgment normal.         Labs  - Old records and notes have been reviewed in [facility]   ABG  Lab Results   Component Value Date    PH 7.35 09/29/2013    PO2 53 09/29/2013    PCO2 45 09/29/2013    HCO3 25 09/29/2013    O2SAT 85 09/29/2013     No results found for: IFIO2, MODE, SETTIDVOL, SETPEEP  CBC  Recent Labs     06/15/21  0440   WBC 10.2   RBC 4.03*   HGB 12.6   HCT 41.9   .0*   MCH 31.3   MCHC 30.1*      MPV 10.9      BMP  Recent Labs     06/14/21 2027   CREATININE 1.1     LFT  No results for input(s): AST, ALT, ALB, BILITOT, ALKPHOS, LIPASE in the last 72 hours. Invalid input(s): AMYLASE  TROP  Lab Results   Component Value Date    TROPONINT < 0.010 03/12/2021    TROPONINT < 0.010 12/13/2020    TROPONINT < 0.010 05/16/2017     BNP  No results for input(s): BNP in the last 72 hours. Lactic Acid  No results for input(s): LACTA in the last 72 hours. INR  No results for input(s): INR, PROTIME in the last 72 hours. PTT  No results for input(s): APTT in the last 72 hours. Glucose  No results for input(s): POCGLU in the last 72 hours. UA No results for input(s): SPECGRAV, PHUR, COLORU, CLARITYU, MUCUS, PROTEINU, BLOODU, RBCUA, WBCUA, BACTERIA, NITRU, GLUCOSEU, BILIRUBINUR, UROBILINOGEN, KETUA, LABCAST, LABCASTTY, AMORPHOS in the last 72 hours. Invalid input(s): CRYSTALS.     PFTs               Sleep studies   Non on file    Cultures    None on file    EKG         NSR w/ LVH    Echocardiogram       TTE procedure:ECHOCARDIOGRAM COMPLETE 2D W DOPPLER W COLOR. Procedure Date  Date: 03/13/2021 Start: 10:00 AM     Study Location: Bedside  Technical Quality: Adequate visualization     Indications:Syncope.     Additional Medical History:Coronary artery disease, Hypertension,  Hyperlipidemia, Anxiety, stroke, Skin cancer, COPD, Breast cancer, Right  mastectomy, DVT, Family history of heart disease, Tobacco use     Patient Status: Routine     Height: 63.78 inches Weight: 136 pounds BSA: 1.66 m^2 BMI: 23.51 kg/m^2     BP: 120/41 mmHg      Conclusions      Summary   Technically difficult examination. Left ventricular size and systolic function is normal. Ejection fraction   was estimated at 55-60%. LV wall thickness is within normal limits and   there are no obvious wall motion abnormalities. The right ventricular size was normal with normal systolic function and   wall thickness. Mild aortic regurgitation is noted. Mild mitral regurgitation is present. Signature      ----------------------------------------------------------------   Electronically signed by Abigail So MD (Interpreting   physician) on 03/13/2021 at 02:48 PM    Radiology    CXR      Stable placement of right-sided chest tube and interval increase of right lower lobe pleural effusion        CT Scans          CTA CHEST W WO CONTRAST   12/13/2020   FINDINGS: No PE. There is no aneurysm or dissection. No mediastinal or hilar adenopathy by size criteria. Postsurgical changes right axilla. No axillary lymphadenopathy Heart size normal. No infiltrates or pleural effusions. The 8 mm nodule seen on the prior CT now measures 14 mm and has spiculated borders highly suspicious of malignancy. There however are no other additional new masses. Scattered nodules are present in both lungs which are previously seen. There is a cluster of nodular the anterior right upper lung stable. Small nodules posterior right lower lung stable.  Tiny triangular nodular density lingula is stable.  Upper abdomen Stable bilateral adrenal fullness likely hyperplasia this is stable dating back to June 2017.  Bones No suspicious bone lesions   Impression:  1. No PE. 2. No acute findings. 3. Interval increase in size of the patient's right lung nodule now measures 14 mm with spiculated borders and is highly suspicious for malignancy. (See actual reports for details)    Assessment     -Poorly differentiated squamous cell carcinoma right lower lobe. T1BN0 stage IA2 RLL s/p nonanatomic robotic assisted wedge resection  -Chronic COPD GOLD3 (FEV1 49% 4/19/21)  -Right sided pleural effusion s/p chest tube 6/14/21   -Tobacco abuse (96 pack year hx)  -Remote hx Squamous cell breast CA 2005 s/p mastectomy and radiation believed to be unrelated to current episode  -Remote hx DVT 1976  -hypothyroidism  -HTN  -remote hx of CVA      Plan     -Continue Symbicort 2 puffs BID  -Continue Spiriva Respimat 2 puffs daily  -Daily CXR while chest tube in place  -Acapella Q4H while awake  -Recommend f/u w/ tobacco cessation clinic on discharge  -will f/u in outpatient clinic  -Pain control per primary service  -VTE prophylaxis 40mg daily   -Continue to encourage ambulation        \"Thank you for asking us to see this patient\"    Questions and concerns addressed. Electronically signed by   Donne Kehr, DO on 6/15/2021 at 9:02 AM     Progressing well s/p non anatomic wedge RLL, robotic assisted RLL, superior  segmentectomy/mediastinal dissection, cryotherapy intercostal nerves 5,6,7,8  Clinical stage 1 A2 SqCC right lung  CT with small air leak  Continue home regiment cor GOLD 3 COPD  Awaiting path report. Patient seen and examined independently by me. Above discussed and I agree with  Medical resident's  note Also see my additional comments. Labs, cultures, and radiographs when available were reviewed. Changes were made in the orders as necessary. I discussed patient concerns with Fabby HONG.SHADI and instructions were given.  Respiratory care issues addressed. Please see our orders for the updated patient care plan.     Electronically signed by     Lucina Espinoza MD on 6/15/2021 at 6:13 PM
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
## 2021-06-15 NOTE — PROGRESS NOTES
[facility] -  Leonrad Ponce MD M.D. FACS  Daily Progress Note    Pt Name: Ang Martínez  Medical Record Number: [id number]  Date of Birth 1950   Today's Date: 6/15/2021    ASSESSMENT:     1. POD # 1 s/p non anatomic wedge RLL, mediastinal dissection, cryotherapy intercostal nerves 5,6,7,8  2. HD # 1  Active Hospital Problems    Diagnosis Date Noted    Primary malignant neoplasm of right lower lobe of lung ([facility] [C34.31] 04/20/2021     Priority: High    Cancer of lower lobe of right lung (HCC) [C34.31] 06/14/2021    S/P robotic assisted nonanatomic wedge resection of lateral basilar right lower lobe lung mass and mediastinal dissection [Z90.2] 06/14/2021    SOB (shortness of breath) on exertion [R06.02] 05/20/2021    Stage 2 moderate COPD by GOLD classification ([facility] [J44.9] 09/30/2013   3. Chief Complaint:  No chief complaint on file. PLAN:     1. IV to INT  2. Cont CT has airleak with cough  3. CXR no pneumo, a little more density at wedge site;not worrisome  4. Remove schuster  5. ambulate      SUBJECTIVE:     Lewis Starr is doing well. Pain is well controlled. She has no nausea and no vomiting. She has passed flatus and has not had a bowel movement. She is tolerating ADULT DIET; Regular.  Current activity is ambulating in halls      OBJECTIVE:     Patient Vitals for the past 24 hrs:   BP Temp Temp src Pulse Resp SpO2 Height Weight   06/15/21 0345 125/60 98.1 °F (36.7 °C) Oral 60 18 97 % -- 153 lb (69.4 kg)   06/14/21 2312 (!) 112/53 98.1 °F (36.7 °C) Oral 61 18 98 % -- --   06/14/21 2015 (!) 125/59 97.8 °F (36.6 °C) Oral 64 18 97 % -- --   06/14/21 1800 (!) 120/59 98.1 °F (36.7 °C) Oral 63 -- 96 % -- --   06/14/21 1750 (!) 114/54 -- -- 64 17 97 % -- --   06/14/21 1745 (!) 115/55 -- -- 61 16 97 % -- --   06/14/21 1740 (!) 116/56 -- -- 61 17 97 % -- --   06/14/21 1735 (!) 120/58 -- -- 64 17 97 % -- --   06/14/21 1730 (!) 122/59 -- -- 61 16 96 % -- --   06/14/21 1725 (!) 118/59 -- -- 63 15 96 % -- --   06/14/21 1720 (!) 121/56 -- -- 67 14 96 % -- --   06/14/21 1715 (!) 114/55 -- -- 60 16 96 % -- --   06/14/21 1710 (!) 119/57 -- -- 64 16 98 % -- --   06/14/21 1705 (!) 110/54 -- -- 61 18 96 % -- --   06/14/21 1700 (!) 112/55 -- -- 60 20 95 % -- --   06/14/21 1655 (!) 107/54 -- -- 60 15 95 % -- --   06/14/21 1650 (!) 97/46 -- -- 63 14 96 % -- --   06/14/21 1645 (!) 111/53 -- -- 60 12 100 % -- --   06/14/21 1640 (!) 111/53 -- -- 64 13 99 % -- --   06/14/21 1635 (!) 108/50 -- -- 60 16 97 % -- --   06/14/21 1630 (!) 119/57 -- -- 62 14 99 % -- --   06/14/21 1625 (!) 116/53 -- -- 60 13 98 % -- --   06/14/21 1620 (!) 122/55 -- -- 60 13 97 % -- --   06/14/21 1615 (!) 113/54 -- -- 60 14 98 % -- --   06/14/21 1610 125/61 -- -- 62 18 97 % -- --   06/14/21 1605 (!) 125/55 -- -- 60 16 97 % -- --   06/14/21 1600 (!) 128/57 -- -- 60 16 97 % -- --   06/14/21 1555 (!) 124/56 -- -- 59 16 97 % -- --   06/14/21 1550 (!) 129/57 -- -- 60 17 97 % -- --   06/14/21 1545 137/65 -- -- 63 12 97 % -- --   06/14/21 1540 (!) 124/57 -- -- 59 14 98 % -- --   06/14/21 1535 126/60 -- -- 59 16 97 % -- --   06/14/21 1530 (!) 129/59 -- -- 60 14 97 % -- --   06/14/21 1525 128/61 -- -- 58 15 98 % -- --   06/14/21 1520 135/62 -- -- 58 14 97 % -- --   06/14/21 1515 (!) 130/59 -- -- 58 15 98 % -- --   06/14/21 1510 128/60 -- -- 58 17 99 % -- --   06/14/21 1505 129/61 -- -- 59 18 98 % -- --   06/14/21 1500 (!) 126/58 -- -- 58 16 98 % -- --   06/14/21 1455 129/62 -- -- 60 12 98 % -- --   06/14/21 1450 (!) 128/59 -- -- 58 14 98 % -- --   06/14/21 1445 (!) 118/54 -- -- 58 12 99 % -- --   06/14/21 1440 (!) 119/57 -- -- 59 16 100 % -- --   06/14/21 1435 (!) 107/53 -- -- 57 13 98 % -- --   06/14/21 1430 130/62 -- -- 57 11 99 % -- --   06/14/21 1425 134/63 -- -- 57 12 99 % -- --   06/14/21 1420 128/61 -- -- 57 12 99 % -- --   06/14/21 1415 131/62 -- -- 57 17 98 % -- --   06/14/21 1410 137/63 -- -- 58 16 100 % -- --   06/14/21 1405 139/63 -- -- 56 18 99 % -- --   06/14/21 1400 (!) 140/64 -- -- 57 19 99 % -- --   06/14/21 1355 (!) 141/63 -- -- 57 17 99 % -- --   06/14/21 1350 138/65 -- -- 58 16 99 % -- --   06/14/21 1345 130/63 -- -- 57 18 98 % -- --   06/14/21 1335 136/63 -- -- 57 17 93 % -- --   06/14/21 1330 (!) 126/59 -- -- 57 16 94 % -- --   06/14/21 1315 138/62 -- -- 55 18 93 % -- --   06/14/21 1310 127/60 -- -- 54 16 95 % -- --   06/14/21 1305 (!) 143/63 -- -- 54 19 97 % -- --   06/14/21 1300 137/64 -- -- 53 18 95 % -- --   06/14/21 1255 (!) 135/55 -- -- 53 17 98 % -- --   06/14/21 1250 134/63 -- -- 53 18 100 % -- --   06/14/21 1245 138/65 -- -- 54 16 99 % -- --   06/14/21 1240 (!) 148/56 -- -- 54 17 99 % -- --   06/14/21 1235 139/65 -- -- 54 15 100 % -- --   06/14/21 1230 138/63 -- -- 54 17 100 % -- --   06/14/21 1225 (!) 144/66 -- -- 55 16 100 % -- --   06/14/21 1220 (!) 147/56 -- -- 55 18 100 % -- --   06/14/21 1215 (!) 154/64 -- -- 58 14 100 % -- --   06/14/21 1210 (!) 145/65 -- -- 59 19 100 % -- --   06/14/21 1205 (!) 148/67 -- -- 112 20 100 % -- --   06/14/21 1200 (!) 167/73 -- -- 85 16 100 % -- --   06/14/21 1155 138/65 -- -- 59 15 100 % -- --   06/14/21 1150 129/61 -- -- 59 16 100 % -- --   06/14/21 1147 (!) 119/57 97 °F (36.1 °C) Temporal 59 16 100 % -- --   06/14/21 0653 (!) 160/72 97.7 °F (36.5 °C) Temporal 61 16 96 % 5' 4\" (1.626 m) 139 lb 3.2 oz (63.1 kg)         Intake/Output Summary (Last 24 hours) at 6/15/2021 0631  Last data filed at 6/15/2021 0400  Gross per 24 hour   Intake 3728 ml   Output 1050 ml   Net 2678 ml       In: 3728 [P.O.:800; I.V.:2928]  Out: 1050 [Urine:670; Drains:370]    I/O last 3 completed shifts: In: 2550 [P.O.:800; I.V.:1750]  Out: 570 [Urine:320; Drains:240; Blood:10]     Date 06/15/21 0000 - 06/15/21 2359   Shift 8065-1584 5230-1131 9658-0483 24 Hour Total   INTAKE   P.O.(mL/kg/hr) 0   0   I. V.(mL/kg) [id number])   [id number])   Shift Total(mL/kg) 0046(50)   3562(12)   OUTPUT   Urine(mL/kg/hr) 350 350   Drains(mL/kg) 130(1.9)   130(1.9)   Shift Total(mL/kg) 480(6.9)   480(6.9)   Weight (kg) 69.4 69.4 69.4 69.4       Wt Readings from Last 3 Encounters:   06/15/21 153 lb (69.4 kg)   06/03/21 143 lb 12.8 oz (65.2 kg)   06/02/21 143 lb (64.9 kg)        Body mass index is 26.26 kg/m². Diet: ADULT DIET; Regular        MEDS:     Scheduled Meds:   levothyroxine  88 mcg Oral Daily    sodium chloride flush  5-40 mL Intravenous 2 times per day    bacitracin   Topical Daily    enoxaparin  40 mg Subcutaneous Daily    polyethylene glycol  17 g Oral Daily    ketorolac  15 mg Intravenous Q6H    lidocaine  2 patch Transdermal Nightly    budesonide-formoterol  2 puff Inhalation BID    And    tiotropium  2 puff Inhalation Daily     Continuous Infusions:   sodium chloride       PRN Meds:albuterol sulfate HFA, 2 puff, Q6H PRN  sodium chloride flush, 5-40 mL, PRN  sodium chloride, 25 mL, PRN  acetaminophen, 650 mg, Q4H PRN  ondansetron, 4 mg, Q8H PRN   Or  ondansetron, 4 mg, Q6H PRN  oxyCODONE-acetaminophen, 1 tablet, Q4H PRN   Or  oxyCODONE-acetaminophen, 2 tablet, Q4H PRN  HYDROmorphone, 0.25 mg, Q3H PRN   Or  HYDROmorphone, 0.5 mg, Q3H PRN          PHYSICAL EXAM:     CONSTITUTIONAL: Alert and oriented times 3, no acute distress and cooperative to examination. HEENT:still on nasal cannula at 2l weaning down    LUNGS: equal BS, rhonchi that clears with cough, CT small leak with cough    WOUND/INCISION:  healing well, no drainage, no erythema  EXTREMITY: no cyanosis, clubbing or edema      LABS:     CBC:   Recent Labs     06/15/21  0440   WBC 10.2   RBC 4.03*   HGB 12.6   HCT 41.9   .0*   MCH 31.3   MCHC 30.1*      MPV 10.9      Last 3 CMP:   Recent Labs     06/14/21 2027   CREATININE 1.1      Troponin: No results for input(s): TROPONINI in the last 72 hours.   Calcium:   Lab Results   Component Value Date    CALCIUM 9.3 04/14/2021    CALCIUM 8.5 03/16/2021    CALCIUM 8.7 03/15/2021      Ionized Calcium: No results found for: IONCA   Lipids: No results for input(s): CHOL, HDL in the last 72 hours. Invalid input(s): LDLCALCU  INR: No results for input(s): INR in the last 72 hours. Lactic Acid:   Lab Results   Component Value Date    LACTA 2.1 05/16/2017    LACTA 1.0 10/30/2015        CBC:   Lab Results   Component Value Date    WBC 10.2 06/15/2021    RBC 4.03 06/15/2021    RBC 4.49 05/16/2012    HGB 12.6 06/15/2021    HCT 41.9 06/15/2021    .0 06/15/2021    MCH 31.3 06/15/2021    MCHC 30.1 06/15/2021    RDW 13.7 04/14/2021     06/15/2021    MPV 10.9 06/15/2021             DVT prophylaxis: [x] Lovenox                                 [x] SCDs                                 [] SQ Heparin                                 [] Encourage ambulation, low risk for DVT, no chemical or mechanical prophylaxis necessary              [] Already on Anticoagulation      RADIOLOGY:      XR CHEST PORTABLE    Result Date: 6/15/2021  Impression: 1. Redemonstration of right chest tube. No pneumothorax. 2.  Increasing right lower lobe pleural effusion and/or atelectasis and/or infiltrate. This document has been electronically signed by: Андрей Neville. DO Petra on 06/15/2021 05:18 AM    XR CHEST PORTABLE    Result Date: 6/14/2021  1. Postoperative changes in the right lower lobe. Right-sided chest tube in place. 2. Borderline cardiomegaly. 3. Thickening of the interstitial lung markings and areas of increased density in the right lower lobe and left midlung field. 4. Postoperative changes along the right chest wall. . **This report has been created using voice recognition software. It may contain minor errors which are inherent in voice recognition technology. ** Final report electronically signed by DR Carlene Joiner on 6/14/2021 12:27 PM        Girish Ernst MD, M.D.  FACS  Electronically signed 6/15/2021 at 6:31 AM

## 2021-06-15 NOTE — PLAN OF CARE
Problem: Falls - Risk of:  Goal: Will remain free from falls  Description: Will remain free from falls  Outcome: Ongoing  Note: Patient remains on fall precautions. Falling star in place. Fall band on. Non-skid slippers on when ambulating. Side rails up x 2. Bed and chair alarm on. Call light within reach. Patient uses call light appropriately. Hourly rounding in place. Problem: Pain:  Goal: Pain level will decrease  Description: Pain level will decrease  Outcome: Ongoing  Note: Pain Assessment: 0-10  Pain Level: 2   Patient's Stated Pain Goal: No pain   Is pain goal met at this time? Yes     Non-Pharmaceutical Pain Intervention(s): Rest       Problem: Pain:  Goal: Control of acute pain  Description: Control of acute pain  Outcome: Ongoing  Note: Patient's right sided chest pain from chest tube has been well controlled this shift. Scheduled Toradol given. Problem: Cardiovascular  Goal: Hemodynamic stability  Outcome: Ongoing  Note: Vital signs remain stable. Continue to monitor every 4 hours and as needed. Problem: GI  Goal: Bowel movement at least every other day  Outcome: Ongoing  Note: No bowel movement today. Glycolax given. Problem: Skin Integrity/Risk  Goal: No skin breakdown during hospitalization  Outcome: Ongoing  Note: No new skin breakdown noted this shift. Turns self. Problem: Discharge Planning:  Goal: Discharged to appropriate level of care  Description: Discharged to appropriate level of care  Outcome: Ongoing  Note: Patient is from home. Plans are to return home once medically stable. Care plan reviewed with patient. Patient is able to verbalize understanding of the plan of care and contribute to goal setting.

## 2021-06-15 NOTE — ANESTHESIA POSTPROCEDURE EVALUATION
Department of Anesthesiology  Postprocedure Note    Patient: Leatha Méndez  MRN: [id number]  YOB: 1950  Date of evaluation: 6/15/2021  Time:  10:55 AM     Procedure Summary     Date: 06/14/21 Room / Location: [address] [place] [address] [facility] [place]    Anesthesia Start: 1872 Anesthesia Stop: 1150    Procedure: ROBOTIC ASSISTED RIGHT LOWER LOBE SUPERIOR SEGMENTECTOMY AND MEDIASTINAL DISSECTION, CRYOTHERAPY OF INTERCOSTAL NERVES (Right Chest) Diagnosis: (RIGHT LUNG CANCER)    Surgeons: Patricia Velarde MD Responsible Provider: [employer] [facility],     Anesthesia Type: general ASA Status: 3          Anesthesia Type: general    Eliot Phase I: Eliot Score: 9    Eliot Phase II:      Last vitals: Reviewed and per EMR flowsheets.        Anesthesia Post Evaluation    Patient location during evaluation: PACU  Patient participation: complete - patient participated  Level of consciousness: awake and alert  Pain score: 3  Airway patency: patent  Nausea & Vomiting: no nausea and no vomiting  Complications: no  Cardiovascular status: hemodynamically stable and blood pressure returned to baseline  Respiratory status: spontaneous ventilation, room air and acceptable  Hydration status: stable

## 2021-06-15 NOTE — FLOWSHEET NOTE
06/15/21 1826   Provider Notification   Reason for Communication Review case   Provider Name Dejon Hansen Nate   Provider Notification Advance Practice Clinician (CNS, NP, CNM, CRNA, PA)   Method of Communication Secure Message   Response See orders   Notification Time [id number]: Updated ZEENAT Hdez that patient had a schuster removed this morning. She did void, just unsure how much and that was earlier this morning. She now has the urge but cannot seem to go. Her bladder scan showed 290 ml but she still has yet to have a bowel movement. She did just get nauseated as well. 1842: Give her another hour and if still no urine straight cath and wait 8 hours, if no urine then replace schuster. Continue antiemetics for now. Orders to let him know if it worsens.

## 2021-06-15 NOTE — CARE COORDINATION
6/15/21, 10:54 AM EDT  DISCHARGE PLANNING EVALUATION:    [employer]       Admitted: 6/14/2021/ [facility] [address] [facility] day: 1   Location: [facility]11/011-A Reason for admit: Cancer of lower lobe of right lung ([address] [C34.31]   PMH:  has a past medical history of Anxiety, Arthritis, Breast cancer ([address] CAD (coronary artery disease), Cancer of the skin, Cerebral artery occlusion with cerebral infarction ([address] Chronic UTI, COPD (chronic obstructive pulmonary disease) ([address] CVA (cerebral infarction), Depression, DVT of lower extremity (deep venous thrombosis) ([address] Facial injury, History of stroke, Hx of blood clots, Hyperlipidemia, Hypertension, Hypothyroidism, IBS (irritable bowel syndrome), Low HDL (under 40), Lung cancer ([address] Prolonged emergence from general anesthesia, Recurrent UTI (urinary tract infection), and Word finding difficulty. Procedure:   6/14 s/p non anatomic wedge RLL, robotic assisted RLL, superior segmentectomy/mediastinal dissection, cryotherapy intercostal nerves 5,6,7,8  6/15 CXR  1.  Redemonstration of right chest tube.  No pneumothorax. 2.  Increasing right lower lobe pleural effusion and/or atelectasis and/or   infiltrate. Barriers to Discharge:  From SDS w history RLL malignant neoplasm w history CVA, COPD. POD 1 (see procedure above). Right Chest Tube Output = 370 ml/24h; monitor. Pulmonary/SGY following. Oxygen 1L continued  PCP: NITO Marquez CNP  Readmission Risk Score: 9%    Patient Goals/Plan/Treatment Preferences: met w client; plans home w son Keira Acosta; prefers either [facility] or agency her friend works for (unsure of name but will let staff know) for nsg, therapy, aide after choices offered; if client needs home oxygen; prefers [facility] after choices offered; monitor for home oxygen eval if not weaned off; will ask Attending; has walker  Transportation/Food Security/Housekeeping Addressed:  No issues identified.

## 2021-06-15 NOTE — PROGRESS NOTES
Pharmacy Medication History Note      List of current medications patient is taking is complete. Source of information: pt, patient's pharmacy: [facility]    Changes made to medication list:  Medications removed (include reason, ex. therapy complete or physician discontinued):  Removed Trelegy Ellipta: pt states she didn't know she was supposed to be taking, has never been filled  Removed venlafaxine: pt states it helped her feel better but she didn't receive more refills    Medications added/doses adjusted: Added benadryl 25 mg tab prn for itching    Other notes (ex. Recent course of antibiotics, Coumadin dosing):  Denies use of other OTC or herbal medications.       Allergies reviewed      Electronically signed by Lizzie Granger on 6/15/2021 at 11:09 AM   NABOR Rosario PharmD Candidate [id number]

## 2021-06-15 NOTE — PLAN OF CARE
Problem: Falls - Risk of:  Goal: Will remain free from falls  Description: Will remain free from falls  6/15/2021 0017 by Rolando Coates RN  Outcome: Ongoing  Note: Patient in bed, call light and items in reach, bed alarm on.   6/15/2021 0015 by Rolando Coates RN  Outcome: Ongoing  Goal: Absence of physical injury  Description: Absence of physical injury  6/15/2021 0017 by Rolando Coates RN  Outcome: Ongoing  Note: No falls or injury this shift. 6/15/2021 0015 by Rolando Coates RN  Outcome: Ongoing     Problem: Pain:  Goal: Pain level will decrease  Description: Pain level will decrease  6/15/2021 0017 by Rolando Coates RN  Outcome: Ongoing  Note: Pain Assessment: 0-10  Pain Level: 3   Patient's Stated Pain Goal: 4   Is pain goal met at this time?   Yes     Non-Pharmaceutical Pain Intervention(s): Rest, Repositioned    6/15/2021 0015 by Rolando Coates RN  Outcome: Ongoing  Goal: Control of acute pain  Description: Control of acute pain  6/15/2021 0017 by Rolando Coates RN  Outcome: Ongoing  6/15/2021 0015 by Rolando Coates RN  Outcome: Ongoing  Goal: Control of chronic pain  Description: Control of chronic pain  6/15/2021 0017 by Rolando Coates RN  Outcome: Ongoing  6/15/2021 0015 by Rolando Coates RN  Outcome: Ongoing     Problem: Cardiovascular  Goal: Hemodynamic stability  Outcome: Ongoing  Note:   Vitals:    06/14/21 2312   BP: (!) 112/53   Pulse: 61   Resp: 18   Temp: 98.1 °F (36.7 °C)   SpO2: 98%       Goal: Agreement to quit smoking  Outcome: Ongoing     Problem: GI  Goal: Bowel movement at least every other day  Outcome: Ongoing  Note: No BM this shift, on glycolax     Problem: Skin Integrity/Risk  Goal: No skin breakdown during hospitalization  Outcome: Ongoing     Problem: Musculor/Skeletal Functional Status  Goal: Absence of falls  Outcome: Ongoing

## 2021-06-16 ENCOUNTER — APPOINTMENT (OUTPATIENT)
Dept: GENERAL RADIOLOGY | Age: 71
DRG: 163 | End: 2021-06-16
Attending: SURGERY
Payer: MEDICARE

## 2021-06-16 PROBLEM — N99.89 POSTOPERATIVE URINARY RETENTION: Status: ACTIVE | Noted: 2021-06-15

## 2021-06-16 PROBLEM — R33.8 POSTOPERATIVE URINARY RETENTION: Status: ACTIVE | Noted: 2021-06-15

## 2021-06-16 PROCEDURE — 2060000000 HC ICU INTERMEDIATE R&B

## 2021-06-16 PROCEDURE — 6370000000 HC RX 637 (ALT 250 FOR IP): Performed by: SURGERY

## 2021-06-16 PROCEDURE — 71045 X-RAY EXAM CHEST 1 VIEW: CPT

## 2021-06-16 PROCEDURE — 99024 POSTOP FOLLOW-UP VISIT: CPT | Performed by: SURGERY

## 2021-06-16 PROCEDURE — 6370000000 HC RX 637 (ALT 250 FOR IP): Performed by: NURSE PRACTITIONER

## 2021-06-16 PROCEDURE — 94640 AIRWAY INHALATION TREATMENT: CPT

## 2021-06-16 PROCEDURE — 99232 SBSQ HOSP IP/OBS MODERATE 35: CPT | Performed by: INTERNAL MEDICINE

## 2021-06-16 PROCEDURE — 51798 US URINE CAPACITY MEASURE: CPT

## 2021-06-16 PROCEDURE — APPSS30 APP SPLIT SHARED TIME 16-30 MINUTES: Performed by: NURSE PRACTITIONER

## 2021-06-16 PROCEDURE — 2580000003 HC RX 258: Performed by: SURGERY

## 2021-06-16 PROCEDURE — 94669 MECHANICAL CHEST WALL OSCILL: CPT

## 2021-06-16 PROCEDURE — 6360000002 HC RX W HCPCS: Performed by: SURGERY

## 2021-06-16 RX ORDER — GUAIFENESIN 600 MG/1
600 TABLET, EXTENDED RELEASE ORAL 2 TIMES DAILY
Status: DISCONTINUED | OUTPATIENT
Start: 2021-06-16 | End: 2021-06-18 | Stop reason: HOSPADM

## 2021-06-16 RX ADMIN — TIOTROPIUM BROMIDE INHALATION SPRAY 2 PUFF: 3.12 SPRAY, METERED RESPIRATORY (INHALATION) at 07:33

## 2021-06-16 RX ADMIN — GUAIFENESIN 600 MG: 600 TABLET, EXTENDED RELEASE ORAL at 11:38

## 2021-06-16 RX ADMIN — POLYETHYLENE GLYCOL 3350 17 G: 17 POWDER, FOR SOLUTION ORAL at 08:20

## 2021-06-16 RX ADMIN — LEVOTHYROXINE SODIUM 88 MCG: 0.09 TABLET ORAL at 06:36

## 2021-06-16 RX ADMIN — ENOXAPARIN SODIUM 40 MG: 40 INJECTION, SOLUTION INTRAVENOUS; SUBCUTANEOUS at 08:20

## 2021-06-16 RX ADMIN — BUDESONIDE AND FORMOTEROL FUMARATE DIHYDRATE 2 PUFF: 160; 4.5 AEROSOL RESPIRATORY (INHALATION) at 17:05

## 2021-06-16 RX ADMIN — SODIUM CHLORIDE, PRESERVATIVE FREE 10 ML: 5 INJECTION INTRAVENOUS at 08:26

## 2021-06-16 RX ADMIN — KETOROLAC TROMETHAMINE 15 MG: 30 INJECTION, SOLUTION INTRAMUSCULAR; INTRAVENOUS at 08:21

## 2021-06-16 RX ADMIN — BACITRACIN: 500 OINTMENT TOPICAL at 08:21

## 2021-06-16 RX ADMIN — KETOROLAC TROMETHAMINE 15 MG: 30 INJECTION, SOLUTION INTRAMUSCULAR; INTRAVENOUS at 03:49

## 2021-06-16 RX ADMIN — BUDESONIDE AND FORMOTEROL FUMARATE DIHYDRATE 2 PUFF: 160; 4.5 AEROSOL RESPIRATORY (INHALATION) at 07:37

## 2021-06-16 RX ADMIN — OXYCODONE HYDROCHLORIDE AND ACETAMINOPHEN 1 TABLET: 5; 325 TABLET ORAL at 21:12

## 2021-06-16 RX ADMIN — GUAIFENESIN 600 MG: 600 TABLET, EXTENDED RELEASE ORAL at 21:09

## 2021-06-16 ASSESSMENT — PAIN DESCRIPTION - ORIENTATION
ORIENTATION: RIGHT
ORIENTATION: RIGHT

## 2021-06-16 ASSESSMENT — PAIN DESCRIPTION - DESCRIPTORS
DESCRIPTORS: SHARP;ACHING
DESCRIPTORS: CONSTANT;ACHING

## 2021-06-16 ASSESSMENT — PAIN DESCRIPTION - FREQUENCY
FREQUENCY: CONTINUOUS
FREQUENCY: CONTINUOUS

## 2021-06-16 ASSESSMENT — PAIN DESCRIPTION - PROGRESSION
CLINICAL_PROGRESSION: GRADUALLY IMPROVING
CLINICAL_PROGRESSION: GRADUALLY WORSENING

## 2021-06-16 ASSESSMENT — PAIN DESCRIPTION - ONSET
ONSET: ON-GOING
ONSET: ON-GOING

## 2021-06-16 ASSESSMENT — PAIN SCALES - GENERAL
PAINLEVEL_OUTOF10: 8
PAINLEVEL_OUTOF10: 0
PAINLEVEL_OUTOF10: 3
PAINLEVEL_OUTOF10: 0

## 2021-06-16 ASSESSMENT — PAIN - FUNCTIONAL ASSESSMENT
PAIN_FUNCTIONAL_ASSESSMENT: PREVENTS OR INTERFERES SOME ACTIVE ACTIVITIES AND ADLS
PAIN_FUNCTIONAL_ASSESSMENT: PREVENTS OR INTERFERES SOME ACTIVE ACTIVITIES AND ADLS

## 2021-06-16 ASSESSMENT — PAIN DESCRIPTION - PAIN TYPE
TYPE: SURGICAL PAIN
TYPE: SURGICAL PAIN

## 2021-06-16 ASSESSMENT — PAIN DESCRIPTION - LOCATION
LOCATION: CHEST
LOCATION: CHEST

## 2021-06-16 NOTE — CARE COORDINATION
6/16/21, 12:55 PM EDT    DISCHARGE ON GOING EVALUATION    Derek Wu day: 2  Location: [facility]11/011-A Reason for admit: Cancer of lower lobe of right lung [facility]) [C34.31]    Procedure:   6/14 s/p non anatomic wedge RLL, robotic assisted RLL, superior segmentectomy/mediastinal dissection, cryotherapy intercostal nerves 5,6,7,8  6/15 CXR  1.  Redemonstration of right chest tube.  No pneumothorax. 2.  Increasing right lower lobe pleural effusion and/or atelectasis and/or infiltrate  6/16 CXR; progressing left basilar atelectasis, stable right basilar consolidation, atelectasis and pleural fluid   Barriers to Discharge:  From SDS w history RLL malignant neoplasm w history CVA, COPD. POD 2 (see procedure above). Right Chest Tube Output = 490 ml/24h; monitor. Pulmonary/SGY following.  Oxygen 1L continued  PCP: NITO Burger CNP  Readmission Risk Score: 9%     Patient Goals/Plan/Treatment Preferences: met w client; plans home w son Ival Sensor; [facility]; [facility]; monitor for home oxygen eval if not weaned off; will ask Attending; has walker

## 2021-06-16 NOTE — PROGRESS NOTES
[facility]      Patient - Mariela Anderson   MRN -  [id number]   [facility] # - [de-identified]   - 1950      Date of Admission -  2021  6:13 AM  Date of evaluation -  2021  Room - --A   Hospital Day - 2  Consulting - Asael Carpio MD Primary Care Physician - NITO Ricks - CNP     Problem List      Active Hospital Problems    Diagnosis Date Noted    Postoperative urinary retention [N99.89, R33.8] 06/15/2021    Cancer of lower lobe of right lung (HCC) [C34.31] 2021    S/P robotic assisted nonanatomic wedge resection of lateral basilar right lower lobe lung mass and mediastinal dissection [Z90.2] 2021    SOB (shortness of breath) on exertion [R06.02] 2021    Primary malignant neoplasm of right lower lobe of lung (HCC) [C34.31] 2021    Stage 2 moderate COPD by GOLD classification ([facility] [J44.9] 2013     Reason for Consult    Post op Pulmonary care s/p nonanatomic wedge resection for neoplasm  HPI   History Obtained From: Patient and electronic medical record. Mariela Anderson is a 73 y.o. female who presented to [facility] on 2021 for robotic assisted right lower lobe superior segment dissection. 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 Patient seen and evaluated at bedside following surgery. Patient reports minimal post procedural discomfort or pain. Patient does report worsening shortness of breath for the last several months unchanged at time of interview. Denies cough, shortness of breath or chest pain.  Patient does admit to continued hx of smoking with last cigarette yesteday prior to surgery. Patient reports she has been smoking 2 ppd for the past 48 years. Patient was last seen and evaluated by hematology and oncology on 6/3/2021 with Dr. Katlyn Donovan. Noted to have a 14 mm spiculated nodule on CTA of the chest in 12/13/2020 with interval enlargement from previous CT in 2/19. Biopsy of the right lower lobe demonstrated poorly differentiated squamous cell carcinoma. P40 positive and TTF-1 negative. Patient does have a remote history of breast cancer roughly 15 years ago (2005?) treated with chemotherapy followed by right mastectomy and radiation to the chest wall. Patient received several years of adjuvant hormonal therapy. There was no history of recurrence. This was thought to be unrelated to current tumor as previous tumor was receptor positive and this tumor is receptor negative. She is having shortness of breath: No  She is having cough: No  She is having chest pain:No      Past 24 hrs   -On 1 LPM via NC  -surgical path no malignancy in lymph nodes  -CT to R  cc no obvious air leak  -Post op pain well controlled  All other systems reviewed        PMHx   Past Medical History      Diagnosis Date    Anxiety 2007    Arthritis     Breast cancer [facility]) 2005    right mastectomy, chemo and radiation history    CAD (coronary artery disease) 2001    sees Dr Erasmo Christie of the skin 2004    Cerebral artery occlusion with cerebral infarction ([address]     Chronic UTI     COPD (chronic obstructive pulmonary disease) ([address]     sees RL Petersen    CVA (cerebral infarction) 2007, 2008    Depression 2007    DVT of lower extremity (deep venous thrombosis) ([address] 1976    Facial injury 2005    Fall on greasy ground, striking left periorbital region    History of stroke 2007, 2008, 2009    Patient relates a stroke with right weakness and speech in 2007; another in 2010 or 2012 (unsure), both with need to rehabilitation.   Also \"2 mini-strokes\" (?)    Hx of blood clots 1977    hip, leg    Hyperlipidemia 2005    Hypertension 2005    Hypothyroidism     IBS (irritable bowel syndrome)     Low HDL (under 40) 2014    Lung cancer [facility])     sees Dr Rosalinda Vazquez    Prolonged emergence from general anesthesia     Recurrent UTI (urinary tract infection) 2015    Dr Lorene Damico finding difficulty 05/16/2017    work-up in progress      Past Surgical History        Procedure Laterality Date   [address] Right 04/01/2005    evacuation of breast hematoma-Dr. Mary Grace Franklin    BREAST SURGERY Right 11/30/2004    lymphatic mapping, SLN bx x2-Dr. Gurpreet Campbell    COLONOSCOPY  2009    Dr. Luciana Worrell  03/16/2021    CT NEEDLE BIOPSY LUNG PERCUTANEOUS 3/16/2021 [facility] CT SCAN    HIP SURGERY  01/19/2015    HYSTERECTOMY  1976    JOINT REPLACEMENT Left 2011    HIP    JOINT REPLACEMENT Right 01/19/2015    Right Total Hip Replacement - Dr. Yoly Pizarro, TOTAL Right 6/14/2021    ROBOTIC ASSISTED RIGHT LOWER LOBE SUPERIOR SEGMENTECTOMY AND MEDIASTINAL DISSECTION, CRYOTHERAPY OF INTERCOSTAL NERVES performed by Sandra Herzog MD at [address] [place] [address] Right 2005    Dr. Zakiya Leblanc  04/10/2018    Lumbar epidural steroid injection at L4    ME NJX DX/THER SBST INTRLMNR LMBR/SAC W/IMG GDN N/A 04/10/2018    LESI  @ L4 performed by Sandra Willard MD at [address] [facility], 2001    ovaries    THYROID LOBECTOMY Left 11/26/2010    left thyroid lobectomy with isthmusectomy-Dr. Martha Wall THYROID SURGERY  2007    right thyroid lobectomy-Dr. Ibanez     [facility]    Current Medications    levothyroxine  88 mcg Oral Daily    sodium chloride flush  5-40 mL Intravenous 2 times per day    bacitracin   Topical Daily    enoxaparin  40 mg Subcutaneous Daily    polyethylene glycol  17 g Oral Daily    ketorolac  15 mg Intravenous Q6H    lidocaine  2 patch Transdermal Nightly    budesonide-formoterol  2 puff Inhalation BID    And    tiotropium  2 puff Inhalation Daily     albuterol sulfate HFA, sodium chloride flush, sodium chloride, acetaminophen, ondansetron **OR** ondansetron, oxyCODONE-acetaminophen **OR** oxyCODONE-acetaminophen, HYDROmorphone **OR** HYDROmorphone  IV Drips/Infusions   sodium chloride       Home Medications  Medications Prior to Admission: diphenhydrAMINE (BENADRYL) 25 MG tablet, Take 25 mg by mouth every 6 hours as needed for Itching  albuterol sulfate HFA (PROVENTIL HFA) 108 (90 Base) MCG/ACT inhaler, Inhale 2 puffs into the lungs every 6 hours as needed for Wheezing  levothyroxine (LEVOTHROID) 88 MCG tablet, Take 1 tablet by mouth daily  [DISCONTINUED] fluticasone-umeclidin-vilant (TRELEGY ELLIPTA) 100-62.5-25 MCG/INH AEPB, Inhale 1 puff into the lungs daily  [DISCONTINUED] venlafaxine (EFFEXOR XR) 37.5 MG extended release capsule, Take 1 capsule by mouth daily (Patient not taking: Reported on 6/1/2021)  Diet    ADULT DIET; Regular  Allergies    Cipro xr and Vicodin [hydrocodone-acetaminophen]  Social History     Social History     Socioeconomic History    Marital status:       Spouse name: Ariela Alatorre    Number of children: 3    Years of education: 5    Highest education level: Not on file   Occupational History    Occupation: Disabled   Tobacco Use    Smoking status: Current Every Day Smoker     Packs/day: 2.00     Years: 48.00     Pack years: 96.00     Types: Cigarettes     Start date: 11/13/1967    Smokeless tobacco: Never Used   Vaping Use    Vaping Use: Never used   Substance and Sexual Activity    Alcohol use: No     Alcohol/week: 0.0 standard drinks    Drug use: No    Sexual activity: Yes     Partners: Male   Other Topics Concern    Not on file   Social History Narrative    Not on file     Social Determinants of Health     Financial Resource Strain:     Difficulty of Paying Living Expenses:    Food Insecurity:     Worried About Running Out of Food in the Last Year:     [address] in the Last Year:    Transportation Needs:     Lack of Transportation (Medical):  Lack of Transportation (Non-Medical):    Physical Activity:     Days of Exercise per Week:     Minutes of Exercise per Session:    Stress:     Feeling of Stress :    Social Connections:     Frequency of Communication with Friends and Family:     Frequency of Social Gatherings with Friends and Family:     Attends Confucianist Services:     Active Member of Clubs or Organizations:     Attends Club or Organization Meetings:     Marital Status:    Intimate Partner Violence:     Fear of Current or Ex-Partner:     Emotionally Abused:     Physically Abused:     Sexually Abused:      Family History          Problem Relation Age of Onset    Osteoporosis Mother     Hypertension Mother     High Cholesterol Mother     Stroke Mother     Colon Cancer Mother     Cancer Father         lung cancer    Heart Disease Father     Hypertension Father     High Cholesterol Father     Heart Disease Sister     High Cholesterol Sister     Hypertension Sister     Asthma Sister     Other Other         high arched feet    Lung Cancer Son      Sleep History    Never diagnosed with sleep apnea in the past.  Occupational history   Occupation:  She is current working: No  Type of profession: retired. History of tobacco smoking:Yes  Amount of tobacco smokin.0 PPD. Years of tobacco smokin. Quit smoking: No.              Quit BEPY:[id number]   Current smoker: Yes. Amount of current tobacco smokin.0 PPD  . History of recreational or IV drug use in the past:NO      History of pulmonary embolism in the past: No            History of DVT in the past:Yes        [x] Right lower extremity   [] Left lower extremity.            Vitals     height is 5' 4\" (1.626 m) and weight is 145 lb 3.2 oz (65.9 kg). Her oral temperature is 97.9 °F (36.6 °C). Her blood pressure is 140/64 (abnormal) and her pulse is 51. Her respiration is 17 and oxygen saturation is 92%. Body mass index is 24.92 kg/m². SUPPLEMENTAL O2: O2 Flow Rate (L/min): 1 L/min     I/O        Intake/Output Summary (Last 24 hours) at 6/16/2021 0819  Last data filed at 6/16/2021 0347  Gross per 24 hour   Intake 1139.01 ml   Output 1058 ml   Net 81.01 ml     I/O last 3 completed shifts: In: 18 [P.O.:880; I.V.:259]  Out: 1058 [Urine:568; Drains:490]   Patient Vitals for the past 96 hrs (Last 3 readings):   Weight   06/16/21 0346 145 lb 3.2 oz (65.9 kg)   06/15/21 0345 153 lb (69.4 kg)   06/14/21 0653 139 lb 3.2 oz (63.1 kg)       Exam   Physical Exam   Constitutional: No distress on 1 LPM O2 per NC. Patient appears moderately built and  moderately nourished. Head: Normocephalic and atraumatic. Mouth/Throat: Oropharynx is clear and moist.  No oral thrush. Eyes: Conjunctivae are normal. Pupils are equal, round. No scleral icterus. Neck: Neck supple. No tracheal deviation present. Cardiovascular: S1 and S2 with no murmur. No peripheral edema  Pulmonary/Chest: Normal effort with bilateral air entry, clear breath sounds, noted congested cough. No stridor. No respiratory distress. CT to right CW connected to atrium on WS at (-20 mmHg) no noted airleak serosanguinous drainage noted  Abdominal: Soft. Bowel sounds audible. No distension or tenderness to palp. Musculoskeletal: Moves all extremities  Neurological: Patient is alert and oriented to person, place, and time.      Labs  - Old records and notes have been reviewed in [facility]   ABG  Lab Results   Component Value Date    PH 7.35 09/29/2013    PO2 53 09/29/2013    PCO2 45 09/29/2013    HCO3 25 09/29/2013    O2SAT 85 09/29/2013     No results found for: IFIO2, MODE, SETTIDVOL, SETPEEP  CBC  Recent Labs     06/15/21  0440   WBC 10.2   RBC 4.03*   HGB 12.6   HCT 41.9   MCV 104.0*   MCH 31.3   MCHC 30.1*      MPV 10.9      BMP  Recent Labs     06/14/21 2027   CREATININE 1.1     LFT  No results for input(s): AST, ALT, ALB, BILITOT, ALKPHOS, LIPASE in the last 72 hours. Invalid input(s): AMYLASE  TROP  Lab Results   Component Value Date    TROPONINT < 0.010 03/12/2021    TROPONINT < 0.010 12/13/2020    TROPONINT < 0.010 05/16/2017     BNP  No results for input(s): BNP in the last 72 hours. Lactic Acid  No results for input(s): LACTA in the last 72 hours. INR  No results for input(s): INR, PROTIME in the last 72 hours. PTT  No results for input(s): APTT in the last 72 hours. Glucose  No results for input(s): POCGLU in the last 72 hours. UA No results for input(s): SPECGRAV, PHUR, COLORU, CLARITYU, MUCUS, PROTEINU, BLOODU, RBCUA, WBCUA, BACTERIA, NITRU, GLUCOSEU, BILIRUBINUR, UROBILINOGEN, KETUA, LABCAST, LABCASTTY, AMORPHOS in the last 72 hours. Invalid input(s): CRYSTALS. PFTs               Sleep studies   Non on file    Cultures    None on file    EKG         NSR w/ LVH    Echocardiogram       TTE procedure:ECHOCARDIOGRAM COMPLETE 2D W DOPPLER W COLOR. Procedure Date  Date: 03/13/2021 Start: 10:00 AM     Study Location: Bedside  Technical Quality: Adequate visualization     Indications:Syncope.     Additional Medical History:Coronary artery disease, Hypertension,  Hyperlipidemia, Anxiety, stroke, Skin cancer, COPD, Breast cancer, Right  mastectomy, DVT, Family history of heart disease, Tobacco use     Patient Status: Routine     Height: 63.78 inches Weight: 136 pounds BSA: 1.66 m^2 BMI: 23.51 kg/m^2     BP: 120/41 mmHg      Conclusions      Summary   Technically difficult examination. Left ventricular size and systolic function is normal. Ejection fraction   was estimated at 55-60%. LV wall thickness is within normal limits and   there are no obvious wall motion abnormalities.    The right ventricular size was normal with normal systolic function and   wall thickness. Mild aortic regurgitation is noted. Mild mitral regurgitation is present. Signature      ----------------------------------------------------------------   Electronically signed by Casandra Stapleton MD (Interpreting   physician) on 03/13/2021 at 02:48 PM    Radiology    CXR    1 view chest x-ray   06/16/2021   Findings: Right chest tube is unchanged. Stable right basilar consolidation, atelectasis and pleural fluid. Progressing medial left basilar atelectasis noted. Subcutaneous emphysema right chest wall without pneumothorax. Cardiomegaly. No bony abnormalities. Impression:  Progressing medial left basilar atelectasis. XR CHEST PORTABLE    06/15/2021   Findings: Portable semiupright study was performed. Again seen is a right chest tube. There are increasing right lower lobe pleural-parenchymal opacities. No pneumothorax. Stable cardiomediastinal silhouette and bony thorax. Impression:  1. Redemonstration of right chest tube. No pneumothorax. 2.  Increasing right lower lobe pleural effusion and/or atelectasis and/or infiltrate. CT Scans          CTA CHEST W WO CONTRAST   12/13/2020   FINDINGS: No PE. There is no aneurysm or dissection. No mediastinal or hilar adenopathy by size criteria. Postsurgical changes right axilla. No axillary lymphadenopathy Heart size normal. No infiltrates or pleural effusions. The 8 mm nodule seen on the prior CT now measures 14 mm and has spiculated borders highly suspicious of malignancy. There however are no other additional new masses. Scattered nodules are present in both lungs which are previously seen. There is a cluster of nodular the anterior right upper lung stable. Small nodules posterior right lower lung stable. Tiny triangular nodular density lingula is stable.  Upper abdomen Stable bilateral adrenal fullness likely hyperplasia this is stable dating back to June 2017.  Bones No suspicious bone lesions   Impression:  1. No PE.    2. No acute findings. 3. Interval increase in size of the patient's right lung nodule now measures 14 mm with spiculated borders and is highly suspicious for malignancy. (See actual reports for details)    Assessment   -Acute respiratory failure with hypoxia-1 LPM via NC baseline no O2  -Poorly differentiated squamous cell carcinoma right lower lobe. T1BN0 stage IA2 RLL s/p nonanatomic robotic assisted wedge resection  -Chronic COPD GOLD3 (FEV1 Post BD 62% 4/19/21)  -Right sided pleural effusion s/p chest tube 6/14/21   -Tobacco abuse (96 pack year hx)  -Remote hx Squamous cell breast CA 2005 s/p mastectomy and radiation believed to be unrelated to current episode  -Remote hx DVT 1976  -hypothyroidism  -HTN  -remote hx of CVA  Plan   -Monitor SpO2 wean supplemental O2 to maintain SpO2 >90%  -Continue Symbicort 2 puffs BID  -Continue Spiriva Respimat 2 puffs daily  -Add guaifenesin 600 mg PO BID for congested cough, 2/2 chronic bronchitis afebrile WBC WNL no concern at this time for pneumonia   -Daily CXR while chest tube in place  -Acapella Q1H while awake  -Recommend f/u w/ tobacco cessation clinic on discharge  -Pain control per primary service  -Encourage early ambulation  -VTE prophylaxis lovenox 40mg daily       Questions and concerns addressed. Electronically signed by   NITO Caballero - CNP on 6/16/2021 at 8:19 AM     No new issues  Small air leak present  Pain well controlled  Afebrile  Respiratory interventions in progress. Patient seen and examined independently by me. Above discussed and I agree with  CNP note Also see my additional comments. Labs, cultures, and radiographs when available were reviewed. Changes were made in the orders as necessary. I discussed patient concerns with Shania's R.NLance and instructions were given. Respiratory care issues addressed. Please see our orders for the updated patient care plan.     Electronically signed by     Bailey Thurston MD on 6/16/2021 at 6:21 PM

## 2021-06-16 NOTE — CARE COORDINATION
6/16/21, 9:19 AM EDT    DISCHARGE PLANNING EVALUATION      SW spoke with patient about [facility] agency. She reported that she would like to use [facility] for RN only. SW encouraged patient to think about PT and OT but she reported that she would be fine and she only wants an RN. JASON called [facility] and made referral. Tatyana Escalante will notify [facility] when patient is discharged.

## 2021-06-16 NOTE — PLAN OF CARE
Problem: Falls - Risk of:  Goal: Will remain free from falls  Description: Will remain free from falls  6/16/2021 0145 by Gailen Bernheim, RN  Outcome: Ongoing     Problem: Pain:  Goal: Pain level will decrease  Description: Pain level will decrease  6/16/2021 0145 by Gailen Bernheim, RN  Outcome: Ongoing     Problem: Pain:  Goal: Control of acute pain  Description: Control of acute pain  6/16/2021 0145 by Gailen Bernheim, RN  Outcome: Ongoing  Note: Patient educated on need to continue to get scheduled pain medications to avoid pain breakthrough. Problem: Cardiovascular  Goal: Hemodynamic stability  6/16/2021 0145 by Gailen Bernheim, RN  Outcome: Ongoing  Note: Patient is stable and BP is within normal range. Patient has on bilateral SCDs. Problem: GI  Goal: Bowel movement at least every other day  6/16/2021 0145 by Gailen Bernheim, RN  Outcome: Ongoing  Note: Bowels sounds are active X4. Patient is waiting to have a BM at this time. Problem: Skin Integrity/Risk  Goal: No skin breakdown during hospitalization  6/16/2021 0145 by Gailen Bernheim, RN  Outcome: Ongoing  Note: Redness on buttocks and bilateral heels is blanching at this time. Patient educated on the importance of turning in bed. Problem: Discharge Planning:  Goal: Discharged to appropriate level of care  Description: Discharged to appropriate level of care  6/16/2021 0145 by Gailen Bernheim, RN  Outcome: Ongoing  Note: Patient will be going home at discharge.

## 2021-06-16 NOTE — PROGRESS NOTES
[facility] -  Oj Beltran MD M.D. FACS  Daily Progress Note    Pt Name: Nik Raymundo  Medical Record Number: [id number]  Date of Birth 1950   Today's Date: 6/16/2021    ASSESSMENT:     1. POD # 2 s/p non anatomic wedge RLL, mediastinal dissection, cryotherapy intercostal nerves 5,6,7,8  2. HD # 2  Active Hospital Problems    Diagnosis Date Noted    Primary malignant neoplasm of right lower lobe of lung ([facility] [C34.31] 04/20/2021     Priority: High    Postoperative urinary retention [N99.89, R33.8] 06/15/2021    Cancer of lower lobe of right lung (HCC) [C34.31] 06/14/2021    S/P robotic assisted nonanatomic wedge resection of lateral basilar right lower lobe lung mass and mediastinal dissection [Z90.2] 06/14/2021    SOB (shortness of breath) on exertion [R06.02] 05/20/2021    Stage 2 moderate COPD by GOLD classification ([facility] [J44.9] 09/30/2013       Chief Complaint:  No chief complaint on file. PLAN:     1. Schuster back in for urinary retention  2. Cont CT has airleak with cough, improved but present  3. CXR no pneumo, some sq emphysema on film, CT in good position  4. ambulate      SUBJECTIVE:     Juan Pablo Hopson is doing well. Pain is well controlled. She states she is not having much pain. Couls not urinate after schuster removal. . She has no nausea and no vomiting. She has passed flatus and has not had a bowel movement. She is tolerating ADULT DIET; Regular.  Current activity is ambulating in halls      OBJECTIVE:     Patient Vitals for the past 24 hrs:   BP Temp Temp src Pulse Resp SpO2 Weight   06/16/21 0346 -- -- -- -- -- -- 145 lb 3.2 oz (65.9 kg)   06/16/21 0315 (!) 115/47 97.8 °F (36.6 °C) Oral 50 18 92 % --   06/15/21 2312 (!) 110/53 98.2 °F (36.8 °C) Oral 58 19 93 % --   06/15/21 1959 (!) 116/53 98.1 °F (36.7 °C) Oral 60 20 90 % --   06/15/21 1455 (!) 150/69 98 °F (36.7 °C) Oral 59 18 94 % --   06/15/21 1153 (!) 142/62 97.5 °F (36.4 °C) Oral 52 18 94 % -- 06/15/21 0858 -- -- -- -- -- 93 % --   06/15/21 0855 (!) 109/51 97.7 °F (36.5 °C) Oral 60 18 90 % --   06/15/21 0751 -- -- -- -- -- 94 % --         Intake/Output Summary (Last 24 hours) at 6/16/2021 0630  Last data filed at 6/16/2021 0347  Gross per 24 hour   Intake 1139.01 ml   Output 1058 ml   Net 81.01 ml       In: 1139 [P.O.:880; I.V.:259]  Out: 3379 [Urine:568; Drains:490]    I/O last 3 completed shifts: In: 2117 [P.O.:680; I.V.:1437]  Out: 6617 [Urine:918; Drains:470]     Date 06/16/21 0000 - 06/16/21 2359   Shift 1552-7972 9778-7091 9803-2849 24 Hour Total   INTAKE   P.O.(mL/kg/hr) 200   200   I. V.(mL/kg) 0(0)   0(0)   Shift Total(mL/kg) 200(3)   200(3)   OUTPUT   Urine(mL/kg/hr) 0   0   Emesis/NG output(mL/kg) 0(0)   0(0)   Drains(mL/kg) 150(2.3)   150(2.3)   Other(mL/kg) 0(0)   0(0)   Stool(mL/kg) 0(0)   0(0)   Blood(mL/kg) 0(0)   0(0)   Shift Total(mL/kg) 150(2.3)   150(2.3)   Weight (kg) 65.9 65.9 65.9 65.9       Wt Readings from Last 3 Encounters:   06/16/21 145 lb 3.2 oz (65.9 kg)   06/03/21 143 lb 12.8 oz (65.2 kg)   06/02/21 143 lb (64.9 kg)        Body mass index is 24.92 kg/m². Diet: ADULT DIET;  Regular        MEDS:     Scheduled Meds:   levothyroxine  88 mcg Oral Daily    sodium chloride flush  5-40 mL Intravenous 2 times per day    bacitracin   Topical Daily    enoxaparin  40 mg Subcutaneous Daily    polyethylene glycol  17 g Oral Daily    ketorolac  15 mg Intravenous Q6H    lidocaine  2 patch Transdermal Nightly    budesonide-formoterol  2 puff Inhalation BID    And    tiotropium  2 puff Inhalation Daily     Continuous Infusions:   sodium chloride       PRN Meds:albuterol sulfate HFA, 2 puff, Q6H PRN  sodium chloride flush, 5-40 mL, PRN  sodium chloride, 25 mL, PRN  acetaminophen, 650 mg, Q4H PRN  ondansetron, 4 mg, Q8H PRN   Or  ondansetron, 4 mg, Q6H PRN  oxyCODONE-acetaminophen, 1 tablet, Q4H PRN   Or  oxyCODONE-acetaminophen, 2 tablet, Q4H PRN  HYDROmorphone, 0.25 mg, Q3H PRN   Or  HYDROmorphone, 0.5 mg, Q3H PRN          PHYSICAL EXAM:     CONSTITUTIONAL: Alert and oriented times 3, no acute distress and cooperative to examination. HEENT:still on nasal cannula at 2l weaning down    LUNGS: equal BS, rhonchi that clears with cough, CT small leak with cough, no obvious sq emphysema on exam    WOUND/INCISION:  healing well, no drainage, no erythema  EXTREMITY: no cyanosis, clubbing or edema      LABS:     CBC:   Recent Labs     06/15/21  0440   WBC 10.2   RBC 4.03*   HGB 12.6   HCT 41.9   .0*   MCH 31.3   MCHC 30.1*      MPV 10.9      Last 3 CMP:   Recent Labs     06/14/21 2027   CREATININE 1.1      Troponin: No results for input(s): TROPONINI in the last 72 hours. Calcium:   Lab Results   Component Value Date    CALCIUM 9.3 04/14/2021    CALCIUM 8.5 03/16/2021    CALCIUM 8.7 03/15/2021      Ionized Calcium: No results found for: IONCA   Lipids: No results for input(s): CHOL, HDL in the last 72 hours. Invalid input(s): LDLCALCU  INR: No results for input(s): INR in the last 72 hours. Lactic Acid:   Lab Results   Component Value Date    LACTA 2.1 05/16/2017    LACTA 1.0 10/30/2015        CBC:   Lab Results   Component Value Date    WBC 10.2 06/15/2021    RBC 4.03 06/15/2021    RBC 4.49 05/16/2012    HGB 12.6 06/15/2021    HCT 41.9 06/15/2021    .0 06/15/2021    MCH 31.3 06/15/2021    MCHC 30.1 06/15/2021    RDW 13.7 04/14/2021     06/15/2021    MPV 10.9 06/15/2021             DVT prophylaxis: [x] Lovenox                                 [x] SCDs                                 [] SQ Heparin                                 [] Encourage ambulation, low risk for DVT, no chemical or mechanical prophylaxis necessary              [] Already on Anticoagulation      RADIOLOGY:      XR CHEST PORTABLE    Result Date: 6/15/2021  Impression: 1. Redemonstration of right chest tube. No pneumothorax.  2.  Increasing right lower lobe pleural effusion and/or atelectasis and/or infiltrate. This document has been electronically signed by: Susi Lambert DO on 06/15/2021 05:18 AM    XR CHEST PORTABLE    Result Date: 6/14/2021  1. Postoperative changes in the right lower lobe. Right-sided chest tube in place. 2. Borderline cardiomegaly. 3. Thickening of the interstitial lung markings and areas of increased density in the right lower lobe and left midlung field. 4. Postoperative changes along the right chest wall. . **This report has been created using voice recognition software. It may contain minor errors which are inherent in voice recognition technology. ** Final report electronically signed by DR Presley Adame on 6/14/2021 12:27 PM        Chapin Veloz MD, M.D.  FACS  Electronically signed 6/16/2021 at 6:30 AM

## 2021-06-17 ENCOUNTER — APPOINTMENT (OUTPATIENT)
Dept: GENERAL RADIOLOGY | Age: 71
DRG: 163 | End: 2021-06-17
Attending: SURGERY
Payer: MEDICARE

## 2021-06-17 PROCEDURE — 94640 AIRWAY INHALATION TREATMENT: CPT

## 2021-06-17 PROCEDURE — 94669 MECHANICAL CHEST WALL OSCILL: CPT

## 2021-06-17 PROCEDURE — APPSS30 APP SPLIT SHARED TIME 16-30 MINUTES: Performed by: NURSE PRACTITIONER

## 2021-06-17 PROCEDURE — 2700000000 HC OXYGEN THERAPY PER DAY

## 2021-06-17 PROCEDURE — 6370000000 HC RX 637 (ALT 250 FOR IP): Performed by: NURSE PRACTITIONER

## 2021-06-17 PROCEDURE — 6360000002 HC RX W HCPCS: Performed by: SURGERY

## 2021-06-17 PROCEDURE — 6370000000 HC RX 637 (ALT 250 FOR IP): Performed by: SURGERY

## 2021-06-17 PROCEDURE — 71045 X-RAY EXAM CHEST 1 VIEW: CPT

## 2021-06-17 PROCEDURE — 2060000000 HC ICU INTERMEDIATE R&B

## 2021-06-17 PROCEDURE — 99232 SBSQ HOSP IP/OBS MODERATE 35: CPT | Performed by: INTERNAL MEDICINE

## 2021-06-17 PROCEDURE — 99024 POSTOP FOLLOW-UP VISIT: CPT | Performed by: SURGERY

## 2021-06-17 PROCEDURE — 94760 N-INVAS EAR/PLS OXIMETRY 1: CPT

## 2021-06-17 RX ORDER — DIPHENHYDRAMINE HCL 25 MG
25 TABLET ORAL EVERY 6 HOURS PRN
Status: DISCONTINUED | OUTPATIENT
Start: 2021-06-17 | End: 2021-06-18 | Stop reason: HOSPADM

## 2021-06-17 RX ADMIN — GUAIFENESIN 600 MG: 600 TABLET, EXTENDED RELEASE ORAL at 20:28

## 2021-06-17 RX ADMIN — BUDESONIDE AND FORMOTEROL FUMARATE DIHYDRATE 2 PUFF: 160; 4.5 AEROSOL RESPIRATORY (INHALATION) at 07:29

## 2021-06-17 RX ADMIN — ACETAMINOPHEN 650 MG: 325 TABLET ORAL at 20:25

## 2021-06-17 RX ADMIN — TIOTROPIUM BROMIDE INHALATION SPRAY 2 PUFF: 3.12 SPRAY, METERED RESPIRATORY (INHALATION) at 07:31

## 2021-06-17 RX ADMIN — BUDESONIDE AND FORMOTEROL FUMARATE DIHYDRATE 2 PUFF: 160; 4.5 AEROSOL RESPIRATORY (INHALATION) at 17:29

## 2021-06-17 RX ADMIN — ENOXAPARIN SODIUM 40 MG: 40 INJECTION, SOLUTION INTRAVENOUS; SUBCUTANEOUS at 08:19

## 2021-06-17 RX ADMIN — POLYETHYLENE GLYCOL 3350 17 G: 17 POWDER, FOR SOLUTION ORAL at 08:19

## 2021-06-17 RX ADMIN — GUAIFENESIN 600 MG: 600 TABLET, EXTENDED RELEASE ORAL at 08:20

## 2021-06-17 RX ADMIN — LEVOTHYROXINE SODIUM 88 MCG: 0.09 TABLET ORAL at 05:48

## 2021-06-17 RX ADMIN — DIPHENHYDRAMINE HCL 25 MG: 25 TABLET ORAL at 23:21

## 2021-06-17 ASSESSMENT — PAIN SCALES - GENERAL
PAINLEVEL_OUTOF10: 6
PAINLEVEL_OUTOF10: 0

## 2021-06-17 ASSESSMENT — PAIN SCALES - WONG BAKER: WONGBAKER_NUMERICALRESPONSE: 0

## 2021-06-17 ASSESSMENT — PAIN DESCRIPTION - PROGRESSION: CLINICAL_PROGRESSION: GRADUALLY WORSENING

## 2021-06-17 ASSESSMENT — PAIN DESCRIPTION - FREQUENCY: FREQUENCY: INTERMITTENT

## 2021-06-17 ASSESSMENT — PAIN DESCRIPTION - ORIENTATION: ORIENTATION: RIGHT

## 2021-06-17 ASSESSMENT — PAIN - FUNCTIONAL ASSESSMENT: PAIN_FUNCTIONAL_ASSESSMENT: PREVENTS OR INTERFERES SOME ACTIVE ACTIVITIES AND ADLS

## 2021-06-17 ASSESSMENT — PAIN DESCRIPTION - LOCATION: LOCATION: CHEST

## 2021-06-17 ASSESSMENT — PAIN DESCRIPTION - ONSET: ONSET: ON-GOING

## 2021-06-17 ASSESSMENT — PAIN DESCRIPTION - DESCRIPTORS: DESCRIPTORS: ACHING;CONSTANT

## 2021-06-17 ASSESSMENT — PAIN DESCRIPTION - PAIN TYPE: TYPE: SURGICAL PAIN

## 2021-06-17 NOTE — FLOWSHEET NOTE
06/17/21 1603   Provider Notification   Reason for Communication Evaluate  (new consult)   Provider Name Dr. Bowen Dang   Provider Notification Physician   Method of Communication Secure Message   Response Waiting for response   Notification Time [id number]   Consult called for elevated blood pressure, message read. No response, no new orders.

## 2021-06-17 NOTE — PROGRESS NOTES
[facility]      Patient - Nancy Larsen   MRN -  [id number]   [facility] # - [de-identified]   - 1950      Date of Admission -  2021  6:13 AM  Date of evaluation -  2021  Room - -011-A   Hospital Day - 3  Consulting - Cleola Fleischer, MD Primary Care Physician - NITO Montaño - CNP     Problem List      Active Hospital Problems    Diagnosis Date Noted    Postoperative urinary retention [N99.89, R33.8] 06/15/2021    Cancer of lower lobe of right lung (HCC) [C34.31] 2021    S/P robotic assisted nonanatomic wedge resection of lateral basilar right lower lobe lung mass and mediastinal dissection [Z90.2] 2021    SOB (shortness of breath) on exertion [R06.02] 2021    Primary malignant neoplasm of right lower lobe of lung (HCC) [C34.31] 2021    Stage 2 moderate COPD by GOLD classification ([address] [J44.9] 2013     Reason for Consult    Pulmonary management s/p Lobectomy  HPI   History Obtained From: Patient and electronic medical record. Nancy Larsen is a 73 y.o. female who presented to [facility] on 2021 for robotic assisted right lower lobe superior segment dissection. 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 Patient seen and evaluated at bedside following surgery. Patient reports minimal post procedural discomfort or pain. Patient does report worsening shortness of breath for the last several months unchanged at time of interview. Denies cough, shortness of breath or chest pain.  Patient does admit to continued hx of smoking with last cigarette yesteday prior to surgery. Patient reports she has been smoking 2 ppd for the past 48 years. Patient was last seen and evaluated by hematology and oncology on 6/3/2021 with Dr. Arturo Howard. Noted to have a 14 mm spiculated nodule on CTA of the chest in 12/13/2020 with interval enlargement from previous CT in 2/19. Biopsy of the right lower lobe demonstrated poorly differentiated squamous cell carcinoma. P40 positive and TTF-1 negative. Patient does have a remote history of breast cancer roughly 15 years ago (2005?) treated with chemotherapy followed by right mastectomy and radiation to the chest wall. Patient received several years of adjuvant hormonal therapy. There was no history of recurrence. This was thought to be unrelated to current tumor as previous tumor was receptor positive and this tumor is receptor negative. She is having shortness of breath: No  She is having cough: No  She is having chest pain:No      Past 24 hrs   -On RA  -Reports Pain tolerable   -CXR this AM no PTX   -Ongoing cough but less congested today  -CT to water seal 325 cc out  All other systems reviewed  PMHx   Past Medical History      Diagnosis Date    Anxiety 2007    Arthritis     Breast cancer [facility]) 2005    right mastectomy, chemo and radiation history    CAD (coronary artery disease) 2001    sees Dr Krystle Mai of the skin 2004    Cerebral artery occlusion with cerebral infarction ([address]     Chronic UTI     COPD (chronic obstructive pulmonary disease) ([address]     sees RL Petersen    CVA (cerebral infarction) 2007, 2008    Depression 2007    DVT of lower extremity (deep venous thrombosis) ([address] 1976    Facial injury 2005    Fall on greasy ground, striking left periorbital region    History of stroke 2007, 2008, 2009    Patient relates a stroke with right weakness and speech in 2007; another in 2010 or 2012 (unsure), both with need to rehabilitation.   Also \"2 mini-strokes\" (?)    Hx of blood clots 1977    hip, leg  Hyperlipidemia 2005    Hypertension 2005    Hypothyroidism     IBS (irritable bowel syndrome)     Low HDL (under 40) 2014    Lung cancer [facility])     sees Dr Srinivas Bowman    Prolonged emergence from general anesthesia     Recurrent UTI (urinary tract infection) 2015    Dr Ana Bishop finding difficulty 05/16/2017    work-up in progress      Past Surgical History        Procedure Laterality Date   [address] Right 04/01/2005    evacuation of breast hematoma-Dr. Jame Wilcox    BREAST SURGERY Right 11/30/2004    lymphatic mapping, SLN bx x2-Dr. Rell Urrutia    COLONOSCOPY  2009    Dr. Deepika Jones  03/16/2021    CT NEEDLE BIOPSY LUNG PERCUTANEOUS 3/16/2021 [facility] SCAN    HIP SURGERY  01/19/2015    HYSTERECTOMY  1976    JOINT REPLACEMENT Left 2011    HIP    JOINT REPLACEMENT Right 01/19/2015    Right Total Hip Replacement - Dr. Rosaura Parr, TOTAL Right 6/14/2021    ROBOTIC ASSISTED RIGHT LOWER LOBE SUPERIOR SEGMENTECTOMY AND MEDIASTINAL DISSECTION, CRYOTHERAPY OF INTERCOSTAL NERVES performed by Asael Carpio MD at [address] Right 2005    Dr. Frances Ramos  04/10/2018    Lumbar epidural steroid injection at L4    MA NJX DX/THER SBST INTRLMNR LMBR/SAC W/IMG GDN N/A 04/10/2018    LESI  @ L4 performed by Rodney Savage MD at [address] [facility], [facility]    ovaries    THYROID LOBECTOMY Left 11/26/2010    left thyroid lobectomy with isthmusectomy-Dr. [facility] THYROID SURGERY  2007    right thyroid lobectomy-[facility] Ibanez     [facility]    Current Medications    guaiFENesin  600 mg Oral BID    levothyroxine  88 mcg Oral Daily    sodium chloride flush  5-40 mL Intravenous 2 times per day    bacitracin   Topical Daily    enoxaparin  40 mg Subcutaneous Daily    polyethylene glycol  17 g Oral Daily    lidocaine  2 patch Transdermal Nightly    budesonide-formoterol  2 puff Inhalation BID    And    tiotropium  2 puff Inhalation Daily     albuterol sulfate HFA, sodium chloride flush, sodium chloride, acetaminophen, ondansetron **OR** ondansetron, oxyCODONE-acetaminophen **OR** oxyCODONE-acetaminophen, HYDROmorphone **OR** HYDROmorphone  IV Drips/Infusions   sodium chloride       Home Medications  Medications Prior to Admission: diphenhydrAMINE (BENADRYL) 25 MG tablet, Take 25 mg by mouth every 6 hours as needed for Itching  albuterol sulfate HFA (PROVENTIL HFA) 108 (90 Base) MCG/ACT inhaler, Inhale 2 puffs into the lungs every 6 hours as needed for Wheezing  levothyroxine (LEVOTHROID) 88 MCG tablet, Take 1 tablet by mouth daily  [DISCONTINUED] fluticasone-umeclidin-vilant (TRELEGY ELLIPTA) 100-62.5-25 MCG/INH AEPB, Inhale 1 puff into the lungs daily  [DISCONTINUED] venlafaxine (EFFEXOR XR) 37.5 MG extended release capsule, Take 1 capsule by mouth daily (Patient not taking: Reported on 6/1/2021)  Diet    ADULT DIET; Regular  Allergies    Cipro xr and Vicodin [hydrocodone-acetaminophen]  Social History     Social History     Socioeconomic History    Marital status:       Spouse name: Ariela Alatorre    Number of children: 3    Years of education: 5    Highest education level: Not on file   Occupational History    Occupation: Disabled   Tobacco Use    Smoking status: Current Every Day Smoker     Packs/day: 2.00     Years: 48.00     Pack years: 96.00     Types: Cigarettes     Start date: 11/13/1967    Smokeless tobacco: Never Used   Vaping Use    Vaping Use: Never used   Substance and Sexual Activity    Alcohol use: No     Alcohol/week: 0.0 standard drinks    Drug use: No    Sexual activity: Yes     Partners: Male   Other Topics Concern    Not on file   Social History Narrative    Not on file     Social Determinants of Health     Financial Resource Strain:     Difficulty of Paying Living Expenses:    Food Insecurity:     Worried About temperature is 98.4 °F (36.9 °C). Her blood pressure is 151/67 (abnormal) and her pulse is 54. Her respiration is 20 and oxygen saturation is 94%. Body mass index is 25.04 kg/m². SUPPLEMENTAL O2: O2 Flow Rate (L/min): 1 L/min     I/O        Intake/Output Summary (Last 24 hours) at 6/17/2021 0918  Last data filed at 6/17/2021 0330  Gross per 24 hour   Intake 905 ml   Output 1500 ml   Net -595 ml     I/O last 3 completed shifts: In: 8256 [P.O.:1145]  Out: 1500 [SDPAZ:9054; Drains:325]   Patient Vitals for the past 96 hrs (Last 3 readings):   Weight   06/17/21 0542 145 lb 14.4 oz (66.2 kg)   06/16/21 0346 145 lb 3.2 oz (65.9 kg)   06/15/21 0345 153 lb (69.4 kg)       Exam   Physical Exam   Constitutional: No distress on RA. Patient appears moderately built and  moderately nourished. Head: Normocephalic and atraumatic. Mouth/Throat: Oropharynx is clear and moist.  No oral thrush. Eyes: Conjunctivae are normal. Pupils are equal, round. No scleral icterus. Neck: Neck supple. No tracheal deviation present. Cardiovascular: S1 and S2 with no murmur. No peripheral edema  Pulmonary/Chest: Normal effort with bilateral air entry, clear breath sounds. No stridor. No respiratory distress. CT to right CW connected to atrium mild intermittent air leak noted in chamber, serosanguinous drainage. Abdominal: Soft. Bowel sounds audible. No distension or tenderness to palp. Musculoskeletal: Moves all extremities  Neurological: Patient is alert and follows simple commands.     Labs  - Old records and notes have been reviewed in [facility]   ABG  Lab Results   Component Value Date    PH 7.35 09/29/2013    PO2 53 09/29/2013    PCO2 45 09/29/2013    HCO3 25 09/29/2013    O2SAT 85 09/29/2013     No results found for: IFIO2, MODE, SETTIDVOL, SETPEEP  CBC  Recent Labs     06/15/21  0440   WBC 10.2   RBC 4.03*   HGB 12.6   HCT 41.9   .0*   MCH 31.3   MCHC 30.1*      MPV 10.9      BMP  Recent Labs     06/14/21 2027 CREATININE 1.1     LFT  No results for input(s): AST, ALT, ALB, BILITOT, ALKPHOS, LIPASE in the last 72 hours. Invalid input(s): AMYLASE  TROP  Lab Results   Component Value Date    TROPONINT < 0.010 03/12/2021    TROPONINT < 0.010 12/13/2020    TROPONINT < 0.010 05/16/2017     BNP  No results for input(s): BNP in the last 72 hours. Lactic Acid  No results for input(s): LACTA in the last 72 hours. INR  No results for input(s): INR, PROTIME in the last 72 hours. PTT  No results for input(s): APTT in the last 72 hours. Glucose  No results for input(s): POCGLU in the last 72 hours. UA No results for input(s): SPECGRAV, PHUR, COLORU, CLARITYU, MUCUS, PROTEINU, BLOODU, RBCUA, WBCUA, BACTERIA, NITRU, GLUCOSEU, BILIRUBINUR, UROBILINOGEN, KETUA, LABCAST, LABCASTTY, AMORPHOS in the last 72 hours. Invalid input(s): CRYSTALS. PFTs               Sleep studies   Non on file    Cultures    None on file    EKG         NSR w/ LVH    Echocardiogram       TTE procedure:ECHOCARDIOGRAM COMPLETE 2D W DOPPLER W COLOR. Procedure Date  Date: 03/13/2021 Start: 10:00 AM     Study Location: Bedside  Technical Quality: Adequate visualization     Indications:Syncope.     Additional Medical History:Coronary artery disease, Hypertension,  Hyperlipidemia, Anxiety, stroke, Skin cancer, COPD, Breast cancer, Right  mastectomy, DVT, Family history of heart disease, Tobacco use     Patient Status: Routine     Height: 63.78 inches Weight: 136 pounds BSA: 1.66 m^2 BMI: 23.51 kg/m^2     BP: 120/41 mmHg      Conclusions      Summary   Technically difficult examination. Left ventricular size and systolic function is normal. Ejection fraction   was estimated at 55-60%. LV wall thickness is within normal limits and   there are no obvious wall motion abnormalities. The right ventricular size was normal with normal systolic function and   wall thickness. Mild aortic regurgitation is noted. Mild mitral regurgitation is present. Signature      ----------------------------------------------------------------   Electronically signed by Andrew Rocha MD (Interpreting   physician) on 03/13/2021 at 02:48 PM    Radiology    CXR    Chest X-ray, 1 View   06/17/2021   FINDINGS: Slightly decreased right lower lung consolidation. Small right pleural effusion. No visible pneumothorax. Stable heart size. No acute fracture. Similar right chest wall soft tissue emphysema. Right chest tube remains in place. Impression:  No visible pneumothorax. Right chest tube remains in place. Slightly decreased right lower lung consolidation. Similar right chest wall soft tissue emphysema. 1 view chest x-ray   06/16/2021   Findings: Right chest tube is unchanged. Stable right basilar consolidation, atelectasis and pleural fluid. Progressing medial left basilar atelectasis noted. Subcutaneous emphysema right chest wall without pneumothorax. Cardiomegaly. No bony abnormalities. Impression:  Progressing medial left basilar atelectasis. XR CHEST PORTABLE    06/15/2021   Findings: Portable semiupright study was performed. Again seen is a right chest tube. There are increasing right lower lobe pleural-parenchymal opacities. No pneumothorax. Stable cardiomediastinal silhouette and bony thorax. Impression:  1. Redemonstration of right chest tube. No pneumothorax. 2.  Increasing right lower lobe pleural effusion and/or atelectasis and/or infiltrate. CT Scans          CTA CHEST W WO CONTRAST   12/13/2020   FINDINGS: No PE. There is no aneurysm or dissection. No mediastinal or hilar adenopathy by size criteria. Postsurgical changes right axilla. No axillary lymphadenopathy Heart size normal. No infiltrates or pleural effusions. The 8 mm nodule seen on the prior CT now measures 14 mm and has spiculated borders highly suspicious of malignancy. There however are no other additional new masses.  Scattered nodules are present in both lungs which are previously seen. There is a cluster of nodular the anterior right upper lung stable. Small nodules posterior right lower lung stable. Tiny triangular nodular density lingula is stable.  Upper abdomen Stable bilateral adrenal fullness likely hyperplasia this is stable dating back to June 2017.  Bones No suspicious bone lesions   Impression:  1. No PE. 2. No acute findings. 3. Interval increase in size of the patient's right lung nodule now measures 14 mm with spiculated borders and is highly suspicious for malignancy. (See actual reports for details)    Assessment   -Acute respiratory failure with hypoxia-Improved on RA  -Poorly differentiated squamous cell carcinoma right lower lobe. T1BN0 stage IA2 RLL s/p nonanatomic robotic assisted wedge resection  -Chronic COPD GOLD3 (FEV1 Post BD 62% 4/19/21)  -Right sided pleural effusion s/p chest tube 6/14/21-to Water seal  -Tobacco abuse (96 pack year hx)  -Remote hx Squamous cell breast CA 2005 s/p mastectomy and radiation believed to be unrelated to current episode  -Remote hx DVT 1976  -hypothyroidism  -HTN  -remote hx of CVA  Plan   -Monitor SpO2 initiate supplemental O2 to maintain SpO2 >90% if needed  -Continue Symbicort 2 puffs BID  -Continue Spiriva Respimat 2 puffs daily  -Continue guaifenesin 600 mg PO BID for congested cough  -Daily CXR while chest tube in place  -Monitor Chest tube output   -Acapella Q1H while awake  -Recommend f/u w/ tobacco cessation clinic on discharge  -Pain control per primary service  -Encourage early ambulation  -Eval with PT/OT  -VTE prophylaxis lovenox 40mg daily     Questions and concerns addressed. Electronically signed by   NITO Turcios - CNP on 6/17/2021 at 9:18 AM     Progressing well  BP mildly elevated--cardiology consulted by primary team  On 1 lpm NC  CT to water seal  Surgical path: Stage 1B (pT2, N0, M0)    Patient seen and examined independently by me.  Above discussed and I agree with  CNP note Also see my additional comments. Labs, cultures, and radiographs when available were reviewed. Changes were made in the orders as necessary. I discussed patient concerns with Shania'bill HONG.[email or web address] and instructions were given. Respiratory care issues addressed. Please see our orders for the updated patient care plan.     Electronically signed by     Saige Berman MD on 6/17/2021 at 9:54 PM

## 2021-06-17 NOTE — CARE COORDINATION
6/17/21, 1:12 PM EDT    DISCHARGE ON GOING EVALUATION    Anna Quitter day: 3  Location: -11/011-A Reason for admit: Cancer of lower lobe of right lung [facility]) [C34.31]    Procedure:   6/14 s/p non anatomic wedge RLL, robotic assisted RLL, superior segmentectomy/mediastinal dissection, cryotherapy intercostal nerves 5,6,7,8  6/15 CXR  1.  Redemonstration of right chest tube.  No pneumothorax. 2.  Increasing right lower lobe pleural effusion and/or atelectasis and/or infiltrate  6/17 CXR  Decreased right lung cosolidation  Barriers to Discharge:  From SDS w history RLL malignant neoplasm w history CVA, COPD. POD 2 (see procedure above). Right Chest Tube Output = 325 ml/24h; monitor; possible chest tube removal in am per notes. Pulmonary/SGY following.    PCP: NITO HENSON CNP  Readmission Risk Score: 9%  Patient Goals/Plan/Treatment Preferences: met w client; plans home w esa Parra; prefers \[facility]"" - [facility] therapy following

## 2021-06-17 NOTE — PLAN OF CARE
Problem: Impaired respiratory status  Goal: Clear lung sounds  Outcome: Ongoing   CONTINUE THERAPY TO IMPROVE LUNG SOUNDS.

## 2021-06-17 NOTE — PLAN OF CARE
Problem: Falls - Risk of:  Goal: Will remain free from falls  Description: Will remain free from falls  Outcome: Ongoing  Note: Patient has remained free from falls tonight. Call light and bedside table within reach. Patient uses her call light appropriately. Patient is up with 1 assist and her walker to the bedside commode. Fall precautions in place for her safety. Nurse checks on the patient every hour. Problem: Pain:  Goal: Pain level will decrease  Description: Pain level will decrease  Outcome: Ongoing  Note: Patient had a decrease in pain. Will continue to monitor and assess. Problem: Pain:  Goal: Control of acute pain  Description: Control of acute pain  Outcome: Ongoing  Note: Patient's pain level has decreased. Problem: Cardiovascular  Goal: Hemodynamic stability  Outcome: Ongoing  Note: Patient had an increase in BP. See flowsheet. Problem: GI  Goal: Bowel movement at least every other day  Outcome: Ongoing  Note: Patient has not had a BM this shift. Problem: Skin Integrity/Risk  Goal: No skin breakdown during hospitalization  Outcome: Ongoing  Note: Patient educated on the importance of turning to prevent skin breakdown. Patient will turn at times. Refuses at times. Problem: Discharge Planning:  Goal: Discharged to appropriate level of care  Description: Discharged to appropriate level of care  Outcome: Ongoing  Note: Patient will return home at discharge.

## 2021-06-17 NOTE — PROGRESS NOTES
[facility] -  Yelena Velazquez MD M.D. FACS  Daily Progress Note    Pt Name: Tracey Swift  Medical Record Number: [id number]  Date of Birth 1950   Today's Date: 6/17/2021    ASSESSMENT:     1. POD # 3 s/p non anatomic wedge RLL, mediastinal dissection, cryotherapy intercostal nerves 5,6,7,8  2. HD # 3  Cancer Staging  Primary malignant neoplasm of right lower lobe of lung [facility])  Staging form: Lung, AJCC 8th Edition  - Clinical stage from 4/12/2021: Stage IA2 (cT1b, cN0, cM0) - Signed by Cherelle Lorenzana MD on 4/20/2021  - Pathologic stage from 6/17/2021: Stage IB (pT2a, pN0, cM0) - Signed by Yelena Velazquez MD on 6/17/2021       Chief Complaint:  No chief complaint on file. PLAN:     1. Remove schuster  2. No air leak, will try off suction  3. CXR improved  4. Ambulate  5. If CXR OK tomorrow, remove CT  6. Path T2N0 stage 1b      SUBJECTIVE:     Sandra Martinez is doing well. Pain is well controlled. She states she is not having much pain. Couls not urinate after schuster removal. . She has no nausea and no vomiting. She has passed flatus and has not had a bowel movement. She is tolerating ADULT DIET; Regular.  Current activity is ambulating in halls      OBJECTIVE:     Patient Vitals for the past 24 hrs:   BP Temp Temp src Pulse Resp SpO2 Weight   06/17/21 0542 -- -- -- -- -- -- 145 lb 14.4 oz (66.2 kg)   06/17/21 0330 (!) 147/68 97.8 °F (36.6 °C) Oral 54 20 92 % --   06/16/21 2352 (!) 141/63 -- -- 57 -- -- --   06/16/21 2317 (!) 160/105 98.4 °F (36.9 °C) Oral 60 20 92 % --   06/16/21 2056 (!) 150/57 98 °F (36.7 °C) Oral 61 26 93 % --   06/16/21 1526 (!) 128/59 98.2 °F (36.8 °C) Oral 60 19 93 % --   06/16/21 1142 (!) 124/44 97.7 °F (36.5 °C) Oral 54 18 92 % --   06/16/21 0800 (!) 140/64 97.9 °F (36.6 °C) Oral 51 17 92 % --         Intake/Output Summary (Last 24 hours) at 6/17/2021 0759  Last data filed at 6/17/2021 0330  Gross per 24 hour   Intake 1145 ml   Output 1500 ml Net -355 ml       In: 555 [P.O.:555]  Out: 990 [Urine:775; Drains:215]    I/O last 3 completed shifts: In: 4917 [P.O.:1145]  Out: 1500 [Urine:1175; Drains:325]     Date 06/17/21 0000 - 06/17/21 2359   Shift 2584-0333 1113-5825 2914-0732 24 Hour Total   INTAKE   P.O.(mL/kg/hr) 0   0   I. V.(mL/kg) 0(0)   0(0)   Shift Total(mL/kg) 0(0)   0(0)   OUTPUT   Urine(mL/kg/hr) 375   375   Emesis/NG output(mL/kg) 0(0)   0(0)   Drains(mL/kg) 50(0.8)   50(0.8)   Other(mL/kg) 0(0)   0(0)   Stool(mL/kg) 0(0)   0(0)   Blood(mL/kg) 0(0)   0(0)   Shift Total(mL/kg) 425(6.4)   425(6.4)   Weight (kg) 66.2 66.2 66.2 66.2       Wt Readings from Last 3 Encounters:   06/17/21 145 lb 14.4 oz (66.2 kg)   06/03/21 143 lb 12.8 oz (65.2 kg)   06/02/21 143 lb (64.9 kg)        Body mass index is 25.04 kg/m². Diet: ADULT DIET; Regular        MEDS:     Scheduled Meds:   guaiFENesin  600 mg Oral BID    levothyroxine  88 mcg Oral Daily    sodium chloride flush  5-40 mL Intravenous 2 times per day    bacitracin   Topical Daily    enoxaparin  40 mg Subcutaneous Daily    polyethylene glycol  17 g Oral Daily    lidocaine  2 patch Transdermal Nightly    budesonide-formoterol  2 puff Inhalation BID    And    tiotropium  2 puff Inhalation Daily     Continuous Infusions:   sodium chloride       PRN Meds:albuterol sulfate HFA, 2 puff, Q6H PRN  sodium chloride flush, 5-40 mL, PRN  sodium chloride, 25 mL, PRN  acetaminophen, 650 mg, Q4H PRN  ondansetron, 4 mg, Q8H PRN   Or  ondansetron, 4 mg, Q6H PRN  oxyCODONE-acetaminophen, 1 tablet, Q4H PRN   Or  oxyCODONE-acetaminophen, 2 tablet, Q4H PRN  HYDROmorphone, 0.25 mg, Q3H PRN   Or  HYDROmorphone, 0.5 mg, Q3H PRN          PHYSICAL EXAM:     CONSTITUTIONAL: Alert and oriented times 3, no acute distress and cooperative to examination.   HEENT:still on nasal cannula at 2l weaning down    LUNGS: equal BS, rhonchi that clears with cough, CT no air leak with cough, no obvious sq emphysema on exam    WOUND/INCISION:  healing well, no drainage, no erythema  EXTREMITY: no cyanosis, clubbing or edema      LABS:     CBC:   Recent Labs     06/15/21  0440   WBC 10.2   RBC 4.03*   HGB 12.6   HCT 41.9   .0*   MCH 31.3   MCHC 30.1*      MPV 10.9      Last 3 CMP:   Recent Labs     06/14/21 2027   CREATININE 1.1      Troponin: No results for input(s): TROPONINI in the last 72 hours. Calcium:   Lab Results   Component Value Date    CALCIUM 9.3 04/14/2021    CALCIUM 8.5 03/16/2021    CALCIUM 8.7 03/15/2021      Ionized Calcium: No results found for: IONCA   Lipids: No results for input(s): CHOL, HDL in the last 72 hours. Invalid input(s): LDLCALCU  INR: No results for input(s): INR in the last 72 hours. Lactic Acid:   Lab Results   Component Value Date    LACTA 2.1 05/16/2017    LACTA 1.0 10/30/2015        CBC:   Lab Results   Component Value Date    WBC 10.2 06/15/2021    RBC 4.03 06/15/2021    RBC 4.49 05/16/2012    HGB 12.6 06/15/2021    HCT 41.9 06/15/2021    .0 06/15/2021    MCH 31.3 06/15/2021    MCHC 30.1 06/15/2021    RDW 13.7 04/14/2021     06/15/2021    MPV 10.9 06/15/2021             DVT prophylaxis: [x] Lovenox                                 [x] SCDs                                 [] SQ Heparin                                 [] Encourage ambulation, low risk for DVT, no chemical or mechanical prophylaxis necessary              [] Already on Anticoagulation      RADIOLOGY:      XR CHEST PORTABLE    Result Date: 6/15/2021  Impression: 1. Redemonstration of right chest tube. No pneumothorax. 2.  Increasing right lower lobe pleural effusion and/or atelectasis and/or infiltrate. This document has been electronically signed by: Sybil Bhat. DO Petra on 06/15/2021 05:18 AM    XR CHEST PORTABLE    Result Date: 6/14/2021  1. Postoperative changes in the right lower lobe. Right-sided chest tube in place. 2. Borderline cardiomegaly.  3. Thickening of the interstitial lung markings and areas of increased density in the right lower lobe and left midlung field. 4. Postoperative changes along the right chest wall. . **This report has been created using voice recognition software. It may contain minor errors which are inherent in voice recognition technology. ** Final report electronically signed by DR Star Bates on 6/14/2021 12:27 PM        Cleola Fleischer, MD, M.D.  FACS  Electronically signed 6/17/2021 at 7:27 AM

## 2021-06-18 ENCOUNTER — APPOINTMENT (OUTPATIENT)
Dept: GENERAL RADIOLOGY | Age: 71
DRG: 163 | End: 2021-06-18
Attending: SURGERY
Payer: MEDICARE

## 2021-06-18 VITALS
SYSTOLIC BLOOD PRESSURE: 106 MMHG | OXYGEN SATURATION: 93 % | HEART RATE: 72 BPM | HEIGHT: 64 IN | WEIGHT: 141.6 LBS | DIASTOLIC BLOOD PRESSURE: 55 MMHG | BODY MASS INDEX: 24.17 KG/M2 | RESPIRATION RATE: 18 BRPM | TEMPERATURE: 97.4 F

## 2021-06-18 PROCEDURE — 6370000000 HC RX 637 (ALT 250 FOR IP): Performed by: NUCLEAR MEDICINE

## 2021-06-18 PROCEDURE — 71045 X-RAY EXAM CHEST 1 VIEW: CPT

## 2021-06-18 PROCEDURE — 97116 GAIT TRAINING THERAPY: CPT

## 2021-06-18 PROCEDURE — 99232 SBSQ HOSP IP/OBS MODERATE 35: CPT | Performed by: INTERNAL MEDICINE

## 2021-06-18 PROCEDURE — 94640 AIRWAY INHALATION TREATMENT: CPT

## 2021-06-18 PROCEDURE — 6370000000 HC RX 637 (ALT 250 FOR IP): Performed by: NURSE PRACTITIONER

## 2021-06-18 PROCEDURE — APPSS30 APP SPLIT SHARED TIME 16-30 MINUTES: Performed by: NURSE PRACTITIONER

## 2021-06-18 PROCEDURE — 94761 N-INVAS EAR/PLS OXIMETRY MLT: CPT

## 2021-06-18 PROCEDURE — 6360000002 HC RX W HCPCS: Performed by: SURGERY

## 2021-06-18 PROCEDURE — 99223 1ST HOSP IP/OBS HIGH 75: CPT | Performed by: NUCLEAR MEDICINE

## 2021-06-18 PROCEDURE — 97162 PT EVAL MOD COMPLEX 30 MIN: CPT

## 2021-06-18 PROCEDURE — 99024 POSTOP FOLLOW-UP VISIT: CPT | Performed by: SURGERY

## 2021-06-18 PROCEDURE — 6370000000 HC RX 637 (ALT 250 FOR IP): Performed by: SURGERY

## 2021-06-18 RX ORDER — AMLODIPINE BESYLATE 5 MG/1
5 TABLET ORAL DAILY
Status: DISCONTINUED | OUTPATIENT
Start: 2021-06-18 | End: 2021-06-18 | Stop reason: HOSPADM

## 2021-06-18 RX ORDER — GUAIFENESIN 600 MG/1
600 TABLET, EXTENDED RELEASE ORAL 2 TIMES DAILY
Qty: 60 TABLET | Refills: 1 | Status: SHIPPED | OUTPATIENT
Start: 2021-06-18

## 2021-06-18 RX ORDER — LIDOCAINE 4 G/G
1 PATCH TOPICAL NIGHTLY
Qty: 14 PATCH | Refills: 0 | Status: SHIPPED | OUTPATIENT
Start: 2021-06-18 | End: 2021-07-02

## 2021-06-18 RX ORDER — OXYCODONE HYDROCHLORIDE AND ACETAMINOPHEN 5; 325 MG/1; MG/1
1 TABLET ORAL EVERY 4 HOURS PRN
Qty: 42 TABLET | Refills: 0 | Status: SHIPPED | OUTPATIENT
Start: 2021-06-18 | End: 2021-06-25

## 2021-06-18 RX ORDER — FLUTICASONE FUROATE, UMECLIDINIUM BROMIDE AND VILANTEROL TRIFENATATE 100; 62.5; 25 UG/1; UG/1; UG/1
1 POWDER RESPIRATORY (INHALATION) DAILY
Qty: 1 EACH | Refills: 11 | Status: SHIPPED | OUTPATIENT
Start: 2021-06-18 | End: 2022-06-18

## 2021-06-18 RX ADMIN — GUAIFENESIN 600 MG: 600 TABLET, EXTENDED RELEASE ORAL at 07:59

## 2021-06-18 RX ADMIN — BACITRACIN: 500 OINTMENT TOPICAL at 07:59

## 2021-06-18 RX ADMIN — TIOTROPIUM BROMIDE INHALATION SPRAY 2 PUFF: 3.12 SPRAY, METERED RESPIRATORY (INHALATION) at 07:42

## 2021-06-18 RX ADMIN — LEVOTHYROXINE SODIUM 88 MCG: 0.09 TABLET ORAL at 05:28

## 2021-06-18 RX ADMIN — ENOXAPARIN SODIUM 40 MG: 40 INJECTION, SOLUTION INTRAVENOUS; SUBCUTANEOUS at 07:59

## 2021-06-18 RX ADMIN — AMLODIPINE BESYLATE 5 MG: 5 TABLET ORAL at 11:13

## 2021-06-18 RX ADMIN — BUDESONIDE AND FORMOTEROL FUMARATE DIHYDRATE 2 PUFF: 160; 4.5 AEROSOL RESPIRATORY (INHALATION) at 07:42

## 2021-06-18 RX ADMIN — POLYETHYLENE GLYCOL 3350 17 G: 17 POWDER, FOR SOLUTION ORAL at 08:00

## 2021-06-18 ASSESSMENT — PAIN SCALES - GENERAL: PAINLEVEL_OUTOF10: 0

## 2021-06-18 NOTE — PROGRESS NOTES
CLINICAL PHARMACY: DISCHARGE MED RECONCILIATION/REVIEW    [facility]) Select Patient?: Yes  Total # of Interventions Recommended: 0   -   Total # Interventions Accepted: 0  Intervention Severity:   - Level 1 Intervention Present?: No   - Level 2 #: 0   - Level 3 #: 0   Time Spent (min): 5    Additional Documentation:

## 2021-06-18 NOTE — PROGRESS NOTES
A home oxygen evaluation has been completed. [x]Patient is an inpatient. It is expected that the patient will be discharged within the next 48 hours. Qualified provider to write order for home prescription if patient qualifies. Social service/care managers will arrange for home oxygen. If patient is active, arrange for Home Medical supplier to assess for Oxygen Conserving Device per pulse oximetry. []Patient is an outpatient. Results will be faxed to the ordering provider. Qualified provider to write order for home prescription if patient qualifies and arranges for home oxygen. Patient was placed on room air for 2 minutes. SpO2 was 88 % on room air at rest. Patients SpO2 was below 89% and qualified for home oxygen. Oxygen was applied at 1 lpm via nasal cannula to maintain a SpO2 between 90-92% while at rest. Actual SpO2 was 92 %. Patient can ambulate, using a walker, for exercise flow rate. Patients was ambulated, SpO2 was 92% on 2 lpm to maintain SpO2 between 90-92% while exercising. .     Note: For any SpO2 at 01% see policy and procedure for possible qualifications.

## 2021-06-18 NOTE — CARE COORDINATION
6/18/21, 2:55 PM EDT    Patient goals/plan/ treatment preferences discussed by  and . Patient goals/plan/ treatment preferences reviewed with patient/ family. Patient/ family verbalize understanding of discharge plan and are in agreement with goal/plan/treatment preferences. Understanding was demonstrated using the teach back method. AVS provided by RN at time of discharge, which includes all necessary medical information pertaining to the patients current course of illness, treatment, post-discharge goals of care, and treatment preferences. Services After Discharge  Services At/After Discharge: Skilled Therapy, Nursing Services [facility])   IMM Letter  IMM Letter given to Patient/Family/Significant other/Guardian/POA/by[de-identified]   IMM Letter date given[de-identified] 06/18/21  IMM Letter time given[de-identified] 7342     Left a message with [facility] to updated that patient was being discharge. No further needs voiced.

## 2021-06-18 NOTE — PROGRESS NOTES
cannula at 2l weaning down    LUNGS: equal BS, rhonchi that clears with cough, CT no air leak with cough, no obvious sq emphysema on exam    WOUND/INCISION:  healing well, no drainage, no erythema  EXTREMITY: no cyanosis, clubbing or edema      LABS:     CBC:   No results for input(s): WBC, RBC, HGB, HCT, MCV, MCH, MCHC, RDW, PLT, MPV in the last 72 hours. Last 3 CMP:   No results for input(s): NA, K, CL, CO2, BUN, CREATININE, GLUCOSE, CALCIUM, PROT, LABALBU, BILITOT, ALKPHOS, AST, ALT in the last 72 hours. Troponin: No results for input(s): TROPONINI in the last 72 hours. Calcium:   Lab Results   Component Value Date    CALCIUM 9.3 04/14/2021    CALCIUM 8.5 03/16/2021    CALCIUM 8.7 03/15/2021      Ionized Calcium: No results found for: IONCA   Lipids: No results for input(s): CHOL, HDL in the last 72 hours. Invalid input(s): LDLCALCU  INR: No results for input(s): INR in the last 72 hours. Lactic Acid:   Lab Results   Component Value Date    LACTA 2.1 05/16/2017    LACTA 1.0 10/30/2015        CBC:   Lab Results   Component Value Date    WBC 10.2 06/15/2021    RBC 4.03 06/15/2021    RBC 4.49 05/16/2012    HGB 12.6 06/15/2021    HCT 41.9 06/15/2021    .0 06/15/2021    MCH 31.3 06/15/2021    MCHC 30.1 06/15/2021    RDW 13.7 04/14/2021     06/15/2021    MPV 10.9 06/15/2021             DVT prophylaxis: [x] Lovenox                                 [x] SCDs                                 [] SQ Heparin                                 [] Encourage ambulation, low risk for DVT, no chemical or mechanical prophylaxis necessary              [] Already on Anticoagulation      RADIOLOGY:      XR CHEST PORTABLE    Result Date: 6/15/2021  Impression: 1. Redemonstration of right chest tube. No pneumothorax. 2.  Increasing right lower lobe pleural effusion and/or atelectasis and/or infiltrate. This document has been electronically signed by: Malika Vitale.  DO Petra on 06/15/2021 05:18 AM    XR CHEST PORTABLE    Result Date: 6/14/2021  1. Postoperative changes in the right lower lobe. Right-sided chest tube in place. 2. Borderline cardiomegaly. 3. Thickening of the interstitial lung markings and areas of increased density in the right lower lobe and left midlung field. 4. Postoperative changes along the right chest wall. . **This report has been created using voice recognition software. It may contain minor errors which are inherent in voice recognition technology. ** Final report electronically signed by DR Donita Michael on 6/14/2021 12:27 PM        Curt Paige MD, M.D.  FACS  Electronically signed 6/18/2021 at 6:31 AM

## 2021-06-18 NOTE — PROGRESS NOTES
[address] [id number]  INPATIENT PHYSICAL THERAPY  EVALUATION  STR ICU STEPDOWN TELEMETRY 4K - 4K-11/011-A    Time In: 08  Time Out: 6872  Timed Code Treatment Minutes: 10 Minutes  Minutes: 25        Date: 2021  Patient Name: Latricia Oconnor,  Gender:  female        MRN: [id number]  : 1950  (73 y.o.)      Referring Practitioner: BRIGID Ledesma  Diagnosis: cancer of lower lobe of right lung  Additional Pertinent Hx: Pt is s/p robotic assisted R LL superior segmentectomy and mediastinal dissection, cryotherapy of intercostal nn. DOS: 21. Restrictions/Precautions:  Restrictions/Precautions: General Precautions  Position Activity Restriction  Other position/activity restrictions: on 1L O2 at rest, increased to 2L with activity on     Subjective:  Chart Reviewed: Yes  Patient assessed for rehabilitation services?: Yes  Family / Caregiver Present: No  Subjective: RN approved PT evaluation. Pt agreeable to therapy. She states she is hopeful to return home soon and knows she will likely go home with O2 pending home O2 eval with RT. She is agreeable to using her walker at discharge due to unsteadiness without it this date.     General:  Follows Commands: Within Functional Limits    Vision: Impaired    Hearing: Within functional limits    Pain: none stated    Vitals: Oxygen: decreased to 85% on 1L, increased O2 to 2L during ambulation, and pt was in low 90s for majority of ambulation    Social/Functional History:    Lives With: Son (son is home most of the time)  Type of Home: House  Home Layout: Two level, Able to Live on Main level with bedroom/bathroom  Home Access: Stairs to enter with rails  Entrance Stairs - Number of Steps: 3 + 2  Home Equipment: Cane, Rolling walker     Ambulation Assistance: Independent  Transfer Assistance: Independent    Active : Yes  Occupation: Retired       OBJECTIVE:  Range of Motion:  Bilateral Lower Extremity: WFL   *pt able to lift R arm overhead for functional reaching and does not report thorax pain with this    Strength:  Bilateral Lower Extremity: WFL    Balance:  Static Sitting Balance:  Independent  Dynamic Sitting Balance: Independent  Static Standing Balance: Supervision  Dynamic Standing Balance: Stand By Assistance    Bed Mobility:  Not Tested    Transfers:  Sit to Stand: Stand By Assistance  Stand to Sit:Stand By Assistance    Ambulation:  Stand By Assistance, with cues for safety, with verbal cues , with increased time for completion, cues for slowing down pace and taking rest breaks when getting SOB  Distance: 75'  Surface: Level Tile  Device:Rolling Walker  Gait Deviations:  Slow Bhavani, Decreased Step Length Bilaterally and Decreased Gait Speed    Stairs:  Ascent/descent of 4 steps with L HR on ascent with SBA using step-to gait pattern for ascent and descent. Functional Outcome Measures: Completed  AM-PAC Inpatient Mobility Raw Score : 18  AM-PAC Inpatient T-Scale Score : 43.63    ASSESSMENT:  Activity Tolerance:  Patient tolerance of  treatment: good. Treatment Initiated: Treatment and education initiated within context of evaluation. Evaluation time included review of current medical information, gathering information related to past medical, social and functional history, completion of standardized testing, formal and informal observation of tasks, assessment of data and development of plan of care and goals. Treatment time included skilled education and facilitation of tasks to increase safety and independence with functional mobility for improved independence and quality of life. Assessment:  Assessment: Pt is at/near PLOF on date of evaluation. She does demonstrate slight unsteadiness with ambulation without AD. She also has decreased endurance, which she states is her baseline. Pt is agreeable to using walker at discharge to ensure safety with ambulation.   Prognosis: Good    REQUIRES PT FOLLOW UP: No    Discharge Recommendations:  Discharge Recommendations: Home with assist PRN. Pt would benefit from home O2 eval before returning home. Patient Education:  PT Education: Goals, General Safety, Gait Training, PT Role, Energy Conservation    Equipment Recommendations:  Equipment Needed: No    Plan:  Times per week: N/A    Goals:  Patient goals : return home  Short term goals  Time Frame for Short term goals: N/A, as pt is at/near PLOF  Long term goals  Time Frame for Long term goals : N/A, as pt is at/near PLOF    Following session, patient left in safe position with all fall risk precautions in place.     Krishna Teran, PT, DPT

## 2021-06-18 NOTE — FLOWSHEET NOTE
06/17/21 4904   Provider Notification   Reason for Communication Patient request   Provider Name Maxim Cochran NP   Provider Notification Advance Practice Clinician (CNS/NP/CNM/CRNA/PA)   Method of Communication Secure Message   Response See orders   Notification Time [id number]   Patient request Benadryl for history of itching. Welts/rash noted as well as reports of itching.

## 2021-06-18 NOTE — DISCHARGE SUMMARY
Discharge Summary    Patient:  Eleanor Thakkar  YOB: 1950  MRN: [id number]   Acct: [de-identified]    Primary Care Physician: NITO Connell CNP         Admit date:  6/14/2021  6:13 AM        Discharge date:  No discharge date for patient encounter. Admitting Diagnosis:   Active Hospital Problems    Diagnosis Date Noted    Primary malignant neoplasm of right lower lobe of lung ([facility] [C34.31] 04/20/2021     Priority: High    Postoperative urinary retention [N99.89, R33.8] 06/15/2021    Cancer of lower lobe of right lung (HCC) [C34.31] 06/14/2021    S/P robotic assisted nonanatomic wedge resection of lateral basilar right lower lobe lung mass and mediastinal dissection [Z90.2] 06/14/2021    SOB (shortness of breath) on exertion [R06.02] 05/20/2021    Stage 2 moderate COPD by GOLD classification ([facility] [J44.9] 09/30/2013         Discharge Diagnosis:   Active Hospital Problems    Diagnosis Date Noted    Primary malignant neoplasm of right lower lobe of lung ([facility] [C34.31] 04/20/2021     Priority: High    Postoperative urinary retention [N99.89, R33.8] 06/15/2021    Cancer of lower lobe of right lung (HCC) [C34.31] 06/14/2021    S/P robotic assisted nonanatomic wedge resection of lateral basilar right lower lobe lung mass and mediastinal dissection [Z90.2] 06/14/2021    SOB (shortness of breath) on exertion [R06.02] 05/20/2021    Stage 2 moderate COPD by GOLD classification [facility]) [J44.9] 09/30/2013         Consultants: Pulmonary followed by Dr. Amadou Hallman        Procedures/Diagnostic Test: Robotic assisted wide resection of right lower lobe lung cancer with mediastinal dissection and cryotherapy of intercostal nerves [facility] Course: , She presented for elective resection of a right lower lobe biopsy-proven lung cancer. CT scan initially felt this was a superior segment right lower lobe but in the operating room this was actually lateral basal segment.   She had did pulmonary capacity and felt that lobectomy would actively impair her respiratory status and the patient did not want to be on chronic long-term oxygen. She underwent a wide wedge resection of the right lower lobe mass and mediastinal dissection and cryotherapy of intercostal nerves for postoperative pain control. Postop day #1 air leak present with cough pain was fairly well controlled requiring nasal oxygen. Postop day 2 and 3 essentially the same but on day 3 no air leak was seen so her chest tube was placed to straight drain off suction and the chest x-ray on postop day #4 June 18 showed no pneumothorax and improving infiltrates at the area of the wedge resection. Her chest tube was removed and the plan is discharged to home. Her stage was:  Cancer Staging  Primary malignant neoplasm of right lower lobe of lung ([facility]  Staging form: Lung, AJCC 8th Edition  - Clinical stage from 4/12/2021: Stage IA2 (cT1b, cN0, cM0) - Signed by Genaro Cornelius MD on 4/20/2021  - Pathologic stage from 6/17/2021: Stage IB (pT2a, pN0, cM0) - Signed by Francie Chu MD on 6/17/2021  And we will follow up in the office in 1 week take her chest tube suture out  Oncologic opinion if needed         Discharge Vitals: BP (!) 147/66   Pulse 51   Temp 98.2 °F (36.8 °C) (Oral)   Resp 19   Ht 5' 4\" (1.626 m)   Wt 141 lb 9.6 oz (64.2 kg)   SpO2 93%   BMI 24.31 kg/m²          Physical Exam:  24 hour intake/output:    Intake/Output Summary (Last 24 hours) at 6/18/2021 0758  Last data filed at 6/18/2021 0326  Gross per 24 hour   Intake 620 ml   Output 1530 ml   Net -910 ml     Last 3 weights:   Wt Readings from Last 3 Encounters:   06/18/21 141 lb 9.6 oz (64.2 kg)   06/03/21 143 lb 12.8 oz (65.2 kg)   06/02/21 143 lb (64.9 kg)            Allergies:  Cipro xr and Vicodin [hydrocodone-acetaminophen]         Discharge Medications:        Medication List      START taking these medications    guaiFENesin 600 MG extended release tablet  Commonly known as: MUCINEX  Take 1 tablet by mouth 2 times daily     lidocaine 4 % external patch  Place 1 patch onto the skin nightly for 14 days     oxyCODONE-acetaminophen 5-325 MG per tablet  Commonly known as: PERCOCET  Take 1 tablet by mouth every 4 hours as needed for Pain for up to 7 days. CONTINUE taking these medications    albuterol sulfate  (90 Base) MCG/ACT inhaler  Commonly known as: Proventil HFA  Inhale 2 puffs into the lungs every 6 hours as needed for Wheezing     diphenhydrAMINE 25 MG tablet  Commonly known as: BENADRYL     levothyroxine 88 MCG tablet  Commonly known as: Levothroid  Take 1 tablet by mouth daily           Where to Get Your Medications      These medications were sent to [address] [facility] [address]  [address]    Phone: [phone]   · guaiFENesin 600 MG extended release tablet  · lidocaine 4 % external patch  · oxyCODONE-acetaminophen 5-325 MG per tablet              Diet: ADULT DIET; Regular           Activity: Leave chest tube dressing on for 48 hours and then Band-Aid dressing x1 week, no Valsalva maneuver for 1 month           LABS:    CBC: No results for input(s): WBC, HGB, PLT in the last 72 hours. BMP:  No results for input(s): NA, K, CL, CO2, BUN, CREATININE, GLUCOSE in the last 72 hours. Calcium:No results for input(s): CALCIUM in the last 72 hours. Ionized Calcium:No results for input(s): IONCA in the last 72 hours. Magnesium:No results for input(s): MG in the last 72 hours. Phosphorus:No results for input(s): PHOS in the last 72 hours. BNP:No results for input(s): BNP in the last 72 hours. Glucose:No results for input(s): POCGLU in the last 72 hours. HgbA1C: No results for input(s): LABA1C in the last 72 hours. INR: No results for input(s): INR in the last 72 hours.   Hepatic: No results for input(s): ALKPHOS, ALT, AST, PROT, BILITOT, BILIDIR, LABALBU in the last 72 hours. Amylase and Lipase:No results for input(s): LACTA, AMYLASE in the last 72 hours. Lactic Acid: No results for input(s): LACTA in the last 72 hours. Troponin: No results for input(s): CKTOTAL, CKMB, TROPONINI in the last 72 hours. BNP: No results for input(s): BNP in the last 72 hours. Lipids: No results for input(s): CHOL, TRIG, HDL, LDLCALC in the last 72 hours.     Invalid input(s): LDL  ABGs: No results found for: PHART, PO2ART, ZCR0ENB, AIW7CDE, BEART           Problem List:   Active Hospital Problems    Diagnosis Date Noted    Primary malignant neoplasm of right lower lobe of lung ([facility] [C34.31] 04/20/2021     Priority: High    Postoperative urinary retention [N99.89, R33.8] 06/15/2021    Cancer of lower lobe of right lung (HCC) [C34.31] 06/14/2021    S/P robotic assisted nonanatomic wedge resection of lateral basilar right lower lobe lung mass and mediastinal dissection [Z90.2] 06/14/2021    SOB (shortness of breath) on exertion [R06.02] 05/20/2021    Stage 2 moderate COPD by GOLD classification ([facility] [J44.9] 09/30/2013         Stability:  Stable      Wound Care: Daily              Follow-up: Follow up with Khalif French MD  in 9 days  Follow-up: Follow up with Alfred Phalen, APRN - CNP in 30 days      Time Spent: 20 minutes      Khalif French MD, MD FACS 6/18/2021 7:58 AM

## 2021-06-18 NOTE — PROGRESS NOTES
[address]  OCCUPATIONAL THERAPY MISSED TREATMENT NOTE  [facility] ICU STEPDOWN TELEMETRY 4K  [id number]      Date: 2021  Patient Name: Eleanor Thakkar        CSN: [id number]   : 1950  (73 y.o.)  Gender: female   Referring Practitioner: NITO Galo CNP  Diagnosis: Cancer of lower lobe of right lung    REASON FOR MISSED TREATMENT: Per conversation with PT, RN Janice Javier, and patient, plans for discharge this afternoon. Patient has been ambulating in room and hallway with RW and SBA. Patient declining needs for inpatient OT services as she reports IND with ADLs and has assist from friend and family upon discharge home. Patient with no equipment needs. Will defer OT eval at this time.     Izzy Carlin, JEANMARIE, OTR/L

## 2021-06-18 NOTE — CARE COORDINATION
Update: plans home w [facility] when medically cleared likely today; collaborated w Nithin Melendez, [address]  Electronically signed by Ramses Deshpande RN on 6/18/2021 at 10:29 AM

## 2021-06-18 NOTE — CONSULTS
[address] [id number]                                  CONSULTATION    PATIENT NAME: Eric Serna                    :        1950  MED REC NO:   [id number]                           ROOM:       0011  ACCOUNT NO:   [de-identified]                           ADMIT DATE: 2021  PROVIDER:     Bucky Bello M.D.    Charlene Keel:  2021    REQUESTING PROVIDER:  Shayla Mike. Chato Pierce MD    REASON FOR CONSULTATION:  Elevated blood pressure. HISTORY OF PRESENT ILLNESS:  Pleasant 81-year-old patient who had a  previous cardiac catheterization for nonobstructive coronary artery  disease in . Had a recent stress test and an echo by Dr. Kathie Zhou for  preop assessment for lung cancer surgery. The patient underwent the  procedure successfully without any complication and has been recovering  from that. Apparently, she has been dealing with higher blood pressure,  for which we were consulted to assist in the management. The patient  denies any active chest pain right now besides some incisional  discomfort. She does have some baseline COPD and shortness of breath. There has not been any obvious dizziness, lightheadedness, or loss of  conscious in the recent days. She denies any issues with palpitation or  arrhythmia. REVIEW OF SYSTEMS:  No fever. No chills. No weight loss. No hematuria  or dysuria. No abdominal pain, nausea, vomiting, or diarrhea. No  obvious active psych problems or suicidal ideation. No skin rashes. No  obvious dizziness, lightheadedness, or loss of consciousness. No recent  trauma. No bleeding problem. PAST MEDICAL HISTORY:  1. Nonobstructive coronary artery disease. 2.  COPD. 3.  Hypertension. 4.  Hyperlipidemia. ALLERGIES:  VICODIN AND CIPRO. MEDICATIONS:  Albuterol inhaler, levothyroxine 88 a day, Spiriva. SOCIAL HISTORY:  History of smoking.   No alcohol or drug abuse reported. FAMILY HISTORY:  Noncontributory. PHYSICAL EXAMINATION:  VITAL SIGNS:  Physical exam showed a blood pressure of 130/70 and heart  rate of 70. GENERAL APPEARANCE:  Pleasant lady in no obvious acute distress. EYES AND EARS:  No discharge. NECK:  No JVD. No bruits. No masses. LUNGS:.  Decreased air entry. No crackles. No wheezes. HEART:  Normal S1, S2. Systolic murmur grade 2/6. ABDOMEN:  Soft, nontender. Positive bowel sounds. No organomegaly. EXTREMITIES:  No significant edema. NEUROLOGIC:  Grossly intact. Awake, alert. No focal deficits. PSYCHIATRIC:  No evidence of active psychosis. SKIN:  No rashes. IMPRESSION:  This is a patient who is status post lung surgery dealing  with elevated blood pressure. PLAN:  As follows:  1. I am going to start low-dose Norvasc with close monitoring to the  patient's response to blood pressure. 2.  Continue to monitor cardiac symptoms. 3.  Consider further treatment accordingly. Thank you for allowing me to participate in her care.         Alka Walton M.D.    D: 06/18/2021 8:48:52       T: 06/18/2021 8:58:13     [email or web address]  Job#: [id number]     Doc#: [id number]    CC:

## 2021-06-18 NOTE — CARE COORDINATION
Update: plans home w [place] today; home oxygen eval: 1L at rest, 2L w activity for COPD; left message w Pulmonary; await reply; collaborated w Merlin Ensign, SW  Electronically signed by Patricia Rodriguez RN on 6/18/2021 at 1:56 PM      Patient prefers new [facility] after choices offered. Patient was evaluated today for the diagnosis of COPD. I entered a DME order for home oxygen because the diagnosis and testing requires the patient to have supplemental oxygen. Condition will improve or be benefited by oxygen use. The patient is  able to perform good mobility in a home setting and therefore does require the use of a portable oxygen system. The need for this equipment was discussed with the patient and she understands and is in agreement.     Update: reply, oxygen orders in Epic; collaborated w Bart Wright RN, patient/family, Jose Sourav Davey  Electronically signed by Patricia Rodriguez RN on 6/18/2021 at 2:04 PM

## 2021-06-19 PROBLEM — Z01.818 PRE-OP EVALUATION: Status: RESOLVED | Noted: 2021-05-20 | Resolved: 2021-06-19

## 2021-06-21 ENCOUNTER — CARE COORDINATION (OUTPATIENT)
Dept: CASE MANAGEMENT | Age: 71
End: 2021-06-21

## 2021-06-22 NOTE — PROGRESS NOTES
Physician Progress Note      PATIENTCltamiko Feng  [facility] #:                  [id number]  :                       1950  ADMIT DATE:       2021 6:13 AM  DISCH DATE:        2021 4:38 PM  RESPONDING  PROVIDER #:        Eloisa Ahumada MD          QUERY TEXT:    Patient admitted for lymph node dissections. Noted documentation of acute   respiratory failure in pulmonary progress notes on . In order to support   the diagnosis of acute respiratory failure, please include additional clinical   indicators in your documentation. Or please document if the diagnosis of   acute respiratory failure has been ruled out after further study. The medical record reflects the following:  Risk Factors: Lung Ca  Clinical Indicators: Requiring only 1L of oxygen, Diminished lung sounds, no   labored breathing, lowest pulse ox 90%  Treatment: 1LNC, imaging, monitoring, pulmonary consulted    Acute Respiratory Failure Clinical Indicators per 3[facility] MS-DRG Training Guide and   Quick Reference Guide:  pO2 < 60 mmHg or SpO2 (pulse oximetry) < 91% breathing room air  pCO2 > 50 and pH < 7.35  P/F ratio (pO2 / FIO2) < 300  pO2 decrease or pCO2 increase by 10 mmHg from baseline (if known)  Supplemental oxygen of 40% or more  Presence of respiratory distress, tachypnea, dyspnea, shortness of breath,   wheezing  Unable to speak in complete sentences  Use of accessory muscles to breathe  Extreme anxiety and feeling of impending doom  Tripod position  Confusion/altered mental status/obtunded    Thank You! Frank Abreu RN  RN Clinical   (E) [phone] (H) [phone]  Options provided:  -- Acute Respiratory Failure as evidenced by, Please document evidence.   -- Acute Respiratory Failure ruled out after study  -- Other - I will add my own diagnosis  -- Disagree - Not applicable / Not valid  -- Disagree - Clinically unable to determine / Unknown  -- Refer to Clinical Documentation Reviewer    PROVIDER RESPONSE TEXT:    Provider disagreed with this query. I never saw this patient. This patient being followed by Dr. Francisco Khoury MD.   Please send this message to his in box.     Query created by: Dwight Guido on 6/17/2021 2:08 PM      Electronically signed by:  Rakesh Mendoza MD 6/21/2021 8:26 PM

## 2021-06-24 NOTE — PROGRESS NOTES
CLINICAL PHARMACY NOTE: MEDS TO BEDS    Total # of Prescriptions Filled: 1   The following medications were delivered to the patient:  [place] 100-62.5-25    Additional Documentation:

## 2021-06-28 ENCOUNTER — TELEPHONE (OUTPATIENT)
Dept: PULMONOLOGY | Age: 71
End: 2021-06-28

## 2021-06-28 NOTE — TELEPHONE ENCOUNTER
I do not see anything different in right hand corner does this need to be in a different type of encounter or on a certain tab

## 2021-06-28 NOTE — TELEPHONE ENCOUNTER
----- Message from Chase Conde RN sent at 6/22/2021 12:59 PM EDT -----  Regarding: Clarification  Hello,     Informing you that there is a query in [address] [place] [address] Rd (eMD) for your review on patient Ryanne Lange. The queries [facility]" as we like to call it) are now popping up in the rt lower hand side of your screen. This is our new query platform that will make responding to queries as simple as a click of your mouse. Please review and provide your response and/or feel free to call me with any questions you may have. I would be happy to assist!     Thank You!   Tommy Asher RN  RN Clinical    (I) [phone] (U) [phone]

## 2021-06-30 NOTE — TELEPHONE ENCOUNTER
Received a call from [facility] with clinical documentation, tip sheet was handed to [employer] to finish this request.

## 2021-07-06 NOTE — TELEPHONE ENCOUNTER
Called and was not able to reach [facility] left VM asking for further clarification to what she needs tried previous recommendation had no new queries pop up in right lower corner

## 2021-07-12 ENCOUNTER — HOSPITAL ENCOUNTER (OUTPATIENT)
Dept: RADIATION ONCOLOGY | Age: 71
Discharge: HOME | End: 2021-07-12
Payer: MEDICARE

## 2021-07-12 VITALS
RESPIRATION RATE: 16 BRPM | HEART RATE: 68 BPM | BODY MASS INDEX: 23.24 KG/M2 | DIASTOLIC BLOOD PRESSURE: 64 MMHG | WEIGHT: 135.4 LBS | SYSTOLIC BLOOD PRESSURE: 133 MMHG | OXYGEN SATURATION: 94 % | TEMPERATURE: 97.8 F

## 2021-07-12 PROCEDURE — 99214 OFFICE O/P EST MOD 30 MIN: CPT | Performed by: RADIOLOGY

## 2021-07-12 PROCEDURE — 99212 OFFICE O/P EST SF 10 MIN: CPT | Performed by: RADIOLOGY

## 2021-07-12 ASSESSMENT — PAIN DESCRIPTION - ONSET: ONSET: ON-GOING

## 2021-07-12 ASSESSMENT — PAIN DESCRIPTION - PAIN TYPE: TYPE: SURGICAL PAIN

## 2021-07-12 ASSESSMENT — PAIN DESCRIPTION - ORIENTATION: ORIENTATION: RIGHT

## 2021-07-12 ASSESSMENT — PAIN DESCRIPTION - LOCATION: LOCATION: RIB CAGE

## 2021-07-12 ASSESSMENT — PAIN SCALES - GENERAL: PAINLEVEL_OUTOF10: 6

## 2021-07-12 ASSESSMENT — PAIN DESCRIPTION - FREQUENCY: FREQUENCY: INTERMITTENT

## 2021-07-12 NOTE — PROGRESS NOTES
Moris December 7/12/2021    Chaperone: No    Advance Directives     Power of  Living Will ACP-Advance Directive ACP-Power of     Not on File Filed on 09/30/13 Filed Not on File          Temp Readings from Last 2 Encounters:   07/12/21 97.8 °F (36.6 °C) (Infrared)   06/18/21 97.4 °F (36.3 °C) (Oral)     BP Readings from Last 2 Encounters:   07/12/21 133/64   06/18/21 (!) 106/55     Pulse Readings from Last 2 Encounters:   07/12/21 68   06/18/21 72        Wt Readings from Last 3 Encounters:   07/12/21 135 lb 6.4 oz (61.4 kg)   06/18/21 141 lb 9.6 oz (64.2 kg)   06/03/21 143 lb 12.8 oz (65.2 kg)        [unfilled]       Lab Results   Component Value Date    CREATININE 1.1 06/14/2021     Lab Results   Component Value Date    BUN 14 04/14/2021       Mediport: no    Pacemaker/ICD: no    Previous XRT: yes, Right Breast in 2005 at [facility]    Past Medical History:   Diagnosis Date    Anxiety 2007    Arthritis     Breast cancer [facility]) 2005    right mastectomy, chemo and radiation history    CAD (coronary artery disease) 2001    sees Dr Mcgowan [place] of the skin 2004    Cerebral artery occlusion with cerebral infarction ([address]     Chronic UTI     COPD (chronic obstructive pulmonary disease) ([address]     sees RL Petersen    CVA (cerebral infarction) 2007, 2008    Depression 2007    DVT of lower extremity (deep venous thrombosis) ([address] 1976    Facial injury 2005    Fall on greasy ground, striking left periorbital region    History of stroke 2007, 2008, 2009    Patient relates a stroke with right weakness and speech in 2007; another in 2010 or 2012 (unsure), both with need to rehabilitation.   Also \"2 mini-strokes\" (?)    Hx of blood clots 1977    hip, leg    Hyperlipidemia 2005    Hypertension 2005    Hypothyroidism     IBS (irritable bowel syndrome)     Low HDL (under 40) 2014    Lung cancer [facility])     sees Dr ORELLANA    Prolonged emergence from general anesthesia     Recurrent UTI (urinary tract infection) 2015    Dr Kin Nguyễn finding difficulty 05/16/2017    work-up in progress     Past Surgical History:   Procedure Laterality Date    APPENDECTOMY  1975    BREAST SURGERY Right 04/01/2005    evacuation of breast hematoma-Dr. Opal Park    BREAST SURGERY Right 11/30/2004    lymphatic mapping, SLN bx x2-Dr. Cheli Sultana    COLONOSCOPY  2009    Dr. Leonard Smith  03/16/2021    CT NEEDLE BIOPSY LUNG PERCUTANEOUS 3/16/2021 [facility] CT SCAN    HIP SURGERY  01/19/2015    HYSTERECTOMY  1976    JOINT REPLACEMENT Left 2011    HIP    JOINT REPLACEMENT Right 01/19/2015    Right Total Hip Replacement - Dr. Jj Dorantes, TOTAL Right 6/14/2021    ROBOTIC ASSISTED RIGHT LOWER LOBE SUPERIOR SEGMENTECTOMY AND MEDIASTINAL DISSECTION, CRYOTHERAPY OF INTERCOSTAL NERVES performed by Rosemary Brown MD at [address] Right 2005    Dr. Mando Recinos  04/10/2018    Lumbar epidural steroid injection at L4    SC NJX DX/THER SBST INTRLMNR LMBR/SAC W/IMG GDN N/A 04/10/2018    LESI  @ L4 performed by Frankie Adorno MD at [facility], 2001    ovaries    THYROID LOBECTOMY Left 11/26/2010    left thyroid lobectomy with isthmusectomy-Dr. Vanessa [facility] THYROID SURGERY  2007    right thyroid lobectomy-Dr. Ibanez       Allergies   Allergen Reactions    Cipro Xr     Vicodin [Hydrocodone-Acetaminophen]      Weird dreams            Current Outpatient Medications:     guaiFENesin (MUCINEX) 600 MG extended release tablet, Take 1 tablet by mouth 2 times daily, Disp: 60 tablet, Rfl: 1    fluticasone-umeclidin-vilant (TRELEGY ELLIPTA) 100-62.5-25 MCG/INH AEPB, Inhale 1 puff into the lungs daily Rinse mouth with water and spit without swallowing after each dose., Disp: 1 each, Rfl: 11    diphenhydrAMINE (BENADRYL) 25 MG tablet, Take 25 mg by mouth every 6 hours as needed for Itching, Disp: , Rfl:     albuterol sulfate HFA (PROVENTIL HFA) 108 (90 Base) MCG/ACT inhaler, Inhale 2 puffs into the lungs every 6 hours as needed for Wheezing, Disp: 1 Inhaler, Rfl: 3    levothyroxine (LEVOTHROID) 88 MCG tablet, Take 1 tablet by mouth daily, Disp: 30 tablet, Rfl: 5      ADDITIONAL COMMENTS: Patient here today after surgery to discuss treatment options.        Grecia Weber RN BSN

## 2021-07-12 NOTE — PROGRESS NOTES
[facility])         [address], [address]        [place],  [facility]        Marlena Finer: [phone]        F: [phone]       [email or web address]          FOLLOW UP    Date of Service: 2021  Patient ID: Philippe Pina   : 1950  MRN: [id number]   Acct Number: [de-identified]       DATE OF SERVICE: 2021   LOCATION: [facility]  PROVIDER: Ciro Rose MD    FOLLOW UP PHYSICIANS: Dr. Latoya Murray (Surgery)    ASSESSMENT AND PLAN:  Cancer Staging  Primary malignant neoplasm of right lower lobe of lung [facility])  Staging form: Lung, AJCC 8th Edition  - Clinical stage from 2021: Stage IA2 (cT1b, cN0, cM0) - Signed by Rambo Marte MD on 2021  - Pathologic stage from 2021: Stage IB (pT2a, pN0, cM0) - Signed by Robyn Brown MD on 2021      History of right breast CA, treated with resection and radiation in the past.  Also lung SCC, now returns s/p robotic wedge resection of lateral basilar RLL lung mass and mediastinal dissection (confirmed poorly differentiated SCC), LN dissection negative, margins negative. Currently no e/o localized disease, would recommend watchful waiting and follow-up as indicated. Tobacco cessation counseling offered, patient declined. The patient will return to clinic PRN as clinically indicated. They have our clinic number to call with any questions or concerns if needed. Thank you for allowing us to be a part of their care. HPI:  Oncology History Overview Note   Philippe iPna is a 73 y.o. female who is at her baseline in terms of being symptomatic with COPD. She denies hemoptysis but does admit to perhaps slight increased shortness of breath with activity over the past several months. Recently hospitalized at [facility] 3/12 through the  for syncope with no evidence of stroke or cardiac event with a CTA of the head and neck showed an occluded skull base left ICA.   Smoking habit includes 2 pack/day for 48 years, ongoing and not on home O2. Still at 2ppd . Unintentional weight loss noted over the past several months. Sister and  passed last year. On 12/13/2020 she had a CTA of the chest showed no PE but did show a 14 mm spiculated nodule that increased from 8 mm on the prior exam February 2019. No adenopathy reported. Biopsy of the right lower lobe lung nodule revealed a poorly differentiated squamous cell carcinoma. P40 positive and TTF-1 negative. Remote history of breast cancer approximately 15 years ago (poor historian). Treated with upfront chemotherapy then a right mastectomy followed by radiation to the chest wall and several years of adjuvant hormone therapy. No history of recurrence. No close oncologic follow-up in recent history. Primary malignant neoplasm of right lower lobe of lung ([facility] [address]   4/20/2021 Initial Diagnosis    Primary malignant neoplasm of right middle lobe of lung ([facility] [address]     6/14/2021 Surgery    Right lower lobe of lung, wedge resection, with lymph node resection performed by Dr. Norris Ramos (Surgery)    FINAL DIAGNOSIS:   A. Lymph nodes, level VII, resections:    Negative for neoplasm in 4 out of 4 lymph nodes. B. Lymph nodes, level IV R, resections:    Negative for neoplasm in 2 out of 2 lymph nodes. C. Lymph node, level IX, resection:    Negative for neoplasm in one out of one lymph node. D. Lymph nodes, level X R, resection:    Negative for neoplasm in 2 out of 2 lymph nodes. E. Lymph nodes, level XI R, resections:    Negative for neoplasm in 2 out of 2 lymph nodes. F. Right lower lobe of lung, wedge biopsy:    Invasive, moderately to poorly differentiated squamous cell carcinoma. Maximum tumor size equal 3.1 cm. Margins of resection are free of neoplasia. Moderate emphysema. pT2, pN0.           INTERVAL HISTORY:  David Brambila is a 73 y.o. female is presenting today for regularly scheduled follow up regarding the above mentioned oncologic history. Patient recently had wedge resection of RLL for SCC with mediastinal dissection, including lymph nodes. Patient was seen for consultation regarding potential radiation therapy. Currently no evidence of localized lesions identified, advised watchful waiting and follow-up as needed. Patient c/o chest wall tenderness around incision site environment drain was previously placed, this is exacerbated by sneezing, deep breath, Valsalva. Mild erythema noted around that site with suture in place, no purulence. CHAPERONE: Declined    ROS:  A complete review of systems was performed and found to be negative except as presented above. LABORATORY STUDIES:   Onc labs: No results found for: PSA, CEA, LDH, AFP    Radiology:  MRI brain W WO on 4/19/2021:   No evidence of metastatic disease in the brain. Stable chronic small vessel ischemic changes. No acute findings. PET/CT skull base 04/12/21:   1. FDG avid right mid lung nodule corresponding to known malignancy. 2. 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 Metastatic disease is considered less likely but not excluded. 3. Mildly FDG avid bilateral adrenal nodules, likely benign adenomas. MEDICATIONS:   Current Outpatient Medications   Medication Sig Dispense Refill    guaiFENesin (MUCINEX) 600 MG extended release tablet Take 1 tablet by mouth 2 times daily 60 tablet 1    fluticasone-umeclidin-vilant (TRELEGY ELLIPTA) 100-62.5-25 MCG/INH AEPB Inhale 1 puff into the lungs daily Rinse mouth with water and spit without swallowing after each dose.  1 each 11    diphenhydrAMINE (BENADRYL) 25 MG tablet Take 25 mg by mouth every 6 hours as needed for Itching      albuterol sulfate HFA (PROVENTIL HFA) 108 (90 Base) MCG/ACT inhaler Inhale 2 puffs into the lungs every 6 hours as needed for Wheezing 1 Inhaler 3    levothyroxine (LEVOTHROID) 88 MCG tablet Take 1 tablet by mouth daily 30 tablet 5     No current facility-administered medications for this encounter. EXAMINATION:  VITAL SIGNS: /64   Pulse 68   Temp 97.8 °F (36.6 °C) (Infrared)   Resp 16   Wt 135 lb 6.4 oz (61.4 kg)   SpO2 94%   BMI 23.24 kg/m²     ECO - Symptomatic, <50% in bed during the day (Ambulatory and capable of all self care but unable to carry out any work activities. Up and about more than 50% of waking hours)    PAIN: 6/10    GENERAL: Pleasant well-developed adult. In no acute distress. Alert and oriented ×3  HEENT: Normocephalic, atraumatic. PERRLA, EOMI, oral mucosa moist without lesion or exudate in the visible oral cavity or oropharynx,  LUNGS: clear bilaterally. Good inspiratory effort, no accessory muscle use. Mild chest wall erythema around robotic incision site/drainage site, w/o purulence. CARDIAC: Regular rate and rhythm  ABDOMEN: Soft, nontender, nondistended  EXTREMITIES: No significant LE edema b/l  NEUROLOGIC: No grossly apparent focal deficits. Cranial nerves grossly intact    Electronically signed by Jean Marie Acosta MD on 21 at 9:43 AM EDT    Attending Supervising Physicians Attestation Statement  I saw and evaluated the patient independently. I discussed the findings and plans with resident physician (Dr. Jean Marie Acosta) and agree as documented in his note after reviewing the patient chart carefully and prolonged discussion regarding the resident assessment and plan including adjustments that would benefit our patient. ATTESTATION: 30 minutes were spent with the patient at today's visit reviewing pertinent information related to their oncologic diagnosis, including any recent labs, imaging, follow ups and plan of care going forward.     CC:Dr. Meenakshi Augustin (Surgery)  ACC:. [facility]

## 2021-07-14 NOTE — PROGRESS NOTES
Physician Progress Note      PATIENTDemetrio Loges  [facility] #:                  [id number]  :                       1950  ADMIT DATE:       2021 6:13 AM  DISCH DATE:        2021 4:38 PM  RESPONDING  PROVIDER #:        TRAMAINE MORAN APRN - CNP          QUERY TEXT:    Patient admitted for lymph node dissections. Noted documentation of acute   respiratory failure in pulmonary progress notes on . In order to support   the diagnosis of acute respiratory failure, please include additional clinical   indicators in your documentation. Or please document if the diagnosis of   acute respiratory failure has been ruled out after further study. The medical record reflects the following:  Risk Factors: Lung Ca  Clinical Indicators: Requiring only 1L of oxygen, Diminished lung sounds, no   labored breathing, lowest pulse ox 90%  Treatment: 1LNC, imaging, monitoring, pulmonary consulted    Acute Respiratory Failure Clinical Indicators per 3[employer] Training Guide and   Quick Reference Guide:  pO2 < 60 mmHg or SpO2 (pulse oximetry) < 91% breathing room air  pCO2 > 50 and pH < 7.35  P/F ratio (pO2 / FIO2) < 300  pO2 decrease or pCO2 increase by 10 mmHg from baseline (if known)  Supplemental oxygen of 40% or more  Presence of respiratory distress, tachypnea, dyspnea, shortness of breath,   wheezing  Unable to speak in complete sentences  Use of accessory muscles to breathe  Extreme anxiety and feeling of impending doom  Tripod position  Confusion/altered mental status/obtunded    Thank You! Jenny Melendez RN  RN Clinical   (G) [phone] (O) [phone]  Options provided:  -- Acute Respiratory Failure as evidenced by, Please document evidence.   -- Acute Respiratory Failure ruled out after study  -- Other - I will add my own diagnosis  -- Disagree - Not applicable / Not valid  -- Disagree - Clinically unable to determine / Unknown  -- Refer to Clinical Documentation Reviewer    PROVIDER RESPONSE TEXT:    This patient is in acute respiratory failure as evidenced by requirement of   supplemental oxygen postoperatively noted variable requirements during   recovery after Robot assisted wide resection of right lower lobe lung cancer   with mediastinal dissection and cryotherapy of intercostal nerves was   discharged on 1 LRobotic assisted wide resection of right lower lobe lung   cancer with mediastinal dissection and cryotherapy of intercostal nerve was   discharged on 1 LPM at rest and 2 LPM with exertion    Query created by: Josh Hood on 6/22/2021 12:55 PM      Electronically signed by:  Briana Ruano CNP 7/14/2021 5:04 PM

## 2021-07-20 ENCOUNTER — OFFICE VISIT (OUTPATIENT)
Dept: ONCOLOGY | Age: 71
End: 2021-07-20
Payer: MEDICARE

## 2021-07-20 ENCOUNTER — CLINICAL DOCUMENTATION (OUTPATIENT)
Dept: CASE MANAGEMENT | Age: 71
End: 2021-07-20

## 2021-07-20 ENCOUNTER — HOSPITAL ENCOUNTER (OUTPATIENT)
Dept: INFUSION THERAPY | Age: 71
Discharge: HOME | End: 2021-07-20
Payer: MEDICARE

## 2021-07-20 VITALS
HEIGHT: 64 IN | RESPIRATION RATE: 18 BRPM | TEMPERATURE: 97.9 F | WEIGHT: 133.2 LBS | OXYGEN SATURATION: 92 % | SYSTOLIC BLOOD PRESSURE: 150 MMHG | BODY MASS INDEX: 22.74 KG/M2 | HEART RATE: 85 BPM | DIASTOLIC BLOOD PRESSURE: 68 MMHG

## 2021-07-20 DIAGNOSIS — C34.91 PRIMARY LUNG SQUAMOUS CELL CARCINOMA, RIGHT (HCC): Primary | ICD-10-CM

## 2021-07-20 PROCEDURE — 99211 OFF/OP EST MAY X REQ PHY/QHP: CPT

## 2021-07-20 PROCEDURE — 4004F PT TOBACCO SCREEN RCVD TLK: CPT | Performed by: INTERNAL MEDICINE

## 2021-07-20 PROCEDURE — 1090F PRES/ABSN URINE INCON ASSESS: CPT | Performed by: INTERNAL MEDICINE

## 2021-07-20 PROCEDURE — 1123F ACP DISCUSS/DSCN MKR DOCD: CPT | Performed by: INTERNAL MEDICINE

## 2021-07-20 PROCEDURE — 4040F PNEUMOC VAC/ADMIN/RCVD: CPT | Performed by: INTERNAL MEDICINE

## 2021-07-20 PROCEDURE — G8399 PT W/DXA RESULTS DOCUMENT: HCPCS | Performed by: INTERNAL MEDICINE

## 2021-07-20 PROCEDURE — G8420 CALC BMI NORM PARAMETERS: HCPCS | Performed by: INTERNAL MEDICINE

## 2021-07-20 PROCEDURE — G8427 DOCREV CUR MEDS BY ELIG CLIN: HCPCS | Performed by: INTERNAL MEDICINE

## 2021-07-20 PROCEDURE — 99214 OFFICE O/P EST MOD 30 MIN: CPT | Performed by: INTERNAL MEDICINE

## 2021-07-20 PROCEDURE — 3017F COLORECTAL CA SCREEN DOC REV: CPT | Performed by: INTERNAL MEDICINE

## 2021-07-20 NOTE — PROGRESS NOTES
[address] CANCER [address] [place] [address]  Dept: [phone]  Loc: [phone]   Hematology/Oncology Consult (Clinic)      7/20/2021    Philippe Creed   1950     No ref. provider found   Dallin NITO Mackay - CNP       DIAGNOSIS:   -Squamous cell carcinoma RLL. Pathologic stage T2 a N0 M0 / stage Ib.  3.1 cm on the high risk feature grade 3.  - Remote history of squamous cell cancer of the breast in 2005. This current tumor is receptor negative versus been receptor positive in 2005    TREATMENT:  - Right lower lobectomy and node dissection per Dr. Keron Sarabia:  -Chest CT  -PET/CT      SUBJECTIVE: Patient is scheduled for surgery by Dr. Maria Fernanda Griggs on June 17. She understands we reviewed her case at lung conference earlier this week and the consensus was to proceed with surgery. No new complaints. HPI: Rafael Oneil is a 74-year-old female who is at her baseline in terms of being symptomatic with  COPD . She denies hemoptysis but does admit to perhaps slight increased shortness of breath with activity over the past several months. Recently hospitalized at [facility] 3/12 through the 17th for syncope with no evidence of stroke or cardiac event with a CTA of the head and neck showed an occluded skull base left ICA. Smoking habit includes 2 pack/day for 48 years, ongoing and not on home O2. Still at 2ppd . Weight loss of 179 last November to 138 today; unintentional x much stress . Sister and  passed last year. On 12/13/2020 she had a CTA of the chest showed no PE but did show a 14 mm spiculated nodule that increased from 8 mm on the prior exam February 2019. No adenopathy reported. Biopsy of the right lower lobe lung nodule revealed a poorly differentiated squamous cell carcinoma. P40 positive and TTF-1 negative. Remote history of breast cancer approximately 15 years ago (poor historian).   Treated with 1967    Smokeless tobacco: Never Used    Tobacco comment: Currently at 1 pk/day   Vaping Use    Vaping Use: Never used   Substance and Sexual Activity    Alcohol use: No     Alcohol/week: 0.0 standard drinks    Drug use: No    Sexual activity: Yes     Partners: Male   Other Topics Concern    Not on file   Social History Narrative    Not on file     Social Determinants of Health     Financial Resource Strain:     Difficulty of Paying Living Expenses:    Food Insecurity:     Worried About Running Out of Food in the Last Year:     Ran Out of Food in the Last Year:    Transportation Needs:     Lack of Transportation (Medical):  Lack of Transportation (Non-Medical):    Physical Activity:     Days of Exercise per Week:     Minutes of Exercise per Session:    Stress:     Feeling of Stress :    Social Connections:     Frequency of Communication with Friends and Family:     Frequency of Social Gatherings with Friends and Family:     Attends Spiritism Services:     Active Member of Clubs or Organizations:     Attends Club or Organization Meetings:     Marital Status:    Intimate Partner Violence:     Fear of Current or Ex-Partner:     Emotionally Abused:     Physically Abused:     Sexually Abused:         Spouse: in   3 sons nearby and supportive  Marcio [phone]    Phone: [phone]     [address] [place] [id number]     Employment:  Ret [employer]    Immunizations:  Immunization History   Administered Date(s) Administered    Influenza Virus Vaccine 10/08/2014, 10/05/2015    Influenza, High Dose (Fluzone 65 yrs and older) 2018, 01/10/2019    Pneumococcal Conjugate 13-valent ([id number]) 2015    Pneumococcal Polysaccharide ([id number]) 2014        Health Screenings:  Mammogram:   2018-benign  Pap / Pelvic: Status post hysterectomy followed by bilateral oophorectomy  C-Scope: 2018-benign      Gyn HX:   GPA:  2 BARBRA/BSO: Done  LMP: No LMP recorded. Patient has had a hysterectomy. Health Maintenance Due   Topic Date Due    COVID-19 Vaccine (1) Never done    DTaP/Tdap/Td vaccine (1 - Tdap) Never done    Shingles Vaccine (1 of 2) Never done    Pneumococcal 65+ years Vaccine (2 of 2 - PPSV23) 05/23/2019    Annual Wellness Visit (AWV)  Never done    Breast cancer screen  06/13/2019        Interests:   Family time    Fam HX:   Family History   Problem Relation Age of Onset    Osteoporosis Mother     Hypertension Mother     High Cholesterol Mother     Stroke Mother     Colon Cancer Mother     Cancer Father         lung cancer    Heart Disease Father     Hypertension Father     High Cholesterol Father     Heart Disease Sister     High Cholesterol Sister     Hypertension Sister     Asthma Sister     Other Other         high arched feet    Lung Cancer Son    Dad- lung ca 61  Mom- colon ca  80  Son- lung ca      Hospitalizations:   Syncope March 2021    Allergies:   Allergies   Allergen Reactions    Cipro Xr     Vicodin [Hydrocodone-Acetaminophen]      Weird dreams          Adult Illness:  Patient Active Problem List   Diagnosis    Hyperlipidemia    History of breast cancer    Vitamin D deficiency    Chronic headachess/p head injjury s/p fall    Depression    Smoker    Hypothyroidism    Allergic rhinitis    Incontinence    Noncompliance    Vasovagal syncope    History of CVA (cerebrovascular accident)    Stage 2 moderate COPD by GOLD classification ([address]    Carotid occlusion, left    OA (osteoarthritis) of hip    Essential hypertension    CAD (coronary artery disease)    Transient cerebral ischemia    Aortic insufficiency    Chest pain, atypical    Tobacco abuse    History of chest pain atypical    S/P cardiac cath nonobstructive lesion 2014    Lumbar radiculitis    Spinal stenosis of lumbar region without neurogenic claudication    Syncope and collapse    Solitary pulmonary nodule on lung CT    Severe malnutrition (HCC)    Primary malignant neoplasm of right lower lobe of lung (HCC)    SOB (shortness of breath) on exertion    Dizziness on standing    History of syncope    Cancer of lower lobe of right lung (HCC)    S/P robotic assisted nonanatomic wedge resection of lateral basilar right lower lobe lung mass and mediastinal dissection    Postoperative urinary retention    Breast ca 2004 R MRM . Chemo first monthly x 3, MRM then XRT then 1 pill x few years. We will attempt to get records.       Surgery:  Past Surgical History:   Procedure Laterality Date    APPENDECTOMY  1975    BREAST SURGERY Right 04/01/2005    evacuation of breast hematoma-Dr. Ríos Cea    BREAST SURGERY Right 11/30/2004    lymphatic mapping, SLN bx x2-Dr. Carlos Morin    COLONOSCOPY  2009    Dr. Himanshu Mckinney  03/16/2021    CT NEEDLE BIOPSY LUNG PERCUTANEOUS 3/16/2021 [facility] CT SCAN    HIP SURGERY  01/19/2015    HYSTERECTOMY  1976    JOINT REPLACEMENT Left 2011    HIP    JOINT REPLACEMENT Right 01/19/2015    Right Total Hip Replacement - Dr. Grace Elliott, TOTAL Right 6/14/2021    ROBOTIC ASSISTED RIGHT LOWER LOBE SUPERIOR SEGMENTECTOMY AND MEDIASTINAL DISSECTION, CRYOTHERAPY OF INTERCOSTAL NERVES performed by Darrell Barron MD at [address] Right 2005    Dr. Reta Richardson  04/10/2018    Lumbar epidural steroid injection at L4    NV NJX DX/THER SBST INTRLMNR LMBR/SAC W/IMG GDN N/A 04/10/2018    LESI  @ L4 performed by Nataliya Martinez MD at [facility]    ovaries    THYROID LOBECTOMY Left 11/26/2010    left thyroid lobectomy with isthmusectomy-Dr. Diallo Nelson THYROID SURGERY  2007    right thyroid lobectomy-Dr. Ibanez        Medications:  Current Outpatient Medications   Medication Sig Dispense Refill    guaiFENesin (MUCINEX) 600 MG extended release tablet Take 1 tablet by mouth 2 times daily 60 tablet 1    fluticasone-umeclidin-vilant (TRELEGY ELLIPTA) 100-62.5-25 MCG/INH AEPB Inhale 1 puff into the lungs daily Rinse mouth with water and spit without swallowing after each dose. 1 each 11    diphenhydrAMINE (BENADRYL) 25 MG tablet Take 25 mg by mouth every 6 hours as needed for Itching      albuterol sulfate HFA (PROVENTIL HFA) 108 (90 Base) MCG/ACT inhaler Inhale 2 puffs into the lungs every 6 hours as needed for Wheezing 1 Inhaler 3    levothyroxine (LEVOTHROID) 88 MCG tablet Take 1 tablet by mouth daily 30 tablet 5     No current facility-administered medications for this visit. EXAM:    BP (!) 150/68 (Site: Left Upper Arm, Position: Sitting, Cuff Size: Medium Adult)   Pulse 85   Temp 97.9 °F (36.6 °C) (Oral)   Resp 18   Ht 5' 4\" (1.626 m)   Wt 133 lb 3.2 oz (60.4 kg)   SpO2 92%   BMI 22.86 kg/m²      ECO  General: Non-ill appearing. Thin, mildly anxious and tearful at times. She is alone today. HEENT: NC/AT,nonicteric, perrla,eom intact, no mucosal lesions wearing upper dentures, no lesions seen. Neck: normal thyroid, no masses. pulses nl, no bruits,   Nodes: No adenopathy  Lungs/chest: clear, no rales,rhonchi or wheezing, lung bases clear, overall very diminished breath sounds throughout. CV: rrr, no rubs ,gallops or murmurs  Breasts: Right mastectomy site unremarkable, left breast atrophic without masses. (Exam chaperoned by Michelle Soto)  Abd/Rectal: soft, non-tender,bowel sounds normal , no HSM,no masses  Back: normal curvature, No midline tenderness. flanks nontender  : Not Examined  Extremities: no cyanosis,clubbing or edema. Skin: unremarkable  Neuro: A and O x 4, CN exam nonfocal, Motor- no deficits, Sensory- no deficits, gait-nl, speech- fluent, no ataxia.   Devices: none      DATA:    LAB:   CBC, CMP today  CBC with Differential:      Lab Results   Component Value Date    WBC 10.2 06/15/2021    RBC 4.03 06/15/2021    RBC 4.49 2012    HGB 12.6 06/15/2021    HCT 41.9 06/15/2021     06/15/2021    .0 06/15/2021    MCH 31.3 06/15/2021    MCHC 30.1 06/15/2021    RDW 13.7 04/14/2021    NRBC 0 03/16/2021    NRBC 0 05/16/2012    SEGSPCT 91.8 03/16/2021    MONOPCT 0.6 03/16/2021    MONOSABS 0.3 04/14/2021    LYMPHSABS 1.5 04/14/2021    EOSABS 0.2 04/14/2021    BASOSABS 0.0 04/14/2021       CMP:    Lab Results   Component Value Date     04/14/2021    K 4.2 04/14/2021    K 3.9 03/13/2021     04/14/2021    CO2 28 04/14/2021    BUN 14 04/14/2021    CREATININE 1.1 06/14/2021    LABGLOM 49 06/14/2021    GLUCOSE 100 04/14/2021    GLUCOSE 94 05/16/2012    PROT 7.2 04/14/2021    LABALBU 4.1 04/14/2021    LABALBU 4.4 05/16/2012    CALCIUM 9.3 04/14/2021    BILITOT 0.5 04/14/2021    ALKPHOS 157 04/14/2021    AST 13 04/14/2021    ALT <5 04/14/2021       BMP:    Lab Results   Component Value Date     04/14/2021    K 4.2 04/14/2021    K 3.9 03/13/2021     04/14/2021    CO2 28 04/14/2021    BUN 14 04/14/2021    LABALBU 4.1 04/14/2021    LABALBU 4.4 05/16/2012    CREATININE 1.1 06/14/2021    CALCIUM 9.3 04/14/2021    LABGLOM 49 06/14/2021    GLUCOSE 100 04/14/2021    GLUCOSE 94 05/16/2012       Magnesium:    Lab Results   Component Value Date    MG 2.5 03/16/2021     PT/INR:    Lab Results   Component Value Date    INR 0.97 03/16/2021     TSH:    Lab Results   Component Value Date    TSH 4.810 03/13/2021     VITAMIN B12: No components found for: B12  FOLATE:    Lab Results   Component Value Date    FOLATE 7.3 08/31/2016       IMAGING:  Brain MRI with and without contrast 4/19/2021  Impression       1. No evidence of metastatic disease in the brain. 2. Stable chronic small vessel ischemic changes. 3. No acute findings.        PROCEDURE: CTA CHEST W WO CONTRAST 12/13/2020       CLINICAL INFORMATION: cough, sob, elevated d-dimer, PE .       COMPARISON: 2/21/2019       TECHNIQUE: Postcontrast images of the chest with 3-D MIPS Isovue-370 IV contrast   All CT scans at this facility use dose modulation, iterative reconstruction, and/or weight-based dosing when appropriate to reduce radiation dose to as low as reasonably achievable.       FINDINGS: No PE. There is no aneurysm or dissection. No mediastinal or hilar adenopathy by size criteria. Postsurgical changes right axilla. No axillary lymphadenopathy   Heart size normal.   No infiltrates or pleural effusions. The 8 mm nodule seen on the prior CT now measures 14 mm and has spiculated borders highly suspicious of malignancy. There however are no other additional new masses. Scattered nodules are present in both lungs which are previously seen. There is a    cluster of nodular the anterior right upper lung stable. Small nodules posterior right lower lung stable. Tiny triangular nodular density lingula is stable.       Upper abdomen   Stable bilateral adrenal fullness likely hyperplasia this is stable dating back to June 2017.       Bones   No suspicious bone lesions           Impression   1. No PE. 2. No acute findings. 3. Interval increase in size of the patient's right lung nodule now measures 14 mm with spiculated borders and is highly suspicious for malignancy.               **This report has been created using voice recognition software.  It may contain minor errors which are inherent in voice recognition technology. **       Final report electronically signed by Dr. Artemio Edward on 12/13/2020 4:08 PM       Impression:  1. No PE. 2. No acute findings. 3. Interval increase in size of the patient's right lung nodule now measures 14 mm with spiculated borders and is highly suspicious for malignancy.        CT CHEST W CONTRAST       CTA head with contrast 3/12/2021/syncope evaluation  Impression   Impression:   1.  Occluded skull base left ICA, present previously.  Patent left    posterior communicating artery. 2.  Intact anterior, posterior, and middle cerebral circulations.    3.  No intracranial aneurysm.         Brain MRI without contrast 3/13/2021      Impression       1. No evidence of an acute infarct. 2. No antegrade flow in the left internal carotid artery. 3. Mild atrophy and probable ischemic changes in the white matter. 4. Inflammatory changes in the right mastoid air cells.             Ct Needle Biopsy Lung/mediastinum Percutaneous  Result Date: 3/16/2021   Successful CT-guided right lower lobe lung nodule biopsy with small postprocedural pneumothorax. Follow-up chest radiographs will be obtained. **This report has been created using voice recognition software. It may contain minor errors which are inherent in voice recognition technology. ** Final report electronically signed by Dr. Corine Ambrosio MD on 3/16/2021 10:50 AM    Pet Ct Skull Base To Mid Thigh 4/12/2021  FINDINGS:        Neck: A 0.7 cm nodule in the left parotid gland has a maximum SUV of 4.3 (image 39).     Chest: Cardiac size is normal. Atherosclerotic calcifications are present in the thoracic aorta and coronary arteries without evidence of aneurysm. There is no pleural or pericardial effusion. Emphysematous changes are noted in the bilateral lungs. A 1.5    cm nodule at the lateral right midlung is stable in size is associated with a maximum SUV of 9.5 (image 118). There is no FDG avid mediastinal, hilar or axillary lymphadenopathy. Surgical clips are noted in the right axilla. There are surgical changes    of prior right mastectomy. Activity associated with a small hiatal hernia may be infectious or inflammatory. Degenerative changes are seen in the thoracic spine without evidence of hypermetabolic osseous metastatic disease.       Abdomen/pelvis: Physiologic activity is present in the liver, spleen, urinary collecting system and gastrointestinal tract. The gallbladder is surgically absent. There is fatty replacement of the pancreas.  Hypoattenuating lesions in the bilateral kidneys    are likely cysts but are incompletely characterized on the current study. A 1.5 cm hypoattenuating nodule in the right adrenal gland is associated with a maximum SUV of 7 (image 159). A 1.4 cm nodule left adrenal gland has a maximum SUV of 2.4 (image    154). Atherosclerotic calcifications are present in the abdominal aorta without evidence of aneurysm or the uterus is surgically absent. There is no FDG avid mesenteric, retroperitoneal, pelvic or inguinal lymphadenopathy. Degenerative changes are    present in the lumbar spine and pelvis without evidence of hypermetabolic osseous metastatic disease. There is extensive metallic artifact in the lower pelvis from bilateral hip prostheses.           Impression   1. FDG avid right mid lung nodule corresponding to known malignancy. 2. 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 Metastatic disease is considered less likely but not excluded. 3. Mildly FDG avid bilateral adrenal nodules, likely benign adenomas.             PROCEDURES:  3/16/2021 IR lung biopsy    PATHOLOGY:   The needle biopsy lung 3/16/2021  Clinical Information: RIGHT LOWER LOBE LUNG NODULE; PT HAS REMOTE   HISTORY OF BREAST CANCER 2005     FINAL DIAGNOSIS:   Lung, right lower lobe, biopsy:    Poorly differentiated squamous cell carcinoma.      Specimen:   BIOPSY OF LUNG, RIGHT LOWER LOBE      Intraoperative Consultation:   Touch Prep DX:  1.  Atypical epithelial cells present.  2.  Malignant   epithelial cells present.  MTK     Gross Examination:   The container is labeled Reesa Neat, RLL, lung.  Received in   formalin are several tiny bits of tan tissue, each less than 0.1 cm in   diameter.  The longest fragment measures about 0.3 cm.  The specimen is   entirely submitted in two cassettes.  ns.  PCF/DKR:[id number]     Microscopic Examination:   Sections show infiltrative nests of polygonal cells with foamy   cytoplasm and enlarged pleomorphic nuclei with variably prominent nucleoli.  Rare dyskeratotic cells are noted.  Immunochemical stains   for TTF-1 and p40 are performed, with appropriate controls*.  Lesional   cells express p40 and lack expression of TTF-1.  The findings are   consistent with the above diagnosis. ER and DE both negative.                           PATHOLOGY REPORT                       ATTN: YUSRA FOY                       REQ: Jeanettebill Ирина       Copies To:   Derian Michaud       Clinical Information: RIGHT LUNG CANCER   6/14/2021    FINAL DIAGNOSIS:   A. Lymph nodes, level VII, resections:    Negative for neoplasm in 4 out of 4 lymph nodes. B. Lymph nodes, level IV R, resections:    Negative for neoplasm in 2 out of 2 lymph nodes. C. Lymph node, level IX, resection:    Negative for neoplasm in one out of one lymph node. D. Lymph nodes, level X R, resection:    Negative for neoplasm in 2 out of 2 lymph nodes. E. Lymph nodes, level XI R, resections:    Negative for neoplasm in 2 out of 2 lymph nodes. F. 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    Maximum tumor size equal 3.1 cm.    Margins of resection are free of neoplasia.    Moderate emphysema.    pT2, pN0.        Specimen:   A) LYMPH NODE(S), LEVEL 7   B) LYMPH NODE(S), LEVEL 4R   C) LYMPH NODE(S), LEVEL 9   D) LYMPH NODE(S), LEVEL 10R   E) LYMPH NODE(S), LEVEL 11R   F) WEDGE BIOPSY OF LUNG, RIGHT LOWER LOBE     Microscopic Examination:   Procedure   Wedge resection   Specimen Laterality   Right   Tumor Site   Lower lobe of lung   Tumor Size   2. 2 x 2 x 3.1 cm   Tumor Focality   Single focus   Histologic Type   Invasive squamous cell carcinoma, non-keratinizing   + Histologic Grade   G3: Poorly differentiated   + Spread Through Air Spaces (JESSICA)   Not identified   Visceral Pleura Invasion   Not identified   Lymphovascular Invasion   Not identified   Direct Invasion of Adjacent Structures   No adjacent structures present   Margins   All margins are uninvolved by tumor       Distance of invasive carcinoma from closest margin (centimeters):   0.1 cm       Specify closest margin: Visceral pleura   Bronchial Margin   Not applicable   Vascular Margin   Not applicable   Parenchymal Margin   Uninvolved by invasive carcinoma   Regional Lymph Nodes   Number of Lymph Nodes Involved: 0   Number of Lymph Nodes Examined: 11   Treatment Effect   No known presurgical therapy   Pathologic Stage Classification (pTNM) (AJCC 8th Edition)   Primary Tumor (pT)   pT2:  Tumor >3 cm but <=5 cm or having any of the following features:#   Regional Lymph Nodes (pN)   pN0: No regional lymph node metastasis   + Additional Pathologic Findings   Emphysema     [id number]   [id number]                                                     <Sign Out Dr. Kiley Mcclelland M.D., F.C.A.P. [facility]/ [address] [address]  Printed on:  6/16/2021   [facility]     PFT: Ordered ASAP and will be done Monday 4/19  ( if she gets her Covid testing tomorrow )  FEV1 1.720 which is 59% predicted FEV1 1.11 which is 49% predicted with significant bronchodilator response spirometry was severe obstructive defect    GENETICS:  na    MOLECULAR:  na    ASSESSMENT/PLAN:    1: Diagnosis: 61-year-old female with significant COPD and comorbidities as detailed above with a fairly decent performance status. A lateral right middle lung nodule is increased since 2019 a recent biopsy of this 15 mm lesion shows a poorly differentiated squamous cell. Staging including PET scan shows no other hypermetabolic foci. She did have a syncopal episode and reports slight increase in headaches from her baseline. Work-up included a MRI of the brain with and without contrast summarized above which shows no evidence of metastatic disease. Clinical stage [id number]  /  Stage I A2 .   Right middle lobe squamous cell carcinoma, poorly differentiated. 6/14/21 right lower lobe lobectomy and mediastinal dissection reveals pathologic stage T2 a N0 stage Ib squamous cell carcinoma grade 3 right lower lobe. 2) Prognosis / Disease Status: Very good. Stage Ib with only high risk features being a grade 3.    3) Work-up:    Labs: No additional labs   Imaging: Brain MRI with and without contrast reviewed   Procedures: No additional procedures needed   Consults: None new   Other: PFTs/spirometry results summarized above    4) Symptom Management: None needed. Does report baseline minor neuropathy in her fingertips. 5) Supportive care provided. Level of care is appropriate. Teaching done today. Emphasized options at this point include surveillance versus adjuvant therapy. I think her risk is fairly low I would be concerned about toxicity with adjuvant chemotherapy. Recommend no adjuvant chemotherapy in this case. 6) Treatment goal:      Treatment plan: Case discussed at lung conference on June 1. Right lower lobectomy done. No adjuvant chemotherapy recommended. 7) Medications reviewed. Prescriptions today: None            No orders of the defined types were placed in this encounter. [place]ARRS:  Controlled Substance Monitoring:    Acute and Chronic Pain Monitoring:   RX Monitoring 3/23/2018   Attestation The Prescription Monitoring Report for this patient was reviewed today. Periodic Controlled Substance Monitoring Possible medication side effects, risk of tolerance/dependence & alternative treatments discussed. ;Random urine drug screen sent today. ;No signs of potential drug abuse or diversion identified. 8) Research Options:       Not applicable      9) Other:       Strongly encouraged to attempt to stop smoking. Reviewed records detailing her breast cancer diagnosis 15 years ago and treatment. Added ER/[place] testing to the recent lung biopsy; negative.     10) Follow Up:  Pap with me in 4 months and 2 weeks before chest CT with contrast.    Rosamaria Mendoza MD     Addendum:  I obtained records regarding Shania's prior diagnosis of breast cancer. Abnormal mammogram in 2004 led to a biopsy that showed a grade 3 infiltrating ductal carcinoma (after reviewing all of the available data I believe this is a typographical error) that was ER positive and WI and HER-2 negative. No sentinel nodes involved. Initial dimension approximately 8.5 x 6 cm. No metastatic disease. Some mass in the left breast measuring 2.3 cm suspicious for malignancy as well PET scan did not light up in the lungs left breast or axilla. She underwent neoadjuvant chemotherapy and had a dramatic decrease in the size of the mass. Patient underwent radiation to the right chest wall treated to a total dose of 5040 cGy in 28 fractions with a boost with a total dose of 6120 cGy in 34 fractions. Interestingly however when I look at the path report from 3/11/5 the simple mastectomy specimen from the right reveals an invasive moderately differentiated squamous cell carcinoma the breast.  ER was reported as positive and 34% of cells HER-2 negative review of this pathology is confusing with the discrepancy and reports it is significant and that raises the possibility that this lung lesion could be metastatic and not a primary lesion. A fairly completely note from Kassie Ohara of radiation oncology references that the histology was squamous cell carcinoma and acknowledged the rarity of this type of histology of the breast.  Therefore I have little doubt that this in fact was squamous cell cancer of the breast.  Checked with pathology and all tissue was destroyed after 10 years therefore is not available. Patient's treatment was neoadjuvant chemotherapy by Dr. Baltazar Diaz followed by right simple mastectomy and sentinel node procedure and radiation.   I do note the path report the documents at the time of the right simple mastectomy invasive grade 2 squamous

## 2021-07-22 NOTE — PROGRESS NOTES
Name: Dyan Moran  : 1950  MRN: [id number]    Oncology Navigation Follow-Up Note    Contact Type:  Medical Oncology    Referrals: [facility], Dietician        Notes:   Met with Leydi Farias during her post op follow up with Dr. John Johns for her Stage IB Lung Cancer. Status post robotic wedge resection with Dr. Nuria Morales. She met with Dr. Deepti Banda in consultation and no need for radiation. Dr. John Johns also explained no need for chemotherapy at this time-plan is surveillance. Please see his note. She follows up with Dr. Nuria Morales mid August.  Having some mild pain where incisions were for surgery and drain, tylenol helps. She is still SOB-is on home 02 2L with activity, 1 L at rest.    Down to 6 cigarettes/day-and urged by Dr. John Johns to stop all together. Information on Smoking Cessation given. She has also been losing weight-that addressed as well, and gave information to Massiel Velazquez, to call. Leydi Farias still has my contact information and can call with any questions or concerns.     Electronically signed by Beltran Kramer RN on 2021 at 11:21 AM

## 2021-07-27 ENCOUNTER — CLINICAL DOCUMENTATION (OUTPATIENT)
Dept: NUTRITION | Age: 71
End: 2021-07-27

## 2021-07-30 ENCOUNTER — APPOINTMENT (OUTPATIENT)
Dept: GENERAL RADIOLOGY | Age: 71
DRG: 480 | End: 2021-07-30
Payer: MEDICARE

## 2021-07-30 ENCOUNTER — HOSPITAL ENCOUNTER (INPATIENT)
Age: 71
LOS: 6 days | Discharge: NURSING, REHAB OR LONG-TERM CARE | DRG: 480 | End: 2021-08-06
Attending: ORTHOPAEDIC SURGERY | Admitting: ORTHOPAEDIC SURGERY
Payer: MEDICARE
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
## 2021-07-30 DIAGNOSIS — M97.02XA PERIPROSTHETIC FRACTURE AROUND INTERNAL PROSTHETIC LEFT HIP JOINT, INITIAL ENCOUNTER (HCC): Primary | ICD-10-CM

## 2021-07-30 DIAGNOSIS — S09.90XA MINOR HEAD INJURY, INITIAL ENCOUNTER: ICD-10-CM

## 2021-07-30 LAB
ALBUMIN SERPL-MCNC: 3.8 G/DL (ref 3.5–5.1)
ALP BLD-CCNC: 128 U/L (ref 38–126)
ALT SERPL-CCNC: 7 U/L (ref 11–66)
ANION GAP SERPL CALCULATED.3IONS-SCNC: 10 MEQ/L (ref 8–16)
AST SERPL-CCNC: 13 U/L (ref 5–40)
BASOPHILS # BLD: 0.3 %
BASOPHILS ABSOLUTE: 0 THOU/MM3 (ref 0–0.1)
BILIRUB SERPL-MCNC: 0.5 MG/DL (ref 0.3–1.2)
BUN BLDV-MCNC: 11 MG/DL (ref 7–22)
CALCIUM SERPL-MCNC: 8.8 MG/DL (ref 8.5–10.5)
CHLORIDE BLD-SCNC: 105 MEQ/L (ref 98–111)
CO2: 25 MEQ/L (ref 23–33)
CREAT SERPL-MCNC: 0.9 MG/DL (ref 0.4–1.2)
EOSINOPHIL # BLD: 1.6 %
EOSINOPHILS ABSOLUTE: 0.2 THOU/MM3 (ref 0–0.4)
ERYTHROCYTE [DISTWIDTH] IN BLOOD BY AUTOMATED COUNT: 13.2 % (ref 11.5–14.5)
ERYTHROCYTE [DISTWIDTH] IN BLOOD BY AUTOMATED COUNT: 47.5 FL (ref 35–45)
GFR SERPL CREATININE-BSD FRML MDRD: 62 ML/MIN/1.73M2
GLUCOSE BLD-MCNC: 109 MG/DL (ref 70–108)
HCT VFR BLD CALC: 43.6 % (ref 37–47)
HEMOGLOBIN: 14 GM/DL (ref 12–16)
IMMATURE GRANS (ABS): 0.07 THOU/MM3 (ref 0–0.07)
IMMATURE GRANULOCYTES: 0.7 %
LYMPHOCYTES # BLD: 8.4 %
LYMPHOCYTES ABSOLUTE: 0.9 THOU/MM3 (ref 1–4.8)
MCH RBC QN AUTO: 31.3 PG (ref 26–33)
MCHC RBC AUTO-ENTMCNC: 32.1 GM/DL (ref 32.2–35.5)
MCV RBC AUTO: 97.5 FL (ref 81–99)
MONOCYTES # BLD: 2.8 %
MONOCYTES ABSOLUTE: 0.3 THOU/MM3 (ref 0.4–1.3)
NUCLEATED RED BLOOD CELLS: 0 /100 WBC
OSMOLALITY CALCULATION: 279.4 MOSMOL/KG (ref 275–300)
PLATELET # BLD: 211 THOU/MM3 (ref 130–400)
PMV BLD AUTO: 9.8 FL (ref 9.4–12.4)
POTASSIUM SERPL-SCNC: 3.6 MEQ/L (ref 3.5–5.2)
RBC # BLD: 4.47 MILL/MM3 (ref 4.2–5.4)
SEG NEUTROPHILS: 86.2 %
SEGMENTED NEUTROPHILS ABSOLUTE COUNT: 8.9 THOU/MM3 (ref 1.8–7.7)
SODIUM BLD-SCNC: 140 MEQ/L (ref 135–145)
TOTAL PROTEIN: 6.5 G/DL (ref 6.1–8)
WBC # BLD: 10.3 THOU/MM3 (ref 4.8–10.8)

## 2021-07-30 PROCEDURE — 6820000001 HC L2 TRAUMA SURGERY EVALUATION: Performed by: ORTHOPAEDIC SURGERY

## 2021-07-30 PROCEDURE — 93005 ELECTROCARDIOGRAM TRACING: CPT | Performed by: PHYSICIAN ASSISTANT

## 2021-07-30 PROCEDURE — 96375 TX/PRO/DX INJ NEW DRUG ADDON: CPT

## 2021-07-30 PROCEDURE — 96374 THER/PROPH/DIAG INJ IV PUSH: CPT

## 2021-07-30 PROCEDURE — 36415 COLL VENOUS BLD VENIPUNCTURE: CPT

## 2021-07-30 PROCEDURE — 80053 COMPREHEN METABOLIC PANEL: CPT

## 2021-07-30 PROCEDURE — 73502 X-RAY EXAM HIP UNI 2-3 VIEWS: CPT

## 2021-07-30 PROCEDURE — 85025 COMPLETE CBC W/AUTO DIFF WBC: CPT

## 2021-07-30 PROCEDURE — 99285 EMERGENCY DEPT VISIT HI MDM: CPT

## 2021-07-30 PROCEDURE — 6360000002 HC RX W HCPCS: Performed by: PHYSICIAN ASSISTANT

## 2021-07-30 RX ORDER — ONDANSETRON 2 MG/ML
4 INJECTION INTRAMUSCULAR; INTRAVENOUS ONCE
Status: COMPLETED | OUTPATIENT
Start: 2021-07-30 | End: 2021-07-30

## 2021-07-30 RX ORDER — MORPHINE SULFATE 4 MG/ML
4 INJECTION, SOLUTION INTRAMUSCULAR; INTRAVENOUS ONCE
Status: COMPLETED | OUTPATIENT
Start: 2021-07-30 | End: 2021-07-30

## 2021-07-30 RX ADMIN — MORPHINE SULFATE 4 MG: 4 INJECTION, SOLUTION INTRAMUSCULAR; INTRAVENOUS at 23:25

## 2021-07-30 RX ADMIN — ONDANSETRON 4 MG: 2 INJECTION INTRAMUSCULAR; INTRAVENOUS at 23:24

## 2021-07-30 ASSESSMENT — PAIN DESCRIPTION - PAIN TYPE: TYPE: ACUTE PAIN

## 2021-07-30 ASSESSMENT — PAIN DESCRIPTION - DESCRIPTORS: DESCRIPTORS: SHARP

## 2021-07-30 ASSESSMENT — PAIN DESCRIPTION - ORIENTATION: ORIENTATION: LEFT

## 2021-07-30 ASSESSMENT — PAIN DESCRIPTION - LOCATION: LOCATION: HIP

## 2021-07-30 ASSESSMENT — PAIN DESCRIPTION - FREQUENCY: FREQUENCY: CONTINUOUS

## 2021-07-30 ASSESSMENT — PAIN SCALES - GENERAL: PAINLEVEL_OUTOF10: 8

## 2021-07-31 ENCOUNTER — ANESTHESIA EVENT (OUTPATIENT)
Dept: OPERATING ROOM | Age: 71
DRG: 480 | End: 2021-07-31
Payer: MEDICARE

## 2021-07-31 ENCOUNTER — APPOINTMENT (OUTPATIENT)
Dept: CT IMAGING | Age: 71
DRG: 480 | End: 2021-07-31
Payer: MEDICARE

## 2021-07-31 PROBLEM — S72.002A CLOSED LEFT HIP FRACTURE, INITIAL ENCOUNTER (HCC): Status: ACTIVE | Noted: 2021-07-31

## 2021-07-31 LAB
ABO: NORMAL
ANTIBODY SCREEN: NORMAL
EKG ATRIAL RATE: 72 BPM
EKG P AXIS: 69 DEGREES
EKG P-R INTERVAL: 152 MS
EKG Q-T INTERVAL: 424 MS
EKG QRS DURATION: 84 MS
EKG QTC CALCULATION (BAZETT): 464 MS
EKG R AXIS: 53 DEGREES
EKG T AXIS: 35 DEGREES
EKG VENTRICULAR RATE: 72 BPM
RH FACTOR: NORMAL

## 2021-07-31 PROCEDURE — 86850 RBC ANTIBODY SCREEN: CPT

## 2021-07-31 PROCEDURE — 73700 CT LOWER EXTREMITY W/O DYE: CPT

## 2021-07-31 PROCEDURE — 36415 COLL VENOUS BLD VENIPUNCTURE: CPT

## 2021-07-31 PROCEDURE — 2580000003 HC RX 258: Performed by: PHYSICIAN ASSISTANT

## 2021-07-31 PROCEDURE — 6370000000 HC RX 637 (ALT 250 FOR IP): Performed by: PHYSICIAN ASSISTANT

## 2021-07-31 PROCEDURE — 94760 N-INVAS EAR/PLS OXIMETRY 1: CPT

## 2021-07-31 PROCEDURE — 99222 1ST HOSP IP/OBS MODERATE 55: CPT | Performed by: PHYSICIAN ASSISTANT

## 2021-07-31 PROCEDURE — 1200000000 HC SEMI PRIVATE

## 2021-07-31 PROCEDURE — 86901 BLOOD TYPING SEROLOGIC RH(D): CPT

## 2021-07-31 PROCEDURE — 94640 AIRWAY INHALATION TREATMENT: CPT

## 2021-07-31 PROCEDURE — 2700000000 HC OXYGEN THERAPY PER DAY

## 2021-07-31 PROCEDURE — 6360000002 HC RX W HCPCS: Performed by: PHYSICIAN ASSISTANT

## 2021-07-31 PROCEDURE — 86900 BLOOD TYPING SEROLOGIC ABO: CPT

## 2021-07-31 RX ORDER — SODIUM CHLORIDE 0.9 % (FLUSH) 0.9 %
5-40 SYRINGE (ML) INJECTION EVERY 12 HOURS SCHEDULED
Status: DISCONTINUED | OUTPATIENT
Start: 2021-07-31 | End: 2021-08-06 | Stop reason: HOSPADM

## 2021-07-31 RX ORDER — ALBUTEROL SULFATE 90 UG/1
2 AEROSOL, METERED RESPIRATORY (INHALATION) EVERY 6 HOURS PRN
Status: DISCONTINUED | OUTPATIENT
Start: 2021-07-31 | End: 2021-08-06 | Stop reason: HOSPADM

## 2021-07-31 RX ORDER — MORPHINE SULFATE 4 MG/ML
4 INJECTION, SOLUTION INTRAMUSCULAR; INTRAVENOUS
Status: DISCONTINUED | OUTPATIENT
Start: 2021-07-31 | End: 2021-08-06 | Stop reason: HOSPADM

## 2021-07-31 RX ORDER — DIPHENHYDRAMINE HCL 25 MG
25 TABLET ORAL EVERY 6 HOURS PRN
Status: DISCONTINUED | OUTPATIENT
Start: 2021-07-31 | End: 2021-08-06 | Stop reason: HOSPADM

## 2021-07-31 RX ORDER — ONDANSETRON 4 MG/1
4 TABLET, ORALLY DISINTEGRATING ORAL EVERY 8 HOURS PRN
Status: DISCONTINUED | OUTPATIENT
Start: 2021-07-31 | End: 2021-08-02 | Stop reason: SDUPTHER

## 2021-07-31 RX ORDER — BUDESONIDE AND FORMOTEROL FUMARATE DIHYDRATE 160; 4.5 UG/1; UG/1
2 AEROSOL RESPIRATORY (INHALATION) 2 TIMES DAILY
Status: DISCONTINUED | OUTPATIENT
Start: 2021-07-31 | End: 2021-08-06 | Stop reason: HOSPADM

## 2021-07-31 RX ORDER — OXYCODONE HYDROCHLORIDE 5 MG/1
5 TABLET ORAL EVERY 4 HOURS PRN
Status: DISCONTINUED | OUTPATIENT
Start: 2021-07-31 | End: 2021-08-06 | Stop reason: HOSPADM

## 2021-07-31 RX ORDER — SODIUM CHLORIDE 9 MG/ML
25 INJECTION, SOLUTION INTRAVENOUS PRN
Status: DISCONTINUED | OUTPATIENT
Start: 2021-07-31 | End: 2021-08-06 | Stop reason: HOSPADM

## 2021-07-31 RX ORDER — LEVOTHYROXINE SODIUM 88 UG/1
88 TABLET ORAL DAILY
Status: DISCONTINUED | OUTPATIENT
Start: 2021-07-31 | End: 2021-08-06 | Stop reason: HOSPADM

## 2021-07-31 RX ORDER — SODIUM CHLORIDE 0.9 % (FLUSH) 0.9 %
5-40 SYRINGE (ML) INJECTION PRN
Status: DISCONTINUED | OUTPATIENT
Start: 2021-07-31 | End: 2021-08-06 | Stop reason: HOSPADM

## 2021-07-31 RX ORDER — OXYCODONE HYDROCHLORIDE 5 MG/1
10 TABLET ORAL EVERY 4 HOURS PRN
Status: DISCONTINUED | OUTPATIENT
Start: 2021-07-31 | End: 2021-08-06 | Stop reason: HOSPADM

## 2021-07-31 RX ORDER — MORPHINE SULFATE 2 MG/ML
2 INJECTION, SOLUTION INTRAMUSCULAR; INTRAVENOUS
Status: DISCONTINUED | OUTPATIENT
Start: 2021-07-31 | End: 2021-08-06 | Stop reason: HOSPADM

## 2021-07-31 RX ORDER — ONDANSETRON 2 MG/ML
4 INJECTION INTRAMUSCULAR; INTRAVENOUS EVERY 6 HOURS PRN
Status: DISCONTINUED | OUTPATIENT
Start: 2021-07-31 | End: 2021-08-02 | Stop reason: SDUPTHER

## 2021-07-31 RX ORDER — POLYETHYLENE GLYCOL 3350 17 G/17G
17 POWDER, FOR SOLUTION ORAL DAILY PRN
Status: DISCONTINUED | OUTPATIENT
Start: 2021-07-31 | End: 2021-08-06 | Stop reason: HOSPADM

## 2021-07-31 RX ORDER — ACETAMINOPHEN 325 MG/1
650 TABLET ORAL EVERY 4 HOURS PRN
Status: DISCONTINUED | OUTPATIENT
Start: 2021-07-31 | End: 2021-08-02 | Stop reason: SDUPTHER

## 2021-07-31 RX ADMIN — SODIUM CHLORIDE, PRESERVATIVE FREE 10 ML: 5 INJECTION INTRAVENOUS at 08:10

## 2021-07-31 RX ADMIN — SODIUM CHLORIDE, PRESERVATIVE FREE 10 ML: 5 INJECTION INTRAVENOUS at 21:49

## 2021-07-31 RX ADMIN — OXYCODONE 10 MG: 5 TABLET ORAL at 06:29

## 2021-07-31 RX ADMIN — BUDESONIDE AND FORMOTEROL FUMARATE DIHYDRATE 2 PUFF: 160; 4.5 AEROSOL RESPIRATORY (INHALATION) at 06:00

## 2021-07-31 RX ADMIN — TIOTROPIUM BROMIDE INHALATION SPRAY 2 PUFF: 3.12 SPRAY, METERED RESPIRATORY (INHALATION) at 07:42

## 2021-07-31 RX ADMIN — LEVOTHYROXINE SODIUM 88 MCG: 0.09 TABLET ORAL at 06:28

## 2021-07-31 RX ADMIN — BUDESONIDE AND FORMOTEROL FUMARATE DIHYDRATE 2 PUFF: 160; 4.5 AEROSOL RESPIRATORY (INHALATION) at 16:18

## 2021-07-31 RX ADMIN — MORPHINE SULFATE 4 MG: 4 INJECTION, SOLUTION INTRAMUSCULAR; INTRAVENOUS at 03:04

## 2021-07-31 ASSESSMENT — PAIN SCALES - GENERAL
PAINLEVEL_OUTOF10: 0
PAINLEVEL_OUTOF10: 0
PAINLEVEL_OUTOF10: 10
PAINLEVEL_OUTOF10: 0
PAINLEVEL_OUTOF10: 10
PAINLEVEL_OUTOF10: 0
PAINLEVEL_OUTOF10: 9
PAINLEVEL_OUTOF10: 7
PAINLEVEL_OUTOF10: 8
PAINLEVEL_OUTOF10: 0
PAINLEVEL_OUTOF10: 7

## 2021-07-31 ASSESSMENT — PAIN DESCRIPTION - ONSET
ONSET: ON-GOING
ONSET: ON-GOING

## 2021-07-31 ASSESSMENT — PAIN DESCRIPTION - FREQUENCY
FREQUENCY: CONTINUOUS
FREQUENCY: CONTINUOUS

## 2021-07-31 ASSESSMENT — PAIN DESCRIPTION - PAIN TYPE
TYPE: ACUTE PAIN
TYPE: ACUTE PAIN

## 2021-07-31 ASSESSMENT — PAIN DESCRIPTION - PROGRESSION: CLINICAL_PROGRESSION: GRADUALLY IMPROVING

## 2021-07-31 ASSESSMENT — PAIN DESCRIPTION - DESCRIPTORS: DESCRIPTORS: ACHING;SHARP;SHOOTING

## 2021-07-31 ASSESSMENT — PAIN DESCRIPTION - LOCATION
LOCATION: HIP
LOCATION: HIP

## 2021-07-31 ASSESSMENT — PAIN DESCRIPTION - ORIENTATION
ORIENTATION: LEFT
ORIENTATION: LEFT

## 2021-07-31 ASSESSMENT — VISUAL ACUITY: OU: 1
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
## 2021-07-31 NOTE — ED NOTES
Received bedside report from Ernie at this time. Pt resting in bed with call light in reach, family at bedside. No distress noted at this time.        [facility]  07/30/21 2801

## 2021-07-31 NOTE — ED NOTES
Bed: 024A  Expected date: 7/30/21  Expected time: 9:31 PM  Means of arrival: [facility]  Comments:     Cesar Booker RN  07/30/21 5848
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
## 2021-07-31 NOTE — ED NOTES
Pt resting in bed with call light in reach, states no further needs at this time. No distress noted, pt breaths easy and unlabored. Pt placed on 2 L/min nasal cannula for oximetry of 85% on room air while sleeping. Pt states o2 use while at home and sleeping. Pt is 98% on 2 L/min nasal cannula at this time.        [facility]  07/31/21 7023

## 2021-07-31 NOTE — H&P
Orthopedic H&P      CHIEF COMPLAINT:  Left hip pain    HISTORY OF PRESENT ILLNESS:      The patient is a 73 y.o. female with presentation of left hip pain post acute injury s/p mechanical fall. Patient states she had left JATINDER done 2008 and right JATINDER done 2015. She has had no issues with her left hip and was ambulating fine on her own without assistance. She had a mechanical fall on 7/30/21 causing injury to left hip. She came to [facility] ED and had xrays showing left hip periprosthetic fracture and ortho was consulted. Past Medical History:    Past Medical History:   Diagnosis Date    Anxiety 2007    Arthritis     Breast cancer [facility]) 2005    right mastectomy, chemo and radiation history    CAD (coronary artery disease) 2001    sees Dr Nathaniel Gomez of the skin 2004    Cerebral artery occlusion with cerebral infarction ([address]     Chronic UTI     COPD (chronic obstructive pulmonary disease) ([address]     sees RL Petersen    CVA (cerebral infarction) 2007, 2008    Depression 2007    DVT of lower extremity (deep venous thrombosis) ([address] 1976    Facial injury 2005    Fall on greasy ground, striking left periorbital region    History of stroke 2007, 2008, 2009    Patient relates a stroke with right weakness and speech in 2007; another in 2010 or 2012 (unsure), both with need to rehabilitation.   Also \"2 mini-strokes\" (?)    Hx of blood clots 1977    hip, leg    Hyperlipidemia 2005    Hypertension 2005    Hypothyroidism     IBS (irritable bowel syndrome)     Low HDL (under 40) 2014    Lung cancer [facility])     sees Dr John Johns    Prolonged emergence from general anesthesia     Recurrent UTI (urinary tract infection) 2015    Dr Ren Pulse finding difficulty 05/16/2017    work-up in progress       Past Surgical History:    Past Surgical History:   Procedure Laterality Date   [address] Right 04/01/2005    evacuation of breast hematoma-Dr. Bingham [facility]    BREAST SURGERY Right 11/30/2004 lymphatic mapping, SLN bx x2-Dr. Tono Coy    COLONOSCOPY  2009    Dr. Aleyda Vann  03/16/2021    CT NEEDLE BIOPSY LUNG PERCUTANEOUS 3/16/2021 [facility] CT SCAN    HIP SURGERY  01/19/2015    HYSTERECTOMY  1976    JOINT REPLACEMENT Left 2011    HIP    JOINT REPLACEMENT Right 01/19/2015    Right Total Hip Replacement - Dr. Sirisha Ruiz, TOTAL Right 6/14/2021    ROBOTIC ASSISTED RIGHT LOWER LOBE SUPERIOR SEGMENTECTOMY AND MEDIASTINAL DISSECTION, CRYOTHERAPY OF INTERCOSTAL NERVES performed by Imtiaz Luna MD at [address] Right 2005    Dr. Joi Carter  04/10/2018    Lumbar epidural steroid injection at L4    [place] NJX DX/THER SBST INTRLMNR LMBR/SAC W/IMG GDN N/A 04/10/2018    LESI  @ L4 performed by Dawna Walker MD at [address] [facility], 2001    ovaries    THYROID LOBECTOMY Left 11/26/2010    left thyroid lobectomy with isthmusectomy-Dr. Nisa Lynch THYROID SURGERY  2007    right thyroid lobectomy-Dr. Ibanez       Medications Prior to Admission:   Prior to Admission medications    Medication Sig Start Date End Date Taking? Authorizing Provider   guaiFENesin (MUCINEX) 600 MG extended release tablet Take 1 tablet by mouth 2 times daily 6/18/21  Yes Imtiaz Luna MD   fluticasone-umeclidin-vilant (TRELEGY ELLIPTA) 129-30.5-78 MCG/INH AEPB Inhale 1 puff into the lungs daily Rinse mouth with water and spit without swallowing after each dose.  6/18/21 6/18/22 Yes NITO Sunshine CNP   diphenhydrAMINE (BENADRYL) 25 MG tablet Take 25 mg by mouth every 6 hours as needed for Itching   Yes Historical Provider, MD   albuterol sulfate HFA (PROVENTIL HFA) 108 (90 Base) MCG/ACT inhaler Inhale 2 puffs into the lungs every 6 hours as needed for Wheezing 3/8/21  Yes NITO Sunshine CNP   levothyroxine (LEVOTHROID) 88 MCG tablet Take 1 tablet by mouth daily 7/25/17  Yes Meghann Niño MD       Allergies:    Cipro xr and Vicodin [hydrocodone-acetaminophen]    Social History:   Social History     Socioeconomic History    Marital status:      Spouse name: None    Number of children: 3    Years of education: 9    Highest education level: None   Occupational History    Occupation: Disabled   Tobacco Use    Smoking status: Current Every Day Smoker     Packs/day: 2.00     Years: 48.00     Pack years: 96.00     Types: Cigarettes     Start date: 11/13/1967    Smokeless tobacco: Never Used    Tobacco comment: Currently at 1 pk/day   Vaping Use    Vaping Use: Never used   Substance and Sexual Activity    Alcohol use: No     Alcohol/week: 0.0 standard drinks    Drug use: No    Sexual activity: Yes     Partners: Male   Other Topics Concern    None   Social History Narrative    None     Social Determinants of Health     Financial Resource Strain:     Difficulty of Paying Living Expenses:    Food Insecurity:     Worried About Running Out of Food in the Last Year:     Ran Out of Food in the Last Year:    Transportation Needs:     Lack of Transportation (Medical):      Lack of Transportation (Non-Medical):    Physical Activity:     Days of Exercise per Week:     Minutes of Exercise per Session:    Stress:     Feeling of Stress :    Social Connections:     Frequency of Communication with Friends and Family:     Frequency of Social Gatherings with Friends and Family:     Attends Anabaptism Services:     Active Member of Clubs or Organizations:     Attends Club or Organization Meetings:     Marital Status:    Intimate Partner Violence:     Fear of Current or Ex-Partner:     Emotionally Abused:     Physically Abused:     Sexually Abused:        Family History:  Family History   Problem Relation Age of Onset    Osteoporosis Mother     Hypertension Mother     High Cholesterol Mother     Stroke Mother     Colon Cancer Mother     Cancer Father         lung cancer    Heart Disease Father     Hypertension Father     High Cholesterol Father     Heart Disease Sister     High Cholesterol Sister     Hypertension Sister     Asthma Sister     Other Other         high arched feet    Lung Cancer Son          REVIEW OF SYSTEMS:  General: No fever or chills  Respiratory: no cough or wheezing  Cardiovascular: no chest pain or dyspnea   Gastrointestinal ROS: no nausea no vomiting   MSK:left hip pain    PHYSICAL EXAM:  Blood pressure (!) 97/47, pulse 59, temperature 98 °F (36.7 °C), temperature source Oral, resp. rate 16, height 5' 4\" (1.626 m), weight 135 lb (61.2 kg), SpO2 98 %, not currently breastfeeding. Gen: alert and oriented  Head: normocephalic and atraumatic   Neck: supple  Chest: Non labored breathing   Heart: Reg rate   Extremity: LLE: skin intact, mild to moderate edema to left hip and lower extremity. Left leg shortened and externally rotated when compared to contralateral side. - Homans, thigh and calf soft and supple. 2+ DP/PT pulse. SILT. 5/5 strength EHL, dorsiflexion and plantarflexion. TTP anterior and lateral hip. Pain with log rolling. LABS:  Recent Labs     07/30/21  2334   WBC 10.3   HGB 14.0   HCT 43.6         K 3.6   BUN 11   CREATININE 0.9   GLUCOSE 109*        Radiology:      Pelvis and left hip.       Limited comparison 3/12/2021.       Findings:   Bilateral hip prostheses. No dislocation. There is an acute periprosthetic    subtrochanteric fracture on the left.           Impression   Impression:   Acute periprosthetic fracture proximal left femur. A/P: 73 y.o. female left proximal femur periprosthetic fracture  RBA's to surgery discussed   Pt elected to proceed  Patient has consented for the surgery. Consented for left proximal femur ORIF versus total hip arthroplasty revision and ORIF. Will receive antibiotics pre and post operatively. NPO starting 12:05 AM 8/2/21.   Dr. Андрей Feng consulted with Dr. Karina Moralez who will evaluate patient and discuss surgical intervention. Patient will be medically managed and will proceed with surgery morning of 8/2/21 with Dr. Ranjan Byers. Electronically signed by Kacie Daniels PA-C on 7/31/2021 at 12:32 PM     Patient seen and evaluated. Agree with assessment and plan per [place]. Patient sustained a ground level fall onto her left hip yesterday. She was unable to ambulate after the fall. Patient was neurovascularly intact on admission. Previously underwent left total hip arthroplasty in 2011. She did well post operatively until her fall. She recently underwent partial lobectomy for right lung cancer. She denies any mets. Radiograph reviewed which demonstrates displaced greater trochanter fracture. There is no definitive subsidence of her stem. CT reviewed demonstrates displaced greater trochanter fracture with no evidence of medial loosening. Discussed with patient that given the displacement of the fracture we would recommend ORIF. Discussed that there is concern for loosening of her stem not appreciated on imaging. Recommend ORIF with possible revision total hip arthroplasty if her stem were determined to be loose intraoperatively. Discussed procedure, risks, benefits, and alternatives including but not limited to bleeding, infection, neurovascular injury, nonunion, malunion, hardware failure, dislocation, leg length discrepancy, need for additional surgery, and risks of anesthesia. Patient understood the risks and elected to proceed with surgery. Plan for surgery Monday morning. NPO at midnight 8/1/2021.      Brooke Estrada MD  Orthopaedic Surgeon  [facility]  7/31/2021  8:18 PM
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
## 2021-08-01 LAB
ANION GAP SERPL CALCULATED.3IONS-SCNC: 9 MEQ/L (ref 8–16)
BUN BLDV-MCNC: 12 MG/DL (ref 7–22)
CALCIUM SERPL-MCNC: 8 MG/DL (ref 8.5–10.5)
CHLORIDE BLD-SCNC: 102 MEQ/L (ref 98–111)
CO2: 26 MEQ/L (ref 23–33)
CREAT SERPL-MCNC: 0.8 MG/DL (ref 0.4–1.2)
ERYTHROCYTE [DISTWIDTH] IN BLOOD BY AUTOMATED COUNT: 13.5 % (ref 11.5–14.5)
ERYTHROCYTE [DISTWIDTH] IN BLOOD BY AUTOMATED COUNT: 49.7 FL (ref 35–45)
GFR SERPL CREATININE-BSD FRML MDRD: 71 ML/MIN/1.73M2
GLUCOSE BLD-MCNC: 105 MG/DL (ref 70–108)
HCT VFR BLD CALC: 39.1 % (ref 37–47)
HEMOGLOBIN: 12 GM/DL (ref 12–16)
MCH RBC QN AUTO: 30.8 PG (ref 26–33)
MCHC RBC AUTO-ENTMCNC: 30.7 GM/DL (ref 32.2–35.5)
MCV RBC AUTO: 100.3 FL (ref 81–99)
PLATELET # BLD: 162 THOU/MM3 (ref 130–400)
PMV BLD AUTO: 9.9 FL (ref 9.4–12.4)
POTASSIUM REFLEX MAGNESIUM: 3.6 MEQ/L (ref 3.5–5.2)
RBC # BLD: 3.9 MILL/MM3 (ref 4.2–5.4)
SODIUM BLD-SCNC: 137 MEQ/L (ref 135–145)
WBC # BLD: 5.8 THOU/MM3 (ref 4.8–10.8)

## 2021-08-01 PROCEDURE — 6370000000 HC RX 637 (ALT 250 FOR IP): Performed by: PHYSICIAN ASSISTANT

## 2021-08-01 PROCEDURE — 1200000000 HC SEMI PRIVATE

## 2021-08-01 PROCEDURE — 80048 BASIC METABOLIC PNL TOTAL CA: CPT

## 2021-08-01 PROCEDURE — 85027 COMPLETE CBC AUTOMATED: CPT

## 2021-08-01 PROCEDURE — 94640 AIRWAY INHALATION TREATMENT: CPT

## 2021-08-01 PROCEDURE — 94760 N-INVAS EAR/PLS OXIMETRY 1: CPT

## 2021-08-01 PROCEDURE — 6360000002 HC RX W HCPCS: Performed by: PHYSICIAN ASSISTANT

## 2021-08-01 PROCEDURE — 2580000003 HC RX 258: Performed by: PHYSICIAN ASSISTANT

## 2021-08-01 PROCEDURE — 36415 COLL VENOUS BLD VENIPUNCTURE: CPT

## 2021-08-01 PROCEDURE — 2700000000 HC OXYGEN THERAPY PER DAY

## 2021-08-01 RX ADMIN — OXYCODONE 10 MG: 5 TABLET ORAL at 04:12

## 2021-08-01 RX ADMIN — ENOXAPARIN SODIUM 40 MG: 40 INJECTION SUBCUTANEOUS at 10:00

## 2021-08-01 RX ADMIN — BUDESONIDE AND FORMOTEROL FUMARATE DIHYDRATE 2 PUFF: 160; 4.5 AEROSOL RESPIRATORY (INHALATION) at 18:04

## 2021-08-01 RX ADMIN — SODIUM CHLORIDE, PRESERVATIVE FREE 10 ML: 5 INJECTION INTRAVENOUS at 23:40

## 2021-08-01 RX ADMIN — LEVOTHYROXINE SODIUM 88 MCG: 0.09 TABLET ORAL at 05:50

## 2021-08-01 RX ADMIN — OXYCODONE 10 MG: 5 TABLET ORAL at 14:04

## 2021-08-01 RX ADMIN — OXYCODONE 10 MG: 5 TABLET ORAL at 23:39

## 2021-08-01 RX ADMIN — OXYCODONE 10 MG: 5 TABLET ORAL at 10:01

## 2021-08-01 RX ADMIN — TIOTROPIUM BROMIDE INHALATION SPRAY 2 PUFF: 3.12 SPRAY, METERED RESPIRATORY (INHALATION) at 09:03

## 2021-08-01 RX ADMIN — SODIUM CHLORIDE, PRESERVATIVE FREE 10 ML: 5 INJECTION INTRAVENOUS at 10:00

## 2021-08-01 RX ADMIN — BUDESONIDE AND FORMOTEROL FUMARATE DIHYDRATE 2 PUFF: 160; 4.5 AEROSOL RESPIRATORY (INHALATION) at 09:03

## 2021-08-01 ASSESSMENT — PAIN DESCRIPTION - DESCRIPTORS
DESCRIPTORS: ACHING;JABBING;THROBBING
DESCRIPTORS: ACHING;THROBBING

## 2021-08-01 ASSESSMENT — PAIN SCALES - GENERAL
PAINLEVEL_OUTOF10: 8
PAINLEVEL_OUTOF10: 9
PAINLEVEL_OUTOF10: 5
PAINLEVEL_OUTOF10: 8
PAINLEVEL_OUTOF10: 9
PAINLEVEL_OUTOF10: 0
PAINLEVEL_OUTOF10: 10
PAINLEVEL_OUTOF10: 9
PAINLEVEL_OUTOF10: 8

## 2021-08-01 ASSESSMENT — ENCOUNTER SYMPTOMS
SHORTNESS OF BREATH: 0
RHINORRHEA: 0
BACK PAIN: 0
VOMITING: 0
ABDOMINAL PAIN: 0
NAUSEA: 0

## 2021-08-01 ASSESSMENT — PAIN DESCRIPTION - PAIN TYPE
TYPE: ACUTE PAIN
TYPE: ACUTE PAIN

## 2021-08-01 ASSESSMENT — PAIN DESCRIPTION - LOCATION: LOCATION: HIP

## 2021-08-01 ASSESSMENT — PAIN DESCRIPTION - ONSET: ONSET: ON-GOING

## 2021-08-01 ASSESSMENT — PAIN DESCRIPTION - ORIENTATION: ORIENTATION: LEFT

## 2021-08-01 ASSESSMENT — PAIN DESCRIPTION - FREQUENCY: FREQUENCY: INTERMITTENT

## 2021-08-01 NOTE — PROGRESS NOTES
Subjective:   Left proximal femur periprosthetic fracture  Pain controlled. No acute events overnight. Denies numbness or tingling. Afebrile. Hb 12.0. No c/o fevers, chills, SOB, CP, n/v/d. Objective:   BP (!) 119/58   Pulse 63   Temp 98.1 °F (36.7 °C) (Oral)   Resp 16   Ht 5' 4\" (1.626 m)   Wt 135 lb (61.2 kg)   SpO2 98%   BMI 23.17 kg/m²     Recent Labs     07/30/21  2334 08/01/21  0531   WBC 10.3 5.8   HGB 14.0 12.0   HCT 43.6 39.1    162       Current Facility-Administered Medications: albuterol sulfate  (90 Base) MCG/ACT inhaler 2 puff, 2 puff, Inhalation, Q6H PRN  diphenhydrAMINE (BENADRYL) tablet 25 mg, 25 mg, Oral, Q6H PRN  levothyroxine (SYNTHROID) tablet 88 mcg, 88 mcg, Oral, Daily  sodium chloride flush 0.9 % injection 5-40 mL, 5-40 mL, Intravenous, 2 times per day  sodium chloride flush 0.9 % injection 5-40 mL, 5-40 mL, Intravenous, PRN  0.9 % sodium chloride infusion, 25 mL, Intravenous, PRN  enoxaparin (LOVENOX) injection 40 mg, 40 mg, Subcutaneous, Daily  ondansetron (ZOFRAN-ODT) disintegrating tablet 4 mg, 4 mg, Oral, Q8H PRN **OR** ondansetron (ZOFRAN) injection 4 mg, 4 mg, Intravenous, Q6H PRN  acetaminophen (TYLENOL) tablet 650 mg, 650 mg, Oral, Q4H PRN  oxyCODONE (ROXICODONE) immediate release tablet 5 mg, 5 mg, Oral, Q4H PRN **OR** oxyCODONE (ROXICODONE) immediate release tablet 10 mg, 10 mg, Oral, Q4H PRN  morphine (PF) injection 2 mg, 2 mg, Intravenous, Q2H PRN **OR** morphine injection 4 mg, 4 mg, Intravenous, Q2H PRN  polyethylene glycol (GLYCOLAX) packet 17 g, 17 g, Oral, Daily PRN  budesonide-formoterol (SYMBICORT) 160-4.5 MCG/ACT inhaler 2 puff, 2 puff, Inhalation, BID **AND** tiotropium (SPIRIVA RESPIMAT) 2.5 MCG/ACT inhaler 2 puff, 2 puff, Inhalation, Daily      Exam:  Gen: NAD  LLE: incision is clean, dry and intact with no active drainage, erythema, or other signs of infection. 2+ DP/PT pulse. Sensation intact distally to light touch.  Thigh and calf soft and supple with - Jesse's test. 5/5 strength with EHL, dorsiflexion and plantarflexion. Assessment:  73 y.o. female left proximal femur periprosthetic fracture    Plan:  RBA's to surgery discussed with by patient by Dr. Chris Joseph yesterday  Pt elected to proceed  Patient has consented for the surgery. Consented for left proximal femur ORIF versus total hip arthroplasty revision and ORIF. Will receive antibiotics pre and post operatively. NPO starting 12:05 AM 8/2/21. Dr. Ronald James consulted with Dr. Chris Joseph who evaluated patient and discussed surgical intervention. Patient will be medically managed and will proceed with surgery morning of 8/2/21 with Dr. Chris Joseph. Hold [facility] morning of surgery.     Electronically signed by Kelly Albarran PA-C on 8/1/2021 at 7:59 AM

## 2021-08-01 NOTE — PLAN OF CARE
Problem: Pain:  Goal: Pain level will decrease  Description: Pain level will decrease  8/1/2021 1157 by Radha Barton RN  Outcome: Ongoing  Note: Patient complains of pain in the left hip/leg with a rating of 9/10. PRN pain medication given as ordered along with ice packs. Patient is refusing to reposition due to pain. Patient's stated pain goal is 6. Goal: Control of acute pain  Description: Control of acute pain  Outcome: Ongoing  Note: Patient complains of pain in the left hip/leg with a rating of 9/10. PRN pain medication given as ordered along with ice packs. Patient is refusing to reposition due to pain. Patient's stated pain goal is 6. Goal: Control of chronic pain  Description: Control of chronic pain  Outcome: Ongoing  Note: Patient complains of pain in the left hip/leg with a rating of 9/10. PRN pain medication given as ordered along with ice packs. Patient is refusing to reposition due to pain. Patient's stated pain goal is 6. Problem: Falls - Risk of:  Goal: Will remain free from falls  Description: Will remain free from falls  8/1/2021 1157 by Radha Barton RN  Outcome: Ongoing  Note: Patient has remained free of falls during this shift. Appropriate fall prevention measures in place. Patient is compliant with using call light for assistance when needed. Goal: Absence of physical injury  Description: Absence of physical injury  Outcome: Ongoing  Note: Patient has remained free of physical injury during this shift. Safe environment provided, call light within reach, and hourly rounding completed. Problem: Skin Integrity:  Goal: Will show no infection signs and symptoms  Description: Will show no infection signs and symptoms  8/1/2021 1157 by Radha Barton RN  Outcome: Ongoing  Note: No s/sx of infection noted during this shift. Patient remains afebrile and VS stable.  Will continue to   Goal: Absence of new skin breakdown  Description: Absence of new skin breakdown  Outcome: Ongoing  Note: No new skin issues noted during this shift. See skin assessment. Patient is refusing to turn due to pain and being uncomfortable. Patient educated on consequences of not turning. Care plan reviewed with patient. Patient verbalize understanding of the plan of care and contribute to goal setting.

## 2021-08-02 ENCOUNTER — APPOINTMENT (OUTPATIENT)
Dept: GENERAL RADIOLOGY | Age: 71
DRG: 480 | End: 2021-08-02
Payer: MEDICARE

## 2021-08-02 ENCOUNTER — ANESTHESIA (OUTPATIENT)
Dept: OPERATING ROOM | Age: 71
DRG: 480 | End: 2021-08-02
Payer: MEDICARE

## 2021-08-02 VITALS — SYSTOLIC BLOOD PRESSURE: 116 MMHG | DIASTOLIC BLOOD PRESSURE: 53 MMHG | OXYGEN SATURATION: 100 %

## 2021-08-02 LAB
ANION GAP SERPL CALCULATED.3IONS-SCNC: 9 MEQ/L (ref 8–16)
BUN BLDV-MCNC: 10 MG/DL (ref 7–22)
CALCIUM SERPL-MCNC: 8.4 MG/DL (ref 8.5–10.5)
CHLORIDE BLD-SCNC: 103 MEQ/L (ref 98–111)
CO2: 25 MEQ/L (ref 23–33)
CREAT SERPL-MCNC: 0.8 MG/DL (ref 0.4–1.2)
ERYTHROCYTE [DISTWIDTH] IN BLOOD BY AUTOMATED COUNT: 13.5 % (ref 11.5–14.5)
ERYTHROCYTE [DISTWIDTH] IN BLOOD BY AUTOMATED COUNT: 50.2 FL (ref 35–45)
GFR SERPL CREATININE-BSD FRML MDRD: 71 ML/MIN/1.73M2
GLUCOSE BLD-MCNC: 104 MG/DL (ref 70–108)
HCT VFR BLD CALC: 39 % (ref 37–47)
HEMOGLOBIN: 12.2 GM/DL (ref 12–16)
MCH RBC QN AUTO: 31.6 PG (ref 26–33)
MCHC RBC AUTO-ENTMCNC: 31.3 GM/DL (ref 32.2–35.5)
MCV RBC AUTO: 101 FL (ref 81–99)
PLATELET # BLD: 152 THOU/MM3 (ref 130–400)
PMV BLD AUTO: 10.2 FL (ref 9.4–12.4)
POTASSIUM REFLEX MAGNESIUM: 3.7 MEQ/L (ref 3.5–5.2)
RBC # BLD: 3.86 MILL/MM3 (ref 4.2–5.4)
SODIUM BLD-SCNC: 137 MEQ/L (ref 135–145)
WBC # BLD: 6.7 THOU/MM3 (ref 4.8–10.8)

## 2021-08-02 PROCEDURE — 87205 SMEAR GRAM STAIN: CPT

## 2021-08-02 PROCEDURE — 80048 BASIC METABOLIC PNL TOTAL CA: CPT

## 2021-08-02 PROCEDURE — 73501 X-RAY EXAM HIP UNI 1 VIEW: CPT

## 2021-08-02 PROCEDURE — 3700000001 HC ADD 15 MINUTES (ANESTHESIA): Performed by: ORTHOPAEDIC SURGERY

## 2021-08-02 PROCEDURE — 2580000003 HC RX 258: Performed by: PHYSICIAN ASSISTANT

## 2021-08-02 PROCEDURE — 7100000000 HC PACU RECOVERY - FIRST 15 MIN: Performed by: ORTHOPAEDIC SURGERY

## 2021-08-02 PROCEDURE — 2709999900 HC NON-CHARGEABLE SUPPLY: Performed by: ORTHOPAEDIC SURGERY

## 2021-08-02 PROCEDURE — 2500000003 HC RX 250 WO HCPCS: Performed by: ORTHOPAEDIC SURGERY

## 2021-08-02 PROCEDURE — 06183J4 BYPASS PORTAL VEIN TO HEPATIC VEIN WITH SYNTHETIC SUBSTITUTE, PERCUTANEOUS APPROACH: ICD-10-PCS | Performed by: ORTHOPAEDIC SURGERY

## 2021-08-02 PROCEDURE — 6370000000 HC RX 637 (ALT 250 FOR IP): Performed by: ORTHOPAEDIC SURGERY

## 2021-08-02 PROCEDURE — 87075 CULTR BACTERIA EXCEPT BLOOD: CPT

## 2021-08-02 PROCEDURE — 6370000000 HC RX 637 (ALT 250 FOR IP): Performed by: PHYSICIAN ASSISTANT

## 2021-08-02 PROCEDURE — 87070 CULTURE OTHR SPECIMN AEROBIC: CPT

## 2021-08-02 PROCEDURE — 6360000002 HC RX W HCPCS: Performed by: ANESTHESIOLOGY

## 2021-08-02 PROCEDURE — 6360000002 HC RX W HCPCS: Performed by: ORTHOPAEDIC SURGERY

## 2021-08-02 PROCEDURE — 0QS704Z REPOSITION LEFT UPPER FEMUR WITH INTERNAL FIXATION DEVICE, OPEN APPROACH: ICD-10-PCS | Performed by: ORTHOPAEDIC SURGERY

## 2021-08-02 PROCEDURE — 2500000003 HC RX 250 WO HCPCS: Performed by: ANESTHESIOLOGY

## 2021-08-02 PROCEDURE — 2580000003 HC RX 258: Performed by: NURSE PRACTITIONER

## 2021-08-02 PROCEDURE — 72170 X-RAY EXAM OF PELVIS: CPT

## 2021-08-02 PROCEDURE — 1200000000 HC SEMI PRIVATE

## 2021-08-02 PROCEDURE — 6360000002 HC RX W HCPCS: Performed by: NURSE PRACTITIONER

## 2021-08-02 PROCEDURE — 7100000001 HC PACU RECOVERY - ADDTL 15 MIN: Performed by: ORTHOPAEDIC SURGERY

## 2021-08-02 PROCEDURE — C1776 JOINT DEVICE (IMPLANTABLE): HCPCS | Performed by: ORTHOPAEDIC SURGERY

## 2021-08-02 PROCEDURE — 2580000003 HC RX 258: Performed by: ANESTHESIOLOGY

## 2021-08-02 PROCEDURE — 2720000010 HC SURG SUPPLY STERILE: Performed by: ORTHOPAEDIC SURGERY

## 2021-08-02 PROCEDURE — 3700000000 HC ANESTHESIA ATTENDED CARE: Performed by: ORTHOPAEDIC SURGERY

## 2021-08-02 PROCEDURE — 3600000014 HC SURGERY LEVEL 4 ADDTL 15MIN: Performed by: ORTHOPAEDIC SURGERY

## 2021-08-02 PROCEDURE — 85027 COMPLETE CBC AUTOMATED: CPT

## 2021-08-02 PROCEDURE — 36415 COLL VENOUS BLD VENIPUNCTURE: CPT

## 2021-08-02 PROCEDURE — 2580000003 HC RX 258: Performed by: ORTHOPAEDIC SURGERY

## 2021-08-02 PROCEDURE — 3209999900 FLUORO FOR SURGICAL PROCEDURES

## 2021-08-02 PROCEDURE — 3600000004 HC SURGERY LEVEL 4 BASE: Performed by: ORTHOPAEDIC SURGERY

## 2021-08-02 DEVICE — ACCORD 2.0MM COBALT CHROME CABLE
Type: IMPLANTABLE DEVICE | Site: HIP | Status: FUNCTIONAL
Brand: ACCORD

## 2021-08-02 RX ORDER — FENTANYL CITRATE 50 UG/ML
INJECTION, SOLUTION INTRAMUSCULAR; INTRAVENOUS PRN
Status: DISCONTINUED | OUTPATIENT
Start: 2021-08-02 | End: 2021-08-02 | Stop reason: SDUPTHER

## 2021-08-02 RX ORDER — PROPOFOL 10 MG/ML
INJECTION, EMULSION INTRAVENOUS PRN
Status: DISCONTINUED | OUTPATIENT
Start: 2021-08-02 | End: 2021-08-02 | Stop reason: SDUPTHER

## 2021-08-02 RX ORDER — MELOXICAM 7.5 MG/1
3.75 TABLET ORAL DAILY
Status: COMPLETED | OUTPATIENT
Start: 2021-08-02 | End: 2021-08-04

## 2021-08-02 RX ORDER — ONDANSETRON 2 MG/ML
4 INJECTION INTRAMUSCULAR; INTRAVENOUS EVERY 6 HOURS PRN
Status: DISCONTINUED | OUTPATIENT
Start: 2021-08-02 | End: 2021-08-06 | Stop reason: HOSPADM

## 2021-08-02 RX ORDER — ROCURONIUM BROMIDE 10 MG/ML
INJECTION, SOLUTION INTRAVENOUS PRN
Status: DISCONTINUED | OUTPATIENT
Start: 2021-08-02 | End: 2021-08-02 | Stop reason: SDUPTHER

## 2021-08-02 RX ORDER — PHENYLEPHRINE HYDROCHLORIDE 10 MG/ML
INJECTION INTRAVENOUS PRN
Status: DISCONTINUED | OUTPATIENT
Start: 2021-08-02 | End: 2021-08-02 | Stop reason: SDUPTHER

## 2021-08-02 RX ORDER — ONDANSETRON 4 MG/1
4 TABLET, ORALLY DISINTEGRATING ORAL EVERY 8 HOURS PRN
Status: DISCONTINUED | OUTPATIENT
Start: 2021-08-02 | End: 2021-08-06 | Stop reason: HOSPADM

## 2021-08-02 RX ORDER — SENNA AND DOCUSATE SODIUM 50; 8.6 MG/1; MG/1
1 TABLET, FILM COATED ORAL 2 TIMES DAILY
Status: DISCONTINUED | OUTPATIENT
Start: 2021-08-02 | End: 2021-08-06 | Stop reason: HOSPADM

## 2021-08-02 RX ORDER — ACETAMINOPHEN 325 MG/1
650 TABLET ORAL EVERY 6 HOURS
Status: DISCONTINUED | OUTPATIENT
Start: 2021-08-02 | End: 2021-08-06 | Stop reason: HOSPADM

## 2021-08-02 RX ORDER — SODIUM CHLORIDE 9 MG/ML
INJECTION, SOLUTION INTRAVENOUS CONTINUOUS PRN
Status: DISCONTINUED | OUTPATIENT
Start: 2021-08-02 | End: 2021-08-02 | Stop reason: SDUPTHER

## 2021-08-02 RX ORDER — CEFAZOLIN SODIUM 1 G/3ML
INJECTION, POWDER, FOR SOLUTION INTRAMUSCULAR; INTRAVENOUS PRN
Status: DISCONTINUED | OUTPATIENT
Start: 2021-08-02 | End: 2021-08-02 | Stop reason: SDUPTHER

## 2021-08-02 RX ORDER — MIDAZOLAM HYDROCHLORIDE 1 MG/ML
INJECTION INTRAMUSCULAR; INTRAVENOUS PRN
Status: DISCONTINUED | OUTPATIENT
Start: 2021-08-02 | End: 2021-08-02 | Stop reason: SDUPTHER

## 2021-08-02 RX ADMIN — POLYETHYLENE GLYCOL 3350 17 G: 17 POWDER, FOR SOLUTION ORAL at 22:45

## 2021-08-02 RX ADMIN — PROPOFOL 100 MG: 10 INJECTION, EMULSION INTRAVENOUS at 07:55

## 2021-08-02 RX ADMIN — MELOXICAM 3.75 MG: 7.5 TABLET ORAL at 22:44

## 2021-08-02 RX ADMIN — PHENYLEPHRINE HYDROCHLORIDE 100 MCG: 10 INJECTION INTRAVENOUS at 08:06

## 2021-08-02 RX ADMIN — PHENYLEPHRINE HYDROCHLORIDE 200 MCG: 10 INJECTION INTRAVENOUS at 07:58

## 2021-08-02 RX ADMIN — DOCUSATE SODIUM 50 MG AND SENNOSIDES 8.6 MG 1 TABLET: 8.6; 5 TABLET, FILM COATED ORAL at 22:53

## 2021-08-02 RX ADMIN — OXYCODONE 10 MG: 5 TABLET ORAL at 22:44

## 2021-08-02 RX ADMIN — PHENYLEPHRINE HYDROCHLORIDE 200 MCG: 10 INJECTION INTRAVENOUS at 08:09

## 2021-08-02 RX ADMIN — CEFAZOLIN 2000 MG: 10 INJECTION, POWDER, FOR SOLUTION INTRAVENOUS at 15:56

## 2021-08-02 RX ADMIN — SODIUM CHLORIDE: 9 INJECTION, SOLUTION INTRAVENOUS at 07:43

## 2021-08-02 RX ADMIN — SODIUM CHLORIDE, PRESERVATIVE FREE 10 ML: 5 INJECTION INTRAVENOUS at 22:53

## 2021-08-02 RX ADMIN — CEFAZOLIN 2000 MG: 1 INJECTION, POWDER, FOR SOLUTION INTRAMUSCULAR; INTRAVENOUS at 08:15

## 2021-08-02 RX ADMIN — MIDAZOLAM 2 MG: 1 INJECTION INTRAMUSCULAR; INTRAVENOUS at 07:44

## 2021-08-02 RX ADMIN — ACETAMINOPHEN 650 MG: 325 TABLET ORAL at 22:44

## 2021-08-02 RX ADMIN — FENTANYL CITRATE 100 MCG: 50 INJECTION, SOLUTION INTRAMUSCULAR; INTRAVENOUS at 07:45

## 2021-08-02 RX ADMIN — ROCURONIUM BROMIDE 50 MG: 10 INJECTION INTRAVENOUS at 07:55

## 2021-08-02 ASSESSMENT — PULMONARY FUNCTION TESTS
PIF_VALUE: 25
PIF_VALUE: 32
PIF_VALUE: 26
PIF_VALUE: 0
PIF_VALUE: 26
PIF_VALUE: 5
PIF_VALUE: 24
PIF_VALUE: 2
PIF_VALUE: 26
PIF_VALUE: 28
PIF_VALUE: 1
PIF_VALUE: 26
PIF_VALUE: 26
PIF_VALUE: 24
PIF_VALUE: 31
PIF_VALUE: 25
PIF_VALUE: 26
PIF_VALUE: 25
PIF_VALUE: 30
PIF_VALUE: 23
PIF_VALUE: 26
PIF_VALUE: 28
PIF_VALUE: 24
PIF_VALUE: 30
PIF_VALUE: 31
PIF_VALUE: 27
PIF_VALUE: 28
PIF_VALUE: 30
PIF_VALUE: 29
PIF_VALUE: 1
PIF_VALUE: 24
PIF_VALUE: 26
PIF_VALUE: 25
PIF_VALUE: 2
PIF_VALUE: 25
PIF_VALUE: 25
PIF_VALUE: 3
PIF_VALUE: 24
PIF_VALUE: 27
PIF_VALUE: 31
PIF_VALUE: 26
PIF_VALUE: 25
PIF_VALUE: 25
PIF_VALUE: 26
PIF_VALUE: 26
PIF_VALUE: 25
PIF_VALUE: 26
PIF_VALUE: 3
PIF_VALUE: 26
PIF_VALUE: 28
PIF_VALUE: 31
PIF_VALUE: 25
PIF_VALUE: 27
PIF_VALUE: 24
PIF_VALUE: 25
PIF_VALUE: 25
PIF_VALUE: 31
PIF_VALUE: 24
PIF_VALUE: 32
PIF_VALUE: 1
PIF_VALUE: 26
PIF_VALUE: 1
PIF_VALUE: 2
PIF_VALUE: 31
PIF_VALUE: 24
PIF_VALUE: 28
PIF_VALUE: 25
PIF_VALUE: 27
PIF_VALUE: 25
PIF_VALUE: 3
PIF_VALUE: 30
PIF_VALUE: 33
PIF_VALUE: 31
PIF_VALUE: 28
PIF_VALUE: 24
PIF_VALUE: 23
PIF_VALUE: 1
PIF_VALUE: 27
PIF_VALUE: 32
PIF_VALUE: 25
PIF_VALUE: 32
PIF_VALUE: 25
PIF_VALUE: 26
PIF_VALUE: 4
PIF_VALUE: 29
PIF_VALUE: 32
PIF_VALUE: 28
PIF_VALUE: 24
PIF_VALUE: 26
PIF_VALUE: 25
PIF_VALUE: 32
PIF_VALUE: 26
PIF_VALUE: 32
PIF_VALUE: 25
PIF_VALUE: 26
PIF_VALUE: 26
PIF_VALUE: 27
PIF_VALUE: 26
PIF_VALUE: 2
PIF_VALUE: 28
PIF_VALUE: 24
PIF_VALUE: 28
PIF_VALUE: 27
PIF_VALUE: 26
PIF_VALUE: 25
PIF_VALUE: 26
PIF_VALUE: 2
PIF_VALUE: 29
PIF_VALUE: 2
PIF_VALUE: 28
PIF_VALUE: 26
PIF_VALUE: 31
PIF_VALUE: 28
PIF_VALUE: 24
PIF_VALUE: 31
PIF_VALUE: 26
PIF_VALUE: 25
PIF_VALUE: 30
PIF_VALUE: 24
PIF_VALUE: 26
PIF_VALUE: 25
PIF_VALUE: 6
PIF_VALUE: 24
PIF_VALUE: 28
PIF_VALUE: 27
PIF_VALUE: 27
PIF_VALUE: 25
PIF_VALUE: 30
PIF_VALUE: 24
PIF_VALUE: 25
PIF_VALUE: 27
PIF_VALUE: 27
PIF_VALUE: 29
PIF_VALUE: 26
PIF_VALUE: 27
PIF_VALUE: 24
PIF_VALUE: 31
PIF_VALUE: 25
PIF_VALUE: 26
PIF_VALUE: 29
PIF_VALUE: 28
PIF_VALUE: 2
PIF_VALUE: 27
PIF_VALUE: 26
PIF_VALUE: 24
PIF_VALUE: 24
PIF_VALUE: 25
PIF_VALUE: 28
PIF_VALUE: 0
PIF_VALUE: 26
PIF_VALUE: 0
PIF_VALUE: 28
PIF_VALUE: 25
PIF_VALUE: 26
PIF_VALUE: 28
PIF_VALUE: 1
PIF_VALUE: 29
PIF_VALUE: 23
PIF_VALUE: 24

## 2021-08-02 ASSESSMENT — PAIN DESCRIPTION - ONSET: ONSET: ON-GOING

## 2021-08-02 ASSESSMENT — PAIN DESCRIPTION - ORIENTATION: ORIENTATION: LEFT

## 2021-08-02 ASSESSMENT — PAIN SCALES - GENERAL
PAINLEVEL_OUTOF10: 5
PAINLEVEL_OUTOF10: 10
PAINLEVEL_OUTOF10: 0

## 2021-08-02 ASSESSMENT — PAIN DESCRIPTION - FREQUENCY: FREQUENCY: CONTINUOUS

## 2021-08-02 ASSESSMENT — PAIN DESCRIPTION - PAIN TYPE: TYPE: ACUTE PAIN

## 2021-08-02 ASSESSMENT — PAIN - FUNCTIONAL ASSESSMENT: PAIN_FUNCTIONAL_ASSESSMENT: PREVENTS OR INTERFERES WITH MANY ACTIVE NOT PASSIVE ACTIVITIES

## 2021-08-02 ASSESSMENT — PAIN DESCRIPTION - LOCATION: LOCATION: LEG

## 2021-08-02 ASSESSMENT — PAIN DESCRIPTION - PROGRESSION
CLINICAL_PROGRESSION: GRADUALLY WORSENING
CLINICAL_PROGRESSION: GRADUALLY IMPROVING

## 2021-08-02 ASSESSMENT — ENCOUNTER SYMPTOMS: SHORTNESS OF BREATH: 1

## 2021-08-02 ASSESSMENT — PAIN DESCRIPTION - DESCRIPTORS: DESCRIPTORS: ACHING;SHARP

## 2021-08-02 NOTE — CARE COORDINATION
8/2/21, 1:36 PM EDT  DISCHARGE PLANNING EVALUATION:    Shoshana Hutchins       Admitted: 7/30/2021/ 2141   Hospital day: 2   Location: -16/016-A Reason for admit: Closed left hip fracture, initial encounter ([address] Ilsa Najera   PMH:  has a past medical history of Anxiety, Arthritis, Breast cancer ([address] CAD (coronary artery disease), Cancer of the skin, Cerebral artery occlusion with cerebral infarction ([address] Chronic UTI, COPD (chronic obstructive pulmonary disease) ([address] CVA (cerebral infarction), Depression, DVT of lower extremity (deep venous thrombosis) ([address] Facial injury, History of stroke, Hx of blood clots, Hyperlipidemia, Hypertension, Hypothyroidism, IBS (irritable bowel syndrome), Low HDL (under 40), Lung cancer ([address] Prolonged emergence from general anesthesia, Recurrent UTI (urinary tract infection), and Word finding difficulty. Procedure: 8/2/21 surgery by Dr Perez Scale: FEMUR OPEN REDUCTION INTERNAL FIXATION  Barriers to Discharge: Surgery today. Hospitalist for medical management. PT/OT. Pain management. N/V checks. I&O. Maintain dressing unless saturated  PCP: NITO Schofield CNP  Readmission Risk Score: 17%    Patient Goals/Plan/Treatment Preferences: I spoke with Raimundo Delvalle. She said that she plans to go home with her family friend at discharge. He lives in [place]. He has 4 bedrooms on first floor and she will have some help there. She would like [facility] services there. SW on case. Will follow with PT/OT for DME recommendations. Transportation/Food Security/Housekeeping Addressed:  No issues identified.

## 2021-08-02 NOTE — PROGRESS NOTES
Admitted to PACu from OR  Report received from anesthesia. Lethargic but opens eyes to name. No s/s discomfort or distress. Unable to stay awake to follow commands at this time.

## 2021-08-02 NOTE — PLAN OF CARE
Problem: Pain:  Goal: Pain level will decrease  Description: Pain level will decrease  8/2/2021 0141 by Marlyse Hodgkin, RN  Outcome: Ongoing  8/1/2021 1157 by Chadd Ballesteros RN  Outcome: Ongoing  Note: Patient complains of pain in the left hip/leg with a rating of 9/10. PRN pain medication given as ordered along with ice packs. Patient is refusing to reposition due to pain. Patient's stated pain goal is 6. Goal: Control of acute pain  Description: Control of acute pain  8/2/2021 0141 by Marlyse Hodgkin, RN  Outcome: Ongoing  8/1/2021 1157 by Chadd Ballesteros RN  Outcome: Ongoing  Note: Patient complains of pain in the left hip/leg with a rating of 9/10. PRN pain medication given as ordered along with ice packs. Patient is refusing to reposition due to pain. Patient's stated pain goal is 6. Goal: Control of chronic pain  Description: Control of chronic pain  8/2/2021 0141 by Marlyse Hodgkin, RN  Outcome: Ongoing  8/1/2021 1157 by Chadd Ballesteros RN  Outcome: Ongoing  Note: Patient complains of pain in the left hip/leg with a rating of 9/10. PRN pain medication given as ordered along with ice packs. Patient is refusing to reposition due to pain. Patient's stated pain goal is 6. Problem: Falls - Risk of:  Goal: Will remain free from falls  Description: Will remain free from falls  8/2/2021 0141 by Marlyse Hodgkin, RN  Outcome: Ongoing  8/1/2021 1157 by Chadd Ballesteros RN  Outcome: Ongoing  Note: Patient has remained free of falls during this shift. Appropriate fall prevention measures in place. Patient is compliant with using call light for assistance when needed. Goal: Absence of physical injury  Description: Absence of physical injury  8/2/2021 0141 by Marlyse Hodgkin, RN  Outcome: Ongoing  8/1/2021 1157 by Chadd Ballesteros RN  Outcome: Ongoing  Note: Patient has remained free of physical injury during this shift.  Safe environment provided, call light within reach, and hourly rounding completed. Problem: Skin Integrity:  Goal: Will show no infection signs and symptoms  Description: Will show no infection signs and symptoms  8/2/2021 0141 by Joselin Walton RN  Outcome: Ongoing  8/1/2021 1157 by Bennett Johnson RN  Outcome: Ongoing  Note: No s/sx of infection noted during this shift. Patient remains afebrile and VS stable. Will continue to monitor. Goal: Absence of new skin breakdown  Description: Absence of new skin breakdown  8/2/2021 0141 by Joselin Walton RN  Outcome: Ongoing  8/1/2021 1157 by Bennett Johnson RN  Outcome: Ongoing  Note: No new skin issues noted during this shift. See skin assessment. Patient is refusing to turn due to pain and being uncomfortable. Patient educated on consequences of not turning.

## 2021-08-02 NOTE — PROGRESS NOTES
Transported to floor in stable condition. 4lpm per n/c on pt  o2 sat 88-92%. No respiratory distress  Enc deep breaths/cough when awake. Pt with good cough effort--non productive. Pt states \"I sound like this all the time w/ my cough\". Denies c/o pain in left leg.

## 2021-08-02 NOTE — BRIEF OP NOTE
Brief Postoperative Note      Patient: Wil Acosta  YOB: 1950  MRN: [id number]    Date of Procedure: 8/2/2021    Pre-Op Diagnosis: LEFT PER-IPROSTHETIC FX    Post-Op Diagnosis: Same       Procedure(s): FEMUR OPEN REDUCTION INTERNAL FIXATION    Surgeon(s):  Stephanie Nogueira MD    Assistant:   NITO Valdovinos CNP      Anesthesia: General    Estimated Blood Loss (mL): less than 407     Complications: None    Specimens:   ID Type Source Tests Collected by Time Destination   1 : Left Hip Tissue Tissue Joint, Hip CULTURE, ANAEROBIC AND AEROBIC Stephanie Nogueira MD 8/2/2021 0830    2 : Left Synovium #2 Tissue Tissue Joint, Hip CULTURE, ANAEROBIC AND AEROBIC Stephanie Nogueira MD 8/2/2021 8249        Implants:  Implant Name Type Inv. Item Serial No.  Lot No. LRB No. Used Action   CABLE ORTH DIA2MM HIP CO CHROM FOR GRP PLT ACCORD  CABLE ORTH DIA2MM HIP CO CHROM FOR GRP PLT ACCORD  BL[employer]ur[employer] [id number] Left 5 Implanted         Drains:   Closed/Suction Drain Left;Lateral Hip Accordion (Active)       Urethral Catheter Straight-tip; Non-latex; Temperature probe 16 fr (Active)       Findings: comfirmed    Electronically signed by NITO Valdovinos CNP on 8/2/2021 at 10:36 AM    Stephanie Nogueira MD  Orthopaedic Surgeon  [address] [place] [address]  8/13/2021  8:38 AM

## 2021-08-02 NOTE — OP NOTE
Department of Orthopedic Surgery  Operative Report      Patient:  Tian Lynn    YOB: 1950    MRN:  [id number]    Date of Surgery:  8/2/2021    Pre-operative Diagnosis:    1. Left greater trochanter fracture  2. Status post left total hip arthroplasty    Post-operative Diagnosis:    1. Left greater trochanter fracture  2. Status post left total hip arthroplasty    Procedure:    1. Open reduction internal fixation left greater trochanter fracture  2. Left hip arthrotomy with dislocation of hip    Surgeon:  Franklin Retana MD     Assistant(s): Amber Mcdonnell CNP    Anesthesia: General    Estimated blood loss: 300 mL    Fluids: 1200 mL crystalloid    Urine: 500 mL    Specimens: 2 cultures were taken of synovial tissue    Findings: Stable femoral component with no evidence of loosening. Comminuted left greater trochanter fracture. Complications: None    Condition: Stable    Indications for Surgery: Patient is a 28-year-old female who presented with left hip pain and inability to ambulate. Patient previously underwent left total hip arthroplasty in 2011. She had been doing well until sustaining a fall 3 days ago. She had sharp severe pain in her left hip and inability to ambulate. Radiographs demonstrated displaced greater trochanter fracture. Fracture extended around the femoral stem from her hip replacement. There is no evidence of loosening based on CAT scan or radiographs. Discussion was had with patient regarding open reduction internal fixation and possible revision. Discussed procedure, risk and benefits, and alternative include but not limited to bleeding, infection, vascular, hardware failure, nonunion, malunion, continued pain, need for additional surgery, and risks of anesthesia. Patient understands the risks elects to proceed with surgery.     Implants:    [employer]  5 cable greater trochanter plate with 5 cables    Procedure:  Patient was identified and greeted in the preoperative holding area. The correct surgical site was identified and marked. Surgical consent was obtained and placed on the chart. Patient was taken to the surgical suite and transferred to the operating room table. Patient received preoperative antibiotics. Patient received general anesthetic per anesthesia. Patient was transitioned to a lateral decubitus position with the left hip up. Patient was secured with a pegboard. Axillary roll was placed under the right axilla. Left lower extremity was sterilely prepped and draped. Surgical timeout was performed confirming correct surgical site, patient, and procedure. Patient's prior posterior incision was opened. Incision was carried down through skin and subcutaneous tissue. The IT band and gluteus renata fascia was identified and split. Muscle fibers were split in line bluntly. Dissection was carried down to the greater trochanter. A retractor was placed under the hip abductors. The joint capsule was then split in line with the femoral neck and released off of the posterior femur. There was a small amount of serosanguineous bloody fluid within the joint. Synovial tissue was taken and sent for cultures. After sufficient release around the hip joint, the hip was dislocated. A bone hook was placed around the femoral neck. Inline traction was placed on the femoral stem and the stem was noted to be stable. Stem did not move with rotational force either. Given the stability of the stem the decision was made to proceed with ORIF of the greater trochanter. The hip was reduced. The vastus lateralis was sharply released off of the posterior femur. The tendinous attachment was left in place. The fracture was mobilized. The vastus lateralis tendon was split in line with the femur. A 5 cable trial plate was placed over the lateral femur and noted to have good length and stability. The final plate was opened.   Cables were passed around the proximal femur from anterior to posterior. The hooks of the greater trochanter plate were placed over the tip of the greater trochanter and care was taken to ensure that the tips were through the joint capsule and tendon. After securing the hooks over the greater trochanter the plate was reduced to the lateral femur. Traction was placed on the trochanter piece in order to reduce it. A cable was placed around the calcar and tensioned reducing the plate to the bone as well as reducing the fracture. A second cable was placed and tensioned around the subtrochanteric region securing the plate to the bone. 2 additional cables were secured around the subtrochanteric region fully securing the plate. An additional cable was placed around the calcar and secured to the bone and implant. After fully tensioning all cables the locking screws were tightened. A fluoroscopic image was obtained which demonstrated good fracture reduction and placement of the hooks over the tip of the greater trochanter. Cables were cut. The surgical site was well irrigated with irrisept. The joint capsule was closed with #5 Ethibond. The vastus lateralis split was closed with #1 Vicryl. A Hemovac drain was placed from the distal anterior into the hip joint. The IT band and gluteus fascia was closed with #2 strata fix. The Hemovac drain was sutured in place with 2-0 nylon. Incision was closed with 2-0 Vicryl and 2-0 strata fix. Final sponge and needle counts were correct. Surgical dressing consisting of Steri-Strips, Mepitel, 4 x 4, and Tegaderm was applied. Patient was transferred back to supine position. Patient was awakened from anesthesia and transferred the patient bed. Patient was taken the PACU in stable condition. Disposition: Patient will be transferred back to [place] [address] when medically stable. 25% weightbearing left lower extremity. No active hip abduction. Hip abductor brace. PT/OT.  P.o. pain control.   DVT prophylaxis Lovenox, SCDs, teds. Anticipate DC home when medically stable and ambulating with assistance.     Adam Bolanos MD  Orthopaedic Surgeon  [facility]  8/2/2021  10:51 AM

## 2021-08-02 NOTE — PROGRESS NOTES
Subjective:   Left proximal femur periprosthetic fracture  Pain controlled. No acute events overnight. Denies numbness or tingling. Afebrile. Hb 12.2. No c/o fevers, chills, SOB, CP, n/v/d. Objective:   BP (!) 116/54   Pulse 67   Temp 98.6 °F (37 °C) (Oral)   Resp 16   Ht 5' 4\" (1.626 m)   Wt 135 lb (61.2 kg)   SpO2 92%   BMI 23.17 kg/m²     Recent Labs     07/30/21  2334 08/01/21  0531 08/02/21  0600   WBC 10.3 5.8 6.7   HGB 14.0 12.0 12.2   HCT 43.6 39.1 39.0    162 152       Current Facility-Administered Medications: albuterol sulfate  (90 Base) MCG/ACT inhaler 2 puff, 2 puff, Inhalation, Q6H PRN  diphenhydrAMINE (BENADRYL) tablet 25 mg, 25 mg, Oral, Q6H PRN  levothyroxine (SYNTHROID) tablet 88 mcg, 88 mcg, Oral, Daily  sodium chloride flush 0.9 % injection 5-40 mL, 5-40 mL, Intravenous, 2 times per day  sodium chloride flush 0.9 % injection 5-40 mL, 5-40 mL, Intravenous, PRN  0.9 % sodium chloride infusion, 25 mL, Intravenous, PRN  enoxaparin (LOVENOX) injection 40 mg, 40 mg, Subcutaneous, Daily  ondansetron (ZOFRAN-ODT) disintegrating tablet 4 mg, 4 mg, Oral, Q8H PRN **OR** ondansetron (ZOFRAN) injection 4 mg, 4 mg, Intravenous, Q6H PRN  acetaminophen (TYLENOL) tablet 650 mg, 650 mg, Oral, Q4H PRN  oxyCODONE (ROXICODONE) immediate release tablet 5 mg, 5 mg, Oral, Q4H PRN **OR** oxyCODONE (ROXICODONE) immediate release tablet 10 mg, 10 mg, Oral, Q4H PRN  morphine (PF) injection 2 mg, 2 mg, Intravenous, Q2H PRN **OR** morphine injection 4 mg, 4 mg, Intravenous, Q2H PRN  polyethylene glycol (GLYCOLAX) packet 17 g, 17 g, Oral, Daily PRN  budesonide-formoterol (SYMBICORT) 160-4.5 MCG/ACT inhaler 2 puff, 2 puff, Inhalation, BID **AND** tiotropium (SPIRIVA RESPIMAT) 2.5 MCG/ACT inhaler 2 puff, 2 puff, Inhalation, Daily      Exam:  Gen: NAD  LLE: incision is clean, dry and intact with no active drainage, erythema, or other signs of infection. 2+ DP/PT pulse.  Sensation intact distally to light touch. Thigh and calf soft and supple with - Jesse's test. 5/5 strength with EHL, dorsiflexion and plantarflexion. Assessment:  73 y.o. female left proximal femur periprosthetic fracture    Plan:  RBA's to surgery discussed with by patient by Dr. Yolis Nascimento yesterday  Pt elected to proceed  Patient has consented for the surgery. Consented for left proximal femur ORIF versus total hip arthroplasty revision and ORIF. Will receive antibiotics pre and post operatively. Dr. Rosamaria Murry consulted with Dr. Yolis Nascimento who evaluated patient and discussed surgical intervention. Patient will be medically managed and will proceed with surgery morning of 8/2/21 with Dr. Yolis Nascimento.       Electronically signed by Adilene Childs PA-C on 8/2/2021 at 6:42 AM

## 2021-08-02 NOTE — PROGRESS NOTES
Pt meets PACU d/c criteria. Waiting on radiology for xray. Pt asleep. NO s/s distress or discomfort. o2 sat 90%. Enc deep breaths when awake. Pt w/ wet/congested cough. Unable to bring secretions up.

## 2021-08-02 NOTE — ANESTHESIA PRE PROCEDURE
Department of Anesthesiology  Preprocedure Note       Name:  Elmira Murguia   Age:  73 y.o.  :  1950                                          MRN:  [id number]         Date:  2021      Surgeon: Elodia Tillman):  Meche Roy MD    Procedure: Procedure(s): FEMUR OPEN REDUCTION INTERNAL FIXATION POSS. REVISION HIP    Medications prior to admission:   Prior to Admission medications    Medication Sig Start Date End Date Taking? Authorizing Provider   guaiFENesin (MUCINEX) 600 MG extended release tablet Take 1 tablet by mouth 2 times daily 21  Yes Eufemia Georges MD   fluticasone-umeclidin-vilant (TRELEGY ELLIPTA) 920-32.8-55 MCG/INH AEPB Inhale 1 puff into the lungs daily Rinse mouth with water and spit without swallowing after each dose.  21 Yes NITO Sunshine CNP   diphenhydrAMINE (BENADRYL) 25 MG tablet Take 25 mg by mouth every 6 hours as needed for Itching   Yes Historical Provider, MD   albuterol sulfate HFA (PROVENTIL HFA) 108 (90 Base) MCG/ACT inhaler Inhale 2 puffs into the lungs every 6 hours as needed for Wheezing 3/8/21  Yes NITO Simmons CNP   levothyroxine (LEVOTHROID) 88 MCG tablet Take 1 tablet by mouth daily 17  Yes Torres Monk MD       Current medications:    Current Facility-Administered Medications   Medication Dose Route Frequency Provider Last Rate Last Admin    albuterol sulfate  (90 Base) MCG/ACT inhaler 2 puff  2 puff Inhalation Q6H PRN Uzma Arrington PA-C        diphenhydrAMINE (BENADRYL) tablet 25 mg  25 mg Oral Q6H PRN Uzma Arrington PA-C        levothyroxine (SYNTHROID) tablet 88 mcg  88 mcg Oral Daily Uzma Arrington PA-C   88 mcg at 21 0550    sodium chloride flush 0.9 % injection 5-40 mL  5-40 mL Intravenous 2 times per day Marian Chu PA-C   10 mL at 21 2340    sodium chloride flush 0.9 % injection 5-40 mL  5-40 mL Intravenous PRN Uzma Arrington PA-C        0.9 % sodium chloride infusion  25 mL Intravenous PRN Deuce PA-C        enoxaparin (LOVENOX) injection 40 mg  40 mg Subcutaneous Daily ZEENAT [place]-C   40 mg at 08/01/21 1000    ondansetron (ZOFRAN-ODT) disintegrating tablet 4 mg  4 mg Oral Q8H PRN Uzma Arrington PA-C        Or    ondansetron (ZOFRAN) injection 4 mg  4 mg Intravenous Q6H PRN ZEENAT Tripathi-C        acetaminophen (TYLENOL) tablet 650 mg  650 mg Oral Q4H PRN ZEENAT Tripathi-C        oxyCODONE (ROXICODONE) immediate release tablet 5 mg  5 mg Oral Q4H PRN Uzma Arrington PA-C        Or    oxyCODONE (ROXICODONE) immediate release tablet 10 mg  10 mg Oral Q4H PRN ZEENAT [place]-C   10 mg at 08/01/21 2339    morphine (PF) injection 2 mg  2 mg Intravenous Q2H PRN Uzma Arrington PA-C        Or    morphine injection 4 mg  4 mg Intravenous Q2H PRN ZEENAT [place]-C   4 mg at 07/31/21 0304    polyethylene glycol (GLYCOLAX) packet 17 g  17 g Oral Daily PRN MARIAN Tripathi        budesonide-formoterol (SYMBICORT) 160-4.5 MCG/ACT inhaler 2 puff  2 puff Inhalation BID ZEENAT Tripathi-C   2 puff at 08/01/21 1804    And    tiotropium (SPIRIVA RESPIMAT) 2.5 MCG/ACT inhaler 2 puff  2 puff Inhalation Daily Uzma Arrington PA-C   2 puff at 08/01/21 2126       Allergies:     Allergies   Allergen Reactions    Cipro Xr     Vicodin [Hydrocodone-Acetaminophen]      Weird dreams         Problem List:    Patient Active Problem List   Diagnosis Code    Hyperlipidemia E78.5    History of breast cancer Z85.3    Vitamin D deficiency E55.9    Chronic headachess/p head injjury s/p fall R51.9, G89.29    Depression F32.9    Smoker F17.200    Hypothyroidism E03.9    Allergic rhinitis J30.9    Incontinence R32    Noncompliance Z91.19    Vasovagal syncope R55    History of CVA (cerebrovascular accident) Z80.78    Stage 2 moderate COPD by GOLD classification ([facility] [address] J44.9    Carotid occlusion, left I65.22    OA (osteoarthritis) of hip M16.9    Essential hypertension I10    CAD (coronary artery disease) I25.10    Transient cerebral ischemia G45.9    Aortic insufficiency I35.1    Chest pain, atypical R07.89    Tobacco abuse Z72.0    History of chest pain atypical Z87.898    S/P cardiac cath nonobstructive lesion 2014 Z98.890    Lumbar radiculitis M54.16    Spinal stenosis of lumbar region without neurogenic claudication M48.061    Syncope and collapse R55    Solitary pulmonary nodule on lung CT R91.1    Severe malnutrition (HCC) E43    Primary malignant neoplasm of right lower lobe of lung (HCC) C34.31    SOB (shortness of breath) on exertion R06.02    Dizziness on standing R42    History of syncope Z87.898    Cancer of lower lobe of right lung (HCC) C34.31    S/P robotic assisted nonanatomic wedge resection of lateral basilar right lower lobe lung mass and mediastinal dissection Z90.2    Postoperative urinary retention N99.89, R33.8    Closed left hip fracture, initial encounter ([facility] [address] S72.002A       Past Medical History:        Diagnosis Date    Anxiety 2007    Arthritis     Breast cancer [facility]) 2005    right mastectomy, chemo and radiation history    CAD (coronary artery disease) 2001    sees Dr Da Strauss of the skin 2004    Cerebral artery occlusion with cerebral infarction ([facility] [address]     Chronic UTI     COPD (chronic obstructive pulmonary disease) ([facility] [address]     seebill Petersen    CVA (cerebral infarction) 2007, 2008    Depression 2007    DVT of lower extremity (deep venous thrombosis) ([facility] [address] 1976    Facial injury 2005    Fall on greasy ground, striking left periorbital region    History of stroke 2007, 2008, 2009    Patient relates a stroke with right weakness and speech in 2007; another in 2010 or 2012 (unsure), both with need to rehabilitation.   Also \"2 mini-strokes\" (?)    Hx of blood clots 1977    hip, leg    Hyperlipidemia 2005    Hypertension 2005    Hypothyroidism     IBS (irritable bowel syndrome)     Low HDL (under 40) 2014    Lung cancer [facility])     sees Dr Kristine Land Prolonged emergence from general anesthesia     Recurrent UTI (urinary tract infection) 2015    Dr Calixto Lagunas finding difficulty 05/16/2017    work-up in progress       Past Surgical History:        Procedure Laterality Date    APPENDECTOMY  1975    BREAST SURGERY Right 04/01/2005    evacuation of breast hematoma-Dr. Jevon Albert    BREAST SURGERY Right 11/30/2004    lymphatic mapping, SLN bx x2-Dr. Isabella Black    COLONOSCOPY  2009    Dr. Arash Phillips  03/16/2021    CT NEEDLE BIOPSY LUNG PERCUTANEOUS 3/16/2021 2000 [facility] CT SCAN    HIP SURGERY  01/19/2015    HYSTERECTOMY  1976    JOINT REPLACEMENT Left 2011    HIP    JOINT REPLACEMENT Right 01/19/2015    Right Total Hip Replacement - Dr. Raman Gallardo, TOTAL Right 6/14/2021    ROBOTIC ASSISTED RIGHT LOWER LOBE SUPERIOR SEGMENTECTOMY AND MEDIASTINAL DISSECTION, CRYOTHERAPY OF INTERCOSTAL NERVES performed by Jan Armstrong MD at [address] Right 2005    Dr. Leola Cooks  04/10/2018    Lumbar epidural steroid injection at L4    AR NJX DX/THER SBST INTRLMNR LMBR/SAC W/IMG GDN N/A 04/10/2018    LESI  @ L4 performed by Kenny Lui MD at [address] [facility]    ovaries    THYROID LOBECTOMY Left 11/26/2010    left thyroid lobectomy with isthmusectomy-Dr. Salgado Harcourt THYROID SURGERY  2007    right thyroid lobectomy-Dr. Ibanez       Social History:    Social History     Tobacco Use    Smoking status: Current Every Day Smoker     Packs/day: 2.00     Years: 48.00     Pack years: 96.00     Types: Cigarettes     Start date: 11/13/1967    Smokeless tobacco: Never Used    Tobacco comment: Currently at 1 pk/day   Substance Use Topics    Alcohol use: No     Alcohol/week: 0.0 standard drinks                                Ready to quit: Not Answered  Counseling given: Not Answered  Comment: Currently at 1 pk/day      Vital Signs (Current):   Vitals:    08/01/21 1256 08/01/21 1945 08/01/21 2330 08/02/21 0401   BP: (!) 115/51 125/60 (!) 129/57 (!) 116/54   Pulse: 70 67 66 67   Resp: 18 18 16 16   Temp: 98.2 °F (36.8 °C) 98.3 °F (36.8 °C) 98.6 °F (37 °C)    TempSrc: Oral Oral Oral Oral   SpO2: 90%  92% 92%   Weight:       Height:                                                  BP Readings from Last 3 Encounters:   08/02/21 (!) 116/54   07/20/21 (!) 150/68   07/12/21 133/64       NPO Status:                                                                                 BMI:   Wt Readings from Last 3 Encounters:   07/30/21 135 lb (61.2 kg)   07/20/21 133 lb 3.2 oz (60.4 kg)   07/12/21 135 lb 6.4 oz (61.4 kg)     Body mass index is 23.17 kg/m². CBC:   Lab Results   Component Value Date    WBC 6.7 08/02/2021    RBC 3.86 08/02/2021    RBC 4.49 05/16/2012    HGB 12.2 08/02/2021    HCT 39.0 08/02/2021    .0 08/02/2021    RDW 13.7 04/14/2021     08/02/2021       CMP:   Lab Results   Component Value Date     08/02/2021    K 3.7 08/02/2021     08/02/2021    CO2 25 08/02/2021    BUN 10 08/02/2021    CREATININE 0.8 08/02/2021    LABGLOM 71 08/02/2021    GLUCOSE 104 08/02/2021    GLUCOSE 94 05/16/2012    PROT 6.5 07/30/2021    CALCIUM 8.4 08/02/2021    BILITOT 0.5 07/30/2021    ALKPHOS 128 07/30/2021    AST 13 07/30/2021    ALT 7 07/30/2021       POC Tests: No results for input(s): POCGLU, POCNA, POCK, POCCL, POCBUN, POCHEMO, POCHCT in the last 72 hours.     Coags:   Lab Results   Component Value Date    INR 0.97 03/16/2021    APTT 26.5 03/16/2021       HCG (If Applicable): No results found for: PREGTESTUR, PREGSERUM, HCG, HCGQUANT     ABGs: No results found for: PHART, PO2ART, FOZ8VOM, ZHG7NIT, BEART, A9ULGXQU     Type & Screen (If Applicable):  Lab Results   Component Value Date    LABRH POS 07/31/2021       Drug/Infectious Status (If Applicable):  Lab Results   Component Value Date    HEPCAB Negative 06/13/2017       COVID-19 Screening (If Applicable):   Lab Results   Component Value Date    COVID19 Not Detected 06/09/2021           Anesthesia Evaluation    Airway: Mallampati: II        Dental:          Pulmonary:   (+) COPD:  shortness of breath:                             Cardiovascular:    (+) hypertension:, CAD:, AYALA:,       ECG reviewed      Echocardiogram reviewed                  Neuro/Psych:   (+) CVA:, neuromuscular disease:, headaches:, psychiatric history:            GI/Hepatic/Renal:             Endo/Other:    (+) hypothyroidism::., .                 Abdominal:             Vascular: Other Findings:             Anesthesia Plan      general     ASA 4       Induction: intravenous. Anesthetic plan and risks discussed with patient.                       Anthony Clayotn MD   8/2/2021

## 2021-08-02 NOTE — ANESTHESIA POSTPROCEDURE EVALUATION
Department of Anesthesiology  Postprocedure Note    Patient: Pratibha Scruggs  MRN: [id number]  YOB: 1950  Date of evaluation: 8/2/2021  Time:  12:04 PM     Procedure Summary     Date: 08/02/21 Room / Location: [address]    Anesthesia Start: 0743 Anesthesia Stop: 1033    Procedure: FEMUR OPEN REDUCTION INTERNAL FIXATION (Left Leg Upper) Diagnosis: (LEFT PER-IPROSTHETIC FX)    Surgeons: Mara Kramer MD Responsible Provider: Ender Luna MD    Anesthesia Type: general ASA Status: 4          Anesthesia Type: general    Eliot Phase I: Eliot Score: 9    Eliot Phase II:      Last vitals: Reviewed and per EMR flowsheets.        Anesthesia Post Evaluation    Complications: no  Cardiovascular status: hemodynamically stable  Respiratory status: acceptable

## 2021-08-02 NOTE — H&P
Update History & Physical    The patient's History and Physical of July 31, 2021 was reviewed with the patient and I examined the patient. There was no change. The surgical site was confirmed by the patient and me. Plan: The risks, benefits, expected outcome, and alternative to the recommended procedure have been discussed with the patient. Patient understands and wants to proceed with the procedure.      Electronically signed by NITO Alegre CNP on 8/2/2021 at 10:33 AM

## 2021-08-03 LAB
ANION GAP SERPL CALCULATED.3IONS-SCNC: 9 MEQ/L (ref 8–16)
BUN BLDV-MCNC: 18 MG/DL (ref 7–22)
CALCIUM SERPL-MCNC: 7.7 MG/DL (ref 8.5–10.5)
CHLORIDE BLD-SCNC: 103 MEQ/L (ref 98–111)
CO2: 23 MEQ/L (ref 23–33)
CREAT SERPL-MCNC: 0.9 MG/DL (ref 0.4–1.2)
ERYTHROCYTE [DISTWIDTH] IN BLOOD BY AUTOMATED COUNT: 13.5 % (ref 11.5–14.5)
ERYTHROCYTE [DISTWIDTH] IN BLOOD BY AUTOMATED COUNT: 49.5 FL (ref 35–45)
GFR SERPL CREATININE-BSD FRML MDRD: 62 ML/MIN/1.73M2
GLUCOSE BLD-MCNC: 126 MG/DL (ref 70–108)
HCT VFR BLD CALC: 28.4 % (ref 37–47)
HEMOGLOBIN: 9 GM/DL (ref 12–16)
MCH RBC QN AUTO: 31.7 PG (ref 26–33)
MCHC RBC AUTO-ENTMCNC: 31.7 GM/DL (ref 32.2–35.5)
MCV RBC AUTO: 100 FL (ref 81–99)
PLATELET # BLD: 148 THOU/MM3 (ref 130–400)
PMV BLD AUTO: 10.9 FL (ref 9.4–12.4)
POTASSIUM REFLEX MAGNESIUM: 3.8 MEQ/L (ref 3.5–5.2)
RBC # BLD: 2.84 MILL/MM3 (ref 4.2–5.4)
SODIUM BLD-SCNC: 135 MEQ/L (ref 135–145)
WBC # BLD: 6.1 THOU/MM3 (ref 4.8–10.8)

## 2021-08-03 PROCEDURE — 36415 COLL VENOUS BLD VENIPUNCTURE: CPT

## 2021-08-03 PROCEDURE — 99232 SBSQ HOSP IP/OBS MODERATE 35: CPT | Performed by: NURSE PRACTITIONER

## 2021-08-03 PROCEDURE — 6370000000 HC RX 637 (ALT 250 FOR IP): Performed by: ORTHOPAEDIC SURGERY

## 2021-08-03 PROCEDURE — 6360000002 HC RX W HCPCS: Performed by: ORTHOPAEDIC SURGERY

## 2021-08-03 PROCEDURE — 2580000003 HC RX 258: Performed by: PHYSICIAN ASSISTANT

## 2021-08-03 PROCEDURE — 80048 BASIC METABOLIC PNL TOTAL CA: CPT

## 2021-08-03 PROCEDURE — 85027 COMPLETE CBC AUTOMATED: CPT

## 2021-08-03 PROCEDURE — 97163 PT EVAL HIGH COMPLEX 45 MIN: CPT

## 2021-08-03 PROCEDURE — 2580000003 HC RX 258: Performed by: NURSE PRACTITIONER

## 2021-08-03 PROCEDURE — 84443 ASSAY THYROID STIM HORMONE: CPT

## 2021-08-03 PROCEDURE — 6370000000 HC RX 637 (ALT 250 FOR IP): Performed by: PHYSICIAN ASSISTANT

## 2021-08-03 PROCEDURE — 6360000002 HC RX W HCPCS: Performed by: NURSE PRACTITIONER

## 2021-08-03 PROCEDURE — 97530 THERAPEUTIC ACTIVITIES: CPT

## 2021-08-03 PROCEDURE — 1200000000 HC SEMI PRIVATE

## 2021-08-03 PROCEDURE — 94640 AIRWAY INHALATION TREATMENT: CPT

## 2021-08-03 RX ORDER — PROMETHAZINE HYDROCHLORIDE 25 MG/ML
25 INJECTION, SOLUTION INTRAMUSCULAR; INTRAVENOUS ONCE
Status: COMPLETED | OUTPATIENT
Start: 2021-08-03 | End: 2021-08-03

## 2021-08-03 RX ADMIN — DOCUSATE SODIUM 50 MG AND SENNOSIDES 8.6 MG 1 TABLET: 8.6; 5 TABLET, FILM COATED ORAL at 09:22

## 2021-08-03 RX ADMIN — POLYETHYLENE GLYCOL 3350 17 G: 17 POWDER, FOR SOLUTION ORAL at 09:22

## 2021-08-03 RX ADMIN — SODIUM CHLORIDE, PRESERVATIVE FREE 10 ML: 5 INJECTION INTRAVENOUS at 09:23

## 2021-08-03 RX ADMIN — CEFAZOLIN 2000 MG: 10 INJECTION, POWDER, FOR SOLUTION INTRAVENOUS at 01:07

## 2021-08-03 RX ADMIN — OXYCODONE 5 MG: 5 TABLET ORAL at 07:14

## 2021-08-03 RX ADMIN — ENOXAPARIN SODIUM 40 MG: 40 INJECTION SUBCUTANEOUS at 07:13

## 2021-08-03 RX ADMIN — CEFAZOLIN 2000 MG: 10 INJECTION, POWDER, FOR SOLUTION INTRAVENOUS at 09:26

## 2021-08-03 RX ADMIN — LEVOTHYROXINE SODIUM 88 MCG: 0.09 TABLET ORAL at 07:14

## 2021-08-03 RX ADMIN — ONDANSETRON 4 MG: 2 INJECTION INTRAMUSCULAR; INTRAVENOUS at 14:39

## 2021-08-03 RX ADMIN — SODIUM CHLORIDE, PRESERVATIVE FREE 10 ML: 5 INJECTION INTRAVENOUS at 20:35

## 2021-08-03 RX ADMIN — BUDESONIDE AND FORMOTEROL FUMARATE DIHYDRATE 2 PUFF: 160; 4.5 AEROSOL RESPIRATORY (INHALATION) at 16:38

## 2021-08-03 RX ADMIN — MELOXICAM 3.75 MG: 7.5 TABLET ORAL at 09:23

## 2021-08-03 RX ADMIN — ACETAMINOPHEN 650 MG: 325 TABLET ORAL at 04:42

## 2021-08-03 RX ADMIN — PROMETHAZINE HYDROCHLORIDE 25 MG: 25 INJECTION INTRAMUSCULAR; INTRAVENOUS at 20:33

## 2021-08-03 ASSESSMENT — PAIN DESCRIPTION - FREQUENCY
FREQUENCY: CONTINUOUS
FREQUENCY: CONTINUOUS

## 2021-08-03 ASSESSMENT — PAIN SCALES - GENERAL
PAINLEVEL_OUTOF10: 6
PAINLEVEL_OUTOF10: 6
PAINLEVEL_OUTOF10: 2
PAINLEVEL_OUTOF10: 10
PAINLEVEL_OUTOF10: 5
PAINLEVEL_OUTOF10: 2
PAINLEVEL_OUTOF10: 10

## 2021-08-03 ASSESSMENT — PAIN DESCRIPTION - PROGRESSION
CLINICAL_PROGRESSION: NOT CHANGED
CLINICAL_PROGRESSION: NOT CHANGED

## 2021-08-03 ASSESSMENT — PAIN DESCRIPTION - ORIENTATION
ORIENTATION: LEFT

## 2021-08-03 ASSESSMENT — PAIN DESCRIPTION - LOCATION
LOCATION: HIP

## 2021-08-03 ASSESSMENT — PAIN DESCRIPTION - ONSET
ONSET: ON-GOING
ONSET: ON-GOING

## 2021-08-03 ASSESSMENT — PAIN DESCRIPTION - DESCRIPTORS
DESCRIPTORS: ACHING;CONSTANT
DESCRIPTORS: ACHING;CONSTANT

## 2021-08-03 ASSESSMENT — PAIN - FUNCTIONAL ASSESSMENT
PAIN_FUNCTIONAL_ASSESSMENT: PREVENTS OR INTERFERES SOME ACTIVE ACTIVITIES AND ADLS
PAIN_FUNCTIONAL_ASSESSMENT: PREVENTS OR INTERFERES WITH MANY ACTIVE NOT PASSIVE ACTIVITIES

## 2021-08-03 ASSESSMENT — PAIN DESCRIPTION - PAIN TYPE
TYPE: SURGICAL PAIN

## 2021-08-03 NOTE — PLAN OF CARE
Problem: Pain:  Goal: Pain level will decrease  Description: Pain level will decrease  Outcome: Ongoing  Note: Pain Assessment: 0-10  Pain Level: 6   Patient's Stated Pain Goal: 5   Is pain goal met at this time? No; patient is able to rest with eyes closed and states a decrease in pain post pain medication administration. Non-Pharmaceutical Pain Intervention(s): Rest, Repositioned, Cold applied   Goal: Control of acute pain  Description: Control of acute pain  Outcome: Ongoing  Note: Pain Assessment: 0-10  Pain Level: 6   Patient's Stated Pain Goal: 5   Is pain goal met at this time? No; patient is able to rest with eyes closed and states a decrease in pain post pain medication administration. Non-Pharmaceutical Pain Intervention(s): Rest, Repositioned, Cold applied      Problem: Falls - Risk of:  Goal: Will remain free from falls  Description: Will remain free from falls  Outcome: Ongoing  Note: Patient remained free of falls this shift. Fall precautions in place. Items within reach, call light within reach and side rails up x2. Bed alarm is on. Hourly rounding in place. Patient verbalizes understanding of using call light to ask for assistance as needed. Will monitor. Goal: Absence of physical injury  Description: Absence of physical injury  Outcome: Ongoing  Note: No injury has occurred this shift. Fall and safety precautions in place. Will monitor. Problem: Skin Integrity:  Goal: Will show no infection signs and symptoms  Description: Will show no infection signs and symptoms  Outcome: Ongoing  Note: Dressing dry and intact. Unable to visualize surgical incision due to surgical dressing. Dressing not to be removed today. No fevers or tachycardia . Pt denies any complaints   Goal: Absence of new skin breakdown  Description: Absence of new skin breakdown  Outcome: Ongoing  Note: No new skin breakdown noted. Nonblanchable discoloration noted to coccyx upon first assessment.  Turning and repositioning as patient tolerates. Problem: Nutritional:  Goal: Ability to attain and maintain optimal nutritional status will improve  Description: Ability to attain and maintain optimal nutritional status will improve  Outcome: Ongoing  Note: Patient is on regular diet but not tolerating well yet. She is post-op day #1. Problem: Physical Regulation:  Goal: Will remain free from infection  Description: Will remain free from infection  Outcome: Ongoing  Note: No s/s of infection noted. Afebrile. Antibiotics in place. Problem: Respiratory:  Goal: Ability to maintain adequate ventilation will improve  Description: Ability to maintain adequate ventilation will improve  Outcome: Ongoing  Note: Patient chronically on 2L via nasal cannula. Patient is current everyday smoker. Wheezes noted at times. Denies SOB. Problem: Self-Care:  Goal: Ability to meet self-care needs will improve  Description: Ability to meet self-care needs will improve  Outcome: Ongoing  Note: Patient able to verbalize needs. Assisting as they arise. Patient is able to be active in some activities of daily living. Will monitor. Problem: Cardiovascular  Goal: No DVT, peripheral vascular complications  Outcome: Ongoing  Note: Pt without s/s of DVT. Pt able to take prescribed anticoagulants to help prevent development of DVT. Problem: GI  Goal: No bowel complications  Outcome: Ongoing  Note: Pt with hypoactive bowel sounds, not passing flatus, and without nausea. Taking prescribed medications to assist with BM      Problem:   Goal: Adequate urinary output  Outcome: Ongoing  Note: Patient is voiding adequately without difficulty. Care plan reviewed with patient. Patient  verbalize understanding of the plan of care and contribute to goal setting. no

## 2021-08-03 NOTE — PROGRESS NOTES
[address] [place] [address]  INPATIENT PHYSICAL THERAPY  EVALUATION  [facility] ORTHOPEDICS 7K - 7K-16/016-A    Time In: 4767  Time Out: 5195  Timed Code Treatment Minutes: 23 Minutes  Minutes: 40          Date: 8/3/2021  Patient Name: Jermaine Mcclendon,  Gender:  female        MRN: [id number]  : 1950  (73 y.o.)      Referring Practitioner: Eris Burton MD  Diagnosis: Closed left hip fracture, initial encounter  Additional Pertinent Hx: The patient is a 73 y.o. female with presentation of left hip pain post acute injury s/p mechanical fall. Patient states she had left JATINDER done  and right JATINDER done . She has had no issues with her left hip and was ambulating fine on her own without assistance. She had a mechanical fall on 21 causing injury to left hip. She came to [address] [place] [address] ED and had xrays showing left hip periprosthetic fracture and ortho was consulted. Restrictions/Precautions:  Restrictions/Precautions: Weight Bearing, General Precautions, Fall Risk  Left Lower Extremity Weight Bearing: Toe Touch Weight Bearing  Partial Weight Bearing Percentage Or Pounds: 25%  Required Braces or Orthoses  Left Lower Extremity Brace:  (Hip ABD Brace)  Position Activity Restriction  Hip Precautions: No hip flexion > 90 degrees, No active ABduction, No hip internal rotation, No hip external rotation  Other position/activity restrictions: 1L at rest, 2L with activity home O2    Subjective:  Chart Reviewed: Yes  Patient assessed for rehabilitation services?: Yes  Family / Caregiver Present: No  Subjective: RN approved session. Pt just received hip ABD brace. Pt reports being nauseated but agreeable to try. Upon sitting up pt was +for emesis and asking to lay back down.      General:  Overall Orientation Status: Within Functional Limits  Follows Commands: Within Functional Limits    Vision: Impaired  Vision Exceptions: Wears glasses for reading    Hearing: Within functional limits         Pain: 8/10: L hip     Vitals: Vitals restrictions and hip precautions, pt verbalized understanding. Treatment Initiated: Treatment and education initiated within context of evaluation. Evaluation time included review of current medical information, gathering information related to past medical, social and functional history, completion of standardized testing, formal and informal observation of tasks, assessment of data and development of plan of care and goals. Treatment time included skilled education and facilitation of tasks to increase safety and independence with functional mobility for improved independence and quality of life. Assessment: Body structures, Functions, Activity limitations: Decreased functional mobility , Decreased posture, Decreased endurance, Decreased ROM, Decreased strength, Decreased balance, Increased pain  Assessment: Pt demonstrates a decrease in baseline by way of bed mobility, transfers and ambulation secondary to decreased activity tolerance, strength, fatigue, and balance deficits. Pt will benefit from skilled PT services throughout admission and beyond hospital discharge for improvements in functional mobility and in order to decrease fall risk and return pt to PLOF.    Prognosis: Good    REQUIRES PT FOLLOW UP: Yes    Discharge Recommendations:  Discharge Recommendations: Continue to assess pending progress, Patient would benefit from continued therapy after discharge    Patient Education:  PT Education: Goals, Plan of Care, Functional Mobility Training, PT Role, Weight-bearing Education, Precautions    Equipment Recommendations:  Equipment Needed: No    Plan:  Times per week: 6x O  Times per day: Daily  Current Treatment Recommendations: Strengthening, Gait Training, Patient/Caregiver Education & Training, Equipment Evaluation, Education, & procurement, Stair training, Balance Training, Endurance Training, Home Exercise Program, Functional Mobility Training, Transfer Training, Safety Education & Training    Goals:  Patient goals : to walk again  Short term goals  Time Frame for Short term goals: by discharge  Short term goal 1: bed mobility with HOB flat, no rails, mod I for increased functional ind  Short term goal 2: PT to assess transfers/gait  Long term goals  Time Frame for Long term goals : NA d/t short ELOS    Following session, patient left in safe position with all fall risk precautions in place.     Kylah Beckford PT, DPT

## 2021-08-03 NOTE — CARE COORDINATION
8/3/21, 2:11 PM EDT    DISCHARGE ON GOING [address] day: 3  Location: -16/016-A Reason for admit: Closed left hip fracture, initial encounter [facility]) [S72.002A]   Procedure: 8/2/21 surgery by Dr Ranjan Byers: FEMUR OPEN REDUCTION INTERNAL FIXATION  Barriers to Discharge: POD 1. PT/OT. Hospitalist for medical management. Pain management. N/V checks. I&O. Drain care. Maintain dressing unless saturated. Wean oxygen to her baseline of 1 L at rest and 2 L with activity. Daily weights. Hgb 9.0  PCP: NITO Carr CNP  Readmission Risk Score: 17%   Patient Goals/Plan/Treatment Preferences: See SW notes. Mary Serrano was considering home with a friend with [place]. May need ECF, for safety. Follow with SW.

## 2021-08-03 NOTE — PROGRESS NOTES
[facility]  OCCUPATIONAL THERAPY MISSED TREATMENT NOTE  [facility] ORTHOPEDICS 7K  7K-16/016-A      Date: 8/3/2021  Patient Name: David Brambila        CSN: [id number]   : 1950  (73 y.o.)  Gender: female                REASON FOR MISSED TREATMENT: OT eval/tx orders received. Spoke with RN who reported hip abductor brace not present. Clarified with ortho NP who confirmed need for hip abductor brace at all times. Discussed with RN who will place order to [facility]. Will initiate eval once brace arrives. Abductor brace was delivered ~1400 this date. PT had completed eval although reported pt vomited with transition to EOB. Will hold eval this date and check back  as able.
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
## 2021-08-04 LAB
ALLEN TEST: POSITIVE
AMMONIA: 18 UMOL/L (ref 11–60)
ANION GAP SERPL CALCULATED.3IONS-SCNC: 7 MEQ/L (ref 8–16)
BACTERIA: ABNORMAL
BASE EXCESS (CALCULATED): 2.7 MMOL/L (ref -2.5–2.5)
BILIRUBIN URINE: NEGATIVE
BLOOD, URINE: NEGATIVE
BUN BLDV-MCNC: 17 MG/DL (ref 7–22)
CALCIUM SERPL-MCNC: 8.1 MG/DL (ref 8.5–10.5)
CASTS: ABNORMAL /LPF
CASTS: ABNORMAL /LPF
CHARACTER, URINE: CLEAR
CHLORIDE BLD-SCNC: 102 MEQ/L (ref 98–111)
CO2: 28 MEQ/L (ref 23–33)
COLLECTED BY:: ABNORMAL
COLOR: ABNORMAL
CREAT SERPL-MCNC: 0.8 MG/DL (ref 0.4–1.2)
CRYSTALS: ABNORMAL
DEVICE: ABNORMAL
EPITHELIAL CELLS, UA: ABNORMAL /HPF
ERYTHROCYTE [DISTWIDTH] IN BLOOD BY AUTOMATED COUNT: 13.5 % (ref 11.5–14.5)
ERYTHROCYTE [DISTWIDTH] IN BLOOD BY AUTOMATED COUNT: 49.7 FL (ref 35–45)
GFR SERPL CREATININE-BSD FRML MDRD: 71 ML/MIN/1.73M2
GLUCOSE BLD-MCNC: 97 MG/DL (ref 70–108)
GLUCOSE, URINE: NEGATIVE MG/DL
HCO3: 27 MMOL/L (ref 23–28)
HCT VFR BLD CALC: 28.4 % (ref 37–47)
HEMOGLOBIN: 8.8 GM/DL (ref 12–16)
IFIO2: 2
KETONES, URINE: NEGATIVE
LEUKOCYTE ESTERASE, URINE: ABNORMAL
MCH RBC QN AUTO: 31.3 PG (ref 26–33)
MCHC RBC AUTO-ENTMCNC: 31 GM/DL (ref 32.2–35.5)
MCV RBC AUTO: 101.1 FL (ref 81–99)
MISCELLANEOUS LAB TEST RESULT: ABNORMAL
NITRITE, URINE: NEGATIVE
O2 SATURATION: 95 %
PCO2: 37 MMHG (ref 35–45)
PH BLOOD GAS: 7.47 (ref 7.35–7.45)
PH UA: 6.5 (ref 5–9)
PLATELET # BLD: 173 THOU/MM3 (ref 130–400)
PMV BLD AUTO: 10.6 FL (ref 9.4–12.4)
PO2: 71 MMHG (ref 71–104)
POTASSIUM REFLEX MAGNESIUM: 4.3 MEQ/L (ref 3.5–5.2)
PROTEIN UA: 30 MG/DL
RBC # BLD: 2.81 MILL/MM3 (ref 4.2–5.4)
RBC URINE: ABNORMAL /HPF
RENAL EPITHELIAL, UA: ABNORMAL
SODIUM BLD-SCNC: 137 MEQ/L (ref 135–145)
SOURCE, BLOOD GAS: ABNORMAL
SPECIFIC GRAVITY UA: 1.02 (ref 1–1.03)
TSH SERPL DL<=0.05 MIU/L-ACNC: 3.22 UIU/ML (ref 0.4–4.2)
UROBILINOGEN, URINE: >= 8 EU/DL (ref 0–1)
WBC # BLD: 6.2 THOU/MM3 (ref 4.8–10.8)
WBC UA: ABNORMAL /HPF
YEAST: ABNORMAL

## 2021-08-04 PROCEDURE — 97116 GAIT TRAINING THERAPY: CPT

## 2021-08-04 PROCEDURE — 94640 AIRWAY INHALATION TREATMENT: CPT

## 2021-08-04 PROCEDURE — 97164 PT RE-EVAL EST PLAN CARE: CPT

## 2021-08-04 PROCEDURE — 87086 URINE CULTURE/COLONY COUNT: CPT

## 2021-08-04 PROCEDURE — 82140 ASSAY OF AMMONIA: CPT

## 2021-08-04 PROCEDURE — 1200000000 HC SEMI PRIVATE

## 2021-08-04 PROCEDURE — 85027 COMPLETE CBC AUTOMATED: CPT

## 2021-08-04 PROCEDURE — 2580000003 HC RX 258: Performed by: STUDENT IN AN ORGANIZED HEALTH CARE EDUCATION/TRAINING PROGRAM

## 2021-08-04 PROCEDURE — 94760 N-INVAS EAR/PLS OXIMETRY 1: CPT

## 2021-08-04 PROCEDURE — 81001 URINALYSIS AUTO W/SCOPE: CPT

## 2021-08-04 PROCEDURE — 2700000000 HC OXYGEN THERAPY PER DAY

## 2021-08-04 PROCEDURE — 80048 BASIC METABOLIC PNL TOTAL CA: CPT

## 2021-08-04 PROCEDURE — 6370000000 HC RX 637 (ALT 250 FOR IP): Performed by: ORTHOPAEDIC SURGERY

## 2021-08-04 PROCEDURE — 36600 WITHDRAWAL OF ARTERIAL BLOOD: CPT

## 2021-08-04 PROCEDURE — 99233 SBSQ HOSP IP/OBS HIGH 50: CPT | Performed by: PHYSICIAN ASSISTANT

## 2021-08-04 PROCEDURE — 51798 US URINE CAPACITY MEASURE: CPT

## 2021-08-04 PROCEDURE — 82803 BLOOD GASES ANY COMBINATION: CPT

## 2021-08-04 PROCEDURE — 51701 INSERT BLADDER CATHETER: CPT

## 2021-08-04 PROCEDURE — 6360000002 HC RX W HCPCS: Performed by: ORTHOPAEDIC SURGERY

## 2021-08-04 PROCEDURE — 36415 COLL VENOUS BLD VENIPUNCTURE: CPT

## 2021-08-04 PROCEDURE — 6370000000 HC RX 637 (ALT 250 FOR IP): Performed by: PHYSICIAN ASSISTANT

## 2021-08-04 PROCEDURE — 97530 THERAPEUTIC ACTIVITIES: CPT

## 2021-08-04 PROCEDURE — 97166 OT EVAL MOD COMPLEX 45 MIN: CPT

## 2021-08-04 RX ORDER — 0.9 % SODIUM CHLORIDE 0.9 %
1000 INTRAVENOUS SOLUTION INTRAVENOUS ONCE
Status: COMPLETED | OUTPATIENT
Start: 2021-08-04 | End: 2021-08-04

## 2021-08-04 RX ADMIN — DOCUSATE SODIUM 50 MG AND SENNOSIDES 8.6 MG 1 TABLET: 8.6; 5 TABLET, FILM COATED ORAL at 09:35

## 2021-08-04 RX ADMIN — TIOTROPIUM BROMIDE INHALATION SPRAY 2 PUFF: 3.12 SPRAY, METERED RESPIRATORY (INHALATION) at 08:53

## 2021-08-04 RX ADMIN — ACETAMINOPHEN 650 MG: 325 TABLET ORAL at 06:06

## 2021-08-04 RX ADMIN — MELOXICAM 3.75 MG: 7.5 TABLET ORAL at 09:36

## 2021-08-04 RX ADMIN — SODIUM CHLORIDE 1000 ML: 9 INJECTION, SOLUTION INTRAVENOUS at 06:04

## 2021-08-04 RX ADMIN — LEVOTHYROXINE SODIUM 88 MCG: 0.09 TABLET ORAL at 06:07

## 2021-08-04 RX ADMIN — BUDESONIDE AND FORMOTEROL FUMARATE DIHYDRATE 2 PUFF: 160; 4.5 AEROSOL RESPIRATORY (INHALATION) at 16:43

## 2021-08-04 RX ADMIN — BUDESONIDE AND FORMOTEROL FUMARATE DIHYDRATE 2 PUFF: 160; 4.5 AEROSOL RESPIRATORY (INHALATION) at 08:50

## 2021-08-04 RX ADMIN — ONDANSETRON 4 MG: 2 INJECTION INTRAMUSCULAR; INTRAVENOUS at 09:35

## 2021-08-04 RX ADMIN — ENOXAPARIN SODIUM 40 MG: 40 INJECTION SUBCUTANEOUS at 06:07

## 2021-08-04 RX ADMIN — ACETAMINOPHEN 650 MG: 325 TABLET ORAL at 13:17

## 2021-08-04 RX ADMIN — ACETAMINOPHEN 650 MG: 325 TABLET ORAL at 19:35

## 2021-08-04 ASSESSMENT — PAIN SCALES - GENERAL
PAINLEVEL_OUTOF10: 0
PAINLEVEL_OUTOF10: 2
PAINLEVEL_OUTOF10: 3
PAINLEVEL_OUTOF10: 3
PAINLEVEL_OUTOF10: 2
PAINLEVEL_OUTOF10: 4
PAINLEVEL_OUTOF10: 10
PAINLEVEL_OUTOF10: 0
PAINLEVEL_OUTOF10: 3

## 2021-08-04 ASSESSMENT — PAIN DESCRIPTION - PAIN TYPE
TYPE: SURGICAL PAIN
TYPE: SURGICAL PAIN
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
## 2021-08-04 NOTE — PROGRESS NOTES
Rolanda Almaguer 62  INPATIENT OCCUPATIONAL THERAPY  [facility] ORTHOPEDICS 7K  EVALUATION    Time:   Time In: 796  Time Out: 0845  Timed Code Treatment Minutes: 18 Minutes  Minutes: 26    Date: 2021  Patient Name: Jelena Garvin,   Gender: female      MRN: [id number]  : 1950  (73 y.o.)  Referring Practitioner: Kelly Dominguez MD  Diagnosis: Closed left hip fracture, initial encounter  Additional Pertinent Hx: The patient is a 73 y.o. female with presentation of left hip pain post acute injury s/p mechanical fall. Patient states she had left JATINDER done  and right JATINDER done . She has had no issues with her left hip and was ambulating fine on her own without assistance. She had a mechanical fall on 21 causing injury to left hip. She came to [facility] ED and had xrays showing left hip periprosthetic fracture and ortho was consulted. Patient is s/p ORIF left greater troch fracture, left hip arthrotomy  by Dr. Princess Vyas. Restrictions/Precautions:  Restrictions/Precautions: Weight Bearing, General Precautions, Fall Risk  Left Lower Extremity Weight Bearing: Toe Touch Weight Bearing  Partial Weight Bearing Percentage Or Pounds: 25%  Required Braces or Orthoses  Left Lower Extremity Brace:  (Hip ABD Brace)  Position Activity Restriction  Hip Precautions: No hip flexion > 90 degrees, No active ABduction, No hip internal rotation, No hip external rotation  Other position/activity restrictions: 1L at rest, 2L with activity home O2    Subjective  Chart Reviewed: Yes, Orders, History and Physical  Patient assessed for rehabilitation services?: Yes  Family / Caregiver Present: No    Subjective: RN approved OT session. Ortho NP just finishing with pt and reporting pt stating discomfort with brace. Upon arrival, pt laying in supine reporting she is \"not doing very good\" and intermittenly tearful throughout. Pt with increased anxiety with mobility requiring mod encouragement and therapeutic listening.  At end of session patient in and out of sleep when gathering PLOF information, so unable to attain clear PLOF. No head CT noted in chart review, notified RN of concerns with level of alertness. Pain:  Pain Assessment  Patient Currently in Pain: No  Patient's Stated Pain Goal: 2    Vitals: O2 WFL on 1L after activity    Social/Functional History:  Lives With: Family  Type of Home: House  Home Layout: Two level, Able to Live on Main level with bedroom/bathroom  Home Access: Stairs to enter with rails  Entrance Stairs - Number of Steps: 4+4  Entrance Stairs - Rails: Left  Home Equipment: Cane, Rolling walker, Standard walker, Wheelchair-manual   Bathroom Shower/Tub: Tub/Shower unit, Shower chair with back  Bathroom Toilet: Standard  Bathroom Equipment: Shower chair       ADL Assistance: Independent  Homemaking Assistance: Independent  Homemaking Responsibilities: Yes  Ambulation Assistance: Independent  Transfer Assistance: Independent    Active : Yes  Leisure & Hobbies: crafts, gardening, playing cards  Additional Comments: ind with ambulation with no AD    VISION:WFL    HEARING:  WFL    COGNITION: Slow Processing, Decreased Problem Solving and Decreased Safety Awareness, decreased alertness noted observed at end of session after activity with pt having difficulty staying awake when gathering PLOF information. RANGE OF MOTION:  Bilateral Upper Extremity:  Not formally tested, appears WFL during ADL and mobility participation    STRENGTH:  Bilateral Upper Extremity:  Not formally tested, appears WFL    SENSATION:   WFL    ADL:   Lower Extremity Dressing: Dependent. donning B socks. BALANCE:  Sitting Balance:  Contact Guard Assistance. with cues to maintain upright posture as pt leaning toward R. Pt attempting to correct however min difficulty noted due to Hip ABD brace  Standing Balance: Minimal Assistance.  with BUE support on RW    BED MOBILITY:  Supine to Sit: Moderate Assistance, with head of bed raised, with rail, with verbal cues  x2, with one assist on BLEs and one assist on trunk. Patient grabbing onto upper hand rails requiring mod cues for hand placement to push trunk off bed. Patient appearing to have increased anxiety with mobility tasks    TRANSFERS:  Sit to Stand: Moderate Assistance, X 1, cues for hand placement. from EOB with max cues for placement of feet and for hand placement on RW as pt attempting to hold onto front bar of RW with L hand and bed rail with R hand. Patient also requiring min cues to maintain TTWB  Stand to Sit: Minimal Assistance, cues for hand placement. to recliner    FUNCTIONAL MOBILITY:  Assistive Device: Rolling Walker  Assist Level:  Minimal Assistance. Distance: EOB>recliner  Patient with min unsteadiness with cues needed for walker management and TTWB. Patient demo fair adherence to precautions after education. Activity Tolerance:  Patient tolerance of  treatment: fair. Patient with decreased alertness resulting in limited task participation following mobility. Assessment:  Assessment: Patient presents with the following performance deficits resulting in overall decreased safety and independence in all self care, transfer, and mobility tasks. Patient currently requires increased assist and cues to maintain TTWB restrictions and hip precautions during ADL participation. She would benefit from skilled OT servicecs during hospital stay and after discharge to achieve greatest level of independence. Without skilled OT services, pt is at increased risk of falls and caregiver burden. Performance deficits / Impairments: Decreased functional mobility , Decreased endurance, Decreased ADL status, Decreased strength, Decreased balance, Decreased safe awareness  Prognosis: Fair  REQUIRES OT FOLLOW UP: Yes    Treatment Initiated: Treatment and education initiated within context of evaluation.   Evaluation time included review of current medical information, gathering information related to past medical, social and functional history, completion of standardized testing, formal and informal observation of tasks, assessment of data and development of plan of care and goals. Treatment time included skilled education and facilitation of tasks to increase safety and independence with ADL's for improved functional independence and quality of life. Discharge Recommendations:  Patient would benefit from continued therapy after discharge, Continue to assess pending progress    Patient Education:  OT Education: OT Role, Plan of Care, Transfer Training, Precautions  Patient Education: Patient educated in post surgical hip precautions and TTWB restrictions, walker management, and activity progression. Equipment Recommendations:  Equipment Needed:  (Continue to monitor for needs)    Plan:  Times per week: 6x  Current Treatment Recommendations: Strengthening, Balance Training, Functional Mobility Training, Endurance Training, Self-Care / ADL. See long-term goal time frame for expected duration of plan of care. If no long-term goals established, a short length of stay is anticipated. Goals:  Patient goals : did not state  Short term goals  Time Frame for Short term goals: by discharge  Short term goal 1: Patient to increase activity tolerance to/from BR and HH distances with SBA and no > 2 cues for TTWB to increase indep in home environment. Short term goal 2: Patient to complete LB ADL with SBA and no >2 cues to increase indep dressing tasks. Short term goal 3: Patient to tolerate dynamic standing task with SBA and unilateral release to increase indep in sinkside grooming tasks. Short term goal 4: Patient to complete BADL routine with no > min A and <2 cues for WB/hip precautions to increase indep in ADL completion. Following session, patient left in safe position with all fall risk precautions in place.
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
## 2021-08-04 NOTE — PROCEDURES
Bladder scan completed and results given to [facility]. Scan showed 243 mL in bladder. Last void is unknown.  Haris Mccrary CET

## 2021-08-04 NOTE — PLAN OF CARE
Problem: Pain:  Goal: Pain level will decrease  Description: Pain level will decrease  Outcome: Ongoing  Note: Pt report pain at 10 on scale. Pt states oral medication helping to achieve pain goal of a 8 on scale. Using Tylenol, rest, repositioning, and cold therapy to manage pain. Problem: Falls - Risk of:  Goal: Will remain free from falls  Description: Will remain free from falls  Outcome: Ongoing  Note: Pt is compliant with use of non-skid slippers. Hourly rounding on this patient is increased. Goal: Absence of physical injury  Description: Absence of physical injury  Outcome: Ongoing  Note: Pt is free from physical injury at this time. Problem: Skin Integrity:  Goal: Will show no infection signs and symptoms  Description: Will show no infection signs and symptoms  Outcome: Ongoing  Note: Dressing dry and intact. Unable to visualize surgical incision due to surgical dressing. Dressing not to be removed today. No fevers, tachycardia, hypotension noted. Pt denies any complaints. Goal: Absence of new skin breakdown  Description: Absence of new skin breakdown  Outcome: Ongoing  Note: Pt's LLE surgical incision healing. Dressing is dry and intact and not due to be changed today. Other skin impairments charted and protected with cream and/or protective dressing. Pt understands the importance of frequent repositioning in order to prevent any skin breakdown. Pt turned every 2 hours. Problem: Nutritional:  Goal: Ability to attain and maintain optimal nutritional status will improve  Description: Ability to attain and maintain optimal nutritional status will improve  Outcome: Ongoing  Note: Pt given antiemetics to treat nausea/vomiting. Currently tolerating small sips of water. Denies offer for snack. Problem: Physical Regulation:  Goal: Will remain free from infection  Description: Will remain free from infection  Outcome: Ongoing  Note: Dressing dry and intact.  Unable to visualize surgical incision due to surgical dressing. Dressing not to be removed today. No fevers, tachycardia, hypotension noted. Pt denies any complaints      Problem: Respiratory:  Goal: Ability to maintain adequate ventilation will improve  Description: Ability to maintain adequate ventilation will improve  Outcome: Ongoing  Note: Pt maintaining Spo2 greater than 90% on 2 liters O2 nasal cannula. Problem: Self-Care:  Goal: Ability to meet self-care needs will improve  Description: Ability to meet self-care needs will improve  Outcome: Ongoing     Problem: Cardiovascular  Goal: No DVT, peripheral vascular complications  Outcome: Ongoing  Note: Pt with s/s of DVT. Pt able to take prescribed anticoagulants to help prevent development of DVT. Problem: GI  Goal: No bowel complications  Outcome: Ongoing  Note: Pt. Taking prescribed bowel medications to prevent bowel complications. Problem:   Goal: Adequate urinary output  Outcome: Ongoing  Note: Pt had urine output of 400 mL this shift. Currently on fluid bolus. Problem: DISCHARGE BARRIERS  Goal: Patient's continuum of care needs are met  Outcome: Ongoing     Care plan reviewed with patient. Patient verbalizes understanding of the plan of care and contribute to goal setting.

## 2021-08-04 NOTE — PROGRESS NOTES
[address] [place] [address] [address]  INPATIENT PHYSICAL THERAPY  RE-EVALUATION  [facility] ORTHOPEDICS 7K - 7K-16/016-A    Time In: 6997  Time Out: 1138  Timed Code Treatment Minutes: 10 Minutes  Minutes: 19          Date: 2021  Patient Name: Son Rogers,  Gender:  female        MRN: [id number]  : 1950  (73 y.o.)      Referring Practitioner: Cecily Belcher MD  Diagnosis: Closed left hip fracture, initial encounter  Additional Pertinent Hx: The patient is a 73 y.o. female with presentation of left hip pain post acute injury s/p mechanical fall. Patient states she had left JATINDER done  and right JATINDER done . She has had no issues with her left hip and was ambulating fine on her own without assistance. She had a mechanical fall on 21 causing injury to left hip. She came to [facility] ED and had xrays showing left hip periprosthetic fracture and ortho was consulted. Restrictions/Precautions:  Restrictions/Precautions: Weight Bearing, General Precautions, Fall Risk  Left Lower Extremity Weight Bearing: Toe Touch Weight Bearing  Partial Weight Bearing Percentage Or Pounds: 25%  Required Braces or Orthoses  Left Lower Extremity Brace:  (Hip ABD Brace)  Position Activity Restriction  Hip Precautions: No hip flexion > 90 degrees, No active ABduction, No hip internal rotation, No hip external rotation  Other position/activity restrictions: 1L at rest, 2L with activity home O2    Subjective:  Chart Reviewed: Yes  Patient assessed for rehabilitation services?: Yes  Family / Caregiver Present: No  Subjective: RN approved session. Pt just received hip ABD brace. Pt reports being nauseated but agreeable to try.     General:  Overall Orientation Status: Within Functional Limits  Follows Commands: Within Functional Limits    Vision: Impaired  Vision Exceptions: Wears glasses for reading    Hearing: Within functional limits         Pain: 5/10: L hip    Vitals: Vitals not assessed per clinical judgement, see nursing flowsheet    Social/Functional History:    Lives With: Family  Type of Home: House  Home Layout: Two level, Able to Live on Main level with bedroom/bathroom  Home Access: Stairs to enter with rails  Entrance Stairs - Number of Steps: 4+4  Entrance Stairs - Rails: Left  Home Equipment: Cane, Rolling walker, Standard walker, Wheelchair-manual     Bathroom Shower/Tub: Tub/Shower unit, Shower chair with back  Bathroom Toilet: Standard  Bathroom Equipment: Shower chair       ADL Assistance: [address] [place] [address] Avenue: Independent  Homemaking Responsibilities: Yes  Ambulation Assistance: Independent  Transfer Assistance: Independent    Active : Yes  Leisure & Hobbies: crafts, gardening, playing cards  Additional Comments: ind with ambulation with no AD    OBJECTIVE:  Range of Motion:  Right Lower Extremity: WFL  Left Lower Extremity: Impaired - due to recent surgery and brace     Strength:  Right Lower Extremity: WFL  Left Lower Extremity: Impaired - due to recent surgery     Balance:  Static Sitting Balance:  Contact Guard Assistance  Static Standing Balance: Contact Guard Assistance    Bed Mobility:  Sit to Supine: Minimal Assistance, X 1, with head of bed flat, with rail, with verbal cues , with increased time for completion, assist for LLE      Transfers:  Sit to Stand: Minimal Assistance, X 1, with increased time for completion, cues for hand placement  Stand to [place] [address], X 1, with increased time for completion, cues for hand placement    Ambulation:  Minimal Assistance, with verbal cues , with increased time for completion  Distance: 3 hops forward, 5 hops backward   Surface: Level Tile  Device:Rolling Walker  Gait Deviations:  Due to pt's 25% WBing status on LLE, Pt performed a hopping on RLE gait pattern. Pt required verbal cues for safety and sequencing and reminders to maintaining WBing status.      Exercise:  Patient was guided in 1 set(s) 10 reps of exercise to both lower extremities. Ankle pumps, Glut sets, Quad sets and Heelslides. Exercises were completed for increased independence with functional mobility. *LLE heelslides completed at a smaller ROM to ensure hip precautions remained     Functional Outcome Measures: Completed  AM-PAC Inpatient Mobility Raw Score : 15  AM-PAC Inpatient T-Scale Score : 39.45    ASSESSMENT:  Activity Tolerance:  Patient tolerance of  treatment: good. Pt slightly lethargic throughout session. Treatment Initiated: Treatment and education initiated within context of evaluation. Evaluation time included review of current medical information, gathering information related to past medical, social and functional history, completion of standardized testing, formal and informal observation of tasks, assessment of data and development of plan of care and goals. Treatment time included skilled education and facilitation of tasks to increase safety and independence with functional mobility for improved independence and quality of life. Assessment: Body structures, Functions, Activity limitations: Decreased functional mobility , Decreased posture, Decreased endurance, Decreased ROM, Decreased strength, Decreased balance, Increased pain  Assessment: Pt progressing towards established goals, Goals updated. Pt requires continued acute PT services for increase in functional mobility, safety and decreased fall risk.    Prognosis: Good    REQUIRES PT FOLLOW UP: Yes    Discharge Recommendations:  Discharge Recommendations: Continue to assess pending progress, Patient would benefit from continued therapy after discharge (Pt Unsafe to return home at this time)    Patient Education:  PT Education: Goals, Plan of Care, Functional Mobility Training, PT Role, Weight-bearing Education, Precautions    Equipment Recommendations:  Equipment Needed: No    Plan:  Times per week: 6x O  Times per day: Daily  Current Treatment Recommendations: Strengthening, Gait Training, Patient/Caregiver Education & Training, Equipment Evaluation, Education, & procurement, Stair training, Balance Training, Endurance Training, Home Exercise Program, Functional Mobility Training, Transfer Training, Safety Education & Training    Goals:  Patient goals : to walk again  Short term goals  Time Frame for Short term goals: by discharge  Short term goal 1: bed mobility with HOB flat, no rails, mod I for increased functional ind  Short term goal 2: sit<>stand from various surfaces with LRD mod I while maintaining WBing precautions for safe transfers  Short term goal 3: Ambulate 48' with LRD mod I while maintaining WBing precautions for safe household ambulation  Short term goal 4: Navigate 8 steps with unilateral rail mod I while maintaining WBing precautions for safe enter/ exit of home  Long term goals  Time Frame for Long term goals : NA d/t short ELOS    Following session, patient left in safe position with all fall risk precautions in place.     Brice Byers PT, DPT

## 2021-08-04 NOTE — SIGNIFICANT EVENT
Brief Note:  At approximately 0442 hrs., I was paged by the nurse to evaluate this patient for slightly increased confusion and short-term memory loss. I talked to the patient, she was pleasant and able to answer questions and follow commands. However, she was slightly confused. She was able to say her name and knew that she was at [facility]. However, she did not know the town she was in, the state, or the year. Patient thought it was 2001. However, she did know the current and previousPpresident. Patient had no focal neurological deficits and no obvious muscular weakness except for in the left leg. Notably though, patient did have reduction in sensation in her face. Nurse reported NIH score of 1. Seems unlikely the patient is having a stroke. Therefore, code stroke was not called. Confusion probably related to recent surgery in 8/2/2021. Urinalysis demonstrated no UTI and ammonia level is normal.  No other obvious metabolic issues that would cause AMS were seen on BMP. Will avoid opioid medications and allow the patient to sleep.     Snehal Wu MD, MPH, [facility]

## 2021-08-04 NOTE — FLOWSHEET NOTE
08/04/21 0430   Assessment   Charting Type Reassessment   Reassessment Performed Changes documented below   Neurological   Neuro (WDL) X   Level of Consciousness Responds to Voice (1)   Orientation Level Oriented to person;Oriented to situation;Oriented to place; Disoriented to time; Appropriate for developmental age   Cognition Follows commands;Poor attention/concentration; Short term memory loss; Appropriate judgement   Language Clear; Appropriate for developmental age   R Hand  Moderate   L Hand  Moderate   R Foot Dorsiflexion Moderate   L Foot Dorsiflexion Weak   R Foot Plantar Flexion Moderate   L Foot Plantar Flexion Weak   LLE Motor Response Responds to command; Other (Comment)  (in abductor brace, post op femur repair)   RUE Motor Response Responds to command;Normal extension;Normal flexion   LUE Motor Response Responds to command;Normal extension;Normal flexion   RLE Motor Response Responds to command;Normal extension;Normal flexion   Size R Pupil (mm) 3   R Pupil Shape Round   R Pupil Reaction Brisk   Size L Pupil (mm) 3   L Pupil Shape Round   L Pupil Reaction Brisk   Tongue Deviation None   [place] Coma Scale   Eye Opening 3   Best Verbal Response 5   Best Motor Response 6   [place] Coma Scale Score 14   NIHSS Stroke Scale   NIHSS Stroke Scale Assessed Yes   Interval Other (Comment)  (asked to assess per primary RN)   Level of Consciousness (1a. ) 0   LOC Questions (1b. ) 1  (cannot recall the month or year)   LOC Commands (1c. ) 0   Best Gaze (2. ) 0   Visual (3. ) 0   Facial Palsy (4. ) 0   Motor Arm, Left (5a. ) 0   Motor Arm, Right (5b. ) 0   Motor Leg, Left (6a. ) UN  (unable to assess d/t pain/femur repair)   Motor Leg, Right (6b. ) 0   Limb Ataxia (7. ) 0   Sensory (8. ) 0   Best Language (9. ) 0   Dysarthria (10. ) 0   Extinction and Inattention (11) 0   Total 1   Peripheral Vascular   Sensation RUE Full sensation   Sensation LUE Full sensation   Sensation RLE Full sensation     Primary RN concerned with pts level of lethargy, assessed per this Resource RN. Pt awakens to verbal stim, is drowsy and do note she falls asleep numerous times while speaking with her. She cannot recall the year or month correctly, but can recall situation/location/name/birthdate/pets names/address. Speech clear, no visual deficits, LLE sl weaker however this is attributed to post-op femur repair and associated pain when attempt to move pts leg. Primary RN is updating Hospitalist service. Dr Bridger Joshi at bedside to evaluate.

## 2021-08-05 LAB
ERYTHROCYTE [DISTWIDTH] IN BLOOD BY AUTOMATED COUNT: 13.5 % (ref 11.5–14.5)
ERYTHROCYTE [DISTWIDTH] IN BLOOD BY AUTOMATED COUNT: 52.3 FL (ref 35–45)
HCT VFR BLD CALC: 29.4 % (ref 37–47)
HEMOGLOBIN: 8.7 GM/DL (ref 12–16)
MCH RBC QN AUTO: 31 PG (ref 26–33)
MCHC RBC AUTO-ENTMCNC: 29.6 GM/DL (ref 32.2–35.5)
MCV RBC AUTO: 104.6 FL (ref 81–99)
PLATELET # BLD: 187 THOU/MM3 (ref 130–400)
PMV BLD AUTO: 10.6 FL (ref 9.4–12.4)
RBC # BLD: 2.81 MILL/MM3 (ref 4.2–5.4)
WBC # BLD: 4.4 THOU/MM3 (ref 4.8–10.8)

## 2021-08-05 PROCEDURE — 6360000002 HC RX W HCPCS: Performed by: ORTHOPAEDIC SURGERY

## 2021-08-05 PROCEDURE — 1200000000 HC SEMI PRIVATE

## 2021-08-05 PROCEDURE — 6370000000 HC RX 637 (ALT 250 FOR IP): Performed by: PHYSICIAN ASSISTANT

## 2021-08-05 PROCEDURE — 6370000000 HC RX 637 (ALT 250 FOR IP): Performed by: NURSE PRACTITIONER

## 2021-08-05 PROCEDURE — 97530 THERAPEUTIC ACTIVITIES: CPT

## 2021-08-05 PROCEDURE — 51798 US URINE CAPACITY MEASURE: CPT

## 2021-08-05 PROCEDURE — 6370000000 HC RX 637 (ALT 250 FOR IP): Performed by: ORTHOPAEDIC SURGERY

## 2021-08-05 PROCEDURE — 94640 AIRWAY INHALATION TREATMENT: CPT

## 2021-08-05 PROCEDURE — 85027 COMPLETE CBC AUTOMATED: CPT

## 2021-08-05 PROCEDURE — 94760 N-INVAS EAR/PLS OXIMETRY 1: CPT

## 2021-08-05 PROCEDURE — 2580000003 HC RX 258: Performed by: PHYSICIAN ASSISTANT

## 2021-08-05 PROCEDURE — 36415 COLL VENOUS BLD VENIPUNCTURE: CPT

## 2021-08-05 PROCEDURE — 51701 INSERT BLADDER CATHETER: CPT

## 2021-08-05 PROCEDURE — 97535 SELF CARE MNGMENT TRAINING: CPT

## 2021-08-05 RX ORDER — OXYCODONE HYDROCHLORIDE 5 MG/1
5 TABLET ORAL EVERY 4 HOURS PRN
Qty: 42 TABLET | Refills: 0 | Status: ON HOLD | OUTPATIENT
Start: 2021-08-05 | End: 2021-08-19 | Stop reason: HOSPADM

## 2021-08-05 RX ORDER — FERROUS SULFATE 325(65) MG
325 TABLET ORAL 2 TIMES DAILY WITH MEALS
Status: DISCONTINUED | OUTPATIENT
Start: 2021-08-05 | End: 2021-08-06 | Stop reason: HOSPADM

## 2021-08-05 RX ADMIN — DOCUSATE SODIUM 50 MG AND SENNOSIDES 8.6 MG 1 TABLET: 8.6; 5 TABLET, FILM COATED ORAL at 19:57

## 2021-08-05 RX ADMIN — FERROUS SULFATE TAB 325 MG (65 MG ELEMENTAL FE) 325 MG: 325 (65 FE) TAB at 18:06

## 2021-08-05 RX ADMIN — ACETAMINOPHEN 650 MG: 325 TABLET ORAL at 18:06

## 2021-08-05 RX ADMIN — ASPIRIN 325 MG: 325 TABLET, COATED ORAL at 11:27

## 2021-08-05 RX ADMIN — ACETAMINOPHEN 650 MG: 325 TABLET ORAL at 11:27

## 2021-08-05 RX ADMIN — ENOXAPARIN SODIUM 40 MG: 40 INJECTION SUBCUTANEOUS at 05:50

## 2021-08-05 RX ADMIN — LEVOTHYROXINE SODIUM 88 MCG: 0.09 TABLET ORAL at 05:51

## 2021-08-05 RX ADMIN — ACETAMINOPHEN 650 MG: 325 TABLET ORAL at 05:50

## 2021-08-05 RX ADMIN — SODIUM CHLORIDE, PRESERVATIVE FREE 10 ML: 5 INJECTION INTRAVENOUS at 19:57

## 2021-08-05 RX ADMIN — DIPHENHYDRAMINE HCL 25 MG: 25 TABLET ORAL at 19:57

## 2021-08-05 RX ADMIN — TIOTROPIUM BROMIDE INHALATION SPRAY 2 PUFF: 3.12 SPRAY, METERED RESPIRATORY (INHALATION) at 07:41

## 2021-08-05 RX ADMIN — FERROUS SULFATE TAB 325 MG (65 MG ELEMENTAL FE) 325 MG: 325 (65 FE) TAB at 11:27

## 2021-08-05 RX ADMIN — DOCUSATE SODIUM 50 MG AND SENNOSIDES 8.6 MG 1 TABLET: 8.6; 5 TABLET, FILM COATED ORAL at 11:27

## 2021-08-05 RX ADMIN — ACETAMINOPHEN 650 MG: 325 TABLET ORAL at 00:01

## 2021-08-05 RX ADMIN — BUDESONIDE AND FORMOTEROL FUMARATE DIHYDRATE 2 PUFF: 160; 4.5 AEROSOL RESPIRATORY (INHALATION) at 07:41

## 2021-08-05 ASSESSMENT — PAIN DESCRIPTION - PAIN TYPE
TYPE: SURGICAL PAIN
TYPE: SURGICAL PAIN

## 2021-08-05 ASSESSMENT — PAIN DESCRIPTION - ORIENTATION
ORIENTATION: LEFT
ORIENTATION: LEFT

## 2021-08-05 ASSESSMENT — PAIN SCALES - GENERAL
PAINLEVEL_OUTOF10: 2
PAINLEVEL_OUTOF10: 5
PAINLEVEL_OUTOF10: 5
PAINLEVEL_OUTOF10: 8
PAINLEVEL_OUTOF10: 2
PAINLEVEL_OUTOF10: 5

## 2021-08-05 ASSESSMENT — PAIN DESCRIPTION - LOCATION
LOCATION: HIP
LOCATION: HIP

## 2021-08-05 NOTE — PROCEDURES
A Bladder scan was performed at 0200 . The residual amount was measured to be 220 ML. Report of results was given to [facility].

## 2021-08-05 NOTE — PROGRESS NOTES
[address] [facility]  Occupational Therapy  Daily Note  Time:   Time In: 1153  Time Out: 1216  Timed Code Treatment Minutes: 23 Minutes  Minutes: 23          Date: 2021  Patient Name: Tai Mc,   Gender: female      Room: [facility]16/016-A  MRN: [id number]  : 1950  (73 y.o.)  Referring Practitioner: Asif Tello MD  Diagnosis: Closed left hip fracture, initial encounter  Additional Pertinent Hx: The patient is a 73 y.o. female with presentation of left hip pain post acute injury s/p mechanical fall. Patient states she had left JATINDER done  and right JATINDER done . She has had no issues with her left hip and was ambulating fine on her own without assistance. She had a mechanical fall on 21 causing injury to left hip. She came to [facility] ED and had xrays showing left hip periprosthetic fracture and ortho was consulted. Patient is s/p ORIF left greater troch fracture, left hip arthrotomy  by Dr. Juan Olmos. Restrictions/Precautions:  Restrictions/Precautions: Weight Bearing, General Precautions, Fall Risk  Left Lower Extremity Weight Bearing: Toe Touch Weight Bearing  Partial Weight Bearing Percentage Or Pounds: 25%  Required Braces or Orthoses  Left Lower Extremity Brace:  (Hip ABD Brace)  Position Activity Restriction  Hip Precautions: No hip flexion > 90 degrees, No active ABduction, No hip internal rotation, No hip external rotation  Other position/activity restrictions: 1L at rest, 2L with activity home O2     SUBJECTIVE: Pt. Nurse approved OT treatment. Pt. In bed upon arrival and agreeable to OT treatment. Pt. Tearful throughout     PAIN: 2-3/10:L hip    Vitals: Vitals not assessed per clinical judgement, see nursing flowsheet    COGNITION: Slow Processing, Decreased Insight, Decreased Problem Solving, Decreased Safety Awareness       BALANCE:  Sitting Balance:  Stand By Assistance.  while seated on EOB prior to mobility  Standing Balance: [address], with cues for safety, with verbal cues , with increased time for completion. BED MOBILITY:  Supine to Sit: Minimal Assistance, with head of bed raised, with rail, with verbal cues , with increased time for completion assistance provided to guide LE's OOB    TRANSFERS:  Sit to Stand:  Minimal Assistance, with increased time for completion, cues for hand placement, with verbal cues. Pt. with increased anxiety with movement  Stand to Sit: Contact Guard Assistance, with increased time for completion, cues for hand placement, with verbal cues. FUNCTIONAL MOBILITY:  Assistive Device: Rolling Walker  Assist Level:  Contact Guard Assistance, with verbal cues  and with increased time for completion. Distance: From bed to recliner         ADDITIONAL ACTIVITIES:  Pt. Instructed on AE and educated Pt. On hip precautions. Demo use of long handled reacher and sock aid to complete donning and doffing of footies. Will benefit from continued review. ASSESSMENT:     Activity Tolerance:  Patient tolerance of  treatment: fair. Discharge Recommendations: Patient would benefit from continued therapy after discharge, Continue to assess pending progress   Equipment Recommendations: Equipment Needed:  (Continue to monitor for needs)  Plan: Times per week: 6x  Current Treatment Recommendations: Strengthening, Balance Training, Functional Mobility Training, Endurance Training, Self-Care / ADL    Patient Education  Patient Education: ADL's, Precautions, Equipment Education, Importance of Increasing Activity and Assistive Device Safety    Goals  Short term goals  Time Frame for Short term goals: by discharge  Short term goal 1: Patient to increase activity tolerance to/from  and [facility] distances with SBA and no > 2 cues for TTWB to increase indep in home environment. Short term goal 2: Patient to complete LB ADL with SBA and no >2 cues to increase indep dressing tasks.   Short term goal 3: Patient to tolerate dynamic standing task with SBA and unilateral release to increase indep in sinkside grooming tasks. Short term goal 4: Patient to complete BADL routine with no > min A and <2 cues for WB/hip precautions to increase indep in ADL completion. Following session, patient left in safe position with all fall risk precautions in place.

## 2021-08-05 NOTE — PROGRESS NOTES
[facility]  PHYSICAL THERAPY MISSED TREATMENT NOTE  [facility] ORTHOPEDICS 7K    Date: 2021  Patient Name: Shoshana Hutchins        MRN: [id number]   : 1950  (73 y.o.)  Gender: female   Referring Practitioner: Javier Oliva MD  Diagnosis: Closed left hip fracture, initial encounter         REASON FOR MISSED TREATMENT:  Patient refused treatment. First attempt this morning 0925, pt refusing that she is too tired and doesn't want to get out of bed yet. Education on importance of therapy and maximal encouragement but pt still refusing. Second attempt this afternoon 1432, pt in bed stating she just got back to bed and was already up once today. Educated pt that OT worked with pt but i'm with PT and importance of PT. Pt stating she doesn't want to get up more than once. Discussed with pt the benefit of SNF for further therapy, pt refusing. Discussed with pt, her 8 steps to enter her house and benefits of PT to strengthen and prepare for return home if that's pt's wishes to return home. Pt getting frustrated and refusing PT. Will try back tomorrow .      Ansley Baez PT, DPT

## 2021-08-05 NOTE — CARE COORDINATION
8/5/21, 2:35 PM EDT    DISCHARGE ON GOING EVALUATION    Debra Partida day: 5  Location: [facility]16/016-A Reason for admit: Closed left hip fracture, initial encounter ([facility] [address] Charles Sonal   Procedure:8/2/21 surgery by Dr Barrie Albert  Barriers to Discharge: POD #3, 25% WB to LLE with walker, PT and OT recommends continued therapy at discharge, dressing care, hip ABD pillow on continuous, I/O. Hospitalist and ortho. Hgb 8/7   PCP: NITO Whitmore CNP  Readmission Risk Score: 19%  Patient Goals/Plan/Treatment Preferences: Dion Ravi plans home at discharge. SW on case. Refusing ECF. Need HH orders. Has home O2 pta.
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
## 2021-08-06 ENCOUNTER — HOSPITAL ENCOUNTER (INPATIENT)
Age: 71
LOS: 14 days | Discharge: NURSING, REHAB OR LONG-TERM CARE | DRG: 560 | End: 2021-08-20
Attending: PHYSICAL MEDICINE & REHABILITATION | Admitting: PHYSICAL MEDICINE & REHABILITATION
Payer: MEDICARE

## 2021-08-06 VITALS
BODY MASS INDEX: 24.24 KG/M2 | SYSTOLIC BLOOD PRESSURE: 131 MMHG | DIASTOLIC BLOOD PRESSURE: 65 MMHG | HEIGHT: 64 IN | RESPIRATION RATE: 18 BRPM | TEMPERATURE: 98.4 F | OXYGEN SATURATION: 96 % | WEIGHT: 142 LBS | HEART RATE: 60 BPM

## 2021-08-06 PROBLEM — S72.002A HIP FRACTURE REQUIRING OPERATIVE REPAIR, LEFT, CLOSED, INITIAL ENCOUNTER (HCC): Status: ACTIVE | Noted: 2021-08-06

## 2021-08-06 LAB — URINE CULTURE, ROUTINE: NORMAL

## 2021-08-06 PROCEDURE — 97535 SELF CARE MNGMENT TRAINING: CPT

## 2021-08-06 PROCEDURE — 6370000000 HC RX 637 (ALT 250 FOR IP): Performed by: NURSE PRACTITIONER

## 2021-08-06 PROCEDURE — 94640 AIRWAY INHALATION TREATMENT: CPT

## 2021-08-06 PROCEDURE — 94760 N-INVAS EAR/PLS OXIMETRY 1: CPT

## 2021-08-06 PROCEDURE — 99223 1ST HOSP IP/OBS HIGH 75: CPT | Performed by: PHYSICAL MEDICINE & REHABILITATION

## 2021-08-06 PROCEDURE — 6370000000 HC RX 637 (ALT 250 FOR IP): Performed by: PHYSICIAN ASSISTANT

## 2021-08-06 PROCEDURE — 2700000000 HC OXYGEN THERAPY PER DAY

## 2021-08-06 PROCEDURE — 6370000000 HC RX 637 (ALT 250 FOR IP): Performed by: ORTHOPAEDIC SURGERY

## 2021-08-06 PROCEDURE — 2580000003 HC RX 258: Performed by: PHYSICIAN ASSISTANT

## 2021-08-06 PROCEDURE — 6370000000 HC RX 637 (ALT 250 FOR IP): Performed by: PHYSICAL MEDICINE & REHABILITATION

## 2021-08-06 PROCEDURE — 1180000000 HC REHAB R&B

## 2021-08-06 PROCEDURE — 6360000002 HC RX W HCPCS: Performed by: PHYSICAL MEDICINE & REHABILITATION

## 2021-08-06 RX ORDER — BISACODYL 10 MG
10 SUPPOSITORY, RECTAL RECTAL DAILY PRN
Status: CANCELLED | OUTPATIENT
Start: 2021-08-06

## 2021-08-06 RX ORDER — BUDESONIDE AND FORMOTEROL FUMARATE DIHYDRATE 160; 4.5 UG/1; UG/1
2 AEROSOL RESPIRATORY (INHALATION) 2 TIMES DAILY
Status: DISCONTINUED | OUTPATIENT
Start: 2021-08-06 | End: 2021-08-20 | Stop reason: HOSPADM

## 2021-08-06 RX ORDER — DIPHENHYDRAMINE HCL 25 MG
25 TABLET ORAL EVERY 6 HOURS PRN
Status: CANCELLED | OUTPATIENT
Start: 2021-08-06

## 2021-08-06 RX ORDER — FAMOTIDINE 20 MG/1
20 TABLET, FILM COATED ORAL 2 TIMES DAILY
Status: DISCONTINUED | OUTPATIENT
Start: 2021-08-06 | End: 2021-08-11

## 2021-08-06 RX ORDER — FERROUS SULFATE 325(65) MG
325 TABLET ORAL 2 TIMES DAILY WITH MEALS
Status: DISCONTINUED | OUTPATIENT
Start: 2021-08-06 | End: 2021-08-20 | Stop reason: HOSPADM

## 2021-08-06 RX ORDER — OXYCODONE HYDROCHLORIDE 5 MG/1
10 TABLET ORAL EVERY 4 HOURS PRN
Status: DISCONTINUED | OUTPATIENT
Start: 2021-08-06 | End: 2021-08-09

## 2021-08-06 RX ORDER — OXYCODONE HYDROCHLORIDE 5 MG/1
10 TABLET ORAL EVERY 4 HOURS PRN
Status: CANCELLED | OUTPATIENT
Start: 2021-08-06

## 2021-08-06 RX ORDER — LEVOTHYROXINE SODIUM 88 UG/1
88 TABLET ORAL DAILY
Status: DISCONTINUED | OUTPATIENT
Start: 2021-08-07 | End: 2021-08-20 | Stop reason: HOSPADM

## 2021-08-06 RX ORDER — FERROUS SULFATE 325(65) MG
325 TABLET ORAL 2 TIMES DAILY WITH MEALS
Status: CANCELLED | OUTPATIENT
Start: 2021-08-06

## 2021-08-06 RX ORDER — SENNA AND DOCUSATE SODIUM 50; 8.6 MG/1; MG/1
1 TABLET, FILM COATED ORAL 2 TIMES DAILY
Status: DISCONTINUED | OUTPATIENT
Start: 2021-08-06 | End: 2021-08-06

## 2021-08-06 RX ORDER — ALBUTEROL SULFATE 90 UG/1
2 AEROSOL, METERED RESPIRATORY (INHALATION) EVERY 6 HOURS PRN
Status: DISCONTINUED | OUTPATIENT
Start: 2021-08-06 | End: 2021-08-20 | Stop reason: HOSPADM

## 2021-08-06 RX ORDER — POLYETHYLENE GLYCOL 3350 17 G/17G
17 POWDER, FOR SOLUTION ORAL DAILY
Status: DISCONTINUED | OUTPATIENT
Start: 2021-08-06 | End: 2021-08-17

## 2021-08-06 RX ORDER — SENNA AND DOCUSATE SODIUM 50; 8.6 MG/1; MG/1
1 TABLET, FILM COATED ORAL 2 TIMES DAILY
Status: CANCELLED | OUTPATIENT
Start: 2021-08-06

## 2021-08-06 RX ORDER — ACETAMINOPHEN 325 MG/1
650 TABLET ORAL EVERY 6 HOURS
Status: DISCONTINUED | OUTPATIENT
Start: 2021-08-06 | End: 2021-08-20 | Stop reason: HOSPADM

## 2021-08-06 RX ORDER — LEVOTHYROXINE SODIUM 88 UG/1
88 TABLET ORAL DAILY
Status: CANCELLED | OUTPATIENT
Start: 2021-08-07

## 2021-08-06 RX ORDER — OXYCODONE HYDROCHLORIDE 5 MG/1
5 TABLET ORAL EVERY 4 HOURS PRN
Status: DISCONTINUED | OUTPATIENT
Start: 2021-08-06 | End: 2021-08-09

## 2021-08-06 RX ORDER — DIPHENHYDRAMINE HCL 25 MG
25 TABLET ORAL EVERY 6 HOURS PRN
Status: DISCONTINUED | OUTPATIENT
Start: 2021-08-06 | End: 2021-08-20 | Stop reason: HOSPADM

## 2021-08-06 RX ORDER — ACETAMINOPHEN 325 MG/1
650 TABLET ORAL EVERY 6 HOURS
Status: CANCELLED | OUTPATIENT
Start: 2021-08-06

## 2021-08-06 RX ORDER — BISACODYL 10 MG
10 SUPPOSITORY, RECTAL RECTAL DAILY PRN
Status: DISCONTINUED | OUTPATIENT
Start: 2021-08-06 | End: 2021-08-06

## 2021-08-06 RX ORDER — OXYCODONE HYDROCHLORIDE 5 MG/1
5 TABLET ORAL EVERY 4 HOURS PRN
Status: CANCELLED | OUTPATIENT
Start: 2021-08-06

## 2021-08-06 RX ORDER — DOCUSATE SODIUM 100 MG/1
100 CAPSULE, LIQUID FILLED ORAL 2 TIMES DAILY
Status: DISCONTINUED | OUTPATIENT
Start: 2021-08-06 | End: 2021-08-17

## 2021-08-06 RX ORDER — BUDESONIDE AND FORMOTEROL FUMARATE DIHYDRATE 160; 4.5 UG/1; UG/1
2 AEROSOL RESPIRATORY (INHALATION) 2 TIMES DAILY
Status: CANCELLED | OUTPATIENT
Start: 2021-08-06

## 2021-08-06 RX ORDER — SENNA PLUS 8.6 MG/1
2 TABLET ORAL NIGHTLY PRN
Status: DISCONTINUED | OUTPATIENT
Start: 2021-08-06 | End: 2021-08-11

## 2021-08-06 RX ORDER — ALBUTEROL SULFATE 90 UG/1
2 AEROSOL, METERED RESPIRATORY (INHALATION) EVERY 6 HOURS PRN
Status: CANCELLED | OUTPATIENT
Start: 2021-08-06

## 2021-08-06 RX ADMIN — DOCUSATE SODIUM 100 MG: 100 CAPSULE ORAL at 21:59

## 2021-08-06 RX ADMIN — DOCUSATE SODIUM 50 MG AND SENNOSIDES 8.6 MG 1 TABLET: 8.6; 5 TABLET, FILM COATED ORAL at 09:19

## 2021-08-06 RX ADMIN — FERROUS SULFATE TAB 325 MG (65 MG ELEMENTAL FE) 325 MG: 325 (65 FE) TAB at 16:56

## 2021-08-06 RX ADMIN — OXYCODONE 5 MG: 5 TABLET ORAL at 22:00

## 2021-08-06 RX ADMIN — SODIUM CHLORIDE, PRESERVATIVE FREE 10 ML: 5 INJECTION INTRAVENOUS at 09:19

## 2021-08-06 RX ADMIN — ENOXAPARIN SODIUM 40 MG: 40 INJECTION, SOLUTION INTRAVENOUS; SUBCUTANEOUS at 21:59

## 2021-08-06 RX ADMIN — POLYETHYLENE GLYCOL 3350 17 G: 17 POWDER, FOR SOLUTION ORAL at 16:57

## 2021-08-06 RX ADMIN — ACETAMINOPHEN 650 MG: 325 TABLET ORAL at 16:56

## 2021-08-06 RX ADMIN — BUDESONIDE AND FORMOTEROL FUMARATE DIHYDRATE 2 PUFF: 160; 4.5 AEROSOL RESPIRATORY (INHALATION) at 08:47

## 2021-08-06 RX ADMIN — FERROUS SULFATE TAB 325 MG (65 MG ELEMENTAL FE) 325 MG: 325 (65 FE) TAB at 09:19

## 2021-08-06 RX ADMIN — LEVOTHYROXINE SODIUM 88 MCG: 0.09 TABLET ORAL at 06:21

## 2021-08-06 RX ADMIN — BUDESONIDE AND FORMOTEROL FUMARATE DIHYDRATE 2 PUFF: 160; 4.5 AEROSOL RESPIRATORY (INHALATION) at 17:10

## 2021-08-06 RX ADMIN — OXYCODONE 5 MG: 5 TABLET ORAL at 03:05

## 2021-08-06 RX ADMIN — OXYCODONE 5 MG: 5 TABLET ORAL at 09:20

## 2021-08-06 RX ADMIN — TIOTROPIUM BROMIDE INHALATION SPRAY 2 PUFF: 3.12 SPRAY, METERED RESPIRATORY (INHALATION) at 08:48

## 2021-08-06 RX ADMIN — FAMOTIDINE 20 MG: 20 TABLET, FILM COATED ORAL at 22:00

## 2021-08-06 RX ADMIN — ASPIRIN 325 MG: 325 TABLET, COATED ORAL at 09:19

## 2021-08-06 RX ADMIN — ACETAMINOPHEN 650 MG: 325 TABLET ORAL at 06:21

## 2021-08-06 ASSESSMENT — PAIN SCALES - GENERAL
PAINLEVEL_OUTOF10: 5
PAINLEVEL_OUTOF10: 0
PAINLEVEL_OUTOF10: 3
PAINLEVEL_OUTOF10: 9
PAINLEVEL_OUTOF10: 10
PAINLEVEL_OUTOF10: 7
PAINLEVEL_OUTOF10: 4
PAINLEVEL_OUTOF10: 8

## 2021-08-06 ASSESSMENT — PAIN DESCRIPTION - ORIENTATION
ORIENTATION: LEFT
ORIENTATION: LEFT

## 2021-08-06 ASSESSMENT — PAIN DESCRIPTION - DESCRIPTORS: DESCRIPTORS: ACHING

## 2021-08-06 ASSESSMENT — PAIN DESCRIPTION - PAIN TYPE
TYPE: SURGICAL PAIN
TYPE: SURGICAL PAIN

## 2021-08-06 ASSESSMENT — PAIN DESCRIPTION - LOCATION
LOCATION: HIP
LOCATION: HIP

## 2021-08-06 ASSESSMENT — PAIN DESCRIPTION - FREQUENCY: FREQUENCY: INTERMITTENT

## 2021-08-06 NOTE — PROGRESS NOTES
[facility]  Individualized Disclosure Statement      Patient: Jelena Garvin      Scope of Service  [facility] provides 24 hour individualized service to patients with functional limitations due to, but not limited to: stroke, brain injury, spinal cord injury, major multiple trauma, fractures, amputation, and neurological disorders. The [address] [place] [address] provides rehabilitative nursing and medical services as well as physical, occupational, speech, and recreation therapies. [facility] [facility] is fully accredited by the Commission on Accreditation of Rehabilitation Facilities (CARF) as a comprehensive provider of rehabilitation services. Patients admitted to the [address] [place] [address] receive a minimum of three hours of therapy per day, at least six days per week, with a revised therapy schedule on weekends and holidays. Physical therapy, occupational therapy, and speech therapy are provided seven days per week including holidays. Other therapeutic services are available on weekends and evenings as needed or scheduled. Intensity of Treatment  Your treatment program will consist of Nursing Care and:  1.5 hours of Physical Therapy, per day  1.5 hours of Occupational Therapy, per day  1 hour of Recreational Therapy, per week    [address] [address] maintains contracts with most insurance plans. Depending on the type of coverage, the insurance may impose limits on the coverage for rehabilitation care. Coverage is based on the premise that you are able to fully participate in the rehabilitation program and show continued progress. Please verify your own insurance information A copy of this was given to the patient/ family on this date.   Insurance Coverage  Your insurance company has made the following determination relative to the length of your stay:   Your estimated length of stay is  14 days   Your insurance Coverage has been verified as follows:    Primary Insurance: Medicare   Deductible: $8983  Coverage: Active  Secondary Insurance ;secondary insurance policies often cover co-pay amounts, but to ensure payment please contact your insurance company.     Alternative Resources: Please ask the  for more information [phone]

## 2021-08-06 NOTE — PROGRESS NOTES
Admitted to the Inpatient Rehabilitation Unit via bed. Patient was then oriented to room and unit. Education provided on the rehabilitation routine: three hours of therapy five days per week and dining room for lunch. Explained patients right to have family, representative or physician notified of their admission. Patient has Declined for physician to be notified. Patient has Declined for family/representative to be notified. Admitting medication orders compared with acute stay medications; home medication list reviewed with patient/family. Medication issues identified No  Medication issue: none    Bladder and Bowel Function Assessment:  1. Prior history of bladder problems: stress incontinence  2. Number of pads used per day: 2  3. Frequency of night time voiding: twice  4. Fluid intake volume and pattern: adequate  5. Last BM: 09/04/21  6. Bowel problems (prior or current) Yes      Incontinence      Frequent diarrhea   x   No BM in 3 days this stay or history of constipation      Hemorrhoids      Diverticulitis      Bowel Surgery     Two nurse skin assessment performed by Domitila Hernandez LPN  and this nurse . Weight: 175 lbs        Care plan was created with patient's input and goals were agreed upon. Admission folder provided with education regarding patients diagnoses, fall prevention, skin care, and M in the box. \"Data Collection Information Summary for Patients in Inpatient Rehabilitation Facilities\" and \"Privacy Act Statement - Health Care Records\" provided. Please refer to the admission navigator for further information.

## 2021-08-06 NOTE — CONSULTS
Physical Medicine & Rehabilitation   Consult Note      Admitting Physician: Viry Seaman DO    Primary Care Provider: NITO Parra CNP     Reason for Consult:  Closed Left Hip Fracture    History of Present Illness:  Salinas Cid is a 73 y.o. female admitted to [facility] on 7/30/2021 with history of bilateral hip replacement, lung resection 2/2 neoplasm, COPD who presented with acute left hip pain 2/2 a mechanical fall. Xray showed acute periprosthetic subtrochanteric fracture on the left. Pt underwent open reduction internal fixation left greater trochanter fracture and left hip arthrotomy with dislocation of hip with Gunnar Acosta MD on 8/2/2021. Pt was previously ambulating without assistance and lives alone in a 2 story house. Pt is experiencing a lot of pain, 10/10 with slight movement. Pt states she already has a walker and wheelchair at home. Pt expresses that she wants to go home, but realizes she needs to go home safely and wants rehabilitation on the IPR Unit first.     Current Rehabilitation Assessments:    PT - Reviewed    Restrictions/Precautions:  Restrictions/Precautions: Weight Bearing, General Precautions, Fall Risk  Left Lower Extremity Weight Bearing: Toe Touch Weight Bearing  Partial Weight Bearing Percentage Or Pounds: 25%  Required Braces or Orthoses  Left Lower Extremity Brace:  (Hip ABD Brace)  Position Activity Restriction  Hip Precautions: No hip flexion > 90 degrees, No active ABduction, No hip internal rotation, No hip external rotation  Other position/activity restrictions: 1L at rest, 2L with activity home O2     Subjective:  Chart Reviewed: Yes  Patient assessed for rehabilitation services?: Yes  Family / Caregiver Present: No  Subjective: RN approved session. Pt just received hip ABD brace. Pt reports being nauseated but agreeable to try.      General:  Overall Orientation Status: Within Functional Limits  Follows Commands: Within Functional Limits hopping on RLE gait pattern. Pt required verbal cues for safety and sequencing and reminders to maintaining WBing status. Exercise:  Patient was guided in 1 set(s) 10 reps of exercise to both lower extremities. Ankle pumps, Glut sets, Quad sets and Heelslides. Exercises were completed for increased independence with functional mobility. *LLE heelslides completed at a smaller ROM to ensure hip precautions remained      Functional Outcome Measures: Completed  -PAC Inpatient Mobility Raw Score : 15  AM-PAC Inpatient T-Scale Score : 39.45        OT - Reviewed    Restrictions/Precautions:  Restrictions/Precautions: Weight Bearing, General Precautions, Fall Risk  Left Lower Extremity Weight Bearing: Toe Touch Weight Bearing  Partial Weight Bearing Percentage Or Pounds: 25%  Required Braces or Orthoses  Left Lower Extremity Brace:  (Hip ABD Brace)  Position Activity Restriction  Hip Precautions: No hip flexion > 90 degrees, No active ABduction, No hip internal rotation, No hip external rotation  Other position/activity restrictions: 1L at rest, 2L with activity home O2      SUBJECTIVE: Pt. Nurse approved OT session. Pt. In bed upon arrival and agreeable to OT treatment. Pt. Became tearful during treatment due to her situation states she just wants to go back home. Pt. Offered positive reinforcement and discussed discharge options and benefit of continued rehab care in a skilled setting prior to discharge. PAIN: 1/10:R hip     Vitals: Vitals not assessed per clinical judgement, see nursing flowsheet     COGNITION: Decreased Insight, Decreased Problem Solving and Decreased Safety Awareness     ADL:   Grooming: Stand By Assistance and with set-up.  to complete hair care while seated  Bathing: Stand By Assistance, Dependent, with set-up, with verbal cues  and with increased time for completion. SBA for seated portions, Dependent to complete pericare and below knees  Upper Extremity Dressing:  Moderate rehabilitation. Also \"2 mini-strokes\" (?)    Hx of blood clots 1977    hip, leg    Hyperlipidemia 2005    Hypertension 2005    Hypothyroidism     IBS (irritable bowel syndrome)     Low HDL (under 40) 2014    Lung cancer [facility])     sees Dr Mary Lundy    Prolonged emergence from general anesthesia     Recurrent UTI (urinary tract infection) 2015    Dr Jeremy Dakins finding difficulty 05/16/2017    work-up in progress       Past Surgical History:        Procedure Laterality Date   [address] Right 04/01/2005    evacuation of breast hematoma-Dr. Yany Daley    BREAST SURGERY Right 11/30/2004    lymphatic mapping, SLN bx x2-Dr. Rene Mcclelland    COLONOSCOPY  2009    Dr. Juan Altman  03/16/2021    CT NEEDLE BIOPSY LUNG PERCUTANEOUS 3/16/2021 [facility] CT SCAN    FEMUR FRACTURE SURGERY Left 8/2/2021    FEMUR OPEN REDUCTION INTERNAL FIXATION performed by Eris Burton MD at [address] [place] [address]  01/19/2015    HYSTERECTOMY  1976    JOINT REPLACEMENT Left 2011    HIP    JOINT REPLACEMENT Right 01/19/2015    Right Total Hip Replacement - Dr. Lelo Lawson, TOTAL Right 6/14/2021    ROBOTIC ASSISTED RIGHT LOWER LOBE SUPERIOR SEGMENTECTOMY AND MEDIASTINAL DISSECTION, CRYOTHERAPY OF INTERCOSTAL NERVES performed by Damion Lan MD at [address] Right 2005    Dr. Nataliia Quintana  04/10/2018    Lumbar epidural steroid injection at L4    MI NJX DX/THER SBST INTRLMNR LMBR/SAC W/IMG GDN N/A 04/10/2018    LESI  @ L4 performed by Maurice Cesar MD at [address] [facility]    ovaries    THYROID LOBECTOMY Left 11/26/2010    left thyroid lobectomy with isthmusectomy-Dr. Laz Mckeon THYROID SURGERY  2007    right thyroid lobectomy-Dr. Ibanez       Allergies:     Allergies   Allergen Reactions    Cipro Xr     Vicodin [Hydrocodone-Acetaminophen]      Weird dreams Types: Cigarettes     Start date: 11/13/1967    Smokeless tobacco: Never Used    Tobacco comment: Currently at 1 pk/day   Vaping Use    Vaping Use: Never used   Substance and Sexual Activity    Alcohol use: No     Alcohol/week: 0.0 standard drinks    Drug use: No    Sexual activity: Yes     Partners: Male   Other Topics Concern    Not on file   Social History Narrative    Not on file     Social Determinants of Health     Financial Resource Strain:     Difficulty of Paying Living Expenses:    Food Insecurity:     Worried About Running Out of Food in the Last Year:     Ran Out of Food in the Last Year:    Transportation Needs:     Lack of Transportation (Medical):      Lack of Transportation (Non-Medical):    Physical Activity:     Days of Exercise per Week:     Minutes of Exercise per Session:    Stress:     Feeling of Stress :    Social Connections:     Frequency of Communication with Friends and Family:     Frequency of Social Gatherings with Friends and Family:     Attends Synagogue Services:     Active Member of Clubs or Organizations:     Attends Club or Organization Meetings:     Marital Status:    Intimate Partner Violence:     Fear of Current or Ex-Partner:     Emotionally Abused:     Physically Abused:     Sexually Abused:      Lives With: Alone  Type of Home: House  Home Layout: Two level, Able to Live on Main level with bedroom/bathroom  Home Access: Stairs to enter with rails  Entrance Stairs - Number of Steps: 4+4  Entrance Stairs - Rails: Left  Home Equipment: Cane, Rolling walker, Standard walker, Gaila Reek      Bathroom Shower/Tub: Tub/Shower unit, Shower chair with back  Bathroom Toilet: [address] [facility]: Shower chair     ADL Assistance: Independent  Homemaking Assistance: Independent  Homemaking Responsibilities: Yes  Ambulation Assistance: Independent  Transfer Assistance: Independent     Active : Yes  [address]: [employer], [place], playing cards  Additional Comments: ind with ambulation with no AD      Family History:       Problem Relation Age of Onset    Osteoporosis Mother     Hypertension Mother     High Cholesterol Mother     Stroke Mother     Colon Cancer Mother     Cancer Father         lung cancer    Heart Disease Father     Hypertension Father     High Cholesterol Father     Heart Disease Sister     High Cholesterol Sister     Hypertension Sister     Asthma Sister     Other Other         high arched feet    Lung Cancer Son        Review of Systems   Constitutional: Negative for chills and fever. HENT: Negative for hearing loss and sore throat. Eyes: Negative for pain and redness. Respiratory: Negative for shortness of breath. Cardiovascular: Negative for chest pain and leg swelling. Gastrointestinal: Negative for abdominal distention, abdominal pain, blood in stool, nausea and vomiting. Genitourinary: Negative for difficulty urinating, flank pain and frequency. Skin: Negative for rash. Neurological: Negative for tremors, numbness and headaches. Psychiatric/Behavioral: Negative for confusion and hallucinations. Physical Exam:  /65   Pulse 60   Temp 98.4 °F (36.9 °C) (Oral)   Resp 18   Ht 5' 4\" (1.626 m)   Wt 142 lb (64.4 kg)   SpO2 96%   BMI 24.37 kg/m²   awake  Orientation:   person, place, time  Mood: euthymic  Affect: calm and tearful  General appearance: Patient is well nourished, well developed, mild distress    Memory:   normal,   Attention/Concentration: normal  Language:  normal    Cranial Nerves:  cranial nerves II-XII are grossly intact  ROM:  normal  Motor Exam:  Motor exam is 5 out of 5 all extremities with the exception of left hip flexion 2/2 Left hip pain. Tone:  normal  Muscle bulk: normal  Sensory:  Sensory intact  Coordination:   normal  Deep Tendon Reflexes:  Reflexes are intact and symmetrical bilaterally. Gait: non-ambulatory.      Heart: normal rate, regular rhythm, normal S1, S2, no murmurs, rubs, clicks or gallops  Lungs: clear to auscultation without wheezes or rales  Abdomen: soft, non-tender, non-distended, normal bowel sounds, no masses or organomegaly    Skin: warm and dry, no rash or erythema  Peripheral vascular: Pulses: Normal upper and lower extremity pulses; Edema: no      Diagnostics:  No results found for this or any previous visit (from the past 24 hour(s)). Impression:  · Left hip fracture   · bilateral hip replacement  · lung resection 2/2 neoplasm  · COPD - state 2  · Tobacco abuse  · Anemia  · Hx of CVA  · Essential HTN  · CAD  · Hx of Breast cancer  · Depression    Recommendations:  · Pt would benefit greatly from aggressive rehabilitation to improve ambulation necessary for Activities of Daily living. Admit to PM&R. Will continue to monitor. It was my pleasure to evaluate [facility] today. Please call with questions. Estephania Mccracken   [facility]    I have evaluated the patient and reviewed the case with the medical student. I agree with the current plan of care including the workup, evaluation, management, and diagnosis. Care plan has been discussed. I agree with above documentation.       Zulema Mccracken MD

## 2021-08-06 NOTE — H&P
Physical Medicine & Rehabilitation   History and Physical    Chief Complaint and Reason for Rehabilitation Admission:   Closed Left Hip Fracture    History of Present Illness:  Fuad Corona  is a 73 y.o. female admitted to the inpatient rehabilitation unit on 8/6/2021. She was originally admitted to [facility] on 7/30/2021 with history of bilateral hip replacement, lung resection 2/2 neoplasm, COPD who presented with acute left hip pain 2/2 a mechanical fall. Xray showed acute periprosthetic subtrochanteric fracture in the left femur. Pt underwent open reduction internal fixation left greater trochanter fracture and left hip arthrotomy with dislocation of hip with Yohannes Olmedo MD on 8/2/2021. Pt was previously ambulating without assistance and lives alone in a 2 story house. Pt is experiencing a lot of pain, 10/10, with slight movement. Pt states she already has a walker and wheelchair at home. Pt expresses that she wants to go home, but realizes she needs to go home safely and wants rehabilitation on the IPR Unit first.     Patient seen and examined per myself today. She stated that her bowels moved once since she has been in the hospital.  Her pain control has been adequate and she has been able to sleep. Admitted to the IPR Unit today to improve her mobility, ADL's, and endurance. Current Rehabilitation Assessments:  PT:      Diagnosis: Closed left hip fracture, initial encounter  Additional Pertinent Hx: The patient is a 73 y.o. female with presentation of left hip pain post acute injury s/p mechanical fall. Patient states she had left JATINDER done 2008 and right JATINDER done 2015. She has had no issues with her left hip and was ambulating fine on her own without assistance. She had a mechanical fall on 7/30/21 causing injury to left hip. She came to [facility] ED and had xrays showing left hip periprosthetic fracture and ortho was consulted.    Restrictions/Precautions:  Restrictions/Precautions: Weight Bearing, General Precautions, Fall Risk  Left Lower Extremity Weight Bearing: Toe Touch Weight Bearing  Partial Weight Bearing Percentage Or Pounds: 25%  Required Braces or Orthoses  Left Lower Extremity Brace:  (Hip ABD Brace)  Position Activity Restriction  Hip Precautions: No hip flexion > 90 degrees, No active ABduction, No hip internal rotation, No hip external rotation  Other position/activity restrictions: 1L at rest, 2L with activity home O2     Subjective:  Chart Reviewed: Yes  Patient assessed for rehabilitation services?: Yes  Family / Caregiver Present: No  Subjective: RN approved session. Pt just received hip ABD brace.  Pt reports being nauseated but agreeable to try.     General:  Overall Orientation Status: Within Functional Limits  Follows Commands: Within Functional Limits     Vision: Impaired  Vision Exceptions: Wears glasses for reading     Hearing: Within functional limits        Pain: 5/10: L hip     Vitals: Vitals not assessed per clinical judgement, see nursing flowsheet     Social/Functional History:    Lives With: Family  Type of Home: House  Home Layout: Two level, Able to Live on Main level with bedroom/bathroom  Home Access: Stairs to enter with rails  Entrance Stairs - Number of Steps: 4+4  Entrance Stairs - Rails: Left  Home Equipment: Cane, Rolling walker, Standard walker, Wheelchair-manual      Bathroom Shower/Tub: Tub/Shower unit, Shower chair with back  Bathroom Toilet: Standard  Bathroom Equipment: Shower chair     ADL Assistance: Independent  Homemaking Assistance: Independent  Homemaking Responsibilities: Yes  Ambulation Assistance: Independent  Transfer Assistance: Independent     Active : Yes  Leisure & Hobbies: crafts, gardening, playing cards  Additional Comments: ind with ambulation with no AD     OBJECTIVE:  Range of Motion:  Right Lower Extremity: WFL  Left Lower Extremity: Impaired - due to recent surgery and brace      Strength:  Right Lower Extremity: WFL  Left Lower Extremity: Impaired - due to recent surgery      Balance:  Static Sitting Balance:  Contact Guard Assistance  Static Standing Balance: Contact Guard Assistance     Bed Mobility:  Sit to Supine: Minimal Assistance, X 1, with head of bed flat, with rail, with verbal cues , with increased time for completion, assist for LLE       Transfers:  Sit to Stand: Minimal Assistance, X 1, with increased time for completion, cues for hand placement  Stand to [place] [id number], X 1, with increased time for completion, cues for hand placement     Ambulation:  Minimal Assistance, with verbal cues , with increased time for completion  Distance: 3 hops forward, 5 hops backward   Surface: Level Tile  Device:Rolling Walker  Gait Deviations:  Due to pt's 25% WBing status on LLE, Pt performed a hopping on RLE gait pattern. Pt required verbal cues for safety and sequencing and reminders to maintaining WBing status.      Exercise:  Patient was guided in 1 set(s) 10 reps of exercise to both lower extremities. Ankle pumps, Glut sets, Quad sets and Heelslides. Exercises were completed for increased independence with functional mobility. *LLE heelslides completed at a smaller ROM to ensure hip precautions remained      Functional Outcome Measures: Completed  AM-PAC Inpatient Mobility Raw Score : 15  AM-PAC Inpatient T-Scale Score : 39.45     ASSESSMENT:  Activity Tolerance:  Patient tolerance of  treatment: good. Pt slightly lethargic throughout session.        Treatment Initiated: Treatment and education initiated within context of evaluation. Evaluation time included review of current medical information, gathering information related to past medical, social and functional history, completion of standardized testing, formal and informal observation of tasks, assessment of data and development of plan of care and goals.   Treatment time included skilled education and facilitation of tasks to increase safety and independence with functional mobility for improved independence and quality of life.     Assessment: Body structures, Functions, Activity limitations: Decreased functional mobility , Decreased posture, Decreased endurance, Decreased ROM, Decreased strength, Decreased balance, Increased pain  Assessment: Pt progressing towards established goals, Goals updated. Pt requires continued acute PT services for increase in functional mobility, safety and decreased fall risk. Prognosis: Good     REQUIRES PT FOLLOW UP: Yes     Discharge Recommendations:  Discharge Recommendations: Continue to assess pending progress, Patient would benefit from continued therapy after discharge (Pt Unsafe to return home at this time)      OT:      Diagnosis: Closed left hip fracture, initial encounter  Additional Pertinent Hx: The patient is a 73 y.o. female with presentation of left hip pain post acute injury s/p mechanical fall. Patient states she had left JATINDER done 2008 and right JATINDER done 2015. She has had no issues with her left hip and was ambulating fine on her own without assistance. She had a mechanical fall on 7/30/21 causing injury to left hip. She came to [facility] ED and had xrays showing left hip periprosthetic fracture and ortho was consulted.  Patient is s/p ORIF left greater troch fracture, left hip arthrotomy 8/2 by Dr. Raeann Jesus.     Restrictions/Precautions:  Restrictions/Precautions: Weight Bearing, General Precautions, Fall Risk  Left Lower Extremity Weight Bearing: Toe Touch Weight Bearing  Partial Weight Bearing Percentage Or Pounds: 25%  Required Braces or Orthoses  Left Lower Extremity Brace:  (Hip ABD Brace)  Position Activity Restriction  Hip Precautions: No hip flexion > 90 degrees, No active ABduction, No hip internal rotation, No hip external rotation  Other position/activity restrictions: 1L at rest, 2L with activity home O2      SUBJECTIVE: Pt. Nurse approved OT session. Pt. In bed upon arrival and agreeable to OT treatment. Pt. Became tearful during treatment due to her situation states she just wants to go back home. Pt. Offered positive reinforcement and discussed discharge options and benefit of continued rehab care in a skilled setting prior to discharge.     PAIN: 1/10:R hip     Vitals: Vitals not assessed per clinical judgement, see nursing flowsheet     COGNITION: Decreased Insight, Decreased Problem Solving and Decreased Safety Awareness     ADL:   Grooming: Stand By Assistance and with set-up.  to complete hair care while seated  Bathing: Stand By Assistance, Dependent, with set-up, with verbal cues  and with increased time for completion. SBA for seated portions, Dependent to complete pericare and below knees  Upper Extremity Dressing: Moderate Assistance and with set-up. to don hospital gown  Lower Extremity Dressing: Dependent and with set-up. to don nate hose. Toileting: Pt. Incontinent of bladder upon entry, Dependent with cyrus care.     BALANCE:  Sitting Balance:  Stand By Assistance, with cues for safety. while seated on EOB for bathing  Standing Balance: Contact Guard Assistance, with cues for safety.       BED MOBILITY:  Supine to Sit: Moderate Assistance, Maximum Assistance, with head of bed raised, with rail, with verbal cues , with increased time for completion to bring BLE's to side of bed   Sit to Supine: Moderate Assistance, Maximum Assistance to guide LE's into bed     TRANSFERS:  Sit to Stand:  [employer], with increased time for completion, cues for hand placement, with verbal cues. Stand to Sit: [employer], with increased time for completion, cues for hand placement, with verbal cues.       FUNCTIONAL MOBILITY:  Assistive Device: Rolling Walker  Assist Level:  Contact Guard Assistance, with verbal cues  and with increased time for completion.    Distance: from EOB to chair  vcs provided for technique with body mechanics to maintain WB status on LLE.         ASSESSMENT:  Activity Tolerance:  Patient tolerance of  treatment: fair. Discharge Recommendations: Patient would benefit from continued therapy after discharge, Continue to assess pending progress   Equipment Recommendations: Equipment Needed:  (Continue to monitor for needs)  Plan: Times per week: 6x  Current Treatment Recommendations: Strengthening, Balance Training, Functional Mobility Training, Endurance Training, Self-Care / ADL         Past Medical History:      Diagnosis Date    Anxiety 2007    Arthritis     Breast cancer ([facility] [address] 2005    right mastectomy, chemo and radiation history    CAD (coronary artery disease) 2001    sees Dr Clara Owen of the skin 2004    Cerebral artery occlusion with cerebral infarction ([facility]     Chronic UTI     COPD (chronic obstructive pulmonary disease) ([facility] [address]     sees RL Petersen    CVA (cerebral infarction) 2007, 2008    Depression 2007    DVT of lower extremity (deep venous thrombosis) ([facility] [address] [facility] 1976    Facial injury 2005    Fall on greasy ground, striking left periorbital region    History of stroke 2007, 2008, 2009    Patient relates a stroke with right weakness and speech in 2007; another in 2010 or 2012 (unsure), both with need to rehabilitation.   Also \"2 mini-strokes\" (?)    Hx of blood clots 1977    hip, leg    Hyperlipidemia 2005    Hypertension 2005    Hypothyroidism     IBS (irritable bowel syndrome)     Low HDL (under 40) 2014    [facility]     sees Dr Vijay Damico    Prolonged emergence from general anesthesia     Recurrent UTI (urinary tract infection) 2015    Dr Luis Garcia finding difficulty 05/16/2017    work-up in progress       Primary care provider: NTIO Hernandez - CNP     Past Surgical History:      Procedure Laterality Date   [address] Right 04/01/2005    evacuation of breast hematoma-Dr. Shaji Anna BREAST SURGERY Right 11/30/2004    lymphatic mapping, SLN bx x2-Dr. Tasha Okeefe    COLONOSCOPY  2009    Dr. Deborah Allen  03/16/2021    CT NEEDLE BIOPSY LUNG PERCUTANEOUS 3/16/2021 [facility] CT SCAN    FEMUR FRACTURE SURGERY Left 8/2/2021    FEMUR OPEN REDUCTION INTERNAL FIXATION performed by Tish Brannon MD at [address]  01/19/2015    HYSTERECTOMY  1976    JOINT REPLACEMENT Left 2011    HIP    JOINT REPLACEMENT Right 01/19/2015    Right Total Hip Replacement - Dr. Leonor Trinidad, TOTAL Right 6/14/2021    ROBOTIC ASSISTED RIGHT LOWER LOBE SUPERIOR SEGMENTECTOMY AND MEDIASTINAL DISSECTION, CRYOTHERAPY OF INTERCOSTAL NERVES performed by Juan Daniel Willson MD at [address] Right 2005    Dr. Earnest Keith  04/10/2018    Lumbar epidural steroid injection at L4    NH NJX DX/THER SBST INTRLMNR LMBR/SAC W/IMG GDN N/A 04/10/2018    LESI  @ L4 performed by Michelle Fox MD at [address], 2001    ovaries    THYROID LOBECTOMY Left 11/26/2010    left thyroid lobectomy with isthmusectomy-Dr. Kait Goldstein THYROID SURGERY  2007    right thyroid lobectomy-Dr. Ibanez       Allergies:    Cipro xr and Vicodin [hydrocodone-acetaminophen]    Current Medications:    Current Facility-Administered Medications: acetaminophen (TYLENOL) tablet 650 mg, 650 mg, Oral, Q6H  magnesium hydroxide (MILK OF MAGNESIA) 400 MG/5ML suspension 30 mL, 30 mL, Oral, Daily PRN  sennosides-docusate sodium (SENOKOT-S) 8.6-50 MG tablet 1 tablet, 1 tablet, Oral, BID  oxyCODONE (ROXICODONE) immediate release tablet 5 mg, 5 mg, Oral, Q4H PRN **OR** oxyCODONE (ROXICODONE) immediate release tablet 10 mg, 10 mg, Oral, Q4H PRN  bisacodyl (DULCOLAX) suppository 10 mg, 10 mg, Rectal, Daily PRN  albuterol sulfate  (90 Base) MCG/ACT inhaler 2 puff, 2 puff, Inhalation, Q6H PRN  [START ON 8/7/2021] aspirin EC tablet 325 mg, 325 mg, Oral, Daily  budesonide-formoterol (SYMBICORT) 160-4.5 MCG/ACT inhaler 2 puff, 2 puff, Inhalation, BID **AND** [START ON 8/7/2021] tiotropium (SPIRIVA RESPIMAT) 2.5 MCG/ACT inhaler 2 puff, 2 puff, Inhalation, Daily  [START ON 8/4/2022] calcium replacement protocol, , Other, RX Placeholder  diphenhydrAMINE (BENADRYL) tablet 25 mg, 25 mg, Oral, Q6H PRN  ferrous sulfate (IRON 325) tablet 325 mg, 325 mg, Oral, BID WC  [START ON 8/7/2021] levothyroxine (SYNTHROID) tablet 88 mcg, 88 mcg, Oral, Daily     Social History:  Social History     Socioeconomic History    Marital status:      Spouse name: Not on file    Number of children: 3    Years of education: 5    Highest education level: Not on file   Occupational History    Occupation: Disabled   Tobacco Use    Smoking status: Current Every Day Smoker     Packs/day: 2.00     Years: 48.00     Pack years: 96.00     Types: Cigarettes     Start date: 11/13/1967    Smokeless tobacco: Never Used    Tobacco comment: Currently at 1 pk/day   Vaping Use    Vaping Use: Never used   Substance and Sexual Activity    Alcohol use: No     Alcohol/week: 0.0 standard drinks    Drug use: No    Sexual activity: Yes     Partners: Male   Other Topics Concern    Not on file   Social History Narrative    Not on file     Social Determinants of Health     Financial Resource Strain:     Difficulty of Paying Living Expenses:    Food Insecurity:     Worried About Running Out of Food in the Last Year:     Ran Out of Food in the Last Year:    Transportation Needs:     Lack of Transportation (Medical):      Lack of Transportation (Non-Medical):    Physical Activity:     Days of Exercise per Week:     Minutes of Exercise per Session:    Stress:     Feeling of Stress :    Social Connections:     Frequency of Communication with Friends and Family:     Frequency of Social Gatherings with Friends and Family:     Attends Taoism Services:     Active Member of Clubs or Organizations: and headaches. Psychiatric/Behavioral: Negative for confusion and hallucinations. Physical Exam:  BP (!) 115/44   Pulse 66   Temp 98.4 °F (36.9 °C) (Oral)   Resp 17   SpO2 97%   awake  Orientation:   person, place, time  Mood: euthymic  Affect: calm and tearful  General appearance: Patient is well nourished, well developed, mild distress     Memory:   normal,   Attention/Concentration: normal  Language:  normal     Cranial Nerves:  cranial nerves II-XII are grossly intact  ROM:  normal  Motor Exam:  Motor exam is 5 out of 5 all extremities with the exception of left hip flexion 2/2 Left hip pain. Tone:  normal  Muscle bulk: normal  Sensory:  Sensory intact  Coordination:   normal  Deep Tendon Reflexes:  Reflexes are intact and symmetrical bilaterally. Gait: non-ambulatory.      Heart: normal rate, regular rhythm, normal S1, S2, no murmurs, rubs, clicks or gallops  Lungs: clear to auscultation without wheezes or rales  Abdomen: soft, non-tender, non-distended, normal bowel sounds, no masses or organomegaly     Skin: warm and dry, no rash or erythema  Peripheral vascular: Pulses: Normal upper and lower extremity pulses; Edema: no    Diagnostics:  No results found for this or any previous visit (from the past 24 hour(s)). Impression:  · Left hip fracture   · bilateral hip replacement  · lung resection 2/2 neoplasm  · COPD - state 2  · Tobacco abuse  · Anemia  · Hx of CVA  · Essential HTN  · CAD  · Hx of Breast cancer  · Depression    Plan:   · Admit to the inpatient rehabilitation unit. The patient demonstrates good potential to participate in an inpatient rehabilitation program involving at least 3 hours per day, 5 days per week of intensive rehabilitation. Rehabilitation services will include PT and OT/RT in order to improve functional status prior to discharge. Family education and training will be completed. Equipment evaluations and recommendations will be completed as appropriate. · Rehabilitation nursing will be involved for bowel, bladder, skin, and pain management. Nursing will also provide education and training to patient and family. · Prophylaxis:  DVT: Lovenox, ASA . · GI: Pepcid. · Pain: Tylenol 650 mg po q 6 hours, OxyIR 5-10 mg po q 4 hours prn  · Nutrition:  Consultation to dietician for nutritional counseling and recommendations. Prealbumin will be checked on admission. · Bladder: Per nursing  · Bowel: Glycolax 17 g po q day; colace 100 mg po bid, Senna 2 tabs at bedtime prn, MOM 30 ml po q day prn  ·  and case management consultations for coordination of care and discharge planning    The main medical problem(s) and comorbidities being actively managed by the physicians and requiring 24 hour rehabilitation nursing care during this stay include:    · Left hip fracture   · bilateral hip replacement  · lung resection 2/2 neoplasm  · COPD - state 2  · Tobacco abuse  · Anemia  · Hx of CVA  · Essential HTN  · CAD  · Hx of Breast cancer  · Depression       The domains of functional impairment present in this patient which will require an intensive and interdisciplinary rehabilitation environment include self care, mobility, motor dysfunction, bowel/bladder management, pain management and safety. Estimated length of stay for this admission 14 days    Anticipated disposition: Home. The potential to achieve that is good. The post admission physician evaluation (LISA) is consistent with the pre-admission assessment. See above findings to reflect the elements required in the LISA. Patient's admitting condition is consistent with the findings of the preadmission assessment by the rehabilitation admissions coordinator.     Elan Pires MD

## 2021-08-06 NOTE — CARE COORDINATION
8/6/21, 10:36 AM EDT    Patient goals/plan/ treatment preferences discussed by  and . Patient goals/plan/ treatment preferences reviewed with patient/ family. Patient/ family verbalize understanding of discharge plan and are in agreement with goal/plan/treatment preferences. Understanding was demonstrated using the teach back method. AVS provided by RN at time of discharge, which includes all necessary medical information pertaining to the patients current course of illness, treatment, post-discharge goals of care, and treatment preferences.     Services After Discharge  Services At/After Discharge: Nursing Services, OT, PT, Skilled Therapy   IMM Letter  IMM Letter given to Patient/Family/Significant other/Guardian/POA/by[de-identified] cm  IMM Letter date given[de-identified] 08/06/21  IMM Letter time given[de-identified] 5563     Discharge / Diana Cosby to [phone] today
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
## 2021-08-06 NOTE — PROGRESS NOTES
[address] [address]  Acute Inpatient Rehab Preadmission Assessment    Patient Name: Pratibha Scruggs        MRN: [id number]    : 1950  (73 y.o.)  Gender: female     Admitted from:[address]  Initial Assessment    Date of admission to the hospital: 2021  9:41 PM  Date patient eligible for admission:2021    Primary Diagnosis:Left greater trochanter fracture  Did patient have surgery? yes - Open reduction internal fixation left greater trochanter fracture    Left hip arthrotomy with dislocation of hip    Physicians: Umair Lehman DO,  Dr. Grant Thompson for clinical complications/co-morbidities:   Past Medical History:   Diagnosis Date    Anxiety 2007    Arthritis     Breast cancer [facility]) 2005    right mastectomy, chemo and radiation history    CAD (coronary artery disease)     sees Dr Blaise Buckner of the skin 2004    Cerebral artery occlusion with cerebral infarction [facility])     Chronic UTI     COPD (chronic obstructive pulmonary disease) ([facility] [address]     sees RL Petersen    CVA (cerebral infarction) ,     Depression     DVT of lower extremity (deep venous thrombosis) ([facility] [address] 1976    Facial injury 2005    Fall on greasy ground, striking left periorbital region    History of stroke , ,     Patient relates a stroke with right weakness and speech in ; another in  or  (unsure), both with need to rehabilitation. Also \"2 mini-strokes\" (?)    Hx of blood clots 1977    hip, leg    Hyperlipidemia 2005    Hypertension 2005    Hypothyroidism     IBS (irritable bowel syndrome)     Low HDL (under 40)     Lung cancer [facility])     sees Dr Teena Oconnor    Prolonged emergence from general anesthesia     Recurrent UTI (urinary tract infection)     Dr Schuyler Sarkar finding difficulty 2017    work-up in progress       Financial Information  Primary insurance: Medicare    Secondary Insurance:  Commercial: Company: ., Contact Information: .     Has the patient had two or more falls in the past year or any fall with injury in the past year? yes    Did the patient have major surgery during the 100 days prior to admission? yes    Precautions:   falls, infections and skin  Restrictions/Precautions: Weight Bearing, General Precautions, Fall Risk  Hip Precautions: No hip flexion > 90 degrees, No active ABduction, No hip internal rotation, No hip external rotation  Other position/activity restrictions: 1L at rest, 2L with activity home O2  Left Lower Extremity Weight Bearing: Toe Touch Weight Bearing  Required Braces or Orthoses  Left Lower Extremity Brace:  (Hip ABD Brace)    Isolation Precautions: None       Physiatrist: Dr. Samy Lang    Patients Occupation: Retired  Reviewed Lab and Diagnostic reports from Current Admission: Yes    Patients Prior Functional  Level: Prior Function  ADL Assistance: Independent  Homemaking Assistance: Independent  Ambulation Assistance: Independent  Transfer Assistance: Independent  Additional Comments: ind with ambulation with no AD    Current functional status for upper extremity ADLs: moderate assistance    Current functional status for lower extremity ADLs: dependent    Current functional status for bed, chair, wheelchair transfers: contact guard assistance    Current functional status for toilet transfers: contact gaurd assistance    Current functional status for locomotion: Assistive Device: Rolling Walker  Assist Level:  [employer], with verbal cues  and with increased time for completion. Distance: from EOB to chair  vcs provided for technique with body mechanics to maintain WB status on LLE.         Current functional status for bladder management: Modified independence    Current functional status for bowel management:Modified independence    Current functional status for comprehension: Complete independence    Current functional status for expression: Complete independence    Current functional status for social interaction: Complete independence    Current functional status for problem solving: Complete independence    Current functional status for memory:Complete independence    Expected level of Improvement in Self-Care:  Complete independence    Expected level of Improvement in Sphincter Control:  Complete independence    Expected level of Improvement in Transfers: Complete independence    Expected level of Improvement in Locomotion:  Complete independence    Expected level of Improvement in Communication and Social Cognition: Complete independence    Expected length of time to achieve that level of improvement: 2 weeks    Current rehab issues: ADL dysfunction,bladder management,bowel management,carry over of therapy techniques, discharge planning, disease and co-morbidity management, gait/mobility dysfunction, medication management, nutrition and hydration management,Ongoing assessment of safety, Pain management, Patient and family education, Prevention of secondary complications, Skin Integrity    Required therapy: Physical Therapy, Occupational Therapy 3 hours per day, 5-6 days per week. Recreational Therapy 1 hour per week.     Expected Discharge Destination: Home    Expected Post Discharge Treatments: Out Patient    Other information relevant to the care needs:   Lives With: Family  Type of Home: House  Home Layout: Two level, Able to Live on Main level with bedroom/bathroom  Home Access: Stairs to enter with rails  Entrance Stairs - Number of Steps: 4+4  Entrance Stairs - Rails: Left  Bathroom Shower/Tub: Tub/Shower unit, Shower chair with back  Bathroom Toilet: Standard  Bathroom Equipment: Shower chair  Home Equipment: Cane, Rolling walker, Standard walker, [address]  ADL Assistance: Independent  Homemaking Assistance: Independent  Homemaking Responsibilities: Yes  Ambulation Assistance: Independent  Transfer Assistance: Independent  Active : Yes  Leisure & Hobbies: crafts, gardening, playing cards  Additional Comments: ind with ambulation with no AD    Acute Inpatient Rehabilitation Disclosure Statement provided to patient. Patient verbalized understanding. I have reviewed and concur with the findings and results of the pre-admission screening assessment completed by the Inpatient Rehabilitation Admissions Coordinator.         Jessica Veronica M.D.

## 2021-08-06 NOTE — PLAN OF CARE
Problem: Pain:  Goal: Pain level will decrease  Description: Pain level will decrease  Outcome: Ongoing  Note: Pt report pain at  2/10 on scale. Pt states oral medication and repositioning are helping to achieve pain goal of a 2/10 on scale. Problem: Falls - Risk of:  Goal: Will remain free from falls  Description: Will remain free from falls  Outcome: Ongoing  Note: Pt free from falls this shift. Pt able to use call light to request assistance. Fall prevention measures in place. Problem: Skin Integrity:  Goal: Will show no infection signs and symptoms  Description: Will show no infection signs and symptoms  Outcome: Ongoing  Note: No fevers, tachycardia, hypotension noted. Pt denies any complaints. Surgical dressing dry and intact. Pt repositioned very two hours and prn         Problem: Nutritional:  Goal: Ability to attain and maintain optimal nutritional status will improve  Description: Ability to attain and maintain optimal nutritional status will improve  Outcome: Ongoing  Note: Pt on regular diet. No n/v noted    Problem: Physical Regulation:  Goal: Will remain free from infection  Description: Will remain free from infection  Outcome: Ongoing  Note: Pt afebrile this shift. Problem: Respiratory:  Goal: Ability to maintain adequate ventilation will improve  Description: Ability to maintain adequate ventilation will improve  Outcome: Ongoing  Note: Pt on 2L via NC. LS clear diminished throughout. Problem: Cardiovascular  Goal: No DVT, peripheral vascular complications  Outcome: Ongoing  Note: Denies chest pain and calf tenderness. No other s/s of DVTs noted. Optimal peripheral tissue perfusion. Problem: GI  Goal: No bowel complications  Outcome: Ongoing  Note: No bowel complications noted. BS active and passing gas. Taking appropriate medication to help to have a BM.      Problem:   Goal: Adequate urinary output  Outcome: Ongoing  Note: Pt voiding adequate amounts of yellow, clear urine. Pt has  episodes of incontinence. Problem: DISCHARGE BARRIERS  Goal: Patient's continuum of care needs are met  Outcome: Ongoing  Note: Pt plans discharged home with family support. Care plan reviewed with patient . Patient verbalizes understanding of the plan of care and contribute to goal setting.

## 2021-08-06 NOTE — PROGRESS NOTES
[address] [facility]  Occupational Therapy  Daily Note  Time:   Time In: 0800  Time Out: 0838  Timed Code Treatment Minutes: 45 Minutes  Minutes: 38          Date: 2021  Patient Name: Thelma Haskins,   Gender: female      Room: [facility]16/016-A  MRN: [id number]  : 1950  (73 y.o.)  Referring Practitioner: Rody Mcclelland MD  Diagnosis: Closed left hip fracture, initial encounter  Additional Pertinent Hx: The patient is a 73 y.o. female with presentation of left hip pain post acute injury s/p mechanical fall. Patient states she had left JATINDER done  and right JATINDER done . She has had no issues with her left hip and was ambulating fine on her own without assistance. She had a mechanical fall on 21 causing injury to left hip. She came to [facility] ED and had xrays showing left hip periprosthetic fracture and ortho was consulted. Patient is s/p ORIF left greater troch fracture, left hip arthrotomy  by Dr. Ge Carter. Restrictions/Precautions:  Restrictions/Precautions: Weight Bearing, General Precautions, Fall Risk  Left Lower Extremity Weight Bearing: Toe Touch Weight Bearing  Partial Weight Bearing Percentage Or Pounds: 25%  Required Braces or Orthoses  Left Lower Extremity Brace:  (Hip ABD Brace)  Position Activity Restriction  Hip Precautions: No hip flexion > 90 degrees, No active ABduction, No hip internal rotation, No hip external rotation  Other position/activity restrictions: 1L at rest, 2L with activity home O2     SUBJECTIVE: Pt. Nurse approved OT session. Pt. In bed upon arrival and agreeable to OT treatment. Pt. Became tearful during treatment due to her situation states she just wants to go back home. Pt. Offered positive reinforcement and discussed discharge options and benefit of continued rehab care in a skilled setting prior to discharge.     PAIN: 1/10:R hip    Vitals: Vitals not assessed per clinical judgement, see nursing flowsheet    COGNITION: Decreased Insight, Decreased Problem Solving and Decreased Safety Awareness    ADL:   Grooming: Stand By Assistance and with set-up.  to complete hair care while seated  Bathing: Stand By Assistance, Dependent, with set-up, with verbal cues  and with increased time for completion. SBA for seated portions, Dependent to complete pericare and below knees  Upper Extremity Dressing: Moderate Assistance and with set-up. to don [facility] gown  Lower Extremity Dressing: Dependent and with set-up. to don nate hose. Toileting: Pt. Incontinent of bladder upon entry, Dependent with cyrus care. BALANCE:  Sitting Balance:  Stand By Assistance, with cues for safety. while seated on EOB for bathing  Standing Balance: [address], with cues for safety. BED MOBILITY:  Supine to Sit: Moderate Assistance, Maximum Assistance, with head of bed raised, with rail, with verbal cues , with increased time for completion to bring BLE's to side of bed   Sit to Supine: Moderate Assistance, Maximum Assistance to guide LE's into bed    TRANSFERS:  Sit to Stand:  [address], with increased time for completion, cues for hand placement, with verbal cues. Stand to Sit: [address], with increased time for completion, cues for hand placement, with verbal cues. FUNCTIONAL MOBILITY:  Assistive Device: Rolling Walker  Assist Level:  Contact Guard Assistance, with verbal cues  and with increased time for completion. Distance: from EOB to chair  vcs provided for technique with body mechanics to maintain WB status on LLE. ASSESSMENT:     Activity Tolerance:  Patient tolerance of  treatment: fair.        Discharge Recommendations: Patient would benefit from continued therapy after discharge, Continue to assess pending progress   Equipment Recommendations: Equipment Needed:  (Continue to monitor for needs)  Plan: Times per week: 6x  Current Treatment Recommendations: Strengthening, Balance Training, Functional Mobility Training, Endurance Training, Self-Care / ADL    Patient Education  Patient Education: ADL's, Importance of Increasing Activity and Assistive Device Safety and safety with functional mobility and transfers. Goals  Short term goals  Time Frame for Short term goals: by discharge  Short term goal 1: Patient to increase activity tolerance to/from BR and HH distances with SBA and no > 2 cues for TTWB to increase indep in home environment. Short term goal 2: Patient to complete LB ADL with SBA and no >2 cues to increase indep dressing tasks. Short term goal 3: Patient to tolerate dynamic standing task with SBA and unilateral release to increase indep in sinkside grooming tasks. Short term goal 4: Patient to complete BADL routine with no > min A and <2 cues for WB/hip precautions to increase indep in ADL completion. Following session, patient left in safe position with all fall risk precautions in place.

## 2021-08-06 NOTE — FLOWSHEET NOTE
[facility] PROGRESS NOTE      Patient: Vinny Kamara  Room #: 10K-16/56-A            YOB: 1950  Age: 73 y.o. Gender: female            Admit Date & Time: 7/30/2021  9:41 PM    Assessment:  Earlier, Teressa Worley was with the doctors who were determining where she will go for rehab. She is now in bed and seems sleepy and tired. She is a 73year old female. No family is in the room at this time. Interventions:  Prayer for her continued healing and strength. Outcomes:  encouraged    Plan:    1.going to  for Rehab.      Electronically signed by Aamir Rodas, on 8/6/2021 at 12:01 PM.  [address] [facility]  [phone]

## 2021-08-06 NOTE — FLOWSHEET NOTE
08/06/21 1740   Provider Notification   Reason for Communication Review case  (Psychology Consult for coping)   Provider Name Dr. Maria Alejandra Garcia   Provider Notification Physician   Method of Communication Secure Message   Response Waiting for response

## 2021-08-07 LAB
AEROBIC CULTURE: NORMAL
AEROBIC CULTURE: NORMAL
ANAEROBIC CULTURE: NORMAL
ANAEROBIC CULTURE: NORMAL
ANION GAP SERPL CALCULATED.3IONS-SCNC: 7 MEQ/L (ref 8–16)
BASOPHILS # BLD: 0.8 %
BASOPHILS ABSOLUTE: 0 THOU/MM3 (ref 0–0.1)
BUN BLDV-MCNC: 15 MG/DL (ref 7–22)
CALCIUM SERPL-MCNC: 8.2 MG/DL (ref 8.5–10.5)
CHLORIDE BLD-SCNC: 104 MEQ/L (ref 98–111)
CO2: 27 MEQ/L (ref 23–33)
CREAT SERPL-MCNC: 0.7 MG/DL (ref 0.4–1.2)
EOSINOPHIL # BLD: 10.9 %
EOSINOPHILS ABSOLUTE: 0.4 THOU/MM3 (ref 0–0.4)
ERYTHROCYTE [DISTWIDTH] IN BLOOD BY AUTOMATED COUNT: 13.7 % (ref 11.5–14.5)
ERYTHROCYTE [DISTWIDTH] IN BLOOD BY AUTOMATED COUNT: 49.9 FL (ref 35–45)
GFR SERPL CREATININE-BSD FRML MDRD: 83 ML/MIN/1.73M2
GLUCOSE BLD-MCNC: 87 MG/DL (ref 70–108)
GRAM STAIN RESULT: NORMAL
GRAM STAIN RESULT: NORMAL
HCT VFR BLD CALC: 29.6 % (ref 37–47)
HEMOGLOBIN: 9 GM/DL (ref 12–16)
IMMATURE GRANS (ABS): 0.02 THOU/MM3 (ref 0–0.07)
IMMATURE GRANULOCYTES: 0.5 %
LYMPHOCYTES # BLD: 26 %
LYMPHOCYTES ABSOLUTE: 1 THOU/MM3 (ref 1–4.8)
MCH RBC QN AUTO: 30.6 PG (ref 26–33)
MCHC RBC AUTO-ENTMCNC: 30.4 GM/DL (ref 32.2–35.5)
MCV RBC AUTO: 100.7 FL (ref 81–99)
MONOCYTES # BLD: 5.7 %
MONOCYTES ABSOLUTE: 0.2 THOU/MM3 (ref 0.4–1.3)
NUCLEATED RED BLOOD CELLS: 0 /100 WBC
PLATELET # BLD: 251 THOU/MM3 (ref 130–400)
PMV BLD AUTO: 10.4 FL (ref 9.4–12.4)
POTASSIUM REFLEX MAGNESIUM: 4 MEQ/L (ref 3.5–5.2)
PREALBUMIN: 11.4 MG/DL (ref 20–40)
RBC # BLD: 2.94 MILL/MM3 (ref 4.2–5.4)
SEG NEUTROPHILS: 56.1 %
SEGMENTED NEUTROPHILS ABSOLUTE COUNT: 2.1 THOU/MM3 (ref 1.8–7.7)
SODIUM BLD-SCNC: 138 MEQ/L (ref 135–145)
WBC # BLD: 3.7 THOU/MM3 (ref 4.8–10.8)

## 2021-08-07 PROCEDURE — 94760 N-INVAS EAR/PLS OXIMETRY 1: CPT

## 2021-08-07 PROCEDURE — 1180000000 HC REHAB R&B

## 2021-08-07 PROCEDURE — 97110 THERAPEUTIC EXERCISES: CPT

## 2021-08-07 PROCEDURE — 97162 PT EVAL MOD COMPLEX 30 MIN: CPT

## 2021-08-07 PROCEDURE — 36415 COLL VENOUS BLD VENIPUNCTURE: CPT

## 2021-08-07 PROCEDURE — 6370000000 HC RX 637 (ALT 250 FOR IP): Performed by: PHYSICAL MEDICINE & REHABILITATION

## 2021-08-07 PROCEDURE — 80048 BASIC METABOLIC PNL TOTAL CA: CPT

## 2021-08-07 PROCEDURE — 97166 OT EVAL MOD COMPLEX 45 MIN: CPT

## 2021-08-07 PROCEDURE — 97535 SELF CARE MNGMENT TRAINING: CPT

## 2021-08-07 PROCEDURE — 97530 THERAPEUTIC ACTIVITIES: CPT

## 2021-08-07 PROCEDURE — 2700000000 HC OXYGEN THERAPY PER DAY

## 2021-08-07 PROCEDURE — 84134 ASSAY OF PREALBUMIN: CPT

## 2021-08-07 PROCEDURE — 6360000002 HC RX W HCPCS: Performed by: PHYSICAL MEDICINE & REHABILITATION

## 2021-08-07 PROCEDURE — 94640 AIRWAY INHALATION TREATMENT: CPT

## 2021-08-07 PROCEDURE — 85025 COMPLETE CBC W/AUTO DIFF WBC: CPT

## 2021-08-07 PROCEDURE — 97542 WHEELCHAIR MNGMENT TRAINING: CPT

## 2021-08-07 RX ADMIN — DOCUSATE SODIUM 100 MG: 100 CAPSULE ORAL at 21:02

## 2021-08-07 RX ADMIN — ASPIRIN 325 MG: 325 TABLET, DELAYED RELEASE ORAL at 08:48

## 2021-08-07 RX ADMIN — FAMOTIDINE 20 MG: 20 TABLET, FILM COATED ORAL at 08:48

## 2021-08-07 RX ADMIN — ENOXAPARIN SODIUM 40 MG: 40 INJECTION, SOLUTION INTRAVENOUS; SUBCUTANEOUS at 21:02

## 2021-08-07 RX ADMIN — DOCUSATE SODIUM 100 MG: 100 CAPSULE ORAL at 08:48

## 2021-08-07 RX ADMIN — FAMOTIDINE 20 MG: 20 TABLET, FILM COATED ORAL at 21:03

## 2021-08-07 RX ADMIN — TIOTROPIUM BROMIDE INHALATION SPRAY 2 PUFF: 3.12 SPRAY, METERED RESPIRATORY (INHALATION) at 07:24

## 2021-08-07 RX ADMIN — ACETAMINOPHEN 650 MG: 325 TABLET ORAL at 10:33

## 2021-08-07 RX ADMIN — FERROUS SULFATE TAB 325 MG (65 MG ELEMENTAL FE) 325 MG: 325 (65 FE) TAB at 17:15

## 2021-08-07 RX ADMIN — FERROUS SULFATE TAB 325 MG (65 MG ELEMENTAL FE) 325 MG: 325 (65 FE) TAB at 08:48

## 2021-08-07 RX ADMIN — ACETAMINOPHEN 650 MG: 325 TABLET ORAL at 05:32

## 2021-08-07 RX ADMIN — BUDESONIDE AND FORMOTEROL FUMARATE DIHYDRATE 2 PUFF: 160; 4.5 AEROSOL RESPIRATORY (INHALATION) at 07:24

## 2021-08-07 RX ADMIN — OXYCODONE 5 MG: 5 TABLET ORAL at 10:33

## 2021-08-07 RX ADMIN — BUDESONIDE AND FORMOTEROL FUMARATE DIHYDRATE 2 PUFF: 160; 4.5 AEROSOL RESPIRATORY (INHALATION) at 17:04

## 2021-08-07 RX ADMIN — SENNOSIDES 17.2 MG: 8.6 TABLET, FILM COATED ORAL at 21:03

## 2021-08-07 RX ADMIN — LEVOTHYROXINE SODIUM 88 MCG: 0.09 TABLET ORAL at 05:32

## 2021-08-07 RX ADMIN — ACETAMINOPHEN 650 MG: 325 TABLET ORAL at 17:16

## 2021-08-07 ASSESSMENT — PAIN SCALES - GENERAL
PAINLEVEL_OUTOF10: 0
PAINLEVEL_OUTOF10: 8
PAINLEVEL_OUTOF10: 0
PAINLEVEL_OUTOF10: 6
PAINLEVEL_OUTOF10: 2
PAINLEVEL_OUTOF10: 0
PAINLEVEL_OUTOF10: 1

## 2021-08-07 ASSESSMENT — PAIN DESCRIPTION - PAIN TYPE: TYPE: SURGICAL PAIN

## 2021-08-07 ASSESSMENT — PAIN DESCRIPTION - ORIENTATION: ORIENTATION: LEFT

## 2021-08-07 ASSESSMENT — PAIN DESCRIPTION - DESCRIPTORS: DESCRIPTORS: ACHING

## 2021-08-07 ASSESSMENT — PAIN DESCRIPTION - FREQUENCY: FREQUENCY: INTERMITTENT

## 2021-08-07 ASSESSMENT — PAIN DESCRIPTION - LOCATION: LOCATION: HIP;LEG

## 2021-08-07 ASSESSMENT — PAIN DESCRIPTION - PROGRESSION: CLINICAL_PROGRESSION: NOT CHANGED

## 2021-08-07 ASSESSMENT — PAIN - FUNCTIONAL ASSESSMENT: PAIN_FUNCTIONAL_ASSESSMENT: PREVENTS OR INTERFERES SOME ACTIVE ACTIVITIES AND ADLS

## 2021-08-07 ASSESSMENT — PAIN DESCRIPTION - ONSET: ONSET: ON-GOING

## 2021-08-07 NOTE — PROGRESS NOTES
[address] [facility]  EVALUATION    Time:    Time In: 0700  Time Out: 0830  Timed Code Treatment Minutes: 75 Minutes  Minutes: 90          Date: 2021  Patient Name: Justa Cadet,   Gender: female      MRN: [id number]  : 1950  (73 y.o.)  Referring Practitioner: Dr. Mao Greco  Diagnosis: Displaced fracture of greater trochanter Left femur. Additional Pertinent Hx: The patient is a 73 y.o. female with presentation of left hip pain post acute injury s/p mechanical fall. Patient states she had left JATINDER done  and right JATINDER done . She has had no issues with her left hip and was ambulating fine on her own without assistance. She had a mechanical fall on 21 causing injury to left hip. She came to [facility] ED and had xrays showing left hip periprosthetic fracture and ortho was consulted. Patient is s/p ORIF left greater troch fracture, left hip arthrotomy  by Dr. Lisandro Griffin. 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 Admitted to [facility] on 2021. Restrictions/Precautions:  Restrictions/Precautions: Weight Bearing, General Precautions, Fall Risk  Left Lower Extremity Weight Bearing: Toe Touch Weight Bearing  Partial Weight Bearing Percentage Or Pounds: 25%  Position Activity Restriction  Hip Precautions: No hip flexion > 90 degrees, No active ABduction, No hip internal rotation, No hip external rotation  Other position/activity restrictions: 1L at rest, 2L with activity home O2; hip abductor brace to be worn all the time. May remove for hygiene. \"; hx of R lung removal 2021    Subjective  Chart Reviewed: Yes, Orders, Progress Notes, History and Physical  Patient assessed for rehabilitation services?: Yes  Family / Caregiver Present: No    Subjective: cooperative, reporting needing to use toilet, was incontinent of urine    Pain:  Pain Assessment  Patient Currently in while seated in recliner. Hip abd brace trunk portion opened while seated on BSC to wash trunk then straps reapplied    BALANCE:  Sitting Balance:  Stand By Assistance. Standing Balance: Contact Guard Assistance. BED MOBILITY:  Supine to Sit: Moderate Assistance      TRANSFERS:  Sit to Stand:  Minimal Assistance. from EOB    FUNCTIONAL MOBILITY:  Assistive Device: Rolling Walker  Assist Level:  Contact Guard Assistance. Distance: 3ft to [facility] the 4ft to recliner from [facility]        Activity Tolerance:  Patient tolerance of  treatment: fair. Assessment:  Assessment: Pt demo decreased ADL & functional mobility status over PLOF s/p fall with L femur fx. Continued OT recommended to faciliate & educate Pt on safety & adaptative strategies for safely returning to ADLS & IADLs with hip precautions & TTWB status. Performance deficits / Impairments: Decreased functional mobility , Decreased endurance, Decreased ADL status, Decreased safe awareness, Decreased balance, Decreased strength, Decreased high-level IADLs  Prognosis: Fair  Decision Making: Medium Complexity  Safety Devices in place: Yes  Type of devices: All fall risk precautions in place, Gait belt, Call light within reach    Treatment Initiated: Treatment and education initiated within context of evaluation. Evaluation time included review of current medical information, gathering information related to past medical, social and functional history, completion of standardized testing, formal and informal observation of tasks, assessment of data and development of plan of care and goals. Treatment time included skilled education and facilitation of tasks to increase safety and independence with ADL's for improved functional independence and quality of life.     Discharge Recommendations:  Continue to assess pending progress, Home with Home health OT    Patient Education:  OT Education: OT Role, Plan of Care, Transfer Training, Equipment  Barriers to

## 2021-08-07 NOTE — PROGRESS NOTES
[address] [address]  INPATIENT PHYSICAL THERAPY  EVALUATION  [facility] - 7E-[id number]/055-A    Time In: 1000  Time Out: 1105  Timed Code Treatment Minutes: 42 Minutes  Minutes: 65          Date: 2021  Patient Name: Ashok Ayon,  Gender:  female        MRN: [id number]  : 1950  (73 y.o.)      Referring Practitioner: Dr. Ruth Branham  Diagnosis: Displaced Fx of Greater Trochanter Lt femur  Additional Pertinent Hx: The patient is a 73 y.o. female with presentation of left hip pain post acute injury s/p mechanical fall. Patient states she had left JATINDER done  and right JATINDER done . She had a mechanical fall on 21 causing injury to left hip. xrays showing left hip periprosthetic fracture and ortho was consulted. Hx:  Lung removal due to lung CA,  2021     Restrictions/Precautions:  Restrictions/Precautions: Weight Bearing, General Precautions, Fall Risk  Left Lower Extremity Weight Bearing: Toe Touch Weight Bearing  Partial Weight Bearing Percentage Or Pounds: 25%  Required Braces or Orthoses  Left Lower Extremity Brace:  (hip ABD brace)  Position Activity Restriction  Hip Precautions: No hip flexion > 90 degrees, No active ABduction, No hip internal rotation, No hip external rotation  Other position/activity restrictions: hip abductor brace to be worn all the time. May remove for hygiene. \"; hx of R lung removal 2021, O2 at 2 liters with activity, 1 liter at rest    Subjective:  Chart Reviewed: Yes  Patient assessed for rehabilitation services?: Yes  Subjective: Pt seated in recliner. Pleasant and cooperative. O2 at 3 liters and maintained there throughout the session. General:  Overall Orientation Status: Within Normal Limits  Follows Commands: Within Functional Limits    Vision: Within Functional Limits  Vision Exceptions: Wears glasses for reading    Hearing: Within functional limits         Pain: 0/10: at rest.  Increased in Lt hip with activity.   Nsg notified for pain meds during session. Ice pack applied to LT hip at end of session. Vitals: Oxygen: at 3 liters throughout session. Social/Functional History:    Lives With: Son (Son will not be staying with her. Pt reports friends and nieces to stay to assist)  Type of Home: House  Home Layout: Two level, Able to Live on Main level with bedroom/bathroom, 1/2 bath on main level  Home Access: Stairs to enter with rails  Entrance Stairs - Number of Steps: 4+4  Entrance Stairs - Rails: Left  Home Equipment: Cane, Rolling walker, Standard walker, Wheelchair-manual, 4 wheeled walker     Bathroom Shower/Tub: Walk-in shower (on 2nd story)  Bathroom Toilet: Standard  Bathroom Equipment: Shower chair, Grab bars in shower       ADL Assistance: [address] [place] [address] Avenue: Needs assistance (sharies duties with son)  Ambulation Assistance: Independent  Transfer Assistance: Independent    Active : Yes  Leisure & Hobbies: crafts, gardening, playing cards  Additional Comments: Pt reports using no AD PLOF. Currently has bed on 1st level of home, 1/2 bath on 1st level; walk in shower is on 2nd story. OBJECTIVE:  Range of Motion:  Right Lower Extremity: WFL  Left Lower Extremity: [place] maintaining hip precautions    Strength:  Right Lower Extremity: WFL  Left Lower Extremity: WFL ankle df/pf,  knee ext 3-, hip requires mod assist with SLR. ABD deferred per protocol    Balance:  Static Sitting Balance:  Modified Independent  Static Standing Balance: Contact Guard Assistance, with RW support    Bed Mobility:  Rolling to Left: Not tested due to hip ABD/ADD restrictions  Rolling to Right: Not tested, due to c/o pain   Supine to Sit: Moderate Assistance, with LLE off of bed, min at trunk  Sit to Supine: Minimal Assistance, with LLE onto bed     Transfers:  Sit to Stand: Minimal Assistance  Stand to Gun[place] 9, with RW.   Pt unable to perform without AD (premorbid level)    Ambulation:  Contact Guard Assistance  Distance: 6 ft, 11 ft  Surface: Level Tile  Device:Rolling Walker  Gait Deviations:  Slow Bhavani, Decreased Step Length Bilaterally, Decreased Weight Shift Left and Decreased Gait Speed. Pt showing good maintenance of TTWB status LLE  Wheelchair Mobility:  Supervision  Extremities Used: Bilateral Upper Extremities  Type of Chair:Manual  Surface: Level Tile  Distance: 160 ft  Quality: Slow Velocity, Decreased Fluidity and cue x2 to avoid wall on Rt     TOTAL JOINT EXERCISES:  The following exercises were completed to increase range of motion and strength for increased independence with functional mobility:    EXERCISE REPS/SETS   Glut Sets 10/1   Quads Sets 10/1 with manual cues LLE   Ankle Pumps 10/1 BLEs with manual cues to increase df   Heel Slides 10/1 LLE with min assist   Short Arc Quads    Long Arc Quads 5/1 LLE with mod assist   Hip ABDuction 10/1 RLE only. Deferred LLE per protocol   Straight Leg Raise 5/1 LLE with mod assist   Hamstring Curls    Hamstring Curls with Theraband                 Functional Outcome Measures: Not completed       ASSESSMENT:  Activity Tolerance:  Patient tolerance of  treatment: good. Treatment Initiated: Treatment and education initiated within context of evaluation. Evaluation time included review of current medical information, gathering information related to past medical, social and functional history, completion of standardized testing, formal and informal observation of tasks, assessment of data and development of plan of care and goals. Treatment time included skilled education and facilitation of tasks to increase safety and independence with functional mobility for improved independence and quality of life. Assessment:   Body structures, Functions, Activity limitations: Decreased functional mobility , Decreased posture, Decreased endurance, Decreased ROM, Decreased strength, Decreased balance, Increased pain  Assessment: Pt demonstrates a decline in baseline by way of bed mobility, transfers, gait and stairs due to recent fall resulting in Lt hip fracture. Pt now requires min A with transfers and short distance gait with having to maintain TTWB LLE and increased LT hip pain. Pt will benefit from skilled PT to advance with endurance, functional strength and training with techniques for transfers and gait due to change in wt bearing status. Prognosis: Good         Discharge Recommendations:  Discharge Recommendations: Continue to assess pending progress, Patient would benefit from continued therapy after discharge    Patient Education:  PT Education: Goals, Plan of Care, Functional Mobility Training, PT Role, Weight-bearing Education, Precautions, Equipment, Transfer Training, Gait Training, Home Exercise Program    Equipment Recommendations:  Equipment Needed: No (Pt has RW, SW, cane, w/c and 4WW)    Plan:  Times per week: 5x/ wk 90 min, 1x/ wk 30 min  Current Treatment Recommendations: Strengthening, Gait Training, Patient/Caregiver Education & Training, Equipment Evaluation, Education, & procurement, Stair training, Balance Training, Endurance Training, Home Exercise Program, Functional Mobility Training, Transfer Training, Safety Education & Training, Wheelchair Mobility Training    Goals:  Patient goals : Get home in time for granddaughter's wedding  Short term goals  Time Frame for Short term goals: 1 wk  Short term goal 1: Pt to go supine <->sit, cGA with LLE, to get in/out of bed. No rail, bed flat  Short term goal 2: Pt to get up/down from various seated surfaces, CGA, to get up to walk. Short term goal 3: Pt to walk with RW >= 25 ft, TTWB LLE, CGA, to get in/out of restroom  Short term goal 4: Pt to ascend/descend 6\" step in parallel bars, maintaining TTWB LLE, CGA to progress to home entry.   Short term goal 5: Pt to propel w/c, indoor surfaces, Mod I, for home and community mobility  Short term goal 6: Pt to get in/out of car, min +1 for transportation needs. Long term goals  Time Frame for Long term goals : 2 wks  Long term goal 1: Pt to go supine <->sit, Mod I, to get in/out of bed. No rail, bed flat  Long term goal 2: Pt to get up/down from various seated surfaces, Mod I, to get up to walk. Long term goal 3: Pt to walk with RW >= 50 ft, TTWB LLE, Mod I, for home mobility  Long term goal 4: Pt to ascend/descend 4 +4 steps with Lt rail, min +1 for home entry  Long term goal 5: Pt to get in/out of car, SBA +1 for transportation needs. Following session, patient left in safe position with all fall risk precautions in place.

## 2021-08-07 NOTE — PROGRESS NOTES
[address] [address]  INPATIENT PHYSICAL THERAPY  DAILY NOTE  254 [facility] - [id number]-55/055-A    Time In: 1400  Time Out: 1435  Timed Code Treatment Minutes: 35 Minutes  Minutes: 35          Date: 2021  Patient Name: Veronique Kiser,  Gender:  female        MRN: [id number]  : 1950  (73 y.o.)     Referring Practitioner: Dr. Kim Lewis  Diagnosis: Displaced Fx of Greater Trochanter Lt femur  Additional Pertinent Hx: The patient is a 73 y.o. female with presentation of left hip pain post acute injury s/p mechanical fall. Patient states she had left JATINDER done  and right JATINDER done . She had a mechanical fall on 21 causing injury to left hip. xrays showing left hip periprosthetic fracture and ortho was consulted. Hx:  Lung removal due to lung CA,  2021     Prior Level of Function:  Lives With: Son (Son will not be staying with her. Pt reports friends and nieces to stay to assist)  Type of Home: House  Home Layout: Two level, Able to Live on Main level with bedroom/bathroom, 1/2 bath on main level  Home Access: Stairs to enter with rails  Entrance Stairs - Number of Steps: 4+4  Entrance Stairs - Rails: Left  Home Equipment: Cane, Rolling walker, Standard walker, Wheelchair-manual, 4 wheeled walker   Bathroom Shower/Tub: Walk-in shower (on 2nd story)  Bathroom Toilet: Standard  Bathroom Equipment: Shower chair, Grab bars in shower    ADL Assistance: [address] [place] [address] Avenue: Needs assistance (sharies duties with son)  Ambulation Assistance: Independent  Transfer Assistance: Independent  Active : Yes  Additional Comments: Pt reports using no AD PLOF. Currently has bed on 1st level of home, 1/2 bath on 1st level; walk in shower is on 2nd story.     Restrictions/Precautions:  Restrictions/Precautions: Weight Bearing, General Precautions, Fall Risk  Left Lower Extremity Weight Bearing: Toe Touch Weight Bearing  Partial Weight Bearing Percentage Or Pounds: & Training, Equipment Evaluation, Education, & procurement, Stair training, Balance Training, Endurance Training, Home Exercise Program, Functional Mobility Training, Transfer Training, Safety Education & Training, Wheelchair Mobility Training    Patient Education  Patient Education: Home Exercise Program, [employer] Mobility, Transfers, Gait,  - Patient Verbalized Understanding, - Patient Requires Continued Education    Goals:  Patient goals : Get home in time for granddaughter's wedding  Short term goals  Time Frame for Short term goals: 1 wk  Short term goal 1: Pt to go supine <->sit, cGA with LLE, to get in/out of bed. No rail, bed flat  Short term goal 2: Pt to get up/down from various seated surfaces, CGA, to get up to walk. Short term goal 3: Pt to walk with RW >= 25 ft, TTWB LLE, CGA, to get in/out of restroom  Short term goal 4: Pt to ascend/descend 6\" step in parallel bars, maintaining TTWB LLE, CGA to progress to home entry. Short term goal 5: Pt to propel w/c, indoor surfaces, Mod I, for home and community mobility  Short term goal 6: Pt to get in/out of car, min +1 for transportation needs. Long term goals  Time Frame for Long term goals : 2 wks  Long term goal 1: Pt to go supine <->sit, Mod I, to get in/out of bed. No rail, bed flat  Long term goal 2: Pt to get up/down from various seated surfaces, Mod I, to get up to walk. Long term goal 3: Pt to walk with RW >= 50 ft, TTWB LLE, Mod I, for home mobility  Long term goal 4: Pt to ascend/descend 4 +4 steps with Lt rail, min +1 for home entry  Long term goal 5: Pt to get in/out of car, SBA +1 for transportation needs. Following session, patient left in safe position with all fall risk precautions in place.

## 2021-08-07 NOTE — PLAN OF CARE
Problem: Nutrition  Goal: Optimal nutrition therapy  Outcome: Ongoing   Nutrition Problem #1: Increased nutrient needs  Intervention: Food and/or Nutrient Delivery: Continue Current Diet, Start Oral Nutrition Supplement  Nutritional Goals: Patient will consume 75% or greater of meals and ONS during LOS

## 2021-08-07 NOTE — DISCHARGE SUMMARY
Orthopaedic Discharge Summary      Patient Identification:   Vinny Kamara   : 1950  MRN: [id number]   Account: [de-identified]      Patient's PCP: NITO Tavares CNP    Admit Date: 2021     Discharge Date: 2021      Admitting Physician: Isela Maxwell DO     Discharge Physician: Nicky Hogan MD     Discharge Diagnoses: Active Hospital Problems    Diagnosis Date Noted    Closed left hip fracture, initial encounter ([facility] [S72.002A] 2021    Tobacco abuse [Z72.0] 2018    CAD (coronary artery disease) [I25.10]     Essential hypertension [I10]     Stage 2 moderate COPD by GOLD classification ([facility] [J44.9] 2013    History of CVA (cerebrovascular accident) [Z86.73] 2013       The patient was seen and examined on day of discharge and this discharge summary is in conjunction with any daily progress note from day of discharge. Operation Performed:     1. Open reduction internal fixation left greater trochanter fracture  2. Left hip arthrotomy with dislocation of hip    Hospital Course:   Vinny Kamara is a 73 y.o. female admitted to [facility] on 2021 for left greater trochanter periprosthetic femur fracture. Patient underwent medical optimization on admission. Patient was taken to surgery 2021. Her femoral stem was noted to be stable but she had a unstable greater trochanter fracture. She subsequently underwent ORIF. Post-operatively the patients diet was advanced as tolerated and their dressing remained clean, dry and intact with no signs of infection. Her hemovac drain was pulled POD 2. The patient remained neurovascularly intact in the lower extremity and had intact pulses distally. Patients calf remained soft and showed no evidence of DVT. The patient was able to move their leg and ankle/foot without any problems post-operatively.   Physical therapy and occupational therapy were consulted and began working with the patient post-operatively. The patient progressed with PT/OT as would be expected and continued to improve through their stay. The patients pain was initially controlled with IV medications but we were able to transition to oral pain medications soon after arrival to the floor and their pain remained under good control through their hospital stay. From a medical standpoint the patient remained stable and continued to have the medicine team follow throughout their stay. The patient will be discharged at this time to Rehab with their current diet restrictions and will continue to follow the precautions outlined to them by us and PT/OT. Consults:     IP CONSULT TO HOSPITALIST  IP CONSULT TO CASE MANAGEMENT  IP CONSULT TO SOCIAL WORK  IP CONSULT TO HOME CARE NEEDS  IP CONSULT TO REHAB/TCU ADMISSION COORDINATOR  IP CONSULT TO PHYSICAL MEDICINE REHAB    Disposition: Rehab  Condition at Discharge: Stable    Code Status:  Full Code     Patient Instructions:    Discharge lab work: none  Activity: 25% WB with no active hip abduction LLE  Diet: No diet orders on file      Follow-up visits:   [facility] and [facility]  [address] [place] [address] Rd  [phone]           Follow up with Dr. Devin Tsai in office in 2 weeks. Patient was instructed on the use of pain medications, the signs and symptoms of infection, and was given our number to call should they have any questions or concerns following discharge.       Discharge Medications:      [facility]   Home Medication Instructions RZR:[id number]    Printed on:08/07/21 0820   Medication Information                      albuterol sulfate HFA (PROVENTIL HFA) 108 (90 Base) MCG/ACT inhaler  Inhale 2 puffs into the lungs every 6 hours as needed for Wheezing             diphenhydrAMINE (BENADRYL) 25 MG tablet  Take 25 mg by mouth every 6 hours as needed for Itching             fluticasone-umeclidin-vilant (TRELEGY ELLIPTA) 100-62.5-25 MCG/INH AEPB  Inhale 1 puff into the lungs daily Rinse mouth with water and spit without swallowing after each dose. guaiFENesin (MUCINEX) 600 MG extended release tablet  Take 1 tablet by mouth 2 times daily             levothyroxine (LEVOTHROID) 88 MCG tablet  Take 1 tablet by mouth daily             oxyCODONE (ROXICODONE) 5 MG immediate release tablet  Take 1 tablet by mouth every 4 hours as needed for Pain for up to 7 days.                      Signed:    Electronically signed by Eris Burton MD on 8/7/2021 at 8:20 AM

## 2021-08-07 NOTE — PLAN OF CARE
Problem: Falls - Risk of:  Goal: Will remain free from falls  Description: Will remain free from falls  Outcome: Ongoing  Note: No falls sustained at this time. Patient alert to call light and uses appropriately to alert staff to needs. Bed/chair alarms in use. Problem: Skin Integrity:  Goal: Absence of new skin breakdown  Description: Absence of new skin breakdown  Outcome: Ongoing  Note: No new skin issues noted this shift. Buttocks has brownish discoloration, continues     Problem: Pain:  Goal: Control of acute pain  Description: Control of acute pain  Outcome: Ongoing  Note: C/o pain to the left hip. On scheduled tylenol and PRN oxycodone      Problem: Bleeding:  Goal: Will show no signs and symptoms of excessive bleeding  Description: Will show no signs and symptoms of excessive bleeding  Outcome: Ongoing  Note: No s/s excessive bleeding noted. On lovenox      Problem: Respiratory  Goal: O2 Sat > 90%  Outcome: Ongoing  Note: VS stable. Spo2 98% on room air. No respiratory distress noted. Problem: IP MOBILITY  Goal: LTG - patient will demonstrate safe mobility requirements  Outcome: Ongoing  Note: 1 assist with walker and gait belt. Hip abductor in place.

## 2021-08-07 NOTE — PROGRESS NOTES
Comprehensive Nutrition Assessment    Type and Reason for Visit:  Initial, Consult (ONS)    Nutrition Recommendations/Plan:   Continue regular diet  ONS start (ensure enlive TID)  Bowel meds per MD - colace, glycolax, prn senna and milk of mag    Nutrition Assessment:    Pt. nutritionally compromised AEB intake noted at 1-50%. At risk for further nutrition compromise r/t admitted with femur fx, recent open reduction (8/2), increased nutrient needs for healing, recent lung resection (6/14), not a water drinker, patient reports weight loss but cannot verify via EMR, and underlying medical condition (hx: current every day smoker, anxiety, breast cancer, CAD, skin cancer, chronic UTI, COPD, CVA, Depression, Facial injury, stroke, HLD, HTN, hypothyroidism, IBS, lung cancer, recurrent UTI). Nutrition recommendations/interventions as per above. Malnutrition Assessment:  Malnutrition Status: At risk for malnutrition (Comment)    Context:  Acute Illness     Findings of the 6 clinical characteristics of malnutrition:  Energy Intake:  1 - 75% or less of estimated energy requirements for 7 or more days  Weight Loss:  No significant weight loss (per available data in EMR)     Body Fat Loss:  No significant body fat loss     Muscle Mass Loss:  No significant muscle mass loss    Fluid Accumulation:  1 - Mild Extremities   Strength:  Not Performed    Estimated Daily Nutrient Needs:  Energy (kcal):  1407-8435 (25-30 kcals/kg); Weight Used for Energy Requirements:  Other (Comment) (most recent weight 65kg on 7/30 via bedscale)     Protein (g):  78+ (1.2+ grams/kg); Weight Used for Protein Requirements:  Other (Comment) (most recent weight of 65kg on 7/30 via bedscale)          Nutrition Related Findings:  Post OR 8/2 for open femur reduction following a fall. Patient had lung resection on 6/14 for lung cancer. Patient seen and reports that she does not need chemo/xRT.  Patient reports that her appetite is not good but that she was never a big eater. Visitor in room reports that patient is not a water drinker. Patient admits to liking to drink pepsi, hot tea, and iced tea. Patient's current intake is noted to be 1-50%. Discussed protein for healing and option of ONS. Patient agrees to try ensure while here. Patient says that she did have it back in 2005 and did not care for it much but is willing to try again. Patient mentions that she weighed 178# in May 2021 (EMR shows a weight of 141# n 6/14/21). Patient says that she now weighes 121# (last documented weight via bedscale was 142# on 7/30). Meds: reviewed: colace, glycolax, iron, prn: senna and milk of mag. Wounds:  Surgical Incision (8/2 - open femur reduction; wound consult - buttock discoloration; non blanchable)       Current Nutrition Therapies:    ADULT DIET;  Regular  Adult Oral Nutrition Supplement; Standard High Calorie/High Protein Oral Supplement    Anthropometric Measures:  · Height: 5' 4\" (162.6 cm)  · Current Body Weight: 142 lb (64.4 kg) (EMR, 7/30, bedscale - patient reports weighing 121#)   · Admission Body Weight: 142 lb (64.4 kg) (EMR, 7/30, bedscale - patient reports weighing 121#)    · Usual Body Weight: 141 lb (64 kg) (EMR, standing scale on 6/14 - patient reports weighing 178# in May 2021)     · Ideal Body Weight: 120 lbs;     · BMI: 24.4  · Adjusted Body Weight:  ; No Adjustment    · BMI Categories: Normal Weight (BMI 22.0 to 24.9) age over 72       Nutrition Diagnosis:   · Increased nutrient needs related to acute injury/trauma as evidenced by wounds    Nutrition Interventions:   Food and/or Nutrient Delivery:  Continue Current Diet, Start Oral Nutrition Supplement  Nutrition Education/Counseling:  Education initiated (8/7 - encouraged increase water intake, encouraged PO at best efforts, encouraged protein for healing)   Coordination of Nutrition Care:  Continue to monitor while inpatient    Goals:  Patient will consume 75% or greater of meals and

## 2021-08-08 PROCEDURE — 1180000000 HC REHAB R&B

## 2021-08-08 PROCEDURE — 2700000000 HC OXYGEN THERAPY PER DAY

## 2021-08-08 PROCEDURE — 6370000000 HC RX 637 (ALT 250 FOR IP): Performed by: PHYSICAL MEDICINE & REHABILITATION

## 2021-08-08 PROCEDURE — 94640 AIRWAY INHALATION TREATMENT: CPT

## 2021-08-08 PROCEDURE — 94760 N-INVAS EAR/PLS OXIMETRY 1: CPT

## 2021-08-08 PROCEDURE — 6360000002 HC RX W HCPCS: Performed by: PHYSICAL MEDICINE & REHABILITATION

## 2021-08-08 RX ADMIN — DOCUSATE SODIUM 100 MG: 100 CAPSULE ORAL at 21:01

## 2021-08-08 RX ADMIN — OXYCODONE 10 MG: 5 TABLET ORAL at 01:40

## 2021-08-08 RX ADMIN — ACETAMINOPHEN 650 MG: 325 TABLET ORAL at 16:22

## 2021-08-08 RX ADMIN — FAMOTIDINE 20 MG: 20 TABLET, FILM COATED ORAL at 21:01

## 2021-08-08 RX ADMIN — ACETAMINOPHEN 650 MG: 325 TABLET ORAL at 09:58

## 2021-08-08 RX ADMIN — TIOTROPIUM BROMIDE INHALATION SPRAY 2 PUFF: 3.12 SPRAY, METERED RESPIRATORY (INHALATION) at 08:20

## 2021-08-08 RX ADMIN — BUDESONIDE AND FORMOTEROL FUMARATE DIHYDRATE 2 PUFF: 160; 4.5 AEROSOL RESPIRATORY (INHALATION) at 17:00

## 2021-08-08 RX ADMIN — DOCUSATE SODIUM 100 MG: 100 CAPSULE ORAL at 09:58

## 2021-08-08 RX ADMIN — ENOXAPARIN SODIUM 40 MG: 40 INJECTION, SOLUTION INTRAVENOUS; SUBCUTANEOUS at 21:01

## 2021-08-08 RX ADMIN — FERROUS SULFATE TAB 325 MG (65 MG ELEMENTAL FE) 325 MG: 325 (65 FE) TAB at 16:22

## 2021-08-08 RX ADMIN — BUDESONIDE AND FORMOTEROL FUMARATE DIHYDRATE 2 PUFF: 160; 4.5 AEROSOL RESPIRATORY (INHALATION) at 08:20

## 2021-08-08 RX ADMIN — FAMOTIDINE 20 MG: 20 TABLET, FILM COATED ORAL at 09:58

## 2021-08-08 RX ADMIN — ACETAMINOPHEN 650 MG: 325 TABLET ORAL at 01:38

## 2021-08-08 RX ADMIN — ASPIRIN 325 MG: 325 TABLET, DELAYED RELEASE ORAL at 09:58

## 2021-08-08 RX ADMIN — ACETAMINOPHEN 650 MG: 325 TABLET ORAL at 05:41

## 2021-08-08 RX ADMIN — FERROUS SULFATE TAB 325 MG (65 MG ELEMENTAL FE) 325 MG: 325 (65 FE) TAB at 09:58

## 2021-08-08 RX ADMIN — OXYCODONE 10 MG: 5 TABLET ORAL at 05:43

## 2021-08-08 RX ADMIN — LEVOTHYROXINE SODIUM 88 MCG: 0.09 TABLET ORAL at 05:41

## 2021-08-08 ASSESSMENT — PAIN SCALES - GENERAL
PAINLEVEL_OUTOF10: 5
PAINLEVEL_OUTOF10: 8
PAINLEVEL_OUTOF10: 8
PAINLEVEL_OUTOF10: 4
PAINLEVEL_OUTOF10: 8
PAINLEVEL_OUTOF10: 8

## 2021-08-09 LAB
BACTERIA: ABNORMAL /HPF
BILIRUBIN URINE: NEGATIVE
BLOOD, URINE: NEGATIVE
CASTS 2: ABNORMAL /LPF
CASTS UA: ABNORMAL /LPF
CHARACTER, URINE: ABNORMAL
COLOR: YELLOW
CRYSTALS, UA: ABNORMAL
EPITHELIAL CELLS, UA: ABNORMAL /HPF
GLUCOSE URINE: NEGATIVE MG/DL
KETONES, URINE: NEGATIVE
LEUKOCYTE ESTERASE, URINE: ABNORMAL
MISCELLANEOUS 2: ABNORMAL
NITRITE, URINE: NEGATIVE
PH UA: 6 (ref 5–9)
PROTEIN UA: ABNORMAL
RBC URINE: ABNORMAL /HPF
RENAL EPITHELIAL, UA: ABNORMAL
SPECIFIC GRAVITY, URINE: 1.02 (ref 1–1.03)
UROBILINOGEN, URINE: 1 EU/DL (ref 0–1)
WBC UA: > 100 /HPF
YEAST: ABNORMAL

## 2021-08-09 PROCEDURE — 87077 CULTURE AEROBIC IDENTIFY: CPT

## 2021-08-09 PROCEDURE — 6370000000 HC RX 637 (ALT 250 FOR IP): Performed by: NURSE PRACTITIONER

## 2021-08-09 PROCEDURE — 97535 SELF CARE MNGMENT TRAINING: CPT

## 2021-08-09 PROCEDURE — 87086 URINE CULTURE/COLONY COUNT: CPT

## 2021-08-09 PROCEDURE — 94640 AIRWAY INHALATION TREATMENT: CPT

## 2021-08-09 PROCEDURE — 81001 URINALYSIS AUTO W/SCOPE: CPT

## 2021-08-09 PROCEDURE — 6360000002 HC RX W HCPCS: Performed by: PHYSICAL MEDICINE & REHABILITATION

## 2021-08-09 PROCEDURE — 97530 THERAPEUTIC ACTIVITIES: CPT

## 2021-08-09 PROCEDURE — 92523 SPEECH SOUND LANG COMPREHEN: CPT

## 2021-08-09 PROCEDURE — 6370000000 HC RX 637 (ALT 250 FOR IP): Performed by: PHYSICAL MEDICINE & REHABILITATION

## 2021-08-09 PROCEDURE — 1180000000 HC REHAB R&B

## 2021-08-09 PROCEDURE — 2580000003 HC RX 258: Performed by: PHYSICAL MEDICINE & REHABILITATION

## 2021-08-09 PROCEDURE — 87186 SC STD MICRODIL/AGAR DIL: CPT

## 2021-08-09 PROCEDURE — 97110 THERAPEUTIC EXERCISES: CPT

## 2021-08-09 PROCEDURE — 99233 SBSQ HOSP IP/OBS HIGH 50: CPT | Performed by: NURSE PRACTITIONER

## 2021-08-09 RX ORDER — NITROFURANTOIN 25; 75 MG/1; MG/1
100 CAPSULE ORAL EVERY 12 HOURS SCHEDULED
Status: DISCONTINUED | OUTPATIENT
Start: 2021-08-09 | End: 2021-08-10 | Stop reason: ALTCHOICE

## 2021-08-09 RX ORDER — SODIUM CHLORIDE 0.9 % (FLUSH) 0.9 %
5-40 SYRINGE (ML) INJECTION 2 TIMES DAILY
Status: DISCONTINUED | OUTPATIENT
Start: 2021-08-09 | End: 2021-08-20 | Stop reason: HOSPADM

## 2021-08-09 RX ORDER — MORPHINE SULFATE 10 MG/5ML
10 SOLUTION ORAL EVERY 4 HOURS PRN
Status: DISCONTINUED | OUTPATIENT
Start: 2021-08-09 | End: 2021-08-20 | Stop reason: HOSPADM

## 2021-08-09 RX ORDER — ONDANSETRON 4 MG/1
4 TABLET, ORALLY DISINTEGRATING ORAL EVERY 8 HOURS PRN
Status: DISCONTINUED | OUTPATIENT
Start: 2021-08-09 | End: 2021-08-20 | Stop reason: HOSPADM

## 2021-08-09 RX ORDER — MORPHINE SULFATE 10 MG/5ML
5 SOLUTION ORAL EVERY 4 HOURS PRN
Status: DISCONTINUED | OUTPATIENT
Start: 2021-08-09 | End: 2021-08-20 | Stop reason: HOSPADM

## 2021-08-09 RX ADMIN — ACETAMINOPHEN 650 MG: 325 TABLET ORAL at 05:05

## 2021-08-09 RX ADMIN — FAMOTIDINE 20 MG: 20 TABLET, FILM COATED ORAL at 07:37

## 2021-08-09 RX ADMIN — NITROFURANTOIN MONOHYDRATE/MACROCRYSTALLINE 100 MG: 25; 75 CAPSULE ORAL at 23:01

## 2021-08-09 RX ADMIN — LEVOTHYROXINE SODIUM 88 MCG: 0.09 TABLET ORAL at 05:05

## 2021-08-09 RX ADMIN — DOCUSATE SODIUM 100 MG: 100 CAPSULE ORAL at 07:37

## 2021-08-09 RX ADMIN — DOCUSATE SODIUM 100 MG: 100 CAPSULE ORAL at 23:01

## 2021-08-09 RX ADMIN — POLYETHYLENE GLYCOL 3350 17 G: 17 POWDER, FOR SOLUTION ORAL at 07:37

## 2021-08-09 RX ADMIN — ACETAMINOPHEN 650 MG: 325 TABLET ORAL at 23:02

## 2021-08-09 RX ADMIN — FERROUS SULFATE TAB 325 MG (65 MG ELEMENTAL FE) 325 MG: 325 (65 FE) TAB at 07:37

## 2021-08-09 RX ADMIN — SODIUM CHLORIDE, PRESERVATIVE FREE 10 ML: 5 INJECTION INTRAVENOUS at 23:01

## 2021-08-09 RX ADMIN — FERROUS SULFATE TAB 325 MG (65 MG ELEMENTAL FE) 325 MG: 325 (65 FE) TAB at 16:15

## 2021-08-09 RX ADMIN — ACETAMINOPHEN 650 MG: 325 TABLET ORAL at 11:57

## 2021-08-09 RX ADMIN — ENOXAPARIN SODIUM 40 MG: 40 INJECTION, SOLUTION INTRAVENOUS; SUBCUTANEOUS at 23:02

## 2021-08-09 RX ADMIN — OXYCODONE 10 MG: 5 TABLET ORAL at 07:37

## 2021-08-09 RX ADMIN — TIOTROPIUM BROMIDE INHALATION SPRAY 2 PUFF: 3.12 SPRAY, METERED RESPIRATORY (INHALATION) at 05:45

## 2021-08-09 RX ADMIN — BUDESONIDE AND FORMOTEROL FUMARATE DIHYDRATE 2 PUFF: 160; 4.5 AEROSOL RESPIRATORY (INHALATION) at 18:34

## 2021-08-09 RX ADMIN — FAMOTIDINE 20 MG: 20 TABLET, FILM COATED ORAL at 23:01

## 2021-08-09 RX ADMIN — ONDANSETRON 4 MG: 4 TABLET, ORALLY DISINTEGRATING ORAL at 09:30

## 2021-08-09 RX ADMIN — BUDESONIDE AND FORMOTEROL FUMARATE DIHYDRATE 2 PUFF: 160; 4.5 AEROSOL RESPIRATORY (INHALATION) at 05:45

## 2021-08-09 RX ADMIN — ASPIRIN 325 MG: 325 TABLET, DELAYED RELEASE ORAL at 07:37

## 2021-08-09 ASSESSMENT — PAIN DESCRIPTION - DESCRIPTORS: DESCRIPTORS: ACHING

## 2021-08-09 ASSESSMENT — PAIN DESCRIPTION - ORIENTATION
ORIENTATION: LEFT
ORIENTATION: LEFT

## 2021-08-09 ASSESSMENT — PAIN DESCRIPTION - LOCATION
LOCATION: HIP
LOCATION: HIP

## 2021-08-09 ASSESSMENT — PAIN DESCRIPTION - PROGRESSION
CLINICAL_PROGRESSION: NOT CHANGED

## 2021-08-09 ASSESSMENT — PAIN - FUNCTIONAL ASSESSMENT: PAIN_FUNCTIONAL_ASSESSMENT: ACTIVITIES ARE NOT PREVENTED

## 2021-08-09 ASSESSMENT — PAIN SCALES - GENERAL
PAINLEVEL_OUTOF10: 1
PAINLEVEL_OUTOF10: 7
PAINLEVEL_OUTOF10: 7
PAINLEVEL_OUTOF10: 4
PAINLEVEL_OUTOF10: 7
PAINLEVEL_OUTOF10: 4

## 2021-08-09 NOTE — PROGRESS NOTES
[facility]  Diagnosis List for Inpatient Rehab facility (IRF) - Patient Assessment Instrument (SAMANTA)    Patient Name: Ashok Ayon        MRN: [id number]    : 1950  (73 y.o.)  Gender: female     Primary impairment requiring rehabilitation: 8.11 Unilateral Hip Fracture     Etiologic Diagnosis that led to the condition: Left greater trochanter fracture    Comorbid conditions affecting rehabilitation:  Left hip fracture   S/p ORIF with Dr Emil Rivera 21  bilateral hip replacement  lung resection /2 neoplasm  COPD - stage 2  Tobacco abuse  Anemia  Hx of CVA  Essential HTN  CAD  Hx of Breast cancer  Depression  Nausea and vomiting   UTI,  enteric gram negative bacilli  Diarrhea  Chest discomfort         Melvin Beckett M.D.

## 2021-08-09 NOTE — PLAN OF CARE
Problem: DISCHARGE BARRIERS  Goal: Patient's continuum of care needs are met  Note:   [address] [address]  Physical Medicine Case Management Assessment    [x] Inpatient Rehabilitation Unit      Patient Name: Veronique Kiser        MRN: [id number]    : 1950  (73 y.o.)  Gender: female     Date of Admission: 2021      Family/Social/Home Environment: Prior to hospitalization, patient was independent with ADL's and personal care. Patient with recent hospitalization and surgery in 2020, but reports was able to return to independence since hospitalization. Patient was discharged home in  with home health care, but once home felt that it was not needed and cancelled services. Patient lives with son, Inga Olmedo. Patient reports they have lived together for a few years. Patient reports goal is to be able to return to own home with support from son, Inga Olmedo, friends, and niece. Patient reports that friends and niece will not be able to stay all the time, but able to check-in frequently. Patient was driving, managing own finances, managing own medications, completing laundry, housekeeping, errands, and meal preparation. Patient reports that son, Inga Olmedo, does assist with errands and meal preparation. Patient was not using any medical equipment for ambulation. Since discharge from hospital in , patient qualified for home oxygen. Home oxygen supplied through [address] [place] [address]. Patient reports current smoker. SW discussed concerns regarding smoking and oxygen use. Patient reports only smoking outside and never leaving oxygen on while smoking. Patient reports not having a cigarette since 2021, but the first thing she is going to do when she gets home is to get a cigarette. Patient has son, Rayshawn Sanchez, that lives in [address], but works full time. Patient's son, Rosa Fernandez, lives in [place], but his wife just passed away. Patient reports having one dog and three birds at home to care for. Social/Functional History  Lives With: Son   Type of Home: House  Home Layout: Two level, Able to Live on Main level with bedroom/bathroom, 1/2 bath on main level  Home Access: Stairs to enter with rails  Entrance Stairs - Number of Steps: 4+4  Entrance Stairs - Rails: Left  Bathroom Shower/Tub: Walk-in shower (on 2nd story)  Bathroom Toilet: Standard  Bathroom Equipment: Shower chair, Grab bars in shower  Home Equipment: Cane, Rolling walker, Standard walker, [address], 4 wheeled walker  ADL Assistance: [facility]: Needs assistance (sharies duties with son)  Ambulation Assistance: Independent  Transfer Assistance: Independent  Active : Yes  Leisure & Hobbies: crafts, gardening, playing cards  Additional Comments: Pt reports using no AD PLOF. Currently has bed on 1st level of home, 1/2 bath on 1st level; walk in shower is on 2nd story. Contact/Guardian Information: Masha Wesley, [phone]. Mindi Savage, Son, [phone]. Community Resources Utilized: No community resources utilized prior to admission. Sexuality/Intimacy: No issues or concerns identified at time of SW assessment. Complementary Health Approaches: Patient with no current interest in complementary health approaches at time of SW assessment. Anticipated Needs/Discharge Plans: Anticipate patient would benefit from continued therapy upon discharge. SW met with patient to introduce self and explain role, complete SW assessment, and initiate discharge planning. Prior to hospitalization, patient was independent with ADL's and personal care. Patient with recent hospitalization and surgery in June 2020, but reports was able to return to independence since hospitalization. Patient was discharged home in June with home health care, but once home felt that it was not needed and cancelled services. Patient lives with son, Maylin Del Angel. Patient reports they have lived together for a few years.  Patient reports goal is to be able to return to own home with support from son, Anthony Vasquez, friends, and niece. Patient reports that friends and niece will not be able to stay all the time, but able to check-in frequently. Patient was driving, managing own finances, managing own medications, completing laundry, housekeeping, errands, and meal preparation. Patient reports that son, Anthony aVsquez, does assist with errands and meal preparation. Patient was not using any medical equipment for ambulation. Since discharge from hospital in June, patient qualified for home oxygen. Home oxygen supplied through [facility]. Patient reports current smoker. SW discussed concerns regarding smoking and oxygen use. Patient reports only smoking outside and never leaving oxygen on while smoking. Patient reports not having a cigarette since 07/31/2021, but the first thing she is going to do when she gets home is to get a cigarette. Patient has son, Sergio Hernandez, that lives in [facility], but works full time. Patient's son, Gino Zavala, lives in [place], but his wife just passed away. Patient reports having one dog and three birds at home to care for. Anticipate patient would benefit from continued therapy upon discharge. SW reviewed with patient team conference on Wednesday, 08/11/2021, to further discuss progress and discharge recommendations. SW to follow and maintain involvement in discharge planning.            Discharge Planning  Living Arrangements: Children  Support Systems: Children, Family Members  Potential Assistance Needed: Home Care  Potential Assistance Purchasing Medications: No  Meds-to-Beds: Does the patient want to have any new prescriptions delivered to bedside prior to discharge?: No (Patient prefers to use [employer] [facility] on [address] for discharge medications.)  Type of Home Care Services: PT, OT, Nursing Services, [address]  Patient expects to be discharged to[de-identified] [place]  Expected Discharge Date:  (Undetermined)  Follow Up Appointment: Best Day/Time : Tuesday AM    Electronically signed by TC Kumar on 8/9/2021 at 3:11 PM

## 2021-08-09 NOTE — PROGRESS NOTES
[address] THERAPY MISSED TREATMENT NOTE  [facility]- [address]  [id number]      Date: 2021  Patient Name: Tai Mc        CSN: [id number]   : 1950  (73 y.o.)  Gender: female   Referring Practitioner: Dr. Cathy Henderson  Diagnosis: Displaced fracture of greater trochanter Left femur. REASON FOR MISSED TREATMENT: Patient Refused. Upon arrival, pt reports having an episode of nausea and didn't feel well and was declining therapy. Attempted to encourage patient and even offered in bed activity. Pt originally appeared agreeable to bed exercises, but at the last moment put blankets over her head and said \"I don't want to do anything, I want to rest. We'll see if I want to do anything later. \" Will check back at later scheduled therapy. * Did call [address] [phone] [facility] regarding replacement pads for brace as pt was incontinent earlier this date and pads were soaked with urine. [facility] reports that they will deliver replacement pads today. Notified RN Babetta Apgar.          Rebekah FATIMA/KAREY [id number]

## 2021-08-09 NOTE — PROGRESS NOTES
Obtained clean catch urine after cleaning cyrus area and also had large loose/liquid black stool. Returned to bed after that.

## 2021-08-09 NOTE — PROGRESS NOTES
PT. Awakened at 0740 and voiced slept well. Dressing intact to left hip and covered with occlusive opsite. Pt. Request to get up to wash up stating she was all sweaty. Noted pt. Was incontinent of urine and unaware. Reports calling on call light and staff enter and just shut light off. Encouraged to talk to staff and call as many times as needed so can urinate on commode and not  Get spica brace wet from urine. Brace removed and was slightly damp. Pt. Also voices wanting to go home so she can smoke. Offered and encouraged to discuss with  Use of Nicotine patch. Adamently refused. Discussed use of oxygen at home and pt. Confirms she uses it. When asked states she removes if smoking. Reiterated need to remove if is smoking and why. Meds given with am breakfast but ate poor. During therapy in the gym after breakfast pt. Got nauseated and had 2 small emesis. Notif. Sraah Davila NP and received order of Zofran which was given after therapy done at 0900. Pt. Rested in bed then and reports stomach felt much better at 1000. Refused OT therapy at 1030 stating then her stomach was still upset. Covered head with blanket and refused to interact with staff.

## 2021-08-09 NOTE — PROGRESS NOTES
assessed per clinical judgement, see nursing flowsheet    COGNITION: Slow Processing, Decreased Recall and Impaired Attention    ADL:   Toileting: Minimal Assistance. A to pull down, pt able to wipe and pull up. CGA for balance with min cues for TTWB. Toilet Transfer: Minimal Assistance. BSC, mod cues for hand placement . * Min increased time required for toileting routine     BALANCE:  Sitting Balance:  Supervision. Standing Balance: Contact Guard Assistance. BED MOBILITY:  Supine to Sit: Minimal Assistance A for LLE   Sit to Supine: Minimal Assistance A for LLE     TRANSFERS:  Sit to Stand:  Minimal Assistance. EOB, min cues for hand placement   Stand to Sit: Contact Guard Assistance. FUNCTIONAL MOBILITY:  Assistive Device: Rolling Walker  Assist Level:  Contact Guard Assistance. Distance: x 4 ft in room with mod cues for TTWB adherence       ADDITIONAL ACTIVITIES:  - Patient identified one of their personal goals is to be able to sustain functional standing positions in order to complete various ADL and IADL skills in standing position, such as sinkside grooming or washing dishes. Dynamic standing task was then facilitated to challenge standing endurance and balance with unilateral and bilateral release. Patient required CGA, and demo'ed an endurance of 6 minutes. - Patient completed BUE strengthening exercises with skilled education on HEP: completed x15 reps x1 set with a medium resistive in all joints and all planes in order to improve UE strength and activity tolerance required for BADL routine and toilet / shower transfers. Patient tolerated fair-, requiring mod rest breaks. Patient also required min cues for technique.          ASSESSMENT:     Activity Tolerance:  Patient tolerance of  treatment: fair       Discharge Recommendations: Continue to assess pending progress, Home with Home health OT   Equipment Recommendations: Equipment Needed: Yes  Other: FAISAL DALAL AE kit (FAISAL sykes yuniel arroyo, LH bath sponge)  Plan: Times per week: 90 minutes 5x/wk, 30 minutes 1x/wk  Current Treatment Recommendations: Balance Training, Endurance Training, Functional Mobility Training, Self-Care / ADL, Equipment Evaluation, Education, & procurement, Strengthening    Patient Education  Patient Education: ADL's, Energy Conservation, Home Exercise Program, Precautions, Reviewed Prior Education, Home Safety and Assistive Device Safety    Goals  Short term goals  Time Frame for Short term goals: 1 week  Short term goal 1: Pt will complete various t/fs including BSC with SBA & min vcs for technique  Short term goal 2: Pt will complete LE dressing with mod A, LH AE, & 0-2 vcs for hip precautions  Short term goal 3: Pt will tolerate standing 2 min with 1 UE release & SBA for increased ease of toileting routine  Short term goal 4: Pt will complete mobility to/from BSC/bedside chair with SBA, RW, & 0-2 vcs for TTWB precautions  Short term goal 5: Pt will tolerate BUE moderate resistance band HEP x 10 reps with min RBs to increase UB endurance for ADL tasks  Long term goals  Time Frame for Long term goals : 2 weeks  Long term goal 1: Pt will complete various t/fs including toilet with mod I  Long term goal 2: Pt will complete BADL routine (sponge bath) with S & 0-1 vcs for safety  Long term goal 3: Pt will complete LE dressing with S & LH AE  Long term goal 4: Pt will complete simple meal prep task with RW, S, & good safety awareness for maintaining TTWB (seated when needed to maintain)    Following session, patient left in safe position with all fall risk precautions in place.

## 2021-08-09 NOTE — PROGRESS NOTES
Physical Medicine & Rehabilitation   Progress Note    Chief Complaint:  Left hip fracture. Rehab needs    Subjective:  Patient resting in bed. Kindra Mcbride present and talking with patient. Patient with nausea and vomiting the last two morning. Patient states she has had OJ and doesn't normally drink Orange juice. Also noted to be taking meds on an empty stomach. Discussed with patient about stopping OJ and waiting to take meds until breakfast. Patient understanding. Patient states she doesn't remember having a BM yesterday. patient states she hasn't had a BM since June 18th. attempted to clarify multiple times, patient maintained the 6/18 date for last BM. Planning SLP eval today. Pain up to 7-8/10 with activity, decreases to 2-4/10 with rest and medications. +BM 8/8/21    Rehabilitation:  PT:   Range of Motion:  Right Lower Extremity: WFL  [facility]ity[facility] precautions  Strength:  Right Lower Extremity: WFL  Left Lower Extremity: WFL ankle df/pf,  knee ext 3-, hip requires mod assist with SLR. ABD deferred per protocol  Balance:  Static Sitting Balance:  Modified Independent  Static Standing Balance: Contact Guard Assistance, with RW support  Bed Mobility:  Rolling to Left: Not tested due to hip ABD/ADD restrictions  Rolling to Right: Not tested, due to c/o pain   Supine to Sit: Moderate Assistance, with LLE off of bed, min at trunk  Sit to Supine: Minimal Assistance, with LLE onto bed   Transfers:  Sit to Stand: Minimal Assistance  Stand to [address], with RW. Pt unable to perform without AD (premorbid level)  Ambulation:  Contact Guard Assistance  Distance: 6 ft, 11 ft  Surface: Level Tile  Device:Rolling Walker  Gait Deviations:  Slow Bhavani, Decreased Step Length Bilaterally, Decreased Weight Shift Left and Decreased Gait Speed.   Pt showing good maintenance of TTWB status LLE  Wheelchair Mobility:  Supervision  Extremities Used: Bilateral Upper Extremities  Type of Chair:Manual  Surface: Level Tile  Distance: 160 ft  Quality: Slow Velocity, Decreased Fluidity and cue x2 to avoid wall on Rt    OT:   ADL:   EATING:Setup or clean-up assistance. Kyler Cast CARE Score: 5. ORAL HYGIENE:Supervision or touching assistance. Kyler Cast CARE Score: 4. TOILETING HYGIENE:Substantial/maximal assistance. Pt wiped while seated on [facility], dependent for clothing hygiene. CARE Score: 2. SHOWERING/BATHING:Substantial/maximal assistance. sponge bath on BS; A for B legs & feet, bottom. CARE Score: 2.     UPPER BODY DRESSING:Partial/moderate assistance. Kyler Cast CARE Score: 3. LOWER BODY DRESSING:Substantial/maximal assistance. Kyler Cast CARE Score: 2. FOOTWEAR:Dependent   . CARE Score: 1.     TOILET TRANSFER: Partial/moderate assistance. from [facility]. CARE Score: 3. **Assisted Pt with washing hair (with shower cap) while seated in recliner. Hip abd brace trunk portion opened while seated on BSC to wash trunk then straps reapplied  BALANCE:  Sitting Balance:  Stand By Assistance. Standing Balance: Contact Guard Assistance. BED MOBILITY:  Supine to Sit: Moderate Assistance    TRANSFERS:  Sit to Stand:  Minimal Assistance. from EOB  FUNCTIONAL MOBILITY:  Assistive Device: Rolling Walker  Assist Level:  Contact Guard Assistance.    Distance: 3ft to BSC the 4ft to recliner from [facility]      Review of Systems:  CONSTITUTIONAL:  positive for  fatigue  EYES:  negative  HEENT:  negative  RESPIRATORY:  negative  CARDIOVASCULAR:  negative  GASTROINTESTINAL:  positive for nausea and vomiting  GENITOURINARY:  positive for urinary incontinence  SKIN:  Left hip incision  HEMATOLOGIC/LYMPHATIC:  positive for swelling/edema  MUSCULOSKELETAL:  positive for  pain  NEUROLOGICAL:  Cognitive deficits noted with conversation today  BEHAVIOR/PSYCH:  negative  System review otherwise negative      Objective:  /74   Pulse 68   Temp 98 °F (36.7 °C) (Oral)   Resp 16   Ht 5' 4\" (1.626 m)   Wt 138 lb 9.6 oz (62.9 kg)   SpO2 96%   BMI 23.79 kg/m²   awake  Orientation:   person, place  Mood: within normal limits  Affect: calm  General appearance: Patient is well nourished, well developed, well groomed and in no acute distress    Memory:   abnormal - deficits noted,   Attention/Concentration: normal  Language:  normal    Cranial Nerves:  cranial nerves II-XII are grossly intact  ROM:  abnormal - right hip  Motor Exam:  Motor exam is 4+ out of 5 all extremities with the exception of LLE note tested  Tone:  normal  Muscle bulk: within normal limits  Sensory:  Sensory intact  Coordination:   normal    Heart: normal rate, regular rhythm, normal S1, S2, no murmurs, rubs, clicks or gallops  Lungs: clear to auscultation without wheezes or rales  Abdomen: soft, non-tender, non-distended, normal bowel sounds, no masses or organomegaly    Skin: warm and dry, no rash or erythema  Peripheral vascular: Pulses: Normal upper and lower extremity pulses; Edema: trace      Diagnostics:   No results found for this or any previous visit (from the past 24 hour(s)). 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 · Hip abduction brace  · PWB 25% LLE  · Zofran 4mg PRN for nausea and vomiting. · Take meds with food. Decrease acidic fluids in the morning.    · Team conference Wednesday    Missed Therapy Time:  · None    Rajani Davila, APRN - CNP

## 2021-08-09 NOTE — PROGRESS NOTES
Pt. Did end up eating small amt for lunch and did not want to get up for afternoon therapy but with a lot of encouragement did get up at 1350. C/o increased fatigue and stomach still not feel real good. States ' I just don't feel well\"  Discussed status with Sarah Davila NP and orders received for urine specimen. Encouraged fluids with pt.

## 2021-08-09 NOTE — PLAN OF CARE
Individualized Plan of [facility]    Rehabilitation physician: Dr. Savana Reese Date: 8/6/2021     Rehabilitation Diagnosis: Displaced fracture of greater trochanter of left femur, initial encounter for closed fracture [S72.112A]  Hip fracture requiring operative repair, left, closed, initial encounter ([facility] [address] [S72.002A]      Rehabilitation impairments: self care, mobility, motor dysfunction, bowel/bladder management, pain management, safety, cognitive function and communication    Factors facilitating achievement of predicted outcomes: Family support, Caregiver support, Friend support, Motivated, Cooperative, Pleasant, Sense of humor, Good insight into deficits, Has needed Durable Medical Equipment at home, Home is wheelchair accessible, Knowledge about rehab and Has homemaker services  Barriers to the achievement of predicted outcomes: Pain, Limited family support, Confusion, Cognitive deficit, Limited safety awareness, Limited participation, Depression, Anxiety, Limited insight into deficits, Decreased endurance, Decreased proprioception, Lower extremity weakness, Long standing deficits, Incontinence of bladder, Medical complications, Stairs at home, Skin Care, Wound Care and Medication managment    Patient Goals: Improve independence with mobility, Improvement of mobility at a wheelchair level, Increase overall strength and endurance, Increase balance, Increase endurance, Increase independence with activities of daily living, Improve cognition, Increase self-awareness, Increase safety awareness, Increase community integration, Increase socialization, Functional communication with caregivers, Integrate appropriate pain management plan, Assure adequate nutritional option for discharge, Continence of bowel and bladder and Provide appropriate patient and family education      NURSING:  Nursing goals for Rand Quick while on the rehabilitation unit will include:  Continence of bowel and bladder, Adequate number of bowel movements, Urinate with no urinary retention >300ml in bladder, Complete bladder protocol with schuster removal, Maintain O2 SATs at an acceptable level during stay, Effective pain management while on the rehabilitation unit, Establish adequate pain control plan for discharge, Absence of skin breakdown while on the rehabilitation unit, Improved skin integrity via assessments including wound measurements, Avoidance of any hospital acquired infections, No signs/symptoms of infection at the wound site, Freedom from injury during hospitalization and Complete education with patient/family with understanding demonstrated regarding disease process and resultant impairment     In order to achieve these goals, nursing interventions may include bowel/bladder training, education for medical assistive devices, medication education, O2 saturation management, energy conservation, stress management techniques, fall prevention, alarms protocol, seating and positioning, skin/wound care, pressure relief instruction, dressing changes, infection protection, DVT prophylaxis, assistance with safe transfers , and/or assistance with bathroom activities and hygiene. PHYSICAL THERAPY:  Goals:        Short term goals  Time Frame for Short term goals: 1 wk  Short term goal 1: Pt to go supine <->sit, cGA with LLE, to get in/out of bed. No rail, bed flat  Short term goal 2: Pt to get up/down from various seated surfaces, CGA, to get up to walk. Short term goal 3: Pt to walk with RW >= 25 ft, TTWB LLE, CGA, to get in/out of restroom  Short term goal 4: Pt to ascend/descend 6\" step in parallel bars, maintaining TTWB LLE, CGA to progress to home entry. Short term goal 5: Pt to propel w/c, indoor surfaces, Mod I, for home and community mobility  Short term goal 6: Pt to get in/out of car, min +1 for transportation needs.   Long term goals  Time Frame for Long term goals : 2 wks  Long term goal 1: Pt to go supine <->sit, Mod I, to get in/out of bed. No rail, bed flat  Long term goal 2: Pt to get up/down from various seated surfaces, Mod I, to get up to walk. Long term goal 3: Pt to walk with RW >= 50 ft, TTWB LLE, Mod I, for home mobility  Long term goal 4: Pt to ascend/descend 4 +4 steps with Lt rail, min +1 for home entry  Long term goal 5: Pt to get in/out of car, SBA +1 for transportation needs. Plan of Care: Patient to be seen by physical therapy services 90 minutes per day Monday through Friday and 30 minutes on Saturday or Sunday    Anticipated interventions may include therapeutic exercises, gait training, neuromuscular re-ed, transfer training, community reintegration, bed mobility, w/c mobility and training.       OCCUPATIONAL THERAPY:  Goals:             Short term goals  Time Frame for Short term goals: 1 week  Short term goal 1: Pt will complete various t/fs including BSC with SBA & min vcs for technique  Short term goal 2: Pt will complete LE dressing with mod A, LH AE, & 0-2 vcs for hip precautions  Short term goal 3: Pt will tolerate standing 2 min with 1 UE release & SBA for increased ease of toileting routine  Short term goal 4: Pt will complete mobility to/from BSC/bedside chair with SBA, RW, & 0-2 vcs for TTWB precautions  Short term goal 5: Pt will tolerate BUE moderate resistance band HEP x 10 reps with min RBs to increase UB endurance for ADL tasks  Long term goals  Time Frame for Long term goals : 2 weeks  Long term goal 1: Pt will complete various t/fs including toilet with mod I  Long term goal 2: Pt will complete BADL routine (sponge bath) with S & 0-1 vcs for safety  Long term goal 3: Pt will complete LE dressing with S & LH AE  Long term goal 4: Pt will complete simple meal prep task with RW, S, & good safety awareness for maintaining TTWB (seated when needed to maintain)    Plan of Care: Patient to be seen by occupational therapy services 90 minutes per bearing precautions, Wound care, Pain management, Infection protection, DVT prophylaxis, Fall precautions, Fluid/Electrolyte balance, Nutritional status, Respiratory needs, Anemia and History of heart disease                                           Physician anticipated functional outcomes: Improved independence with functional measures   Estimated length of stay for this admission 14 days  Medical Prognosis: Good  Anticipated disposition: Home. The potential to achieve the above medical and rehabilitative goals is very good. This plan of care has been developed with the assistance and input of the multidisciplinary rehabilitation team.  The plan was reviewed with the patient. The patient has had the opportunity to provide input to the therapy team.    I have reviewed this Individualized Plan of Care and agree with its contents. Above documentation has been expanded, modified, adjusted to reflect the findings of my evaluations and goals for the patient.     Physician:  Dr. Dave Thurston M.D.

## 2021-08-09 NOTE — PROGRESS NOTES
[facility]  Speech - Language - Cognitive Evaluation    SLP Individual Minutes  Time In: 1440  Time Out: 1500  Minutes: 20  Timed Code Treatment Minutes: 0 Minutes       Date: 2021  Patient Name: Luis Alberto Gaspar      CSN: [id number]   : 1950  (73 y.o.)  Gender: female   Referring Physician: NITO Latham CNP  Diagnosis: Left hip fracture   Secondary Diagnosis: Cognitive deficits  Precautions: Fall risk  History of Present Illness/Injury: Patient admitted s/p mechanical fall in which she reports hitting her forehead on a curb. No recent CT or MRI completed this hospitalization. Please refer to H&P for full details. ST consulted to assess cognitive functioning and determine goals/POC during IPR stay. Past Medical History:   Diagnosis Date    Anxiety 2007    Arthritis     Breast cancer [facility]) 2005    right mastectomy, chemo and radiation history    CAD (coronary artery disease)     sees Dr Sousa Signs of the skin 2004    Cerebral artery occlusion with cerebral infarction ([address]     Chronic UTI     COPD (chronic obstructive pulmonary disease) ([address]     sees RL Petersen    CVA (cerebral infarction) ,     Depression     DVT of lower extremity (deep venous thrombosis) ([address] 1976    Facial injury 2005    Fall on greasy ground, striking left periorbital region    History of stroke , ,     Patient relates a stroke with right weakness and speech in ; another in  or  (unsure), both with need to rehabilitation.   Also \"2 mini-strokes\" (?)    Hx of blood clots     hip, leg    Hyperlipidemia 2005    Hypertension 2005    Hypothyroidism     IBS (irritable bowel syndrome)     Low HDL (under 40)     Lung cancer [facility])     sees Dr Humaira Lemus    Prolonged emergence from general anesthesia     Recurrent UTI (urinary tract infection)     Dr Kin Nguyễn finding difficulty 2017    work-up in progress       Pain: No pain reported. Subjective:  Patient resting in bed upon ST arrival. Alert and pleasantly engaged, though reporting fatigue at end of assessment and appearing slightly agitated. SOCIAL HISTORY:   Living Arrangements: Home with son Vasiliy Bender   Work History: Retired - housekeeping at [employer] Level: High school  Driving Status: Active   Finance Management: Independent  Medication Management: Independent  ADL's: Independent  Hobbies: Playing cards, crafts, caring for pet dog and pet birds  Vision Status: WFL  Hearing: WFL     Type of Home: [address]: Two level, Able to Live on Main level with bedroom/bathroom, 1/2 bath on main level  Home Access: Stairs to enter with [address] - Number of Steps: 4+4  Entrance Stairs - Rails: [address] R[address]/Blu Mark Final: Cane, Rolling walker, Standard walker, Wheelchair-manual, 4 wheeled walker    SPEECH / VOICE:  Speech and Voice appear to be grossly intact for basic and complex daily communication    LANGUAGE:  Receptive:  Receptive language skills appear to be grossly intact for basic and complex daily communication. Expressive:  Expressive language skills appear to be grossly intact for basic and complex daily communication. COGNITION:  [place] Cognitive Assessment [facility]) version F completed. Pt scored 16/30. Normal is greater than or equal to 26/30. Inclusion of +1 point given highest level of education achieved less than/equal to 12th grade or GED with limited-0 post-secondary schooling.    Immediate Recall: /5  Short-Term Recall:   Divergent Naming: 10 wpm  (target=11 wpm)  Problem Solving: Impaired  Thought Organization: Impaired  Attention:   Math Computation: No attempt  Executive Functionin/5     SWALLOWING:  Current Diet: Regular, thin liquids   Not Tested    Comprehension: 4 - Patient understands basic needs 75-90%+ of the time  Expression: 4 - Expresses basic ideas/needs 75-90%+ of the time  Social Interaction: 4 - Patient appropriate 75-90%+ of the time  Problem Solving: 3 - Patient solves simple/routine tasks 50%-74%  Memory: 1 - Patient remembers < 25% of the time    RECOMMENDATIONS/ASSESSMENT:  DIAGNOSTIC IMPRESSIONS:  Patient presents with a suspected moderate cognitive impairment characterized by the findings outlined above. Specific deficits revealed in delayed recall, divided attention, thought organization, problem solving, and visual reasoning. Expressive and receptive language appear grossly intact for basic daily means of communication. Patient highly independent prior to hospitalization, completing all IADL's in home setting. Recommend initiation of skilled ST services during IPR stay in order to improve the aforementioned deficits and permit potential return to PLOF/baseline cognitive functioning. Rehabilitation Potential: good. EDUCATION:  Learner: Patient  Education:  Reviewed results and recommendations of this evaluation, Reviewed ST goals and Plan of Care, Reviewed recommendations for follow-up, Education Related to Potential Risks and Complications Due to Impairment/Illness/Injury and Education Related to Prevention of Recurrence of Impairment/Illness/Injury  Evaluation of Education: Verbalizes understanding, Demonstrates with assistance, Needs further instruction and Family not present    PLAN:  Skilled SLP intervention on IP Rehab or TCU 30 minutes per day 5 days per week. Specific interventions for next session may include: recall, working memory, thought organization, problem solving, reasoning, and/or attention    PATIENT GOAL:    Did not state. Will further assess during treatment.     SHORT TERM GOALS:  Short-term Goals  Timeframe for Short-term Goals: 1 week  Goal 1: Patient will complete functional immediate/delayed recall and working memory tasks with 70% accuracy given mod cues to improve retention of new and daily

## 2021-08-09 NOTE — PROGRESS NOTES
[address] [address]  Recreational Therapy  Daily Note  [address]    Time Spent with Patient: 0 minutes    Date:  8/9/2021       Patient Name: Vinny Kamara      MRN: [id number]      YOB: 1950 (73 y.o.)       Gender: female  Diagnosis: Displaced fracture of greater trochanter Left femur. Referring Practitioner: Dr. Ranjeet Ko    RESTRICTIONS/PRECAUTIONS:  Restrictions/Precautions: Weight Bearing, General Precautions, Fall Risk  Vision: Within Functional Limits  Hearing: Within functional limits    PAIN: 0- no c/o pain    SUBJECTIVE:  I've played similar games.     OBJECTIVE:  O.T. brought pt to [place] [address] to [facility] with peers this afternoon- pt needed assistance in finding the cards about 3 times but was able to find the rest independently- pt's affect brightened and she became social- she talked about card games that she enjoys playing with her family- pt enjoyed playing [employer]    Patient Education  New Education Provided: Importance of Leisure,     Electronically signed by: HOMER Spencer  Date: 8/9/2021

## 2021-08-09 NOTE — PROGRESS NOTES
[facility]  INPATIENT PHYSICAL THERAPY  DAILY NOTE  [address] [facility],4[address] - [id number]    Time In: 0830  Time Out: 7046  Timed Code Treatment Minutes: 65 Minutes  Minutes: 65          Date: 2021  Patient Name: Patrice Carolina,  Gender:  female        MRN: [id number]  : 1950  (73 y.o.)     Referring Practitioner: Dr. Clary Farias  Diagnosis: Displaced Fx of Greater Trochanter Lt femur  Additional Pertinent Hx: The patient is a 73 y.o. female with presentation of left hip pain post acute injury s/p mechanical fall. Patient states she had left JATINDER done  and right JATINDER done . She had a mechanical fall on 21 causing injury to left hip. xrays showing left hip periprosthetic fracture and ortho was consulted. Hx:  Lung removal due to lung CA,  2021     Prior Level of Function:  Lives With: Son (Son will not be staying with her. Pt reports friends and nieces to stay to assist)  Type of Home: House  Home Layout: Two level, Able to Live on Main level with bedroom/bathroom, 1/2 bath on main level  Home Access: Stairs to enter with rails  Entrance Stairs - Number of Steps: 4+4  Entrance Stairs - Rails: Left  Home Equipment: Cane, Rolling walker, Standard walker, Wheelchair-manual, 4 wheeled walker   Bathroom Shower/Tub: Walk-in shower (on 2nd story)  Bathroom Toilet: Standard  Bathroom Equipment: Shower chair, Grab bars in shower    ADL Assistance: [address] [place] [address]: Needs assistance (sharies duties with son)  Ambulation Assistance: Independent  Transfer Assistance: Independent  Active : Yes  Additional Comments: Pt reports using no AD PLOF. Currently has bed on 1st level of home, 1/2 bath on 1st level; walk in shower is on 2nd story.     Restrictions/Precautions:  Restrictions/Precautions: Weight Bearing, General Precautions, Fall Risk  Left Lower Extremity Weight Bearing: Toe Touch Weight Bearing  Partial Weight Bearing Percentage Or Pounds: 25%  Required Braces or Orthoses  Left Lower Extremity Brace:  (hip ABD brace)  Position Activity Restriction  Hip Precautions: No hip flexion > 90 degrees, No active ABduction, No hip internal rotation, No hip external rotation  Other position/activity restrictions: hip abductor brace to be worn all the time. May remove for hygiene. \"; hx of R lung removal 6/14/2021, O2 at 2 liters with activity, 1 liter at rest     SUBJECTIVE: Patient in recliner upon arrival, agreed and cooperative for therapy. During session, patient complains of feeling sick to her stomach, after sitting up on the edge of mat table, patient started vomiting. Notified RN and took patient back to room to get back in bed and rest.  Therapist also adjusting hip abductor brace to be in correct location. PAIN: 3/10: Left hip, rated at start of session, pain meds received prior to treatment. Vitals: Vitals not assessed per clinical judgement, see nursing flowsheet    OBJECTIVE:  Bed Mobility:  Supine to Sit: Minimal Assistance, with verbal cues , with increased time for completion  Sit to Supine: Moderate Assistance, with head of bed flat, without rail, with verbal cues , with increased time for completion     Transfers:  Sit to Stand: Minimal Assistance, cues for hand placement, with verbal cues, for maintaining TTWB on LLE   Stand to Sit:Minimal Assistance, cues for hand placement, with verbal cues, for maintaining TTWB on LLE  Stand Pivot:Minimal Assistance, with verbal cues, using RW, verbal cues for maintaining TTWB (25%) on LLE with stance time    Ambulation:  Minimal Assistance, with verbal cues , with increased time for completion  Distance: 5 ft. x1   Surface: Level Tile  Device:Rolling Walker  Gait Deviations: Forward Flexed Posture, Slow Bhavani, Decreased Weight Shift Left (to maintain TTWB), Decreased Gait Speed, Decreased Heel Strike Bilaterally, Mild Path Deviations, Unsteady Gait, increased time to complete. Balance:  Static Sitting Balance:  Modified Independent   Dynamic Sitting Balance: Supervision  Static Standing Balance: Contact Guard Assistance, with BUE support  Dynamic Standing Balance: Minimal Assistance, with BUE support on AD. Exercise:  Patient was guided in 1 set(s) 10 reps of exercise to both lower extremities. Ankle pumps, Glut sets and Short arc quads. Exercises were completed for increased independence with functional mobility. Functional Outcome Measures: Not completed       ASSESSMENT:  Assessment: Patient progressing toward established goals. Activity Tolerance:  Patient tolerance of  treatment: Fair, Limited by nausea and vomiting during session. Equipment Recommendations:Equipment Needed: No (Pt has RW, SW, cane, w/c and 4WW)  Discharge Recommendations:Continue to assess pending progress, Patient would benefit from continued therapy after discharge    Plan: Times per week: 5x/ wk 90 min, 1x/ wk 30 min  Current Treatment Recommendations: Strengthening, Gait Training, Patient/Caregiver Education & Training, Equipment Evaluation, Education, & procurement, Stair training, Balance Training, Endurance Training, Home Exercise Program, Functional Mobility Training, Transfer Training, Safety Education & Training, Wheelchair Mobility Training    Patient Education  Patient Education: Plan of Care, Precautions/Restrictions, [employer], Transfers, Reviewed Prior Education, Gait, Education Related to Potential Risks and Complications Due to Impairment/Illness/Injury, Health Promotion and Wellness Education, Home Safety Education, - Patient Requires Continued Education    Goals:  Patient goals : Get home in time for granddaughter's wedding  Short term goals  Time Frame for Short term goals: 1 wk  Short term goal 1: Pt to go supine <->sit, cGA with LLE, to get in/out of bed. No rail, bed flat  Short term goal 2: Pt to get up/down from various seated surfaces, CGA, to get up to walk.   Short term goal 3: Pt to walk with RW >= 25 ft, TTWB LLE, CGA, to get in/out of restroom  Short term goal 4: Pt to ascend/descend 6\" step in parallel bars, maintaining TTWB LLE, CGA to progress to home entry. Short term goal 5: Pt to propel w/c, indoor surfaces, Mod I, for home and community mobility  Short term goal 6: Pt to get in/out of car, min +1 for transportation needs. Long term goals  Time Frame for Long term goals : 2 wks  Long term goal 1: Pt to go supine <->sit, Mod I, to get in/out of bed. No rail, bed flat  Long term goal 2: Pt to get up/down from various seated surfaces, Mod I, to get up to walk. Long term goal 3: Pt to walk with RW >= 50 ft, TTWB LLE, Mod I, for home mobility  Long term goal 4: Pt to ascend/descend 4 +4 steps with Lt rail, min +1 for home entry  Long term goal 5: Pt to get in/out of car, SBA +1 for transportation needs. Following session, patient left in safe position with all fall risk precautions in place.

## 2021-08-09 NOTE — PROGRESS NOTES
[facility] Continence Nurse  Consult Note       Jermaine Mcclendon  AGE: 73 y.o. GENDER: female  : 1950  TODAY'S DATE:  2021    Subjective:     Reason for [facility] Evaluation and Assessment: Buttocks discoloration; non blanchable      Jermaine Mcclendon is a 73 y.o. female referred by:   [] Physician/PA/APRN  [x] Nursing  [] Other:     Objective:     Hawk Risk Score: Hawk Scale Score: 19    Assessment:     Encounter: Consulted for nonblanchable buttocks discoloration. Photo documentation reviewed. Patient noted to have perirectal discoloration. Does not appear to be pressure related. Recommend EPC cream BID and PRN. Utilize Hawk order sets. Wound ostomy to sign off at this time. Call as needed. Plan:     Treatment Recommendations:   EPC cream BID and PRN.     Specialty Bed Required :   [x] Low Air Loss   [x] Pressure Redistribution  [] Fluid Immersion- Dolphin  [] Bariatric  [] RotoProne   [] Other:     Discharge Plan:  Patient appropriate for Outpatient [address] [place] [address] Road: No

## 2021-08-09 NOTE — PROGRESS NOTES
[address] NOTE    Conference Date: 2021  Admit Date:  2021  3:21 PM  Patient Name: Marlow Phoenix    MRN: [id number]    : 1950  (73 y.o.)  Rehabilitation Admitting Diagnosis:  Displaced fracture of greater trochanter of left femur, initial encounter for closed fracture [S72.112A]  Hip fracture requiring operative repair, left, closed, initial encounter [facility]) Tennis Drafts  Referring Practitioner: Dr. Mirta Zaidi issues/needs regarding patient and family discharge status: SW met with patient to introduce self and explain role, complete SW assessment, and initiate discharge planning. Prior to hospitalization, patient was independent with ADL's and personal care. Patient with recent hospitalization and surgery in 2020, but reports was able to return to independence since hospitalization. Patient was discharged home in  with home health care, but once home felt that it was not needed and cancelled services. Patient lives with son, Jayde Haider. Patient reports they have lived together for a few years. Patient reports goal is to be able to return to own home with support from sonJayde, friends, and niece. Patient reports that friends and niece will not be able to stay all the time, but able to check-in frequently. Patient was driving, managing own finances, managing own medications, completing laundry, housekeeping, errands, and meal preparation. Patient reports that son, Jayde Haider, does assist with errands and meal preparation. Patient was not using any medical equipment for ambulation. Since discharge from hospital in , patient qualified for home oxygen. Home oxygen supplied through [address] [place] [address]. Patient reports current smoker. SW discussed concerns regarding smoking and oxygen use. Patient reports only smoking outside and never leaving oxygen on while smoking.  Patient reports not having a cigarette since 07/31/2021, but the first thing she is going to do when she gets home is to get a cigarette. Patient has son, Danii Valverde, that lives in [facility], but works full time. Patient's son, Dena Coelho, lives in [place], but his wife just passed away. Patient reports having one dog and three birds at home to care for. Anticipate patient would benefit from continued therapy upon discharge. SW reviewed with patient team conference on Wednesday, 08/11/2021, to further discuss progress and discharge recommendations. SW to follow and maintain involvement in discharge planning. PHYSICAL THERAPY    Assessment: Patient progressing toward established goals, though none yet met due to short length of stay. Pt's initiation and motivation are impeding more consistent and steadier improvement with overall decreased strength, endurance and need to maintain hip precautions with abd brace further impacting pt's function. Pt will continue to benefit from skilled PT to advance in functional mobility, gait and overall safety. Equipment Needed: No (Pt has RW, SW, cane, w/c and 4WW)    SPEECH THERAPY  Patient has met 2/3 STG's and 0/1 LTG's this therapy period. Improvements made in recall, problem solving, and math computation despite limited skilled ST sessions to date. Continues to present with a moderate cognitive impairment characterized by score of 16/30 on assessment completed 8/9/21 with deficits revealed in immediate/delayed recall, divided attention, problem solving, VERBAL reasoning, and organization of thoughts. Patient also with impaired insight into deficits, requiring continued cues for safety and assistance with reasoning skills related to IADL/ADL's. No expressive/receptive language deficits, and/or presence of dysarthria.  Plans to complete medication management and financial tasks during subsequent sessions, however, suspect with assistance patient able to make safe return to management of these tasks in home setting. Would recommend continued skilled ST services via [facility] or OP setting at discharge to further improve the aforementioned deficits. [facility]  Gloria Shelby has made limited progress due to short length of stay thus far. Gloria Shelby can be limited by her affect and motivation to complete activities as she hasn't been feeling well. She requires CGA for functional tranfsers and short distance mobility and requires mod cues for TTWB adherence and min cues for hip precaution adherence. She requires mod A for LB ADLs and requires further [facility] education (limited this date due to anxieties/feeling unwell) and requires more IADL education. Cont OT recomended for ADL, IADL remediation, improvement of precaution adherence, and to decrease fall risk / risk of injury. Equipment Needed: Yes  Other: BS,  AE kit (reacher,  shoehorn, sockaide,  bath sponge)    RECREATIONAL THERAPY  Patient has been offered participation in recreational therapy activities and participates as able. 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 T. brought pt to [place] [address] to [facility] with peers this afternoon- pt needed assistance in finding the cards about 3 times but was able to find the rest independently- pt's affect brightened and she became social- she talked about card games that she enjoys playing with her family- pt enjoyed playing Mbaobao -P. T. brought pt to [facility] and had her stand to shuck a few ears of corn for patients to enjoy with their lunch today-affect flat during activity but very social talking of her grandparents grocery store and helping out when she was younger -Pt declined fresh corn on the sandhu, tomatoes, and peaches to eat with her lunch- she was tearful and stated that she didn't feel good     NUTRITION  Weight: 139 lb 4.8 oz (63.2 kg) / Body mass index is 23.91 kg/m². Current diet: ADULT DIET; Regular  Adult Oral Nutrition Supplement; Frozen Oral Supplement  Please see nutrition note for details. NURSING  Continent of Bowel:Yes . Frequency: Daily     Management: colace, milk of mg, gylcolax, senakot  Continent of Bladder: No can be incontinent due to urgency   Pain is Managed:  Yes. Management: oral morphine. Frequency of Intervention: q4hr. Adequately Controlled: Yes  Sleep: Adequate  Signs and Symptoms of Infection:  Yes. Site of Infection: UTI. Antibiotic: Daily IV Rocephin . Signs and Symptoms of Skin Breakdown:  Yes. Site: R buttock, red open area. Wound Care: EPC. Injury and Fall Free during Inpatient Rehabilitation Admission: Yes  Anticoagulants: lovenox  Diabetic: No  Consultations/Labs/X-rays: Lactic acid 2.3 on 8/10  IV Rocephin with . 9NS continuous began 8/10    No results for input(s): POCGLU in the last 72 hours.     Lab Results   Component Value Date    LDLCALC 115 08/11/2020         Vitals:    08/10/21 1715 08/10/21 1734 08/10/21 2048 08/11/21 0852   BP:   (!) 102/37 (!) 98/55   Pulse:   69 91   Resp:   21 16   Temp:   98.7 °F (37.1 °C) 99.9 °F (37.7 °C)   TempSrc:   Oral    SpO2:  94% 93% 91%   Weight: 139 lb 4.8 oz (63.2 kg)      Height: 5' 4\" (1.626 m)             Family Education: Family available and participating in education   Fall Risk:  Falling star program initiated  Is the patient appropriate for a stay in the functional apartment? no    Discharge Plan   Estimated Discharge Date: 8/20/2021   Destination: discharge home with supervision  Services at Discharge: [address], Occupational Therapy, Speech Therapy, , Nursing and HHA 3x week  Is patient appropriate for an outpatient driving evaluation? yes, prior to return to driving secondary to cognitive issues  Equipment at Discharge: Other: BSC,  AE kit (reacher,  shoehorn, sockaide,  bath sponge)  Factors facilitating achievement of predicted outcomes: Motivated, Cooperative and Pleasant  Barriers to the achievement of predicted outcomes: Limited family support, Confusion, Cognitive deficit, Limited safety awareness, Depression, Anxiety, Limited insight into deficits, Decreased endurance and Medication managment  Follow up with physiatrist? no     Team Members Present at Conference:  :Lanette Jama [place]   Occupational Therapist:Jsaon Sheets OTR/L [address], [address] [facility] [address], [address], [address]  Nurse:Gali Bernard RN  Psychologist: Anusha Benoit, PhD.    I approve the established interdisciplinary plan of care as documented within the medical record of Cedric Cornelius.     Sandip Meehan MD

## 2021-08-09 NOTE — PLAN OF CARE
Care plan reviewed with patient and  verbalize understanding of the plan of care and contribute to goal setting. Problem: Falls - Risk of:  Goal: Will remain free from falls  Description: Will remain free from falls  Outcome: Met This Shift  Note: USES  CALL LIGHT  Goal: Absence of physical injury  Description: Absence of physical injury  Outcome: Completed     Problem: Skin Integrity:  Goal: Will show no infection signs and symptoms  Description: Will show no infection signs and symptoms  Outcome: Met This Shift  Note: DRESSING DRY AND INTACT  Goal: Absence of new skin breakdown  Description: Absence of new skin breakdown  Outcome: Met This Shift  Note: NO NEW  Goal: Demonstration of wound healing without infection will improve  Description: Demonstration of wound healing without infection will improve  Outcome: Completed     Problem: Pain:  Description: Pain management should include both nonpharmacologic and pharmacologic interventions.   Goal: Pain level will decrease  Description: Pain level will decrease  Outcome: Met This Shift  Note: WITH MEDS AND REST  Goal: Control of acute pain  Description: Control of acute pain  Outcome: Met This Shift  Note: WITH MEDS AND REST  Goal: Control of chronic pain  Description: Control of chronic pain  Outcome: Completed     Problem: Bleeding:  Goal: Will show no signs and symptoms of excessive bleeding  Description: Will show no signs and symptoms of excessive bleeding  Outcome: Met This Shift  Note: NO S/S     Problem: Respiratory  Goal: O2 Sat > 90%  Outcome: Met This Shift  Note: WITH OXYGEN  Goal: Supplemental O2 requirements decreased  Outcome: Completed  Goal: Agreement to quit smoking  Outcome: Not Met This Shift  Note: REFUSES AND REFUSES OFFER TO GET NICOTINE     Problem: Nutrition  Goal: Optimal nutrition therapy  Outcome: Ongoing  Note: EMESIS THIS AM     Problem: Discharge Planning:  Goal: Discharged to appropriate level of care  Description: Discharged to appropriate level of care  Outcome: Ongoing  Note: TO BE DETERMINED

## 2021-08-09 NOTE — PROGRESS NOTES
[address] [facility] PHYSICAL THERAPY  DAILY NOTE  [facility] - 7E-55/055-A  Time In: 1100  Time Out: 1130  Timed Code Treatment Minutes: 30 Minutes  Minutes: 30          Date: 2021  Patient Name: David Brambila,  Gender:  female        MRN: [id number]  : 1950  (73 y.o.)     Referring Practitioner: Dr. Yvette Pierre  Diagnosis: Displaced Fx of Greater Trochanter Lt femur  Additional Pertinent Hx: The patient is a 73 y.o. female with presentation of left hip pain post acute injury s/p mechanical fall. Patient states she had left JATINDER done  and right JATINDER done . She had a mechanical fall on 21 causing injury to left hip. xrays showing left hip periprosthetic fracture and ortho was consulted. Hx:  Lung removal due to lung CA,  2021     Prior Level of Function:  Lives With: Son (Son will not be staying with her. Pt reports friends and nieces to stay to assist)  Type of Home: House  Home Layout: Two level, Able to Live on Main level with bedroom/bathroom, 1/2 bath on main level  Home Access: Stairs to enter with rails  Entrance Stairs - Number of Steps: 4+4  Entrance Stairs - Rails: Left  Home Equipment: Cane, Rolling walker, Standard walker, Wheelchair-manual, 4 wheeled walker   Bathroom Shower/Tub: Walk-in shower (on 2nd story)  Bathroom Toilet: Standard  Bathroom Equipment: Shower chair, Grab bars in shower    ADL Assistance: [address] [place] [address] Avenue: Needs assistance (sharies duties with son)  Ambulation Assistance: Independent  Transfer Assistance: Independent  Active : Yes  Additional Comments: Pt reports using no AD PLOF. Currently has bed on 1st level of home, 1/2 bath on 1st level; walk in shower is on 2nd story.     Restrictions/Precautions:  Restrictions/Precautions: Weight Bearing, General Precautions, Fall Risk  Left Lower Extremity Weight Bearing: Toe Touch Weight Bearing  Partial Weight Bearing Percentage Or Pounds: 25%  Required Braces or Orthoses  Left Lower Extremity Brace:  (hip ABD brace)  Position Activity Restriction  Hip Precautions: No hip flexion > 90 degrees, No active ABduction, No hip internal rotation, No hip external rotation  Other position/activity restrictions: hip abductor brace to be worn all the time. May remove for hygiene. \"; hx of R lung removal 6/14/2021, O2 at 2 liters with activity, 1 liter at rest     SUBJECTIVE: Patient in room in bed, initially agitated, not wanting to participate in PT due to pain and not feeling well overall. With education, pt agreed to PT in bed. PAIN: pain left hip, not rated    Vitals: Vitals not assessed per clinical judgement, see nursing flowsheet    OBJECTIVE:  Bed Mobility:  Not Tested    Transfers:  Not Tested    Ambulation:  Not Tested    Balance:  not tested    Exercise:  Patient was guided in 1 set(s) 10 reps of exercise to both lower extremities. Supine: glut sets 5\" hold, quad sets 5\" hold, ankle pumps x20, heel slides with min assist left LE, straight leg raise right only, short arc quad, resisted ankle plantarflexion right. Exercises were completed for increased independence with functional mobility. Functional Outcome Measures: Not completed       ASSESSMENT:  Assessment: Patient progressing toward established goals. Activity Tolerance:  Patient tolerance of  treatment: good.       Equipment Recommendations:Equipment Needed: No (Pt has RW, SW, cane, w/c and 4WW)  Discharge Recommendations:  Continue to assess pending progress, Patient would benefit from continued therapy after discharge    Plan: Times per week: 5x/ wk 90 min, 1x/ wk 30 min  Current Treatment Recommendations: Strengthening, Gait Training, Patient/Caregiver Education & Training, Equipment Evaluation, Education, & procurement, Stair training, Balance Training, Endurance Training, Home Exercise Program, Functional Mobility Training, Transfer Training, Safety Education & Training, Wheelchair Mobility Training    Patient Education  Patient Education: Verbal Exercise Instruction,  - Patient Verbalized Understanding, - Patient Requires Continued Education    Goals:  Patient goals : Get home in time for granddaughter's wedding  Short term goals  Time Frame for Short term goals: 1 wk  Short term goal 1: Pt to go supine <->sit, cGA with LLE, to get in/out of bed. No rail, bed flat  Short term goal 2: Pt to get up/down from various seated surfaces, CGA, to get up to walk. Short term goal 3: Pt to walk with RW >= 25 ft, TTWB LLE, CGA, to get in/out of restroom  Short term goal 4: Pt to ascend/descend 6\" step in parallel bars, maintaining TTWB LLE, CGA to progress to home entry. Short term goal 5: Pt to propel w/c, indoor surfaces, Mod I, for home and community mobility  Short term goal 6: Pt to get in/out of car, min +1 for transportation needs. Long term goals  Time Frame for Long term goals : 2 wks  Long term goal 1: Pt to go supine <->sit, Mod I, to get in/out of bed. No rail, bed flat  Long term goal 2: Pt to get up/down from various seated surfaces, Mod I, to get up to walk. Long term goal 3: Pt to walk with RW >= 50 ft, TTWB LLE, Mod I, for home mobility  Long term goal 4: Pt to ascend/descend 4 +4 steps with Lt rail, min +1 for home entry  Long term goal 5: Pt to get in/out of car, SBA +1 for transportation needs. Following session, patient left in safe position with all fall risk precautions in place.

## 2021-08-10 LAB
ANION GAP SERPL CALCULATED.3IONS-SCNC: 12 MEQ/L (ref 8–16)
BASOPHILS # BLD: 0.2 %
BASOPHILS ABSOLUTE: 0 THOU/MM3 (ref 0–0.1)
BUN BLDV-MCNC: 19 MG/DL (ref 7–22)
CALCIUM SERPL-MCNC: 7.8 MG/DL (ref 8.5–10.5)
CHLORIDE BLD-SCNC: 102 MEQ/L (ref 98–111)
CO2: 23 MEQ/L (ref 23–33)
CREAT SERPL-MCNC: 1 MG/DL (ref 0.4–1.2)
EOSINOPHIL # BLD: 1.7 %
EOSINOPHILS ABSOLUTE: 0.2 THOU/MM3 (ref 0–0.4)
ERYTHROCYTE [DISTWIDTH] IN BLOOD BY AUTOMATED COUNT: 14.9 % (ref 11.5–14.5)
ERYTHROCYTE [DISTWIDTH] IN BLOOD BY AUTOMATED COUNT: 53.1 FL (ref 35–45)
GFR SERPL CREATININE-BSD FRML MDRD: 55 ML/MIN/1.73M2
GLUCOSE BLD-MCNC: 143 MG/DL (ref 70–108)
HCT VFR BLD CALC: 31.3 % (ref 37–47)
HEMOGLOBIN: 9.5 GM/DL (ref 12–16)
IMMATURE GRANS (ABS): 0.1 THOU/MM3 (ref 0–0.07)
IMMATURE GRANULOCYTES: 1.1 %
LACTIC ACID: 2.3 MMOL/L (ref 0.5–2)
LYMPHOCYTES # BLD: 2.1 %
LYMPHOCYTES ABSOLUTE: 0.2 THOU/MM3 (ref 1–4.8)
MCH RBC QN AUTO: 31.5 PG (ref 26–33)
MCHC RBC AUTO-ENTMCNC: 30.4 GM/DL (ref 32.2–35.5)
MCV RBC AUTO: 103.6 FL (ref 81–99)
MONOCYTES # BLD: 1.8 %
MONOCYTES ABSOLUTE: 0.2 THOU/MM3 (ref 0.4–1.3)
NUCLEATED RED BLOOD CELLS: 0 /100 WBC
ORGANISM: ABNORMAL
PLATELET # BLD: 317 THOU/MM3 (ref 130–400)
PMV BLD AUTO: 9.8 FL (ref 9.4–12.4)
POTASSIUM SERPL-SCNC: 4.7 MEQ/L (ref 3.5–5.2)
RBC # BLD: 3.02 MILL/MM3 (ref 4.2–5.4)
SEG NEUTROPHILS: 93.1 %
SEGMENTED NEUTROPHILS ABSOLUTE COUNT: 8.8 THOU/MM3 (ref 1.8–7.7)
SODIUM BLD-SCNC: 137 MEQ/L (ref 135–145)
URINE CULTURE REFLEX: ABNORMAL
VITAMIN D 25-HYDROXY: 6 NG/ML (ref 30–100)
WBC # BLD: 9.5 THOU/MM3 (ref 4.8–10.8)

## 2021-08-10 PROCEDURE — 97110 THERAPEUTIC EXERCISES: CPT

## 2021-08-10 PROCEDURE — 1180000000 HC REHAB R&B

## 2021-08-10 PROCEDURE — 99232 SBSQ HOSP IP/OBS MODERATE 35: CPT | Performed by: NURSE PRACTITIONER

## 2021-08-10 PROCEDURE — 83605 ASSAY OF LACTIC ACID: CPT

## 2021-08-10 PROCEDURE — 97530 THERAPEUTIC ACTIVITIES: CPT

## 2021-08-10 PROCEDURE — 85025 COMPLETE CBC W/AUTO DIFF WBC: CPT

## 2021-08-10 PROCEDURE — 6370000000 HC RX 637 (ALT 250 FOR IP): Performed by: NURSE PRACTITIONER

## 2021-08-10 PROCEDURE — 90791 PSYCH DIAGNOSTIC EVALUATION: CPT | Performed by: PSYCHOLOGIST

## 2021-08-10 PROCEDURE — 6370000000 HC RX 637 (ALT 250 FOR IP): Performed by: FAMILY MEDICINE

## 2021-08-10 PROCEDURE — 6370000000 HC RX 637 (ALT 250 FOR IP): Performed by: PHYSICAL MEDICINE & REHABILITATION

## 2021-08-10 PROCEDURE — 6360000002 HC RX W HCPCS: Performed by: FAMILY MEDICINE

## 2021-08-10 PROCEDURE — 97129 THER IVNTJ 1ST 15 MIN: CPT

## 2021-08-10 PROCEDURE — 2580000003 HC RX 258: Performed by: FAMILY MEDICINE

## 2021-08-10 PROCEDURE — 94640 AIRWAY INHALATION TREATMENT: CPT

## 2021-08-10 PROCEDURE — 6360000002 HC RX W HCPCS: Performed by: PHYSICAL MEDICINE & REHABILITATION

## 2021-08-10 PROCEDURE — 97535 SELF CARE MNGMENT TRAINING: CPT

## 2021-08-10 PROCEDURE — 97116 GAIT TRAINING THERAPY: CPT

## 2021-08-10 PROCEDURE — 82306 VITAMIN D 25 HYDROXY: CPT

## 2021-08-10 PROCEDURE — 36415 COLL VENOUS BLD VENIPUNCTURE: CPT

## 2021-08-10 PROCEDURE — 2580000003 HC RX 258: Performed by: PHYSICAL MEDICINE & REHABILITATION

## 2021-08-10 PROCEDURE — 97130 THER IVNTJ EA ADDL 15 MIN: CPT

## 2021-08-10 PROCEDURE — 80048 BASIC METABOLIC PNL TOTAL CA: CPT

## 2021-08-10 RX ORDER — SODIUM CHLORIDE 9 MG/ML
INJECTION, SOLUTION INTRAVENOUS CONTINUOUS
Status: DISCONTINUED | OUTPATIENT
Start: 2021-08-10 | End: 2021-08-12

## 2021-08-10 RX ORDER — CEFTRIAXONE 1 G/1
1000 INJECTION, POWDER, FOR SOLUTION INTRAMUSCULAR; INTRAVENOUS ONCE
Status: DISCONTINUED | OUTPATIENT
Start: 2021-08-10 | End: 2021-08-10 | Stop reason: SDUPTHER

## 2021-08-10 RX ORDER — ONDANSETRON 4 MG/1
4 TABLET, ORALLY DISINTEGRATING ORAL ONCE
Status: COMPLETED | OUTPATIENT
Start: 2021-08-10 | End: 2021-08-10

## 2021-08-10 RX ADMIN — BUDESONIDE AND FORMOTEROL FUMARATE DIHYDRATE 2 PUFF: 160; 4.5 AEROSOL RESPIRATORY (INHALATION) at 06:56

## 2021-08-10 RX ADMIN — ASPIRIN 325 MG: 325 TABLET, DELAYED RELEASE ORAL at 13:27

## 2021-08-10 RX ADMIN — FERROUS SULFATE TAB 325 MG (65 MG ELEMENTAL FE) 325 MG: 325 (65 FE) TAB at 18:46

## 2021-08-10 RX ADMIN — SODIUM CHLORIDE, PRESERVATIVE FREE 10 ML: 5 INJECTION INTRAVENOUS at 20:49

## 2021-08-10 RX ADMIN — NITROFURANTOIN MONOHYDRATE/MACROCRYSTALLINE 100 MG: 25; 75 CAPSULE ORAL at 20:51

## 2021-08-10 RX ADMIN — ACETAMINOPHEN 650 MG: 325 TABLET ORAL at 18:47

## 2021-08-10 RX ADMIN — LEVOTHYROXINE SODIUM 88 MCG: 0.09 TABLET ORAL at 06:18

## 2021-08-10 RX ADMIN — CEFTRIAXONE SODIUM 1000 MG: 1 INJECTION, POWDER, FOR SOLUTION INTRAMUSCULAR; INTRAVENOUS at 23:17

## 2021-08-10 RX ADMIN — ENOXAPARIN SODIUM 40 MG: 40 INJECTION, SOLUTION INTRAVENOUS; SUBCUTANEOUS at 20:50

## 2021-08-10 RX ADMIN — FAMOTIDINE 20 MG: 20 TABLET, FILM COATED ORAL at 20:50

## 2021-08-10 RX ADMIN — DOCUSATE SODIUM 100 MG: 100 CAPSULE ORAL at 20:50

## 2021-08-10 RX ADMIN — FAMOTIDINE 20 MG: 20 TABLET, FILM COATED ORAL at 13:27

## 2021-08-10 RX ADMIN — NITROFURANTOIN MONOHYDRATE/MACROCRYSTALLINE 100 MG: 25; 75 CAPSULE ORAL at 13:27

## 2021-08-10 RX ADMIN — ACETAMINOPHEN 650 MG: 325 TABLET ORAL at 06:17

## 2021-08-10 RX ADMIN — ONDANSETRON 4 MG: 4 TABLET, ORALLY DISINTEGRATING ORAL at 14:59

## 2021-08-10 RX ADMIN — BUDESONIDE AND FORMOTEROL FUMARATE DIHYDRATE 2 PUFF: 160; 4.5 AEROSOL RESPIRATORY (INHALATION) at 17:33

## 2021-08-10 RX ADMIN — TIOTROPIUM BROMIDE INHALATION SPRAY 2 PUFF: 3.12 SPRAY, METERED RESPIRATORY (INHALATION) at 06:56

## 2021-08-10 RX ADMIN — ACETAMINOPHEN 650 MG: 325 TABLET ORAL at 13:27

## 2021-08-10 RX ADMIN — ONDANSETRON 4 MG: 4 TABLET, ORALLY DISINTEGRATING ORAL at 06:19

## 2021-08-10 RX ADMIN — SENNOSIDES 17.2 MG: 8.6 TABLET, FILM COATED ORAL at 20:51

## 2021-08-10 RX ADMIN — SODIUM CHLORIDE: 9 INJECTION, SOLUTION INTRAVENOUS at 22:46

## 2021-08-10 ASSESSMENT — PAIN - FUNCTIONAL ASSESSMENT: PAIN_FUNCTIONAL_ASSESSMENT: PREVENTS OR INTERFERES SOME ACTIVE ACTIVITIES AND ADLS

## 2021-08-10 ASSESSMENT — PAIN DESCRIPTION - LOCATION: LOCATION: HIP

## 2021-08-10 ASSESSMENT — PAIN DESCRIPTION - PROGRESSION
CLINICAL_PROGRESSION: NOT CHANGED

## 2021-08-10 ASSESSMENT — PAIN SCALES - GENERAL
PAINLEVEL_OUTOF10: 0
PAINLEVEL_OUTOF10: 2
PAINLEVEL_OUTOF10: 3
PAINLEVEL_OUTOF10: 0
PAINLEVEL_OUTOF10: 0
PAINLEVEL_OUTOF10: 7

## 2021-08-10 ASSESSMENT — PAIN DESCRIPTION - DESCRIPTORS: DESCRIPTORS: ACHING

## 2021-08-10 ASSESSMENT — PAIN DESCRIPTION - FREQUENCY: FREQUENCY: INTERMITTENT

## 2021-08-10 ASSESSMENT — PAIN DESCRIPTION - PAIN TYPE: TYPE: SURGICAL PAIN

## 2021-08-10 ASSESSMENT — PAIN DESCRIPTION - ONSET: ONSET: ON-GOING

## 2021-08-10 ASSESSMENT — PAIN DESCRIPTION - ORIENTATION: ORIENTATION: LEFT

## 2021-08-10 NOTE — PROGRESS NOTES
[facility]  Inpatient Rehabilitation  Occupational Therapy  Progress Note  Time:  Time In: 1130  Time Out: 1230  Timed Code Treatment Minutes: 60 Minutes  Minutes: 60    Date: 8/10/2021  Patient Name: Thelma Haskins,   Gender: female      Room: [facility]55/055-A  MRN: [id number]  : 1950  (73 y.o.)  Referring Practitioner: Dr. Elizabeth Douglas  Diagnosis: Displaced fracture of greater trochanter Left femur. Additional Pertinent Hx: The patient is a 73 y.o. female with presentation of left hip pain post acute injury s/p mechanical fall. Patient states she had left JATINDER done  and right JATINDER done . She has had no issues with her left hip and was ambulating fine on her own without assistance. She had a mechanical fall on 21 causing injury to left hip. She came to [facility] ED and had xrays showing left hip periprosthetic fracture and ortho was consulted. Patient is s/p ORIF left greater troch fracture, left hip arthrotomy  by Dr. Ge Carter. 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 Admitted to [facility] on 2021. Restrictions/Precautions:  Restrictions/Precautions: Weight Bearing, General Precautions, Fall Risk  Left Lower Extremity Weight Bearing: Toe Touch Weight Bearing  Partial Weight Bearing Percentage Or Pounds: 25%  Required Braces or Orthoses  Left Lower Extremity Brace:  (hip ABD brace)  Position Activity Restriction  Hip Precautions: No hip flexion > 90 degrees, No active ABduction, No hip internal rotation, No hip external rotation  Other position/activity restrictions: hip abductor brace to be worn all the time. May remove for hygiene. \"; hx of R lung removal 2021, O2 at 2 liters with activity, 1 liter at rest    SUBJECTIVE: Patient agreeable to OT and originally excited to trial first shower, however upon being in shower, pt became anxious and stated she didn't feel good and wanted to leave shower ASAP.  Shower session was slightly rushed towards end and [facility] education was unable to take place due to this. PAIN: Denies pain just reports \"I feel yucky\"     Vitals: Vitals not assessed per clinical judgement, see nursing flowsheet    COGNITION: Decreased Recall, Decreased Insight, Decreased Problem Solving, Decreased Safety Awareness and Impaired Attention    ADL:   Grooming: with set-up. to wash face, hair, brush hair. Bathing: Minimal Assistance. A for lower legs. Skilled edu on LH sponge. Upper Extremity Dressing: with set-up.  with t-shirt   Lower Extremity Dressing: Moderate Assistance. mod A to thread (verbally discussed reacher, unable to practice due to feelings of unwell). Mod A for hip brace. Toileting: Contact Guard Assistance. CGA for balance   Toilet Transfer: Contact Guard Assistance. ETS   Shower Transfer: [address] with use of grab bars and min cues for TTWB. BALANCE:  Sitting Balance:  Modified Independent. Standing Balance: Contact Guard Assistance. BED MOBILITY:  Sit to Supine: Minimal Assistance A for LLE     TRANSFERS:  Sit to Stand:  Contact Guard Assistance. EOB, BSC, Shower chair   Stand to Sit: Contact Guard Assistance. FUNCTIONAL MOBILITY:  Assistive Device: Rolling Walker  Assist Level:  Contact Guard Assistance. Distance: x 5 ft x 2 events within room with min cues for TTWB and slow guarded pace        ASSESSMENT:  Activity Tolerance:  Patient tolerance of  treatment: fair. Limited by feelings of unwell. Assessment: Assessment: Raimundo Delvalle has made limited progress due to short length of stay thus far. Raimundo Delvalle can be limited by her affect and motivation to complete activities as she hasn't been feeling well. She requires CGA for functional tranfsers and short distance mobility and requires mod cues for TTWB adherence and min cues for hip precaution adherence.  She requires mod A for LB ADLs and requires further [facility] education (limited this date due to anxieties/feeling RW, S, & good safety awareness for maintaining TTWB (seated when needed to maintain) GOAL NOT MET, CONTINUE     Following session, patient left in safe position with all fall risk precautions in place.
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
## 2021-08-10 NOTE — PROGRESS NOTES
Physical Medicine & Rehabilitation   Progress Note    Chief Complaint:  Left hip fracture. Rehab needs    Subjective:  Patient sitting up in her chair. Patient reports pain this morning is 2/10. Patient with some confusion yesterday and UA sent. Resulted enteric gram negative bacilli, await sensitivity. SLP consulted and [facility] OF THE [facility] 16/30. SLP will continue to follow. +BM 8/10/21    Rehabilitation:  PT:   Bed Mobility:  Supine to Sit: Minimal Assistance, with verbal cues , with increased time for completion  Sit to Supine: Moderate Assistance, with head of bed flat, without rail, with verbal cues , with increased time for completion   Transfers:  Sit to Stand: Minimal Assistance, cues for hand placement, with verbal cues, for maintaining TTWB on LLE   Stand to Sit:Minimal Assistance, cues for hand placement, with verbal cues, for maintaining TTWB on LLE  Stand Pivot:Minimal Assistance, with verbal cues, using RW, verbal cues for maintaining TTWB (25%) on LLE with stance time  Ambulation:  Minimal Assistance, with verbal cues , with increased time for completion  Distance: 5 ft. x1   Surface: Level Tile  Device:Rolling Walker  Gait Deviations: Forward Flexed Posture, Slow Bhavani, Decreased Weight Shift Left (to maintain TTWB), Decreased Gait Speed, Decreased Heel Strike Bilaterally, Mild Path Deviations, Unsteady Gait, increased time to complete. Balance:  Static Sitting Balance:  Modified Independent   Dynamic Sitting Balance: Supervision  Static Standing Balance: Contact Guard Assistance, with BUE support  Dynamic Standing Balance: Minimal Assistance, with BUE support on AD. Exercise:  Patient was guided in 1 set(s) 10 reps of exercise to both lower extremities. Ankle pumps, Glut sets and Short arc quads. Exercises were completed for increased independence with functional mobility. OT:   COGNITION: Decreased Recall and Decreased Insight  ADL:   Grooming: Contact Guard Assistance.   standing x 2 min in front of mirror to comb hair with unilateral release . BALANCE:  Sitting Balance:  Independent. Standing Balance: Contact Guard Assistance. TRANSFERS:  Sit to Stand:  Contact Guard Assistance. recliner, good hand placement   Stand to Sit: [address] FUNCTIONAL MOBILITY:  Assistive Device: Rolling Walker  Assist Level:  Contact Guard Assistance. Distance: x 5 ft in room with min cues for TTWB      ADDITIONAL ACTIVITIES:   - Patient completed IADL task of gathering clothing from dressers and closets with unilateral release and required CGA for balance, endurance x 3 min   - Patient completed BUE strengthening exercises with skilled education on HEP: completed x15 reps x1 set with a medium resistive band in all joints and all planes in order to improve UE strength and activity tolerance required for BADL routine and toilet / shower transfers. Patient tolerated well, requiring occasional rest breaks. Patient also required min cues for technique.        Review of Systems:  CONSTITUTIONAL:  positive for  fatigue  EYES:  negative  HEENT:  negative  RESPIRATORY:  negative  CARDIOVASCULAR:  negative  GASTROINTESTINAL:  positive for nausea and vomiting  GENITOURINARY:  positive for urinary incontinence  SKIN:  Left hip incision  HEMATOLOGIC/LYMPHATIC:  positive for swelling/edema  MUSCULOSKELETAL:  positive for  pain  NEUROLOGICAL:  Cognitive deficits noted with conversation today  BEHAVIOR/PSYCH:  negative  System review otherwise negative      Objective:  BP (!) 115/40   Pulse 59   Temp 98.6 °F (37 °C) (Oral)   Resp 25   Ht 5' 4\" (1.626 m)   Wt 138 lb 9.6 oz (62.9 kg)   SpO2 91%   BMI 23.79 kg/m²   awake  Orientation:   person, place  Mood: within normal limits  Affect: calm  General appearance: Patient is well nourished, well developed, well groomed and in no acute distress    Memory:   abnormal - deficits noted,   Attention/Concentration: normal  Language:  normal    Cranial Nerves: cranial nerves II-XII are grossly intact  ROM:  abnormal - right hip  Motor Exam:  Motor exam is 4+ out of 5 all extremities with the exception of LLE note tested  Tone:  normal  Muscle bulk: within normal limits  Sensory:  Sensory intact  Coordination:   normal    Skin: warm and dry, no rash or erythema  Peripheral vascular: Pulses: Normal upper and lower extremity pulses; Edema: trace      Diagnostics:   Recent Results (from the past 24 hour(s))   Urine with Reflexed Micro    Collection Time: 08/09/21  2:20 PM   Result Value Ref Range    Glucose, Ur NEGATIVE NEGATIVE mg/dl    Bilirubin Urine NEGATIVE NEGATIVE    Ketones, Urine NEGATIVE NEGATIVE    Specific Gravity, Urine 1.020 1.002 - 1.030    Blood, Urine NEGATIVE NEGATIVE    pH, UA 6.0 5.0 - 9.0    Protein, UA TRACE (A) NEGATIVE    Urobilinogen, Urine 1.0 0.0 - 1.0 eu/dl    Nitrite, Urine NEGATIVE NEGATIVE    Leukocyte Esterase, Urine MODERATE (A) NEGATIVE    Color, UA YELLOW STRAW-YELLOW    Character, Urine CLOUDY (A) CLEAR-SL CLOUD    RBC, UA 0-2 0-2/hpf /hpf    WBC, UA > 100 0-4/hpf /hpf    Epithelial Cells, UA 3-5 3-5/hpf /hpf    Bacteria, UA MANY FEW/NONE SEEN /hpf    Casts UA NONE SEEN NONE SEEN /lpf    Crystals, UA NONE SEEN NONE SEEN    Renal Epithelial, UA NONE SEEN NONE SEEN    Yeast, UA NONE SEEN NONE SEEN    CASTS 2 NONE SEEN NONE SEEN /lpf    MISCELLANEOUS 2 NONE SEEN    Culture, Reflexed, Urine    Collection Time: 08/09/21  2:20 PM    Specimen: Urine, clean catch   Result Value Ref Range    Organism enteric gram negative bacilli (A)     Urine Culture Reflex [place] count: >100,000 CFU/mL         Impression:  · Left hip fracture   · S/p ORIF with Dr Donya Yip 8/2/21  · bilateral hip replacement  · lung resection 2/2 neoplasm  · COPD - stage 2  · Tobacco abuse  · Anemia  · Hx of CVA  · Essential HTN  · CAD  · Hx of Breast cancer  · Depression  · Nausea and vomiting   · UTI,  enteric gram negative bacilli, await sensitivity    Plan:  Continue current therapies  Prophylaxis: DVT - Lovenox, GI - Pepcid  Pain: Tylenol 650 mg po q 6 hours, OxyIR 5-10 mg po q 4 hours prn  Bowels: Glycolax 17 g po q day; colace 100 mg po bid, Senna 2 tabs at bedtime prn, MOM 30 ml po q day prn  · UTI: nitrofurantoin 100mg BID for 3 days, await sensitivity  · Hip abduction brace  · PWB 25% LLE  · Zofran 4mg PRN for nausea and vomiting. · Take meds with food. Decrease acidic fluids in the morning.    · Team conference Wednesday      Missed Therapy Time:  · None    Rajani Davila, APRN - CNP

## 2021-08-10 NOTE — PROGRESS NOTES
[address] [facility]  [facility]  PROGRESS NOTE    TIME   SLP Individual Minutes  Time In: 8734  Time Out: 1130  Minutes: 25  Timed Code Treatment Minutes: 25 Minutes       Date: 8/10/2021  Patient Name: Dyan Moran      CSN: [id number]   : 1950  (73 y.o.)  Gender: female   Referring Physician: NITO Nicole CNP; Parviz Ash MD  Diagnosis: Left hip fracture   Secondary Diagnosis: Cognitive deficits  Precautions: Fall risk  Current Diet: General, thin liquids   Swallowing Strategies: Standard Universal Swallow Precautions  Date of Last MBS/FEES: Not Applicable    Pain:  No pain reported. Subjective:  Patient resting in bed upon ST arrival. Easily awaken provided verbal cues. Alert and pleasantly engaged throughout. Short-Term Goals:  SHORT TERM GOAL #1:  Goal 1: Patient will complete functional immediate/delayed recall and working memory tasks with 70% accuracy given mod cues to improve retention of new and daily information. - GOAL MET  REVISED GOAL: Patient will complete functional immediate/delayed recall and working memory tasks with 90% accuracy given mod cues to improve retention of new and daily information.   INTERVENTIONS: Generation of 6-unit grocery list   Immediate recall - 5/6 indep, 1/6 min cues  Secondary immediate recall - 6/6 indep  Delayed 10 min recall - 5/6 indep, 1/6 min cues   *reviewed WRAP strategies; patient appearing to benefit from reciting list and spaced retrieval techniques, also introduced chunking strategy for improved organization of lists     SHORT TERM GOAL #2:  Goal 2: Patient will complete problem solving, thought organization, and reasoning tasks (i.e., med management, finances, caring for pets, cooking) with 70% accuracy given mod cues to improve skills required for IADL completion. - GOAL MET  REVISED GOAL: Patient will complete problem solving, thought organization, and reasoning tasks (i.e., med management, finances, caring for pets, cooking) with 90% accuracy given mod cues to improve skills required for IADL completion. INTERVENTIONS: Interpretation of prescription label - 6/7 indep, 1/7 min cues  *despite slower processing of information, patient with excellent reading comprehension  *very good interpretation of numerical detail; higher challenges to be introduced    Annabel [address] #3:  Goal 3: Patient will complete visual scanning and attention tasks with no more than 6 errors across a given activity in order to improve multi-tasking skills required for IADL's (i.e., housework, driving). - GOAL NOT MET, CONTINUE   INTERVENTIONS: Not addressed due to focus on other goals; not yet targeted due to short rehab stay thus far. Long-Term Goals:  Timeframe for Long-term Goals: 2 weeks    LONG TERM GOAL #1:  Goal 1: Patient will improve cognitive functioning to a level of Supervision in order to permit potential return to OF. - GOAL NOT MET, CONTINUE       Comprehension: 4 - Patient understands basic needs 75-90%+ of the time  Expression: 4 - Expresses basic ideas/needs 75-90%+ of the time  Social Interaction: 4 - Patient appropriate 75-90%+ of the time  Problem Solving: 3 - Patient solves simple/routine tasks 50%-74%  Memory: 2 - Patient remembers 25%-49% of the time    EDUCATION:  Learner: Patient  Education:  Reviewed ST goals and Plan of Care, Reviewed recommendations for follow-up and Education Related to Potential Risks and Complications Due to Impairment/Illness/Injury  Evaluation of Education: [employer] understanding, Demonstrates with assistance, Needs further instruction and Family not present    ASSESSMENT/PLAN:  Patient has met 2/3 STG's and 0/1 LTG's this therapy period. Improvements made in recall, problem solving, and math computation despite limited skilled ST sessions to date.  Continues to present with a moderate cognitive impairment characterized by score of 16/30 on assessment completed 8/9/21 with deficits revealed in immediate/delayed recall, divided attention, problem solving, VERBAL reasoning, and organization of thoughts. Patient also with impaired insight into deficits, requiring continued cues for safety and assistance with reasoning skills related to IADL/ADL's. No expressive/receptive language deficits, and/or presence of dysarthria. Plans to complete medication management and financial tasks during subsequent sessions, however, suspect with assistance patient able to make safe return to management of these tasks in home setting. Would recommend continued skilled ST services via [facility] or  setting at discharge to further improve the aforementioned deficits. Activity Tolerance:  Patient tolerance of treatment: good. Assessment/Plan: Patient progressing toward established goals. Continues to require skilled care of licensed speech pathologist to progress toward achievement of established goals and plan of care.      Plan for Next Session: sequencing, finances, recall of 6-unit grocery list (strawberries, bananas, cereal/dragon puffs, ham, milk, reeces cup)     Marylee Brought, M.S. [address]

## 2021-08-10 NOTE — PROGRESS NOTES
Spoke with primary nurse regarding PICC line insertion order. States phlebotomy has been unsuccessful in obtaining lab draws so PICC line was ordered for this purpose. Discussed with nurse that PICC lines are not to be inserted for the use of lab draws only. Verbalized understanding. To address with physician.

## 2021-08-10 NOTE — PROGRESS NOTES
Che      Date:  8/10/2021            Patient Name: Veronique Kiser           MRN: [id number]  Kelly: [de-identified]          YOB: 1950 (77 y.o.)       Gender: female   Diagnosis: Displaced fracture of greater trochanter Left femur.   Physician: Referring Practitioner: Dr. Siria Weems:  Pt declined fresh corn on the sandhu, tomatoes, and peaches to eat with her lunch- she was tearful and stated that she didn't feel good    Temitope James, CTRS    8/10/2021

## 2021-08-10 NOTE — PROGRESS NOTES
G[address] 85  [facility]  Occupational Therapy  Daily Note  Time:   Time In: 0700  Time Out: 0730  Timed Code Treatment Minutes: 30 Minutes  Minutes: 30    Date: 8/10/2021  Patient Name: Saundra White,   Gender: female      Room: [facility]/055-A  MRN: [id number]  : 1950  (73 y.o.)  Referring Practitioner: Dr. Kim Meléndez  Diagnosis: Displaced fracture of greater trochanter Left femur. Additional Pertinent Hx: The patient is a 73 y.o. female with presentation of left hip pain post acute injury s/p mechanical fall. Patient states she had left JATINDER done  and right JATINDER done . She has had no issues with her left hip and was ambulating fine on her own without assistance. She had a mechanical fall on 21 causing injury to left hip. She came to [facility] ED and had xrays showing left hip periprosthetic fracture and ortho was consulted. Patient is s/p ORIF left greater troch fracture, left hip arthrotomy  by Dr. Reyes Quan. 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 Admitted to [facility] on 2021. Restrictions/Precautions:  Restrictions/Precautions: Weight Bearing, General Precautions, Fall Risk  Left Lower Extremity Weight Bearing: Toe Touch Weight Bearing  Partial Weight Bearing Percentage Or Pounds: 25%  Required Braces or Orthoses  Left Lower Extremity Brace:  (hip ABD brace)  Position Activity Restriction  Hip Precautions: No hip flexion > 90 degrees, No active ABduction, No hip internal rotation, No hip external rotation  Other position/activity restrictions: hip abductor brace to be worn all the time. May remove for hygiene. \"; hx of R lung removal 2021, O2 at 2 liters with activity, 1 liter at rest     SUBJECTIVE: Patient pleasant and cooperative and agreeable to OT.      PAIN: Denies pain      Vitals: Vitals not assessed per clinical judgement, see nursing flowsheet    COGNITION: Decreased Recall and Decreased Insight    ADL:   Grooming: Contact Guard Assistance. standing x 2 min in front of mirror to comb hair with unilateral release . BALANCE:  Sitting Balance:  Independent. Standing Balance: Contact Guard Assistance. BED MOBILITY:  Not Tested    TRANSFERS:  Sit to Stand:  Contact Guard Assistance. recliner, good hand placement   Stand to Sit: [address] FUNCTIONAL MOBILITY:  Assistive Device: Rolling Walker  Assist Level:  Contact Guard Assistance. Distance: x 5 ft in room with min cues for TTWB        ADDITIONAL ACTIVITIES:   - Patient completed IADL task of gathering clothing from dressers and closets with unilateral release and required CGA for balance, endurance x 3 min   - Patient completed BUE strengthening exercises with skilled education on HEP: completed x15 reps x1 set with a medium resistive band in all joints and all planes in order to improve UE strength and activity tolerance required for BADL routine and toilet / shower transfers. Patient tolerated well, requiring occasional rest breaks. Patient also required min cues for technique. ASSESSMENT:     Activity Tolerance:  Patient tolerance of  treatment: good.        Discharge Recommendations: Continue to assess pending progress, Home with Home health OT   Equipment Recommendations: Equipment Needed: Yes  Other: BSC,  AE kit (reacher,  shoehorn, sockaide,  bath sponge)  Plan: Times per week: 90 minutes 5x/wk, 30 minutes 1x/wk  Current Treatment Recommendations: Balance Training, Endurance Training, Functional Mobility Training, Self-Care / ADL, Equipment Evaluation, Education, & procurement, Strengthening    Patient Education  Patient Education: ADL's, IADL's, Home Exercise Program and Reviewed Prior Education    Goals  Short term goals  Time Frame for Short term goals: 1 week  Short term goal 1: Pt will complete various t/fs including BSC with SBA & min vcs for technique  Short term goal 2: Pt will complete LE dressing with mod A, LH AE, & 0-2 vcs for hip precautions  Short term goal 3: Pt will tolerate standing 2 min with 1 UE release & SBA for increased ease of toileting routine  Short term goal 4: Pt will complete mobility to/from BSC/bedside chair with SBA, RW, & 0-2 vcs for TTWB precautions  Short term goal 5: Pt will tolerate BUE moderate resistance band HEP x 10 reps with min RBs to increase UB endurance for ADL tasks  Long term goals  Time Frame for Long term goals : 2 weeks  Long term goal 1: Pt will complete various t/fs including toilet with mod I  Long term goal 2: Pt will complete BADL routine (sponge bath) with S & 0-1 vcs for safety  Long term goal 3: Pt will complete LE dressing with S & LH AE  Long term goal 4: Pt will complete simple meal prep task with RW, S, & good safety awareness for maintaining TTWB (seated when needed to maintain)    Following session, patient left in safe position with all fall risk precautions in place.

## 2021-08-10 NOTE — PROGRESS NOTES
[address] [facility] PHYSICAL THERAPY  DAILY NOTE  [address] [facility] - 7E-55/055-A    Time In: 1300  Time Out: 1330  Timed Code Treatment Minutes: 30 Minutes  Minutes: 30          Date: 8/10/2021  Patient Name: Philippe Pina,  Gender:  female        MRN: [id number]  : 1950  (73 y.o.)     Referring Practitioner: Dr. Chivo Killian  Diagnosis: Displaced Fx of Greater Trochanter Lt femur  Additional Pertinent Hx: The patient is a 73 y.o. female with presentation of left hip pain post acute injury s/p mechanical fall. Patient states she had left JATINDER done  and right JATINDER done . She had a mechanical fall on 21 causing injury to left hip. xrays showing left hip periprosthetic fracture and ortho was consulted. Hx:  Lung removal due to lung CA,  2021     Prior Level of Function:  Lives With: Son (Son will not be staying with her. Pt reports friends and nieces to stay to assist)  Type of Home: House  Home Layout: Two level, Able to Live on Main level with bedroom/bathroom, 1/2 bath on main level  Home Access: Stairs to enter with rails  Entrance Stairs - Number of Steps: 4+4  Entrance Stairs - Rails: Left  Home Equipment: Cane, Rolling walker, Standard walker, Wheelchair-manual, 4 wheeled walker   Bathroom Shower/Tub: Walk-in shower (on 2nd story)  Bathroom Toilet: Standard  Bathroom Equipment: Shower chair, Grab bars in shower    ADL Assistance: [address] [place] [address] Avenue: Needs assistance (sharies duties with son)  Ambulation Assistance: Independent  Transfer Assistance: Independent  Active : Yes  Additional Comments: Pt reports using no AD PLOF. Currently has bed on 1st level of home, 1/2 bath on 1st level; walk in shower is on 2nd story.     Restrictions/Precautions:  Restrictions/Precautions: Weight Bearing, General Precautions, Fall Risk  Left Lower Extremity Weight Bearing: Toe Touch Weight Bearing  Partial Weight Bearing Percentage Or Pounds: Gait Training, Patient/Caregiver Education & Training, Equipment Evaluation, Education, & procurement, Stair training, Balance Training, Endurance Training, Home Exercise Program, Functional Mobility Training, Transfer Training, Safety Education & Training, Wheelchair Mobility Training    Patient Education  Patient Education: Home Exercise Program    Goals:  Patient goals : Get home in time for granddaughter's wedding  Short term goals  Time Frame for Short term goals: 1 wk  Short term goal 1: Pt to go supine <->sit, cGA with LLE, to get in/out of bed. No rail, bed flat  Short term goal 2: Pt to get up/down from various seated surfaces, CGA, to get up to walk. Short term goal 3: Pt to walk with RW >= 25 ft, TTWB LLE, CGA, to get in/out of restroom  Short term goal 4: Pt to ascend/descend 6\" step in parallel bars, maintaining TTWB LLE, CGA to progress to home entry. Short term goal 5: Pt to propel w/c, indoor surfaces, Mod I, for home and community mobility  Short term goal 6: Pt to get in/out of car, min +1 for transportation needs. Long term goals  Time Frame for Long term goals : 2 wks  Long term goal 1: Pt to go supine <->sit, Mod I, to get in/out of bed. No rail, bed flat  Long term goal 2: Pt to get up/down from various seated surfaces, Mod I, to get up to walk. Long term goal 3: Pt to walk with RW >= 50 ft, TTWB LLE, Mod I, for home mobility  Long term goal 4: Pt to ascend/descend 4 +4 steps with Lt rail, min +1 for home entry  Long term goal 5: Pt to get in/out of car, SBA +1 for transportation needs. Following session, patient left in safe position with all fall risk precautions in place.

## 2021-08-10 NOTE — PROGRESS NOTES
[facility]  INPATIENT PHYSICAL THERAPY  PROGRESS NOTE  [address] [facility] - 7E-55/055-A    Time In: 0930  Time Out: 1030  Timed Code Treatment Minutes: 60 Minutes  Minutes: 60          Date: 8/10/2021  Patient Name: Thelma Haskins,  Gender:  female        MRN: [id number]  : 1950  (73 y.o.)     Referring Practitioner: Dr. Deng Edward  Diagnosis: Displaced Fx of Greater Trochanter Lt femur  Additional Pertinent Hx: The patient is a 73 y.o. female with presentation of left hip pain post acute injury s/p mechanical fall. Patient states she had left JATINDER done  and right JATINDER done . She had a mechanical fall on 21 causing injury to left hip. xrays showing left hip periprosthetic fracture and ortho was consulted. Hx:  Lung removal due to lung CA,  2021     Prior Level of Function:  Lives With: Son (Son will not be staying with her. Pt reports friends and nieces to stay to assist)  Type of Home: House  Home Layout: Two level, Able to Live on Main level with bedroom/bathroom, 1/2 bath on main level  Home Access: Stairs to enter with rails  Entrance Stairs - Number of Steps: 4+4  Entrance Stairs - Rails: Left  Home Equipment: Cane, Rolling walker, Standard walker, Wheelchair-manual, 4 wheeled walker   Bathroom Shower/Tub: Walk-in shower (on 2nd story)  Bathroom Toilet: Standard  Bathroom Equipment: Shower chair, Grab bars in shower    ADL Assistance: [facility] [address]: Needs assistance (sharies duties with son)  Ambulation Assistance: Independent  Transfer Assistance: Independent  Active : Yes  Additional Comments: Pt reports using no AD PLOF. Currently has bed on 1st level of home, 1/2 bath on 1st level; walk in shower is on 2nd story.     Restrictions/Precautions:  Restrictions/Precautions: Weight Bearing, General Precautions, Fall Risk  Left Lower Extremity Weight Bearing: Toe Touch Weight Bearing  Partial Weight Bearing Percentage Or Pounds: 25%  Required Braces or Orthoses  Left Lower Extremity Brace:  (hip ABD brace)  Position Activity Restriction  Hip Precautions: No hip flexion > 90 degrees, No active ABduction, No hip internal rotation, No hip external rotation  Other position/activity restrictions: hip abductor brace to be worn all the time. May remove for hygiene. \"; hx of R lung removal 6/14/2021, O2 at 2 liters with activity, 1 liter at rest    SUBJECTIVE: Patient in recliner resting upon arrival, very resistive to therapy and required max encouragement and education on the importance of therapy prior to discharge home. After ~10 minutes of encouragement, patient finally did agree to participate with therapy. Requesting to get back in bed at the end of session to rest.     PAIN: Yes in Left hip, not rated. Vitals: Oxygen: Patient adamant that she didn't need oxygen at start of session and took off 02 tubing, monitored after being off for short time, Sp02 decreased to 87%. Placed on 1L of 02 initially but still only in high 80's, increased to 2L and then gradually improved to 93%. Also required verbal cues for correct breathing technique to improve effects of oxygen. OBJECTIVE:  Bed Mobility:  Sit to Supine: Moderate Assistance, with head of bed flat, without rail, with verbal cues , with increased time for completion, to assist at BLE's, verbal cues for lowering trunk slowly to lessen pain. Transfers:  Sit to Stand: Minimal Assistance, with increased time for completion, cues for hand placement, with verbal cues, for maintaining TTWB on LLE  Stand to Sit:Minimal Assistance, cues for hand placement, with verbal cues, for maintaining TTWB on LLE  Stand Pivot:Contact Guard Assistance using RW, with increased time for completion, with verbal cues for maintaining TTWB on LLE. Ambulation:  Contact Guard Assistance, with verbal cues , with increased time for completion, WC to follow for safety.    Distance: 20 ft. X1; 6 ft. x1 Surface: Level Tile  Device:Rolling Walker  Gait Deviations: Forward Flexed Posture, Slow Bhavani, Decreased Gait Speed, Mild Path Deviations, Unsteady Gait and verbal cues for maintaining TTWB on LLE. Wheelchair Mobility:  Stand By Assistance  Extremities Used: Bilateral Upper Extremities  Type of Chair:Manual  Surface: Level Tile  Distance: 150 ft. x1  Quality: Slow Velocity, Short Strokes, Decreased Fluidity and increased time to complete distance. Balance:  Static Standing Balance: Contact Guard Assistance, to Close SBA; with BUE support on AD. Dynamic Standing Balance: Contact Guard Assistance with intermittent UE support on AD while completing dynamic standing activity for ~3 minutes, grossly steady, min verbal cueing for maintaining TTWB on LLE. Exercise:  Patient was guided in 1 set(s) 10 reps of exercise to both lower extremities. Glut sets, Seated marches (pt leaning back in WC and completed decreased ROM on LLE to maintain hip precautions), Seated heel/toe raises, Long arc quads and Seated isometric hip adduction. Exercises were completed for increased independence with functional mobility. Functional Outcome Measures: Not completed       ASSESSMENT:  Assessment: Patient progressing toward established goals, though none yet met due to short length of stay. Pt's initiation and motivation are impeding more consistent and steadier improvement with overall decreased strength, endurance and need to maintain hip precautions with abd brace further impacting pt's function. Pt will continue to benefit from skilled PT to advance in functional mobility, gait and overall safety. Activity Tolerance:  Patient tolerance of  treatment: good. Required encouragement to participate.       Equipment Recommendations:Equipment Needed: No (Pt has RW, SW, cane, w/c and 4WW)  Discharge Recommendations:  Continue to assess pending progress, Patient would benefit from continued therapy after discharge    Plan: Times per week: 5x/ wk 90 min, 1x/ wk 30 min  Current Treatment Recommendations: Strengthening, Gait Training, Patient/Caregiver Education & Training, Equipment Evaluation, Education, & procurement, Stair training, Balance Training, Endurance Training, Home Exercise Program, Functional Mobility Training, Transfer Training, Safety Education & Training, Wheelchair Mobility Training    Patient Education  Patient Education: Plan of Care, Precautions/Restrictions, [employer], Transfers, Reviewed Prior Education, Gait, Wheelchair Mobility, Health Promotion and Wellness Education, Home Safety Education, importance of therapy to improve strength and safe discharge to home, - Patient Requires Continued Education    Goals:  Patient goals : Get home in time for granddaughter's wedding  Short term goals  NOT MET, continue  Time Frame for Short term goals: 1 wk  Short term goal 1: Pt to go supine <->sit, cGA with LLE, to get in/out of bed. No rail, bed flat NOT MET   Short term goal 2: Pt to get up/down from various seated surfaces, CGA, to get up to walk. NOT MET   Short term goal 3: Pt to walk with RW >= 25 ft, TTWB LLE, CGA, to get in/out of restroom NOT MET   Short term goal 4: Pt to ascend/descend 6\" step in parallel bars, maintaining TTWB LLE, CGA to progress to home entry. NOT ADDRESSED. Short term goal 5: Pt to propel w/c, indoor surfaces, Mod I, for home and community mobility NOT MET   Short term goal 6: Pt to get in/out of car, min +1 for transportation needs. NOT ADDRESSED   Long term goals  Time Frame for Long term goals : 2 wks  Long term goal 1: Pt to go supine <->sit, Mod I, to get in/out of bed. No rail, bed flat NOT MET   Long term goal 2: Pt to get up/down from various seated surfaces, Mod I, to get up to walk.  NOT MET   Long term goal 3: Pt to walk with RW >= 50 ft, TTWB LLE, Mod I, for home mobility NOT MET   Long term goal 4: Pt to ascend/descend 4 +4 steps with Lt rail, min +1 for home entry NOT MET   Long term goal 5: Pt to get in/out of car, SBA +1 for transportation needs. NOT MET     Following session, patient left in safe position with all fall risk precautions in place. Treatment session and note completed by Alonzo Mark PTA.   Assessment and goal revision of Progress Note completed by Demetria Diallo, PT.

## 2021-08-11 LAB — LACTIC ACID: 1.6 MMOL/L (ref 0.5–2)

## 2021-08-11 PROCEDURE — 6370000000 HC RX 637 (ALT 250 FOR IP): Performed by: NURSE PRACTITIONER

## 2021-08-11 PROCEDURE — 94640 AIRWAY INHALATION TREATMENT: CPT

## 2021-08-11 PROCEDURE — 6360000002 HC RX W HCPCS: Performed by: PHYSICAL MEDICINE & REHABILITATION

## 2021-08-11 PROCEDURE — 97129 THER IVNTJ 1ST 15 MIN: CPT | Performed by: SPEECH-LANGUAGE PATHOLOGIST

## 2021-08-11 PROCEDURE — 97530 THERAPEUTIC ACTIVITIES: CPT

## 2021-08-11 PROCEDURE — 99233 SBSQ HOSP IP/OBS HIGH 50: CPT | Performed by: PHYSICAL MEDICINE & REHABILITATION

## 2021-08-11 PROCEDURE — 83605 ASSAY OF LACTIC ACID: CPT

## 2021-08-11 PROCEDURE — 97116 GAIT TRAINING THERAPY: CPT

## 2021-08-11 PROCEDURE — 6360000002 HC RX W HCPCS: Performed by: FAMILY MEDICINE

## 2021-08-11 PROCEDURE — 6370000000 HC RX 637 (ALT 250 FOR IP): Performed by: PHYSICAL MEDICINE & REHABILITATION

## 2021-08-11 PROCEDURE — 97110 THERAPEUTIC EXERCISES: CPT

## 2021-08-11 PROCEDURE — 1180000000 HC REHAB R&B

## 2021-08-11 PROCEDURE — 94760 N-INVAS EAR/PLS OXIMETRY 1: CPT

## 2021-08-11 PROCEDURE — 2580000003 HC RX 258: Performed by: FAMILY MEDICINE

## 2021-08-11 PROCEDURE — 36415 COLL VENOUS BLD VENIPUNCTURE: CPT

## 2021-08-11 PROCEDURE — 97130 THER IVNTJ EA ADDL 15 MIN: CPT | Performed by: SPEECH-LANGUAGE PATHOLOGIST

## 2021-08-11 RX ORDER — FAMOTIDINE 20 MG/1
20 TABLET, FILM COATED ORAL DAILY
Status: DISCONTINUED | OUTPATIENT
Start: 2021-08-12 | End: 2021-08-20 | Stop reason: HOSPADM

## 2021-08-11 RX ORDER — SENNA PLUS 8.6 MG/1
2 TABLET ORAL 2 TIMES DAILY
Status: DISCONTINUED | OUTPATIENT
Start: 2021-08-11 | End: 2021-08-17

## 2021-08-11 RX ORDER — BISACODYL 10 MG
10 SUPPOSITORY, RECTAL RECTAL DAILY PRN
Status: DISCONTINUED | OUTPATIENT
Start: 2021-08-11 | End: 2021-08-20 | Stop reason: HOSPADM

## 2021-08-11 RX ORDER — SENNA PLUS 8.6 MG/1
1 TABLET ORAL NIGHTLY
Status: DISCONTINUED | OUTPATIENT
Start: 2021-08-11 | End: 2021-08-11 | Stop reason: SDUPTHER

## 2021-08-11 RX ADMIN — ACETAMINOPHEN 650 MG: 325 TABLET ORAL at 06:01

## 2021-08-11 RX ADMIN — FERROUS SULFATE TAB 325 MG (65 MG ELEMENTAL FE) 325 MG: 325 (65 FE) TAB at 17:45

## 2021-08-11 RX ADMIN — LEVOTHYROXINE SODIUM 88 MCG: 0.09 TABLET ORAL at 06:01

## 2021-08-11 RX ADMIN — ONDANSETRON 4 MG: 4 TABLET, ORALLY DISINTEGRATING ORAL at 01:37

## 2021-08-11 RX ADMIN — ENOXAPARIN SODIUM 40 MG: 40 INJECTION, SOLUTION INTRAVENOUS; SUBCUTANEOUS at 22:28

## 2021-08-11 RX ADMIN — DOCUSATE SODIUM 100 MG: 100 CAPSULE ORAL at 13:02

## 2021-08-11 RX ADMIN — BUDESONIDE AND FORMOTEROL FUMARATE DIHYDRATE 2 PUFF: 160; 4.5 AEROSOL RESPIRATORY (INHALATION) at 16:04

## 2021-08-11 RX ADMIN — MORPHINE SULFATE 10 MG: 10 SOLUTION ORAL at 01:26

## 2021-08-11 RX ADMIN — ACETAMINOPHEN 650 MG: 325 TABLET ORAL at 13:03

## 2021-08-11 RX ADMIN — ASPIRIN 325 MG: 325 TABLET, DELAYED RELEASE ORAL at 13:02

## 2021-08-11 RX ADMIN — SENNOSIDES 17.2 MG: 8.6 TABLET, FILM COATED ORAL at 17:45

## 2021-08-11 RX ADMIN — ACETAMINOPHEN 650 MG: 325 TABLET ORAL at 22:27

## 2021-08-11 RX ADMIN — FAMOTIDINE 20 MG: 20 TABLET, FILM COATED ORAL at 13:01

## 2021-08-11 RX ADMIN — DOCUSATE SODIUM 100 MG: 100 CAPSULE ORAL at 22:27

## 2021-08-11 RX ADMIN — CEFTRIAXONE SODIUM 1000 MG: 1 INJECTION, POWDER, FOR SOLUTION INTRAMUSCULAR; INTRAVENOUS at 22:33

## 2021-08-11 RX ADMIN — FERROUS SULFATE TAB 325 MG (65 MG ELEMENTAL FE) 325 MG: 325 (65 FE) TAB at 13:02

## 2021-08-11 RX ADMIN — ONDANSETRON 4 MG: 4 TABLET, ORALLY DISINTEGRATING ORAL at 08:53

## 2021-08-11 RX ADMIN — ACETAMINOPHEN 650 MG: 325 TABLET ORAL at 17:44

## 2021-08-11 ASSESSMENT — PAIN SCALES - GENERAL
PAINLEVEL_OUTOF10: 0
PAINLEVEL_OUTOF10: 9
PAINLEVEL_OUTOF10: 5
PAINLEVEL_OUTOF10: 5
PAINLEVEL_OUTOF10: 4
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
## 2021-08-11 NOTE — PROGRESS NOTES
senna    Review of Systems  Pertinent items are noted in HPI. Objective:     Patient Vitals for the past 8 hrs:   BP Temp Pulse Resp SpO2   08/11/21 0852 (!) 98/55 99.9 °F (37.7 °C) 91 16 91 %     I/O last 3 completed shifts: In: 18 [P.O.:840;  I.V.:20]  Out: 200 [Emesis/NG output:200]  I/O this shift:  In: 200 [P.O.:200]  Out: -     BP (!) 98/55   Pulse 91   Temp 99.9 °F (37.7 °C)   Resp 16   Ht 5' 4\" (1.626 m)   Wt 139 lb 4.8 oz (63.2 kg)   SpO2 91%   BMI 23.91 kg/m²     General appearance: alert, appears stated age and cooperative  Head: Normocephalic, without obvious abnormality, atraumatic  Lungs: clear to auscultation bilaterally  Heart: regular rate and rhythm, S1, S2 normal, no murmur, click, rub or gallop  Abdomen: soft, non-tender; bowel sounds normal; no masses,  no organomegaly  Extremities: extremities normal, atraumatic, no cyanosis or edema  Skin: Skin color, texture, turgor normal. No rashes or lesions  Neurologic: weak      Electronically signed by Deedee Camarillo MD on 8/11/2021 at 12:39 PM

## 2021-08-11 NOTE — PLAN OF CARE
Problem: DISCHARGE BARRIERS  Goal: Patient's continuum of care needs are met  Note: Team conference held Wednesday, 08/11/2021. Recommendations of the team were explained to the patient by TC Castellanos, and Dr. Barney Hendrickson. Team is recommending that patient continue on acute inpatient rehab for nine more days, with expected discharge date of Friday, 08/20/2021. Prior to discharge, team is recommending family education. Following discharge, team is recommending twenty four hour supervision due to cognitive deficits and physical assistance needed for any mobility. Additionally, team is recommending home health care services. Discussion with patient regarding expectation regarding participation with therapy in order to continue rehab at current level of care. SW discussed alternative arrangements available if patient no able to participate in therapy. Patient adamantly refuses SNF. Care plan reviewed with patient. Patient verbalized understanding of the plan of care and contribute to goal setting. SW contacted patient's son, Aaron Beckett, at [phone], to provide update and further discuss discharge arrangements. No answer, left message requesting returned phone call. SW to follow and maintain involvement in discharge planning.

## 2021-08-11 NOTE — PROGRESS NOTES
[address] [facility] THERAPY  [facility]  DAILY NOTE    TIME   SLP Individual Minutes  Time In: 8832  Time Out: 9007  Minutes: 19  Timed Code Treatment Minutes: 19 Minutes          Date: 2021  Patient Name: Cristina Berman      CSN: [id number]   : 1950  (73 y.o.)  Gender: female   Referring Physician: NITO Galarza CNP; Mary Barcenas MD  Diagnosis: Left hip fracture   Secondary Diagnosis: Cognitive deficits  Precautions: Fall risk  Current Diet: General, thin liquids   Swallowing Strategies: Standard Universal Swallow Precautions  Date of Last MBS/FEES: Not Applicable    Pain:  No pain reported. Subjective:  Patient seen at bedside, questioning the reason behind completion of cognitive tasks. Provided rationale for cognitive therapy as it relates to impairments identified on the [facility] [place] [facility] screener and skills needed for successful completion of ADL's in the home environment. When asked what patient's responsibilities are at home she stated \"I only do what I want to do, when I want to do it,\" but unable to provide specifics regarding her daily activities. Max encouragement needed throughout session to complete tasks with patient reporting that using the pen was painful for her fingers. Patient becoming frustrated and throwing clipboard to the edge of the bed.   -Missed 11 minutes of ST time due to initiating session late due to physician rounding and early termination of session per patient request.     Short-Term Goals:  SHORT TERM GOAL #1:  Goal 1:  Patient will complete functional immediate/delayed recall and working memory tasks with 90% accuracy given mod cues to improve retention of new and daily information. INTERVENTIONS: Recall of hip precautions: Patient stating \"something about 90 degrees, and don't walk on my ankle. \" Reviewed standard precautions with the provision of the acronym \"BLT\" (No bending, No lifting, No twisting). Discussed writing the precautions out and posting them on the careboard for the patient to reference. -Immediate recall: 3/3 with max support from therapist  *Will continue to review to assist with safety and recall of proper technique for transfers. Recall of grocery list from previous session: Patient refused to respond stating \"I am not doing anything else today, okay\". SHORT TERM GOAL #2:  Goal 2:  Patient will complete problem solving, thought organization, and reasoning tasks (i.e., med management, finances, caring for pets, cooking) with 90% accuracy given mod cues to improve skills required for IADL completion. INTERVENTIONS: Executive functioning/prospective thinking:  -Patient reporting that she was an active , but stating \"I don't know how long it will be till I can drive again because of the surgery. \" Discussed that the orthopaedic surgeon will give clearance for return to driving, but inquired about how she will have transport to her appointments in the meantime. Patient reporting that she plans to have a friend \"Debra Bryson\" provide transportation and also states that she has several nieces that may be able to help her. Calendar organization task: Patient given a monthly calendar with prompts to organize 10 events onto the calendar, similar to how she organizes information at home. Patient only able to complete 4/10 of the items due to reports of \"pain in her finger tips\" and \"increasing nausea\". -3/4 indep, 1/4 with errors (x1 error related to missing the time of the event)  *Inaddition to the errors listed above, patient omitting placement of x2 events indicating poor organization of information and demonstrating multiple spelling errors. *Increased time needed for task completion (~12 minutes to fill in 4 items on the calendar).     SHORT TERM GOAL #3:  Goal 3: Patient will complete visual scanning and attention tasks with no more than 6 errors across a given activity in order to improve multi-tasking skills required for IADL's (i.e., housework, driving). INTERVENTIONS: Increased time needed for visual scanning/selective attention when completing the calendar organization task. Long-Term Goals:  Timeframe for Long-term Goals: 2 weeks    LONG TERM GOAL #1:  Goal 1: Patient will improve cognitive functioning to a level of Supervision in order to permit potential return to [facility]. Comprehension: 4 - Patient understands basic needs 75-90%+ of the time  Expression: 4 - Expresses basic ideas/needs 75-90%+ of the time  Social Interaction: 3 - Patient appropriate  50%-74% of the time  Problem Solving: 3 - Patient solves simple/routine tasks 50%-74%  Memory: 2 - Patient remembers 25%-49% of the time    EDUCATION:  Learner: Patient  Education:  Reviewed ST goals and Plan of Care, Reviewed recommendations for follow-up and Education Related to Potential Risks and Complications Due to Impairment/Illness/Injury  Evaluation of Education: [place] understanding, Demonstrates with assistance, Needs further instruction and Family not present    ASSESSMENT/PLAN:  Activity Tolerance:  Patient tolerance of treatment: good. Assessment/Plan: Patient progressing toward established goals. Continues to require skilled care of licensed speech pathologist to progress toward achievement of established goals and plan of care. Plan for Next Session: sequencing, finances, recall of 6-unit grocery list (strawberries, bananas, cereal/dragon puffs, ham, milk, reeces cup)     [facility]  [address], [address]

## 2021-08-11 NOTE — PROGRESS NOTES
Comprehensive Nutrition Assessment    Type and Reason for Visit:  Reassess    Nutrition Recommendations/Plan:   Continue current diet. Change ONS to Magic Cups TID. Recommend MVI. Consider Vitamin D supplementation. Nutrition Assessment:    Pt. with no improvement from a nutritional standpoint AEB ongoing poor appetite; disliked Ensure. Remains at risk for further nutritional compromise r/t increased nutrient needs for post-op healing, catabolic illness (lung cancer) and underlying medical condition (hx breast cancer, skin cancer, CAD, COPD, CVA, UTI, depression, IBS, lung cancer). Nutrition recommendations/interventions as per above. Malnutrition Assessment:  Malnutrition Status: At risk for malnutrition (Comment)    Context:  Acute Illness     Findings of the 6 clinical characteristics of malnutrition:  Energy Intake:  1 - 75% or less of estimated energy requirements for 7 or more days  Weight Loss:  No significant weight loss (per available data in EMR)     Body Fat Loss:  No significant body fat loss     Muscle Mass Loss:  No significant muscle mass loss    Fluid Accumulation:  1 - Mild Extremities   Strength:  Not Performed    Estimated Daily Nutrient Needs:  Energy (kcal):  7587-9528 (25-30 kcals/kg); Weight Used for Energy Requirements:  Other (Comment) (most recent weight 65kg on 7/30 via bedscale)     Protein (g):  78+ (1.2+ grams/kg); Weight Used for Protein Requirements:  Other (Comment) (most recent weight of 65kg on 7/30 via bedscale)               Nutrition Related Findings:  Pt. Seen - anxious; asking for RN as something happened \"that really scared\" her; notified RN; noted to have vomited after morning meds; Rx includes Zofran, Pepcid, Glycolax, Colace, Senokot, Iron, ATB;  8/10: Glucose 143, BUN 19, Cr 1.0; Vitamin D 6; pt.  Reports disliking Ensure - agrees to trial Magic Cups    Wounds:  Surgical Incision (8/2 - open femur reduction; wound consult - buttock discoloration; non juan jnckimberlee)       Current Nutrition Therapies:    ADULT DIET; Regular  Adult Oral Nutrition Supplement; Frozen Oral Supplement    Anthropometric Measures:  · Height: 5' 4\" (162.6 cm)  · Current Body Weight: 139 lb 4.8 oz (63.2 kg) (8/10 +1 edema)   · Admission Body Weight: 138 lb 9.6 oz (62.9 kg) (8/9 bedscale)    · Usual Body Weight: 141 lb (64 kg) (EMR, standing scale on 6/14 - patient reports weighing 178# in May 2021)     · Ideal Body Weight: 120 lbs;      · BMI: 23.9  · BMI Categories: Normal Weight (BMI 22.0 to 24.9) age over 72       Nutrition Diagnosis:   · Increased nutrient needs related to acute injury/trauma as evidenced by wounds      Nutrition Interventions:   Food and/or Nutrient Delivery:  Continue Current Diet, Continue Oral Nutrition Supplement  Nutrition Education/Counseling:  Education initiated (8/7 - encouraged increase water intake, encouraged PO at best efforts, encouraged protein for healing)   Coordination of Nutrition Care:  Continue to monitor while inpatient    Goals:  Patient will consume 75% or greater of meals and ONS during LOS       Nutrition Monitoring and Evaluation:   Behavioral-Environmental Outcomes:  None Identified   Food/Nutrient Intake Outcomes:  Food and Nutrient Intake, Diet Advancement/Tolerance, Supplement Intake  Physical Signs/Symptoms Outcomes:  Biochemical Data, GI Status, Fluid Status or Edema, Meal Time Behavior, Nutrition Focused Physical Findings, Skin, Weight     Discharge Planning:     Too soon to determine     Electronically signed by Cindy Gutierrez RD, LD on 8/11/21 at 9:21 AM EDT    Contact: [phone]

## 2021-08-11 NOTE — FLOWSHEET NOTE
[address] [address]  PHYSICAL THERAPY MISSED TREATMENT NOTE  ACUTE CARE  [address] [facility],[address]              Missed Treatment  Pt agitated and refusing session despite max encouragement to participate.  Pt states she's not doing anything because she does not feel good and \"they woke me up at 6am!\"    PT missed 30 mins 8/11/21

## 2021-08-11 NOTE — PLAN OF CARE
Problem: Nutrition  Goal: Optimal nutrition therapy  8/11/2021 0920 by Giancarlo Kingsley RD, LOIS  Outcome: Ongoing  Nutrition Problem #1: Increased nutrient needs  Intervention: Food and/or Nutrient Delivery: Continue Current Diet, Continue Oral Nutrition Supplement  Nutritional Goals: Patient will consume 75% or greater of meals and ONS during LOS

## 2021-08-11 NOTE — PROGRESS NOTES
[address] [address]  INPATIENT PHYSICAL THERAPY  DAILY NOTE  [facility] - 7E-55/055-A    Time In: 0930  Time Out: 1030  Timed Code Treatment Minutes: 60 Minutes  Minutes: 60          Date: 2021  Patient Name: Marlow Phoenix,  Gender:  female        MRN: [id number]  : 1950  (73 y.o.)     Referring Practitioner: Dr. Isabell Ga  Diagnosis: Displaced Fx of Greater Trochanter Lt femur  Additional Pertinent Hx: The patient is a 73 y.o. female with presentation of left hip pain post acute injury s/p mechanical fall. Patient states she had left JATINDER done  and right JATINDER done . She had a mechanical fall on 21 causing injury to left hip. xrays showing left hip periprosthetic fracture and ortho was consulted. Hx:  Lung removal due to lung CA,  2021     Prior Level of Function:  Lives With: Son (Son will not be staying with her. Pt reports friends and nieces to stay to assist)  Type of Home: House  Home Layout: Two level, Able to Live on Main level with bedroom/bathroom, 1/2 bath on main level  Home Access: Stairs to enter with rails  Entrance Stairs - Number of Steps: 4+4  Entrance Stairs - Rails: Left  Home Equipment: Cane, Rolling walker, Standard walker, Wheelchair-manual, 4 wheeled walker   Bathroom Shower/Tub: Walk-in shower (on 2nd story)  Bathroom Toilet: Standard  Bathroom Equipment: Shower chair, Grab bars in shower    ADL Assistance: [address] [place] [address] Avenue: Needs assistance (sharies duties with son)  Ambulation Assistance: Independent  Transfer Assistance: Independent  Active : Yes  Additional Comments: Pt reports using no AD PLOF. Currently has bed on 1st level of home, 1/2 bath on 1st level; walk in shower is on 2nd story.     Restrictions/Precautions:  Restrictions/Precautions: Weight Bearing, General Precautions, Fall Risk  Left Lower Extremity Weight Bearing: Toe Touch Weight Bearing  Partial Weight Bearing Percentage Or Pounds: 25%  Required Braces or Orthoses  Left Lower Extremity Brace:  (hip ABD brace)  Position Activity Restriction  Hip Precautions: No hip flexion > 90 degrees, No active ABduction, No hip internal rotation, No hip external rotation  Other position/activity restrictions: hip abductor brace to be worn all the time. May remove for hygiene. \"; hx of R lung removal 6/14/2021, O2 at 2 liters with activity, 1 liter at rest     SUBJECTIVE: pt in bed upon arrival and agrees to therapy. Pt mood fluctuates during session, initially pt pleasant however pt more irritable toward end of session and requires encouragement to participate throughout session. Pt making mult statements about how she is \"not doing any more therapy today\"    PAIN: L hip did not quantify    Vitals: Vitals not assessed per clinical judgement, see nursing flowsheet    OBJECTIVE:  Bed Mobility:  Supine to Sit: Minimal Assistance, with head of bed raised, with rail, with verbal cues , with increased time for completion  Sit to Supine: Minimal Assistance, with rail, with verbal cues , with increased time for completion   Scooting: Minimal Assistance    Transfers:  Sit to Stand: Contact Guard Assistance  Stand to [address] [address]    Ambulation:  Contact Guard Assistance, with verbal cues , with increased time for completion  Distance: 3ftx1 20ftx1 10ftx1  Surface: Level Tile  Device:Rolling Walker  Gait Deviations:  Slow Bhavani, Decreased Gait Speed, Unsteady Gait and good carryover with 25% TTWB on LLE, slow and guarded, heavy reliance on AD    Balance:  Dynamic Sitting Balance: Contact Guard Assistance  Dynamic Standing Balance: [employer], with verbal cues   Pt completed functional tasks while seated on commode, then pt stood and completed further care. Pt min unsteady with mild LOBs posterior with CGA/min A to correct.  Pt stood ~2 mins    Exercise:  Patient was guided in 1 set(s) 10-20 reps of exercise to both lower extremities. Ankle pumps, Glut sets, Quad sets, Heelslides, Hip abduction/adduction and much extra time to complete with rest breaks provided. pt did become nauseous and had 1 episode of emesis . Exercises were completed for increased independence with functional mobility. Functional Outcome Measures: Completed       ASSESSMENT:  Assessment: Patient progressing toward established goals. Activity Tolerance:  Patient tolerance of  treatment: fair. Pt limited by nausea, fatigue and pain     Equipment Recommendations:Equipment Needed: No (Pt has RW, SW, cane, w/c and 4WW)  Discharge Recommendations:  Continue to assess pending progress, Patient would benefit from continued therapy after discharge    Plan: Times per week: 5x/ wk 90 min, 1x/ wk 30 min  Current Treatment Recommendations: Strengthening, Gait Training, Patient/Caregiver Education & Training, Equipment Evaluation, Education, & procurement, Stair training, Balance Training, Endurance Training, Home Exercise Program, Functional Mobility Training, Transfer Training, Safety Education & Training, Wheelchair Mobility Training    Patient Education  Patient Education: Plan of Care, [employer] Mobility, Transfers, Gait, Verbal Exercise Instruction    Goals:  Patient goals : Get home in time for granddaughter's wedding  Short term goals  Time Frame for Short term goals: 1 wk  Short term goal 1: Pt to go supine <->sit, cGA with LLE, to get in/out of bed. No rail, bed flat  Short term goal 2: Pt to get up/down from various seated surfaces, CGA, to get up to walk. Short term goal 3: Pt to walk with RW >= 25 ft, TTWB LLE, CGA, to get in/out of restroom  Short term goal 4: Pt to ascend/descend 6\" step in parallel bars, maintaining TTWB LLE, CGA to progress to home entry. Short term goal 5: Pt to propel w/c, indoor surfaces, Mod I, for home and community mobility  Short term goal 6: Pt to get in/out of car, min +1 for transportation needs.   Long term goals  Time Frame for Long term goals : 2 wks  Long term goal 1: Pt to go supine <->sit, Mod I, to get in/out of bed. No rail, bed flat  Long term goal 2: Pt to get up/down from various seated surfaces, Mod I, to get up to walk. Long term goal 3: Pt to walk with RW >= 50 ft, TTWB LLE, Mod I, for home mobility  Long term goal 4: Pt to ascend/descend 4 +4 steps with Lt rail, min +1 for home entry  Long term goal 5: Pt to get in/out of car, SBA +1 for transportation needs. Following session, patient left in safe position with all fall risk precautions in place.

## 2021-08-11 NOTE — PROGRESS NOTES
[address] [address]  Recreational Therapy  Daily Note  [address]    Time Spent with Patient: 10 minutes    Date:  8/11/2021       Patient Name: Wil Acosta      MRN: [id number]      YOB: 1950 (73 y.o.)       Gender: female  Diagnosis: Displaced fracture of greater trochanter Left femur. Referring Practitioner: Dr. Yady Mckeon    Patient enjoyed spending time with our pet therapy dogs.  Pt enjoyed petting our pet therapy dog Lashay this morning- pt was laying in bed and Kym Cabot was able to sit beside her so that pt could pet Cherisem Cabot- pt was social and talked about her Pit Bull named Salvador Diaz and her birds and cats that she has at home- pt's affect brightened when she was able to pet Cherisem Cabot- pt appreciative of visit    Electronically signed by: HOMER Zavaleta  Date: 8/11/2021

## 2021-08-11 NOTE — PROGRESS NOTES
Physical Medicine & Rehabilitation   Progress Note    Chief Complaint:  Left hip fracture. Rehab needs    Subjective:  Patient seen on rounds with Judith Fay, MORRIS, TC, and medical students Harsha and Delfino following patient's inpatient rehab team conference. She is c/o nausea and vomiting with very infrequent BM's; nursing working on constipation currently. We explained the need to get bowels moving and that she must continue to work with therapies while on the rehab unit. We also educated her re her planned discharge date of Friday, August 20th with need for 24/7 supervision; she adamantly refused disposition to any place other than home. Currently on Rocephin for UTI.       Rehabilitation:  PT: Reviewed during team conference  OT: Reviewed during team conference  ST: Reviewed during team conference      Review of Systems:  CONSTITUTIONAL:  positive for  fatigue  EYES:  negative  HEENT:  negative  RESPIRATORY:  negative  CARDIOVASCULAR:  negative  GASTROINTESTINAL:  positive for nausea and vomiting;  constipation  GENITOURINARY:  positive for urinary incontinence  SKIN:  Left hip incision  HEMATOLOGIC/LYMPHATIC:  positive for swelling/edema  MUSCULOSKELETAL:  positive for  pain  NEUROLOGICAL:  Cognitive deficits   BEHAVIOR/PSYCH:  negative  System review otherwise negative      Objective:  Vitals:    08/11/21 0852   BP: (!) 98/55   Pulse: 91   Resp: 16   Temp: 99.9 °F (37.7 °C)   SpO2: 91%   awake  Orientation:   person, place  Mood: within normal limits  Affect: calm  General appearance: Patient is well nourished, well developed, well groomed and in mild distress    Memory:   abnormal - deficits noted,   Attention/Concentration: normal  Language:  normal    Cranial Nerves:  cranial nerves II-XII are grossly intact  ROM:  abnormal - right hip  Motor Exam:  Motor exam is 4+ out of 5 all extremities with the exception of LLE note tested  Tone:  normal  Muscle bulk: within normal limits  Sensory:  Sensory intact  Coordination:   normal    Skin: warm and dry, no rash or erythema  Peripheral vascular: Pulses: Normal upper and lower extremity pulses; Edema: trace      Diagnostics:   Recent Results (from the past 24 hour(s))   Vitamin D 25 hydroxy    Collection Time: 08/10/21  3:21 PM   Result Value Ref Range    Vit D, 25-Hydroxy 6 (L) 30 - 100 ng/ml   CBC Auto Differential    Collection Time: 08/10/21  3:21 PM   Result Value Ref Range    WBC 9.5 4.8 - 10.8 thou/mm3    RBC 3.02 (L) 4.20 - 5.40 mill/mm3    Hemoglobin 9.5 (L) 12.0 - 16.0 gm/dl    Hematocrit 31.3 (L) 37.0 - 47.0 %    .6 (H) 81.0 - 99.0 fL    MCH 31.5 26.0 - 33.0 pg    MCHC 30.4 (L) 32.2 - 35.5 gm/dl    RDW-CV 14.9 (H) 11.5 - 14.5 %    RDW-SD 53.1 (H) 35.0 - 45.0 fL    Platelets 160 873 - 910 thou/mm3    MPV 9.8 9.4 - 12.4 fL    Seg Neutrophils 93.1 %    Lymphocytes 2.1 %    Monocytes 1.8 %    Eosinophils 1.7 %    Basophils 0.2 %    Immature Granulocytes 1.1 %    Segs Absolute 8.8 (H) 1 - 7 thou/mm3    Lymphocytes Absolute 0.2 (L) 1.0 - 4.8 thou/mm3    Monocytes Absolute 0.2 (L) 0.4 - 1.3 thou/mm3    Eosinophils Absolute 0.2 0.0 - 0.4 thou/mm3    Basophils Absolute 0.0 0.0 - 0.1 thou/mm3    Immature Grans (Abs) 0.10 (H) 0.00 - 0.07 thou/mm3    nRBC 0 /100 wbc   Lactic acid, plasma    Collection Time: 08/10/21  3:21 PM   Result Value Ref Range    Lactic Acid 2.3 (H) 0.5 - 2.0 mmol/L   Basic Metabolic Panel    Collection Time: 08/10/21  3:21 PM   Result Value Ref Range    Sodium 137 135 - 145 meq/L    Potassium 4.7 3.5 - 5.2 meq/L    Chloride 102 98 - 111 meq/L    CO2 23 23 - 33 meq/L    Glucose 143 (H) 70 - 108 mg/dL    BUN 19 7 - 22 mg/dL    CREATININE 1.0 0.4 - 1.2 mg/dL    Calcium 7.8 (L) 8.5 - 10.5 mg/dL   Anion Gap    Collection Time: 08/10/21  3:21 PM   Result Value Ref Range    Anion Gap 12.0 8.0 - 16.0 meq/L   Glomerular Filtration Rate, Estimated    Collection Time: 08/10/21  3:21 PM   Result Value Ref Range    Est, Glom Filt Rate 55 (A) ml/min/1.73m2       Impression:  · Left hip fracture   · S/p ORIF with Dr Yolis Nascimento 8/2/21  · bilateral hip replacement  · lung resection 2/2 neoplasm  · COPD - stage 2  · Tobacco abuse  · Anemia  · Hx of CVA  · Essential HTN  · CAD  · Hx of Breast cancer  · Depression  · Nausea and vomiting   · UTI,  enteric gram negative bacilli, await sensitivity    Plan:  Continue current therapies  Prophylaxis: DVT - Lovenox, GI - Pepcid  Pain: Tylenol 650 mg po q 6 hours, OxyIR 5-10 mg po q 4 hours prn  Bowels: Glycolax 17 g po q day; colace 100 mg po bid, Senna 2 tabs at bedtime prn, MOM 30 ml po q day prn  · UTI: nitrofurantoin 100mg BID for 3 days, await sensitivity  · Hip abduction brace  · PWB 25% LLE  · Zofran 4mg PRN for nausea and vomiting. · Take meds with food. Decrease acidic fluids in the morning.    · Team conference Wednesday  · Discharge planned for Friday, 8/20/2021 with Home Health      Missed Therapy Time:  · 10 minutes with Speech therapy secondary to nausea/vomiting, and 60 minutes from PT and 60 minutes from OT secondary to patient refusal.    Vanessa Velasquez MD

## 2021-08-11 NOTE — PLAN OF CARE
I have reviewed the patient's plan of care and based on this review, the patient treatment plan has been updated. Problem: Falls - Risk of:  Goal: Will remain free from falls  Description: Will remain free from falls  Outcome: Ongoing  Note: Patient verbalizes understanding of fall precautions, uses call light appropriately. Problem: Skin Integrity:  Goal: Will show no infection signs and symptoms  Description: Will show no infection signs and symptoms  Outcome: Ongoing  Goal: Absence of new skin breakdown  Description: Absence of new skin breakdown  Outcome: Ongoing  Note: Skin remains clean dry and intact. Problem: Activity:  Goal: Ability to ambulate will improve  Description: Ability to ambulate will improve  Outcome: Ongoing  Note: Working toward goal of discharge to home. Problem: Self-Care:  Goal: Ability to meet self-care needs will improve  Description: Ability to meet self-care needs will improve  Outcome: Ongoing     Problem: Pain:  Goal: Pain level will decrease  Description: Pain level will decrease  Outcome: Ongoing  Goal: Control of acute pain  Description: Control of acute pain  Outcome: Ongoing  Note: Patient satisfied with pain control     Problem: Bleeding:  Goal: Will show no signs and symptoms of excessive bleeding  Description: Will show no signs and symptoms of excessive bleeding  Outcome: Ongoing  Note: No signs or symptoms of excessive bleeding noted. Problem: Discharge Planning:  Goal: Discharged to appropriate level of care  Description: Discharged to appropriate level of care  Outcome: Ongoing  Note: Working toward goal of discharge to home. Problem: DISCHARGE BARRIERS  Goal: Patient's continuum of care needs are met  8/11/2021 1445 by Anju Hyde RN  Outcome: Ongoing  Note: Working toward goal of discharge to home  8/11/2021 1233 by TC Mendoza  Note: Team conference held Wednesday, 08/11/2021.  Recommendations of the team were explained to the patient by TC Wilson, and Dr. Dioni Guadalupe. Team is recommending that patient continue on acute inpatient rehab for nine more days, with expected discharge date of Friday, 08/20/2021. Prior to discharge, team is recommending family education. Following discharge, team is recommending twenty four hour supervision due to cognitive deficits and physical assistance needed for any mobility. Additionally, team is recommending home health care services. Discussion with patient regarding expectation regarding participation with therapy in order to continue rehab at current level of care. SW discussed alternative arrangements available if patient no able to participate in therapy. Patient adamantly refuses SNF. Care plan reviewed with patient. Patient verbalized understanding of the plan of care and contribute to goal setting. SW contacted patient's son, Edyta Bach, at [phone], to provide update and further discuss discharge arrangements. No answer, left message requesting returned phone call. SW to follow and maintain involvement in discharge planning.

## 2021-08-11 NOTE — PROGRESS NOTES
[address] [facility] [address]  [address] [place] [address]  Occupational Therapy  Daily Note  Time:   Variance: 30  Reason: Refusal    Date: 2021  Patient Name: Saundra White,   Gender: female      Room: [facility]55/055-A  MRN: [id number]  : 1950  (73 y.o.)  Referring Practitioner: Dr. Kim Meléndez  Diagnosis: Displaced fracture of greater trochanter Left femur. Additional Pertinent Hx: The patient is a 73 y.o. female with presentation of left hip pain post acute injury s/p mechanical fall. Patient states she had left JATINDER done  and right JATINDER done . She has had no issues with her left hip and was ambulating fine on her own without assistance. She had a mechanical fall on 21 causing injury to left hip. She came to [facility] ED and had xrays showing left hip periprosthetic fracture and ortho was consulted. Patient is s/p ORIF left greater troch fracture, left hip arthrotomy  by Dr. Reyes Quan. 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 Admitted to [facility] on 2021. SUBJECTIVE:    Pt declined OT, stating \"I told that other girl I'm not doing therapy today. I promise I'll do it tomorrow. \"  OT offered additional activity, including exercises in bed with pt continuing to decline. \"I told you I'm not doing today, I'll do it tomorrow. \"  Pt became more agitated with any additional discussions on POC or goals. Missed time of 30 minutes this attempt. Jason Arriaga [id number] MOT  OTR/L  [id number]

## 2021-08-11 NOTE — PROGRESS NOTES
Pharmacy Renal Adjustment    Vinny Kamara is a 73 y.o. female. Pharmacy renally adjusted the following medications per P&T approved policy: famotidine    Recent Labs     08/10/21  1521   BUN 19   CREATININE 1.0     Estimated Creatinine Clearance: 46 mL/min (based on SCr of 1 mg/dL). Calculated CrCl:    Height:   Ht Readings from Last 1 Encounters:   08/10/21 5' 4\" (1.626 m)     Weight:  Wt Readings from Last 1 Encounters:   08/10/21 139 lb 4.8 oz (63.2 kg)            Baseline SCr: 0.7-0.9    Plan: Adjustments based on renal function:          Decrease famotidine from 20mg bid to 20mg daily for CrCl < 50ml/min.

## 2021-08-12 PROCEDURE — 97530 THERAPEUTIC ACTIVITIES: CPT

## 2021-08-12 PROCEDURE — 99232 SBSQ HOSP IP/OBS MODERATE 35: CPT | Performed by: NURSE PRACTITIONER

## 2021-08-12 PROCEDURE — 6360000002 HC RX W HCPCS: Performed by: PHYSICAL MEDICINE & REHABILITATION

## 2021-08-12 PROCEDURE — 1180000000 HC REHAB R&B

## 2021-08-12 PROCEDURE — 94640 AIRWAY INHALATION TREATMENT: CPT

## 2021-08-12 PROCEDURE — 6360000002 HC RX W HCPCS: Performed by: FAMILY MEDICINE

## 2021-08-12 PROCEDURE — 97130 THER IVNTJ EA ADDL 15 MIN: CPT

## 2021-08-12 PROCEDURE — 6370000000 HC RX 637 (ALT 250 FOR IP): Performed by: NURSE PRACTITIONER

## 2021-08-12 PROCEDURE — 97110 THERAPEUTIC EXERCISES: CPT

## 2021-08-12 PROCEDURE — 97535 SELF CARE MNGMENT TRAINING: CPT

## 2021-08-12 PROCEDURE — 97129 THER IVNTJ 1ST 15 MIN: CPT

## 2021-08-12 PROCEDURE — 94760 N-INVAS EAR/PLS OXIMETRY 1: CPT

## 2021-08-12 PROCEDURE — 2580000003 HC RX 258: Performed by: PHYSICAL MEDICINE & REHABILITATION

## 2021-08-12 PROCEDURE — 6370000000 HC RX 637 (ALT 250 FOR IP): Performed by: PHYSICAL MEDICINE & REHABILITATION

## 2021-08-12 PROCEDURE — 2580000003 HC RX 258: Performed by: FAMILY MEDICINE

## 2021-08-12 PROCEDURE — 97116 GAIT TRAINING THERAPY: CPT

## 2021-08-12 PROCEDURE — 2700000000 HC OXYGEN THERAPY PER DAY

## 2021-08-12 RX ADMIN — ACETAMINOPHEN 650 MG: 325 TABLET ORAL at 05:38

## 2021-08-12 RX ADMIN — DOCUSATE SODIUM 100 MG: 100 CAPSULE ORAL at 07:55

## 2021-08-12 RX ADMIN — SODIUM CHLORIDE: 9 INJECTION, SOLUTION INTRAVENOUS at 00:08

## 2021-08-12 RX ADMIN — ACETAMINOPHEN 650 MG: 325 TABLET ORAL at 12:25

## 2021-08-12 RX ADMIN — SENNOSIDES 17.2 MG: 8.6 TABLET, FILM COATED ORAL at 05:38

## 2021-08-12 RX ADMIN — BUDESONIDE AND FORMOTEROL FUMARATE DIHYDRATE 2 PUFF: 160; 4.5 AEROSOL RESPIRATORY (INHALATION) at 07:16

## 2021-08-12 RX ADMIN — ACETAMINOPHEN 650 MG: 325 TABLET ORAL at 16:46

## 2021-08-12 RX ADMIN — ENOXAPARIN SODIUM 40 MG: 40 INJECTION, SOLUTION INTRAVENOUS; SUBCUTANEOUS at 22:09

## 2021-08-12 RX ADMIN — FERROUS SULFATE TAB 325 MG (65 MG ELEMENTAL FE) 325 MG: 325 (65 FE) TAB at 07:54

## 2021-08-12 RX ADMIN — POLYETHYLENE GLYCOL 3350 17 G: 17 POWDER, FOR SOLUTION ORAL at 07:55

## 2021-08-12 RX ADMIN — TIOTROPIUM BROMIDE INHALATION SPRAY 2 PUFF: 3.12 SPRAY, METERED RESPIRATORY (INHALATION) at 07:15

## 2021-08-12 RX ADMIN — CEFTRIAXONE SODIUM 1000 MG: 1 INJECTION, POWDER, FOR SOLUTION INTRAMUSCULAR; INTRAVENOUS at 23:14

## 2021-08-12 RX ADMIN — FAMOTIDINE 20 MG: 20 TABLET, FILM COATED ORAL at 07:54

## 2021-08-12 RX ADMIN — SODIUM CHLORIDE, PRESERVATIVE FREE 10 ML: 5 INJECTION INTRAVENOUS at 22:08

## 2021-08-12 RX ADMIN — SODIUM CHLORIDE: 9 INJECTION, SOLUTION INTRAVENOUS at 08:18

## 2021-08-12 RX ADMIN — SENNOSIDES 17.2 MG: 8.6 TABLET, FILM COATED ORAL at 16:46

## 2021-08-12 RX ADMIN — ACETAMINOPHEN 650 MG: 325 TABLET ORAL at 22:09

## 2021-08-12 RX ADMIN — LEVOTHYROXINE SODIUM 88 MCG: 0.09 TABLET ORAL at 05:38

## 2021-08-12 RX ADMIN — BISACODYL 10 MG: 10 SUPPOSITORY RECTAL at 16:47

## 2021-08-12 RX ADMIN — DOCUSATE SODIUM 100 MG: 100 CAPSULE ORAL at 22:09

## 2021-08-12 RX ADMIN — BUDESONIDE AND FORMOTEROL FUMARATE DIHYDRATE 2 PUFF: 160; 4.5 AEROSOL RESPIRATORY (INHALATION) at 17:29

## 2021-08-12 RX ADMIN — MORPHINE SULFATE 10 MG: 10 SOLUTION ORAL at 22:11

## 2021-08-12 RX ADMIN — ASPIRIN 325 MG: 325 TABLET, DELAYED RELEASE ORAL at 07:55

## 2021-08-12 RX ADMIN — FERROUS SULFATE TAB 325 MG (65 MG ELEMENTAL FE) 325 MG: 325 (65 FE) TAB at 16:46

## 2021-08-12 ASSESSMENT — PAIN SCALES - GENERAL
PAINLEVEL_OUTOF10: 8
PAINLEVEL_OUTOF10: 8
PAINLEVEL_OUTOF10: 2
PAINLEVEL_OUTOF10: 0

## 2021-08-12 NOTE — PROGRESS NOTES
Patient: Cedric Cornelius  Unit/Bed: 8L-38/194-I  YOB: 1950  MRN: [id number] Acct: [de-identified]   Admitting Diagnosis: Displaced fracture of greater trochanter of left femur, initial encounter for closed fracture [S72.112A]  Hip fracture requiring operative repair, left, closed, initial encounter [facility]) Caitlin Waldron  Admit Date:  8/6/2021  Hospital Day: 6    Assessment:     Active Problems:    History of CVA (cerebrovascular accident)    Essential hypertension    CAD (coronary artery disease)    Hip fracture requiring operative repair, left, closed, initial encounter [facility])  Resolved Problems:    * No resolved hospital problems. *      Plan:     The lactic acid is better. There is trouble with the maintaince IV pump so will change to INT. Possible change to PO ATB again tomorrow        Subjective:     Patient has no complaint of CP, SOB, nausea/vomiting, she states feeling better in general today. .   Medication side effects: none    Scheduled Meds:   senna  2 tablet Oral BID    famotidine  20 mg Oral Daily    cefTRIAXone (ROCEPHIN) IV  1,000 mg Intravenous Q24H    sodium chloride flush  5-40 mL Intravenous BID    acetaminophen  650 mg Oral Q6H    aspirin  325 mg Oral Daily    budesonide-formoterol  2 puff Inhalation BID    And    tiotropium  2 puff Inhalation Daily    [START ON 8/4/2022] calcium replacement protocol   Other RX Placeholder    ferrous sulfate  325 mg Oral BID WC    levothyroxine  88 mcg Oral Daily    enoxaparin  40 mg Subcutaneous Nightly    docusate sodium  100 mg Oral BID    polyethylene glycol  17 g Oral Daily     Continuous Infusions:   sodium chloride 125 mL/hr at 08/12/21 0818     PRN Meds:bisacodyl, ondansetron, morphine **OR** morphine, magnesium hydroxide, albuterol sulfate HFA, diphenhydrAMINE    Review of Systems  Pertinent items are noted in HPI.     Objective:     Patient Vitals for the past 8 hrs:   BP Temp Temp src Pulse Resp SpO2   08/12/21 0745 (!) 110/53 98.1 °F (36.7 °C) Oral 63 18 91 %     I/O last 3 completed shifts: In: 640 [P.O.:640]  Out: -   No intake/output data recorded. BP (!) 110/53   Pulse 63   Temp 98.1 °F (36.7 °C) (Oral)   Resp 18   Ht 5' 4\" (1.626 m)   Wt 139 lb 4.8 oz (63.2 kg)   SpO2 91%   BMI 23.91 kg/m²     General appearance: alert, appears stated age and cooperative  Head: Normocephalic, without obvious abnormality, atraumatic  Lungs: clear to auscultation bilaterally  Heart: regular rate and rhythm, S1, S2 normal, no murmur, click, rub or gallop  Abdomen: soft, non-tender; bowel sounds normal; no masses,  no organomegaly  Extremities: extremities normal, atraumatic, no cyanosis or edema  Skin: Skin color, texture, turgor normal. No rashes or lesions  Neurologic: Grossly normal    Data Review:   Results for Monalisa Petersen (MRN [id number]) as of 8/12/2021 11:24   Ref.  Range 8/10/2021 15:21 8/11/2021 12:28   Lactic Acid Latest Ref Range: 0.5 - 2.0 mmol/L 2.3 (H) 1.6       Electronically signed by Corina Braxton MD on 8/12/2021 at 11:21 AM

## 2021-08-12 NOTE — PROGRESS NOTES
Patient slept throughout the night, no complaints. She refused to get cleaned up this AM or use the restroom. Stated that she didn't have to urinate, she was dry, and wanted to go back to sleep. This RN explained that she should attempt to urinate and patient refused.

## 2021-08-12 NOTE — PROGRESS NOTES
[address] [address]  Recreational Therapy  Daily Note  [address]    Time Spent with Patient: 30 minutes    Date:  8/12/2021       Patient Name: Marlow Phoenix      MRN: [id number]      YOB: 1950 (73 y.o.)       Gender: female  Diagnosis: Displaced fracture of greater trochanter Left femur. Referring Practitioner: Dr. Gilda Eddy    RESTRICTIONS/PRECAUTIONS:  Restrictions/Precautions: Weight Bearing, General Precautions, Fall Risk  Vision: Within Functional Limits  Hearing: Within functional limits    PAIN: 0- no c/o pain    SUBJECTIVE:  My hair used to be styled in a artie.     OBJECTIVE:  RT brought pt to the Recreational Therapy room this morning to get her hair washed, trimmed, and styled by our beautician- pt did not care what kind of hair  style she receives  from the beautician  but stated that she has a wedding coming up to go to and she wants a nice hairstyle- pt had a bright affect and was social- she talked about conversing with her grandson recently- pt appreciative of hair cut    Patient Education  New Education Provided: Importance of Leisure,     Electronically signed by: Andrew Willis, CTRS  Date: 8/12/2021

## 2021-08-12 NOTE — PROGRESS NOTES
[address]  Occupational Therapy  Daily Note  Time:   Time In: 08  Time Out: 930  Timed Code Treatment Minutes: 60 Minutes  Minutes: 60    Date: 2021  Patient Name: Tai Mc,   Gender: female      Room: [facility]/055-A  MRN: [id number]  : 1950  (73 y.o.)  Referring Practitioner: Dr. Cathy Henderson  Diagnosis: Displaced fracture of greater trochanter Left femur. Additional Pertinent Hx: The patient is a 73 y.o. female with presentation of left hip pain post acute injury s/p mechanical fall. Patient states she had left JATINDER done  and right JATINDER done . She has had no issues with her left hip and was ambulating fine on her own without assistance. She had a mechanical fall on 21 causing injury to left hip. She came to [facility] ED and had xrays showing left hip periprosthetic fracture and ortho was consulted. Patient is s/p ORIF left greater troch fracture, left hip arthrotomy  by Dr. Juan Olmos. 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 Admitted to [facility] on 2021. Restrictions/Precautions:  Restrictions/Precautions: Weight Bearing, General Precautions, Fall Risk  Left Lower Extremity Weight Bearing: Toe Touch Weight Bearing  Partial Weight Bearing Percentage Or Pounds: 25%  Required Braces or Orthoses  Left Lower Extremity Brace:  (hip ABD brace)  Position Activity Restriction  Hip Precautions: No hip flexion > 90 degrees, No active ABduction, No hip internal rotation, No hip external rotation  Other position/activity restrictions: hip abductor brace to be worn all the time. May remove for hygiene. \"; hx of R lung removal 2021, O2 at 2 liters with activity, 1 liter at rest     SUBJECTIVE: Patient supine upon arrival, agreeable to OT. Reports still not feeling well, but was able to persist. Noted incontinence upon arrival and pt was unaware of it. BADL completed this session.      PAIN: 4/10: L hip. Pt also reports feeling as if brace wasn't in correct placed. Checked brace and noted it to be placed anterior and up higher than typical. Adjusted brace and pt reports much improved comfort. Vitals: Vitals not assessed per clinical judgement, see nursing flowsheet    COGNITION: Decreased Recall, Decreased Insight and Impaired Attention    ADL:   Grooming: with set-up. for hair care, oral care. Pt unable to tolerate standing secondary to pain / feeling unwell. Bathing: Minimal Assistance. A for lower legs with skilled edu on LH sponge. CGA for balance during cyrus care. Upper Extremity Dressing: with set-up.  with t-shirt. Lower Extremity Dressing: Minimal Assistance. CGA for balance during clothing mgmt. Skilled edu on [place] with pt able to use reacher + sock-aid appropriately, min A for shoes with shoe horn. A with brace. Toileting: Contact Guard Assistance. during clothing mgmt   Toilet Transfer: [address] BSC. BALANCE:  Sitting Balance:  Modified Independent. Standing Balance: Contact Guard Assistance. BED MOBILITY:  Supine to Sit: Minimal Assistance A with LLE     TRANSFERS:  Sit to Stand:  Contact Guard Assistance. EOB, BSC. Min cues for hand placement. Stand to Sit: Contact Guard Assistance. FUNCTIONAL MOBILITY:  Assistive Device: Rolling Walker  Assist Level:  Contact Guard Assistance. Distance: x 5 ft, EOB to BSC, BSC to recliner. Min cues for  TTWB adherence. ADDITIONAL ACTIVITIES:  - Patient completed BUE strengthening exercises with skilled education on HEP: completed x15 reps x1 set with a medium resistive band in all joints and all planes in order to improve UE strength and activity tolerance required for BADL routine and toilet / shower transfers. Patient tolerated fair, requiring mod rest breaks. Patient also required mod cues for technique. ASSESSMENT:     Activity Tolerance:  Patient tolerance of  treatment: fair. Discharge Recommendations: Home with Home health OT, Home with nursing aide, 24 hour supervision or assist   Equipment Recommendations: Equipment Needed: Yes  Other: BSC, LH AE kit (reacher, LH shoehorn, sockaide, LH bath sponge)  Plan: Times per week: 90 minutes 5x/wk, 30 minutes 1x/wk  Current Treatment Recommendations: Balance Training, Endurance Training, Functional Mobility Training, Self-Care / ADL, Equipment Evaluation, Education, & procurement, Strengthening    Patient Education  Patient Education: ADL's, Home Exercise Program, Precautions, Equipment Education, Reviewed Prior Education and Assistive Device Safety    Goals  Short term goals  Time Frame for Short term goals: 1 week  Short term goal 1: Pt will complete various t/fs including BSC with SBA & min vcs for technique  Short term goal 2: Pt will complete LE dressing with mod A, LH AE, & 0-2 vcs for hip precautions  Short term goal 3: Pt will tolerate standing 2 min with 1 UE release & SBA for increased ease of toileting routine  Short term goal 4: Pt will complete mobility to/from BSC/bedside chair with SBA, RW, & 0-2 vcs for TTWB precautions  Short term goal 5: Pt will tolerate BUE moderate resistance band HEP x 10 reps with min RBs to increase UB endurance for ADL tasks  Long term goals  Time Frame for Long term goals : 2 weeks  Long term goal 1: Pt will complete various t/fs including toilet with mod I  Long term goal 2: Pt will complete BADL routine (sponge bath) with S & 0-1 vcs for safety  Long term goal 3: Pt will complete LE dressing with S & LH AE  Long term goal 4: Pt will complete simple meal prep task with RW, S, & good safety awareness for maintaining TTWB (seated when needed to maintain)    Following session, patient left in safe position with all fall risk precautions in place.

## 2021-08-12 NOTE — PROGRESS NOTES
[address] [facility] THERAPY  [address] [facility]  DAILY NOTE    TIME   SLP Individual Minutes  Time In: 7363  Time Out: 1000  Minutes: 25  Timed Code Treatment Minutes: 25 Minutes       Date: 2021  Patient Name: Jermaine Mcclendon      CSN: [id number]   : 1950  (73 y.o.)  Gender: female   Referring Physician: NITO Ellsworth CNP; Marilyn Cornejo MD  Diagnosis: Left hip fracture   Secondary Diagnosis: Cognitive deficits  Precautions: Fall risk  Current Diet: General, thin liquids   Swallowing Strategies: Standard Universal Swallow Precautions  Date of Last MBS/FEES: Not Applicable    Pain:  No pain reported. Subjective:  Patient relaxed in recliner for duration of session. Alert and pleasantly     Short-Term Goals:  SHORT TERM GOAL #1:  Goal 1:  Patient will complete functional immediate/delayed recall and working memory tasks with 90% accuracy given mod cues to improve retention of new and daily information. INTERVENTIONS: Delayed (48hr+) recall of 6-unit grocery list - 4/6 indep, 2/6 min cues  *excellent success    Introduced hip precautions via mnemonic \"BLT\" (No bending, lifting, or twisting):  Immediate recall: 1/3 indep, 2/3 mod cues  Delayed 15 min recall: 2/3 indep, 1/3 min cues  *improved success with functional recall using spaced retrieval technique     SHORT TERM GOAL #2:  Goal 2:  Patient will complete problem solving, thought organization, and reasoning tasks (i.e., med management, finances, caring for pets, cooking) with 90% accuracy given mod cues to improve skills required for IADL completion.   INTERVENTIONS: Time management - 5/10 indep, 2/10 min cues, 1/10 mod cues, 2/10 max cues  *decreased math computation and mental flexibility  *reduced processing speed      SHORT TERM GOAL #3:  Goal 3: Patient will complete visual scanning and attention tasks with no more than 6 errors across a given activity in order to improve

## 2021-08-12 NOTE — PROGRESS NOTES
[address] [facility] PHYSICAL THERAPY  DAILY NOTE  [address] [facility] - 7E-55/055-A    Time In: 1100  Time Out: 1130  Timed Code Treatment Minutes: 30 Minutes  Minutes: 30          Date: 2021  Patient Name: Alma Stone,  Gender:  female        MRN: [id number]  : 1950  (73 y.o.)     Referring Practitioner: Dr. Juan Muir  Diagnosis: Displaced Fx of Greater Trochanter Lt femur  Additional Pertinent Hx: The patient is a 73 y.o. female with presentation of left hip pain post acute injury s/p mechanical fall. Patient states she had left JATINDER done  and right JATINDER done . She had a mechanical fall on 21 causing injury to left hip. xrays showing left hip periprosthetic fracture and ortho was consulted. Hx:  Lung removal due to lung CA,  2021     Prior Level of Function:  Lives With: Son (Son will not be staying with her. Pt reports friends and nieces to stay to assist)  Type of Home: House  Home Layout: Two level, Able to Live on Main level with bedroom/bathroom, 1/2 bath on main level  Home Access: Stairs to enter with rails  Entrance Stairs - Number of Steps: 4+4  Entrance Stairs - Rails: Left  Home Equipment: Cane, Rolling walker, Standard walker, Wheelchair-manual, 4 wheeled walker   Bathroom Shower/Tub: Walk-in shower (on 2nd story)  Bathroom Toilet: Standard  Bathroom Equipment: Shower chair, Grab bars in shower    ADL Assistance: [facility]: Needs assistance (sharies duties with son)  Ambulation Assistance: Independent  Transfer Assistance: Independent  Active : Yes  Additional Comments: Pt reports using no AD PLOF. Currently has bed on 1st level of home, 1/2 bath on 1st level; walk in shower is on 2nd story.     Restrictions/Precautions:  Restrictions/Precautions: Weight Bearing, General Precautions, Fall Risk  Left Lower Extremity Weight Bearing: Toe Touch Weight Bearing  Partial Weight Bearing Percentage Or Pounds: 25%  Required Braces or Orthoses  Left Lower Extremity Brace:  (hip ABD brace)  Position Activity Restriction  Hip Precautions: No hip flexion > 90 degrees, No active ABduction, No hip internal rotation, No hip external rotation  Other position/activity restrictions: hip abductor brace to be worn all the time. May remove for hygiene. \"; hx of R lung removal 6/14/2021, O2 at 2 liters with activity, 1 liter at rest     SUBJECTIVE: Pt. Seated in [facility] upon arrival and agreeable to therapy session. Pt. States that she feels \"better\" today. Pt. Requiring extra time to complete all activities secondary to pain and SOB. PAIN: Not rated: L LE    Vitals: Vitals not assessed per clinical judgement, see nursing flowsheet    OBJECTIVE:  Bed Mobility:  Not Tested    Transfers:  Sit to Stand: Contact Guard Assistance  Stand to Sit:Contact Guard Assistance    Ambulation:  Contact Guard Assistance  Distance: 45' x 1, 5' x 1  Surface: Level Tile  Device:Rolling Walker  Gait Deviations: Forward Flexed Posture, Slow Bhavani, Decreased Step Length Bilaterally, Decreased Gait Speed, Decreased Terminal Knee Extension. Cues required to comply with TTWBing status with fair carryover. Pt. Dick Raw heavily on walker. Pt. Also with difficulty sequencing at times. Exercise:  Patient was guided in 1 set(s) 10 reps of exercise to both lower extremities. Glut sets, Seated marches, Seated hamstring curls, Seated heel/toe raises, Long arc quads, and Seated abduction/adduction (R only). Exercises were completed for increased independence with functional mobility. Functional Outcome Measures: Not completed       ASSESSMENT:  Assessment: Patient progressing toward established goals. Activity Tolerance:  Patient tolerance of  treatment: good.       Equipment Recommendations:Equipment Needed: No (Pt has RW, SW, cane, w/c and 4WW)  Discharge Recommendations:  Continue to assess pending progress, Patient would benefit from continued therapy after

## 2021-08-12 NOTE — PROGRESS NOTES
[address]  Occupational Therapy  Daily Note  Time:   Time In: 1200  Time Out: 1230  Timed Code Treatment Minutes: 30 Minutes  Minutes: 30    Date: 2021  Patient Name: Valentino Leavell,   Gender: female      Room: [facility]/055-A  MRN: [id number]  : 1950  (73 y.o.)  Referring Practitioner: Dr. Pepe Hassan  Diagnosis: Displaced fracture of greater trochanter Left femur. Additional Pertinent Hx: The patient is a 73 y.o. female with presentation of left hip pain post acute injury s/p mechanical fall. Patient states she had left JATINDER done  and right JATINDER done . She has had no issues with her left hip and was ambulating fine on her own without assistance. She had a mechanical fall on 21 causing injury to left hip. She came to [facility] ED and had xrays showing left hip periprosthetic fracture and ortho was consulted. Patient is s/p ORIF left greater troch fracture, left hip arthrotomy  by Dr. Jeannie Kyle. 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 Admitted to [facility] on 2021. Restrictions/Precautions:  Restrictions/Precautions: Weight Bearing, General Precautions, Fall Risk  Left Lower Extremity Weight Bearing: Toe Touch Weight Bearing  Partial Weight Bearing Percentage Or Pounds: 25%  Required Braces or Orthoses  Left Lower Extremity Brace:  (hip ABD brace)  Position Activity Restriction  Hip Precautions: No hip flexion > 90 degrees, No active ABduction, No hip internal rotation, No hip external rotation  Other position/activity restrictions: hip abductor brace to be worn all the time. May remove for hygiene. \"; hx of R lung removal 2021, O2 at 2 liters with activity, 1 liter at rest     SUBJECTIVE: Patient supine upon arrival, agreeable to OT    PAIN: 4/10: L hip.    Vitals: Vitals not assessed per clinical judgement, see nursing flowsheet    COGNITION: Decreased Recall, Decreased Insight and Impaired Attention    ADL:   None this session     BALANCE:  Sitting Balance:  Modified Independent. Standing Balance: Contact Guard Assistance. BED MOBILITY:  Supine to Sit: Minimal Assistance A with LLE     TRANSFERS:  Sit to Stand:  Contact Guard Assistance. Recliner, w/c.   Stand to Sit: [employer]. Min cues for hand placement. FUNCTIONAL MOBILITY:  Assistive Device: Rolling Walker  Assist Level:  Contact Guard Assistance. Distance: x 5 ft in apt to washer, improvement with TTWB      ADDITIONAL ACTIVITIES:  - Patient completed IADL task of retrieving clothing from dryer with CGA and use of a reacher. Discussed JATINDER precautions. Pt then was able to stand and fold linens with BUE release but with mod cues for TTWB adherence. Total endurance x 8 min. ASSESSMENT:     Activity Tolerance:  Patient tolerance of  treatment: fair.        Discharge Recommendations: Home with Home health OT, Home with nursing aide, 24 hour supervision or assist   Equipment Recommendations: Equipment Needed: Yes  Other: BSC, LH AE kit (reacher, LH shoehorn, sockaide, LH bath sponge)  Plan: Times per week: 90 minutes 5x/wk, 30 minutes 1x/wk  Current Treatment Recommendations: Balance Training, Endurance Training, Functional Mobility Training, Self-Care / ADL, Equipment Evaluation, Education, & procurement, Strengthening    Patient Education  Patient Education: IADLs, transfer safety, TTWB adherence     Goals  Short term goals  Time Frame for Short term goals: 1 week  Short term goal 1: Pt will complete various t/fs including BSC with SBA & min vcs for technique  Short term goal 2: Pt will complete LE dressing with mod A, LH AE, & 0-2 vcs for hip precautions  Short term goal 3: Pt will tolerate standing 2 min with 1 UE release & SBA for increased ease of toileting routine  Short term goal 4: Pt will complete mobility to/from BSC/bedside chair with SBA, RW, & 0-2 vcs for TTWB precautions  Short term goal 5: Pt will tolerate BUE moderate resistance band HEP x 10 reps with min RBs to increase UB endurance for ADL tasks  Long term goals  Time Frame for Long term goals : 2 weeks  Long term goal 1: Pt will complete various t/fs including toilet with mod I  Long term goal 2: Pt will complete BADL routine (sponge bath) with S & 0-1 vcs for safety  Long term goal 3: Pt will complete LE dressing with S & LH AE  Long term goal 4: Pt will complete simple meal prep task with RW, S, & good safety awareness for maintaining TTWB (seated when needed to maintain)    Following session, patient left in safe position with all fall risk precautions in place.

## 2021-08-12 NOTE — PROGRESS NOTES
Physical Medicine & Rehabilitation   Progress Note    Chief Complaint:  Left hip fracture. Rehab needs    Subjective:  Patient seen in the gym during PT. Pt is feeling much better with resolving constipation and is able to participate with therapy without nausea and vomiting. Pt is working towards a home discharge and refuses disposition to anywhere else. Currently on Rocephin for UTI. Rehabilitation:  PT:   Bed Mobility:  Supine to Sit: Minimal Assistance, with head of bed raised, with rail, with verbal cues , with increased time for completion  Sit to Supine: Minimal Assistance, with rail, with verbal cues , with increased time for completion   Scooting: Minimal Assistance     Transfers:  Sit to Stand: Contact Guard Assistance  Stand to [place] [id number]     Ambulation:  [employer], with verbal cues , with increased time for completion  Distance: 3ftx1 20ftx1 10ftx1  Surface: Level Tile  Device:Rolling Walker  Gait Deviations:  Slow Bhavani, Decreased Gait Speed, Unsteady Gait and good carryover with 25% TTWB on LLE, slow and guarded, heavy reliance on AD     Balance:  Dynamic Sitting Balance: Contact Guard Assistance  Dynamic Standing Balance: Contact Guard Assistance, with verbal cues   Pt completed functional tasks while seated on commode, then pt stood and completed further care. Pt min unsteady with mild LOBs posterior with CGA/min A to correct. Pt stood ~2 mins     Exercise:  Patient was guided in 1 set(s) 10-20 reps of exercise to both lower extremities. Ankle pumps, Glut sets, Quad sets, Heelslides, Hip abduction/adduction and much extra time to complete with rest breaks provided. pt did become nauseous and had 1 episode of emesis .   Exercises were completed for increased independence with functional mobility.     Functional Outcome Measures: Completed    OT:   Restrictions/Precautions:  Restrictions/Precautions: Weight Bearing, General Precautions, Fall Risk  Left Lower Extremity Weight Bearing: Toe Touch Weight Bearing  Partial Weight Bearing Percentage Or Pounds: 25%  Required Braces or Orthoses  Left Lower Extremity Brace:  (hip ABD brace)  Position Activity Restriction  Hip Precautions: No hip flexion > 90 degrees, No active ABduction, No hip internal rotation, No hip external rotation  Other position/activity restrictions: hip abductor brace to be worn all the time. May remove for hygiene. \"; hx of R lung removal 6/14/2021, O2 at 2 liters with activity, 1 liter at rest      SUBJECTIVE: Patient supine upon arrival, agreeable to OT. Reports still not feeling well, but was able to persist. Noted incontinence upon arrival and pt was unaware of it. BADL completed this session.      PAIN: 4/10: L hip. Pt also reports feeling as if brace wasn't in correct placed. Checked brace and noted it to be placed anterior and up higher than typical. Adjusted brace and pt reports much improved comfort.      Vitals: Vitals not assessed per clinical judgement, see nursing flowsheet     COGNITION: Decreased Recall, Decreased Insight and Impaired Attention     ADL:   Grooming: with set-up. for hair care, oral care. Pt unable to tolerate standing secondary to pain / feeling unwell. Bathing: Minimal Assistance. A for lower legs with skilled edu on LH sponge. CGA for balance during cyrus care. Upper Extremity Dressing: with set-up.  with t-shirt. Lower Extremity Dressing: Minimal Assistance. CGA for balance during clothing mgmt. Skilled edu on [place] with pt able to use reacher + sock-aid appropriately, min A for shoes with shoe horn. A with brace. Toileting: Contact Guard Assistance. during clothing mgmt   Toilet Transfer: [address] BSC.     BALANCE:  Sitting Balance:  Modified Independent. Standing Balance: Contact Guard Assistance.       BED MOBILITY:  Supine to Sit: Minimal Assistance A with LLE      TRANSFERS:  Sit to Stand:  Contact Guard Assistance. EOB, BSC.  Min cues for hand placement. Stand to Sit: Contact Guard Assistance.       FUNCTIONAL MOBILITY:  Assistive Device: Rolling Walker  Assist Level:  Contact Guard Assistance. Distance: x 5 ft, EOB to BSC, BSC to recliner. Min cues for  TTWB adherence.       ADDITIONAL ACTIVITIES:  - Patient completed BUE strengthening exercises with skilled education on HEP: completed x15 reps x1 set with a medium resistive band in all joints and all planes in order to improve UE strength and activity tolerance required for BADL routine and toilet / shower transfers. Patient tolerated fair, requiring mod rest breaks. Patient also required mod cues for technique. ST:   Short-Term Goals:  SHORT TERM GOAL #1:  Goal 1:  Patient will complete functional immediate/delayed recall and working memory tasks with 90% accuracy given mod cues to improve retention of new and daily information. INTERVENTIONS: Delayed (48hr+) recall of 6-unit grocery list - 4/6 indep, 2/6 min cues  *excellent success     Introduced hip precautions via mnemonic \"BLT\" (No bending, lifting, or twisting):  Immediate recall: 1/3 indep, 2/3 mod cues  Delayed 15 min recall: 2/3 indep, 1/3 min cues  *improved success with functional recall using spaced retrieval technique      SHORT TERM GOAL #2:  Goal 2:  Patient will complete problem solving, thought organization, and reasoning tasks (i.e., med management, finances, caring for pets, cooking) with 90% accuracy given mod cues to improve skills required for IADL completion. INTERVENTIONS: Time management - 5/10 indep, 2/10 min cues, 1/10 mod cues, 2/10 max cues  *decreased math computation and mental flexibility  *reduced processing speed       SHORT TERM GOAL #3:  Goal 3: Patient will complete visual scanning and attention tasks with no more than 6 errors across a given activity in order to improve multi-tasking skills required for IADL's (i.e., housework, driving).     INTERVENTIONS: Good sustained attention to math computation task despite IV alarm intermittently sounding in background.      Long-Term Goals:  Timeframe for Long-term Goals: 2 weeks     LONG TERM GOAL #1:  Goal 1: Patient will improve cognitive functioning to a level of Supervision in order to permit potential return to PLOF.         Comprehension: 4 - Patient understands basic needs 75-90%+ of the time  Expression: 4 - Expresses basic ideas/needs 75-90%+ of the time  Social Interaction: 3 - Patient appropriate  50%-74% of the time  Problem Solving: 3 - Patient solves simple/routine tasks 50%-74%  Memory: 4 - Patient remembers 75-90%+ of the time         Review of Systems:  CONSTITUTIONAL:  positive for  fatigue  EYES:  negative  HEENT:  negative  RESPIRATORY:  Chronic O2 use  CARDIOVASCULAR:  negative  GASTROINTESTINAL:  negative  GENITOURINARY:  positive for urinary incontinence  SKIN:  Left hip incision  HEMATOLOGIC/LYMPHATIC:  positive for swelling/edema  MUSCULOSKELETAL:  positive for  pain  NEUROLOGICAL:  Cognitive deficits   BEHAVIOR/PSYCH:  negative  System review otherwise negative      Objective:  Vitals:    08/12/21 0745   BP: (!) 110/53   Pulse: 63   Resp: 18   Temp: 98.1 °F (36.7 °C)   SpO2: 91%   awake  Orientation:   person, place  Mood: within normal limits  Affect: calm  General appearance: Patient is well nourished, well developed, well groomed     Memory:   abnormal - deficits noted,   Attention/Concentration: normal  Language:  normal    Cranial Nerves:  cranial nerves II-XII are grossly intact  ROM:  abnormal - right hip  Motor Exam:  Motor exam is 4+ out of 5 all extremities with the exception of LLE note tested  Tone:  normal  Muscle bulk: within normal limits  Sensory:  Sensory intact  Coordination:   normal    Skin: warm and dry, no rash or erythema  Peripheral vascular: Pulses: Normal upper and lower extremity pulses; Edema: trace        Prior Level of Function:  Lives With: Son (Son will not be staying with her.   Pt reports friends and practitioner. I agree with the current plan of care including the workup, evaluation, management, and diagnosis. Care plan has been discussed. I agree with above documentation.   Any changes to documentation are noted in [facility], APRN - CNP

## 2021-08-12 NOTE — PROGRESS NOTES
[address] [address]  INPATIENT PHYSICAL THERAPY  DAILY NOTE  [facility] - [id number]    Time In: 1400  Time Out: 1500  Timed Code Treatment Minutes: 60 Minutes  Minutes: 60          Date: 2021  Patient Name: Daniele Lobato,  Gender:  female        MRN: [id number]  : 1950  (73 y.o.)     Referring Practitioner: Dr. Mario Walton  Diagnosis: Displaced Fx of Greater Trochanter Lt femur  Additional Pertinent Hx: The patient is a 73 y.o. female with presentation of left hip pain post acute injury s/p mechanical fall. Patient states she had left JATINDER done  and right JATINDER done . She had a mechanical fall on 21 causing injury to left hip. xrays showing left hip periprosthetic fracture and ortho was consulted. Hx:  Lung removal due to lung CA,  2021     Prior Level of Function:  Lives With: Son (Son will not be staying with her. Pt reports friends and nieces to stay to assist)  Type of Home: House  Home Layout: Two level, Able to Live on Main level with bedroom/bathroom, 1/2 bath on main level  Home Access: Stairs to enter with rails  Entrance Stairs - Number of Steps: 4+4  Entrance Stairs - Rails: Left  Home Equipment: Cane, Rolling walker, Standard walker, Wheelchair-manual, 4 wheeled walker   Bathroom Shower/Tub: Walk-in shower (on 2nd story)  Bathroom Toilet: Standard  Bathroom Equipment: Shower chair, Grab bars in shower    ADL Assistance: [facility]: Needs assistance (sharies duties with son)  Ambulation Assistance: Independent  Transfer Assistance: Independent  Active : Yes  Additional Comments: Pt reports using no AD PLOF. Currently has bed on 1st level of home, 1/2 bath on 1st level; walk in shower is on 2nd story.     Restrictions/Precautions:  Restrictions/Precautions: Weight Bearing, General Precautions, Fall Risk  Left Lower Extremity Weight Bearing: Toe Touch Weight Bearing  Partial Weight Bearing Percentage Or Pounds: 25%  Required Braces or Orthoses  Left Lower Extremity Brace:  (hip ABD brace)  Position Activity Restriction  Hip Precautions: No hip flexion > 90 degrees, No active ABduction, No hip internal rotation, No hip external rotation  Other position/activity restrictions: hip abductor brace to be worn all the time. May remove for hygiene. \"; hx of R lung removal 6/14/2021, O2 at 2 liters with activity, 1 liter at rest     SUBJECTIVE: pt in bed upon arrival and agrees to therapy. Pt needing encouragement throughout session due to pain and fatigue    PAIN: L hip/thigh. Did not quantify    Vitals: Oxygen: WFL  Heart Rate: WFL    OBJECTIVE:  Bed Mobility:  Supine to Sit: Minimal Assistance, with head of bed raised, with rail, with verbal cues , with increased time for completion  Sit to Supine: Minimal Assistance, with head of bed raised, with rail, with verbal cues , with increased time for completion   Scooting: Stand By Assistance    Transfers:  Sit to Stand: Contact Guard Assistance  Stand to [address], with [address], with increased time for completion, cues for hand placement, with verbal cues, pt did not get LEs into car- pt became agitated, breathing rapidly- cued pt for relaxation and to take a break. pt upset stating \"i'm done,i'm going back to the room\" pt also making accusations that staff does not care about her and that staff does not listen that she is in pain. reassured pt that we are aware and just trying to help pt improve. Pt needing min A to stand from seat of car. Cues for hand placement and scooting. Ambulation:  Stand By Assistance, [employer] Assistance  Distance: 20ftx1 10ftx1  Surface: Level Tile  Device:Rolling Walker  Gait Deviations:   Forward Flexed Posture, Slow Bhavani, Decreased Gait Speed, Unsteady Gait, Decreased Terminal Knee Extension and good carryover with 25% TTWB status    Pt completed cone weaving with VICKY support to improve stability on feet with directional changes as well as to esure safety with AD. Pt grossly steady throughout, did fatigue quickly. SOB noted. Balance:  Dynamic Standing Balance: Contact Guard Assistance  Pt completed functional tasks in bathroom with A for stability and clothing management. Pt grossly steady no LOB  Pt also stood at [facility] and completed single hand release tasks, pt reached out for ring and tossed at target on floor. Pt grossly steady, no real LOB noted. 4 trials completed ~1.5 mins per trial. Pt did need a seated rest break after 2 trials. Functional Outcome Measures: Not completed       ASSESSMENT:  Assessment: Patient progressing toward established goals. Activity Tolerance:  Patient tolerance of  treatment: good. Pt limited by pain and fatigue     Equipment Recommendations:Equipment Needed: No (Pt has RW, SW, cane, w/c and 4WW)  Discharge Recommendations:  Continue to assess pending progress, Patient would benefit from continued therapy after discharge    Plan: Times per week: 5x/ wk 90 min, 1x/ wk 30 min  Current Treatment Recommendations: Strengthening, Gait Training, Patient/Caregiver Education & Training, Equipment Evaluation, Education, & procurement, Stair training, Balance Training, Endurance Training, Home Exercise Program, Functional Mobility Training, Transfer Training, Safety Education & Training, Wheelchair Mobility Training    Patient Education  Patient Education: Plan of Care, [employer] Mobility, Transfers, Gait, Verbal Exercise Instruction    Goals:  Patient goals : Get home in time for granddaughter's wedding  Short term goals  Time Frame for Short term goals: 1 wk  Short term goal 1: Pt to go supine <->sit, cGA with LLE, to get in/out of bed. No rail, bed flat  Short term goal 2: Pt to get up/down from various seated surfaces, CGA, to get up to walk.   Short term goal 3: Pt to walk with RW >= 25 ft, TTWB LLE, CGA, to get in/out of restroom  Short term goal 4: Pt to ascend/descend 6\" step in parallel bars, maintaining TTWB LLE, CGA to progress to home entry. Short term goal 5: Pt to propel w/c, indoor surfaces, Mod I, for home and community mobility  Short term goal 6: Pt to get in/out of car, min +1 for transportation needs. Long term goals  Time Frame for Long term goals : 2 wks  Long term goal 1: Pt to go supine <->sit, Mod I, to get in/out of bed. No rail, bed flat  Long term goal 2: Pt to get up/down from various seated surfaces, Mod I, to get up to walk. Long term goal 3: Pt to walk with RW >= 50 ft, TTWB LLE, Mod I, for home mobility  Long term goal 4: Pt to ascend/descend 4 +4 steps with Lt rail, min +1 for home entry  Long term goal 5: Pt to get in/out of car, SBA +1 for transportation needs. Following session, patient left in safe position with all fall risk precautions in place.

## 2021-08-13 PROCEDURE — 97116 GAIT TRAINING THERAPY: CPT

## 2021-08-13 PROCEDURE — 6360000002 HC RX W HCPCS: Performed by: FAMILY MEDICINE

## 2021-08-13 PROCEDURE — 2700000000 HC OXYGEN THERAPY PER DAY

## 2021-08-13 PROCEDURE — 97110 THERAPEUTIC EXERCISES: CPT

## 2021-08-13 PROCEDURE — 97129 THER IVNTJ 1ST 15 MIN: CPT

## 2021-08-13 PROCEDURE — 6370000000 HC RX 637 (ALT 250 FOR IP): Performed by: PHYSICAL MEDICINE & REHABILITATION

## 2021-08-13 PROCEDURE — 97535 SELF CARE MNGMENT TRAINING: CPT

## 2021-08-13 PROCEDURE — 1180000000 HC REHAB R&B

## 2021-08-13 PROCEDURE — 97530 THERAPEUTIC ACTIVITIES: CPT

## 2021-08-13 PROCEDURE — 6360000002 HC RX W HCPCS: Performed by: PHYSICAL MEDICINE & REHABILITATION

## 2021-08-13 PROCEDURE — 99231 SBSQ HOSP IP/OBS SF/LOW 25: CPT | Performed by: PHYSICAL MEDICINE & REHABILITATION

## 2021-08-13 PROCEDURE — 2580000003 HC RX 258: Performed by: FAMILY MEDICINE

## 2021-08-13 PROCEDURE — 94640 AIRWAY INHALATION TREATMENT: CPT

## 2021-08-13 PROCEDURE — 97130 THER IVNTJ EA ADDL 15 MIN: CPT

## 2021-08-13 PROCEDURE — 94760 N-INVAS EAR/PLS OXIMETRY 1: CPT

## 2021-08-13 RX ADMIN — ACETAMINOPHEN 650 MG: 325 TABLET ORAL at 06:20

## 2021-08-13 RX ADMIN — BUDESONIDE AND FORMOTEROL FUMARATE DIHYDRATE 2 PUFF: 160; 4.5 AEROSOL RESPIRATORY (INHALATION) at 18:35

## 2021-08-13 RX ADMIN — ASPIRIN 325 MG: 325 TABLET, DELAYED RELEASE ORAL at 09:46

## 2021-08-13 RX ADMIN — BUDESONIDE AND FORMOTEROL FUMARATE DIHYDRATE 2 PUFF: 160; 4.5 AEROSOL RESPIRATORY (INHALATION) at 08:29

## 2021-08-13 RX ADMIN — SENNOSIDES 17.2 MG: 8.6 TABLET, FILM COATED ORAL at 06:20

## 2021-08-13 RX ADMIN — ACETAMINOPHEN 650 MG: 325 TABLET ORAL at 09:46

## 2021-08-13 RX ADMIN — FERROUS SULFATE TAB 325 MG (65 MG ELEMENTAL FE) 325 MG: 325 (65 FE) TAB at 17:22

## 2021-08-13 RX ADMIN — CEFTRIAXONE SODIUM 1000 MG: 1 INJECTION, POWDER, FOR SOLUTION INTRAMUSCULAR; INTRAVENOUS at 23:34

## 2021-08-13 RX ADMIN — DOCUSATE SODIUM 100 MG: 100 CAPSULE ORAL at 09:46

## 2021-08-13 RX ADMIN — SENNOSIDES 17.2 MG: 8.6 TABLET, FILM COATED ORAL at 17:21

## 2021-08-13 RX ADMIN — POLYETHYLENE GLYCOL 3350 17 G: 17 POWDER, FOR SOLUTION ORAL at 09:46

## 2021-08-13 RX ADMIN — DIPHENHYDRAMINE HCL 25 MG: 25 TABLET ORAL at 22:54

## 2021-08-13 RX ADMIN — TIOTROPIUM BROMIDE INHALATION SPRAY 2 PUFF: 3.12 SPRAY, METERED RESPIRATORY (INHALATION) at 08:29

## 2021-08-13 RX ADMIN — FERROUS SULFATE TAB 325 MG (65 MG ELEMENTAL FE) 325 MG: 325 (65 FE) TAB at 09:46

## 2021-08-13 RX ADMIN — ACETAMINOPHEN 650 MG: 325 TABLET ORAL at 17:22

## 2021-08-13 RX ADMIN — ACETAMINOPHEN 650 MG: 325 TABLET ORAL at 22:43

## 2021-08-13 RX ADMIN — FAMOTIDINE 20 MG: 20 TABLET, FILM COATED ORAL at 09:46

## 2021-08-13 RX ADMIN — ENOXAPARIN SODIUM 40 MG: 40 INJECTION, SOLUTION INTRAVENOUS; SUBCUTANEOUS at 22:43

## 2021-08-13 RX ADMIN — LEVOTHYROXINE SODIUM 88 MCG: 0.09 TABLET ORAL at 06:20

## 2021-08-13 RX ADMIN — DOCUSATE SODIUM 100 MG: 100 CAPSULE ORAL at 22:43

## 2021-08-13 ASSESSMENT — PAIN SCALES - GENERAL
PAINLEVEL_OUTOF10: 0
PAINLEVEL_OUTOF10: 8
PAINLEVEL_OUTOF10: 0
PAINLEVEL_OUTOF10: 2
PAINLEVEL_OUTOF10: 0

## 2021-08-13 ASSESSMENT — PAIN DESCRIPTION - PROGRESSION
CLINICAL_PROGRESSION: NOT CHANGED

## 2021-08-13 NOTE — PROGRESS NOTES
Physical Medicine & Rehabilitation   Progress Note    Chief Complaint:  Left hip fracture. Rehab needs    Subjective:     Rafael Oneil is a 73year old woman in IRB for a left hip fracture needing intensive rehab. She has a history of CVA, HTN, OA, breast cancer, smoking, hypothyroidism, hyperlipidemia, along with several other conditions. She has had issues with fluctuating participation in therapy 2/2 to pain, cognition, and nausea/vomiting. Today her therapy sessions went well. She reports no nausea or vomiting, and states her bowels are regular again (BM 8/12). She says her brace positioning in her LLE yesterday adgitated her leg, causing at times an 8/10 pain, but this has subsided to a 3-4/10 pain after the device was repositioned. She states her pain is being well controlled with Tylenol 650mg Q6hr, and endorses it is not limiting her therapy participation. She is cheerful today, happy about how therapy is going, and is looking forward to spending time with family post-discharge. The patient says she feels well. She does not have much pain at her left hip. She says she tolerate the therapy today well. Rehabilitation:  PT:        Restrictions/Precautions:  Restrictions/Precautions: Weight Bearing, General Precautions, Fall Risk  Left Lower Extremity Weight Bearing: Toe Touch Weight Bearing  Partial Weight Bearing Percentage Or Pounds: 25%  Required Braces or Orthoses  Left Lower Extremity Brace:  (hip ABD brace)  Position Activity Restriction  Hip Precautions: No hip flexion > 90 degrees, No active ABduction, No hip internal rotation, No hip external rotation  Other position/activity restrictions: hip abductor brace to be worn all the time. May remove for hygiene. \"; hx of R lung removal 6/14/2021, O2 at 2 liters with activity, 1 liter at rest      SUBJECTIVE: pt in bed upon arrival and agrees to therapy. Pt needing encouragement throughout session due to pain and fatigue     PAIN: L hip/thigh. Did not quantify     Vitals: Oxygen: WFL  Heart Rate: WFL     OBJECTIVE:  Bed Mobility:  Supine to Sit: Minimal Assistance, with head of bed raised, with rail, with verbal cues , with increased time for completion  Sit to Supine: Minimal Assistance, with head of bed raised, with rail, with verbal cues , with increased time for completion   Scooting: Stand By Assistance     Transfers:  Sit to Stand: Contact Guard Assistance  Stand to [address], with RW  Car:Minimal Assistance, with increased time for completion, cues for hand placement, with verbal cues, pt did not get LEs into car- pt became agitated, breathing rapidly- cued pt for relaxation and to take a break. pt upset stating \"i'm done,i'm going back to the room\" pt also making accusations that staff does not care about her and that staff does not listen that she is in pain. reassured pt that we are aware and just trying to help pt improve. Pt needing min A to stand from seat of car. Cues for hand placement and scooting.     Ambulation:  Stand By Assistance, Contact Guard Assistance  Distance: 20ftx1 10ftx1  Surface: Level Tile  Device:Rolling Walker  Gait Deviations: Forward Flexed Posture, Slow [place]hav[place], Decreased Gait Speed, Unsteady Gait, Decreased Terminal Knee Extension and good carryover with 25% TTWB status     Pt completed cone weaving with RW support to improve stability on feet with directional changes as well as to esure safety with AD. Pt grossly steady throughout, did fatigue quickly. SOB noted.     Balance:  Dynamic Standing Balance: Contact Guard Assistance  Pt completed functional tasks in bathroom with A for stability and clothing management. Pt grossly steady no LOB  Pt also stood at [facility] and completed single hand release tasks, pt reached out for ring and tossed at target on floor. Pt grossly steady, no real LOB noted.  4 trials completed ~1.5 mins per trial. Pt did need a seated rest break after 2 trials.        Functional Outcome Measures: Not completed     OT:       Restrictions/Precautions:  Restrictions/Precautions: Weight Bearing, General Precautions, Fall Risk  Left Lower Extremity Weight Bearing: Toe Touch Weight Bearing  Partial Weight Bearing Percentage Or Pounds: 25%  Required Braces or Orthoses  Left Lower Extremity Brace:  (hip ABD brace)  Position Activity Restriction  Hip Precautions: No hip flexion > 90 degrees, No active ABduction, No hip internal rotation, No hip external rotation  Other position/activity restrictions: hip abductor brace to be worn all the time. May remove for hygiene. \"; hx of R lung removal 6/14/2021, O2 at 2 liters with activity, 1 liter at rest      SUBJECTIVE: Patient supine upon arrival, agreeable to OT     PAIN: 4/10: L hip. Vitals: Vitals not assessed per clinical judgement, see nursing flowsheet     COGNITION: Decreased Recall, Decreased Insight and Impaired Attention     ADL:   None this session      BALANCE:  Sitting Balance:  Modified Independent. Standing Balance: Contact Guard Assistance.       BED MOBILITY:  Supine to Sit: Minimal Assistance A with LLE      TRANSFERS:  Sit to Stand:  Contact Guard Assistance. Recliner, w/c.   Stand to Sit: [employer]. Min cues for hand placement.      FUNCTIONAL MOBILITY:  Assistive Device: Rolling Walker  Assist Level:  Contact Guard Assistance. Distance: x 5 ft in apt to washer, improvement with TTWB       ADDITIONAL ACTIVITIES:  - Patient completed IADL task of retrieving clothing from dryer with CGA and use of a reacher. Discussed JATINDER precautions. Pt then was able to stand and fold linens with BUE release but with mod cues for TTWB adherence. Total endurance x 8 min.      ST:      Subjective:  Patient relaxed in recliner for duration of session.  Alert and pleasantly      Short-Term Goals:  SHORT TERM GOAL #1:  Goal 1:  Patient will complete functional immediate/delayed recall and working memory tasks with 90% accuracy given mod cues to improve retention of new and daily information. INTERVENTIONS: Delayed (48hr+) recall of 6-unit grocery list - 4/6 indep, 2/6 min cues  *excellent success     Introduced hip precautions via mnemonic \"BLT\" (No bending, lifting, or twisting):  Immediate recall: 1/3 indep, 2/3 mod cues  Delayed 15 min recall: 2/3 indep, 1/3 min cues  *improved success with functional recall using spaced retrieval technique      SHORT TERM GOAL #2:  Goal 2:  Patient will complete problem solving, thought organization, and reasoning tasks (i.e., med management, finances, caring for pets, cooking) with 90% accuracy given mod cues to improve skills required for IADL completion. INTERVENTIONS: Time management - 5/10 indep, 2/10 min cues, 1/10 mod cues, 2/10 max cues  *decreased math computation and mental flexibility  *reduced processing speed       SHORT TERM GOAL #3:  Goal 3: Patient will complete visual scanning and attention tasks with no more than 6 errors across a given activity in order to improve multi-tasking skills required for IADL's (i.e., housework, driving).     INTERVENTIONS: Good sustained attention to math computation task despite IV alarm intermittently sounding in background.      Long-Term Goals:  Timeframe for Long-term Goals: 2 weeks     LONG TERM GOAL #1:  Goal 1: Patient will improve cognitive functioning to a level of Supervision in order to permit potential return to PLOF.         Comprehension: 4 - Patient understands basic needs 75-90%+ of the time  Expression: 4 - Expresses basic ideas/needs 75-90%+ of the time  Social Interaction: 3 - Patient appropriate  50%-74% of the time  Problem Solving: 3 - Patient solves simple/routine tasks 50%-74%  Memory: 4 - Patient remembers 75-90%+ of the time     EDUCATION:  Learner: Patient  Education:  Reviewed ST goals and Plan of Care, Reviewed recommendations for follow-up and Education Related to Potential Risks and Complications Due to Impairment/Illness/Injury  Evaluation of Education: [place] understanding, Demonstrates with assistance and Family not present          Prior Level of Function:  Lives With: Son (Son will not be staying with her. Pt reports friends and nieces to stay to assist)  Type of Home: House  Home Layout: Two level, Able to Live on Main level with bedroom/bathroom, 1/2 bath on main level  Home Access: Stairs to enter with rails  Entrance Stairs - Number of Steps: 4+4  Entrance Stairs - Rails: Left  Home Equipment: Cane, Rolling walker, Standard walker, Wheelchair-manual, 4 wheeled walker   Bathroom Shower/Tub: Walk-in shower (on 2nd story)  Bathroom Toilet: Standard  Bathroom Equipment: Shower chair, Grab bars in shower     ADL Assistance: [facility]: Needs assistance (sharies duties with son)  Ambulation Assistance: Independent  Transfer Assistance: Independent  Active : Yes  Additional Comments: Pt reports using no AD PLOF. Currently has bed on 1st level of home, 1/2 bath on 1st level; walk in shower is on 2nd story.       Review of Systems:  CONSTITUTIONAL:  positive for  fatigue  EYES:  negative  HEENT:  negative  RESPIRATORY: Chronis O2 Use, no SOB, coughing, or wheezing from baseline  CARDIOVASCULAR:  negative  GASTROINTESTINAL:  No nausea, vomiting, constipation, diarrhea, or belly pain  GENITOURINARY:  positive for urinary incontinence  SKIN:  Left hip incision  HEMATOLOGIC/LYMPHATIC:  positive for swelling/edema  MUSCULOSKELETAL:  positive for  pain  NEUROLOGICAL:  Cognitive deficits   BEHAVIOR/PSYCH:  negative  System review otherwise negative      Objective:  Vitals:    08/13/21 0930   BP: (!) 114/51   Pulse: 62   Resp: 20   Temp: 97.6 °F (36.4 °C)   SpO2: 97%   awake  Orientation:   person, place  Mood: within normal limits  Affect: calm, cheerful now that N/V symptoms have improved and bowels are regular  General appearance: Patient is well nourished, well developed, well groomed     Memory:   abnormal - deficits noted   Attention/Concentration: normal  Language:  normal    Cranial Nerves:  cranial nerves II-XII are grossly intact  ROM:  abnormal - right hip  Motor Exam:  Motor exam is 5 out of 5 diffusely in all extremities with the exception of LLE which was 3/5 with hip/knee F/E.    Reflexes: 2/4 diffusely in BL UE and LE (Bicep, tricep, forearm extensors, Patellar, achilles, semimembranosus)  Neuro: Sensation intact and symmetrical in C2-S2 dermatomes  Tone:  normal  Muscle bulk: within normal limits  Sensory:  Sensory intact  Coordination:   normal    Skin: warm and dry, no rash or erythema  Peripheral vascular: Pulses: Normal upper and lower extremity pulses; Edema: trace    General:  well-developed, well nourished  female; in no acute distress ; appropriate affect & mood; lying on bed comfortably  Eyes: pupil equally round ; extra-ocular motion intact bilaterally  Head, Ear, Nose, Mouth & Throat : normocephalic ; no discharge from ears or nose ; no deformity ; no facial swelling ; oral mucosa pink   Cardiovascular : regular rate & rhythm ; normal S1 & S2 heart sound ; no murmur  Pumonary : lung clear to auscultation ; no wheezing ; no rale  Gastrointestinal : soft, flat abdomen without tenderness  Skin: no skin lesion or rash ; no pitting edema at all 4 extremities  Musculoskeletal : no limb asymmetry; no obvious limb deformity ; left hip flexion passive ROM reaching 60 degrees; left knee flexion ROM reaching 60 degrees; normal functional joints ROM at bilateral upper & right lower extremities  Cerebral :  alert ; awake ; oriented to place, person and time   Sensory : intact light touch sensation at bilateral upper & lower extremities  Motor : normal tone at bilateral upper & right lower extremities ; 2-/5 muscle strength at the left hip; 4-/5 muscle strength at the left knee extension and flexion 4-/5 muscle strength at right hip flexion; normal 5/5 muscle strength at bilateral upper & the rest of lower extremities  Reflex : 1+ bilateral knees reflexes   Pathological Reflex :   no ankle clonus      Diagnostics:   No results found for this or any previous visit (from the past 24 hour(s)). Impression:  · Left hip fracture   · S/p ORIF with Dr Rita Gomez 8/2/21  · bilateral hip replacement  · lung resection 2/2 neoplasm  · COPD - stage 2  · Tobacco abuse  · Anemia  · Hx of CVA  · Essential HTN  · CAD  · Hx of Breast cancer  · Depression  · UTI,  enteric gram negative bacilli    Plan:  Continue current therapies  Prophylaxis: DVT - Lovenox, GI - Pepcid  Pain: Tylenol 650 mg po q 6 hours, OxyIR 5-10 mg po q 4 hours prn  Bowels: Glycolax 17 g po q day; colace 100 mg po bid, Senna 2 tabs at bedtime prn, MOM 30 ml po q day prn  UTI: Rocephin daily EVERY 24 HOURS @ 100 mL/hr over 30 Minutes per Dr. Tye Cogan  · Hip abduction brace  · PWB 25% LLE  · Zofran 4mg PRN for nausea and vomiting. · Team conference Wednesday  · Discharge planned for Friday, 8/20/2021 with Home Health      Missed Therapy Time:  NONE    John Juárez  [facility]    I personally interview and examined the patient. And I agree with the current plan of care including the workup, evaluation, management, and diagnosis. Care plan has been discussed. The above documentation has been reviewed.     Akila Conteh MD

## 2021-08-13 NOTE — PROGRESS NOTES
[address]  Occupational Therapy  Daily Note  Time:   Time In: 1400  Time Out: 1430  Timed Code Treatment Minutes: 30 Minutes  Minutes: 30    Date: 2021  Patient Name: Servando Keane,   Gender: female      Room: -55/055-A  MRN: [id number]  : 1950  (73 y.o.)  Referring Practitioner: Dr. Graciela Kingsley  Diagnosis: Displaced fracture of greater trochanter Left femur. Additional Pertinent Hx: The patient is a 73 y.o. female with presentation of left hip pain post acute injury s/p mechanical fall. Patient states she had left JATINDER done  and right JATINDER done . She has had no issues with her left hip and was ambulating fine on her own without assistance. She had a mechanical fall on 21 causing injury to left hip. She came to [facility] ED and had xrays showing left hip periprosthetic fracture and ortho was consulted. Patient is s/p ORIF left greater troch fracture, left hip arthrotomy  by Dr. Irma Emerson. 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 Admitted to [facility] on 2021. Restrictions/Precautions:  Restrictions/Precautions: Weight Bearing, General Precautions, Fall Risk  Left Lower Extremity Weight Bearing: Toe Touch Weight Bearing  Partial Weight Bearing Percentage Or Pounds: 25%  Required Braces or Orthoses  Left Lower Extremity Brace:  (hip ABD brace)  Position Activity Restriction  Hip Precautions: No hip flexion > 90 degrees, No active ABduction, No hip internal rotation, No hip external rotation  Other position/activity restrictions: hip abductor brace to be worn all the time. May remove for hygiene. \"; hx of R lung removal 2021, O2 at 2 liters with activity, 1 liter at rest     SUBJECTIVE: Patient pleasant and cooperative. Agreeable to OT. PAIN:  Denies pain     Vitals: Vitals not assessed per clinical judgement, see nursing flowsheet    COGNITION: WFL     ADL:   None this afternoon. BALANCE:  Sitting Balance:  Modified Independent. Standing Balance: Stand By Assistance. BED MOBILITY:  Sit to Supine: Minimal Assistance - A with LLE     TRANSFERS:  Sit to Stand:  Stand By Assistance. recliner Good hand placement. Stand to Sit: Stand By Assistance. FUNCTIONAL MOBILITY:  Assistive Device: Rolling Walker  Assist Level:  Stand By Assistance. Distance: Chair > bed x 5 ft. No LOB. Fair- adherence to TTWB. ADDITIONAL ACTIVITIES:  - Patient completed BUE strengthening exercises with skilled education on HEP: completed x15 reps x1 set with a medium resistive band in all joints and all planes in order to improve UE strength and activity tolerance required for BADL routine and toilet / shower transfers. Patient tolerated well, requiring min rest breaks. Patient also required mod cues for technique. ASSESSMENT:  Activity Tolerance:  Patient tolerance of  treatment: good.        Discharge Recommendations: Home with Home health OT, Home with nursing aide, 24 hour supervision or assist   Equipment Recommendations: Equipment Needed: Yes  Other: BSC,  AE kit (reacher, LH shoehorn, sockaide,  bath sponge)  Plan: Times per week: 90 minutes 5x/wk, 30 minutes 1x/wk  Current Treatment Recommendations: Balance Training, Endurance Training, Functional Mobility Training, Self-Care / ADL, Equipment Evaluation, Education, & procurement, Strengthening    Patient Education  Patient Education: HEP, transfer safety, TTWB adherence     Goals  Short term goals  Time Frame for Short term goals: 1 week  Short term goal 1: Pt will complete various t/fs including BSC with SBA & min vcs for technique  Short term goal 2: Pt will complete LE dressing with mod A, LH AE, & 0-2 vcs for hip precautions  Short term goal 3: Pt will tolerate standing 2 min with 1 UE release & SBA for increased ease of toileting routine  Short term goal 4: Pt will complete mobility to/from BSC/bedside chair with SBA, RW, & 0-2 vcs for TTWB precautions  Short term goal 5: Pt will tolerate BUE moderate resistance band HEP x 10 reps with min RBs to increase UB endurance for ADL tasks  Long term goals  Time Frame for Long term goals : 2 weeks  Long term goal 1: Pt will complete various t/fs including toilet with mod I  Long term goal 2: Pt will complete BADL routine (sponge bath) with S & 0-1 vcs for safety  Long term goal 3: Pt will complete LE dressing with S & LH AE  Long term goal 4: Pt will complete simple meal prep task with RW, S, & good safety awareness for maintaining TTWB (seated when needed to maintain)    Following session, patient left in safe position with all fall risk precautions in place.

## 2021-08-13 NOTE — PLAN OF CARE
Problem: Pain:  Description: Pain management should include both nonpharmacologic and pharmacologic interventions. Goal: Pain level will decrease  Outcome: Ongoing   Patient pain 8 out of 10. PRN Morphine and scheduled tylenol given.  Pain managed and pt resting quietly in bed

## 2021-08-13 NOTE — PLAN OF CARE
Problem: Falls - Risk of:  Goal: Will remain free from falls  Description: Will remain free from falls  Outcome: Ongoing    No noted falls. Compliant with use of call light. Problem: Skin Integrity:  Goal: Will show no infection signs and symptoms  Description: Will show no infection signs and symptoms  Outcome: Ongoing    Open area to R buttock continues. Zinc cream applied. Area improving. Teaching provided on turning/repositioning. Goal: Absence of new skin breakdown  Description: Absence of new skin breakdown  Outcome: Ongoing    No new skin breakdown. Problem: Activity:  Goal: Ability to ambulate will improve  Description: Ability to ambulate will improve  Outcome: Ongoing    Ambulates in room with walker, 1 assist, abductor brace, and a walker. Problem: Self-Care:  Goal: Ability to meet self-care needs will improve  Description: Ability to meet self-care needs will improve  Outcome: Ongoing    Required assistance with rectal care following her BM. Problem: Pain:  Goal: Pain level will decrease  Description: Pain level will decrease  Outcome: Ongoing    Pain minimal.  Goal: Control of acute pain  Description: Control of acute pain  Outcome: Ongoing     Problem: Bleeding:  Goal: Will show no signs and symptoms of excessive bleeding  Description: Will show no signs and symptoms of excessive bleeding  Outcome: Ongoing    No s/s of excessive bleeding. Problem: Respiratory  Goal: O2 Sat > 90%  Outcome: Ongoing    O2 continues at 2.5L. Sats stable. Goal: Agreement to quit smoking  Outcome: Ongoing     Problem: Nutrition  Goal: Optimal nutrition therapy  Outcome: Ongoing    Fair appetite. Enjoys eating her candy and popcorn between meals and drinking pepsi.      Problem: IP BATHING  Goal: LTG - Patient will utilize adaptive techniques to bathe body  Outcome: Ongoing     Problem: IP DRESSINGS LOWER EXTREMITIES  Goal: LTG - Patient will safely utilize adaptive techniques/equipment to dress lower body  Outcome: Ongoing     Problem: IP INSTRUMENTAL ACTIVITIES OF DAILY LIVING  Goal: LTG - patient will complete simple meal preparation activities  Outcome: Ongoing     Problem: IP TRANSFERS  Goal: STG - patient will perform toilet transfer  Outcome: Ongoing     Problem: IP MOBILITY  Goal: LTG - patient will demonstrate safe mobility requirements  Outcome: Ongoing     Problem: Discharge Planning:  Goal: Discharged to appropriate level of care  Description: Discharged to appropriate level of care  Outcome: Ongoing    Discharge planning continues. Problem: DISCHARGE BARRIERS  Goal: Patient's continuum of care needs are met  8/13/2021 1640 by Keeley Zamora RN  Outcome: Ongoing  8/13/2021 1334 by TC Emery  Note: **Son's name is HAMLET, not Sumit**    SW received returned phone call from patient's daughter-in-law, Johnson Ribeiro, (son's, [address], wife) regarding update and further discharge planning. Daughter-in-law, Jonhson Ribeiro, indicates concerns with patient returning to her current home at discharge. SW reviewed with daughter-in-law, Johnson Ribeiro, recommendation for twenty four hour care upon discharge due to physical and cognitive deficits. Discussion regarding anticipated discharge from inpatient rehab on Friday, 08/20/2021. Daughter-in-law, Johnson Ribeiro, indicates that patient does not have support for twenty four hour care. Patient's son, Filemon Kincaid, is currently in long-term and family in unsure of how long he will be incarcerated. Daughter-in-law, Johnson Ribeiro, indicates plan to speak with patient and patient's son/her , Hamlet, regarding discharge planning. SW to follow and maintain involvement in discharge planning. Problem: IP COMMUNICATION/DYSARTHRIA  Goal: LTG - Patient will effectively communicate in all situations with the use of compensatory strategies  Outcome: Ongoing    Communicates with staff without difficulty.      Problem: Impaired respiratory status  Goal: Clear lung sounds  Description: Clear lung sounds  8/13/2021 1437 by Yohannes Maharaj RCP  Outcome: Ongoing

## 2021-08-13 NOTE — PROGRESS NOTES
[facility]  Recreational Therapy  Daily Note  [address]    Time Spent with Patient: 30 minutes    Date:  8/13/2021       Patient Name: Salinas Cid      MRN: [id number]      YOB: 1950 (73 y.o.)       Gender: female  Diagnosis: Displaced fracture of greater trochanter Left femur.   Referring Practitioner: Dr. Pablito Flood    RESTRICTIONS/PRECAUTIONS:  Restrictions/Precautions: Weight Bearing, General Precautions, Fall Risk  Vision: Within Functional Limits  Hearing: Within functional limits    PAIN: 0-no c/o pain     SUBJECTIVE:  I need a necklace and earrings for the wedding    OBJECTIVE:  Via w/c took pt to our gift shop where she picked out a necklace and earrings for her granddaughters wedding coming soon-states everyone is so nice and helpful to her here-took pt out front of hospital to enjoy some fresh air for short time-very pleasant and social -appreciative-upon return to room pt's son and grandson were here to visit       Patient Education  New Education Provided: Importance of Leisure,     Electronically signed by: HOMER Bauer  Date: 8/13/2021

## 2021-08-13 NOTE — PROGRESS NOTES
[address] [facility] THERAPY  [address] [facility]  DAILY NOTE    TIME   SLP Individual Minutes  Time In: 1000  Time Out: 1030  Minutes: 30  Timed Code Treatment Minutes: 30 Minutes       Date: 2021  Patient Name: Ekta Ventura      CSN: [id number]   : 1950  (73 y.o.)  Gender: female   Referring Physician: NITO Naidu CNP; Madeleine Vasquez MD  Diagnosis: Left hip fracture   Secondary Diagnosis: Cognitive deficits  Precautions: Fall risk  Current Diet: General, thin liquids   Swallowing Strategies: Standard Universal Swallow Precautions  Date of Last MBS/FEES: Not Applicable    Pain:  No pain reported. Subjective:  Patient seated upright in recliner for duration of session. Alert and pleasantly engaged throughout, reports feeling much better today. Short-Term Goals:  SHORT TERM GOAL #1:  Goal 1:  Patient will complete functional immediate/delayed recall and working memory tasks with 90% accuracy given mod cues to improve retention of new and daily information. INTERVENTIONS: Delayed recall of hip precautions - 3/3 indep  Delayed recall of recommended mnemonic for hip precautions - indep for \"No BLT\"  Delayed recall of visual aid with written hip precautions - indep reference  *improved functional carryover this date     SHORT TERM GOAL #2:  Goal 2:  Patient will complete problem solving, thought organization, and reasoning tasks (i.e., med management, finances, caring for pets, cooking) with 90% accuracy given mod cues to improve skills required for IADL completion.   INTERVENTIONS: Counting given dollar/coin amounts - 6/6 indep  *excellent working memory throughout    [employer] problems related to prices on Auto I.D. - /6 indep,1/6 min cues  *good visual scanning and comparison of numerical detail    SHORT TERM GOAL #3:  Goal 3: Patient will complete visual scanning and attention tasks with no more than 6 errors across a given activity in order to improve multi-tasking skills required for IADL's (i.e., housework, driving). INTERVENTIONS: Complex attention task introduced via newspaper task; patient instructed to read article while identifying one target word throughout and comprehending newly learned information well enough to provide summary following task. Time - 4 minutes 10 seconds  Errors - 6 (identified 9 of 15 targets)  Summary - good to fair; provided 3 details, required min cues to summarize x2 further pertinent details  *decreased divided and selective attention demonstrated      Long-Term Goals:  Timeframe for Long-term Goals: 2 weeks    LONG TERM GOAL #1:  Goal 1: Patient will improve cognitive functioning to a level of Supervision in order to permit potential return to OF. Comprehension: 5 - Patient understands basic needs (hungry/hot/pain)  Expression: 5 - Expresses basic ideas/needs only (hungry/hot/pain)  Social Interaction: 4 - Patient appropriate 75-90%+ of the time  Problem Solvin - Patient solves simple/routine tasks 75-90%+   Memory: 4 - Patient remembers 75-90%+ of the time    EDUCATION:  Learner: Patient  Education:  Reviewed ST goals and Plan of Care, Reviewed recommendations for follow-up and Education Related to Potential Risks and Complications Due to Impairment/Illness/Injury  Evaluation of Education: Verbalizes understanding, Demonstrates with assistance and Family not present    ASSESSMENT/PLAN:  Activity Tolerance:  Patient tolerance of treatment: good. Assessment/Plan: Patient progressing toward established goals. Continues to require skilled care of licensed speech pathologist to progress toward achievement of established goals and plan of care.      Plan for Next Session: Marino Razo [address], [facility]     Adan Barragan M.S. [facility] [id number]

## 2021-08-13 NOTE — PROGRESS NOTES
[facility]  INPATIENT PHYSICAL THERAPY  DAILY NOTE  [address] [facility] - 7E-55/055-A      Time In: 1030  Time Out: 1130  Timed Code Treatment Minutes: 60 Minutes  Minutes: 60          Date: 2021  Patient Name: Philippe Pina,  Gender:  female        MRN: [id number]  : 1950  (73 y.o.)     Referring Practitioner: Dr. Chivo Killian  Diagnosis: Displaced Fx of Greater Trochanter Lt femur  Additional Pertinent Hx: The patient is a 73 y.o. female with presentation of left hip pain post acute injury s/p mechanical fall. Patient states she had left JATINDER done  and right JATINDER done . She had a mechanical fall on 21 causing injury to left hip. xrays showing left hip periprosthetic fracture and ortho was consulted. Hx:  Lung removal due to lung CA,  2021     Prior Level of Function:  Lives With: Son (Son will not be staying with her. Pt reports friends and nieces to stay to assist)  Type of Home: House  Home Layout: Two level, Able to Live on Main level with bedroom/bathroom, 1/2 bath on main level  Home Access: Stairs to enter with rails  Entrance Stairs - Number of Steps: 4+4  Entrance Stairs - Rails: Left  Home Equipment: Cane, Rolling walker, Standard walker, Wheelchair-manual, 4 wheeled walker   Bathroom Shower/Tub: Walk-in shower (on 2nd story)  Bathroom Toilet: Standard  Bathroom Equipment: Shower chair, Grab bars in shower    ADL Assistance: [facility] [address] [facility] [address]: Needs assistance (sharies duties with son)  Ambulation Assistance: Independent  Transfer Assistance: Independent  Active : Yes  Additional Comments: Pt reports using no AD PLOF. Currently has bed on 1st level of home, 1/2 bath on 1st level; walk in shower is on 2nd story.     Restrictions/Precautions:  Restrictions/Precautions: Weight Bearing, General Precautions, Fall Risk  Left Lower Extremity Weight Bearing: Toe Touch Weight Bearing  Partial Weight Bearing Percentage Or Pounds: 25%  Required Braces or Orthoses  Left Lower Extremity Brace:  (hip ABD brace)  Position Activity Restriction  Hip Precautions: No hip flexion > 90 degrees, No active ABduction, No hip internal rotation, No hip external rotation  Other position/activity restrictions: hip abductor brace to be worn all the time. May remove for hygiene. \"; hx of R lung removal 6/14/2021, O2 at 2 liters with activity, 1 liter at rest       SUBJECTIVE: pt up in chair on arrival and agreeable for therapy     PAIN: 4/10: at left LE     Vitals: Oxygen: Pt was on 2 1/2 L in room and on concentrator 3L on tank O2 sats > 96%       OBJECTIVE:  Bed Mobility:  Supine to Sit: Moderate Assistance, at trunk to get up into long sitting- pt completed 1/2 bridges to scoot hips to edge of bed first- then she needed little assist at her LEs as she scooted to edge   Sit to Supine: Moderate Assistance, pt needed assist at surya LEs      Transfers:  Sit to Stand: [facility]  ** cues for hand placement and wbing status- cues to back all the way upto surface as well   Ambulation:  Contact Guard Assistance  Distance: 30x3  Surface: Level Tile  Device:Rolling Walker  Gait Deviations:  Slow steffi with decreased step length and pt tended to slide her feet on the floor at times questionable if she is maintaining her PWBing - pt needed cues for wbing status and assist to manage her O2 line and tank   Stairs:  Contact Guard Assistance  Number of Steps: 2  Height: 4\" step in // bars then 6 in step in // bars pt ascended retro to help maintain her PWBing and down forward- cues for technique and for maintaining PWBing     Exercise:  Patient was guided in 1 set(s) 12 reps of exercise to both lower extremities. Ankle pumps, Glut sets, Quad sets, Heelslides, Short arc quads and then completed 2x5 reps of SLR with min to mod assist at left LE- right LE only hip abd/add, stretched left heelcord 3x30 sec each .   Exercises were completed for increased independence with functional mobility. Functional Outcome Measures: Completed       ASSESSMENT:  Assessment: Patient progressing toward established goals. Activity Tolerance:  Patient tolerance of  treatment: good. Rest breaks given        Equipment Recommendations:Equipment Needed: No (Pt has RW, SW, cane, w/c and 4WW)  Discharge Recommendations:    Continue to assess pending progress, Patient would benefit from continued therapy after discharge    Plan: Times per week: 5x/ wk 90 min, 1x/ wk 30 min  Current Treatment Recommendations: Strengthening, Gait Training, Patient/Caregiver Education & Training, Equipment Evaluation, Education, & procurement, Stair training, Balance Training, Endurance Training, Home Exercise Program, Functional Mobility Training, Transfer Training, Safety Education & Training, Wheelchair Mobility Training    Patient Education  Patient Education: Plan of Care    Goals:  Patient goals : Get home in time for granddaughter's wedding  Short term goals  Time Frame for Short term goals: 1 wk  Short term goal 1: Pt to go supine <->sit, cGA with LLE, to get in/out of bed. No rail, bed flat  Short term goal 2: Pt to get up/down from various seated surfaces, CGA, to get up to walk. Short term goal 3: Pt to walk with RW >= 25 ft, TTWB LLE, CGA, to get in/out of restroom  Short term goal 4: Pt to ascend/descend 6\" step in parallel bars, maintaining TTWB LLE, CGA to progress to home entry. Short term goal 5: Pt to propel w/c, indoor surfaces, Mod I, for home and community mobility  Short term goal 6: Pt to get in/out of car, min +1 for transportation needs. Long term goals  Time Frame for Long term goals : 2 wks  Long term goal 1: Pt to go supine <->sit, Mod I, to get in/out of bed. No rail, bed flat  Long term goal 2: Pt to get up/down from various seated surfaces, Mod I, to get up to walk.   Long term goal 3: Pt to walk with RW >= 50 ft, TTWB LLE, Mod I, for home mobility  Long term goal 4: Pt to ascend/descend 4 +4 steps with Lt rail, min +1 for home entry  Long term goal 5: Pt to get in/out of car, SBA +1 for transportation needs. Following session, patient left in safe position with all fall risk precautions in place.

## 2021-08-13 NOTE — PLAN OF CARE
Problem: DISCHARGE BARRIERS  Goal: Patient's continuum of care needs are met  Note: **Son's name is HAMLET, not Sumit**    SW received returned phone call from patient's daughter-in-law, Naima Rajput, (son's, Hamlet, wife) regarding update and further discharge planning. Daughter-in-law, Naima Rajput, indicates concerns with patient returning to her current home at discharge. SW reviewed with daughter-in-law, Naima Rajput, recommendation for twenty four hour care upon discharge due to physical and cognitive deficits. Discussion regarding anticipated discharge from inpatient rehab on Friday, 08/20/2021. Daughter-in-law, Naima Rajput, indicates that patient does not have support for twenty four hour care. Patient's son, Frieda Flores, is currently in skilled nursing and family in unsure of how long he will be incarcerated. Daughter-in-law, Naima Rajput, indicates plan to speak with patient and patient's son/her , Hamlet, regarding discharge planning. SW to follow and maintain involvement in discharge planning.

## 2021-08-13 NOTE — PROGRESS NOTES
Received a call from [facility] stating they cannot get new padding for her abductor brace. Spoke with therapy who suggested speaking with the surgeon in regards to laying her down, removing the brace, place an abductor pillow, and wash the padding. Call placed to Kinga Talley CNP with this request. Voicemail left. Awaiting reply.

## 2021-08-13 NOTE — PROGRESS NOTES
[address] [facility] PHYSICAL THERAPY  DAILY NOTE  [facility]- [address] [facility] - 7E-55/055-A    Time In: 1200  Time Out: 1230  Timed Code Treatment Minutes: 30 Minutes  Minutes: 30          Date: 2021  Patient Name: Jelena Garvin,  Gender:  female        MRN: [id number]  : 1950  (73 y.o.)     Referring Practitioner: Dr. Shakeel Cornejo  Diagnosis: Displaced Fx of Greater Trochanter Lt femur  Additional Pertinent Hx: The patient is a 73 y.o. female with presentation of left hip pain post acute injury s/p mechanical fall. Patient states she had left JATINDER done  and right JATINDER done . She had a mechanical fall on 21 causing injury to left hip. xrays showing left hip periprosthetic fracture and ortho was consulted. Hx:  Lung removal due to lung CA,  2021     Prior Level of Function:  Lives With: Son (Son will not be staying with her. Pt reports friends and nieces to stay to assist)  Type of Home: House  Home Layout: Two level, Able to Live on Main level with bedroom/bathroom, 1/2 bath on main level  Home Access: Stairs to enter with rails  Entrance Stairs - Number of Steps: 4+4  Entrance Stairs - Rails: Left  Home Equipment: Cane, Rolling walker, Standard walker, Wheelchair-manual, 4 wheeled walker   Bathroom Shower/Tub: Walk-in shower (on 2nd story)  Bathroom Toilet: Standard  Bathroom Equipment: Shower chair, Grab bars in shower    ADL Assistance: [facility]: Needs assistance (sharies duties with son)  Ambulation Assistance: Independent  Transfer Assistance: Independent  Active : Yes  Additional Comments: Pt reports using no AD PLOF. Currently has bed on 1st level of home, 1/2 bath on 1st level; walk in shower is on 2nd story.     Restrictions/Precautions:  Restrictions/Precautions: Weight Bearing, General Precautions, Fall Risk  Left Lower Extremity Weight Bearing: Toe Touch Weight Bearing  Partial Weight Bearing Percentage Or Pounds: 25%  Required Braces or Orthoses  Left Lower Extremity Brace:  (hip ABD brace)  Position Activity Restriction  Hip Precautions: No hip flexion > 90 degrees, No active ABduction, No hip internal rotation, No hip external rotation  Other position/activity restrictions: hip abductor brace to be worn all the time. May remove for hygiene. \"; hx of R lung removal 6/14/2021, O2 at 2 liters with activity, 1 liter at rest     SUBJECTIVE: Pt resting in [address] visiting with family. Pleasant and agreeable to therapy. PAIN: No complaints of pain    Vitals: Vitals not assessed per clinical judgement, see nursing flowsheet    OBJECTIVE:  Bed Mobility:  Not Tested    Transfers:  Sit to Stand: Contact Guard Assistance  Stand to [place] [id number], cues for hand placement    Ambulation:  Contact Guard Assistance, Minimal Assistance  Distance: 55 feet x1 and 45 feet x1   Surface: Level Tile  Device:Rolling Walker  Gait Deviations:  Frequent cuing for compliance with TTWBing, pt would maintain this for several feet and then get distracted in harding and require cuing to maintain. Cues to push AD instead of picking up to advance. Exercise:  Patient was guided in 1 set(s) 10 reps of exercise to both lower extremities. Glut sets, Seated marches, Seated hamstring curls, Seated heel/toe raises, Long arc quads and Seated abduction/adduction. Exercises were completed for increased independence with functional mobility. Functional Outcome Measures: Not Completed       ASSESSMENT:  Assessment: Patient progressing toward established goals. and Pt tolerated session fairly well. Cuing for compliance with TTWBing. Limited by decreased strength and decreased mobility. Activity Tolerance:  Patient tolerance of  treatment: good.       Equipment Recommendations:Equipment Needed: No (Pt has RW, SW, cane, w/c and 4WW)  Discharge Recommendations:   Continue to assess pending progress, Patient would benefit from continued therapy after discharge    Plan: Times per week: 5x/ wk 90 min, 1x/ wk 30 min  Current Treatment Recommendations: Strengthening, Gait Training, Patient/Caregiver Education & Training, Equipment Evaluation, Education, & procurement, Stair training, Balance Training, Endurance Training, Home Exercise Program, Functional Mobility Training, Transfer Training, Safety Education & Training, Wheelchair Mobility Training    Patient Education  Patient Education: Plan of Care, Transfers, Gait    Goals:  Patient goals : Get home in time for granddaughter's wedding  Short term goals  Time Frame for Short term goals: 1 wk  Short term goal 1: Pt to go supine <->sit, cGA with LLE, to get in/out of bed. No rail, bed flat  Short term goal 2: Pt to get up/down from various seated surfaces, CGA, to get up to walk. Short term goal 3: Pt to walk with RW >= 25 ft, TTWB LLE, CGA, to get in/out of restroom  Short term goal 4: Pt to ascend/descend 6\" step in parallel bars, maintaining TTWB LLE, CGA to progress to home entry. Short term goal 5: Pt to propel w/c, indoor surfaces, Mod I, for home and community mobility  Short term goal 6: Pt to get in/out of car, min +1 for transportation needs. Long term goals  Time Frame for Long term goals : 2 wks  Long term goal 1: Pt to go supine <->sit, Mod I, to get in/out of bed. No rail, bed flat  Long term goal 2: Pt to get up/down from various seated surfaces, Mod I, to get up to walk. Long term goal 3: Pt to walk with RW >= 50 ft, TTWB LLE, Mod I, for home mobility  Long term goal 4: Pt to ascend/descend 4 +4 steps with Lt rail, min +1 for home entry  Long term goal 5: Pt to get in/out of car, SBA +1 for transportation needs. Following session, patient left in safe position with all fall risk precautions in place.

## 2021-08-13 NOTE — PROGRESS NOTES
[address]  Occupational Therapy  Daily Note  Time:   Time In: 08  Time Out: 930  Timed Code Treatment Minutes: 60 Minutes  Minutes: 60    Date: 2021  Patient Name: Valentino Leavell,   Gender: female      Room: [facility]/055-A  MRN: [id number]  : 1950  (73 y.o.)  Referring Practitioner: Dr. Pepe Hassan  Diagnosis: Displaced fracture of greater trochanter Left femur. Additional Pertinent Hx: The patient is a 73 y.o. female with presentation of left hip pain post acute injury s/p mechanical fall. Patient states she had left JATINDER done  and right JATINDER done . She has had no issues with her left hip and was ambulating fine on her own without assistance. She had a mechanical fall on 21 causing injury to left hip. She came to [facility] ED and had xrays showing left hip periprosthetic fracture and ortho was consulted. Patient is s/p ORIF left greater troch fracture, left hip arthrotomy  by Dr. Jeannei Kyle. 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 Admitted to [facility] on 2021. Restrictions/Precautions:  Restrictions/Precautions: Weight Bearing, General Precautions, Fall Risk  Left Lower Extremity Weight Bearing: Toe Touch Weight Bearing  Partial Weight Bearing Percentage Or Pounds: 25%  Required Braces or Orthoses  Left Lower Extremity Brace:  (hip ABD brace)  Position Activity Restriction  Hip Precautions: No hip flexion > 90 degrees, No active ABduction, No hip internal rotation, No hip external rotation  Other position/activity restrictions: hip abductor brace to be worn all the time. May remove for hygiene. \"; hx of R lung removal 2021, O2 at 2 liters with activity, 1 liter at rest     SUBJECTIVE: Patient pleasant and cooperative. Agreeable to OT. Reports she is feeling much better today. Pt pleasently requests sponge bath vs shower as she just got her hair done yesterda.      PAIN:  Denies pain Vitals: Vitals not assessed per clinical judgement, see nursing flowsheet    COGNITION: WFL for ADLs + activities this date     ADL:   Grooming: Stand By Assistance. standing x 4 min for hair care at sinkside   Bathing: Stand By Assistance. close SBA when standing to wash [place] area  Upper Extremity Dressing: with set-up.  with t-shirt   Lower Extremity Dressing: Stand By Assistance. use of reacher, sock-aid and shoe horn. SBA for clothing mgmt   Toileting: Stand By Assistance. during hygiene + clothing mgmt   Toilet Transfer: Stand By Assistance. ETS. BALANCE:  Sitting Balance:  Modified Independent. Standing Balance: Stand By Assistance. BED MOBILITY:  Supine to Sit: Minimal Assistance - A with LLE     TRANSFERS:  Sit to Stand:  Stand By Assistance. recliner, EOB, ETS, w/c. Good hand placement,. Stand to Sit: Stand By Assistance. FUNCTIONAL MOBILITY:  Assistive Device: Rolling Walker  Assist Level:  Stand By Assistance. Distance: To and from bathroom  Min cues for TTWB adherence      ADDITIONAL ACTIVITIES:  - Patient identified one of their personal goals is to be able to sustain functional standing positions in order to complete various ADL and IADL skills in standing position, such as sinkside grooming or washing dishes. Dynamic standing task was then facilitated to challenge 1 UE release from AD to complete recreational targeting task. Pt animated with task. Patient required SBA, and demo'ed an endurance of 10 minutes. ASSESSMENT:     Activity Tolerance:  Patient tolerance of  treatment: good.        Discharge Recommendations: Home with Home health OT, Home with nursing aide, 24 hour supervision or assist   Equipment Recommendations: Equipment Needed: Yes  Other: BSC,  AE kit (reacher,  shoehorn, sockaide,  bath sponge)  Plan: Times per week: 90 minutes 5x/wk, 30 minutes 1x/wk  Current Treatment Recommendations: Balance Training, Endurance Training, Functional Mobility Training, Self-Care / ADL, Equipment Evaluation, Education, & procurement, Strengthening    Patient Education  Patient Education: ADL's, Precautions, Reviewed Prior Education, Home Safety and Assistive Device Safety    Goals  Short term goals  Time Frame for Short term goals: 1 week  Short term goal 1: Pt will complete various t/fs including BSC with SBA & min vcs for technique  Short term goal 2: Pt will complete LE dressing with mod A, LH AE, & 0-2 vcs for hip precautions  Short term goal 3: Pt will tolerate standing 2 min with 1 UE release & SBA for increased ease of toileting routine  Short term goal 4: Pt will complete mobility to/from BSC/bedside chair with SBA, RW, & 0-2 vcs for TTWB precautions  Short term goal 5: Pt will tolerate BUE moderate resistance band HEP x 10 reps with min RBs to increase UB endurance for ADL tasks  Long term goals  Time Frame for Long term goals : 2 weeks  Long term goal 1: Pt will complete various t/fs including toilet with mod I  Long term goal 2: Pt will complete BADL routine (sponge bath) with S & 0-1 vcs for safety  Long term goal 3: Pt will complete LE dressing with S & LH AE  Long term goal 4: Pt will complete simple meal prep task with RW, S, & good safety awareness for maintaining TTWB (seated when needed to maintain)    Following session, patient left in safe position with all fall risk precautions in place.

## 2021-08-13 NOTE — PROGRESS NOTES
[facility]. Approved removal of abductor brace to wash padding while laying down with use of the abductor pillow. Therapist, Adilene, sami.

## 2021-08-14 PROCEDURE — 97110 THERAPEUTIC EXERCISES: CPT

## 2021-08-14 PROCEDURE — 2580000003 HC RX 258: Performed by: FAMILY MEDICINE

## 2021-08-14 PROCEDURE — 97535 SELF CARE MNGMENT TRAINING: CPT

## 2021-08-14 PROCEDURE — 6360000002 HC RX W HCPCS: Performed by: FAMILY MEDICINE

## 2021-08-14 PROCEDURE — 94760 N-INVAS EAR/PLS OXIMETRY 1: CPT

## 2021-08-14 PROCEDURE — 94640 AIRWAY INHALATION TREATMENT: CPT

## 2021-08-14 PROCEDURE — 97530 THERAPEUTIC ACTIVITIES: CPT

## 2021-08-14 PROCEDURE — 2580000003 HC RX 258: Performed by: PHYSICAL MEDICINE & REHABILITATION

## 2021-08-14 PROCEDURE — 6360000002 HC RX W HCPCS: Performed by: PHYSICAL MEDICINE & REHABILITATION

## 2021-08-14 PROCEDURE — 97116 GAIT TRAINING THERAPY: CPT

## 2021-08-14 PROCEDURE — 6370000000 HC RX 637 (ALT 250 FOR IP): Performed by: PHYSICAL MEDICINE & REHABILITATION

## 2021-08-14 PROCEDURE — 1180000000 HC REHAB R&B

## 2021-08-14 RX ADMIN — BUDESONIDE AND FORMOTEROL FUMARATE DIHYDRATE 2 PUFF: 160; 4.5 AEROSOL RESPIRATORY (INHALATION) at 08:32

## 2021-08-14 RX ADMIN — SENNOSIDES 17.2 MG: 8.6 TABLET, FILM COATED ORAL at 18:20

## 2021-08-14 RX ADMIN — FERROUS SULFATE TAB 325 MG (65 MG ELEMENTAL FE) 325 MG: 325 (65 FE) TAB at 10:25

## 2021-08-14 RX ADMIN — SENNOSIDES 17.2 MG: 8.6 TABLET, FILM COATED ORAL at 05:59

## 2021-08-14 RX ADMIN — FAMOTIDINE 20 MG: 20 TABLET, FILM COATED ORAL at 10:25

## 2021-08-14 RX ADMIN — SODIUM CHLORIDE, PRESERVATIVE FREE 10 ML: 5 INJECTION INTRAVENOUS at 21:17

## 2021-08-14 RX ADMIN — SODIUM CHLORIDE, PRESERVATIVE FREE 10 ML: 5 INJECTION INTRAVENOUS at 23:59

## 2021-08-14 RX ADMIN — BUDESONIDE AND FORMOTEROL FUMARATE DIHYDRATE 2 PUFF: 160; 4.5 AEROSOL RESPIRATORY (INHALATION) at 18:12

## 2021-08-14 RX ADMIN — LEVOTHYROXINE SODIUM 88 MCG: 0.09 TABLET ORAL at 05:59

## 2021-08-14 RX ADMIN — FERROUS SULFATE TAB 325 MG (65 MG ELEMENTAL FE) 325 MG: 325 (65 FE) TAB at 18:18

## 2021-08-14 RX ADMIN — CEFTRIAXONE SODIUM 1000 MG: 1 INJECTION, POWDER, FOR SOLUTION INTRAMUSCULAR; INTRAVENOUS at 23:59

## 2021-08-14 RX ADMIN — ACETAMINOPHEN 650 MG: 325 TABLET ORAL at 18:19

## 2021-08-14 RX ADMIN — ENOXAPARIN SODIUM 40 MG: 40 INJECTION, SOLUTION INTRAVENOUS; SUBCUTANEOUS at 21:16

## 2021-08-14 RX ADMIN — ASPIRIN 325 MG: 325 TABLET, DELAYED RELEASE ORAL at 10:26

## 2021-08-14 RX ADMIN — TIOTROPIUM BROMIDE INHALATION SPRAY 2 PUFF: 3.12 SPRAY, METERED RESPIRATORY (INHALATION) at 08:29

## 2021-08-14 RX ADMIN — DIPHENHYDRAMINE HCL 25 MG: 25 TABLET ORAL at 18:18

## 2021-08-14 RX ADMIN — DOCUSATE SODIUM 100 MG: 100 CAPSULE ORAL at 10:26

## 2021-08-14 RX ADMIN — ACETAMINOPHEN 650 MG: 325 TABLET ORAL at 10:25

## 2021-08-14 RX ADMIN — SODIUM CHLORIDE, PRESERVATIVE FREE 10 ML: 5 INJECTION INTRAVENOUS at 10:25

## 2021-08-14 ASSESSMENT — PAIN DESCRIPTION - PROGRESSION
CLINICAL_PROGRESSION: NOT CHANGED

## 2021-08-14 ASSESSMENT — PAIN SCALES - GENERAL
PAINLEVEL_OUTOF10: 2
PAINLEVEL_OUTOF10: 0
PAINLEVEL_OUTOF10: 0

## 2021-08-14 ASSESSMENT — PAIN DESCRIPTION - FREQUENCY: FREQUENCY: INTERMITTENT

## 2021-08-14 ASSESSMENT — PAIN DESCRIPTION - ONSET: ONSET: ON-GOING

## 2021-08-14 ASSESSMENT — PAIN DESCRIPTION - DESCRIPTORS: DESCRIPTORS: ACHING

## 2021-08-14 ASSESSMENT — PAIN - FUNCTIONAL ASSESSMENT: PAIN_FUNCTIONAL_ASSESSMENT: ACTIVITIES ARE NOT PREVENTED

## 2021-08-14 ASSESSMENT — PAIN DESCRIPTION - PAIN TYPE: TYPE: SURGICAL PAIN

## 2021-08-14 ASSESSMENT — PAIN DESCRIPTION - ORIENTATION: ORIENTATION: LEFT

## 2021-08-14 ASSESSMENT — PAIN DESCRIPTION - LOCATION: LOCATION: HIP

## 2021-08-14 NOTE — PROGRESS NOTES
[address] [facility] PHYSICAL THERAPY  DAILY NOTE  [facility] - 7E-55/055-A    Time In: 1100  Time Out: 1130  Timed Code Treatment Minutes: 30 Minutes  Minutes: 30          Date: 2021  Patient Name: Kaylee Veras,  Gender:  female        MRN: [id number]  : 1950  (73 y.o.)     Referring Practitioner: Dr. Saundra Matias  Diagnosis: Displaced Fx of Greater Trochanter Lt femur  Additional Pertinent Hx: The patient is a 73 y.o. female with presentation of left hip pain post acute injury s/p mechanical fall. Patient states she had left JATINDER done  and right JATINDER done . She had a mechanical fall on 21 causing injury to left hip. xrays showing left hip periprosthetic fracture and ortho was consulted. Hx:  Lung removal due to lung CA,  2021     Prior Level of Function:  Lives With: Son (Son will not be staying with her. Pt reports friends and nieces to stay to assist)  Type of Home: House  Home Layout: Two level, Able to Live on Main level with bedroom/bathroom, 1/2 bath on main level  Home Access: Stairs to enter with rails  Entrance Stairs - Number of Steps: 4+4  Entrance Stairs - Rails: Left  Home Equipment: Cane, Rolling walker, Standard walker, Wheelchair-manual, 4 wheeled walker   Bathroom Shower/Tub: Walk-in shower (on 2nd story)  Bathroom Toilet: Standard  Bathroom Equipment: Shower chair, Grab bars in shower    ADL Assistance: [facility]: Needs assistance (sharies duties with son)  Ambulation Assistance: Independent  Transfer Assistance: Independent  Active : Yes  Additional Comments: Pt reports using no AD PLOF. Currently has bed on 1st level of home, 1/2 bath on 1st level; walk in shower is on 2nd story.     Restrictions/Precautions:  Restrictions/Precautions: Weight Bearing, General Precautions, Fall Risk  Left Lower Extremity Weight Bearing: Toe Touch Weight Bearing  Partial Weight Bearing Percentage Or Pounds: 25%  Required Braces or Orthoses  Left Lower Extremity Brace:  (hip ABD brace)  Position Activity Restriction  Hip Precautions: No hip flexion > 90 degrees, No active ABduction, No hip internal rotation, No hip external rotation  Other position/activity restrictions: hip abductor brace to be worn all the time. May remove for hygiene. \"; hx of R lung removal 6/14/2021, O2 at 2 liters with activity, 1 liter at rest     SUBJECTIVE: Pt resting in WC upon arrival, pleasant and agreeable to therapy    PAIN: 0/10: denies pain    Vitals: Vitals not assessed per clinical judgement, see nursing flowsheet    OBJECTIVE:  Bed Mobility:  Rolling to Right: Minimal Assistance   Supine to Sit: Moderate Assistance, At trunk. From EOM. Cuing for technqiue. Sit to Supine: Contact Guard Assistance     Transfers:  Sit to Stand: Minimal Assistance, Moderate Assistance, From WC and EOM  Stand to Sit:Contact Guard Assistance    Ambulation:  Minimal Assistance  Distance: 25 feet x1   Surface: Level Tile  Device:Rolling Walker  Gait Deviations:  Catching of R toe ~90% of time. Slow steffi. Decreased B step lengths. Cues to stay closer to AD. Lacks R hip flexion. Balance:  Dynamic Sitting Balance: Stand By Assistance    Exercise:  Patient was guided in 1 set(s) 10 reps of exercise to both lower extremities. Ankle pumps, Glut sets, Quad sets, Heelslides, Hip abduction/adduction and Lower trunk rotations. Exercises were completed for increased independence with functional mobility. Manual heel cord stretch 4x20\" to R ankle for improved mobility. Functional Outcome Measures: Not completed       ASSESSMENT:  Assessment: Patient progressing toward established goals. and Pt tolerated session fairly well. Limited by R sided weakness and decreased endurance.    Activity Tolerance:  Patient tolerance of  treatment: good     Equipment Recommendations:Equipment Needed: No (Pt has RW, SW, cane, w/c and [id number])  Discharge Recommendations: Continue to assess pending progress, Patient would benefit from continued therapy after discharge    Plan: Times per week: 5x/ wk 90 min, 1x/ wk 30 min  Current Treatment Recommendations: Strengthening, Gait Training, Patient/Caregiver Education & Training, Equipment Evaluation, Education, & procurement, Stair training, Balance Training, Endurance Training, Home Exercise Program, Functional Mobility Training, Transfer Training, Safety Education & Training, Wheelchair Mobility Training    Patient Education  Patient Education: Plan of Care, [employer] Mobility, Transfers, Gait    Goals:  Patient goals : Get home in time for granddaughter's wedding  Short term goals  Time Frame for Short term goals: 1 wk  Short term goal 1: Pt to go supine <->sit, cGA with LLE, to get in/out of bed. No rail, bed flat  Short term goal 2: Pt to get up/down from various seated surfaces, CGA, to get up to walk. Short term goal 3: Pt to walk with RW >= 25 ft, TTWB LLE, CGA, to get in/out of restroom  Short term goal 4: Pt to ascend/descend 6\" step in parallel bars, maintaining TTWB LLE, CGA to progress to home entry. Short term goal 5: Pt to propel w/c, indoor surfaces, Mod I, for home and community mobility  Short term goal 6: Pt to get in/out of car, min +1 for transportation needs. Long term goals  Time Frame for Long term goals : 2 wks  Long term goal 1: Pt to go supine <->sit, Mod I, to get in/out of bed. No rail, bed flat  Long term goal 2: Pt to get up/down from various seated surfaces, Mod I, to get up to walk. Long term goal 3: Pt to walk with RW >= 50 ft, TTWB LLE, Mod I, for home mobility  Long term goal 4: Pt to ascend/descend 4 +4 steps with Lt rail, min +1 for home entry  Long term goal 5: Pt to get in/out of car, SBA +1 for transportation needs. Following session, patient left in safe position with all fall risk precautions in place.

## 2021-08-14 NOTE — PROGRESS NOTES
[address]  Occupational Therapy  Daily Note  Time:   Time In: 1200  Time Out: 1230  Timed Code Treatment Minutes: 30 Minutes  Minutes: 30          Date: 2021  Patient Name: Ekta Ventura,   Gender: female      Room: [facility]/055-A  MRN: [id number]  : 1950  (73 y.o.)  Referring Practitioner: Dr. Brooke Goins  Diagnosis: Displaced fracture of greater trochanter Left femur. Additional Pertinent Hx: The patient is a 73 y.o. female with presentation of left hip pain post acute injury s/p mechanical fall. Patient states she had left JATINDER done  and right JATINDER done . She has had no issues with her left hip and was ambulating fine on her own without assistance. She had a mechanical fall on 21 causing injury to left hip. She came to [facility] ED and had xrays showing left hip periprosthetic fracture and ortho was consulted. Patient is s/p ORIF left greater troch fracture, left hip arthrotomy  by Dr. Honorio Dominguez. 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 Admitted to [facility] on 2021. Restrictions/Precautions:  Restrictions/Precautions: Weight Bearing, General Precautions, Fall Risk  Left Lower Extremity Weight Bearing: Toe Touch Weight Bearing  Partial Weight Bearing Percentage Or Pounds: 25%  Required Braces or Orthoses  Left Lower Extremity Brace:  (hip ABD brace)  Position Activity Restriction  Hip Precautions: No hip flexion > 90 degrees, No active ABduction, No hip internal rotation, No hip external rotation  Other position/activity restrictions: hip abductor brace to be worn all the time. May remove for hygiene. \"; hx of R lung removal 2021, O2 at 2 liters with activity, 1 liter at rest     SUBJECTIVE: Pt. In room in bed upon entry Pt. Reports fatigue although agreeable to OT treatment.     PAIN: Denies pain reports fatigue    Vitals: Vitals not assessed per clinical judgement, see nursing flowsheet    COGNITION: WFL    ADL:   Toileting: Stand By Assistance and with verbal cues . Toilet Transfer: Stand By Assistance and with verbal cues . Justin Echevarria BALANCE:  Standing Balance: Stand By Assistance, with cues for safety. BED MOBILITY:  Supine to Sit: Minimal Assistance with L LE    TRANSFERS:  Sit to Stand:  Stand By Assistance. Stand to Sit: Stand By Assistance. Following TTWB on LLE    FUNCTIONAL MOBILITY:  Assistive Device: Rolling Walker  Assist Level:  Stand By Assistance. Distance: To and from bathroom  X 2 due to stools. ADDITIONAL ACTIVITIES:  Pt completed B UE exs with moderate resistance band x 12 reps, x 1set with fair t tolerance of exs, with  RBs throughout, and visual and verbal cues for tech with resistance band for increased strength and activity tolerance. ASSESSMENT:     Activity Tolerance:  Patient tolerance of  treatment: fair.        Discharge Recommendations: Home with Home health OT, Home with nursing aide, 24 hour supervision or assist   Equipment Recommendations: Equipment Needed: Yes  Other: BSC,  AE kit (reacher,  shoehorn, sockaide,  bath sponge)  Plan: Times per week: 90 minutes 5x/wk, 30 minutes 1x/wk  Current Treatment Recommendations: Balance Training, Endurance Training, Functional Mobility Training, Self-Care / ADL, Equipment Evaluation, Education, & procurement, Strengthening    Patient Education  Patient Education: ADL's, Home Exercise Program, Precautions, Equipment Education, Importance of Increasing Activity and Assistive Device Safety and safety with functional mobility and transfers,     Goals  Short term goals  Time Frame for Short term goals: 1 week  Short term goal 1: Pt will complete various t/fs including BSC with SBA & min vcs for technique  Short term goal 2: Pt will complete LE dressing with mod A,  AE, & 0-2 vcs for hip precautions  Short term goal 3: Pt will tolerate standing 2 min with 1 UE release & SBA for increased ease of toileting routine  Short term goal 4: Pt will complete mobility to/from BSC/bedside chair with SBA, RW, & 0-2 vcs for TTWB precautions  Short term goal 5: Pt will tolerate BUE moderate resistance band HEP x 10 reps with min RBs to increase UB endurance for ADL tasks  Long term goals  Time Frame for Long term goals : 2 weeks  Long term goal 1: Pt will complete various t/fs including toilet with mod I  Long term goal 2: Pt will complete BADL routine (sponge bath) with S & 0-1 vcs for safety  Long term goal 3: Pt will complete LE dressing with S & LH AE  Long term goal 4: Pt will complete simple meal prep task with RW, S, & good safety awareness for maintaining TTWB (seated when needed to maintain)    Following session, patient left in safe position with all fall risk precautions in place.

## 2021-08-14 NOTE — PROGRESS NOTES
[facility]  INPATIENT PHYSICAL THERAPY  DAILY NOTE  [address] [facility] - 7E-55/055-A    Time In: 0800  Time Out: 0830  Timed Code Treatment Minutes: 30 Minutes  Minutes: 30          Date: 2021  Patient Name: Moris Diaz,  Gender:  female        MRN: [id number]  : 1950  (73 y.o.)     Referring Practitioner: Dr. Akira Larson  Diagnosis: Displaced Fx of Greater Trochanter Lt femur  Additional Pertinent Hx: The patient is a 73 y.o. female with presentation of left hip pain post acute injury s/p mechanical fall. Patient states she had left JATINDER done  and right JATINDER done . She had a mechanical fall on 21 causing injury to left hip. xrays showing left hip periprosthetic fracture and ortho was consulted. Hx:  Lung removal due to lung CA,  2021     Prior Level of Function:  Lives With: Son (Son will not be staying with her. Pt reports friends and nieces to stay to assist)  Type of Home: House  Home Layout: Two level, Able to Live on Main level with bedroom/bathroom, 1/2 bath on main level  Home Access: Stairs to enter with rails  Entrance Stairs - Number of Steps: 4+4  Entrance Stairs - Rails: Left  Home Equipment: Cane, Rolling walker, Standard walker, Wheelchair-manual, 4 wheeled walker   Bathroom Shower/Tub: Walk-in shower (on 2nd story)  Bathroom Toilet: Standard  Bathroom Equipment: Shower chair, Grab bars in shower    ADL Assistance: [address] [place] [address] Avenue: Needs assistance (sharies duties with son)  Ambulation Assistance: Independent  Transfer Assistance: Independent  Active : Yes  Additional Comments: Pt reports using no AD PLOF. Currently has bed on 1st level of home, 1/2 bath on 1st level; walk in shower is on 2nd story.     Restrictions/Precautions:  Restrictions/Precautions: Weight Bearing, General Precautions, Fall Risk  Left Lower Extremity Weight Bearing: Toe Touch Weight Bearing  Partial Weight Bearing Percentage Or Pounds: 25%  Required Braces or Orthoses  Left Lower Extremity Brace:  (hip ABD brace)  Position Activity Restriction  Hip Precautions: No hip flexion > 90 degrees, No active ABduction, No hip internal rotation, No hip external rotation  Other position/activity restrictions: hip abductor brace to be worn all the time. May remove for hygiene. \"; hx of R lung removal 6/14/2021, O2 at 2 liters with activity, 1 liter at rest     SUBJECTIVE: Pt resting in bed upon arrival, pleasant and agreeable to therapy. PAIN: 0/10: denies pain    Vitals: Vitals not assessed per clinical judgement, see nursing flowsheet    OBJECTIVE:  Bed Mobility:  Rolling to Left: Stand By Assistance   Supine to Sit: Minimal Assistance, Of L LE per pt request    Transfers:  Sit to Stand: Contact Guard Assistance  Stand to [place] [address], Minimal Assistance, Min A for one transfer due to pt quickly \"plopping\" into chair. Max cuing to reach back and to control decent of transfer for decreased fall risk. Ambulation:  Contact Guard Assistance  Distance: 50 feet x1   Surface: Level Tile  Device:Rolling Walker  Gait Deviations:  After initial cuing for TTWBing pt was compliant through rest of gait. Slow steffi. Decreased B step lengths. Balance:  Dynamic Sitting Balance: Modified Independent   Dynamic Standing Balance: Stand By Assistance. Pt up in restroom able to complete cyrus clean up and wash hands at sink    Exercise:  Patient was guided in 1 set(s) 15 reps of exercise to both lower extremities. Glut sets, Seated marches, Seated hamstring curls, Seated heel/toe raises and Long arc quads. Exercises were completed for increased independence with functional mobility. Functional Outcome Measures: Not completed       ASSESSMENT:  Assessment: Patient progressing toward established goals. and Pt tolerated session fairly well. Limited by decreased strength and decreased endurance.    Activity Tolerance:  Patient tolerance of  treatment: good.      Equipment Recommendations:Equipment Needed: No (Pt has RW, SW, cane, w/c and 4WW)  Discharge Recommendations:  Continue to assess pending progress, Patient would benefit from continued therapy after discharge    Plan: Times per week: 5x/ wk 90 min, 1x/ wk 30 min  Current Treatment Recommendations: Strengthening, Gait Training, Patient/Caregiver Education & Training, Equipment Evaluation, Education, & procurement, Stair training, Balance Training, Endurance Training, Home Exercise Program, Functional Mobility Training, Transfer Training, Safety Education & Training, Wheelchair Mobility Training    Patient Education  Patient Education: Plan of Care, [employer] Mobility, Transfers, Gait    Goals:  Patient goals : Get home in time for granddaughter's wedding  Short term goals  Time Frame for Short term goals: 1 wk  Short term goal 1: Pt to go supine <->sit, cGA with LLE, to get in/out of bed. No rail, bed flat  Short term goal 2: Pt to get up/down from various seated surfaces, CGA, to get up to walk. Short term goal 3: Pt to walk with RW >= 25 ft, TTWB LLE, CGA, to get in/out of restroom  Short term goal 4: Pt to ascend/descend 6\" step in parallel bars, maintaining TTWB LLE, CGA to progress to home entry. Short term goal 5: Pt to propel w/c, indoor surfaces, Mod I, for home and community mobility  Short term goal 6: Pt to get in/out of car, min +1 for transportation needs. Long term goals  Time Frame for Long term goals : 2 wks  Long term goal 1: Pt to go supine <->sit, Mod I, to get in/out of bed. No rail, bed flat  Long term goal 2: Pt to get up/down from various seated surfaces, Mod I, to get up to walk. Long term goal 3: Pt to walk with RW >= 50 ft, TTWB LLE, Mod I, for home mobility  Long term goal 4: Pt to ascend/descend 4 +4 steps with Lt rail, min +1 for home entry  Long term goal 5: Pt to get in/out of car, SBA +1 for transportation needs.     Following session, patient left in safe position with all

## 2021-08-14 NOTE — PLAN OF CARE
Problem: Falls - Risk of:  Goal: Will remain free from falls  Description: Will remain free from falls  Outcome: Met This Shift  Note: Patient uses her call light appropriately for assistance     Problem: Skin Integrity:  Goal: Will show no infection signs and symptoms  Description: Will show no infection signs and symptoms  Outcome: Met This Shift  Note: Incision on left hip is covered with surgical dressing with steri strips and no old drainage on dressing with no signs of infection. Problem: Activity:  Goal: Ability to ambulate will improve  Description: Ability to ambulate will improve  Outcome: Ongoing  Note: Patient ambulation slowly improving      Problem: Pain:  Goal: Pain level will decrease  Description: Pain level will decrease  Outcome: Ongoing  Note: Patient has scheduled Tylenol every six hours, she mainly c/o of discomfort with therapy. Problem: Bleeding:  Goal: Will show no signs and symptoms of excessive bleeding  Description: Will show no signs and symptoms of excessive bleeding  Outcome: Met This Shift     Problem: Respiratory  Goal: O2 Sat > 90%  Outcome: Ongoing  Note: Patient continues on 2 liters of O2, incentive spirometer encouraged to use every 1-2 hours. Patient continues with rhonchi in lower bases of her lungs. Problem: Nutrition  Goal: Optimal nutrition therapy  Outcome: Ongoing  Note: Education provided about how pepsi is not good for her health with the sugar and caffeine content and how water is a better alternative for her health.

## 2021-08-15 LAB — VITAMIN B-12: 223 PG/ML (ref 211–911)

## 2021-08-15 PROCEDURE — 82607 VITAMIN B-12: CPT

## 2021-08-15 PROCEDURE — 94760 N-INVAS EAR/PLS OXIMETRY 1: CPT

## 2021-08-15 PROCEDURE — 2700000000 HC OXYGEN THERAPY PER DAY

## 2021-08-15 PROCEDURE — 94640 AIRWAY INHALATION TREATMENT: CPT

## 2021-08-15 PROCEDURE — 2580000003 HC RX 258: Performed by: PHYSICAL MEDICINE & REHABILITATION

## 2021-08-15 PROCEDURE — 6370000000 HC RX 637 (ALT 250 FOR IP): Performed by: PHYSICAL MEDICINE & REHABILITATION

## 2021-08-15 PROCEDURE — 1180000000 HC REHAB R&B

## 2021-08-15 PROCEDURE — 36415 COLL VENOUS BLD VENIPUNCTURE: CPT

## 2021-08-15 PROCEDURE — 2580000003 HC RX 258: Performed by: FAMILY MEDICINE

## 2021-08-15 PROCEDURE — 6360000002 HC RX W HCPCS: Performed by: FAMILY MEDICINE

## 2021-08-15 PROCEDURE — 6360000002 HC RX W HCPCS: Performed by: PHYSICAL MEDICINE & REHABILITATION

## 2021-08-15 RX ADMIN — ASPIRIN 325 MG: 325 TABLET, DELAYED RELEASE ORAL at 10:52

## 2021-08-15 RX ADMIN — FAMOTIDINE 20 MG: 20 TABLET, FILM COATED ORAL at 10:52

## 2021-08-15 RX ADMIN — ENOXAPARIN SODIUM 40 MG: 40 INJECTION, SOLUTION INTRAVENOUS; SUBCUTANEOUS at 20:22

## 2021-08-15 RX ADMIN — LEVOTHYROXINE SODIUM 88 MCG: 0.09 TABLET ORAL at 05:52

## 2021-08-15 RX ADMIN — BUDESONIDE AND FORMOTEROL FUMARATE DIHYDRATE 2 PUFF: 160; 4.5 AEROSOL RESPIRATORY (INHALATION) at 07:44

## 2021-08-15 RX ADMIN — SODIUM CHLORIDE, PRESERVATIVE FREE 10 ML: 5 INJECTION INTRAVENOUS at 23:19

## 2021-08-15 RX ADMIN — SENNOSIDES 17.2 MG: 8.6 TABLET, FILM COATED ORAL at 17:42

## 2021-08-15 RX ADMIN — SODIUM CHLORIDE, PRESERVATIVE FREE 10 ML: 5 INJECTION INTRAVENOUS at 10:55

## 2021-08-15 RX ADMIN — CEFTRIAXONE SODIUM 1000 MG: 1 INJECTION, POWDER, FOR SOLUTION INTRAMUSCULAR; INTRAVENOUS at 23:19

## 2021-08-15 RX ADMIN — ACETAMINOPHEN 650 MG: 325 TABLET ORAL at 10:51

## 2021-08-15 RX ADMIN — DOCUSATE SODIUM 100 MG: 100 CAPSULE ORAL at 20:22

## 2021-08-15 RX ADMIN — TIOTROPIUM BROMIDE INHALATION SPRAY 2 PUFF: 3.12 SPRAY, METERED RESPIRATORY (INHALATION) at 07:45

## 2021-08-15 RX ADMIN — BUDESONIDE AND FORMOTEROL FUMARATE DIHYDRATE 2 PUFF: 160; 4.5 AEROSOL RESPIRATORY (INHALATION) at 17:33

## 2021-08-15 RX ADMIN — DOCUSATE SODIUM 100 MG: 100 CAPSULE ORAL at 10:52

## 2021-08-15 RX ADMIN — FERROUS SULFATE TAB 325 MG (65 MG ELEMENTAL FE) 325 MG: 325 (65 FE) TAB at 10:52

## 2021-08-15 RX ADMIN — ACETAMINOPHEN 650 MG: 325 TABLET ORAL at 05:51

## 2021-08-15 RX ADMIN — FERROUS SULFATE TAB 325 MG (65 MG ELEMENTAL FE) 325 MG: 325 (65 FE) TAB at 17:42

## 2021-08-15 RX ADMIN — ACETAMINOPHEN 650 MG: 325 TABLET ORAL at 17:41

## 2021-08-15 ASSESSMENT — PAIN DESCRIPTION - PROGRESSION
CLINICAL_PROGRESSION: GRADUALLY IMPROVING
CLINICAL_PROGRESSION: NOT CHANGED
CLINICAL_PROGRESSION: GRADUALLY IMPROVING

## 2021-08-15 ASSESSMENT — PAIN DESCRIPTION - DESCRIPTORS: DESCRIPTORS: DISCOMFORT

## 2021-08-15 ASSESSMENT — PAIN SCALES - GENERAL
PAINLEVEL_OUTOF10: 0
PAINLEVEL_OUTOF10: 2
PAINLEVEL_OUTOF10: 2
PAINLEVEL_OUTOF10: 4

## 2021-08-15 ASSESSMENT — PAIN DESCRIPTION - ONSET: ONSET: ON-GOING

## 2021-08-15 ASSESSMENT — PAIN DESCRIPTION - LOCATION: LOCATION: HIP

## 2021-08-15 ASSESSMENT — PAIN - FUNCTIONAL ASSESSMENT: PAIN_FUNCTIONAL_ASSESSMENT: ACTIVITIES ARE NOT PREVENTED

## 2021-08-15 ASSESSMENT — PAIN DESCRIPTION - FREQUENCY: FREQUENCY: INTERMITTENT

## 2021-08-15 ASSESSMENT — PAIN DESCRIPTION - PAIN TYPE: TYPE: SURGICAL PAIN

## 2021-08-15 ASSESSMENT — PAIN DESCRIPTION - ORIENTATION: ORIENTATION: LEFT

## 2021-08-16 PROCEDURE — 6360000002 HC RX W HCPCS: Performed by: FAMILY MEDICINE

## 2021-08-16 PROCEDURE — 97130 THER IVNTJ EA ADDL 15 MIN: CPT

## 2021-08-16 PROCEDURE — 2700000000 HC OXYGEN THERAPY PER DAY

## 2021-08-16 PROCEDURE — 94760 N-INVAS EAR/PLS OXIMETRY 1: CPT

## 2021-08-16 PROCEDURE — 97129 THER IVNTJ 1ST 15 MIN: CPT

## 2021-08-16 PROCEDURE — 99231 SBSQ HOSP IP/OBS SF/LOW 25: CPT | Performed by: NURSE PRACTITIONER

## 2021-08-16 PROCEDURE — 6370000000 HC RX 637 (ALT 250 FOR IP): Performed by: PHYSICAL MEDICINE & REHABILITATION

## 2021-08-16 PROCEDURE — 97530 THERAPEUTIC ACTIVITIES: CPT

## 2021-08-16 PROCEDURE — 2580000003 HC RX 258: Performed by: PHYSICAL MEDICINE & REHABILITATION

## 2021-08-16 PROCEDURE — 97110 THERAPEUTIC EXERCISES: CPT

## 2021-08-16 PROCEDURE — 2580000003 HC RX 258: Performed by: FAMILY MEDICINE

## 2021-08-16 PROCEDURE — 6360000002 HC RX W HCPCS: Performed by: PHYSICAL MEDICINE & REHABILITATION

## 2021-08-16 PROCEDURE — 97542 WHEELCHAIR MNGMENT TRAINING: CPT

## 2021-08-16 PROCEDURE — 97535 SELF CARE MNGMENT TRAINING: CPT

## 2021-08-16 PROCEDURE — 94640 AIRWAY INHALATION TREATMENT: CPT

## 2021-08-16 PROCEDURE — 1180000000 HC REHAB R&B

## 2021-08-16 PROCEDURE — 97116 GAIT TRAINING THERAPY: CPT

## 2021-08-16 RX ORDER — SULFAMETHOXAZOLE AND TRIMETHOPRIM 800; 160 MG/1; MG/1
1 TABLET ORAL EVERY 12 HOURS SCHEDULED
Status: CANCELLED | OUTPATIENT
Start: 2021-08-17 | End: 2021-08-21

## 2021-08-16 RX ADMIN — SODIUM CHLORIDE, PRESERVATIVE FREE 10 ML: 5 INJECTION INTRAVENOUS at 23:04

## 2021-08-16 RX ADMIN — BUDESONIDE AND FORMOTEROL FUMARATE DIHYDRATE 2 PUFF: 160; 4.5 AEROSOL RESPIRATORY (INHALATION) at 08:09

## 2021-08-16 RX ADMIN — BUDESONIDE AND FORMOTEROL FUMARATE DIHYDRATE 2 PUFF: 160; 4.5 AEROSOL RESPIRATORY (INHALATION) at 19:13

## 2021-08-16 RX ADMIN — FERROUS SULFATE TAB 325 MG (65 MG ELEMENTAL FE) 325 MG: 325 (65 FE) TAB at 16:39

## 2021-08-16 RX ADMIN — ACETAMINOPHEN 650 MG: 325 TABLET ORAL at 10:11

## 2021-08-16 RX ADMIN — ENOXAPARIN SODIUM 40 MG: 40 INJECTION, SOLUTION INTRAVENOUS; SUBCUTANEOUS at 22:07

## 2021-08-16 RX ADMIN — ASPIRIN 325 MG: 325 TABLET, DELAYED RELEASE ORAL at 07:50

## 2021-08-16 RX ADMIN — DOCUSATE SODIUM 100 MG: 100 CAPSULE ORAL at 22:07

## 2021-08-16 RX ADMIN — SODIUM CHLORIDE, PRESERVATIVE FREE 10 ML: 5 INJECTION INTRAVENOUS at 10:11

## 2021-08-16 RX ADMIN — FAMOTIDINE 20 MG: 20 TABLET, FILM COATED ORAL at 07:51

## 2021-08-16 RX ADMIN — FERROUS SULFATE TAB 325 MG (65 MG ELEMENTAL FE) 325 MG: 325 (65 FE) TAB at 07:49

## 2021-08-16 RX ADMIN — ACETAMINOPHEN 650 MG: 325 TABLET ORAL at 06:18

## 2021-08-16 RX ADMIN — TIOTROPIUM BROMIDE INHALATION SPRAY 2 PUFF: 3.12 SPRAY, METERED RESPIRATORY (INHALATION) at 08:07

## 2021-08-16 RX ADMIN — SENNOSIDES 17.2 MG: 8.6 TABLET, FILM COATED ORAL at 07:50

## 2021-08-16 RX ADMIN — DOCUSATE SODIUM 100 MG: 100 CAPSULE ORAL at 07:50

## 2021-08-16 RX ADMIN — CEFTRIAXONE SODIUM 1000 MG: 1 INJECTION, POWDER, FOR SOLUTION INTRAMUSCULAR; INTRAVENOUS at 23:06

## 2021-08-16 RX ADMIN — ACETAMINOPHEN 650 MG: 325 TABLET ORAL at 16:40

## 2021-08-16 RX ADMIN — LEVOTHYROXINE SODIUM 88 MCG: 0.09 TABLET ORAL at 06:18

## 2021-08-16 ASSESSMENT — PAIN DESCRIPTION - PROGRESSION
CLINICAL_PROGRESSION: GRADUALLY IMPROVING

## 2021-08-16 ASSESSMENT — PAIN SCALES - GENERAL
PAINLEVEL_OUTOF10: 0
PAINLEVEL_OUTOF10: 1
PAINLEVEL_OUTOF10: 2
PAINLEVEL_OUTOF10: 2

## 2021-08-16 NOTE — PLAN OF CARE
Problem: Falls - Risk of:  Goal: Will remain free from falls  Description: Will remain free from falls  Outcome: Ongoing  Falling star prevention in place. Bed and chair alarms in use. Call light in reach. Purposeful hourly rounding. Problem: Skin Integrity:  Goal: Will show no infection signs and symptoms  Description: Will show no infection signs and symptoms  Outcome: Ongoing  No signs or symptoms of infection noted. Problem: Skin Integrity:  Goal: Absence of new skin breakdown  Description: Absence of new skin breakdown  Outcome: Ongoing  No new skin breakdown noted since admission. Problem: Activity:  Goal: Ability to ambulate will improve  Description: Ability to ambulate will improve  Outcome: Ongoing  Patient continues with therapies and is working toward discharge goals. Problem: Self-Care:  Goal: Ability to meet self-care needs will improve  Description: Ability to meet self-care needs will improve  Outcome: Ongoing  Activity is improving with therapies and patient is working toward discharge goals. Problem: Pain:  Goal: Pain level will decrease  Description: Pain level will decrease  Outcome: Ongoing  Pain decreases with rest, repositioning, ice application, and prn pain medications. Problem: Bleeding:  Goal: Will show no signs and symptoms of excessive bleeding  Description: Will show no signs and symptoms of excessive bleeding  Outcome: Ongoing  No signs of excessive bleeding noted this shift. Problem: Respiratory  Goal: O2 Sat > 90%  Outcome: Ongoing  Oxygen remains at 2.5 L/min as at home with saturations > 90%    Problem: Nutrition  Goal: Optimal nutrition therapy  Outcome: Ongoing  Tolerating current diet and po fluids well.

## 2021-08-16 NOTE — PROGRESS NOTES
[address]  Occupational Therapy  Daily Note  Time:   Time In: 08  Time Out: 930  Timed Code Treatment Minutes: 60 Minutes  Minutes: 60          Date: 2021  Patient Name: Dyan Moran,   Gender: female      Room: [facility]055-A  MRN: [id number]  : 1950  (73 y.o.)  Referring Practitioner: Dr. Ariella Mcclain  Diagnosis: Displaced fracture of greater trochanter Left femur. Additional Pertinent Hx: The patient is a 73 y.o. female with presentation of left hip pain post acute injury s/p mechanical fall. Patient states she had left JATINDER done  and right JATINDER done . She has had no issues with her left hip and was ambulating fine on her own without assistance. She had a mechanical fall on 21 causing injury to left hip. She came to [facility] ED and had xrays showing left hip periprosthetic fracture and ortho was consulted. Patient is s/p ORIF left greater troch fracture, left hip arthrotomy  by Dr. Conor Jurado. 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 Admitted to [facility] on 2021. Restrictions/Precautions:  Restrictions/Precautions: Weight Bearing, General Precautions, Fall Risk  Left Lower Extremity Weight Bearing: Toe Touch Weight Bearing  Partial Weight Bearing Percentage Or Pounds: 25%  Required Braces or Orthoses  Left Lower Extremity Brace:  (hip ABD brace)  Position Activity Restriction  Hip Precautions: No hip flexion > 90 degrees, No active ABduction, No hip internal rotation, No hip external rotation  Other position/activity restrictions: hip abductor brace to be worn all the time. May remove for hygiene. \"; hx of R lung removal 2021, O2 at 2 liters with activity, 1 liter at rest     SUBJECTIVE: Pt seated in bedside chair upon arrival, agreeable to OT session. PAIN: Denies. Vitals: Oxygen: Patient on 1L O2 via Nasal Canula  upon arrival to room. Patient O2 sats at >90%.  Upon activity increased patient to 2L and maintaining O2 at >90%. Pt requiring min rest break(s) to recover. COGNITION: Decreased Insight, Decreased Problem Solving and Decreased Safety Awareness    ADL:   Grooming: Stand By Assistance. Standing sink side washing hands  Bathing: Stand By Assistance. To complete UB care, washing BLE within hip precautions while seated, and for cyrus care/washing bottom in standing. Upper Extremity Dressing: with set-up. [place]/donning tshirt overhead. Lower Extremity Dressing: Minimal Assistance. Margueritte Locker to thread BLE into leggings. SBA for balance to manage clothing over hips. Toileting: Minimal Assistance. Cathy for thoroughness of hygiene after large BM during 2nd episode of toileting. Toilet Transfer: Stand By Assistance. BSC. BALANCE:  Sitting Balance:  Supervision. Standing Balance: Stand By Assistance, [employer]. >5 minutes within IADL task, x1-2 minutes within ADLs. BED MOBILITY:  Not Tested    TRANSFERS:  Sit to Stand:  Stand By Assistance. Stand to Sit: Stand By Assistance. FUNCTIONAL MOBILITY:  Assistive Device: Rolling Walker   Assist Level:  Stand By Assistance. Distance:   Completed functional mobility to/from BR and within pt room at slow pace, no LOB noted with fair compliance with TTWB precautions. Pt requires seated rest break after trial of mobility, min fatigue noted. ADDITIONAL ACTIVITIES:  Pt participated in IADL task to ambulate within therapy kitchen with use of RW to reach into various planes at high/low levels to retrieve items making stovetop sandwhich. Pt required CGA-SBA for balance and frequent cues cues for safety with O2 lines or walker safety while reaching outisde of ASHA. Completed to increase kitchen safety and facilitate functional reaching required for simple meal prep tasks. ASSESSMENT:     Activity Tolerance:  Patient tolerance of  treatment: good.        Discharge Recommendations: Home with Home health OT, Home with nursing aide, 24 hour supervision or assist   Equipment Recommendations: Equipment Needed: Yes  Other: BSC,  AE kit (reacher, LH shoehorn, sockaide, LH bath sponge)  Plan: Times per week: 90 minutes 5x/wk, 30 minutes 1x/wk  Current Treatment Recommendations: Balance Training, Endurance Training, Functional Mobility Training, Self-Care / ADL, Equipment Evaluation, Education, & procurement, Strengthening    Patient Education  Patient Education: ADL's, IADL's, Importance of Increasing Activity, Assistive Device Safety and Safety with transfers and mobility. Goals  Short term goals  Time Frame for Short term goals: 1 week  Short term goal 1: Pt will complete various t/fs including BSC with SBA & min vcs for technique  Short term goal 2: Pt will complete LE dressing with mod A, LH AE, & 0-2 vcs for hip precautions  Short term goal 3: Pt will tolerate standing 2 min with 1 UE release & SBA for increased ease of toileting routine  Short term goal 4: Pt will complete mobility to/from BSC/bedside chair with SBA, RW, & 0-2 vcs for TTWB precautions  Short term goal 5: Pt will tolerate BUE moderate resistance band HEP x 10 reps with min RBs to increase UB endurance for ADL tasks  Long term goals  Time Frame for Long term goals : 2 weeks  Long term goal 1: Pt will complete various t/fs including toilet with mod I  Long term goal 2: Pt will complete BADL routine (sponge bath) with S & 0-1 vcs for safety  Long term goal 3: Pt will complete LE dressing with S & LH AE  Long term goal 4: Pt will complete simple meal prep task with RW, S, & good safety awareness for maintaining TTWB (seated when needed to maintain)    Following session, patient left in safe position with all fall risk precautions in place.

## 2021-08-16 NOTE — PROGRESS NOTES
[address] NOTE    Conference Date: 2021  Admit Date:  2021  3:21 PM  Patient Name: David Brambila    MRN: [id number]    : 1950  (73 y.o.)  Rehabilitation Admitting Diagnosis:  Displaced fracture of greater trochanter of left femur, initial encounter for closed fracture [S72.112A]  Hip fracture requiring operative repair, left, closed, initial encounter [facility]  Referring Practitioner: Dr. Airam Peng issues/needs regarding patient and family discharge status: Further discussion with team regarding patient's progress and discharge planning. Although patient has made significant progress towards goals, rehab team recommendation for patient to transition to SNF for continued therapy, as patient does not have twenty four hour support available at discharge. SW contacted patient's son, Hamlet, and met with patient to further discuss discharge planning. Patient's son, Phoebe Pizarro, indicates preference for patient to be able to transition to SNF for continued therapy and safety at discharge. Patient indicates understanding of recommendation for SNF, but indicates reason/preference to be able to return home is to be able to smoke a cigarette. Patient reports that a friend, Slade Lee, indicated that she would be able to stay with patient for a few days upon discharge (SW has not verified this information yet). SW encouraged patient to speak with her son, Phoebe Pizarro, this evening regarding discharge plans. Care plan reviewed with patient and son, Hamlet. Patient and son, Phoebe Pizarro, verbalized understanding of the plan of care and contributed to goal setting. SW to follow and maintain involvement in discharge planning. PHYSICAL THERAPY  Assessment: Patient progressing toward established goals, meeting 4 short term goals. and Pt tolerated session fairly well.  Continues to be limited by TTWBing status of L LE, decreased strength, decreased endurance. Pt will benefit from continued skilled PT to advance in these areas for improved functional mobility and safety. Equipment Needed: No (Pt has RW, SW, cane, w/c and 4WW)    SPEECH THERAPY  Patient has met 3/3 STG's and 1/1 LTG's this therapy period. Improvements made in functional recall of 4+ units, problem solving, sustained attention, and functional math computation. Continues to present with a mild-moderate cognitive impairment characterized by ongoing deficits in working memory, divided attention, mildly complex and problem solving. Patient also with improved insight into deficits regarding safety awareness, and improved carryover of hip precautions daily (though unsure of physical carryover). Suspect patient able to make safe return to management of previous ADL's/IADL's in home setting, though will require assistance initially to ensure safety and success. No driving until MD clearance. Continued skilled ST services are highly recommended to improve the aforementioned deficits. Would recommend continued skilled ST services via [facility] or [facility] setting at discharge. OCCUPATIONAL THERAPY  Efe Mckeon has made progress meeting 5/5 STG. Efe Mckeon can be limited by her affect and motivation to complete activities as she hasn't been feeling well. She requires SBA for functional tranfsers and short distance mobility and requires min cues for TTWB adherence and min cues for hip precaution adherence. She requires min A for LB ADLs and requires further [facility] education and requires more IADL education. Cont OT recomended for ADL, IADL remediation, improvement of precaution adherence, and to decrease fall risk / risk of injury. Equipment Needed: Yes  Other: Patient has shower chair. Needs BSC and recommend [facility] hip kit however doesn't know if can afford a hip kit. RECREATIONAL THERAPY  Patient has been offered participation in recreational therapy activities and participates as able. Pt enjoyed petting our pet therapy dog Lashay this morning- pt was laying in bed and Alf Crabtree was able to sit beside her so that pt could pet Alf Crabtree- pt was social and talked about her Pit Bull named Zakiya Dewey and her birds and cats that she has at home- pt's affect brightened when she was able to pet Alf Crabtree- pt appreciative of visit -RT brought pt to the Recreational Therapy room this morning to get her hair washed, trimmed, and styled by our beautician- pt did not care what kind of hair  style she receives  from the beautician  but stated that she has a wedding coming up to go to and she wants a nice hairstyle- pt had a bright affect and was social- she talked about conversing with her grandson recently- pt appreciative of hair cut -Via w/c took pt to our gift shop where she picked out a necklace and earrings for her granddaughters wedding coming soon-states everyone is so nice and helpful to her here-took pt out front of hospital to enjoy some fresh air for short time-very pleasant and social -appreciative-upon return to room pt's son and grandson were here to visit  -Upon arrival pt tearful stating her nurse just told her that she is going to an assisted living facility and not home when she leaves here-as we were talking pt misunderstood and thought she had to go to a nursing home-explained an assisted living facility and she felt better about that-will have  talk everything over with her about discharge      NUTRITION  Weight: 140 lb 11.2 oz (63.8 kg) / Body mass index is 24.15 kg/m². Current diet: ADULT DIET; Regular  Adult Oral Nutrition Supplement; Frozen Oral Supplement  Please see nutrition note for details.     NURSING  Continent of Bowel: no   Continent of Bladder: yes   Pain is Managed:  Yes     Sleep: Adequate  Signs and Symptoms of Infection:  No.   Signs and Symptoms of Skin Breakdown:    Injury and Fall Free during Inpatient Rehabilitation Admission: No  Anticoagulants: asa lovenox  Diabetic: yes Consultations/Labs/X-rays: no    No results for input(s): POCGLU in the last 72 hours. Lab Results   Component Value Date    LDLCALC 115 08/11/2020         Vitals:    08/17/21 0717 08/17/21 0719 08/17/21 2050 08/18/21 0747   BP:   (!) 131/59 (!) 122/51   Pulse:   73 58   Resp: 16 16 16 15   Temp:   98.1 °F (36.7 °C) 98.1 °F (36.7 °C)   TempSrc:   Oral Oral   SpO2: 96% 96% 96% 97%   Weight:       Height:              Family Education: not available   Fall Risk:  Yes   Is the patient appropriate for a stay in the functional apartment? No     Discharge Plan   Estimated Discharge Date: 08/20/2021    Destination: ecf vs home; awaiting her decision after she talks to son Reggie Tyler at Discharge:  ECF, or if going home, then [address], OT, ST, RN, and HHA  Is patient appropriate for an outpatient driving evaluation? No   Equipment at Discharge: Other: Patient has shower chair. Needs BSC and recommend [facility] hip kit however doesn't know if can afford a hip kit. Factors facilitating achievement of predicted outcomes:   Barriers to the achievement of predicted outcomes: bending   Follow up with physiatrist? no       NO DRIVING UNTIL CLEARED MEDICALLY      Team Members Present at Conference:  :Lanette Coleman [place]  Occupational Therapist:Jason hSeets OTR/L [id number]  Physical [facility], PT [address], [address], [address]  Nurse:Betina Kim, GABBY      I approve the established interdisciplinary plan of care as documented within the medical record of Justa Cadet.     Marcela Asif MD

## 2021-08-16 NOTE — PROGRESS NOTES
Physical Medicine & Rehabilitation   Progress Note    Chief Complaint:  Left hip fracture. Rehab needs    Subjective:     Noe Terrazas is a 73year old woman in IRB for a left hip fracture needing intensive rehab. She has a history of CVA, HTN, OA, breast cancer, smoking, hypothyroidism, hyperlipidemia, along with several other conditions. She has had issues with fluctuating participation in therapy 2/2 to pain, cognition, and nausea/vomiting.     8/17: Today her therapy sessions are going well. She reports no nausea or vomiting, states her bowels are regular (BMx2 8/16), and has slight edema in her LLE diffusely. She says her brace positioning in her LLE continues to adgitated her leg a little when her leg or the device aren't positioned ideally, but currently is in 0/10 pain. She states her pain is being well controlled with Tylenol 650mg Q6hr when she is in pain, endorses it is not limiting her therapy participation. She is cheerful today, happy about how therapy is going, and is looking forward to spending time with family post-discharge. Rehabilitation:  PT:     Prior Level of Function:  Lives With: Son (Son will not be staying with her. Pt reports friends and nieces to stay to assist)  Type of Home: House  Home Layout: Two level, Able to Live on Main level with bedroom/bathroom, 1/2 bath on main level  Home Access: Stairs to enter with rails  Entrance Stairs - Number of Steps: 4+4  Entrance Stairs - Rails: Left  Home Equipment: Cane, Rolling walker, Standard walker, Wheelchair-manual, 4 wheeled walker   Bathroom Shower/Tub: Walk-in shower (on 2nd story)  Bathroom Toilet: Standard  Bathroom Equipment: Shower chair, Grab bars in shower     ADL Assistance: [address]: Needs assistance (sharies duties with son)  Ambulation Assistance: Independent  Transfer Assistance: Independent  Active : Yes  Additional Comments: Pt reports using no AD PLOF.  Currently has bed on 1st level of home, 1/2 bath on 1st level; walk in shower is on 2nd story.     Restrictions/Precautions:  Restrictions/Precautions: Weight Bearing, General Precautions, Fall Risk  Left Lower Extremity Weight Bearing: Toe Touch Weight Bearing  Partial Weight Bearing Percentage Or Pounds: 25%  Required Braces or Orthoses  Left Lower Extremity Brace:  (hip ABD brace)  Position Activity Restriction  Hip Precautions: No hip flexion > 90 degrees, No active ABduction, No hip internal rotation, No hip external rotation  Other position/activity restrictions: hip abductor brace to be worn all the time. May remove for hygiene. \"; hx of R lung removal 6/14/2021, O2 at 2 liters with activity, 1 liter at rest      SUBJECTIVE: Pt resting in bed upon arrival, pleasant and agreeable to therapy.      PAIN: 0/10: denies pain     Vitals: Vitals not assessed per clinical judgement, see nursing flowsheet     OBJECTIVE:  Bed Mobility:  Rolling to Left: Stand By Assistance   Supine to Sit: Minimal Assistance, Of L LE per pt request     Transfers:  Sit to Stand: Contact Guard Assistance  Stand to [place] [id number], Minimal Assistance, Min A for one transfer due to pt quickly \"plopping\" into chair. Max cuing to reach back and to control decent of transfer for decreased fall risk.      Ambulation:  Contact Guard Assistance  Distance: 50 feet x1   Surface: Level Tile  Device:Rolling Walker  Gait Deviations:  After initial cuing for TTWBing pt was compliant through rest of gait. Slow steffi. Decreased B step lengths.      Balance:  Dynamic Sitting Balance: Modified Independent   Dynamic Standing Balance: Stand By Assistance. Pt up in restroom able to complete cyrus clean up and wash hands at sink     Exercise:  Patient was guided in 1 set(s) 15 reps of exercise to both lower extremities. Glut sets, Seated marches, Seated hamstring curls, Seated heel/toe raises and Long arc quads.   Exercises were completed for increased independence with functional mobility.     Functional Outcome Measures: Not completed        OT:     SUBJECTIVE: Pt. In room in bed upon entry Pt. Reports fatigue although agreeable to OT treatment.     PAIN: Denies pain reports fatigue     Vitals: Vitals not assessed per clinical judgement, see nursing flowsheet     COGNITION: WFL     ADL:   Toileting: Stand By Assistance and with verbal cues . Toilet Transfer: Stand By Assistance and with verbal cues . .     BALANCE:  Standing Balance: Stand By Assistance, with cues for safety.       BED MOBILITY:  Supine to Sit: Minimal Assistance with L LE     TRANSFERS:  Sit to Stand:  Stand By Assistance. Stand to Sit: Stand By Assistance. Following TTWB on LLE     FUNCTIONAL MOBILITY:  Assistive Device: Rolling Walker  Assist Level:  Stand By Assistance. Distance: To and from bathroom  X 2 due to stools.      ADDITIONAL ACTIVITIES:  Pt completed B UE exs with moderate resistance band x 12 reps, x 1set with fair t tolerance of exs, with  RBs throughout, and visual and verbal cues for tech with resistance band for increased strength and activity tolerance.          ST:      Short-Term Goals:  SHORT TERM GOAL #1:  Goal 1:  Patient will complete functional immediate/delayed recall and working memory tasks with 90% accuracy given mod cues to improve retention of new and daily information. INTERVENTIONS: Delayed recall of hip precautions - 3/3 indep  Delayed recall of recommended mnemonic for hip precautions - indep for \"No BLT\"  Delayed recall of visual aid with written hip precautions - indep reference  *improved functional carryover this date      SHORT TERM GOAL #2:  Goal 2:  Patient will complete problem solving, thought organization, and reasoning tasks (i.e., med management, finances, caring for pets, cooking) with 90% accuracy given mod cues to improve skills required for IADL completion.   INTERVENTIONS: Counting given dollar/coin amounts - 6/6 indep  *excellent working memory throughout     Math-word problems related to prices on [employer] - 5/6 indep,1/6 min cues  *good visual scanning and comparison of numerical detail     SHORT TERM GOAL #3:  Goal 3: Patient will complete visual scanning and attention tasks with no more than 6 errors across a given activity in order to improve multi-tasking skills required for IADL's (i.e., housework, driving). INTERVENTIONS: Complex attention task introduced via newspaper task; patient instructed to read article while identifying one target word throughout and comprehending newly learned information well enough to provide summary following task.   Time - 4 minutes 10 seconds  Errors - 6 (identified 9 of 15 targets)  Summary - good to fair; provided 3 details, required min cues to summarize x2 further pertinent details  *decreased divided and selective attention demonstrated       Long-Term Goals:  Timeframe for Long-term Goals: 2 weeks     LONG TERM GOAL #1:  Goal 1: Patient will improve cognitive functioning to a level of Supervision in order to permit potential return to PLOF.         Comprehension: 5 - Patient understands basic needs (hungry/hot/pain)  Expression: 5 - Expresses basic ideas/needs only (hungry/hot/pain)  Social Interaction: 4 - Patient appropriate 75-90%+ of the time  Problem Solvin - Patient solves simple/routine tasks 75-90%+   Memory: 4 - Patient remembers 75-90%+ of the time     EDUCATION:  Learner: Patient  Education:  Reviewed ST goals and Plan of Care, Reviewed recommendations for follow-up and Education Related to Potential Risks and Complications Due to Impairment/Illness/Injury  Evaluation of Education: Verbalizes understanding, Demonstrates with assistance and Family not present      Review of Systems:  CONSTITUTIONAL:  positive for  fatigue  EYES:  negative  HEENT:  negative  RESPIRATORY: Chronis O2 Use, no SOB from baseline, coughing, or wheezing from baseline    CARDIOVASCULAR:  negative  GASTROINTESTINAL: No nausea, vomiting, constipation, diarrhea, or belly pain  GENITOURINARY:  positive for history of urinary incontinence  SKIN:  Left hip incision  HEMATOLOGIC/LYMPHATIC:  positive for swelling/edema  MUSCULOSKELETAL:  Positive for occasional pain  NEUROLOGICAL:  Cognitive deficits   BEHAVIOR/PSYCH:  negative  System review otherwise negative      Objective:  Vitals:    08/16/21 0807   BP:    Pulse:    Resp:    Temp:    SpO2: 97%   awake  Orientation:   person, place  Mood: within normal limits  Affect: calm, cheerful now that N/V symptoms have improved and bowels are regular  General appearance: Patient is well nourished, well developed, well groomed     Memory:   abnormal - deficits noted   Attention/Concentration: normal  Language:  normal    Cranial Nerves:  cranial nerves II-XII are grossly intact  ROM: left limb hip flexion passive ROM 60 degrees. Left knee flexion ROM 60 degrees, normal functional joints ROM at BL UE and RLE  Motor Exam:  Motor exam is 5 out of 5 diffusely in all extremities with the exception: 2-/5 muscle strength in left hip F/E, 4-/5 left knee F/E, 4-/5 right hip F. Reflexes: 1+/4 diffusely in BL UE and LE (Bicep, tricep, forearm extensors, Patellar, achilles, semimembranosus)  Neuro: Sensation intact and symmetrical in C2-S2 dermatomes, no ankle clonus  Tone:  normal  Muscle bulk: within normal limits.  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 Adams Benoit  · Hip abduction brace  · PWB 25% LLE  · Zofran 4mg PRN for nausea and vomiting. · Team conference Wednesday  · Discharge planned for Friday, 8/20/2021 with Home Health      Missed Therapy Time:  · None    Nuzhat Aviles  [facility]        I have evaluated the patient and reviewed the case with the nurse practitioner. I agree with the current plan of care including the workup, evaluation, management, and diagnosis. Care plan has been discussed. I agree with above documentation.       Daria Julien, APRN - CNP

## 2021-08-16 NOTE — PROGRESS NOTES
[facility] [facility] THERAPY  [facility]  DAILY NOTE    TIME   SLP Individual Minutes  Time In: 1646  Time Out: 1400  Minutes: 25  Timed Code Treatment Minutes: 25 Minutes       Date: 2021  Patient Name: Jermaine Mcclendon      CSN: [id number]   : 1950  (73 y.o.)  Gender: female   Referring Physician: NITO Ellsworth CNP; Marilyn Cornejo MD  Diagnosis: Left hip fracture   Secondary Diagnosis: Cognitive deficits  Precautions: Fall risk  Current Diet: General, thin liquids   Swallowing Strategies: Standard Universal Swallow Precautions  Date of Last MBS/FEES: Not Applicable    Pain:  No pain reported. Subjective:  Patient seated upright in recliner for duration of session. Session initiated 5 minutes late due to patient with urgent need to use restroom. ST provided min steadying assist. Patient pleasant throughout. Short-Term Goals:  SHORT TERM GOAL #1:  Goal 1:  Patient will complete functional immediate/delayed recall and working memory tasks with 90% accuracy given mod cues to improve retention of new and daily information. INTERVENTIONS: Delayed recall of hip precautions - 3/3 indep  *despite consistent carryover of precautions, patient with limited physical carryover of executing proper caution     SHORT TERM GOAL #2:  Goal 2:  Patient will complete problem solving, thought organization, and reasoning tasks (i.e., med management, finances, caring for pets, cooking) with 90% accuracy given mod cues to improve skills required for IADL completion.   INTERVENTIONS: Determining plan for safety needs in home setting - 3/5 indep, 2/5 mod cues  *decreased insight into deficits   *ST recommended having family or friend stay with patient  for at least 1 week upon discharge to home setting; patient receptive, and reporting awareness into needs for assistance with transportation, and IADL's requiring physical demands     Identifying 5 goals for discharge, how to achieve each goal, and concerns related to each goal.   Goals - 5/5 indep  Achievement - 3/5 indep, 2/5 min cues  Concerns - 2/5 indep, 3/5 min cues  *excellent awareness into needs for improvement in order to achieve specific IADL's  *reduced insight into rationale behind PT/OT services; education provided     SHORT TERM GOAL #3:  Goal 3: Patient will complete visual scanning and attention tasks with no more than 6 errors across a given activity in order to improve multi-tasking skills required for IADL's (i.e., housework, driving). INTERVENTIONS: Not addressed due to focus on other goals. Long-Term Goals:  Timeframe for Long-term Goals: 2 weeks    LONG TERM GOAL #1:  Goal 1: Patient will improve cognitive functioning to a level of Supervision in order to permit potential return to PLOF. Comprehension: 5 - Patient understands basic needs (hungry/hot/pain)  Expression: 5 - Expresses basic ideas/needs only (hungry/hot/pain)  Social Interaction: 4 - Patient appropriate 75-90%+ of the time  Problem Solvin - Patient solves simple/routine tasks 75-90%+   Memory: 4 - Patient remembers 75-90%+ of the time    EDUCATION:  Learner: Patient  Education:  Reviewed ST goals and Plan of Care, Reviewed recommendations for follow-up and Education Related to Potential Risks and Complications Due to Impairment/Illness/Injury  Evaluation of Education: Verbalizes understanding, Demonstrates with assistance and Family not present    ASSESSMENT/PLAN:  Activity Tolerance:  Patient tolerance of treatment: good. Assessment/Plan: Patient progressing toward established goals. Continues to require skilled care of licensed speech pathologist to progress toward achievement of established goals and plan of care.      Plan for Next Session: sequencing, finances, hip precaution recall     Angely Navarrete M.S. [address]

## 2021-08-16 NOTE — PROGRESS NOTES
[address] [address]  INPATIENT PHYSICAL THERAPY  DAILY NOTE  [facility] - 7E-[id number]/055-A    Time In: 1006  Time Out: 1036  Timed Code Treatment Minutes: 30 Minutes  Minutes: 30          Date: 2021  Patient Name: Jelena Garvin,  Gender:  female        MRN: [id number]  : 1950  (73 y.o.)     Referring Practitioner: Dr. Shakeel Cornejo  Diagnosis: Displaced Fx of Greater Trochanter Lt femur  Additional Pertinent Hx: The patient is a 73 y.o. female with presentation of left hip pain post acute injury s/p mechanical fall. Patient states she had left JATINDER done  and right JATINDER done . She had a mechanical fall on 21 causing injury to left hip. xrays showing left hip periprosthetic fracture and ortho was consulted. Hx:  Lung removal due to lung CA,  2021     Prior Level of Function:  Lives With: Son (Son will not be staying with her. Pt reports friends and nieces to stay to assist)  Type of Home: House  Home Layout: Two level, Able to Live on Main level with bedroom/bathroom, 1/2 bath on main level  Home Access: Stairs to enter with rails  Entrance Stairs - Number of Steps: 4+4  Entrance Stairs - Rails: Left  Home Equipment: Cane, Rolling walker, Standard walker, Wheelchair-manual, 4 wheeled walker   Bathroom Shower/Tub: Walk-in shower (on 2nd story)  Bathroom Toilet: Standard  Bathroom Equipment: Shower chair, Grab bars in shower    ADL Assistance: [address] [place] [address] Avenue: Needs assistance (sharies duties with son)  Ambulation Assistance: Independent  Transfer Assistance: Independent  Active : Yes  Additional Comments: Pt reports using no AD PLOF. Currently has bed on 1st level of home, 1/2 bath on 1st level; walk in shower is on 2nd story.     Restrictions/Precautions:  Restrictions/Precautions: Weight Bearing, General Precautions, Fall Risk  Left Lower Extremity Weight Bearing: Toe Touch Weight Bearing  Partial Weight Bearing Percentage Or Pounds: 25%  Required Braces or Orthoses  Left Lower Extremity Brace:  (hip ABD brace)  Position Activity Restriction  Hip Precautions: No hip flexion > 90 degrees, No active ABduction, No hip internal rotation, No hip external rotation  Other position/activity restrictions: hip abductor brace to be worn all the time. May remove for hygiene. \"; hx of R lung removal 6/14/2021, O2 at 2 liters with activity, 1 liter at rest     SUBJECTIVE: Patient in room in chair, agreeable to PT. Pt cooperative and pleasant. PAIN: 2/10 left hip    Vitals: Oxygen: 95-99% on 2L    OBJECTIVE:  Bed Mobility:  Not Tested    Transfers:  Sit to Stand: Contact Guard Assistance  Stand to Sit:Contact Guard Assistance--one verbal cue to maintain TTWB'ing left    Ambulation:  Contact Guard Assistance  Distance: 20', 30', 5'  Surface: Level Tile  Device:Rolling Walker  Gait Deviations: Forward Flexed Posture, Slow Bhavani, Decreased Step Length on Right, Decreased Gait Speed and Decreased Heel Strike on Left  *Verbal cues to maintain TTWB'ing left, pt tends to increase weight with fatigue    Balance:  Static Standing Balance: Contact Guard Assistance  Dynamic Standing Balance: Contact Guard Assistance    Stairs:  Contact Guard Assistance  Number of Steps: 1 x 3  Height: 6\" step with Parallel Bars   *Verbal cues for LE sequencing and maintaining left LE TTWB'ing. Pt would benefit from ascending steps retro. Pt reports son putting in second hand rail before discharge end of week. *Rest in between due to shortness of breath  *Discussed placing wheelchair at top of steps to rest after completing first 4 steps. Functional Outcome Measures: Not completed       ASSESSMENT:  Assessment: Patient progressing toward established goals. Activity Tolerance:  Patient tolerance of  treatment: good.      Equipment Recommendations:Equipment Needed: No (Pt has RW, SW, cane, w/c and 4WW)  Discharge Recommendations:  Continue to assess pending progress, Patient would benefit from continued therapy after discharge    Plan: Times per week: 5x/ wk 90 min, 1x/ wk 30 min  Current Treatment Recommendations: Strengthening, Gait Training, Patient/Caregiver Education & Training, Equipment Evaluation, Education, & procurement, Stair training, Balance Training, Endurance Training, Home Exercise Program, Functional Mobility Training, Transfer Training, Safety Education & Training, Wheelchair Mobility Training    Patient Education  Patient Education: Transfers, Gait, Stairs,  - Patient Verbalized Understanding, - Patient Requires Continued Education    Goals:  Patient goals : Get home in time for granddaughter's wedding  Short term goals  Time Frame for Short term goals: 1 wk  Short term goal 1: Pt to go supine <->sit, cGA with LLE, to get in/out of bed. No rail, bed flat  Short term goal 2: Pt to get up/down from various seated surfaces, CGA, to get up to walk. Short term goal 3: Pt to walk with RW >= 25 ft, TTWB LLE, CGA, to get in/out of restroom  Short term goal 4: Pt to ascend/descend 6\" step in parallel bars, maintaining TTWB LLE, CGA to progress to home entry. Short term goal 5: Pt to propel w/c, indoor surfaces, Mod I, for home and community mobility  Short term goal 6: Pt to get in/out of car, min +1 for transportation needs. Long term goals  Time Frame for Long term goals : 2 wks  Long term goal 1: Pt to go supine <->sit, Mod I, to get in/out of bed. No rail, bed flat  Long term goal 2: Pt to get up/down from various seated surfaces, Mod I, to get up to walk. Long term goal 3: Pt to walk with RW >= 50 ft, TTWB LLE, Mod I, for home mobility  Long term goal 4: Pt to ascend/descend 4 +4 steps with Lt rail, min +1 for home entry  Long term goal 5: Pt to get in/out of car, SBA +1 for transportation needs. Following session, patient left in safe position with all fall risk precautions in place.

## 2021-08-16 NOTE — PROGRESS NOTES
[address] [address]  INPATIENT PHYSICAL THERAPY  DAILY NOTE  [facility] - [id number]-A    Time In: 1130  Time Out: 1230  Timed Code Treatment Minutes: 60 Minutes  Minutes: 60          Date: 2021  Patient Name: Ekta Ventura,  Gender:  female        MRN: [id number]  : 1950  (73 y.o.)     Referring Practitioner: Dr. Javad Tam  Diagnosis: Displaced Fx of Greater Trochanter Lt femur  Additional Pertinent Hx: The patient is a 73 y.o. female with presentation of left hip pain post acute injury s/p mechanical fall. Patient states she had left JATINDER done  and right JATINDER done . She had a mechanical fall on 21 causing injury to left hip. xrays showing left hip periprosthetic fracture and ortho was consulted. Hx:  Lung removal due to lung CA,  2021     Prior Level of Function:  Lives With: Son (Son will not be staying with her. Pt reports friends and nieces to stay to assist)  Type of Home: House  Home Layout: Two level, Able to Live on Main level with bedroom/bathroom, 1/2 bath on main level  Home Access: Stairs to enter with rails  Entrance Stairs - Number of Steps: 4+4  Entrance Stairs - Rails: Left  Home Equipment: Cane, Rolling walker, Standard walker, Wheelchair-manual, 4 wheeled walker   Bathroom Shower/Tub: Walk-in shower (on 2nd story)  Bathroom Toilet: Standard  Bathroom Equipment: Shower chair, Grab bars in shower    ADL Assistance: [address] [place] [address] Avenue: Needs assistance (sharies duties with son)  Ambulation Assistance: Independent  Transfer Assistance: Independent  Active : Yes  Additional Comments: Pt reports using no AD PLOF. Currently has bed on 1st level of home, 1/2 bath on 1st level; walk in shower is on 2nd story.     Restrictions/Precautions:  Restrictions/Precautions: Weight Bearing, General Precautions, Fall Risk  Left Lower Extremity Weight Bearing: Toe Touch Weight Bearing  Partial Weight Bearing Percentage Or Pounds: 25%  Required Braces or Orthoses  Left Lower Extremity Brace:  (hip ABD brace)  Position Activity Restriction  Hip Precautions: No hip flexion > 90 degrees, No active ABduction, No hip internal rotation, No hip external rotation  Other position/activity restrictions: hip abductor brace to be worn all the time. May remove for hygiene. \"; hx of R lung removal 6/14/2021, O2 at 2 liters with activity, 1 liter at rest     SUBJECTIVE: Patient seated in bedside chair upon arrival. Patient pleasant and agreeable to therapy. Patient notes feeling fatigued this morning. PAIN: 0/10:     Vitals: Oxygen: Patient SpO2 100% after gait training. OBJECTIVE:  Bed Mobility:  Not Tested    Transfers:  Sit to Stand: [address] [facility] [address], with increased time for completion  Stand to [facility] [address], with increased time for completion    Ambulation:  Stand By Assistance, with increased time for completion, Patient needed initial cues for TTWB. Distance: 40'x2, 15'x1 with line management. Surface: Level Tile  Device:Rolling Walker  Gait Deviations:  Slow Bhavani, Decreased Step Length Bilaterally and complaint for TTWB after initial VC    Stairs:  Platform:  6\" platform X 3 using Parallel Bars and Contact Guard Assistance, Patient needed VC for proper sequencing while performing platform step. Patient with good compliance for TTWB after VC given. .     Wheelchair Mobility:  Stand By Assistance, with increased time for completion  Extremities Used: Bilateral Upper Extremities  Type of Chair:Manual  Surface: Level Tile  Distance: 75'x2  Quality: Slow Velocity, Short Strokes, Decreased Fluidity and Patient improved stroke length after VC given. Exercise:  Patient was guided in 1 set(s) 15 reps of exercise to both lower extremities. Seated marches, Seated heel/toe raises, Long arc quads, Seated abduction/adduction and Marches and hip abduction performed R LE only. .  Exercises were completed for increased independence with functional mobility. Functional Outcome Measures: Not completed       ASSESSMENT:  Assessment: Patient progressing toward established goals. Activity Tolerance:  Patient tolerance of  treatment: good. Equipment Recommendations:Equipment Needed: No (Pt has RW, SW, cane, w/c and 4WW)  Discharge Recommendations:  Continue to assess pending progress, Patient would benefit from continued therapy after discharge    Plan: Times per week: 5x/ wk 90 min, 1x/ wk 30 min  Current Treatment Recommendations: Strengthening, Gait Training, Patient/Caregiver Education & Training, Equipment Evaluation, Education, & procurement, Stair training, Balance Training, Endurance Training, Home Exercise Program, Functional Mobility Training, Transfer Training, Safety Education & Training, Wheelchair Mobility Training    Patient Education  Patient Education: Plan of Care, Gait, Stairs, Wheelchair Mobility, Verbal Exercise Instruction    Goals:  Patient goals : Get home in time for granddaughter's wedding  Short term goals  Time Frame for Short term goals: 1 wk  Short term goal 1: Pt to go supine <->sit, cGA with LLE, to get in/out of bed. No rail, bed flat  Short term goal 2: Pt to get up/down from various seated surfaces, CGA, to get up to walk. Short term goal 3: Pt to walk with RW >= 25 ft, TTWB LLE, CGA, to get in/out of restroom  Short term goal 4: Pt to ascend/descend 6\" step in parallel bars, maintaining TTWB LLE, CGA to progress to home entry. Short term goal 5: Pt to propel w/c, indoor surfaces, Mod I, for home and community mobility  Short term goal 6: Pt to get in/out of car, min +1 for transportation needs. Long term goals  Time Frame for Long term goals : 2 wks  Long term goal 1: Pt to go supine <->sit, Mod I, to get in/out of bed. No rail, bed flat  Long term goal 2: Pt to get up/down from various seated surfaces, Mod I, to get up to walk.   Long term goal 3: Pt to walk with RW >= 50 ft, TTWB LLE, Mod I, for home mobility  Long term goal 4: Pt to ascend/descend 4 +4 steps with Lt rail, min +1 for home entry  Long term goal 5: Pt to get in/out of car, SBA +1 for transportation needs. Following session, patient left in safe position with all fall risk precautions in place.

## 2021-08-16 NOTE — PROGRESS NOTES
go to a smaller place that does not have steps. Patient expresses they are supposed to begin building ramp tonight/tomorrow however may not since she will be moving. Patient states she does not have help throughout the day however someone can come assist patient in the am if need be. PAIN: 0/10:     Vitals: Vitals not assessed per clinical judgement, see nursing flowsheet    COGNITION: Decreased Recall, Decreased Insight, Decreased Problem Solving and Decreased Safety Awareness    ADL:   Lower Extremity Dressing: Stand By Assistance. verbal/visual cues for technique; demonstrating understanding requiring increased time for problem solving in order to remove sock aid.  utilized reacher in ordre to doff B socks requiring incresaed time. ASSESSMENT:     Activity Tolerance:  Patient tolerance of  treatment: good.        Discharge Recommendations: Home with Home health OT, Home with nursing aide, 24 hour supervision or assist   Equipment Recommendations: Equipment Needed: Yes  Other: BSC, LH AE kit (reacher, LH shoehorn, sockaide, LH bath sponge)  Plan: Times per week: 90 minutes 5x/wk, 30 minutes 1x/wk  Current Treatment Recommendations: Balance Training, Endurance Training, Functional Mobility Training, Self-Care / ADL, Equipment Evaluation, Education, & procurement, Strengthening    Patient Education  Patient Education: Role of OT, Plan of Care, ADL's, IADL's, Precautions, Home Safety and Importance of Increasing Activity    Goals  Short term goals  Time Frame for Short term goals: 1 week  Short term goal 1: Pt will complete various t/fs including BSC with SBA & min vcs for technique  Short term goal 2: Pt will complete LE dressing with mod A, LH AE, & 0-2 vcs for hip precautions  Short term goal 3: Pt will tolerate standing 2 min with 1 UE release & SBA for increased ease of toileting routine  Short term goal 4: Pt will complete mobility to/from BSC/bedside chair with SBA, RW, & 0-2 vcs for TTWB precautions  Short term goal 5: Pt will tolerate BUE moderate resistance band HEP x 10 reps with min RBs to increase UB endurance for ADL tasks  Long term goals  Time Frame for Long term goals : 2 weeks  Long term goal 1: Pt will complete various t/fs including toilet with mod I  Long term goal 2: Pt will complete BADL routine (sponge bath) with S & 0-1 vcs for safety  Long term goal 3: Pt will complete LE dressing with S & LH AE  Long term goal 4: Pt will complete simple meal prep task with RW, S, & good safety awareness for maintaining TTWB (seated when needed to maintain)    Following session, patient left in safe position with all fall risk precautions in place.

## 2021-08-17 LAB
ANION GAP SERPL CALCULATED.3IONS-SCNC: 11 MEQ/L (ref 8–16)
BUN BLDV-MCNC: 11 MG/DL (ref 7–22)
CALCIUM SERPL-MCNC: 8.6 MG/DL (ref 8.5–10.5)
CHLORIDE BLD-SCNC: 103 MEQ/L (ref 98–111)
CO2: 27 MEQ/L (ref 23–33)
CREAT SERPL-MCNC: 0.7 MG/DL (ref 0.4–1.2)
EKG ATRIAL RATE: 64 BPM
EKG P AXIS: 56 DEGREES
EKG P-R INTERVAL: 146 MS
EKG Q-T INTERVAL: 438 MS
EKG QRS DURATION: 86 MS
EKG QTC CALCULATION (BAZETT): 492 MS
EKG R AXIS: 41 DEGREES
EKG T AXIS: 4 DEGREES
EKG VENTRICULAR RATE: 76 BPM
ERYTHROCYTE [DISTWIDTH] IN BLOOD BY AUTOMATED COUNT: 17.6 % (ref 11.5–14.5)
ERYTHROCYTE [DISTWIDTH] IN BLOOD BY AUTOMATED COUNT: 67.5 FL (ref 35–45)
GFR SERPL CREATININE-BSD FRML MDRD: 83 ML/MIN/1.73M2
GLUCOSE BLD-MCNC: 100 MG/DL (ref 70–108)
HCT VFR BLD CALC: 38.7 % (ref 37–47)
HEMOGLOBIN: 11.6 GM/DL (ref 12–16)
MCH RBC QN AUTO: 32.2 PG (ref 26–33)
MCHC RBC AUTO-ENTMCNC: 30 GM/DL (ref 32.2–35.5)
MCV RBC AUTO: 107.5 FL (ref 81–99)
PLATELET # BLD: 405 THOU/MM3 (ref 130–400)
PMV BLD AUTO: 10.3 FL (ref 9.4–12.4)
POTASSIUM SERPL-SCNC: 3.8 MEQ/L (ref 3.5–5.2)
RBC # BLD: 3.6 MILL/MM3 (ref 4.2–5.4)
SODIUM BLD-SCNC: 141 MEQ/L (ref 135–145)
TROPONIN T: 0.01 NG/ML
WBC # BLD: 9.1 THOU/MM3 (ref 4.8–10.8)

## 2021-08-17 PROCEDURE — 99232 SBSQ HOSP IP/OBS MODERATE 35: CPT | Performed by: NURSE PRACTITIONER

## 2021-08-17 PROCEDURE — 2580000003 HC RX 258: Performed by: PHYSICAL MEDICINE & REHABILITATION

## 2021-08-17 PROCEDURE — 6370000000 HC RX 637 (ALT 250 FOR IP): Performed by: PHYSICAL MEDICINE & REHABILITATION

## 2021-08-17 PROCEDURE — 97116 GAIT TRAINING THERAPY: CPT

## 2021-08-17 PROCEDURE — 97110 THERAPEUTIC EXERCISES: CPT

## 2021-08-17 PROCEDURE — 97542 WHEELCHAIR MNGMENT TRAINING: CPT

## 2021-08-17 PROCEDURE — 97530 THERAPEUTIC ACTIVITIES: CPT

## 2021-08-17 PROCEDURE — 97130 THER IVNTJ EA ADDL 15 MIN: CPT

## 2021-08-17 PROCEDURE — 2580000003 HC RX 258: Performed by: FAMILY MEDICINE

## 2021-08-17 PROCEDURE — 6360000002 HC RX W HCPCS: Performed by: FAMILY MEDICINE

## 2021-08-17 PROCEDURE — 80048 BASIC METABOLIC PNL TOTAL CA: CPT

## 2021-08-17 PROCEDURE — 97129 THER IVNTJ 1ST 15 MIN: CPT

## 2021-08-17 PROCEDURE — 84484 ASSAY OF TROPONIN QUANT: CPT

## 2021-08-17 PROCEDURE — 1180000000 HC REHAB R&B

## 2021-08-17 PROCEDURE — 85027 COMPLETE CBC AUTOMATED: CPT

## 2021-08-17 PROCEDURE — 2700000000 HC OXYGEN THERAPY PER DAY

## 2021-08-17 PROCEDURE — 6360000002 HC RX W HCPCS: Performed by: PHYSICAL MEDICINE & REHABILITATION

## 2021-08-17 PROCEDURE — 36415 COLL VENOUS BLD VENIPUNCTURE: CPT

## 2021-08-17 PROCEDURE — 93005 ELECTROCARDIOGRAM TRACING: CPT | Performed by: NURSE PRACTITIONER

## 2021-08-17 PROCEDURE — 97535 SELF CARE MNGMENT TRAINING: CPT

## 2021-08-17 PROCEDURE — 94640 AIRWAY INHALATION TREATMENT: CPT

## 2021-08-17 PROCEDURE — 94760 N-INVAS EAR/PLS OXIMETRY 1: CPT

## 2021-08-17 RX ORDER — SENNA PLUS 8.6 MG/1
2 TABLET ORAL NIGHTLY PRN
Status: DISCONTINUED | OUTPATIENT
Start: 2021-08-17 | End: 2021-08-20 | Stop reason: HOSPADM

## 2021-08-17 RX ORDER — LOPERAMIDE HYDROCHLORIDE 2 MG/1
2 CAPSULE ORAL 4 TIMES DAILY PRN
Status: DISCONTINUED | OUTPATIENT
Start: 2021-08-17 | End: 2021-08-20 | Stop reason: HOSPADM

## 2021-08-17 RX ORDER — POLYETHYLENE GLYCOL 3350 17 G/17G
17 POWDER, FOR SOLUTION ORAL DAILY PRN
Status: DISCONTINUED | OUTPATIENT
Start: 2021-08-17 | End: 2021-08-20 | Stop reason: HOSPADM

## 2021-08-17 RX ORDER — DOCUSATE SODIUM 100 MG/1
100 CAPSULE, LIQUID FILLED ORAL 2 TIMES DAILY PRN
Status: DISCONTINUED | OUTPATIENT
Start: 2021-08-17 | End: 2021-08-20 | Stop reason: HOSPADM

## 2021-08-17 RX ORDER — CALCIUM CARBONATE 200(500)MG
500 TABLET,CHEWABLE ORAL 3 TIMES DAILY PRN
Status: DISCONTINUED | OUTPATIENT
Start: 2021-08-17 | End: 2021-08-20 | Stop reason: HOSPADM

## 2021-08-17 RX ADMIN — ACETAMINOPHEN 650 MG: 325 TABLET ORAL at 11:00

## 2021-08-17 RX ADMIN — TIOTROPIUM BROMIDE INHALATION SPRAY 2 PUFF: 3.12 SPRAY, METERED RESPIRATORY (INHALATION) at 07:17

## 2021-08-17 RX ADMIN — ACETAMINOPHEN 650 MG: 325 TABLET ORAL at 17:55

## 2021-08-17 RX ADMIN — FERROUS SULFATE TAB 325 MG (65 MG ELEMENTAL FE) 325 MG: 325 (65 FE) TAB at 09:00

## 2021-08-17 RX ADMIN — CEFTRIAXONE SODIUM 1000 MG: 1 INJECTION, POWDER, FOR SOLUTION INTRAMUSCULAR; INTRAVENOUS at 23:22

## 2021-08-17 RX ADMIN — BUDESONIDE AND FORMOTEROL FUMARATE DIHYDRATE 2 PUFF: 160; 4.5 AEROSOL RESPIRATORY (INHALATION) at 07:17

## 2021-08-17 RX ADMIN — SODIUM CHLORIDE, PRESERVATIVE FREE 10 ML: 5 INJECTION INTRAVENOUS at 23:26

## 2021-08-17 RX ADMIN — FERROUS SULFATE TAB 325 MG (65 MG ELEMENTAL FE) 325 MG: 325 (65 FE) TAB at 17:55

## 2021-08-17 RX ADMIN — ASPIRIN 325 MG: 325 TABLET, DELAYED RELEASE ORAL at 09:00

## 2021-08-17 RX ADMIN — BUDESONIDE AND FORMOTEROL FUMARATE DIHYDRATE 2 PUFF: 160; 4.5 AEROSOL RESPIRATORY (INHALATION) at 17:38

## 2021-08-17 RX ADMIN — LEVOTHYROXINE SODIUM 88 MCG: 0.09 TABLET ORAL at 06:26

## 2021-08-17 RX ADMIN — ENOXAPARIN SODIUM 40 MG: 40 INJECTION, SOLUTION INTRAVENOUS; SUBCUTANEOUS at 21:04

## 2021-08-17 RX ADMIN — SODIUM CHLORIDE, PRESERVATIVE FREE 10 ML: 5 INJECTION INTRAVENOUS at 13:59

## 2021-08-17 RX ADMIN — FAMOTIDINE 20 MG: 20 TABLET, FILM COATED ORAL at 09:00

## 2021-08-17 RX ADMIN — ACETAMINOPHEN 650 MG: 325 TABLET ORAL at 23:27

## 2021-08-17 ASSESSMENT — PAIN SCALES - GENERAL
PAINLEVEL_OUTOF10: 0
PAINLEVEL_OUTOF10: 0
PAINLEVEL_OUTOF10: 3
PAINLEVEL_OUTOF10: 2

## 2021-08-17 ASSESSMENT — PAIN DESCRIPTION - PROGRESSION

## 2021-08-17 NOTE — PROGRESS NOTES
25 [facility]  INPATIENT PHYSICAL THERAPY  PROGRESS NOTE  254 [facility] - 7E-55/055-A    Time In: 1130  Time Out: 1230  Timed Code Treatment Minutes: 60 Minutes  Minutes: 60          Date: 2021  Patient Name: Justa Cadet,  Gender:  female        MRN: [id number]  : 1950  (73 y.o.)     Referring Practitioner: Dr. Monson Born  Diagnosis: Displaced Fx of Greater Trochanter Lt femur  Additional Pertinent Hx: The patient is a 73 y.o. female with presentation of left hip pain post acute injury s/p mechanical fall. Patient states she had left JATINDER done  and right JATINDER done . She had a mechanical fall on 21 causing injury to left hip. xrays showing left hip periprosthetic fracture and ortho was consulted. Hx:  Lung removal due to lung CA,  2021     Prior Level of Function:  Lives With: Son (Son will not be staying with her. Pt reports friends and nieces to stay to assist)  Type of Home: House  Home Layout: Two level, Able to Live on Main level with bedroom/bathroom, 1/2 bath on main level  Home Access: Stairs to enter with rails  Entrance Stairs - Number of Steps: 4+4  Entrance Stairs - Rails: Left  Home Equipment: Cane, Rolling walker, Standard walker, Wheelchair-manual, 4 wheeled walker   Bathroom Shower/Tub: Walk-in shower (on 2nd story)  Bathroom Toilet: Standard  Bathroom Equipment: Shower chair, Grab bars in shower    ADL Assistance: [address] [place] [address] Avenue: Needs assistance (sharies duties with son)  Ambulation Assistance: Independent  Transfer Assistance: Independent  Active : Yes  Additional Comments: Pt reports using no AD PLOF. Currently has bed on 1st level of home, 1/2 bath on 1st level; walk in shower is on 2nd story.     Restrictions/Precautions:  Restrictions/Precautions: Weight Bearing, General Precautions, Fall Risk  Left Lower Extremity Weight Bearing: Toe Touch Weight Bearing  Partial Weight Bearing Percentage Or Pounds: 25%  Required Braces or Orthoses  Left Lower Extremity Brace:  (hip ABD brace)  Position Activity Restriction  Hip Precautions: No hip flexion > 90 degrees, No active ABduction, No hip internal rotation, No hip external rotation  Other position/activity restrictions: 6 weeks of hip abductor brace to be worn all the time. May remove for hygiene. \"; hx of R lung removal 6/14/2021, O2 at 2 liters with activity, 1 liter at rest     SUBJECTIVE: Pt resting in bed upon arrival, pleasant and agreeable to therapy. PAIN: 3/10: L LE with mobility    Vitals: Vitals not assessed per clinical judgement, see nursing flowsheet    OBJECTIVE:  Bed Mobility:  Rolling to Left: Supervision   Rolling to Right: Supervision   Supine to Sit: Minimal Assistance, To assist L LE over to edge of bed. Pt attempted several times on her own and could not complete without assistance. Sit to Supine: Minimal Assistance, Again at      Transfers:  Sit to Stand: Contact Guard Assistance  Stand to [address] Transfer: Minimal Assistance both in and out of car at [facility]. Verbal cuing for technique. Ambulation:  Contact Guard Assistance  Distance: 35 feet x1 25 feet x1 and 10 feet x1   Surface: Level Tile  Device:Rolling Walker  Gait Deviations:  Slow Bhavani, Decreased Step Length Bilaterally, Decreased Gait Speed, Decreased Heel Strike Bilaterally and Occasional cuing to not slide L LE to advance. Reminders throughout gait for TTWBing L LE, pt states she is compliant. Stairs:  Contact Guard Assistance  Number of Steps: 4+4  Height: 6\" step with Parallel Bars   Comment: Cuing for proper technique.  Question pt maintaining WBing status, pt states she is     Wheelchair Mobility:  Supervision  Extremities Used: Right Upper Extremity and Left Upper Extremity  Type of Chair:Manual  Surface: Level Tile  Distance: 80 feet x 1  Quality: Slow Velocity, Short Strokes, Veers Left and Veers Right    Balance:  Dynamic Standing Balance: Stand By Assistance, Pt completed functional activity while standing at AD with one to no UE support on AD. Pt able to stand 4'10\" while reaching from shoulder to waist level outside ASHA while maintaining WB restriction. Pt tolerated well. Exercise:  None    Functional Outcome Measures: Not completed       ASSESSMENT:  Assessment: Patient progressing toward established goals, meeting 4 short term goals. and Pt tolerated session fairly well. Continues to be limited by TTWBing status of L LE, decreased strength, decreased endurance. Pt will benefit from continued skilled PT to advance in these areas for improved functional mobility and safety. Activity Tolerance:  Patient tolerance of  treatment: good. Equipment Recommendations:Equipment Needed: No (Pt has RW, SW, cane, w/c and 4WW)  Discharge Recommendations:  Continue to assess pending progress, Patient would benefit from continued therapy after discharge    Plan: Times per week: 5x/ wk 90 min, 1x/ wk 30 min  Current Treatment Recommendations: Strengthening, Gait Training, Patient/Caregiver Education & Training, Equipment Evaluation, Education, & procurement, Stair training, Balance Training, Endurance Training, Home Exercise Program, Functional Mobility Training, Transfer Training, Safety Education & Training, Wheelchair Mobility Training    Patient Education  Patient Education: Plan of Care, [employer], Transfers, Gait, Stairs, Car Transfers    Goals:  Patient goals : Get home in time for granddaughter's wedding  Short term goals  Time Frame for Short term goals: 1 wk  Short term goal 1: Pt to go supine <->sit, cGA with LLE, to get in/out of bed.   No rail, bed flat -NOT MET, continue  Short term goal 2: Pt to get up/down from various seated surfaces, CGA, to get up to walk -MET, see LTG  Short term goal 3: Pt to walk with RW >= 25 ft, TTWB LLE, CGA, to get in/out of restroom -MET, see LTG  Short term goal 4: Pt to ascend/descend 6\" step in parallel bars, maintaining TTWB LLE, CGA to progress to home entry. -MET, see LTG  Short term goal 5: Pt to propel w/c, indoor surfaces, Mod I, for home and community mobility -NOT MET  Short term goal 6: Pt to get in/out of car, min +1 for transportation needs. -MET, see LTG  Long term goals  Time Frame for Long term goals : 2 wks  Long term goal 1: Pt to go supine <->sit, Mod I, to get in/out of bed. No rail, bed flat - NOT MET  Long term goal 2: Pt to get up/down from various seated surfaces, Mod I, to get up to walk. -NOT MET  Long term goal 3: Pt to walk with RW >= 50 ft, TTWB LLE, Mod I, for home mobility - NOT MET  Long term goal 4: Pt to ascend/descend 4 +4 steps with Lt rail, min +1 for home entry -NOT MET  Long term goal 5: Pt to get in/out of car, SBA +1 for transportation needs. - NOT MET    Following session, patient left in safe position with all fall risk precautions in place. Treatment session and note completed by Felicia Wetzel PTA.   Assessment and goal revision of Progress Note completed by Tara Weinstein, PT.

## 2021-08-17 NOTE — PLAN OF CARE
Problem: Nutrition  Goal: Optimal nutrition therapy  Outcome: Ongoing   Nutrition Problem #1: Increased nutrient needs  Intervention: Food and/or Nutrient Delivery: Continue Current Diet, Continue Oral Nutrition Supplement, Vitamin Supplement  Nutritional Goals: Patient will consume 75% or greater of meals and ONS during LOS

## 2021-08-17 NOTE — PLAN OF CARE
Problem: Falls - Risk of:  Goal: Will remain free from falls  Description: Will remain free from falls  Outcome: Ongoing  Note: Bed and chair alarm on      Problem: Nutrition  Goal: Optimal nutrition therapy  8/17/2021 1420 by Silvestre Hooper LPN  Note: Ate 04% of each meal   8/17/2021 1107 by Cielo Reed MS, RD, LD  Outcome: Ongoing     Problem: Skin Integrity:  Goal: Will show no infection signs and symptoms  Description: Will show no infection signs and symptoms  Note: Proper hand washing this shift      Problem:  Activity:  Goal: Ability to ambulate will improve  Description: Ability to ambulate will improve  Note: Ambulates with walker      Problem: Pain:  Goal: Pain level will decrease  Description: Pain level will decrease  Note: Denies pain this shift

## 2021-08-17 NOTE — PROGRESS NOTES
[address]  Occupational Therapy  Daily Note  Time:   Time In: 1330  Time Out: 1400  Timed Code Treatment Minutes: 30 Minutes  Minutes: 30          Date: 2021  Patient Name: Philippe Pina,   Gender: female      Room: [facility]55/055-A  MRN: [id number]  : 1950  (73 y.o.)  Referring Practitioner: Dr. Selina Villaseñor  Diagnosis: Displaced fracture of greater trochanter Left femur. Additional Pertinent Hx: The patient is a 73 y.o. female with presentation of left hip pain post acute injury s/p mechanical fall. Patient states she had left JATINDER done  and right JATINDER done . She has had no issues with her left hip and was ambulating fine on her own without assistance. She had a mechanical fall on 21 causing injury to left hip. She came to [facility] ED and had xrays showing left hip periprosthetic fracture and ortho was consulted. Patient is s/p ORIF left greater troch fracture, left hip arthrotomy  by Dr. Ariana MAYES [facility]. 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 Admitted to [facility] on 2021. Restrictions/Precautions:  Restrictions/Precautions: Weight Bearing, General Precautions, Fall Risk  Left Lower Extremity Weight Bearing: Toe Touch Weight Bearing  Partial Weight Bearing Percentage Or Pounds: 25%  Required Braces or Orthoses  Left Lower Extremity Brace:  (hip ABD brace)  Position Activity Restriction  Hip Precautions: No hip flexion > 90 degrees, No active ABduction, No hip internal rotation, No hip external rotation  Other position/activity restrictions: 6 weeks of hip abductor brace to be worn all the time. May remove for hygiene. \"; hx of R lung removal 2021, O2 at 2 liters with activity, 1 liter at rest     SUBJECTIVE: Patient in chair upon arrival agreeable to OT treatment.     PAIN:  No      Vitals: Vitals not assessed per clinical judgement, see nursing flowsheet     COGNITION: Decreased Insight     ADL:   Grooming: Stand By Assistance. at sink   Toileting: Stand By Assistance. Toilet Transfer: Stand By Assistance     BALANCE:  Sitting Balance:  Supervision. Standing Balance: Stand By Assistance.       BED MOBILITY:  Sit to supine: min A for LLE     TRANSFERS:  Sit to Stand:  Stand By Assistance. Stand to Sit: Stand By Assistance.       FUNCTIONAL MOBILITY:  Assistive Device: Rolling Walker  Assist Level:  Stand By Assistance. Distance: To and from bathroom     ADDITIONAL ACTIVITIES:  Patient completed BUE strengthening exercises with skilled education on HEP: completed x10 reps x1 set with a min resistance band in all joints and all planes in order to improve UE strength and activity tolerance required for BADL routine and toilet / shower transfers. Patient tolerated fair, requiring min rest breaks. Patient also required min cues for technique. ASSESSMENT:  Assessment: Efe Mckeon has made progress meeting 5/5 STG. Efe Mckeon can be limited by her affect and motivation to complete activities as she hasn't been feeling well. She requires SBA for functional tranfsers and short distance mobility and requires min cues for TTWB adherence and min cues for hip precaution adherence. She requires min A for LB ADLs and requires further [facility] education and requires more IADL education. Cont OT recomended for ADL, IADL remediation, improvement of precaution adherence, and to decrease fall risk / risk of injury. Activity Tolerance:  Patient tolerance of  treatment: fair. Discharge Recommendations: Home with Home health OT, Home with nursing aide, 24 hour supervision or assist   Equipment Recommendations: Equipment Needed: Yes  Other: Patient has shower chair. Needs BSC and recommend LHAE hip kit however doesn't know if can afford a hip kit.   Plan: Times per week: 90 minutes 5x/wk, 30 minutes 1x/wk  Current Treatment Recommendations: Balance Training, Endurance Training, Functional Mobility Training, Self-Care / ADL, Equipment Evaluation, Education, & procurement, Strengthening    Patient Education  Patient Education: ADL's and Home Exercise Program    Goals  Short term goals  Time Frame for Short term goals: 1 week  Short term goal 1: Pt will complete various t/fs including BSC with S & min vcs for technique  Short term goal 2: Pt will complete LE dressing with CGA, LH AE, & 0-2 vcs for hip precautions  Short term goal 3: Pt will tolerate standing 2 min with 1 UE release & S for increased ease of toileting routine  Short term goal 4: Pt will complete mobility to/from BSC/bedside chair with S, RW, & 0-2 vcs for TTWB precautions  Short term goal 5: Pt will tolerate BUE moderate resistance band HEP x 10 reps with min RBs to increase UB endurance for ADL tasks  Long term goals  Time Frame for Long term goals : 2 weeks  Long term goal 1: Pt will complete various t/fs including toilet with mod I  Long term goal 2: Pt will complete BADL routine (sponge bath) with S & 0-1 vcs for safety  Long term goal 3: Pt will complete LE dressing with S & LH AE  Long term goal 4: Pt will complete simple meal prep task with RW, S, & good safety awareness for maintaining TTWB (seated when needed to maintain)    Following session, patient left in safe position with all fall risk precautions in place.

## 2021-08-17 NOTE — PROGRESS NOTES
organization despite reduced mental math     Written 6-step ADL's  Making burgers - /6 indep  Washing windows - 6/6 indep  Taking out trash - /6 indep, 1/6 min cues  Doing laundry - /6 indep   *excellent sequential thought processing      SHORT TERM GOAL #3:  Goal 3: Patient will complete visual scanning and attention tasks with no more than 6 errors across a given activity in order to improve multi-tasking skills required for IADL's (i.e., housework, driving). - GOAL MET  REVISED GOAL: Patient will complete visual scanning and attention tasks with no more than 4 errors across a given activity in order to improve multi-tasking skills required for IADL's (i.e., housework, driving). INTERVENTIONS: Appropriate attention to task despite x2 external interruptions from Medical staff throughout cognitive tasks outlined above.      Long-Term Goals:  Timeframe for Long-term Goals: 2 weeks    LONG TERM GOAL #1:  Goal 1: Patient will improve cognitive functioning to a level of Supervision in order to permit potential return to PLOF.  - GOAL MET, CONTINUE FOR CONSISTENCY       Comprehension: 5 - Patient understands basic needs (hungry/hot/pain)  Expression: 5 - Expresses basic ideas/needs only (hungry/hot/pain)  Social Interaction: 5 - Patient is appropriate with supervision/cues  Problem Solvin - Patient able to solve simple/routine tasks  Memory: 5 - Patient requires prompting with stress/unfamiliar situations     ST FIM ASSESSMENT:     Admission score Current score Goal Status   COMMUNICATION 4 - Patient understands basic needs 75-90%+ of the time   5 - Patient understands basic needs (hungry/hot/pain)   Progressing   EXPRESSION 4 - Expresses basic ideas/needs 75-90%+ of the time     5 - Expresses basic ideas/needs only (hungry/hot/pain)   Progressing   SOCIAL INTERACTION 4 - Patient appropriate 75-90%+ of the time   5 - Patient is appropriate with supervision/cues   Progressing   PROBLEM SOLVING 3 - Patient solves simple/routine tasks 50%-74%   5 - Patient able to solve simple/routine tasks   Progressing   MEMORY 1 - Patient remembers < 25% of the time   5 - Patient requires prompting with stress/unfamiliar situations   Progressing         EDUCATION:  Learner: Patient  Education:  Reviewed ST goals and Plan of Care, Reviewed recommendations for follow-up, Education Related to Potential Risks and Complications Due to Impairment/Illness/Injury and Home Safety Education  Evaluation of Education: Verbalizes understanding, Demonstrates without assistance and Family not present    ASSESSMENT/PLAN:  SUMMARY:  Patient has met 3/3 STG's and 1/1 LTG's this therapy period. Improvements made in functional recall of 4+ units, problem solving, sustained attention, and functional math computation. Continues to present with a mild-moderate cognitive impairment characterized by ongoing deficits in working memory, divided attention, mildly complex and problem solving. Patient also with improved insight into deficits regarding safety awareness, and improved carryover of hip precautions daily (though unsure of physical carryover). Suspect patient able to make safe return to management of previous ADL's/IADL's in home setting, though will require assistance initially to ensure safety and success. No driving until MD clearance. Continued skilled ST services are highly recommended to improve the aforementioned deficits. Would recommend continued skilled ST services via [facility] or [facility] setting at discharge. Activity Tolerance:  Patient tolerance of treatment: good. Assessment/Plan: Patient progressing toward established goals. Continues to require skilled care of licensed speech pathologist to progress toward achievement of established goals and plan of care.      Plan for Next Session: counting money/coin amount, basic med management, recall      Preethi Cornelius M.S. [address] [place]

## 2021-08-17 NOTE — PROGRESS NOTES
59[address] [facility] PHYSICAL THERAPY  DAILY NOTE  [facility] - 7E-55/055-A    Time In: 1000  Time Out: 1030  Timed Code Treatment Minutes: 30 Minutes  Minutes: 30          Date: 2021  Patient Name: Farrah Dumont,  Gender:  female        MRN: [id number]  : 1950  (73 y.o.)     Referring Practitioner: Dr. Ran Edwards  Diagnosis: Displaced Fx of Greater Trochanter Lt femur  Additional Pertinent Hx: The patient is a 73 y.o. female with presentation of left hip pain post acute injury s/p mechanical fall. Patient states she had left JATINDER done  and right JATINDER done . She had a mechanical fall on 21 causing injury to left hip. xrays showing left hip periprosthetic fracture and ortho was consulted. Hx:  Lung removal due to lung CA,  2021     Prior Level of Function:  Lives With: Son (Son will not be staying with her. Pt reports friends and nieces to stay to assist)  Type of Home: House  Home Layout: Two level, Able to Live on Main level with bedroom/bathroom, 1/2 bath on main level  Home Access: Stairs to enter with rails  Entrance Stairs - Number of Steps: 4+4  Entrance Stairs - Rails: Left  Home Equipment: Cane, Rolling walker, Standard walker, Wheelchair-manual, 4 wheeled walker   Bathroom Shower/Tub: Walk-in shower (on 2nd story)  Bathroom Toilet: Standard  Bathroom Equipment: Shower chair, Grab bars in shower    ADL Assistance: [facility] [address]: Needs assistance (sharies duties with son)  Ambulation Assistance: Independent  Transfer Assistance: Independent  Active : Yes  Additional Comments: Pt reports using no AD PLOF. Currently has bed on 1st level of home, 1/2 bath on 1st level; walk in shower is on 2nd story.     Restrictions/Precautions:  Restrictions/Precautions: Weight Bearing, General Precautions, Fall Risk  Left Lower Extremity Weight Bearing: Toe Touch Weight Bearing  Partial Weight Bearing Percentage Or Pounds: 25%  Required Braces or Orthoses  Left Lower Extremity Brace:  (hip ABD brace)  Position Activity Restriction  Hip Precautions: No hip flexion > 90 degrees, No active ABduction, No hip internal rotation, No hip external rotation  Other position/activity restrictions: 6 weeks of hip abductor brace to be worn all the time. May remove for hygiene. \"; hx of R lung removal 6/14/2021, O2 at 2 liters with activity, 1 liter at rest     SUBJECTIVE: pt in bathroom with RN upon arrival an agrees to therapy. Pt having bowel issues- very loose and freq movements. PAIN: L hip, 4/10    Vitals: Vitals not assessed per clinical judgement, see nursing flowsheet  Pt on 2L nasal cannula    OBJECTIVE:  Bed Mobility:  Sit to Supine: Stand By Assistance, with head of bed flat, with rail, with verbal cues , with increased time for completion   Scooting: Stand By Assistance, with increased time for completion    Transfers:  Sit to Stand: Stand By Assistance  Stand to Sit:Stand By Assistance    Ambulation:  Stand By Assistance, [address], with cues for safety  Distance: 65ft  Surface: Level Tile  Device:Rolling Walker  Gait Deviations: Forward Flexed Posture, Slow Bhavani, Decreased Gait Speed, Mild Path Deviations, Unsteady Gait and fair carryover with TTWB 25%     Balance:  Dynamic Standing Balance: Contact Guard Assistance  Pt completed functional tasks in bathroom with assistance. [facility]. Pt min unsteady without LOB    Exercise:  Patient was guided in 1 set(s) 10-15 reps of exercise to both lower extremities. Ankle pumps, Glut sets, Quad sets, Heelslides and Hip abduction/adduction. Exercises were completed for increased independence with functional mobility. Functional Outcome Measures: Completed       ASSESSMENT:  Assessment: Patient progressing toward established goals. Activity Tolerance:  Patient tolerance of  treatment: good. Pt tolerated session well.  Pt demos decrfeased endurance, strength and tolerance to mobility. Equipment Recommendations:Equipment Needed: No (Pt has RW, SW, cane, w/c and 4WW)  Discharge Recommendations:  Continue to assess pending progress, Patient would benefit from continued therapy after discharge    Plan: Times per week: 5x/ wk 90 min, 1x/ wk 30 min  Current Treatment Recommendations: Strengthening, Gait Training, Patient/Caregiver Education & Training, Equipment Evaluation, Education, & procurement, Stair training, Balance Training, Endurance Training, Home Exercise Program, Functional Mobility Training, Transfer Training, Safety Education & Training, Wheelchair Mobility Training    Patient Education  Patient Education: Plan of Care, Precautions/Restrictions, [employer], Transfers, Gait, Verbal Exercise Instruction    Goals:  Patient goals : Get home in time for granddaughter's wedding  Short term goals  Time Frame for Short term goals: 1 wk  Short term goal 1: Pt to go supine <->sit, cGA with LLE, to get in/out of bed. No rail, bed flat  Short term goal 2: Pt to get up/down from various seated surfaces, CGA, to get up to walk. Short term goal 3: Pt to walk with RW >= 25 ft, TTWB LLE, CGA, to get in/out of restroom  Short term goal 4: Pt to ascend/descend 6\" step in parallel bars, maintaining TTWB LLE, CGA to progress to home entry. Short term goal 5: Pt to propel w/c, indoor surfaces, Mod I, for home and community mobility  Short term goal 6: Pt to get in/out of car, min +1 for transportation needs. Long term goals  Time Frame for Long term goals : 2 wks  Long term goal 1: Pt to go supine <->sit, Mod I, to get in/out of bed. No rail, bed flat  Long term goal 2: Pt to get up/down from various seated surfaces, Mod I, to get up to walk.   Long term goal 3: Pt to walk with RW >= 50 ft, TTWB LLE, Mod I, for home mobility  Long term goal 4: Pt to ascend/descend 4 +4 steps with Lt rail, min +1 for home entry  Long term goal 5: Pt to get in/out of car, SBA +1 for

## 2021-08-17 NOTE — PROGRESS NOTES
Physical Medicine & Rehabilitation   Progress Note    Chief Complaint:  Left hip fracture. Rehab needs    Subjective:     Eliot Mar is a 73year old woman in IRB for a left hip fracture needing intensive rehab. She has a history of CVA, HTN, OA, breast cancer, smoking, hypothyroidism, hyperlipidemia, along with several other conditions. Patient is seen today, resting in her chair. Patient states she has had multiple bouts of diarrhea this morning. Patient also reports some on and off chest discomfort that started around 830. Patient denies chest discomfort at the time of evaluation. Patient denies chest pressure or pain, denies heartburn or nausea/vomitting. Discussed plan for EKG and labwork. Will add imodium for diarrhea if persists and TUMS for heartburn if needed. Patient understanding. Rehabilitation:  PT:   Transfers:  Sit to Stand: Contact Guard Assistance  Stand to Sit:Contact Guard Assistance--one verbal cue to maintain TTWB'ing left  Ambulation:  Contact Guard Assistance  Distance: 20', 30', 5'  Surface: Level Tile  Device:Rolling Walker  Gait Deviations: Forward Flexed Posture, Slow Bhavani, Decreased Step Length on Right, Decreased Gait Speed and Decreased Heel Strike on Left  *Verbal cues to maintain TTWB'ing left, pt tends to increase weight with fatigue  Balance:  Static Standing Balance: Contact Guard Assistance  Dynamic Standing Balance: Contact Guard Assistance  Stairs:  Contact Guard Assistance  Number of Steps: 1 x 3  Height: 6\" step with Parallel Bars   *Verbal cues for LE sequencing and maintaining left LE TTWB'ing. Pt would benefit from ascending steps retro. Pt reports son putting in second hand rail before discharge end of week. *Rest in between due to shortness of breath  *Discussed placing wheelchair at top of steps to rest after completing first 4 steps.     OT:   COGNITION: Decreased Insight, Decreased Problem Solving and Decreased Safety Awareness  ADL:   Grooming: Stand By Assistance. Standing sink side washing hands  Bathing: Stand By Assistance. To complete UB care, washing BLE within hip precautions while seated, and for cyrus care/washing bottom in standing. Upper Extremity Dressing: with set-up. [place]/donning tshirt overhead. Lower Extremity Dressing: Minimal Assistance. Stevenson Madrigal to thread BLE into leggings. SBA for balance to manage clothing over hips. Toileting: Minimal Assistance. Cathy for thoroughness of hygiene after large BM during 2nd episode of toileting. Toilet Transfer: Stand By Assistance. BSC. BALANCE:  Sitting Balance:  Supervision. Standing Balance: Stand By Assistance, [employer]. >5 minutes within IADL task, x1-2 minutes within ADLs. TRANSFERS:  Sit to Stand:  Stand By Assistance. Stand to Sit: Stand By Assistance. FUNCTIONAL MOBILITY:  Assistive Device: Rolling Walker   Assist Level:  Stand By Assistance. Distance:   Completed functional mobility to/from BR and within pt room at slow pace, no LOB noted with fair compliance with TTWB precautions. Pt requires seated rest break after trial of mobility, min fatigue noted. ADDITIONAL ACTIVITIES:  Pt participated in IADL task to ambulate within therapy kitchen with use of RW to reach into various planes at high/low levels to retrieve items making stovetop sandwhich. Pt required CGA-SBA for balance and frequent cues cues for safety with O2 lines or walker safety while reaching outisde of ASHA. Completed to increase kitchen safety and facilitate functional reaching required for simple meal prep tasks.       ST:    INTERVENTIONS: Delayed recall of hip precautions - 3/3 indep  *despite consistent carryover of precautions, patient with limited physical carryover of executing proper caution     INTERVENTIONS: Determining plan for safety needs in home setting - 3/5 indep, 2/5 mod cues  *decreased insight into deficits   *ST recommended having family or friend stay with patient 24/7 for at least 1 week upon discharge to home setting; patient receptive, and reporting awareness into needs for assistance with transportation, and IADL's requiring physical demands      Identifying 5 goals for discharge, how to achieve each goal, and concerns related to each goal.   Goals - 5/5 indep  Achievement - 3/5 indep, 2/5 min cues  Concerns - 2/5 indep, 3/5 min cues  *excellent awareness into needs for improvement in order to achieve specific IADL's  *reduced insight into rationale behind PT/OT services; education provided       Review of Systems:  CONSTITUTIONAL:  positive for  fatigue  EYES:  negative  HEENT:  negative  RESPIRATORY: Chronis O2 Use, no SOB from baseline, coughing, or wheezing from baseline    CARDIOVASCULAR:  negative  GASTROINTESTINAL:  No nausea, vomiting, constipation, diarrhea, or belly pain  GENITOURINARY:  positive for history of urinary incontinence  SKIN:  Left hip incision  HEMATOLOGIC/LYMPHATIC:  positive for swelling/edema  MUSCULOSKELETAL:  Positive for occasional pain  NEUROLOGICAL:  Cognitive deficits   BEHAVIOR/PSYCH:  negative  System review otherwise negative      Objective:  Vitals:    08/16/21 2200   BP: (!) 161/65   Pulse: 61   Resp: 18   Temp: 97.5 °F (36.4 °C)   SpO2: 97%   awake  Orientation:   person, place  Mood: within normal limits  Affect: calm, cheerful now that N/V symptoms have improved and bowels are regular  General appearance: Patient is well nourished, well developed, well groomed     Memory:   abnormal - deficits noted   Attention/Concentration: normal  Language:  normal    Cranial Nerves:  cranial nerves II-XII are grossly intact  Motor Exam:  Motor exam is 5 out of 5 diffusely in all extremities with the exception: 2-/5 muscle strength in left hip F/E, 4-/5 left knee F/E, 4-/5 right hip F. Neuro: Sensation intact and symmetrical in C2-S2 dermatomes, no ankle clonus  Tone:  normal  Muscle bulk: within normal limits.  Trace edema in LLE  Sensory:  Sensory intact  Coordination:   Normal    Heart: normal rate, regular rhythm, normal S1, S2, no murmurs, rubs, clicks or gallops  Lungs: clear to auscultation without wheezes or rales  Abdomen: soft, non-tender, non-distended, normal bowel sounds, no masses or organomegaly    Skin: warm and dry, no rash or erythema  Peripheral vascular: Pulses: Normal upper and lower extremity pulses; Edema: trace      Diagnostics:   No results found for this or any previous visit (from the past 24 hour(s)). Impression:  · Left hip fracture   · S/p ORIF with Dr Raeann Jesus 8/2/21  · bilateral hip replacement  · lung resection 2/2 neoplasm  · COPD - stage 2  · Tobacco abuse  · Anemia  · Hx of CVA  · Essential HTN  · CAD  · Hx of Breast cancer  · Depression  · UTI,  enteric gram negative bacilli  · Diarrhea  · Chest discomfort     Plan:  Continue current therapies  Prophylaxis: DVT - Lovenox, GI - Pepcid  Pain: Tylenol 650 mg po q 6 hours, OxyIR 5-10 mg po q 4 hours prn  Bowels: Glycolax 17 g po q day; colace 100 mg po bid, Senna 2 tabs at bedtime prn, MOM 30 ml po q day prn  UTI: Rocephin daily EVERY 24 HOURS @ 100 mL/hr over 30 Minutes per Dr. Be Cowan  · Hip abduction brace  · PWB 25% LLE  · Zofran 4mg PRN for nausea and vomiting. · Team conference Wednesday  · Discharge planned for Friday, 8/20/2021 with Home Health  · EKG, BMP, CBC, troponin today for chest discomfort - Dr Be Cowan updated on orders placed, he will be rounding this morning.    · Imodium for diarrhea - all bowel meds made PRN  · Tums PRN for heartburn       Missed Therapy Time:  · None    Rajani Davila, APRN - CNP

## 2021-08-17 NOTE — PROGRESS NOTES
Comprehensive Nutrition Assessment    Type and Reason for Visit:  Reassess (PO Monitor)    Nutrition Recommendations/Plan:   *Recommend a Multivitamin w/minerals daily. *Continue current diet and Magic Cups TID. *Continue Imodium PRN d/t diarrhea. Nutrition Assessment: Pt improving from a nutritional standpoint AEB pt reports of improved appetite and intake of meals over the past week consuming ~50-75% of most meals on average. Remains at risk for further nutritional compromise r/t admit d/t hip fracture, increased nutrient needs for post-op healing, catabolic illness (lung cancer) and underlying medical condition (hx breast cancer, skin cancer, CAD, COPD, CVA, UTI, depression, IBS, lung cancer). Nutrition recommendations/interventions as per above. Malnutrition Assessment:  Malnutrition Status: At risk for malnutrition (Comment)    Context:  Acute Illness     Findings of the 6 clinical characteristics of malnutrition:  Energy Intake:  1 - 75% or less of estimated energy requirements for 7 or more days  Weight Loss:  No significant weight loss (per available data in EMR)     Body Fat Loss:  No significant body fat loss     Muscle Mass Loss:  No significant muscle mass loss    Fluid Accumulation:  1 - Mild Extremities   Strength:  Not Performed    Estimated Daily Nutrient Needs:  Energy (kcal):  8348-7813 (25-30 kcals/kg); Weight Used for Energy Requirements:  Other (Comment) (most recent weight 65kg on 7/30 via bedscale)     Protein (g):  78+ (1.2+ grams/kg); Weight Used for Protein Requirements:  Other (Comment) (most recent weight of 65kg on 7/30 via bedscale)          Nutrition Related Findings: pt seen; she reports improved appetite and intake of meals until today- pt reports diarrhea and upset stomach this morning- last BM x8/17. Pt reports she likes the FX Aligned and consumes ~2 of them daily; pt denies any N/V at present or difficulty chewing/swallowing food;  Rx includes: Rocephin, Pepcid, Iron & Imodium PRN. Wounds:  Surgical Incision (8/2 - open femur reduction; wound consult - buttock discoloration; non blanchable)       Current Nutrition Therapies:    ADULT DIET; Regular  Adult Oral Nutrition Supplement; Frozen Oral Supplement    Anthropometric Measures:  · Height: 5' 4\" (162.6 cm)  · Current Body Weight: 140 lb 11.2 oz (63.8 kg) (8/16; +trace edema BLE)   · Admission Body Weight: 138 lb 9.6 oz (62.9 kg) (8/9 bedscale)    · Usual Body Weight: 141 lb (64 kg) (EMR, standing scale on 6/14 - patient reports weighing 178# in May 2021)     · Ideal Body Weight: 120 lbs; % Ideal Body Weight     · BMI: 24.1  · BMI Categories: Normal Weight (BMI 22.0 to 24.9) age over 72       Nutrition Diagnosis:   · Increased nutrient needs related to acute injury/trauma as evidenced by wounds    Nutrition Interventions:   Food and/or Nutrient Delivery:  Continue Current Diet, Continue Oral Nutrition Supplement, Vitamin Supplement  Nutrition Education/Counseling:   (Encouraged po intake of meals and ONS at best effort to aid in healing)   Coordination of Nutrition Care:  Continue to monitor while inpatient    Goals:  Patient will consume 75% or greater of meals and ONS during LOS       Nutrition Monitoring and Evaluation:   Behavioral-Environmental Outcomes:  None Identified   Food/Nutrient Intake Outcomes:  Food and Nutrient Intake, Supplement Intake, Vitamin/Mineral Intake  Physical Signs/Symptoms Outcomes:  Biochemical Data, Diarrhea, GI Status, Nausea or Vomiting, Weight, Skin, Nutrition Focused Physical Findings, Fluid Status or Edema     Discharge Planning:     Too soon to determine     Electronically signed by Carolyn Foote, MS, RD, LD on 8/17/21 at 11:01 AM EDT    Contact: [phone]

## 2021-08-17 NOTE — PROGRESS NOTES
[address] [address]  Inpatient Rehabilitation  Occupational Therapy  Progress Note  Time:  Time In: 830  Time Out: 930  Timed Code Treatment Minutes: 60 Minutes  Minutes: 60    Date: 2021  Patient Name: Son Rogers,   Gender: female      Room: [facility]/055-A  MRN: [id number]  : 1950  (73 y.o.)  Referring Practitioner: Dr. Nikita Hopkins  Diagnosis: Displaced fracture of greater trochanter Left femur. Additional Pertinent Hx: The patient is a 73 y.o. female with presentation of left hip pain post acute injury s/p mechanical fall. Patient states she had left JATINDER done  and right JATINDER done . She has had no issues with her left hip and was ambulating fine on her own without assistance. She had a mechanical fall on 21 causing injury to left hip. She came to [facility] ED and had xrays showing left hip periprosthetic fracture and ortho was consulted. Patient is s/p ORIF left greater troch fracture, left hip arthrotomy  by Dr. Karina Moralez. 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 Admitted to [facility] on 2021. Restrictions/Precautions:  Restrictions/Precautions: Weight Bearing, General Precautions, Fall Risk  Left Lower Extremity Weight Bearing: Toe Touch Weight Bearing  Partial Weight Bearing Percentage Or Pounds: 25%  Required Braces or Orthoses  Left Lower Extremity Brace:  (hip ABD brace)  Position Activity Restriction  Hip Precautions: No hip flexion > 90 degrees, No active ABduction, No hip internal rotation, No hip external rotation  Other position/activity restrictions: 6 weeks of hip abductor brace to be worn all the time. May remove for hygiene. \"; hx of R lung removal 2021, O2 at 2 liters with activity, 1 liter at rest     SUBJECTIVE: Patient in chair upon arrival agreeable to OT treatment. Reports stomach being upset this date. Multiple events of diarrhea.  Patient stated she wasn't sure if son was building a ramp d/t wanting to look for a \"smaller place for patient to go to before rent is due again\" however stated that if it needs done he will do it. PAIN:  No     Vitals: Vitals not assessed per clinical judgement, see nursing flowsheet    COGNITION: Decreased Insight    ADL:   Grooming: Stand By Assistance. at sink   Bathing: Stand By Assistance. when standing to wash cyrus and bottom   Upper Extremity Dressing: Supervision. Lower Extremity Dressing: Stand By Assistance. donning shorts   Toileting: Stand By Assistance. multiple events d/t diarreha   Toilet Transfer: Stand By Assistance. incerased time d/t multiple events . BALANCE:  Sitting Balance:  Supervision. Standing Balance: Stand By Assistance. BED MOBILITY:  Not Tested    TRANSFERS:  Sit to Stand:  Stand By Assistance. Stand to Sit: Stand By Assistance. FUNCTIONAL MOBILITY:  Assistive Device: Rolling Walker  Assist Level:  Stand By Assistance. Distance: To and from bathroom     ASSESSMENT:  Activity Tolerance:  Patient tolerance of  treatment: fair. Assessment: Assessment: Kermit Diaz has made progress meeting 5/5 STG. Kermit Diaz can be limited by her affect and motivation to complete activities as she hasn't been feeling well. She requires SBA for functional tranfsers and short distance mobility and requires min cues for TTWB adherence and min cues for hip precaution adherence. She requires min A for LB ADLs and requires further [facility] education and requires more IADL education. Cont OT recomended for ADL, IADL remediation, improvement of precaution adherence, and to decrease fall risk / risk of injury. Discharge Recommendations: Home with Home health OT, Home with nursing aide, 24 hour supervision or assist  Equipment Recommendations: Equipment Needed: Yes  Other: Patient has shower chair. Needs BSC and recommend LHAE hip kit however doesn't know if can afford a hip kit.   Plan: Times per week: 90 minutes 5x/wk, 30 minutes 1x/wk  Current Treatment Recommendations: Balance Training, Endurance Training, Functional Mobility Training, Self-Care / ADL, Equipment Evaluation, Education, & procurement, Strengthening    Patient Education  Patient Education: ADL's    Goals  Short term goals  Time Frame for Short term goals: 1 week  Short term goal 1: Pt will complete various t/fs including BSC with SBA & min vcs for technique MET AND REVISE   Short term goal 2: Pt will complete LE dressing with mod A, LH AE, & 0-2 vcs for hip precautions MET AND REVISE   Short term goal 3: Pt will tolerate standing 2 min with 1 UE release & SBA for increased ease of toileting routine MET AND REVISE   Short term goal 4: Pt will complete mobility to/from BSC/bedside chair with SBA, RW, & 0-2 vcs for TTWB precautions MET AND REVISE   Short term goal 5: Pt will tolerate BUE moderate resistance band HEP x 10 reps with min RBs to increase UB endurance for ADL tasks MET AND CONTINUE   Long term goals  Time Frame for Long term goals : 2 weeks  Long term goal 1: Pt will complete various t/fs including toilet with mod I NOT MET AND CONTINUE   Long term goal 2: Pt will complete BADL routine (sponge bath) with S & 0-1 vcs for safety NOT MET AND CONTINUE   Long term goal 3: Pt will complete LE dressing with S & LH AE  NOT MET AND CONTINUE  Long term goal 4: Pt will complete simple meal prep task with RW, S, & good safety awareness for maintaining TTWB (seated when needed to maintain) NOT MET AND CONTINUE    Revised Short-Term Goals  Short term goals  Time Frame for Short term goals: 1 week  Short term goal 1: Pt will complete various t/fs including BSC with S & min vcs for technique  Short term goal 2: Pt will complete LE dressing with CGA, LH AE, & 0-2 vcs for hip precautions  Short term goal 3: Pt will tolerate standing 2 min with 1 UE release & S for increased ease of toileting routine  Short term goal 4: Pt will complete mobility to/from BSC/bedside chair with S, RW, & 0-2 vcs for TTWB precautions  Short term goal 5: Pt will tolerate BUE moderate resistance band HEP x 10 reps with min RBs to increase UB endurance for ADL tasks  Long term goals  Time Frame for Long term goals : 2 weeks  Long term goal 1: Pt will complete various t/fs including toilet with mod I  Long term goal 2: Pt will complete BADL routine (sponge bath) with S & 0-1 vcs for safety  Long term goal 3: Pt will complete LE dressing with S & LH AE  Long term goal 4: Pt will complete simple meal prep task with RW, S, & good safety awareness for maintaining TTWB (seated when needed to maintain)    Following session, patient left in safe position with all fall risk precautions in place.

## 2021-08-17 NOTE — PLAN OF CARE
Problem: DISCHARGE BARRIERS  Goal: Patient's continuum of care needs are met  Note: Further discussion with team regarding patient's progress and discharge planning. Although patient has made significant progress towards goals, rehab team recommendation for patient to transition to SNF for continued therapy, as patient does not have twenty four hour support available at discharge. SW contacted patient's son, Hamlet, and met with patient to further discuss discharge planning. Patient's son, [place], indicates preference for patient to be able to transition to SNF for continued therapy and safety at discharge. Patient indicates understanding of recommendation for SNF, but indicates reason/preference to be able to return home is to be able to smoke a cigarette. Patient reports that a friend, [facility], indicated that she would be able to stay with patient for a few days upon discharge (SW has not verified this information yet). SW encouraged patient to speak with her son, [place], this evening regarding discharge plans. Care plan reviewed with patient and son, [place]. Patient and son, [place], verbalized understanding of the plan of care and contributed to goal setting. SW to follow and maintain involvement in discharge planning.

## 2021-08-17 NOTE — PROGRESS NOTES
[facility]  Recreational Therapy  Daily Note  [address]    Time Spent with Patient: 15 minutes    Date:  8/17/2021       Patient Name: Jelena Garvin      MRN: [id number]      YOB: 1950 (73 y.o.)       Gender: female  Diagnosis: Displaced fracture of greater trochanter Left femur.   Referring Practitioner: Dr. Jeremy Xiao    RESTRICTIONS/PRECAUTIONS:  Restrictions/Precautions: Weight Bearing, General Precautions, Fall Risk  Vision: Within Functional Limits  Hearing: Within functional limits    PAIN: 0-npo c/o pain     SUBJECTIVE:  They tell me I am going to an assisted living    OBJECTIVE:  Upon arrival pt tearful stating her nurse just told her that she is going to an assisted living facility and not home when she leaves here-as we were talking pt misunderstood and thought she had to go to a nursing home-explained an assisted living facility and she felt better about that-will have  talk everything over with her about discharge       Patient Education  New Education Provided: Importance of Leisure,     Electronically signed by: HOMER Calix  Date: 8/17/2021

## 2021-08-18 LAB
EKG ATRIAL RATE: 62 BPM
EKG P AXIS: 67 DEGREES
EKG P-R INTERVAL: 156 MS
EKG Q-T INTERVAL: 442 MS
EKG QRS DURATION: 84 MS
EKG QTC CALCULATION (BAZETT): 448 MS
EKG R AXIS: 49 DEGREES
EKG T AXIS: -73 DEGREES
EKG VENTRICULAR RATE: 62 BPM
TROPONIN T: < 0.01 NG/ML

## 2021-08-18 PROCEDURE — 6360000002 HC RX W HCPCS: Performed by: PHYSICAL MEDICINE & REHABILITATION

## 2021-08-18 PROCEDURE — 97130 THER IVNTJ EA ADDL 15 MIN: CPT | Performed by: SPEECH-LANGUAGE PATHOLOGIST

## 2021-08-18 PROCEDURE — 2580000003 HC RX 258: Performed by: PHYSICAL MEDICINE & REHABILITATION

## 2021-08-18 PROCEDURE — 94640 AIRWAY INHALATION TREATMENT: CPT

## 2021-08-18 PROCEDURE — 97110 THERAPEUTIC EXERCISES: CPT

## 2021-08-18 PROCEDURE — 6370000000 HC RX 637 (ALT 250 FOR IP): Performed by: PHYSICAL MEDICINE & REHABILITATION

## 2021-08-18 PROCEDURE — 6360000002 HC RX W HCPCS: Performed by: FAMILY MEDICINE

## 2021-08-18 PROCEDURE — 2700000000 HC OXYGEN THERAPY PER DAY

## 2021-08-18 PROCEDURE — 36415 COLL VENOUS BLD VENIPUNCTURE: CPT

## 2021-08-18 PROCEDURE — 97530 THERAPEUTIC ACTIVITIES: CPT

## 2021-08-18 PROCEDURE — 2580000003 HC RX 258: Performed by: FAMILY MEDICINE

## 2021-08-18 PROCEDURE — 97129 THER IVNTJ 1ST 15 MIN: CPT | Performed by: SPEECH-LANGUAGE PATHOLOGIST

## 2021-08-18 PROCEDURE — 97535 SELF CARE MNGMENT TRAINING: CPT

## 2021-08-18 PROCEDURE — 84484 ASSAY OF TROPONIN QUANT: CPT

## 2021-08-18 PROCEDURE — 99233 SBSQ HOSP IP/OBS HIGH 50: CPT | Performed by: PHYSICAL MEDICINE & REHABILITATION

## 2021-08-18 PROCEDURE — 93010 ELECTROCARDIOGRAM REPORT: CPT | Performed by: INTERNAL MEDICINE

## 2021-08-18 PROCEDURE — 97116 GAIT TRAINING THERAPY: CPT

## 2021-08-18 PROCEDURE — 1180000000 HC REHAB R&B

## 2021-08-18 PROCEDURE — 97542 WHEELCHAIR MNGMENT TRAINING: CPT

## 2021-08-18 RX ADMIN — TIOTROPIUM BROMIDE INHALATION SPRAY 2 PUFF: 3.12 SPRAY, METERED RESPIRATORY (INHALATION) at 06:03

## 2021-08-18 RX ADMIN — ASPIRIN 325 MG: 325 TABLET, DELAYED RELEASE ORAL at 08:38

## 2021-08-18 RX ADMIN — ENOXAPARIN SODIUM 40 MG: 40 INJECTION, SOLUTION INTRAVENOUS; SUBCUTANEOUS at 20:00

## 2021-08-18 RX ADMIN — BUDESONIDE AND FORMOTEROL FUMARATE DIHYDRATE 2 PUFF: 160; 4.5 AEROSOL RESPIRATORY (INHALATION) at 17:52

## 2021-08-18 RX ADMIN — FAMOTIDINE 20 MG: 20 TABLET, FILM COATED ORAL at 08:38

## 2021-08-18 RX ADMIN — FERROUS SULFATE TAB 325 MG (65 MG ELEMENTAL FE) 325 MG: 325 (65 FE) TAB at 08:38

## 2021-08-18 RX ADMIN — ACETAMINOPHEN 650 MG: 325 TABLET ORAL at 05:07

## 2021-08-18 RX ADMIN — BUDESONIDE AND FORMOTEROL FUMARATE DIHYDRATE 2 PUFF: 160; 4.5 AEROSOL RESPIRATORY (INHALATION) at 06:03

## 2021-08-18 RX ADMIN — CEFTRIAXONE SODIUM 1000 MG: 1 INJECTION, POWDER, FOR SOLUTION INTRAMUSCULAR; INTRAVENOUS at 22:56

## 2021-08-18 RX ADMIN — LEVOTHYROXINE SODIUM 88 MCG: 0.09 TABLET ORAL at 05:07

## 2021-08-18 RX ADMIN — SODIUM CHLORIDE, PRESERVATIVE FREE 10 ML: 5 INJECTION INTRAVENOUS at 22:56

## 2021-08-18 RX ADMIN — ACETAMINOPHEN 650 MG: 325 TABLET ORAL at 22:56

## 2021-08-18 RX ADMIN — SODIUM CHLORIDE, PRESERVATIVE FREE 10 ML: 5 INJECTION INTRAVENOUS at 08:41

## 2021-08-18 ASSESSMENT — PAIN SCALES - GENERAL
PAINLEVEL_OUTOF10: 3
PAINLEVEL_OUTOF10: 0

## 2021-08-18 NOTE — PROGRESS NOTES
[address]  Occupational Therapy  Daily Note  Time:   Time In: 1330  Time Out: 1400  Timed Code Treatment Minutes: 30 Minutes  Minutes: 30          Date: 2021  Patient Name: Jelena Garvin,   Gender: female      Room: [facility]55/055-A  MRN: [id number]  : 1950  (73 y.o.)  Referring Practitioner: Dr. Jeremy Xiao  Diagnosis: Displaced fracture of greater trochanter Left femur. Additional Pertinent Hx: The patient is a 73 y.o. female with presentation of left hip pain post acute injury s/p mechanical fall. Patient states she had left JATINDER done  and right JATINDER done . She has had no issues with her left hip and was ambulating fine on her own without assistance. She had a mechanical fall on 21 causing injury to left hip. She came to [facility] ED and had xrays showing left hip periprosthetic fracture and ortho was consulted. Patient is s/p ORIF left greater troch fracture, left hip arthrotomy  by Dr. Princess Vyas. 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 Admitted to [facility] on 2021. Restrictions/Precautions:  Restrictions/Precautions: Weight Bearing, General Precautions, Fall Risk  Left Lower Extremity Weight Bearing: Toe Touch Weight Bearing  Partial Weight Bearing Percentage Or Pounds: 25%  Required Braces or Orthoses  Left Lower Extremity Brace:  (hip ABD brace)  Position Activity Restriction  Hip Precautions: No hip flexion > 90 degrees, No active ABduction, No hip internal rotation, No hip external rotation  Other position/activity restrictions: 6 weeks of hip abductor brace to be worn all the time. May remove for hygiene. \"; hx of R lung removal 2021, O2 at 2 liters with activity, 1 liter at rest     SUBJECTIVE: Patient in chair upon arrival agreeable to OT treatment     PAIN: no     Vitals: Vitals not assessed per clinical judgement, see nursing flowsheet    COGNITION: WFL    ADL: Patient is requesting a call back from the nurse to discuss questions on her Coumadin.    Toileting: Stand By Assistance. during clothing management   Toilet Transfer: Stand By Assistance. Olivia Fox BALANCE:  Sitting Balance:  Supervision. Standing Balance: Stand By Assistance. BED MOBILITY:  Not Tested    TRANSFERS:  Sit to Stand:  Stand By Assistance. Stand to Sit: Stand By Assistance. FUNCTIONAL MOBILITY:  Assistive Device: Rolling Walker  Assist Level:  Stand By Assistance. Distance: To and from bathroom     ADDITIONAL ACTIVITIES:  Patient completed IADL homemaking task this date of laundry this date - task was graded to challenge safety with hip precautions and RW placement. Patient was able to retrieve all items from dresser with SBA and min VCs with LHAE to maintain hip precautions for safety during the retrieval and transportation of items. Patient required SBA throughout task with a standing endurance of 4mins. Patient required min VCs for safety cognitive aspect. ASSESSMENT:     Activity Tolerance:  Patient tolerance of  treatment: fair. Discharge Recommendations: Home with Home health OT, Home with nursing aide, 24 hour supervision or assist   Equipment Recommendations: Equipment Needed: Yes  Other: Patient has shower chair. Needs BSC and recommend LHAE hip kit however doesn't know if can afford a hip kit.   Plan: Times per week: 90 minutes 5x/wk, 30 minutes 1x/wk  Current Treatment Recommendations: Balance Training, Endurance Training, Functional Mobility Training, Self-Care / ADL, Equipment Evaluation, Education, & procurement, Strengthening    Patient Education  Patient Education: IADL's    Goals  Short term goals  Time Frame for Short term goals: 1 week  Short term goal 1: Pt will complete various t/fs including BSC with S & min vcs for technique  Short term goal 2: Pt will complete LE dressing with CGA, LH AE, & 0-2 vcs for hip precautions  Short term goal 3: Pt will tolerate standing 2 min with 1 UE release & S for increased ease of toileting routine  Short term goal 4: Pt will complete mobility to/from BSC/bedside chair with S, RW, & 0-2 vcs for TTWB precautions  Short term goal 5: Pt will tolerate BUE moderate resistance band HEP x 10 reps with min RBs to increase UB endurance for ADL tasks  Long term goals  Time Frame for Long term goals : 2 weeks  Long term goal 1: Pt will complete various t/fs including toilet with mod I  Long term goal 2: Pt will complete BADL routine (sponge bath) with S & 0-1 vcs for safety  Long term goal 3: Pt will complete LE dressing with S & LH AE  Long term goal 4: Pt will complete simple meal prep task with RW, S, & good safety awareness for maintaining TTWB (seated when needed to maintain)    Following session, patient left in safe position with all fall risk precautions in place.

## 2021-08-18 NOTE — PROGRESS NOTES
[address] [facility] PHYSICAL THERAPY  DAILY NOTE  [facility] - 7E-55/055-A    Time In: 1000  Time Out: 1100  Timed Code Treatment Minutes: 60 Minutes  Minutes: 60          Date: 2021  Patient Name: Shoshana Hutchins,  Gender:  female        MRN: [id number]  : 1950  (73 y.o.)     Referring Practitioner: Dr. Rosie Farfan  Diagnosis: Displaced Fx of Greater Trochanter Lt femur  Additional Pertinent Hx: The patient is a 73 y.o. female with presentation of left hip pain post acute injury s/p mechanical fall. Patient states she had left JATINDER done  and right JATINDER done . She had a mechanical fall on 21 causing injury to left hip. xrays showing left hip periprosthetic fracture and ortho was consulted. Hx:  Lung removal due to lung CA,  2021     Prior Level of Function:  Lives With: Son (Son will not be staying with her. Pt reports friends and nieces to stay to assist)  Type of Home: House  Home Layout: Two level, Able to Live on Main level with bedroom/bathroom, 1/2 bath on main level  Home Access: Stairs to enter with rails  Entrance Stairs - Number of Steps: 4+4  Entrance Stairs - Rails: Left  Home Equipment: Cane, Rolling walker, Standard walker, Wheelchair-manual, 4 wheeled walker   Bathroom Shower/Tub: Walk-in shower (on 2nd story)  Bathroom Toilet: Standard  Bathroom Equipment: Shower chair, Grab bars in shower    ADL Assistance: [facility] [address]: Needs assistance (sharies duties with son)  Ambulation Assistance: Independent  Transfer Assistance: Independent  Active : Yes  Additional Comments: Pt reports using no AD PLOF. Currently has bed on 1st level of home, 1/2 bath on 1st level; walk in shower is on 2nd story.     Restrictions/Precautions:  Restrictions/Precautions: Weight Bearing, General Precautions, Fall Risk  Left Lower Extremity Weight Bearing: Toe Touch Weight Bearing  Partial Weight Bearing Percentage Or Pounds: 25%  Required Braces or Orthoses  Left Lower Extremity Brace:  (hip ABD brace)  Position Activity Restriction  Hip Precautions: No hip flexion > 90 degrees, No active ABduction, No hip internal rotation, No hip external rotation  Other position/activity restrictions: 6 weeks of hip abductor brace to be worn all the time. May remove for hygiene. \"; hx of R lung removal 6/14/2021, O2 at 2 liters with activity, 1 liter at rest     SUBJECTIVE: Pt in chair upon arrival. Pt had multiple conversations with doctors and social work taking some time. Pt agreeable to session and returned to chair at end of session with all needs within reach. Pt requested bathroom use at beginning of session. Pt discussed with social work that plan is to now got to nursing home     PAIN: not rated, left hip    Vitals: Oxygen: stayed above 90% throughout treatment on 2 L O2    OBJECTIVE:  Bed Mobility:  Not Tested    Transfers:  Sit to Stand: [facility] Assistance cues for anterior weight shift   Stand to [place] [address] with cues for alignment and eccentric control     Ambulation:  Contact Guard Assistance, with verbal cues , with increased time for completion  Distance: 50', 60', 10'  Surface: Level Tile  Device:Rolling Walker  Gait Deviations: Forward Flexed Posture, Slow Bhavani, Decreased Step Length Bilaterally, Decreased Weight Shift Left, Decreased Gait Speed and Decreased Heel Strike Bilaterally  Cues to maintain weight bearing by using more weight through toes rather than heel. Cues for posture and sequencing     Wheelchair Mobility:  Stand By Assistance   Extremities Used: Bilateral Upper Extremities  Type of Chair:Manual  Surface: Level Tile  Distance: 80'x2  Quality: Slow Velocity, Short Strokes and Decreased Fluidity      Balance:  Dynamic Standing Balance: Stand By Assistance, Contact Guard Assistance  Pt stood with no UE support while completing pericare and clothing management at [facility] with no sway or LOB.  Cues for weight bearing and posture  Pt stood at [facility] with no UE support while completing ring toss ~10 minutes with one seated rest break. Completed for improved standing tolerance and proprioception with decreased weight bearing. Minimal sway when reaching far outside ASHA and no LOB. Cues for posture with pt breaking weight bearing precautions at times with PTA placing foot under heel to prevent      Stairs:  Stairs:  6\" steps. X 2+3 using Parallel Bars and Contact Guard Assistance. Cues for sequencing with pt able to complete using only 1 UE. Pt had one instance of breaking weight bearing protocol with cues to correct. Pt fatigued easily this date. Increased time to complete stairs    Exercise:  Patient was guided in 1 set(s) 10 reps of exercise to both lower extremities. Ankle pumps, Glut sets, Heelslides, Seated marches, Seated hamstring curls with manual resistance for R LE , Long arc quads and Seated isometric hip adduction. Exercises were completed for increased independence with functional mobility. Functional Outcome Measures: Not completed       ASSESSMENT:  Assessment: Patient progressing toward established goals. Activity Tolerance:  Patient tolerance of  treatment: good. Pt tolerated session well. Decreased endurance, strength and balance. Pt required cues at time to maintain weight bearing.       Equipment Recommendations:Equipment Needed: No (Pt has RW, SW, cane, w/c and 4WW)  Discharge Recommendations:  Continue to assess pending progress, Patient would benefit from continued therapy after discharge    Plan: Times per week: 5x/ wk 90 min, 1x/ wk 30 min  Current Treatment Recommendations: Strengthening, Gait Training, Patient/Caregiver Education & Training, Equipment Evaluation, Education, & procurement, Stair training, Balance Training, Endurance Training, Home Exercise Program, Functional Mobility Training, Transfer Training, Safety Education & Training, Wheelchair Mobility Training    Patient Education  Patient Education: Plan of Care, Bed Mobility, Transfers, Gait, Stairs, Use of Gait Belt, Verbal Exercise Instruction    Goals:  Patient goals : Get home in time for granddaughter's wedding  Short term goals  Time Frame for Short term goals: 1 wk  Short term goal 1: Pt to go supine <->sit, cGA with LLE, to get in/out of bed. No rail, bed flat  Short term goal 2: Pt to get up/down from various seated surfaces, CGA, to get up to walk. MET, see LTG  Short term goal 3: Pt to walk with RW >= 25 ft, TTWB LLE, CGA, to get in/out of restroom MET, see LTG  Short term goal 4: Pt to ascend/descend 6\" step in parallel bars, maintaining TTWB LLE, CGA to progress to home entry. MET, see LTG  Short term goal 5: Pt to propel w/c, indoor surfaces, Mod I, for home and community mobility  Short term goal 6: Pt to get in/out of car, min +1 for transportation needs. MET, see LTG  Long term goals  Time Frame for Long term goals : 2 wks  Long term goal 1: Pt to go supine <->sit, Mod I, to get in/out of bed. No rail, bed flat  Long term goal 2: Pt to get up/down from various seated surfaces, Mod I, to get up to walk. Long term goal 3: Pt to walk with RW >= 50 ft, TTWB LLE, Mod I, for home mobility  Long term goal 4: Pt to ascend/descend 4 +4 steps with Lt rail, min +1 for home entry  Long term goal 5: Pt to get in/out of car, SBA +1 for transportation needs. Following session, patient left in safe position with all fall risk precautions in place.

## 2021-08-18 NOTE — PLAN OF CARE
Problem: Falls - Risk of:  Goal: Will remain free from falls  Description: Will remain free from falls  Outcome: Ongoing  Note: Bed and chair alarm on      Problem: Skin Integrity:  Goal: Will show no infection signs and symptoms  Description: Will show no infection signs and symptoms  Outcome: Ongoing  Note: Incision site clean and dry      Problem: Skin Integrity:  Goal: Absence of new skin breakdown  Description: Absence of new skin breakdown  Outcome: Ongoing  Note: Incision clean and dry      Problem:  Activity:  Goal: Ability to ambulate will improve  Description: Ability to ambulate will improve  Outcome: Ongoing     Problem: Self-Care:  Goal: Ability to meet self-care needs will improve  Description: Ability to meet self-care needs will improve  Outcome: Ongoing     Problem: Pain:  Goal: Pain level will decrease  Description: Pain level will decrease  Outcome: Ongoing  Note: Denies pain  medications      Problem: Pain:  Goal: Control of acute pain  Description: Control of acute pain  Outcome: Ongoing     Problem: Bleeding:  Goal: Will show no signs and symptoms of excessive bleeding  Description: Will show no signs and symptoms of excessive bleeding  Outcome: Ongoing     Problem: Respiratory  Goal: Agreement to quit smoking  Outcome: Ongoing     Problem: IP BATHING  Goal: LTG - Patient will utilize adaptive techniques to bathe body  Outcome: Ongoing

## 2021-08-18 NOTE — PLAN OF CARE
Problem: DISCHARGE BARRIERS  Goal: Patient's continuum of care needs are met  8/18/2021 1621 by TC Henson  Note: SW received call from [facility] with [facility] admissions. [facility] able to accept patient for admission on Friday, 08/20/2021. SW contacted patient's daughter-in-law, Archie Iyer, to update on completed arrangements. Plan for patient's son, Juan Easley, to transport patient to facility on Friday, 08/20/2021, around 3:30pm. SW requested for daughter-in-law, Archie Iyer, to have son, Juan Easley, bring patient's portable oxygen tank for use during transportation. SW to follow and maintain involvement in discharge planning.

## 2021-08-18 NOTE — PROGRESS NOTES
[facility]  INPATIENT PHYSICAL THERAPY  DAILY NOTE  [address] [facility] - 7E-55/055-A    Time In: 1400  Time Out: 1430  Timed Code Treatment Minutes: 30 Minutes  Minutes: 30          Date: 2021  Patient Name: Luis Alberto Gaspar,  Gender:  female        MRN: [id number]  : 1950  (73 y.o.)     Referring Practitioner: Dr. Darnell Nixon  Diagnosis: Displaced Fx of Greater Trochanter Lt femur  Additional Pertinent Hx: The patient is a 73 y.o. female with presentation of left hip pain post acute injury s/p mechanical fall. Patient states she had left JATINDER done  and right JATINDER done . She had a mechanical fall on 21 causing injury to left hip. xrays showing left hip periprosthetic fracture and ortho was consulted. Hx:  Lung removal due to lung CA,  2021     Prior Level of Function:  Lives With: Son (Son will not be staying with her. Pt reports friends and nieces to stay to assist)  Type of Home: House  Home Layout: Two level, Able to Live on Main level with bedroom/bathroom, 1/2 bath on main level  Home Access: Stairs to enter with rails  Entrance Stairs - Number of Steps: 4+4  Entrance Stairs - Rails: Left  Home Equipment: Cane, Rolling walker, Standard walker, Wheelchair-manual, 4 wheeled walker   Bathroom Shower/Tub: Walk-in shower (on 2nd story)  Bathroom Toilet: Standard  Bathroom Equipment: Shower chair, Grab bars in shower    ADL Assistance: [address] [place] [address] Avenue: Needs assistance (sharies duties with son)  Ambulation Assistance: Independent  Transfer Assistance: Independent  Active : Yes  Additional Comments: Pt reports using no AD PLOF. Currently has bed on 1st level of home, 1/2 bath on 1st level; walk in shower is on 2nd story.     Restrictions/Precautions:  Restrictions/Precautions: Weight Bearing, General Precautions, Fall Risk  Left Lower Extremity Weight Bearing: Toe Touch Weight Bearing  Partial Weight Bearing Percentage Or Pounds: 25%  Required Braces or Orthoses  Left Lower Extremity Brace:  (hip ABD brace)  Position Activity Restriction  Hip Precautions: No hip flexion > 90 degrees, No active ABduction, No hip internal rotation, No hip external rotation  Other position/activity restrictions: 6 weeks of hip abductor brace to be worn all the time. May remove for hygiene. \"; hx of R lung removal 6/14/2021, O2 at 2 liters with activity, 1 liter at rest     SUBJECTIVE: Pt. Seated in BS chair upon arrival and pleasantly agrees to therapy session. PAIN: Not rated    Vitals: Vitals not assessed per clinical judgement, see nursing flowsheet    OBJECTIVE:  Transfers:  Sit to Stand: Stand By Assistance  Stand to Sit:Stand By Assistance  Pt. Completed 6 additional sit<>stand transfers with L foot placed on scale to monitor WBing through L LE. Pt. Able to complete with <25lbs placed through L LE with each attempt. Ambulation:  Contact Guard Assistance  Distance: 45' x 1, 4' x 2  Surface: Level Tile  Device:Rolling Walker  Gait Deviations:  Slow Bhavani, Decreased Step Length Bilaterally, Decreased Gait Speed and good compliance of TTWB after initially cued. Pt. Also completed dynamic gait activity fwd and retro stepping with L foot placed on scale to monitor WBing through L LE. Pt. Able to complete activity with <15 lbs throughout. Patient was guided in 1 set(s) 10 reps of exercise to both lower extremities. Glut sets, Seated marches (R only), Seated hamstring curls, Seated heel/toe raises, Long arc quads, and Seated abduction/adduction (R only). Exercises were completed for increased independence with functional mobility. Functional Outcome Measures: Not completed       ASSESSMENT:  Assessment: Patient progressing toward established goals. Activity Tolerance:  Patient tolerance of  treatment: good.       Equipment Recommendations:Equipment Needed: No (Pt has RW, SW, cane, w/c and 4WW)  Discharge Recommendations:  Continue to assess pending progress, Patient would benefit from continued therapy after discharge    Plan: Times per week: 5x/ wk 90 min, 1x/ wk 30 min  Current Treatment Recommendations: Strengthening, Gait Training, Patient/Caregiver Education & Training, Equipment Evaluation, Education, & procurement, Stair training, Balance Training, Endurance Training, Home Exercise Program, Functional Mobility Training, Transfer Training, Safety Education & Training, Wheelchair Mobility Training    Patient Education  Patient Education: Plan of Care, Transfers, Gait, Verbal Exercise Instruction    Goals:  Patient goals : Get home in time for granddaughter's wedding  Short term goals  Time Frame for Short term goals: 1 wk  Short term goal 1: Pt to go supine <->sit, cGA with LLE, to get in/out of bed. No rail, bed flat  Short term goal 2: Pt to get up/down from various seated surfaces, CGA, to get up to walk. MET, see LTG  Short term goal 3: Pt to walk with RW >= 25 ft, TTWB LLE, CGA, to get in/out of restroom MET, see LTG  Short term goal 4: Pt to ascend/descend 6\" step in parallel bars, maintaining TTWB LLE, CGA to progress to home entry. MET, see LTG  Short term goal 5: Pt to propel w/c, indoor surfaces, Mod I, for home and community mobility  Short term goal 6: Pt to get in/out of car, min +1 for transportation needs. MET, see LTG  Long term goals  Time Frame for Long term goals : 2 wks  Long term goal 1: Pt to go supine <->sit, Mod I, to get in/out of bed. No rail, bed flat  Long term goal 2: Pt to get up/down from various seated surfaces, Mod I, to get up to walk. Long term goal 3: Pt to walk with RW >= 50 ft, TTWB LLE, Mod I, for home mobility  Long term goal 4: Pt to ascend/descend 4 +4 steps with Lt rail, min +1 for home entry  Long term goal 5: Pt to get in/out of car, SBA +1 for transportation needs. Following session, patient left in safe position with all fall risk precautions in place.

## 2021-08-18 NOTE — PROGRESS NOTES
[address] [facility] THERAPY  [address] [facility]  DAILY NOTE    TIME   SLP Individual Minutes  Time In: 1430  Time Out: 1500  Minutes: 30  Timed Code Treatment Minutes: 30 Minutes       Date: 2021  Patient Name: Shoshana Hutchins      CSN: [id number]   : 1950  (73 y.o.)  Gender: female   Referring Physician: NITO Piña CNP; David Herrera MD  Diagnosis: Left hip fracture   Secondary Diagnosis: Cognitive deficits  Precautions: Fall risk  Current Diet: General, thin liquids   Swallowing Strategies: Standard Universal Swallow Precautions  Date of Last MBS/FEES: Not Applicable    Pain:  No pain reported. Subjective:  Patient sitting upright in recliner, pleasant and attentive throughout session. No family present. Short-Term Goals:  SHORT TERM GOAL #1:  Goal 1:  Patient will complete functional immediate/delayed recall and working memory tasks with 80% accuracy given min cues to improve retention of new and daily information. INTERVENTIONS: Functional recall: Patient given 2 hypothetical events to recall (Appointment with PCP on Oct 13 at 2:00, [place] 72th birthday party for [employer] next Saturday at 7:00). Min cues needed to use written strategy to assist with recall.  -Immediate recall-  indep- indep use of written aide  -Recall following a 10 minute delay: indep- indep use of written aide  -Recall following a 20 minute delay:  indep- indep use of written aide    SHORT TERM GOAL #2:  Goal 2:  Patient will complete problem solving, thought organization, and reasoning tasks (i.e., med management, finances, caring for pets, cooking) with 70% accuracy given min cues to improve skills required for IADL completion. INTERVENTIONS: Calculating coin/bill values: 2/5 indep, 1/5 sentence completion cues, 2/5 with mod cues  *Decreaed working memory evident when completing higher complexity calculations.  Cues needed to write amounts down as she figured them. *Errors with computation noted, appeared related to mis identifying coins and on occasion mis calculating amounts. Medication management: Reviewed patient's prior system of medication management, as well as the medications listed in the [facility]. According to what was listed in the [facility], patient will likely have only x2 medications for discharge. Reviewed several methods for organizing and recalling when to take medications, emphasizing the importance of building a daily routine. SHORT TERM GOAL #3:  Goal 3: Patient will complete visual scanning and attention tasks with no more than 4 errors across a given activity in order to improve multi-tasking skills required for IADL's (i.e., housework, driving). INTERVENTIONS: Did not address due to focus on other goals. Long-Term Goals:  Timeframe for Long-term Goals: 2 weeks    LONG TERM GOAL #1:  Goal 1: Patient will improve cognitive functioning to a level of Supervision in order to permit potential return to PLOF. Comprehension: 5 - Patient understands basic needs (hungry/hot/pain)  Expression: 5 - Expresses basic ideas/needs only (hungry/hot/pain)  Social Interaction: 5 - Patient is appropriate with supervision/cues  Problem Solvin - Patient able to solve simple/routine tasks  Memory: 5 - Patient requires prompting with stress/unfamiliar situations         EDUCATION:  Learner: Patient  Education:  Reviewed ST goals and Plan of Care, Reviewed recommendations for follow-up, Education Related to Potential Risks and Complications Due to Impairment/Illness/Injury and Home Safety Education  Evaluation of Education: Verbalizes understanding, Demonstrates without assistance and Family not present    ASSESSMENT/PLAN:    Activity Tolerance:  Patient tolerance of treatment: good. Assessment/Plan: Patient progressing toward established goals.   Continues to require skilled care of licensed speech pathologist to progress toward achievement of established goals and plan of care. Plan for Next Session: Visual scanning/selective attention, Establishing goals for home     Bronwyn MOSQUERA.  [address] [facility], [facility] [address], [address]

## 2021-08-18 NOTE — PROGRESS NOTES
[address]  Occupational Therapy  Daily Note  Time:   Time In: 08  Time Out: 930  Timed Code Treatment Minutes: 60 Minutes  Minutes: 60          Date: 2021  Patient Name: Farrah Dumont,   Gender: female      Room: [facility]/055-A  MRN: [id number]  : 1950  (73 y.o.)  Referring Practitioner: Dr. Dilip Chavarria  Diagnosis: Displaced fracture of greater trochanter Left femur. Additional Pertinent Hx: The patient is a 73 y.o. female with presentation of left hip pain post acute injury s/p mechanical fall. Patient states she had left JATINDER done  and right JATINDER done . She has had no issues with her left hip and was ambulating fine on her own without assistance. She had a mechanical fall on 21 causing injury to left hip. She came to [facility] ED and had xrays showing left hip periprosthetic fracture and ortho was consulted. Patient is s/p ORIF left greater troch fracture, left hip arthrotomy  by Dr. Nicolette Perez. 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 Admitted to [facility] on 2021. Restrictions/Precautions:  Restrictions/Precautions: Weight Bearing, General Precautions, Fall Risk  Left Lower Extremity Weight Bearing: Toe Touch Weight Bearing  Partial Weight Bearing Percentage Or Pounds: 25%  Required Braces or Orthoses  Left Lower Extremity Brace:  (hip ABD brace)  Position Activity Restriction  Hip Precautions: No hip flexion > 90 degrees, No active ABduction, No hip internal rotation, No hip external rotation  Other position/activity restrictions: 6 weeks of hip abductor brace to be worn all the time. May remove for hygiene. \"; hx of R lung removal 2021, O2 at 2 liters with activity, 1 liter at rest     SUBJECTIVE: Patient in chair upon arrival agreeable to OT treatment.  Reports feeling better today.      PAIN:  No      Vitals: Vitals not assessed per clinical judgement, see nursing flowsheet     COGNITION: Decreased Insight     ADL:   Grooming: Stand By Assistance. at sink   Bathing: Stand By Assistance. when standing to wash cyrus and bottom   Upper Extremity Dressing: Supervision. Lower Extremity Dressing: Stand By Assistance. donning shorts   Toileting: Stand By Assistance. Toilet Transfer: Stand By Assistance     BALANCE:  Sitting Balance:  Supervision. Standing Balance: Stand By Assistance.       BED MOBILITY:  Not Tested     TRANSFERS:  Sit to Stand:  Stand By Assistance. Stand to Sit: Stand By Assistance.       FUNCTIONAL MOBILITY:  Assistive Device: Rolling Walker  Assist Level:  Stand By Assistance. Distance: To and from bathroom     ASSESSMENT:     Activity Tolerance:  Patient tolerance of  treatment: fair. Discharge Recommendations: Home with Home health OT, Home with nursing aide, 24 hour supervision or assist   Equipment Recommendations: Equipment Needed: Yes  Other: Patient has shower chair. Needs BSC and recommend LHAE hip kit however doesn't know if can afford a hip kit.   Plan: Times per week: 90 minutes 5x/wk, 30 minutes 1x/wk  Current Treatment Recommendations: Balance Training, Endurance Training, Functional Mobility Training, Self-Care / ADL, Equipment Evaluation, Education, & procurement, Strengthening    Patient Education  Patient Education: ADL's    Goals  Short term goals  Time Frame for Short term goals: 1 week  Short term goal 1: Pt will complete various t/fs including BSC with S & min vcs for technique  Short term goal 2: Pt will complete LE dressing with CGA, LH AE, & 0-2 vcs for hip precautions  Short term goal 3: Pt will tolerate standing 2 min with 1 UE release & S for increased ease of toileting routine  Short term goal 4: Pt will complete mobility to/from BSC/bedside chair with S, RW, & 0-2 vcs for TTWB precautions  Short term goal 5: Pt will tolerate BUE moderate resistance band HEP x 10 reps with min RBs to increase UB endurance for ADL tasks  Long term goals  Time Frame for Long term goals : 2 weeks  Long term goal 1: Pt will complete various t/fs including toilet with mod I  Long term goal 2: Pt will complete BADL routine (sponge bath) with S & 0-1 vcs for safety  Long term goal 3: Pt will complete LE dressing with S & LH AE  Long term goal 4: Pt will complete simple meal prep task with RW, S, & good safety awareness for maintaining TTWB (seated when needed to maintain)    Following session, patient left in safe position with all fall risk precautions in place.

## 2021-08-18 NOTE — PROGRESS NOTES
Patient: Patrice Carolina  Unit/Bed: 4N-77/324-R  YOB: 1950  MRN: [id number] Acct: [de-identified]   Admitting Diagnosis: Displaced fracture of greater trochanter of left femur, initial encounter for closed fracture [S72.112A]  Hip fracture requiring operative repair, left, closed, initial encounter [facility]) Pauline Crigler  Admit Date:  8/6/2021  Hospital Day: 12    Assessment:     Active Problems:    History of CVA (cerebrovascular accident)    Essential hypertension    CAD (coronary artery disease)    Hip fracture requiring operative repair, left, closed, initial encounter [facility])  Resolved Problems:    * No resolved hospital problems. *      Plan: The follow up trop was normal so will follow the episode from yesterday        Subjective:     Patient has no complaint of CP or SOB today. .   Medication side effects: none    Scheduled Meds:   famotidine  20 mg Oral Daily    cefTRIAXone (ROCEPHIN) IV  1,000 mg Intravenous Q24H    sodium chloride flush  5-40 mL Intravenous BID    acetaminophen  650 mg Oral Q6H    aspirin  325 mg Oral Daily    budesonide-formoterol  2 puff Inhalation BID    And    tiotropium  2 puff Inhalation Daily    [START ON 8/4/2022] calcium replacement protocol   Other RX Placeholder    ferrous sulfate  325 mg Oral BID WC    levothyroxine  88 mcg Oral Daily    enoxaparin  40 mg Subcutaneous Nightly     Continuous Infusions:  PRN Meds:loperamide, calcium carbonate, docusate sodium, polyethylene glycol, senna, bisacodyl, ondansetron, morphine **OR** morphine, magnesium hydroxide, albuterol sulfate HFA, diphenhydrAMINE    Review of Systems  Pertinent items are noted in HPI. Objective:     Patient Vitals for the past 8 hrs:   BP Temp Temp src Pulse Resp SpO2   08/18/21 0747 (!) 122/51 98.1 °F (36.7 °C) Oral 58 15 97 %     I/O last 3 completed shifts: In: 275 [P.O.:225; I.V.:50]  Out: -   No intake/output data recorded.     BP (!) 122/51   Pulse 58   Temp 98.1 °F (36.7 °C) (Oral) Resp 15   Ht 5' 4\" (1.626 m)   Wt 140 lb 11.2 oz (63.8 kg)   SpO2 97%   BMI 24.15 kg/m²     General appearance: alert, appears stated age and cooperative  Head: Normocephalic, without obvious abnormality, atraumatic  Lungs: clear to auscultation bilaterally  Heart: regular rate and rhythm, S1, S2 normal, no murmur, click, rub or gallop  Abdomen: soft, non-tender; bowel sounds normal; no masses,  no organomegaly  Extremities: extremities normal, atraumatic, no cyanosis or edema  Skin: Skin color, texture, turgor normal. No rashes or lesions  Neurologic: weak      Electronically signed by Monalisa Marin MD on 8/18/2021 at 12:07 PM

## 2021-08-18 NOTE — PROGRESS NOTES
Physical Medicine & Rehabilitation   Progress Note    Chief Complaint:  Left hip fracture. Rehab needs    Subjective:     Leydi Farias is a 73year old woman in IRB for a left hip fracture needing intensive rehab. She has a history of CVA, HTN, OA, breast cancer, smoking, hypothyroidism, hyperlipidemia, along with several other conditions. Patient was seen today during therapy. Patient states her diarrhea and chest discomfort have subsided. Patient denies chest pressure or pain, denies heartburn or nausea/vomitting. Her labs and EKG were insignificant for concerning signs of cardiac complications. Her left limb mobility and strength as improved significantly, and her leg pain is very minimal. Patient is happy with her progress and treatment team, had the Care Conference update presented to her, and is understanding of the current care plan. Rehabilitation:  PT:   Transfers:  Sit to Stand: Contact Guard Assistance  Stand to Sit:Contact Guard Assistance--one verbal cue to maintain TTWB'ing left  Ambulation:  Contact Guard Assistance  Distance: 20', 30', 5'  Surface: Level Tile  Device:Rolling Walker  Gait Deviations: Forward Flexed Posture, Slow Bhavani, Decreased Step Length on Right, Decreased Gait Speed and Decreased Heel Strike on Left  *Verbal cues to maintain TTWB'ing left, pt tends to increase weight with fatigue  Balance:  Static Standing Balance: Contact Guard Assistance  Dynamic Standing Balance: Contact Guard Assistance  Stairs:  Contact Guard Assistance  Number of Steps: 1 x 3  Height: 6\" step with Parallel Bars   *Verbal cues for LE sequencing and maintaining left LE TTWB'ing. Pt would benefit from ascending steps retro. Pt reports son putting in second hand rail before discharge end of week. *Rest in between due to shortness of breath  *Discussed placing wheelchair at top of steps to rest after completing first 4 steps.     OT:   COGNITION: Decreased Insight, Decreased Problem Solving and Decreased Safety Awareness  ADL:   Grooming: Stand By Assistance. Standing sink side washing hands  Bathing: Stand By Assistance. To complete UB care, washing BLE within hip precautions while seated, and for cyrus care/washing bottom in standing. Upper Extremity Dressing: with set-up. Mart/donning tshirt overhead. Lower Extremity Dressing: Minimal Assistance. Eino Cancer to thread BLE into leggings. SBA for balance to manage clothing over hips. Toileting: Minimal Assistance. Cathy for thoroughness of hygiene after large BM during 2nd episode of toileting. Toilet Transfer: Stand By Assistance. BSC. BALANCE:  Sitting Balance:  Supervision. Standing Balance: Stand By Assistance, [address] >5 minutes within IADL task, x1-2 minutes within ADLs. TRANSFERS:  Sit to Stand:  Stand By Assistance. Stand to Sit: Stand By Assistance. FUNCTIONAL MOBILITY:  Assistive Device: Rolling Walker   Assist Level:  Stand By Assistance. Distance:   Completed functional mobility to/from BR and within pt room at slow pace, no LOB noted with fair compliance with TTWB precautions. Pt requires seated rest break after trial of mobility, min fatigue noted. ADDITIONAL ACTIVITIES:  Pt participated in IADL task to ambulate within therapy kitchen with use of RW to reach into various planes at high/low levels to retrieve items making stovetop sandwhich. Pt required CGA-SBA for balance and frequent cues cues for safety with O2 lines or walker safety while reaching outisde of ASHA. Completed to increase kitchen safety and facilitate functional reaching required for simple meal prep tasks.       ST:    INTERVENTIONS: Delayed recall of hip precautions - 3/3 indep  *despite consistent carryover of precautions, patient with limited physical carryover of executing proper caution     INTERVENTIONS: Determining plan for safety needs in home setting - 3/5 indep, 2/5 mod cues  *decreased insight into deficits   *ST normal  Muscle bulk: within normal limits. Trace edema in LLE  Sensory:  Sensory intact  Coordination:   Normal    Heart: normal rate, regular rhythm, normal S1, S2, no murmurs, rubs, clicks or gallops  Lungs: clear to auscultation without wheezes or rales  Abdomen: soft, non-tender, non-distended, normal bowel sounds, no masses or organomegaly    Skin: warm and dry, no rash or erythema  Peripheral vascular: Pulses: Normal upper and lower extremity pulses; Edema: trace      Diagnostics:   No results found for this or any previous visit (from the past 24 hour(s)). Impression:  · Left hip fracture   · S/p ORIF with Dr Ge Carter 8/2/21  · bilateral hip replacement  · lung resection 2/2 neoplasm  · COPD - stage 2  · Tobacco abuse  · Anemia  · Hx of CVA  · Essential HTN  · CAD  · Hx of Breast cancer  · Depression  · UTI,  enteric gram negative bacilli  · Diarrhea  · Chest discomfort     Plan:  Continue current therapies  Prophylaxis: DVT - Lovenox, GI - Pepcid  Pain: Tylenol 650 mg po q 6 hours, OxyIR 5-10 mg po q 4 hours prn  Bowels: Glycolax 17 g po q day; colace 100 mg po bid, Senna 2 tabs at bedtime prn, MOM 30 ml po q day prn, Imodium 2mg QID PRN. TUMs 500mg TID PRN  UTI: Rocephin daily EVERY 24 HOURS @ 100 mL/hr over 30 Minutes per Dr. Williams Everett  · Hip abduction brace  · PWB 25% LLE  · Zofran 4mg PRN for nausea and vomiting.   · Team conference Wednesday  · Discharge planned for Friday, 8/20/2021 with Home Health      Missed Therapy Time:  · None    Jamse Habermann Hypertension    Kidney stones

## 2021-08-19 PROCEDURE — 97530 THERAPEUTIC ACTIVITIES: CPT

## 2021-08-19 PROCEDURE — 97116 GAIT TRAINING THERAPY: CPT

## 2021-08-19 PROCEDURE — 2580000003 HC RX 258: Performed by: FAMILY MEDICINE

## 2021-08-19 PROCEDURE — 97129 THER IVNTJ 1ST 15 MIN: CPT

## 2021-08-19 PROCEDURE — 6370000000 HC RX 637 (ALT 250 FOR IP): Performed by: PHYSICAL MEDICINE & REHABILITATION

## 2021-08-19 PROCEDURE — 94760 N-INVAS EAR/PLS OXIMETRY 1: CPT

## 2021-08-19 PROCEDURE — 2700000000 HC OXYGEN THERAPY PER DAY

## 2021-08-19 PROCEDURE — 6360000002 HC RX W HCPCS: Performed by: PHYSICAL MEDICINE & REHABILITATION

## 2021-08-19 PROCEDURE — 97110 THERAPEUTIC EXERCISES: CPT

## 2021-08-19 PROCEDURE — 2580000003 HC RX 258: Performed by: PHYSICAL MEDICINE & REHABILITATION

## 2021-08-19 PROCEDURE — 97130 THER IVNTJ EA ADDL 15 MIN: CPT

## 2021-08-19 PROCEDURE — 6360000002 HC RX W HCPCS: Performed by: FAMILY MEDICINE

## 2021-08-19 PROCEDURE — 97535 SELF CARE MNGMENT TRAINING: CPT

## 2021-08-19 PROCEDURE — 1180000000 HC REHAB R&B

## 2021-08-19 PROCEDURE — 94640 AIRWAY INHALATION TREATMENT: CPT

## 2021-08-19 PROCEDURE — 97542 WHEELCHAIR MNGMENT TRAINING: CPT

## 2021-08-19 PROCEDURE — 99231 SBSQ HOSP IP/OBS SF/LOW 25: CPT | Performed by: NURSE PRACTITIONER

## 2021-08-19 RX ORDER — SENNA PLUS 8.6 MG/1
2 TABLET ORAL NIGHTLY PRN
DISCHARGE
Start: 2021-08-19 | End: 2021-09-18

## 2021-08-19 RX ORDER — LOPERAMIDE HYDROCHLORIDE 2 MG/1
2 CAPSULE ORAL 4 TIMES DAILY PRN
DISCHARGE
Start: 2021-08-19 | End: 2021-08-29

## 2021-08-19 RX ORDER — CALCIUM CARBONATE 200(500)MG
500 TABLET,CHEWABLE ORAL 3 TIMES DAILY PRN
DISCHARGE
Start: 2021-08-19 | End: 2021-09-18

## 2021-08-19 RX ORDER — CHOLECALCIFEROL (VITAMIN D3) 25 MCG
1000 TABLET ORAL DAILY
Qty: 60 TABLET | DISCHARGE
Start: 2021-08-20

## 2021-08-19 RX ORDER — ONDANSETRON 4 MG/1
4 TABLET, ORALLY DISINTEGRATING ORAL EVERY 8 HOURS PRN
DISCHARGE
Start: 2021-08-19

## 2021-08-19 RX ORDER — VITAMIN B COMPLEX
1000 TABLET ORAL DAILY
Status: DISCONTINUED | OUTPATIENT
Start: 2021-08-19 | End: 2021-08-19 | Stop reason: SDUPTHER

## 2021-08-19 RX ORDER — VITAMIN B COMPLEX
1000 TABLET ORAL DAILY
Status: DISCONTINUED | OUTPATIENT
Start: 2021-08-19 | End: 2021-08-20 | Stop reason: HOSPADM

## 2021-08-19 RX ORDER — FAMOTIDINE 20 MG/1
20 TABLET, FILM COATED ORAL DAILY
Qty: 60 TABLET | Refills: 3 | DISCHARGE
Start: 2021-08-20

## 2021-08-19 RX ORDER — PSEUDOEPHEDRINE HCL 30 MG
100 TABLET ORAL 2 TIMES DAILY PRN
DISCHARGE
Start: 2021-08-19 | End: 2022-08-16

## 2021-08-19 RX ORDER — FERROUS SULFATE 325(65) MG
325 TABLET ORAL 2 TIMES DAILY WITH MEALS
Qty: 30 TABLET | Refills: 3 | DISCHARGE
Start: 2021-08-19

## 2021-08-19 RX ORDER — ACETAMINOPHEN 325 MG/1
650 TABLET ORAL EVERY 6 HOURS
Qty: 120 TABLET | Refills: 3 | DISCHARGE
Start: 2021-08-19

## 2021-08-19 RX ADMIN — SODIUM CHLORIDE, PRESERVATIVE FREE 10 ML: 5 INJECTION INTRAVENOUS at 07:33

## 2021-08-19 RX ADMIN — TIOTROPIUM BROMIDE INHALATION SPRAY 2 PUFF: 3.12 SPRAY, METERED RESPIRATORY (INHALATION) at 07:49

## 2021-08-19 RX ADMIN — Medication 1000 UNITS: at 12:45

## 2021-08-19 RX ADMIN — FERROUS SULFATE TAB 325 MG (65 MG ELEMENTAL FE) 325 MG: 325 (65 FE) TAB at 16:51

## 2021-08-19 RX ADMIN — SODIUM CHLORIDE, PRESERVATIVE FREE 10 ML: 5 INJECTION INTRAVENOUS at 22:41

## 2021-08-19 RX ADMIN — CEFTRIAXONE SODIUM 1000 MG: 1 INJECTION, POWDER, FOR SOLUTION INTRAMUSCULAR; INTRAVENOUS at 22:43

## 2021-08-19 RX ADMIN — FERROUS SULFATE TAB 325 MG (65 MG ELEMENTAL FE) 325 MG: 325 (65 FE) TAB at 07:33

## 2021-08-19 RX ADMIN — BUDESONIDE AND FORMOTEROL FUMARATE DIHYDRATE 2 PUFF: 160; 4.5 AEROSOL RESPIRATORY (INHALATION) at 07:49

## 2021-08-19 RX ADMIN — ACETAMINOPHEN 650 MG: 325 TABLET ORAL at 05:41

## 2021-08-19 RX ADMIN — FAMOTIDINE 20 MG: 20 TABLET, FILM COATED ORAL at 07:33

## 2021-08-19 RX ADMIN — BUDESONIDE AND FORMOTEROL FUMARATE DIHYDRATE 2 PUFF: 160; 4.5 AEROSOL RESPIRATORY (INHALATION) at 17:31

## 2021-08-19 RX ADMIN — ACETAMINOPHEN 650 MG: 325 TABLET ORAL at 16:51

## 2021-08-19 RX ADMIN — LEVOTHYROXINE SODIUM 88 MCG: 0.09 TABLET ORAL at 05:41

## 2021-08-19 RX ADMIN — ENOXAPARIN SODIUM 40 MG: 40 INJECTION, SOLUTION INTRAVENOUS; SUBCUTANEOUS at 22:41

## 2021-08-19 RX ADMIN — ASPIRIN 325 MG: 325 TABLET, DELAYED RELEASE ORAL at 07:33

## 2021-08-19 ASSESSMENT — PAIN SCALES - GENERAL
PAINLEVEL_OUTOF10: 0

## 2021-08-19 NOTE — DISCHARGE INSTR - COC
Continuity of Care Form    Patient Name: Tiffany Farias   :  1950  MRN:  [id number]    Admit date:  2021  Discharge date:  21    Code Status Order: Full Code   Advance Directives:      Admitting Physician: Ramon Coleman MD  PCP: NITO Horotn CNP    Discharging Nurse: JOYCE Klein [facility] Unit/Room#: 7E-55/055-A  Discharging Unit Phone Number: [phone]    Emergency Contact:   Extended Emergency Contact Information  Primary Emergency Contact: Jodi Valenzuela  Home Phone: [phone]  Relation: Child   needed? No  Secondary Emergency Contact: Meghan Vann Phone: [phone]  Work Phone: [phone]  Relation: Child  Preferred language: English   needed?  No    Past Surgical History:  Past Surgical History:   Procedure Laterality Date    APPENDECTOMY  1975    BREAST SURGERY Right 2005    evacuation of breast hematoma-Dr. Claude Comber    BREAST SURGERY Right 2004    lymphatic mapping, SLN bx x2-Dr. Dwight Sanderson    COLONOSCOPY      Dr. Royal Lara    CT NEEDLE BIOPSY LUNG PERCUTANEOUS  2021    CT NEEDLE BIOPSY LUNG PERCUTANEOUS 3/16/2021 [facility] CT SCAN    FEMUR FRACTURE SURGERY Left 2021    FEMUR OPEN REDUCTION INTERNAL FIXATION performed by Cheo Anand MD at [facility]  2015    [address] Left     HIP    JOINT REPLACEMENT Right 2015    Right Total Hip Replacement - Dr. Graciela Nicole, TOTAL Right 2021    ROBOTIC ASSISTED RIGHT LOWER LOBE SUPERIOR SEGMENTECTOMY AND MEDIASTINAL DISSECTION, CRYOTHERAPY OF INTERCOSTAL NERVES performed by Ruthie Shen MD at [address] Right     Dr. Leann Tucker  04/10/2018    Lumbar epidural steroid injection at L4    LA NJX DX/THER SBST INTRLMNR LMBR/SAC W/IMG GDN N/A 04/10/2018    LESI  @ L4 performed by Farideh Red MD at [id number] [place] [address] AND BSO  1976, 2001    ovaries    THYROID LOBECTOMY Left 11/26/2010    left thyroid lobectomy with isthmusectomy-Dr. Afia Tuttle    THYROID SURGERY  2007    right thyroid lobectomy-Dr. Fofana [facility]       Immunization History:   Immunization History   Administered Date(s) Administered    Influenza Virus Vaccine 10/08/2014, 10/05/2015    Influenza, High Dose (Fluzone 65 yrs and older) 01/22/2018, 01/10/2019    Pneumococcal Conjugate 13-valent ([id number]) 11/06/2015    Pneumococcal Polysaccharide (Efxrapczp89) 05/23/2014       Active Problems:  Patient Active Problem List   Diagnosis Code    Hyperlipidemia E78.5    History of breast cancer Z85.3    Vitamin D deficiency E55.9    Chronic headachess/p head injjury s/p fall R51.9, G89.29    Depression F32.9    Smoker F17.200    Hypothyroidism E03.9    Allergic rhinitis J30.9    Incontinence R32    Noncompliance Z91.19    Vasovagal syncope R55    History of CVA (cerebrovascular accident) Z86.73    Stage 2 moderate COPD by GOLD classification ([address] J44.9    Carotid occlusion, left I65.22    OA (osteoarthritis) of hip M16.9    Essential hypertension I10    CAD (coronary artery disease) I25.10    Transient cerebral ischemia G45.9    Aortic insufficiency I35.1    Chest pain, atypical R07.89    Tobacco abuse Z72.0    History of chest pain atypical Z87.898    S/P cardiac cath nonobstructive lesion 2014 Z98.890    Lumbar radiculitis M54.16    Spinal stenosis of lumbar region without neurogenic claudication M48.061    Syncope and collapse R55    Solitary pulmonary nodule on lung CT R91.1    Severe malnutrition (HCC) E43    Primary malignant neoplasm of right lower lobe of lung (HCC) C34.31    SOB (shortness of breath) on exertion R06.02    Dizziness on standing R42    History of syncope Z87.898    Cancer of lower lobe of right lung (HCC) C34.31    S/P robotic assisted nonanatomic wedge resection of lateral basilar right lower lobe lung mass and mediastinal dissection Z90.2    Postoperative urinary retention N99.89, R33.8    Closed left hip fracture, initial encounter [facility]) S72.002A    Hip fracture requiring operative repair, left, closed, initial encounter ([facility] S72.002A       Isolation/Infection:   Isolation            No Isolation          Patient Infection Status       Infection Onset Added Last Indicated Last Indicated By Review Planned Expiration Resolved Resolved By    None active    Resolved    COVID-19 Rule Out 06/09/21 06/09/21 06/09/21 COVID-19 & Influenza Combo (Ordered)   06/09/21 Rule-Out Test Resulted    COVID-19 Rule Out 04/15/21 04/15/21 04/15/21 COVID-19 & Influenza Combo (Ordered)   04/16/21 Rule-Out Test Resulted    COVID-19 Rule Out 12/13/20 12/13/20 12/13/20 COVID-19 (Ordered)   12/14/20 Rule-Out Test Resulted            Nurse Assessment:  Last Vital Signs: BP (!) 153/65   Pulse 55   Temp 97.8 °F (36.6 °C) (Oral)   Resp 18   Ht 5' 4\" (1.626 m)   Wt 140 lb 11.2 oz (63.8 kg)   SpO2 97%   BMI 24.15 kg/m²     Last documented pain score (0-10 scale): Pain Level: 0  Last Weight:   Wt Readings from Last 1 Encounters:   08/16/21 140 lb 11.2 oz (63.8 kg)     Mental Status:  oriented and alert    IV Access:  - None    Nursing Mobility/ADLs:  Walking   Independent  Transfer  Independent  Bathing  Assisted  Dressing  Assisted  Toileting  Assisted  Feeding  Independent  Med Admin  Independent  Med Delivery   whole    Wound Care Documentation and Therapy:        Elimination:  Continence: Bowel: Yes  Bladder: Yes  Urinary Catheter: None   Colostomy/Ileostomy/Ileal Conduit: No       Date of Last BM: 8/17/21    Intake/Output Summary (Last 24 hours) at 8/19/2021 0821  Last data filed at 8/19/2021 0542  Gross per 24 hour   Intake 825 ml   Output --   Net 825 ml     I/O last 3 completed shifts:   In: 945 [P.O.:895; I.V.:50]  Out: -     Safety Concerns:     Risk for Falls    Impairments/Disabilities:      None    Nutrition Therapy:  Current Nutrition Therapy:   - Oral Diet:  General    Routes of Feeding: Oral  Liquids: Thin Liquids  Daily Fluid Restriction: no  Last Modified Barium Swallow with Video (Video Swallowing Test): not done    Treatments at the Time of Hospital Discharge:   Respiratory Treatments: Inhalers per orders  Oxygen Therapy:  is on oxygen at 2 L/min per nasal cannula. Ventilator:    - No ventilator support    Rehab Therapies: Physical Therapy, Occupational Therapy and Speech/Language Therapy  Weight Bearing Status/Restrictions: No weight bearing restirctions  Other Medical Equipment (for information only, NOT a DME order):  walker  Other Treatments: na    Patient's personal belongings (please select all that are sent with patient):  Letitia PIERRE SIGNATURE:  Electronically signed by Mayo Fleischer, LPN on 6/43/83 at 65:70 AM EDT    CASE MANAGEMENT/SOCIAL WORK SECTION    Inpatient Status Date: 07/30/2021 to 08/06/2021 Acute Hospitalization. 08/06/2021 to 08/20/2021 Acute Inpatient Rehab Hospitalization (does NOT use skilled days). Readmission Risk Assessment Score:  Readmission Risk              Risk of Unplanned Readmission:  21           Discharging to Facility/ Agency   Name: [facility]  Address: [address] [place] [address]   Phone: [phone]  Fax: [phone]    / signature: Electronically signed by TC Skinner on 8/20/21 at 1:16 PM EDT    PHYSICIAN SECTION    Prognosis: Good    Condition at Discharge: Stable    Rehab Potential (if transferring to Rehab): Good    Recommended Labs or Other Treatments After Discharge: ongoing therapy    Physician Certification: I certify the above information and transfer of Ekta Ventura  is necessary for the continuing treatment of the diagnosis listed and that she requires [facility] for greater 30 days.      Update Admission H&P: No change in H&P    PHYSICIAN SIGNATURE:  Electronically signed by NITO Naidu CNP on 8/20/21 at 1:20 PM EDT

## 2021-08-19 NOTE — PROGRESS NOTES
[facility]  INPATIENT PHYSICAL THERAPY  DAILY NOTE  [address] [facility] - 7E-55/055-A  Time In: 1030  Time Out: 1130  Timed Code Treatment Minutes: 60 Minutes  Minutes: 60          Date: 2021  Patient Name: Fuad Corona,  Gender:  female        MRN: [id number]  : 1950  (73 y.o.)     Referring Practitioner: Dr. Lita Hernandez  Diagnosis: Displaced Fx of Greater Trochanter Lt femur  Additional Pertinent Hx: The patient is a 73 y.o. female with presentation of left hip pain post acute injury s/p mechanical fall. Patient states she had left JATINDER done  and right JATINDER done . She had a mechanical fall on 21 causing injury to left hip. xrays showing left hip periprosthetic fracture and ortho was consulted. Hx:  Lung removal due to lung CA,  2021     Prior Level of Function:  Lives With: Son (Son will not be staying with her. Pt reports friends and nieces to stay to assist)  Type of Home: House  Home Layout: Two level, Able to Live on Main level with bedroom/bathroom, 1/2 bath on main level  Home Access: Stairs to enter with rails  Entrance Stairs - Number of Steps: 4+4  Entrance Stairs - Rails: Left  Home Equipment: Cane, Rolling walker, Standard walker, Wheelchair-manual, 4 wheeled walker   Bathroom Shower/Tub: Walk-in shower (on 2nd story)  Bathroom Toilet: Standard  Bathroom Equipment: Shower chair, Grab bars in shower    ADL Assistance: [facility] [address] [address]: Needs assistance (sharies duties with son)  Ambulation Assistance: Independent  Transfer Assistance: Independent  Active : Yes  Additional Comments: Pt reports using no AD PLOF. Currently has bed on 1st level of home, 1/2 bath on 1st level; walk in shower is on 2nd story.     Restrictions/Precautions:  Restrictions/Precautions: Weight Bearing, General Precautions, Fall Risk  Left Lower Extremity Weight Bearing: Toe Touch Weight Bearing  Partial Weight Bearing Percentage Or Pounds: 25%  Required Braces or Orthoses  Left Lower Extremity Brace:  (hip ABD brace)  Position Activity Restriction  Hip Precautions: No hip flexion > 90 degrees, No active ABduction, No hip internal rotation, No hip external rotation  Other position/activity restrictions: 6 weeks of hip abductor brace to be worn all the time. May remove for hygiene. \"; hx of R lung removal 6/14/2021, O2 at 2 liters with activity, 1 liter at rest     SUBJECTIVE: Pt up in chair working on word search upon PT arrival--pleasant and agreeable to therapy. She states she is disappointed she is not returning straight home from [facility], but is happy she is progressing. Pt requested not to perform car transfer today as her son's vehicle is much larger and she states she does not anticipate trouble with transferring into it. PAIN: denies     Vitals: O2 saturation spot-checked throughout.  96% with each check on 2L O2.     OBJECTIVE:  Bed Mobility:  Rolling to Left: Modified Independent, partial roll only--did not complete full roll to L to maintain hip precautions   Rolling to Right: Modified Independent, 1/4 turn to R   Supine to Sit: Minimal Assistance, X 1, with head of bed flat, with rail, with verbal cues , with increased time for completion, assist for L LE  Sit to Supine: Minimal Assistance, X 1, with head of bed flat, with rail, with verbal cues , with increased time for completion, assist for L LE   Scooting: Stand By Assistance, with increased time for completion, for seated scooting    Transfers:  Sit to Stand: Stand By Assistance, with increased time for completion, with verbal cues  Stand to Sit:Stand By Assistance, cues for hand placement, with verbal cues   STS performed 5x consecutively + ~4 additional trials throughout session     Ambulation:  Stand By Assistance, X 1, with cues for safety, with verbal cues , with increased time for completion, PT managing O2 lines and w/c follow  Distance: 70' x 2 + various smaller distances  Surface: Level Tile  Device:Rolling Walker  Gait Deviations:  Slow Bhavani, Decreased Step Length Bilaterally, Decreased Weight Shift Left, Decreased Gait Speed, Decreased Heel Strike on Left, Narrow Base of Support and Decreased Terminal Knee Extension  *good maintenance of TTWB---did provide several cues as reminders throughout     Stairs:  Ascent/descent of four 6\" steps with BHRs and cues for sequence using step-to gait. Good maintenance of TTWB with cues (did have more difficulty on descent--cues for hand placement to optimize WB maintenance)    Wheelchair Propulsion:   Propulsion of manual w/c 80' with SBA including 3 turns--short strokes with UEs only. Balance:  Static Sitting Balance:  Supervision  Dynamic Sitting Balance: Supervision  Static Standing Balance: Stand By Assistance  Dynamic Standing Balance: Stand By Assistance (CGA with exercises)    Exercise:  Patient was guided in 3 set(s) 10 reps of exercise to both lower extremities. Seated marches, Seated hamstring curls (orange TB), Seated heel/toe raises, Long arc quads, Seated abduction/adduction and Standing hip flexion (1 set--cues for TTWB when L LE was stance--instructed to bear 75%+ weight through the walker). Exercises were completed for increased independence with functional mobility. Functional Outcome Measures: Completed--IRF SAMANTA complete except car transfer (refusal) and 150' in w/c---to be completed in PM session today. ASSESSMENT:  Assessment: Patient progressing toward established goals. Activity Tolerance:  Patient tolerance of  treatment: good.       Equipment Recommendations:Equipment Needed: No (Pt has RW, SW, cane, w/c and 4WW)  Discharge Recommendations:  Continue to assess pending progress, Patient would benefit from continued therapy after discharge    Plan: Times per week: 5x/ wk 90 min, 1x/ wk 30 min  Current Treatment Recommendations: Strengthening, Gait Training, Patient/Caregiver Education & Training, Equipment Evaluation, Education, & procurement, Stair training, Balance Training, Endurance Training, Home Exercise Program, Functional Mobility Training, Transfer Training, Safety Education & Training, Wheelchair Mobility Training    Patient Education  Patient Education: Plan of Care, [employer], Transfers, Reviewed Prior Education, Gait, Verbal Exercise Instruction,  - Patient Verbalized Understanding, - Patient Requires Continued Education    Goals:  Patient goals : Get home in time for granddaughter's wedding  Short term goals  Time Frame for Short term goals: 1 wk  Short term goal 1: Pt to go supine <->sit, cGA with LLE, to get in/out of bed. No rail, bed flat  Short term goal 2: Pt to get up/down from various seated surfaces, CGA, to get up to walk. MET, see LTG  Short term goal 3: Pt to walk with RW >= 25 ft, TTWB LLE, CGA, to get in/out of restroom MET, see LTG  Short term goal 4: Pt to ascend/descend 6\" step in parallel bars, maintaining TTWB LLE, CGA to progress to home entry. MET, see LTG  Short term goal 5: Pt to propel w/c, indoor surfaces, Mod I, for home and community mobility  Short term goal 6: Pt to get in/out of car, min +1 for transportation needs. MET, see LTG  Long term goals  Time Frame for Long term goals : 2 wks  Long term goal 1: Pt to go supine <->sit, Mod I, to get in/out of bed. No rail, bed flat  Long term goal 2: Pt to get up/down from various seated surfaces, Mod I, to get up to walk. Long term goal 3: Pt to walk with RW >= 50 ft, TTWB LLE, Mod I, for home mobility  Long term goal 4: Pt to ascend/descend 4 +4 steps with Lt rail, min +1 for home entry  Long term goal 5: Pt to get in/out of car, SBA +1 for transportation needs. Following session, patient left in safe position with all fall risk precautions in place.     Laura Maloney, PT, DPT

## 2021-08-19 NOTE — PROGRESS NOTES
[facility]  INPATIENT PHYSICAL THERAPY  DAILY NOTE  [address] [facility] - 7E-55/055-A    Time In: 1400  Time Out: 1430  Timed Code Treatment Minutes: 30 Minutes  Minutes: 30          Date: 2021  Patient Name: Erik Travis,  Gender:  female        MRN: [id number]  : 1950  (73 y.o.)     Referring Practitioner: Dr. Truong Schroeder  Diagnosis: Displaced Fx of Greater Trochanter Lt femur  Additional Pertinent Hx: The patient is a 73 y.o. female with presentation of left hip pain post acute injury s/p mechanical fall. Patient states she had left JATINDER done  and right JATINDER done . She had a mechanical fall on 21 causing injury to left hip. xrays showing left hip periprosthetic fracture and ortho was consulted. Hx:  Lung removal due to lung CA,  2021     Prior Level of Function:  Lives With: Son (Son will not be staying with her. Pt reports friends and nieces to stay to assist)  Type of Home: House  Home Layout: Two level, Able to Live on Main level with bedroom/bathroom, 1/2 bath on main level  Home Access: Stairs to enter with rails  Entrance Stairs - Number of Steps: 4+4  Entrance Stairs - Rails: Left  Home Equipment: Cane, Rolling walker, Standard walker, Wheelchair-manual, 4 wheeled walker   Bathroom Shower/Tub: Walk-in shower (on 2nd story)  Bathroom Toilet: Standard  Bathroom Equipment: Shower chair, Grab bars in shower    ADL Assistance: [address] [place] [address] Avenue: Needs assistance (sharies duties with son)  Ambulation Assistance: Independent  Transfer Assistance: Independent  Active : Yes  Additional Comments: Pt reports using no AD PLOF. Currently has bed on 1st level of home, 1/2 bath on 1st level; walk in shower is on 2nd story.     Restrictions/Precautions:  Restrictions/Precautions: Weight Bearing, General Precautions, Fall Risk  Left Lower Extremity Weight Bearing: Toe Touch Weight Bearing  Partial Weight Bearing Percentage Or Pounds: 25%  Required Braces or Orthoses  Left Lower Extremity Brace:  (hip ABD brace)  Position Activity Restriction  Hip Precautions: No hip flexion > 90 degrees, No active ABduction, No hip internal rotation, No hip external rotation  Other position/activity restrictions: 6 weeks of hip abductor brace to be worn all the time. May remove for hygiene. \"; hx of R lung removal 6/14/2021, O2 at 2 liters with activity, 1 liter at rest     SUBJECTIVE: Pt resting in bedside chair, pleasant and agreeable to therapy. PAIN: 0/10: denies pain    Vitals: Vitals not assessed per clinical judgement, see nursing flowsheet    OBJECTIVE:  Bed Mobility:  Sit to Supine: Minimal Assistance, of BLEs     Transfers:  Sit to Stand: Stand By Assistance  Stand to Sit:Stand By Assistance   Car transfer: Minimal Assistance of L LE    Ambulation:  Stand By Assistance  Distance: 15 feet x1   Surface: Level Tile  Device:Rolling Walker  Gait Deviations:  Slow Bhavani, Decreased Gait Speed and Decreased Heel Strike Bilaterally    Wheelchair Mobility:  Supervision  Extremities Used: Right Upper Extremity and Left Upper Extremity  Type of Chair:Manual  Surface: Level Tile  Distance: 150 feet x1   Quality: Slow Velocity and Short Strokes    Balance:  Dynamic Standing Balance: Contact Guard Assistance, Pt up in restroom managing depends and cyrus clean up    Exercise:  Patient was guided in 1 set(s) 10 reps of exercise to both lower extremities. Seated marches, Seated hamstring curls, Seated heel/toe raises, Long arc quads and Seated abduction/adduction. Exercises were completed for increased independence with functional mobility. Functional Outcome Measures: Not completed       ASSESSMENT:  Assessment: Patient progressing toward established goals. and Pt tolerated well. Activity Tolerance:  Patient tolerance of  treatment: good.       Equipment Recommendations:Equipment Needed: No (Pt has RW, SW, cane, w/c and [id number])  Discharge Recommendations: Continue to assess pending progress, Patient would benefit from continued therapy after discharge    Plan: Times per week: 5x/ wk 90 min, 1x/ wk 30 min  Current Treatment Recommendations: Strengthening, Gait Training, Patient/Caregiver Education & Training, Equipment Evaluation, Education, & procurement, Stair training, Balance Training, Endurance Training, Home Exercise Program, Functional Mobility Training, Transfer Training, Safety Education & Training, Wheelchair Mobility Training    Patient Education  Patient Education: Plan of Care, [employer] Mobility, Transfers, Gait    Goals:  Patient goals : Get home in time for granddaughter's wedding  Short term goals  Time Frame for Short term goals: 1 wk  Short term goal 1: Pt to go supine <->sit, cGA with LLE, to get in/out of bed. No rail, bed flat  Short term goal 2: Pt to get up/down from various seated surfaces, CGA, to get up to walk. MET, see LTG  Short term goal 3: Pt to walk with RW >= 25 ft, TTWB LLE, CGA, to get in/out of restroom MET, see LTG  Short term goal 4: Pt to ascend/descend 6\" step in parallel bars, maintaining TTWB LLE, CGA to progress to home entry. MET, see LTG  Short term goal 5: Pt to propel w/c, indoor surfaces, Mod I, for home and community mobility  Short term goal 6: Pt to get in/out of car, min +1 for transportation needs. MET, see LTG  Long term goals  Time Frame for Long term goals : 2 wks  Long term goal 1: Pt to go supine <->sit, Mod I, to get in/out of bed. No rail, bed flat  Long term goal 2: Pt to get up/down from various seated surfaces, Mod I, to get up to walk. Long term goal 3: Pt to walk with RW >= 50 ft, TTWB LLE, Mod I, for home mobility  Long term goal 4: Pt to ascend/descend 4 +4 steps with Lt rail, min +1 for home entry  Long term goal 5: Pt to get in/out of car, SBA +1 for transportation needs. Following session, patient left in safe position with all fall risk precautions in place.

## 2021-08-19 NOTE — PROGRESS NOTES
[facility] [facility] THERAPY  [address]  DISCHARGE NOTE    TIME   SLP Individual Minutes  Time In: 0930  Time Out: 1000  Minutes: 30  Timed Code Treatment Minutes: 30 Minutes       Date: 2021  Patient Name: Servando Keane      CSN: [id number]   : 1950  (73 y.o.)  Gender: female   Referring Physician: NITO Dior CNP; Augustin Diaz MD  Diagnosis: Left hip fracture   Secondary Diagnosis: Cognitive deficits  Precautions: Fall risk  Current Diet: General, thin liquids   Swallowing Strategies: Standard Universal Swallow Precautions  Date of Last MBS/FEES: Not Applicable    Pain:  No pain reported. Subjective:  Patient sitting upright in recliner, pleasant and engaged in treatment session.      Short-Term Goals:  SHORT TERM GOAL #1:  Goal 1:  Patient will complete functional immediate/delayed recall and working memory tasks with 80% accuracy given min cues to improve retention of new and daily information. - GOAL MET  INTERVENTIONS: Delayed 24hr+ recall of upcoming events -  indep  *excellent carryover     SHORT TERM GOAL #2:  Goal 2:  Patient will complete problem solving, thought organization, and reasoning tasks (i.e., med management, finances, caring for pets, cooking) with 70% accuracy given min cues to improve skills required for IADL completion. - GOAL NOT MET   INTERVENTIONS: Categorical reasoning regarding object function - 8/10 indep, 2/10 min cues    Determining appropriate daily routine in new environment (at [facility]) - 6/8 steps indep, 2/8 steps mod cues  *good sequential thought processing    Determining items needed/to-do list to ensure successful transition to SNF - 6/6 indep  *good insight into basic, daily needs required for ADL's    PREVIOUS SESSION:   Calculating coin/bill values: 2/5 indep, 1/5 sentence completion cues, 2/5 with mod cues  *Decreaed working memory evident when completing higher complexity calculations. Cues needed to write amounts down as she figured them. *Errors with computation noted, appeared related to mis identifying coins and on occasion mis calculating amounts. Medication management: Reviewed patient's prior system of medication management, as well as the medications listed in the [facility]. According to what was listed in the [facility], patient will likely have only x2 medications for discharge. Reviewed several methods for organizing and recalling when to take medications, emphasizing the importance of building a daily routine. SHORT TERM GOAL #3:  Goal 3: Patient will complete visual scanning and attention tasks with no more than 4 errors across a given activity in order to improve multi-tasking skills required for IADL's (i.e., housework, driving). - GOAL NOT MET  INTERVENTIONS: Required redirection to task x2 throughout session; television on in background likely impacting divided, sustained, and selective attention.     Long-Term Goals:  Timeframe for Long-term Goals: 2 weeks    LONG TERM GOAL #1:  Goal 1: Patient will improve cognitive functioning to a level of Supervision in order to permit potential return to PLOF.  - GOAL MET      Comprehension: 6 - Complex ideas 90% or device (hearing aid or glasses- if patient is primarily a visual learner)  Expression: 6 - Device used to express complex ideas/needs  Social Interaction: 6 - Patient requires medication for mood and/or effect  Problem Solvin - Patient able to solve simple/routine tasks  Memory: 5 - Patient requires prompting with stress/unfamiliar situations       EDUCATION:  Learner: Patient  Education:  Reviewed ST goals and Plan of Care, Reviewed recommendations for follow-up, Education Related to Potential Risks and Complications Due to Impairment/Illness/Injury, Education Related to Prevention of Recurrence of Impairment/Illness/Injury and Home Safety Education  Evaluation of Education: Constantino Venegas understanding, Demonstrates without assistance and Family not present    ASSESSMENT/PLAN:    SUMMARY:  Patient has met 1/3 STG's and 1/1 LTG's this therapy period. Improvements made in functional recall of 8+ units, problem solving, sustained attention, and functional math computation. Continues to present with a mild-moderate cognitive impairment characterized by ongoing deficits in working memory, divided attention, mildly complex and problem solving related to mental math. Patient also with improved insight into deficits regarding safety awareness, and improved carryover of hip precautions daily (though unsure of physical carryover). Suspect patient able to make eventual safe return to management of previous ADL's/IADL's in home setting, though will require assistance initially to ensure safety and success. No driving until MD clearance. Continued skilled ST services via ECF setting are highly recommended to improve the aforementioned deficits.         Activity Tolerance:  Patient tolerance of treatment: good. Assessment/Plan: Patient discharged from Speech Therapy at this time due to discharge from unit. Discharge Disposition: Good; ECF.   Continued Speech Therapy Services recommended: Yes         Ernesto Bernabe M.S. [facility] [place] [id number]

## 2021-08-19 NOTE — PROGRESS NOTES
[address]  Occupational Therapy  Daily Note  Time:   Time In: 1200  Time Out: 1230  Timed Code Treatment Minutes: 30 Minutes  Minutes: 30          Date: 2021  Patient Name: Valentino Leavell,   Gender: female      Room: [facility]/055-A  MRN: [id number]  : 1950  (73 y.o.)  Referring Practitioner: Dr. Pepe Hassan  Diagnosis: Displaced fracture of greater trochanter Left femur. Additional Pertinent Hx: The patient is a 73 y.o. female with presentation of left hip pain post acute injury s/p mechanical fall. Patient states she had left JATINDER done  and right JATINDER done . She has had no issues with her left hip and was ambulating fine on her own without assistance. She had a mechanical fall on 21 causing injury to left hip. She came to [facility] ED and had xrays showing left hip periprosthetic fracture and ortho was consulted. Patient is s/p ORIF left greater troch fracture, left hip arthrotomy  by Dr. Jeannie Kyle. 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 Admitted to [facility] on 2021. Restrictions/Precautions:  Restrictions/Precautions: Weight Bearing, General Precautions, Fall Risk  Left Lower Extremity Weight Bearing: Toe Touch Weight Bearing  Partial Weight Bearing Percentage Or Pounds: 25%  Required Braces or Orthoses  Left Lower Extremity Brace:  (hip ABD brace)  Position Activity Restriction  Hip Precautions: No hip flexion > 90 degrees, No active ABduction, No hip internal rotation, No hip external rotation  Other position/activity restrictions: 6 weeks of hip abductor brace to be worn all the time. May remove for hygiene. \"; hx of R lung removal 2021, O2 at 2 liters with activity, 1 liter at rest     SUBJECTIVE: Patient sleeping in w/c upon arrival; agreeable to therapy this date. Patient verbalized she is very fatigue this afternoon requiring increased rest breaks throughout.     PAIN: 0/10:

## 2021-08-19 NOTE — PROGRESS NOTES
Physical Medicine & Rehabilitation   Progress Note    Chief Complaint:  Left hip fracture. Rehab needs    Subjective:     Desmond Blandon is a 73year old woman in IRB for a left hip fracture needing intensive rehab. She has a history of CVA, HTN, OA, breast cancer, smoking, hypothyroidism, hyperlipidemia, along with several other conditions. Patient is seen today sitting up in chair. Patient reports bowels have slowed down and she is feeling better. Patient denies any chest discomfort since Tuesday, discussed likely GI related as she also had diarrhea at that time. Patient to discharge to [address] [place] [address]  her up around . Patient denies any needs or concerns at this time. Rehabilitation:  PT:   Transfers:  Sit to Stand: Stand By Assistance  Stand to Sit:Stand By Assistance  Pt. Completed 6 additional sit<>stand transfers with L foot placed on scale to monitor WBing through L LE. Pt. Able to complete with <25lbs placed through L LE with each attempt. Ambulation:  Contact Guard Assistance  Distance: 45' x 1, 4' x 2  Surface: Level Tile  Device:Rolling Walker  Gait Deviations:  Slow Bhavani, Decreased Step Length Bilaterally, Decreased Gait Speed and good compliance of TTWB after initially cued. Pt. Also completed dynamic gait activity fwd and retro stepping with L foot placed on scale to monitor WBing through L LE. Pt. Able to complete activity with <15 lbs throughout. Patient was guided in 1 set(s) 10 reps of exercise to both lower extremities. Glut sets, Seated marches (R only), Seated hamstring curls, Seated heel/toe raises, Long arc quads, and Seated abduction/adduction (R only). Exercises were completed for increased independence with functional mobility. OT:   ADL:   Toileting: Stand By Assistance. during clothing management   Toilet Transfer: Stand By Assistance. Benson Walton BALANCE:  Sitting Balance:  Supervision. Standing Balance: Stand By Assistance.     TRANSFERS:  Sit to Stand:  Stand By Assistance. Stand to Sit: Stand By Assistance. FUNCTIONAL MOBILITY:  Assistive Device: Rolling Walker  Assist Level:  Stand By Assistance. Distance: To and from bathroom   ADDITIONAL ACTIVITIES:  Patient completed IADL homemaking task this date of laundry this date - task was graded to challenge safety with hip precautions and RW placement. Patient was able to retrieve all items from dresser with SBA and min VCs with LHAE to maintain hip precautions for safety during the retrieval and transportation of items. Patient required SBA throughout task with a standing endurance of 4mins. Patient required min VCs for safety cognitive aspect. ST:    INTERVENTIONS: Functional recall: Patient given 2 hypothetical events to recall (Appointment with PCP on Oct 13 at 2:00, [place] 72th birthday party for Andreas Rice next Saturday at 7:00). Min cues needed to use written strategy to assist with recall.  -Immediate recall- 9/9 indep- indep use of written aide  -Recall following a 10 minute delay:9/9 indep- indep use of written aide  -Recall following a 20 minute delay: 9/9 indep- indep use of written aide    INTERVENTIONS: Calculating coin/bill values: 2/5 indep, 1/5 sentence completion cues, 2/5 with mod cues  *Decreaed working memory evident when completing higher complexity calculations. Cues needed to write amounts down as she figured them. *Errors with computation noted, appeared related to mis identifying coins and on occasion mis calculating amounts. Medication management: Reviewed patient's prior system of medication management, as well as the medications listed in the [facility]. According to what was listed in the [facility], patient will likely have only x2 medications for discharge. Reviewed several methods for organizing and recalling when to take medications, emphasizing the importance of building a daily routine.      Review of Systems:  CONSTITUTIONAL:  positive for  fatigue  EYES:  negative  HEENT: negative  RESPIRATORY: Chronic O2 Use, no SOB from baseline, coughing, or wheezing from baseline    CARDIOVASCULAR:  negative  GASTROINTESTINAL:  No nausea, vomiting, constipation, diarrhea, or abdominal pain  GENITOURINARY:  positive for history of urinary incontinence  SKIN:  Left hip incision  HEMATOLOGIC/LYMPHATIC:  positive for swelling/edema  MUSCULOSKELETAL:  Positive for occasional pain  NEUROLOGICAL:  Cognitive deficits   BEHAVIOR/PSYCH:  negative  System review otherwise negative      Objective:  Vitals:    08/19/21 0727   BP: (!) 153/65   Pulse: 55   Resp: 18   Temp: 97.8 °F (36.6 °C)   SpO2: 97%   awake  Orientation:   person, place  Mood: within normal limits  Affect: calm, cheerful now that N/V symptoms have improved and bowels are regular  General appearance: Patient is well nourished, well developed, well groomed     Memory:   abnormal - deficits noted   Attention/Concentration: normal  Language:  normal    Cranial Nerves:  cranial nerves II-XII are grossly intact  Motor Exam:  Motor exam is 5 out of 5 diffusely in all extremities with the exception: 2-/5 muscle strength in left hip F/E, 4-/5 left knee F/E, 4-/5 right HF. Neuro: Sensation intact and symmetrical in C2-S2 dermatomes, no ankle clonus  Tone:  normal  Muscle bulk: within normal limits.  Trace edema in LLE  Sensory:  Sensory intact  Coordination:   Normal    Skin: warm and dry, no rash or erythema  Peripheral vascular: Pulses: Normal upper and lower extremity pulses; Edema: trace      Diagnostics:   Recent Results (from the past 24 hour(s))   Troponin    Collection Time: 08/18/21 10:31 AM   Result Value Ref Range    Troponin T < 0.010 ng/ml       Impression:  · Left hip fracture   · S/p ORIF with Dr Reyes Quan 8/2/21  · bilateral hip replacement  · lung resection 2/2 neoplasm  · COPD - stage 2  · Tobacco abuse  · Anemia  · Hx of CVA  · Essential HTN  · CAD  · Hx of Breast cancer  · Depression  · UTI,  enteric gram negative bacilli  · Diarrhea  · Chest discomfort     Plan:  Continue current therapies  Prophylaxis: DVT - Lovenox, GI - Pepcid  Pain: Tylenol 650 mg po q 6 hours, OxyIR 5-10 mg po q 4 hours prn  Bowels: Glycolax 17 g po q day; colace 100 mg po bid, Senna 2 tabs at bedtime prn, MOM 30 ml po q day prn, imodium, TUMS  UTI: Rocephin daily EVERY 24 HOURS @ 100 mL/hr over 30 Minutes per Dr. Leland Ferrer. Last dose tonight   · Hip abduction brace  · PWB 25% LLE  · Zofran 4mg PRN for nausea and vomiting.   · Team conference Wednesday  · Discharge planned for Friday, 8/20/2021 to SNF - [facility]    Missed Therapy Time:  · None    NITO Galarza - CNP

## 2021-08-19 NOTE — PROGRESS NOTES
[address]  Occupational Therapy  Daily Note  Time:   Time In: 08  Time Out: 930  Timed Code Treatment Minutes: 60 Minutes  Minutes: 60          Date: 2021  Patient Name: Pratibha Scruggs,   Gender: female      Room: [facility]/055-A  MRN: [id number]  : 1950  (73 y.o.)  Referring Practitioner: Dr. Adolfo Monk  Diagnosis: Displaced fracture of greater trochanter Left femur. Additional Pertinent Hx: The patient is a 73 y.o. female with presentation of left hip pain post acute injury s/p mechanical fall. Patient states she had left JATINDER done  and right JATINDER done . She has had no issues with her left hip and was ambulating fine on her own without assistance. She had a mechanical fall on 21 causing injury to left hip. She came to [facility] ED and had xrays showing left hip periprosthetic fracture and ortho was consulted. Patient is s/p ORIF left greater troch fracture, left hip arthrotomy  by Dr. Linnette Stockton. 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 Ad[facility] to [facility] on 2021. Restrictions/Precautions:  Restrictions/Precautions: Weight Bearing, General Precautions, Fall Risk  Left Lower Extremity Weight Bearing: Toe Touch Weight Bearing  Partial Weight Bearing Percentage Or Pounds: 25%  Required Braces or Orthoses  Left Lower Extremity Brace:  (hip ABD brace)  Position Activity Restriction  Hip Precautions: No hip flexion > 90 degrees, No active ABduction, No hip internal rotation, No hip external rotation  Other position/activity restrictions: 6 weeks of hip abductor brace to be worn all the time. May remove for hygiene. \"; hx of R lung removal 2021, O2 at 2 liters with activity, 1 liter at rest     SUBJECTIVE: patient seated in bedside chair upon arrival; agreeable to therapy this date. Patient pleasant and cooperative throughout.   Patient notes she is not looking forward to going to SNF however understands she needs more therapy. PAIN: 0/10:     Vitals: Oxygen: 2L nasal canula 96%    COGNITION: Decreased Insight and Decreased Problem Solving    ADL:     EATING:Independent. Marnell Dat CARE Score: 6. ORAL HYGIENE:Independent. Marnell Dat CARE Score: 6. TOILETING HYGIENE:Independent. [facility] Score: 6. SHOWERING/BATHING:Supervision or touching assistance. verbal cues for technique for LB bathing; increased time. CARE Score: 4.     UPPER BODY DRESSING:Independent. Marnell Dat CARE Score: 6. LOWER BODY DRESSING:Substantial/maximal assistance. doff/kamilla abductor brace, utilized reacher and sock aid in order doff/kamilla shorts. CARE Score: 2. FOOTWEAR:Partial/moderate assistance   . CARE Score: 3.     TOILET TRANSFER: Independent. [facility] CARE Score: 6. BALANCE:  Sitting Balance:  Modified Independent. Standing Balance: Supervision. BED MOBILITY:  Not Tested    TRANSFERS:  Sit to Stand:  Supervision. Stand to Sit: Supervision. FUNCTIONAL MOBILITY:  Assistive Device: Rolling Walker  Assist Level:  Supervision. Distance: To and from shower room  Verbal cues for TTWB with mobiilty, slow pace, no LOB     ASSESSMENT:     Activity Tolerance:  Patient tolerance of  treatment: good. Discharge Recommendations: Home with Home health OT, Home with nursing aide, 24 hour supervision or assist   Equipment Recommendations: Equipment Needed: Yes  Other: Patient has shower chair. Needs BSC and recommend LHAE hip kit however doesn't know if can afford a hip kit.   Plan: Times per week: 90 minutes 5x/wk, 30 minutes 1x/wk  Current Treatment Recommendations: Balance Training, Endurance Training, Functional Mobility Training, Self-Care / ADL, Equipment Evaluation, Education, & procurement, Strengthening    Patient Education  Patient Education: ADL's, Precautions, Equipment Education, Home Safety, Importance of Increasing Activity and Assistive Device Safety    Goals  Short term goals  Time Frame for Short term goals: 1 week  Short term goal 1: Pt will complete various t/fs including BSC with S & min vcs for technique  Short term goal 2: Pt will complete LE dressing with CGA, LH AE, & 0-2 vcs for hip precautions  Short term goal 3: Pt will tolerate standing 2 min with 1 UE release & S for increased ease of toileting routine  Short term goal 4: Pt will complete mobility to/from BSC/bedside chair with S, RW, & 0-2 vcs for TTWB precautions  Short term goal 5: Pt will tolerate BUE moderate resistance band HEP x 10 reps with min RBs to increase UB endurance for ADL tasks  Long term goals  Time Frame for Long term goals : 2 weeks  Long term goal 1: Pt will complete various t/fs including toilet with mod I  Long term goal 2: Pt will complete BADL routine (sponge bath) with S & 0-1 vcs for safety  Long term goal 3: Pt will complete LE dressing with S & LH AE  Long term goal 4: Pt will complete simple meal prep task with RW, S, & good safety awareness for maintaining TTWB (seated when needed to maintain)    Following session, patient left in safe position with all fall risk precautions in place.

## 2021-08-20 VITALS
TEMPERATURE: 98.4 F | RESPIRATION RATE: 20 BRPM | HEART RATE: 60 BPM | WEIGHT: 140.7 LBS | HEIGHT: 64 IN | SYSTOLIC BLOOD PRESSURE: 124 MMHG | BODY MASS INDEX: 24.02 KG/M2 | DIASTOLIC BLOOD PRESSURE: 55 MMHG | OXYGEN SATURATION: 97 %

## 2021-08-20 PROCEDURE — 2700000000 HC OXYGEN THERAPY PER DAY

## 2021-08-20 PROCEDURE — 94640 AIRWAY INHALATION TREATMENT: CPT

## 2021-08-20 PROCEDURE — 6370000000 HC RX 637 (ALT 250 FOR IP): Performed by: PHYSICAL MEDICINE & REHABILITATION

## 2021-08-20 PROCEDURE — 99239 HOSP IP/OBS DSCHRG MGMT >30: CPT | Performed by: NURSE PRACTITIONER

## 2021-08-20 PROCEDURE — 94760 N-INVAS EAR/PLS OXIMETRY 1: CPT

## 2021-08-20 RX ADMIN — FERROUS SULFATE TAB 325 MG (65 MG ELEMENTAL FE) 325 MG: 325 (65 FE) TAB at 09:13

## 2021-08-20 RX ADMIN — ACETAMINOPHEN 650 MG: 325 TABLET ORAL at 05:16

## 2021-08-20 RX ADMIN — TIOTROPIUM BROMIDE INHALATION SPRAY 2 PUFF: 3.12 SPRAY, METERED RESPIRATORY (INHALATION) at 07:46

## 2021-08-20 RX ADMIN — BUDESONIDE AND FORMOTEROL FUMARATE DIHYDRATE 2 PUFF: 160; 4.5 AEROSOL RESPIRATORY (INHALATION) at 07:46

## 2021-08-20 RX ADMIN — FAMOTIDINE 20 MG: 20 TABLET, FILM COATED ORAL at 09:13

## 2021-08-20 RX ADMIN — LEVOTHYROXINE SODIUM 88 MCG: 0.09 TABLET ORAL at 05:16

## 2021-08-20 RX ADMIN — ASPIRIN 325 MG: 325 TABLET, DELAYED RELEASE ORAL at 09:13

## 2021-08-20 RX ADMIN — Medication 1000 UNITS: at 09:13

## 2021-08-20 ASSESSMENT — PAIN DESCRIPTION - PAIN TYPE: TYPE: SURGICAL PAIN

## 2021-08-20 ASSESSMENT — PAIN DESCRIPTION - DESCRIPTORS: DESCRIPTORS: DISCOMFORT

## 2021-08-20 ASSESSMENT — PAIN DESCRIPTION - LOCATION: LOCATION: HIP

## 2021-08-20 ASSESSMENT — PAIN SCALES - GENERAL
PAINLEVEL_OUTOF10: 1
PAINLEVEL_OUTOF10: 0

## 2021-08-20 ASSESSMENT — PAIN DESCRIPTION - ORIENTATION: ORIENTATION: LEFT

## 2021-08-20 NOTE — PROGRESS NOTES
[address]      Date: 2021  Patient Name: Elmira Murguia,  Gender:  female        MRN: [id number]  : 1950  (73 y.o.)     Referring Practitioner: Dr. Jenny Watson  Diagnosis: Displaced Fx of Greater Trochanter Lt femur  Additional Pertinent Hx: The patient is a 73 y.o. female with presentation of left hip pain post acute injury s/p mechanical fall. Patient states she had left JATINDER done  and right JATINDER done . She had a mechanical fall on 21 causing injury to left hip. xrays showing left hip periprosthetic fracture and ortho was consulted. Hx:  Lung removal due to lung CA,  2021     Restrictions/Precautions:  Restrictions/Precautions: Weight Bearing, General Precautions, Fall Risk    Left Lower Extremity Weight Bearing: Toe Touch Weight Bearing  Partial Weight Bearing Percentage Or Pounds: 25%       Position Activity Restriction  Hip Precautions: No hip flexion > 90 degrees, No active ABduction, No hip internal rotation, No hip external rotation  Other position/activity restrictions: 6 weeks of hip abductor brace to be worn all the time. May remove for hygiene. \"; hx of R lung removal 2021, O2 at 2 liters with activity, 1 liter at rest    Required Braces or Orthoses  Left Lower Extremity Brace:  (hip ABD brace)    Social/Functional:  Type of Home: House  Home Layout: Two level, Able to Live on Main level with bedroom/bathroom, 1/2 bath on main level  Home Access: Stairs to enter with rails  Entrance Stairs - Number of Steps: 4+4  Entrance Stairs - Rails: Left  Home Equipment: Cane, Rolling walker, Standard walker, Wheelchair-manual, 4 wheeled walker     Subjective:  No treatment provided this date by this PT, discharge summary only. Assessment:  At discharge Ms Rachael Malhotra met 4/6 STG's and 0/5 LTG's.   Patient did not meet all goals at discharge, limited by left LE TTWB'ing status and pt requiring SBA to min assist with functional mobility and cues. Despite not meeting all goals, pt has shown good progress since initial evaluation, improving sit < > stand from min A to SBA, supine < > sit from min/mod assist to min assist, gait from CGA short distance to SBA up to 79' with a RW. Patient would continue to benefit from skilled PT services in a skilled setting to continue to increase her independence and safety with functional mobility as well as reduce her fall risk. Equipment Recommendations:  Equipment Needed: No (Pt has RW, SW, cane, w/c and 4WW)    Plan:  Pt discharging to SNF, recommend continued skilled PT services    Goals:  Short term goals  Time Frame for Short term goals: 1 wk  Short term goal 1: Pt to go supine <->sit, cGA with LLE, to get in/out of bed. No rail, bed flat NOT MET  Short term goal 2: Pt to get up/down from various seated surfaces, CGA, to get up to walk. MET, see LTG  Short term goal 3: Pt to walk with RW >= 25 ft, TTWB LLE, CGA, to get in/out of restroom MET, see LTG  Short term goal 4: Pt to ascend/descend 6\" step in parallel bars, maintaining TTWB LLE, CGA to progress to home entry. MET, see LTG  Short term goal 5: Pt to propel w/c, indoor surfaces, Mod I, for home and community mobility NOT MET  Short term goal 6: Pt to get in/out of car, min +1 for transportation needs. MET, see LTG    Long term goals  Time Frame for Long term goals : 2 wks  Long term goal 1: Pt to go supine <->sit, Mod I, to get in/out of bed. No rail, bed flat NOT MET  Long term goal 2: Pt to get up/down from various seated surfaces, Mod I, to get up to walk. NOT MET  Long term goal 3: Pt to walk with RW >= 50 ft, TTWB LLE, Mod I, for home mobility NOT MET  Long term goal 4: Pt to ascend/descend 4 +4 steps with Lt rail, min +1 for home entry NOT MET  Long term goal 5: Pt to get in/out of car, SBA +1 for transportation needs.  NOT MET

## 2021-08-20 NOTE — PROGRESS NOTES
[address]  Occupational Therapy  Discharge Note    Date: 2021  Patient Name: Dyan Moran,   Gender: female      Room: [address] [place], [place] [address]-  MRN: [id number]  : 1950  (73 y.o.)  Referring Practitioner: Dr. Ariella Mcclain  Diagnosis: Displaced fracture of greater trochanter Left femur. Additional Pertinent Hx: The patient is a 73 y.o. female with presentation of left hip pain post acute injury s/p mechanical fall. Patient states she had left JATINDER done  and right JATINDER done . She has had no issues with her left hip and was ambulating fine on her own without assistance. She had a mechanical fall on 21 causing injury to left hip. She came to [facility] ED and had xrays showing left hip periprosthetic fracture and ortho was consulted. Patient is s/p ORIF left greater troch fracture, left hip arthrotomy  by Dr. Conor Jurado. 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 Admitted to [facility] on 2021. Restrictions/Precautions:  Restrictions/Precautions: Weight Bearing, General Precautions, Fall Risk  Left Lower Extremity Weight Bearing: Toe Touch Weight Bearing  Partial Weight Bearing Percentage Or Pounds: 25%  Required Braces or Orthoses  Left Lower Extremity Brace:  (hip ABD brace)  Position Activity Restriction  Hip Precautions: No hip flexion > 90 degrees, No active ABduction, No hip internal rotation, No hip external rotation  Other position/activity restrictions: 6 weeks of hip abductor brace to be worn all the time. May remove for hygiene. \"; hx of R lung removal 2021, O2 at 2 liters with activity, 1 liter at rest     SUBJECTIVE: Patient discharged to SNF for continued therapies. ASSESSMENT:  Leydi Farias has made progress during her IP rehab stay. Leydi Farias can be limited by her affect and motivation to complete activities as she hasn't been feeling well.  She has progressed to mod indep to supervision with cues for TTWBing during BADL routine,  requires supervision for functional tranfsers and short distance mobility and requires min cues for TTWB adherence and min cues for hip precaution adherence. Continued OT is recommended in the SNF to address ADL and IADL remediation, improvement of precaution adherence, and to decrease fall risk / risk of injury. Discharge Recommendations:  24 hour supervision or assist   Equipment Recommendations:  Patient has shower chair. Needs BSC and recommend LHAE hip kit however doesn't know if can afford a hip kit. Plan: Discharge to SNF     Patient Education  Patient Education: ADL's, Precautions, Equipment Education, Home Safety, Importance of Increasing Activity and Assistive Device Safety    Goals  Short term goals  Time Frame for Short term goals: 1 week  Short term goal 1: Pt will complete various t/fs including BSC with S & min vcs for technique. MET   Short term goal 2: Pt will complete LE dressing with CGA, LH AE, & 0-2 vcs for hip precautions MET  Short term goal 3: Pt will tolerate standing 2 min with 1 UE release & S for increased ease of toileting routine MET  Short term goal 4: Pt will complete mobility to/from BSC/bedside chair with S, RW, & 0-2 vcs for TTWB precautions MET  Short term goal 5: Pt will tolerate BUE moderate resistance band HEP x 10 reps with min RBs to increase UB  endurance for ADL tasks MET   Long term goals  Time Frame for Long term goals : 2 weeks  Long term goal 1: Pt will complete various t/fs including toilet with mod I NOT MET  Long term goal 2: Pt will complete BADL routine (sponge bath) with S & 0-1 vcs for safety NOT MET  Long term goal 3: Pt will complete LE dressing with S & LH AE MET   Long term goal 4: Pt will complete simple meal prep task with RW, S, & good safety awareness for maintaining TTWB (seated when needed to maintain) NOT MET     Following session, patient left in safe position with all fall risk precautions in place.

## 2021-08-20 NOTE — PROGRESS NOTES
Patient discharged in stable condition as per order of attending physician to [address] per staff at Time: [phone] to car with son to [facility]. AVS provided by RN at time of discharge, which includes all necessary medical information pertaining to the patients current course of illness, treatment, medications, post-discharge goals of care, and treatment preferences. Patient/ family verbalize understanding of discharge plan and are in agreement with goal/plan/treatment preferences. Belongings including None sent with patient. Home medications sent home with patient N/A    Availability of \"My Chart\" offered to patient as a tool for updated health record. Steps for activation discussed with patient as mentioned on AVS.        Attempted to call report to [facility]- no answer 1600  Will try again.

## 2021-08-20 NOTE — PROGRESS NOTES
Plan remains for discharge today, 08/20/2021, to [facility]. Chart copies completed for transfer. Discharge instructions faxed to facility. PASRR completed and copy on chart.

## 2021-08-20 NOTE — PLAN OF CARE
Care plan reviewed with patient. Patient verbalizes understanding of the plan of care and contribute to goal setting. Problem: Falls - Risk of:  Goal: Will remain free from falls  Description: Will remain free from falls  8/20/2021 0922 by Zheng Rasmussen LPN  Outcome: Completed  Note: Up with assist of one minimal assit. Gait belt and walker used. Uses call light appropriatly. No c/o voiced. Tolerates well. 25% weight bearing cont to left hip     Problem: Skin Integrity:  Goal: Will show no infection signs and symptoms  Description: Will show no infection signs and symptoms  Outcome: Completed  Note: No s/sx infectiono noted. Afebrile. ATB complete     Problem: Activity:  Goal: Ability to ambulate will improve  Description: Ability to ambulate will improve  Outcome: Completed     Problem: Pain:  Goal: Pain level will decrease  Description: Pain level will decrease  Outcome: Completed  Note: Denies pain     Problem: Self-Care:  Goal: Ability to meet self-care needs will improve  Description: Ability to meet self-care needs will improve  Outcome: Completed     Problem: Pain:  Goal: Control of acute pain  Description: Control of acute pain  Outcome: Completed     Problem: Bleeding:  Goal: Will show no signs and symptoms of excessive bleeding  Description: Will show no signs and symptoms of excessive bleeding  Outcome: Completed  Note: No s/sx excessive bleeding noted. Problem: Respiratory  Goal: O2 Sat > 90%  Outcome: Completed  Note: Sats at 96% on 1L/NC     Problem: Respiratory  Goal: Agreement to quit smoking  Outcome: Completed  Note: Patient states she is not going to stop smoking once she is home.       Problem: Nutrition  Goal: Optimal nutrition therapy  Outcome: Completed

## 2021-08-20 NOTE — DISCHARGE SUMMARY
Physical Medicine & Rehabilitation   Discharge Summary     Patient Identification:  Rayshawn Edenilson  : 1950  Admit date: 2021  Discharge date: 21   Attending provider: Zulema Mccracken MD        Primary care provider: NITO Hernandez - CNP     Discharge Diagnoses:   Primary impairment requiring rehabilitation: 8.11 Unilateral Hip Fracture     Etiologic Diagnosis that led to the condition: Left greater trochanter fracture     Comorbid conditions affecting rehabilitation:  1. Left hip fracture   ? S/p ORIF with Dr Paco Hassan 21  2. bilateral hip replacement  3. lung resection 2/2 neoplasm  4. COPD - stage 2  5. Tobacco abuse  6. Anemia  7. Hx of CVA  8. Essential HTN  9. CAD  10. Hx of Breast cancer  11. Depression  12. Nausea and vomiting   13. UTI,  enteric gram negative bacilli  14. Diarrhea  15. Chest discomfort     Discharge Functional Status:    Physical therapy:  Bed Mobility:    Transfers:  ,  ,    Mobility:  , PT Equipment Recommendations  Equipment Needed: No (Pt has RW, SW, cane, w/c and 4WW), Assessment: Pt demonstrates a decline in baseline by way of bed mobility, transfers, gait and stairs due to recent fall resulting in Lt hip fracture. Pt now requires min A with transfers and short distance gait with having to maintain TTWB LLE and increased LT hip pain. Pt will benefit from skilled PT to advance with endurance, functional strength and training with techniques for transfers and gait due to change in wt bearing status. Occupational therapy:  ,  , Assessment: Jevon Price has made progress meeting 5/5 STG. Jevon Price can be limited by her affect and motivation to complete activities as she hasn't been feeling well. She requires SBA for functional tranfsers and short distance mobility and requires min cues for TTWB adherence and min cues for hip precaution adherence. She requires min A for LB ADLs and requires further Yohan Quiver education and requires more IADL education.  Cont OT recomended for ADL, IADL remediation, improvement of precaution adherence, and to decrease fall risk / risk of injury. Speech therapy:  Comprehension: 6 - Complex ideas 90% or device (hearing aid or glasses- if patient is primarily a visual learner)  Expression: 6 - Device used to express complex ideas/needs  Social Interaction: 6 - Patient requires medication for mood and/or effect  Problem Solvin - Patient able to solve simple/routine tasks  Memory: 5 - Patient requires prompting with stress/unfamiliar situations      Inpatient Rehabilitation Course:   Cedric Cornelius is a 73 y.o. female admitted to inpatient rehabilitation on 2021 for left hip fracture. The patient participated in an aggressive multidisciplinary inpatient rehabilitation program involving 3 hours per day, 5 days per week of rehabilitation. Hypertension management was undertaken with medication management on any patient with systolic blood pressure greater than 899 or diastolic blood pressure greater than 90. Patient developed nausea and vomiting during her stay, she was found to have UTI and was treated with IV rocephin. Patient with noted improvement with affect and GI symptoms with treatment. Patient tolerated therapy well. Family felt patient would benefit from ongoing therapy at SNF at discharge, patient was agreeable to this. Appropriate DVT prophylaxis options were considered throughout rehabilitation stay with Lovenox. Dr Arun Lepe followed during the IPR stay for medical management    Patient was discharged Skilled Nursing facility in Stable condition.     Consults:   Family medicine    Significant Diagnostics:   CBC:   Lab Results   Component Value Date    WBC 9.1 2021    RBC 3.60 2021    RBC 4.49 2012    HGB 11.6 2021    HCT 38.7 2021    .5 2021    MCH 32.2 2021    MCHC 30.0 2021    RDW 13.7 2021     2021    MPV 10.3 2021     CMP:    Lab Results Component Value Date     08/17/2021    K 3.8 08/17/2021    K 4.0 08/07/2021     08/17/2021    CO2 27 08/17/2021    BUN 11 08/17/2021    CREATININE 0.7 08/17/2021    LABGLOM 83 08/17/2021    GLUCOSE 100 08/17/2021    GLUCOSE 94 05/16/2012    PROT 6.5 07/30/2021    LABALBU 3.8 07/30/2021    LABALBU 4.4 05/16/2012    CALCIUM 8.6 08/17/2021    BILITOT 0.5 07/30/2021    ALKPHOS 128 07/30/2021    AST 13 07/30/2021    ALT 7 07/30/2021     Hepatic Function Panel:    Lab Results   Component Value Date    ALKPHOS 128 07/30/2021    ALT 7 07/30/2021    AST 13 07/30/2021    PROT 6.5 07/30/2021    BILITOT 0.5 07/30/2021    BILIDIR <0.2 02/21/2019    LABALBU 3.8 07/30/2021    LABALBU 4.4 05/16/2012     Albumin:    Lab Results   Component Value Date    LABALBU 3.8 07/30/2021    LABALBU 4.4 05/16/2012     Calcium:    Lab Results   Component Value Date    CALCIUM 8.6 08/17/2021     Magnesium:    Lab Results   Component Value Date    MG 2.5 03/16/2021     PT/INR:    Lab Results   Component Value Date    INR 0.97 03/16/2021     PTT:    Lab Results   Component Value Date    APTT 26.5 03/16/2021     Troponin:    Lab Results   Component Value Date    TROPONINI <0.006 09/28/2013    TROPONINI 0.026 05/17/2012     Last 3 Troponin:    Lab Results   Component Value Date    TROPONINI <0.006 09/28/2013    TROPONINI <0.006 09/28/2013    TROPONINI <0.006 09/27/2013    TROPONINI 0.026 05/17/2012    TROPONINI <0.006 05/16/2012    TROPONINI 0.046 05/16/2012     U/A:    Lab Results   Component Value Date    NITRITE Negative 04/29/2015    COLORU YELLOW 08/09/2021    PROTEINU TRACE 08/09/2021    PHUR 6.0 08/09/2021    LABCAST NONE SEEN 08/04/2021    LABCAST NONE SEEN 08/04/2021    WBCUA > 100 08/09/2021    WBCUA 15-25 05/16/2012    RBCUA 0-2 08/09/2021    YEAST NONE SEEN 08/09/2021    BACTERIA MANY 08/09/2021    SPECGRAV 1.021 08/04/2021    LEUKOCYTESUR MODERATE 08/09/2021    UROBILINOGEN 1.0 08/09/2021    BILIRUBINUR NEGATIVE 08/09/2021 BILIRUBINUR NEGATIVE 05/16/2012    BLOODU NEGATIVE 08/09/2021    GLUCOSEU NEGATIVE 08/09/2021     ABG:    Lab Results   Component Value Date    PH 7.47 08/04/2021    PCO2 37 08/04/2021    PO2 71 08/04/2021    HCO3 27 08/04/2021    O2SAT 95 08/04/2021     HgBA1c:    Lab Results   Component Value Date    LABA1C 5.2 08/11/2020     FLP:    Lab Results   Component Value Date    TRIG 99 08/11/2020    HDL 40 08/11/2020    LDLCALC 115 08/11/2020     TSH:    Lab Results   Component Value Date    TSH 3.220 08/03/2021     FOLATE:    Lab Results   Component Value Date    FOLATE 7.3 08/31/2016         Patient Instructions:   SNF  Therapy orders: PT, OT and SLP   Discharge lab work: none  Code status: Full Code   Activity: activity as tolerated  Diet: ADULT DIET;  Regular  Adult Oral Nutrition Supplement; Frozen Oral Supplement    Wound Care: keep wound clean and dry    Follow-up visits: See after visit summary from hospitalization    Discharge Medications:   Wilmer Talbert   Home Medication Instructions OKB:[id number]    Printed on:08/20/21 1432   Medication Information                      acetaminophen (TYLENOL) 325 MG tablet  Take 2 tablets by mouth every 6 hours             albuterol sulfate HFA (PROVENTIL HFA) 108 (90 Base) MCG/ACT inhaler  Inhale 2 puffs into the lungs every 6 hours as needed for Wheezing             aspirin 325 MG EC tablet  Take 1 tablet by mouth daily             calcium carbonate (TUMS) 500 MG chewable tablet  Take 1 tablet by mouth 3 times daily as needed for Heartburn             Cholecalciferol (VITAMIN D) 25 MCG TABS  Take 1 tablet by mouth daily             diphenhydrAMINE (BENADRYL) 25 MG tablet  Take 25 mg by mouth every 6 hours as needed for Itching             docusate sodium (COLACE, DULCOLAX) 100 MG CAPS  Take 100 mg by mouth 2 times daily as needed for Constipation             famotidine (PEPCID) 20 MG tablet  Take 1 tablet by mouth daily             ferrous sulfate (IRON 325) 325 (65 Fe) MG tablet  Take 1 tablet by mouth 2 times daily (with meals)             fluticasone-umeclidin-vilant (TRELEGY ELLIPTA) 100-62.5-25 MCG/INH AEPB  Inhale 1 puff into the lungs daily Rinse mouth with water and spit without swallowing after each dose.              guaiFENesin (MUCINEX) 600 MG extended release tablet  Take 1 tablet by mouth 2 times daily             levothyroxine (LEVOTHROID) 88 MCG tablet  Take 1 tablet by mouth daily             loperamide (IMODIUM) 2 MG capsule  Take 1 capsule by mouth 4 times daily as needed for Diarrhea             ondansetron (ZOFRAN-ODT) 4 MG disintegrating tablet  Take 1 tablet by mouth every 8 hours as needed for Nausea or Vomiting             senna (SENOKOT) 8.6 MG tablet  Take 2 tablets by mouth nightly as needed (constipation)                 35 minutes spent preparing the patient for discharge    Colonel Scott, APRN - CNP

## 2021-08-21 NOTE — PROGRESS NOTES
H&P (Admission H&P at [facility])      NAME: Servando Keane  DATE: 21  ROOM #: [id number]  CODE STATUS: FULL CODE  REASON FOR ADMISSION: L hip fxr. : 1950  ADMISSION DATE: 2021  SKILLED PATIENT: Yes    History obtained from chart review, the patient and nursing staff. SUBJECTIVE:  HPI: Servando Keane is a 73 y.o. female. Pt seen and examined at bedside.     -Patient admitted to [facility] from  to  for issues as noted below. D/c summary per hospital team:  \"Hospital Course:   Servando Keane is a 73 y.o. female admitted to [address] [place] [address] on 2021 for left greater trochanter periprosthetic femur fracture. Patient underwent medical optimization on admission. Patient was taken to surgery 2021. Her femoral stem was noted to be stable but she had a unstable greater trochanter fracture. She subsequently underwent ORIF. Post-operatively the patients diet was advanced as tolerated and their dressing remained clean, dry and intact with no signs of infection. Her hemovac drain was pulled POD 2. The patient remained neurovascularly intact in the lower extremity and had intact pulses distally. Patients calf remained soft and showed no evidence of DVT. The patient was able to move their leg and ankle/foot without any problems post-operatively. Physical therapy and occupational therapy were consulted and began working with the patient post-operatively. The patient progressed with PT/OT as would be expected and continued to improve through their stay. The patients pain was initially controlled with IV medications but we were able to transition to oral pain medications soon after arrival to the floor and their pain remained under good control through their hospital stay. From a medical standpoint the patient remained stable and continued to have the medicine team follow throughout their stay.   The patient will be discharged at this time to Rehab with their current diet mentions hx of. Not on statin. Denies cP/SOB    -GERD: on pepcid. sxs controlled. No dysphagia. -Hypothyroidism: on synthroid. Tolerating well. No fatigue or wt changes    -Vit D def: on supplementation: tolerating well    -Smokin PPD. No intentions of quitting, even with lung cancer      Allergies and Medications were reviewed through the [facility] EMR. All medications reviewed and reconciled, including OTC and herbal medications.        Patient Active Problem List    Diagnosis Date Noted    OA (osteoarthritis) of hip 2015     Priority: High     Class: Chronic    Hip fracture requiring operative repair, left, closed, initial encounter ([address] 2021    Closed left hip fracture, initial encounter ([address] 2021    Postoperative urinary retention 06/15/2021    Cancer of lower lobe of right lung ([address] 2021    S/P robotic assisted nonanatomic wedge resection of lateral basilar right lower lobe lung mass and mediastinal dissection 2021    SOB (shortness of breath) on exertion 2021    Dizziness on standing 2021    History of syncope 2021    Primary malignant neoplasm of right lower lobe of lung ([address] 2021    Severe malnutrition ([address] 03/15/2021     Class: Chronic    Syncope and collapse 2021    Solitary pulmonary nodule on lung CT     Lumbar radiculitis     Spinal stenosis of lumbar region without neurogenic claudication     History of chest pain atypical 03/15/2018    S/P cardiac cath nonobstructive lesion 2014 03/15/2018    Chest pain, atypical 2018    Tobacco abuse 2018    Transient cerebral ischemia 10/04/2015    Aortic insufficiency 10/04/2015    Essential hypertension     CAD (coronary artery disease)     Stage 2 moderate COPD by GOLD classification ([address] 2013    Carotid occlusion, left 2013    Vasovagal syncope 2013    History of CVA (cerebrovascular accident) 2013    Noncompliance 2012    Incontinence 10/17/2012    Chronic headachess/p head injjury s/p fall 09/12/2012    Depression 09/12/2012    Smoker 09/12/2012    Hypothyroidism 09/12/2012    Allergic rhinitis 09/12/2012    Hyperlipidemia 09/14/2011    History of breast cancer 09/14/2011    Vitamin D deficiency 09/14/2011       Past Medical History:   Diagnosis Date    Anxiety 2007    Arthritis     Breast cancer [facility]) 2005    right mastectomy, chemo and radiation history    CAD (coronary artery disease) 2001    sees Dr Jeromy Cabral of the skin 2004    Cerebral artery occlusion with cerebral infarction ([address]     Chronic UTI     COPD (chronic obstructive pulmonary disease) ([address]     sees RL Petersen    CVA (cerebral infarction) 2007, 2008    Depression 2007    DVT of lower extremity (deep venous thrombosis) ([address] 1976    Facial injury 2005    Fall on greasy ground, striking left periorbital region    History of stroke 2007, 2008, 2009    Patient relates a stroke with right weakness and speech in 2007; another in 2010 or 2012 (unsure), both with need to rehabilitation.   Also \"2 mini-strokes\" (?)    Hx of blood clots 1977    hip, leg    Hyperlipidemia 2005    Hypertension 2005    Hypothyroidism     IBS (irritable bowel syndrome)     Low HDL (under 40) 2014    Lung cancer [facility])     sees Dr Giovany Zelaya    Prolonged emergence from general anesthesia     Recurrent UTI (urinary tract infection) 2015    Dr Prince Warren finding difficulty 05/16/2017    work-up in progress       Past Surgical History:   Procedure Laterality Date   [address] Right 04/01/2005    evacuation of breast hematoma-Dr. Haritha Doran BREAST SURGERY Right 11/30/2004    lymphatic mapping, SLN bx x2-Dr. Tasha Okeefe    COLONOSCOPY  2009    Dr. Deborah Allen  03/16/2021    CT NEEDLE BIOPSY LUNG PERCUTANEOUS 3/16/2021 [facility] CT SCAN    FEMUR FRACTURE SURGERY Left 8/2/2021    FEMUR OPEN REDUCTION INTERNAL FIXATION performed by Mendel Cole MD at [address]  01/19/2015    HYSTERECTOMY  1976    JOINT REPLACEMENT Left 2011    HIP    JOINT REPLACEMENT Right 01/19/2015    Right Total Hip Replacement - Dr. Grace Elliott, TOTAL Right 6/14/2021    ROBOTIC ASSISTED RIGHT LOWER LOBE SUPERIOR SEGMENTECTOMY AND MEDIASTINAL DISSECTION, CRYOTHERAPY OF INTERCOSTAL NERVES performed by Darrell Barron MD at [address] Right 2005    Dr. Reta Richardson  04/10/2018    Lumbar epidural steroid injection at L4    IN NJX DX/THER SBST INTRLMNR LMBR/SAC W/IMG GDN N/A 04/10/2018    LESI  @ L4 performed by Nataliya Martinez MD at [address] [facility], 2001    ovaries    THYROID LOBECTOMY Left 11/26/2010    left thyroid lobectomy with isthmusectomy-Dr. Diallo Nelson THYROID SURGERY  2007    right thyroid lobectomy-Dr. Ibanez       Allergies   Allergen Reactions    Cipro Xr     Vicodin [Hydrocodone-Acetaminophen]      Weird dreams         Social History     Tobacco Use    Smoking status: Current Every Day Smoker     Packs/day: 2.00     Years: 48.00     Pack years: 96.00     Types: Cigarettes     Start date: 11/13/1967    Smokeless tobacco: Never Used    Tobacco comment: Currently at 1 pk/day   Substance Use Topics    Alcohol use: No     Alcohol/week: 0.0 standard drinks        Family History   Problem Relation Age of Onset    Osteoporosis Mother     Hypertension Mother     High Cholesterol Mother     Stroke Mother     Colon Cancer Mother     Cancer Father         lung cancer    Heart Disease Father     Hypertension Father     High Cholesterol Father     Heart Disease Sister     High Cholesterol Sister     Hypertension Sister     Asthma Sister     Other Other         high arched feet    Lung Cancer Son        I have reviewed the patient's past medical history, past surgical history, allergies, medications, social and family history and I have made updates where appropriate. Review of Systems  Positive responses are highlighted in bold    Constitutional:  Fever, Chills, Night Sweats, Fatigue, Unexpected changes in weight  Eyes:  Eye discharge, Eye pain, Eye redness, Visual disturbances   HENT:  Ear pain, Tinnitus, Nosebleeds, Trouble swallowing, Hearing loss, Sore throat  Cardiovascular:  Chest Pain, Palpitations, Orthopnea, Paroxysmal Nocturnal Dyspnea  Respiratory:  Cough, Wheezing, Shortness of breath, Chest tightness, Apnea  Gastrointestinal:  Nausea, Vomiting, Diarrhea, Constipation, Heartburn, Blood in stool  Genitourinary:  Difficulty or painful urination, Flank pain, Change in frequency, Urgency  Skin:  Color change, Rash, Itching, Wound  Psychiatric:  Hallucinations, Anxiety, Depression, Suicidal ideation  Hematological:  Enlarged glands, Easy bleeding, Easily bruising  Musculoskeletal:  Joint pain, Back pain, Gait problems, Joint swelling, Myalgias  Neurological:  Dizziness, Headaches, Presyncope, Numbness, Seizures, Tremors  Allergy:  Environmental allergies, Food allergies  Endocrine:  Heat Intolerance, Cold Intolerance, Polydipsia, Polyphagia, Polyuria      PHYSICAL EXAM:  Vitals:    08/25/21 1345   BP: 126/65   Pulse: 86   Resp: 18   Temp: 98.6 °F (37 °C)   Weight: 127 lb (57.6 kg)   Height: 5' 4\" (1.626 m)     Body mass index is 21.8 kg/m².   Pain Score:   3 (hips/back)    VS Reviewed  General Appearance: well developed and well- nourished, in no acute distress  Head: normocephalic and atraumatic  Eyes: pupils equal, round, and reactive to light, conjunctivae and eye lids without erythema  ENT: external ear and ear canal normal bilaterally, nose without deformity, nasal mucosa and turbinates normal without polyps, oropharynx normal, dentition is normal for age, no lip or gum lesions noted  Neck: supple and non-tender without mass, no thyromegaly or thyroid nodules, no cervical lymphadenopathy  Pulmonary/Chest: distant but clear to auscultation bilaterally- no wheezes, rales or rhonchi, normal air movement, no respiratory distress or retractions  Cardiovascular: normal rate, regular rhythm, normal S1 and S2, no murmurs, rubs, clicks, or gallops, distal pulses intact  Abdomen: soft, non-tender, non-distended, bowel sounds physiologic,  no rebound or guarding, no masses or hernias noted. Liver and spleen without enlargement. Extremities: no cyanosis, clubbing or edema of the lower extremities  Musculoskeletal: No joint swelling or gross deformity   Neuro:  Alert, 5/5 strength globally and symmetrically, 2+ patellar reflexes b/l,  normal speech, no focal findings or movement disorder noted  Psych:  Normal affect without evidence of depression or anxiety, insight and judgement are appropriate, memory appears intact  Skin: warm and dry, no rash or erythema. L hip incision is clean, dry and intact. Lymph:  No cervical, auricular or supraclavicular lymph nodes palpated      LABS/IMAGING    Recent Labs     08/17/21  1018 08/10/21  1521 08/07/21  0540   WBC 9.1 9.5 3.7*   HGB 11.6* 9.5* 9.0*   HCT 38.7 31.3* 29.6*   .5* 103.6* 100.7*   * 317 251     Lab Results   Component Value Date     08/17/2021    K 3.8 08/17/2021     08/17/2021    CO2 27 08/17/2021    BUN 11 08/17/2021    CREATININE 0.7 08/17/2021    GLUCOSE 100 08/17/2021    CALCIUM 8.6 08/17/2021    PROT 6.5 07/30/2021    LABALBU 3.8 07/30/2021    BILITOT 0.5 07/30/2021    ALKPHOS 128 (H) 07/30/2021    AST 13 07/30/2021    ALT 7 (L) 07/30/2021    LABGLOM 83 (A) 08/17/2021       Narrative   PROCEDURE: XR PELVIS (1-2 VIEWS)       CLINICAL INFORMATION: s/p ORIF left greater trochanter .       COMPARISON: No prior study.       TECHNIQUE: Single AP view pelvis       FINDINGS: Single AP view the pelvis demonstrates a reduction internal fixation left proximal femur sideplate and cerclage wires are present.  No displaced fractures currently seen           Impression   Open reduction internal fixation left hip fracture               **This report has been created using voice recognition software.  It may contain minor errors which are inherent in voice recognition technology. **       Final report electronically signed by Dr. Dejon Harrell on 8/2/2021 4:52 PM       ASSESSMENT & PLAN   Diagnosis Orders   1. Physical deconditioning     2. Closed fracture of left hip, initial encounter ([facility]     3. S/P ORIF (open reduction internal fixation) fracture     4. Acute urinary tract infection     5. Severe malnutrition ([address]     6. Iron deficiency anemia, unspecified iron deficiency anemia type     7. Cancer of lower lobe of right lung ([address]     8. S/P robotic assisted nonanatomic wedge resection of lateral basilar right lower lobe lung mass and mediastinal dissection     9. Chronic obstructive pulmonary disease, unspecified COPD type ([facility]     10. Chronic respiratory failure with hypoxia (HCC)     11. History of CVA (cerebrovascular accident)     15. ASHD (arteriosclerotic heart disease)     13. Gastroesophageal reflux disease, unspecified whether esophagitis present     14. Hypothyroidism, unspecified type     15. Vitamin D deficiency     16. Cigarette nicotine dependence without complication       -Physical deconditioning: improving. con't PT/OT  -L hip fracture/status-post ORIF: stable, con't pain control, PT/OT, fall precautions and f/u ortho. Defer osteoporosis treatment to PCP at d/c.  -UTI: improved, off ATB. Will moniotor  -Malnutrition: improving, will have nutrition evaluation  -KALEY: stable, con't iron supplements. F/u PCP as OP  -NSCLC RLL: s/p wedge resection. Stage 1. Following with oncology. Has f/u apts for CT and apt with Dr. Stephenson Aid. Keep that. -COPD/Respiratory failure: stable, con't O2, Trelegy and prn albuterol.   -Hx of CVA: stable, con't asa. -CAD: stable, con't asa.    -GERD: stable, con't Pepcid.   -Hypothyroidism: stable, con't synthroid. -Vit D def: stable, con't supplementation.   -Smoking: In precontemplation stage and not ready to quit. Declines cessation, aware of risks of continued smoking as well as resources available to help quit. These include tobacco cessation classes as well as 1-800-QUIT NOW. No barriers other than lack of desire. 3+ min spent counseling. Disposition: FULL CODE. Con't PT/OT. Reassess 30 days, sooner prn.        Future Appointments   Date Time Provider Zayra Upton   11/3/2021  3:00 PM STR CT IMAGING RM[facility]  [facility] EXP [facility]   11/10/2021 12:40 PM Leyda Quinones MD [facility] - [address]       Electronically signed by Samuel Jacobs DO on 8/25/2021 at 2:01 PM

## 2021-08-25 ENCOUNTER — OUTSIDE SERVICES (OUTPATIENT)
Dept: FAMILY MEDICINE CLINIC | Age: 71
End: 2021-08-25
Payer: MEDICARE

## 2021-08-25 VITALS
BODY MASS INDEX: 21.68 KG/M2 | SYSTOLIC BLOOD PRESSURE: 126 MMHG | DIASTOLIC BLOOD PRESSURE: 65 MMHG | HEIGHT: 64 IN | HEART RATE: 86 BPM | TEMPERATURE: 98.6 F | WEIGHT: 127 LBS | RESPIRATION RATE: 18 BRPM

## 2021-08-25 DIAGNOSIS — D50.9 IRON DEFICIENCY ANEMIA, UNSPECIFIED IRON DEFICIENCY ANEMIA TYPE: ICD-10-CM

## 2021-08-25 DIAGNOSIS — Z98.890 S/P ORIF (OPEN REDUCTION INTERNAL FIXATION) FRACTURE: ICD-10-CM

## 2021-08-25 DIAGNOSIS — F17.210 CIGARETTE NICOTINE DEPENDENCE WITHOUT COMPLICATION: ICD-10-CM

## 2021-08-25 DIAGNOSIS — E55.9 VITAMIN D DEFICIENCY: ICD-10-CM

## 2021-08-25 DIAGNOSIS — Z87.81 S/P ORIF (OPEN REDUCTION INTERNAL FIXATION) FRACTURE: ICD-10-CM

## 2021-08-25 DIAGNOSIS — C34.31 CANCER OF LOWER LOBE OF RIGHT LUNG (HCC): ICD-10-CM

## 2021-08-25 DIAGNOSIS — Z86.73 HISTORY OF CVA (CEREBROVASCULAR ACCIDENT): ICD-10-CM

## 2021-08-25 DIAGNOSIS — E03.9 HYPOTHYROIDISM, UNSPECIFIED TYPE: ICD-10-CM

## 2021-08-25 DIAGNOSIS — E43 SEVERE MALNUTRITION (HCC): Chronic | ICD-10-CM

## 2021-08-25 DIAGNOSIS — I25.10 ASHD (ARTERIOSCLEROTIC HEART DISEASE): ICD-10-CM

## 2021-08-25 DIAGNOSIS — R53.81 PHYSICAL DECONDITIONING: Primary | ICD-10-CM

## 2021-08-25 DIAGNOSIS — J96.11 CHRONIC RESPIRATORY FAILURE WITH HYPOXIA (HCC): ICD-10-CM

## 2021-08-25 DIAGNOSIS — S72.002A CLOSED FRACTURE OF LEFT HIP, INITIAL ENCOUNTER (HCC): ICD-10-CM

## 2021-08-25 DIAGNOSIS — N39.0 ACUTE URINARY TRACT INFECTION: ICD-10-CM

## 2021-08-25 DIAGNOSIS — K21.9 GASTROESOPHAGEAL REFLUX DISEASE, UNSPECIFIED WHETHER ESOPHAGITIS PRESENT: ICD-10-CM

## 2021-08-25 DIAGNOSIS — J44.9 CHRONIC OBSTRUCTIVE PULMONARY DISEASE, UNSPECIFIED COPD TYPE (HCC): ICD-10-CM

## 2021-08-25 DIAGNOSIS — Z90.2 S/P PARTIAL LOBECTOMY OF LUNG: ICD-10-CM

## 2021-08-25 PROCEDURE — 99305 1ST NF CARE MODERATE MDM 35: CPT | Performed by: FAMILY MEDICINE

## 2021-10-05 ENCOUNTER — APPOINTMENT (OUTPATIENT)
Dept: GENERAL RADIOLOGY | Age: 71
End: 2021-10-05
Payer: MEDICARE

## 2021-10-05 ENCOUNTER — APPOINTMENT (OUTPATIENT)
Dept: CT IMAGING | Age: 71
End: 2021-10-05
Payer: MEDICARE

## 2021-10-05 ENCOUNTER — HOSPITAL ENCOUNTER (EMERGENCY)
Age: 71
Discharge: HOME | End: 2021-10-05
Attending: EMERGENCY MEDICINE
Payer: MEDICARE

## 2021-10-05 VITALS
OXYGEN SATURATION: 91 % | BODY MASS INDEX: 24.24 KG/M2 | TEMPERATURE: 98.2 F | WEIGHT: 142 LBS | SYSTOLIC BLOOD PRESSURE: 126 MMHG | RESPIRATION RATE: 20 BRPM | HEART RATE: 58 BPM | DIASTOLIC BLOOD PRESSURE: 55 MMHG | HEIGHT: 64 IN

## 2021-10-05 DIAGNOSIS — R07.89 CHEST WALL PAIN: Primary | ICD-10-CM

## 2021-10-05 DIAGNOSIS — R93.89 ABNORMAL CT SCAN: ICD-10-CM

## 2021-10-05 LAB
ALBUMIN SERPL-MCNC: 3.8 G/DL (ref 3.5–5.1)
ALP BLD-CCNC: 139 U/L (ref 38–126)
ALT SERPL-CCNC: 8 U/L (ref 11–66)
ANION GAP SERPL CALCULATED.3IONS-SCNC: 14 MEQ/L (ref 8–16)
AST SERPL-CCNC: 13 U/L (ref 5–40)
BASOPHILS # BLD: 0.3 %
BASOPHILS ABSOLUTE: 0 THOU/MM3 (ref 0–0.1)
BILIRUB SERPL-MCNC: 0.5 MG/DL (ref 0.3–1.2)
BUN BLDV-MCNC: 24 MG/DL (ref 7–22)
CALCIUM SERPL-MCNC: 8.9 MG/DL (ref 8.5–10.5)
CHLORIDE BLD-SCNC: 101 MEQ/L (ref 98–111)
CO2: 24 MEQ/L (ref 23–33)
CREAT SERPL-MCNC: 1.1 MG/DL (ref 0.4–1.2)
EKG ATRIAL RATE: 74 BPM
EKG P AXIS: 64 DEGREES
EKG P-R INTERVAL: 150 MS
EKG Q-T INTERVAL: 390 MS
EKG QRS DURATION: 82 MS
EKG QTC CALCULATION (BAZETT): 432 MS
EKG R AXIS: 66 DEGREES
EKG T AXIS: 59 DEGREES
EKG VENTRICULAR RATE: 74 BPM
EOSINOPHIL # BLD: 2 %
EOSINOPHILS ABSOLUTE: 0.1 THOU/MM3 (ref 0–0.4)
ERYTHROCYTE [DISTWIDTH] IN BLOOD BY AUTOMATED COUNT: 14.2 % (ref 11.5–14.5)
ERYTHROCYTE [DISTWIDTH] IN BLOOD BY AUTOMATED COUNT: 53.4 FL (ref 35–45)
GFR SERPL CREATININE-BSD FRML MDRD: 49 ML/MIN/1.73M2
GLUCOSE BLD-MCNC: 157 MG/DL (ref 70–108)
HCT VFR BLD CALC: 47.7 % (ref 37–47)
HEMOGLOBIN: 15.1 GM/DL (ref 12–16)
IMMATURE GRANS (ABS): 0.05 THOU/MM3 (ref 0–0.07)
IMMATURE GRANULOCYTES: 0.8 %
LYMPHOCYTES # BLD: 15.3 %
LYMPHOCYTES ABSOLUTE: 1 THOU/MM3 (ref 1–4.8)
MAGNESIUM: 2.3 MG/DL (ref 1.6–2.4)
MCH RBC QN AUTO: 32.3 PG (ref 26–33)
MCHC RBC AUTO-ENTMCNC: 31.7 GM/DL (ref 32.2–35.5)
MCV RBC AUTO: 101.9 FL (ref 81–99)
MONOCYTES # BLD: 6.5 %
MONOCYTES ABSOLUTE: 0.4 THOU/MM3 (ref 0.4–1.3)
NUCLEATED RED BLOOD CELLS: 0 /100 WBC
PLATELET # BLD: 274 THOU/MM3 (ref 130–400)
PMV BLD AUTO: 11.2 FL (ref 9.4–12.4)
POTASSIUM REFLEX MAGNESIUM: 3.5 MEQ/L (ref 3.5–5.2)
RBC # BLD: 4.68 MILL/MM3 (ref 4.2–5.4)
SEG NEUTROPHILS: 75.1 %
SEGMENTED NEUTROPHILS ABSOLUTE COUNT: 4.8 THOU/MM3 (ref 1.8–7.7)
SODIUM BLD-SCNC: 139 MEQ/L (ref 135–145)
TOTAL PROTEIN: 7.7 G/DL (ref 6.1–8)
TROPONIN T: < 0.01 NG/ML
WBC # BLD: 6.4 THOU/MM3 (ref 4.8–10.8)

## 2021-10-05 PROCEDURE — 71046 X-RAY EXAM CHEST 2 VIEWS: CPT

## 2021-10-05 PROCEDURE — 93005 ELECTROCARDIOGRAM TRACING: CPT | Performed by: EMERGENCY MEDICINE

## 2021-10-05 PROCEDURE — 6370000000 HC RX 637 (ALT 250 FOR IP): Performed by: EMERGENCY MEDICINE

## 2021-10-05 PROCEDURE — 84484 ASSAY OF TROPONIN QUANT: CPT

## 2021-10-05 PROCEDURE — 6360000004 HC RX CONTRAST MEDICATION: Performed by: EMERGENCY MEDICINE

## 2021-10-05 PROCEDURE — 36415 COLL VENOUS BLD VENIPUNCTURE: CPT

## 2021-10-05 PROCEDURE — 83735 ASSAY OF MAGNESIUM: CPT

## 2021-10-05 PROCEDURE — 85025 COMPLETE CBC W/AUTO DIFF WBC: CPT

## 2021-10-05 PROCEDURE — 80053 COMPREHEN METABOLIC PANEL: CPT

## 2021-10-05 PROCEDURE — 71275 CT ANGIOGRAPHY CHEST: CPT

## 2021-10-05 PROCEDURE — 99284 EMERGENCY DEPT VISIT MOD MDM: CPT

## 2021-10-05 RX ORDER — OXYCODONE HYDROCHLORIDE AND ACETAMINOPHEN 5; 325 MG/1; MG/1
1 TABLET ORAL ONCE
Status: DISCONTINUED | OUTPATIENT
Start: 2021-10-05 | End: 2021-10-05

## 2021-10-05 RX ORDER — OXYCODONE HYDROCHLORIDE AND ACETAMINOPHEN 5; 325 MG/1; MG/1
1 TABLET ORAL ONCE
Status: COMPLETED | OUTPATIENT
Start: 2021-10-05 | End: 2021-10-05

## 2021-10-05 RX ORDER — OXYCODONE HYDROCHLORIDE AND ACETAMINOPHEN 5; 325 MG/1; MG/1
1 TABLET ORAL EVERY 6 HOURS PRN
Qty: 12 TABLET | Refills: 0 | Status: SHIPPED | OUTPATIENT
Start: 2021-10-05 | End: 2021-10-08

## 2021-10-05 RX ORDER — DOXYCYCLINE HYCLATE 100 MG
100 TABLET ORAL 2 TIMES DAILY
Qty: 20 TABLET | Refills: 0 | Status: SHIPPED | OUTPATIENT
Start: 2021-10-05 | End: 2021-10-15

## 2021-10-05 RX ADMIN — IOPAMIDOL 80 ML: 755 INJECTION, SOLUTION INTRAVENOUS at 19:42

## 2021-10-05 RX ADMIN — OXYCODONE HYDROCHLORIDE AND ACETAMINOPHEN 1 TABLET: 5; 325 TABLET ORAL at 18:34

## 2021-10-05 ASSESSMENT — ENCOUNTER SYMPTOMS
ABDOMINAL PAIN: 0
SHORTNESS OF BREATH: 0
VOMITING: 0
COUGH: 1
COLOR CHANGE: 0

## 2021-10-05 ASSESSMENT — PAIN DESCRIPTION - PAIN TYPE: TYPE: ACUTE PAIN

## 2021-10-05 ASSESSMENT — PAIN SCALES - GENERAL: PAINLEVEL_OUTOF10: 10

## 2021-10-05 ASSESSMENT — PAIN DESCRIPTION - LOCATION: LOCATION: RIB CAGE

## 2021-10-05 NOTE — ED NOTES
Pt resting in bed. Resp regudlar. Family at side. Denies all needs. Call light in reach.       Everton Hylton RN  10/05/21 [id number]

## 2021-10-05 NOTE — ED NOTES
ED nurse-to-nurse bedside report    Chief Complaint   Patient presents with    Rib Pain      LOC: alert and orientated to name, place, date  Vital signs   Vitals:    10/05/21 1608 10/05/21 1742 10/05/21 1849   BP: 118/72 (!) 128/57 (!) 132/50   Pulse: 91 84 66   Resp: 21 18 18   Temp: 98.2 °F (36.8 °C)     TempSrc: Oral     SpO2: 90% 95% 96%   Weight: 142 lb (64.4 kg)     Height: 5' 4\" (1.626 m)        Pain:    Pain Interventions: percocet  Pain Goal:   Oxygen: No    Current needs required ra   Telemetry: Yes  LDAs:   Peripheral IV 10/05/21 Left Antecubital (Active)   Site Assessment Clean;Dry; Intact 10/05/21 1849   Line Status Normal saline locked 10/05/21 1849   Dressing Status Clean;Dry; Intact 10/05/21 1849     Continuous Infusions:   Mobility: Requires assistance * 1  Guerrero Fall Risk Score: Fall Risk 8/12/2020 1/10/2019 7/25/2017 6/21/2017 5/22/2017   2 or more falls in past year? no no no no yes   Fall with injury in past year? no no no no yes     Fall Interventions: sr up x 2.    Report given to: Helga Whitaker RN  10/05/21 1491

## 2021-10-05 NOTE — ED TRIAGE NOTES
Presents to ER with complaints of worsening lung/rib pain. Pt states she has had surgeries to remove part of her lung and normally wears 2L NC. Pt rates pain in ribs a 10 out of 10 in severity.
oral

## 2021-10-05 NOTE — ED NOTES
Assessment completed. Resp regular. Denies needs. Family at side. Call light in reach.       Mare Matthews RN  10/05/21 [id number]

## 2021-10-05 NOTE — ED PROVIDER NOTES
[facility] EMERGENCY DEPT    EMERGENCY MEDICINE     Pt Name: Wally Cheney  MRN: [id number]  [place] 1950  Date of evaluation: 10/5/2021  Provider: Kelly Camarena MD    CHIEF COMPLAINT       Chief Complaint   Patient presents with    Rib Pain       HISTORY OF PRESENT ILLNESS    Wally Cheney is a pleasant 73 y.o. female who presents to the emergency department from home complaining of ~1 week of R sided chest and rib pain. She reports in June she had surgery to remove lung cancer where the ribs are hurting. She reports she was feeling well until a week ago. She reports a cough, nonproductive, no fever, no shortness of breath, no radiation of the pain, no n/v/d. She reports the pain is worse when pressing on the area. No other complaints, no other known exacerbating/relieving factors. Triage notes and Nursing notes were reviewed by myself. Any discrepancies are addressed above. PAST MEDICAL HISTORY     Past Medical History:   Diagnosis Date    Anxiety 2007    Arthritis     Breast cancer [facility]) 2005    right mastectomy, chemo and radiation history    CAD (coronary artery disease) 2001    sees Dr Martines Erp of the skin 2004    Cerebral artery occlusion with cerebral infarction ([address]     Chronic UTI     COPD (chronic obstructive pulmonary disease) ([address]     sees RL Petersen    CVA (cerebral infarction) 2007, 2008    Depression 2007    DVT of lower extremity (deep venous thrombosis) ([address] 1976    Facial injury 2005    Fall on greasy ground, striking left periorbital region    History of stroke 2007, 2008, 2009    Patient relates a stroke with right weakness and speech in 2007; another in 2010 or 2012 (unsure), both with need to rehabilitation.   Also \"2 mini-strokes\" (?)    Hx of blood clots 1977    hip, leg    Hyperlipidemia 2005    Hypertension 2005    Hypothyroidism     IBS (irritable bowel syndrome)     Low HDL (under 40) 2014    Lung cancer [facility])     sees Dr Jessica Thomas  Prolonged emergence from general anesthesia     Recurrent UTI (urinary tract infection) 2015    Dr Yuri Briones finding difficulty 05/16/2017    work-up in progress       SURGICAL HISTORY       Past Surgical History:   Procedure Laterality Date    APPENDECTOMY  1975    BREAST SURGERY Right 04/01/2005    evacuation of breast hematoma-Dr. Naye Tena    BREAST SURGERY Right 11/30/2004    lymphatic mapping, SLN bx x2-Dr. Diane Hoffman    COLONOSCOPY  2009    Dr. Paris Valencia  03/16/2021    CT NEEDLE BIOPSY LUNG PERCUTANEOUS 3/16/2021 [facility] CT SCAN    FEMUR FRACTURE SURGERY Left 8/2/2021    FEMUR OPEN REDUCTION INTERNAL FIXATION performed by Osorio Huston MD at [address] [place] [address]  01/19/2015    HYSTERECTOMY  1976    JOINT REPLACEMENT Left 2011    HIP    JOINT REPLACEMENT Right 01/19/2015    Right Total Hip Replacement - Dr. Potter Nip, TOTAL Right 6/14/2021    ROBOTIC ASSISTED RIGHT LOWER LOBE SUPERIOR SEGMENTECTOMY AND MEDIASTINAL DISSECTION, CRYOTHERAPY OF INTERCOSTAL NERVES performed by Margaret Suresh MD at [address] Right 2005    Dr. Kanwal Chung  04/10/2018    Lumbar epidural steroid injection at L4    OR NJX DX/THER SBST INTRLMNR LMBR/SAC W/IMG GDN N/A 04/10/2018    LESI  @ L4 performed by Rosaura Pérez MD at [address] [facility], 2001    ovaries    THYROID LOBECTOMY Left 11/26/2010    left thyroid lobectomy with isthmusectomy-Dr. She Alexander THYROID SURGERY  2007    right thyroid lobectomy-Dr. Ibanez       CURRENT MEDICATIONS       Previous Medications    ACETAMINOPHEN (TYLENOL) 325 MG TABLET    Take 2 tablets by mouth every 6 hours    ALBUTEROL SULFATE HFA (PROVENTIL HFA) 108 (90 BASE) MCG/ACT INHALER    Inhale 2 puffs into the lungs every 6 hours as needed for Wheezing    ASPIRIN 325 MG EC TABLET    Take 1 tablet by mouth daily    CHOLECALCIFEROL (VITAMIN D) 25 MCG TABS    Take 1 tablet by mouth daily    DIPHENHYDRAMINE (BENADRYL) 25 MG TABLET    Take 25 mg by mouth every 6 hours as needed for Itching    DOCUSATE SODIUM (COLACE, DULCOLAX) 100 MG CAPS    Take 100 mg by mouth 2 times daily as needed for Constipation    FAMOTIDINE (PEPCID) 20 MG TABLET    Take 1 tablet by mouth daily    FERROUS SULFATE (IRON 325) 325 (65 FE) MG TABLET    Take 1 tablet by mouth 2 times daily (with meals)    FLUTICASONE-UMECLIDIN-VILANT (TRELEGY ELLIPTA) 100-62.5-25 MCG/INH AEPB    Inhale 1 puff into the lungs daily Rinse mouth with water and spit without swallowing after each dose. GUAIFENESIN (MUCINEX) 600 MG EXTENDED RELEASE TABLET    Take 1 tablet by mouth 2 times daily    LEVOTHYROXINE (LEVOTHROID) 88 MCG TABLET    Take 1 tablet by mouth daily    ONDANSETRON (ZOFRAN-ODT) 4 MG DISINTEGRATING TABLET    Take 1 tablet by mouth every 8 hours as needed for Nausea or Vomiting       ALLERGIES     Cipro xr and Vicodin [hydrocodone-acetaminophen]    FAMILY HISTORY       Family History   Problem Relation Age of Onset    Osteoporosis Mother     Hypertension Mother     High Cholesterol Mother     Stroke Mother     Colon Cancer Mother     Cancer Father         lung cancer    Heart Disease Father     Hypertension Father     High Cholesterol Father     Heart Disease Sister     High Cholesterol Sister     Hypertension Sister     Asthma Sister     Other Other         high arched feet    Lung Cancer Son         SOCIAL HISTORY       Social History     Socioeconomic History    Marital status:       Spouse name: Not on file    Number of children: 3    Years of education: 5    Highest education level: Not on file   Occupational History    Occupation: Disabled   Tobacco Use    Smoking status: Current Every Day Smoker     Packs/day: 2.00     Years: 48.00     Pack years: 96.00     Types: Cigarettes     Start date: 11/13/1967    Smokeless tobacco: Never Used    Tobacco comment: Currently at 1 pk/day   Vaping Use    Vaping Use: Never used   Substance and Sexual Activity    Alcohol use: No     Alcohol/week: 0.0 standard drinks    Drug use: No    Sexual activity: Yes     Partners: Male   Other Topics Concern    Not on file   Social History Narrative    Not on file     Social Determinants of Health     Financial Resource Strain:     Difficulty of Paying Living Expenses:    Food Insecurity:     Worried About Running Out of Food in the Last Year:     [address] in the Last Year:    Transportation Needs:     Lack of Transportation (Medical):  Lack of Transportation (Non-Medical):    Physical Activity:     Days of Exercise per Week:     Minutes of Exercise per Session:    Stress:     Feeling of Stress :    Social Connections:     Frequency of Communication with Friends and Family:     Frequency of Social Gatherings with Friends and Family:     Attends Muslim Services:     Active Member of Clubs or Organizations:     Attends Club or Organization Meetings:     Marital Status:    Intimate Partner Violence:     Fear of Current or Ex-Partner:     Emotionally Abused:     Physically Abused:     Sexually Abused:        REVIEW OF SYSTEMS     Review of Systems   Constitutional: Negative for chills and fever. Respiratory: Positive for cough. Negative for shortness of breath. Cardiovascular: Positive for chest pain. Negative for leg swelling. Gastrointestinal: Negative for abdominal pain and vomiting. Skin: Negative for color change and rash. All other systems reviewed and are negative. Except as noted above the remainder of the review of systems was reviewed and is. PHYSICAL EXAM    (up to 7 for level 4, 8 or more for level 5)     ED Triage Vitals [10/05/21 1608]   BP Temp Temp Source Pulse Resp SpO2 Height Weight   118/72 98.2 °F (36.8 °C) Oral 91 21 90 % 5' 4\" (1.626 m) 142 lb (64.4 kg)       Physical Exam  Vitals and nursing note reviewed.    HENT: Head: Normocephalic and atraumatic. Nose: Nose normal.      Mouth/Throat:      Lips: Pink. Mouth: Mucous membranes are moist.   Eyes:      General: Lids are normal.      Conjunctiva/sclera: Conjunctivae normal.   Neck:      Vascular: No JVD. Cardiovascular:      Rate and Rhythm: Normal rate and regular rhythm. Heart sounds: No murmur heard. No friction rub. No gallop. Pulmonary:      Effort: Pulmonary effort is normal.      Breath sounds: Normal breath sounds and air entry. No wheezing, rhonchi or rales. Chest:          Comments: Well healed surgical scar R lower lateral chest wall with tenderness around that area, no crepitus, no redness/swelling/drainage  Abdominal:      Palpations: Abdomen is soft. Tenderness: There is no abdominal tenderness. Musculoskeletal:      Cervical back: Neck supple. Skin:     General: Skin is warm and dry. Findings: No rash. Neurological:      Mental Status: She is alert. GCS: GCS eye subscore is 4. GCS verbal subscore is 5. GCS motor subscore is 6. Sensory: Sensation is intact. Motor: Motor function is intact. Psychiatric:         Behavior: Behavior is cooperative. DIAGNOSTIC RESULTS     EKG:(none if blank)  All EKG's are interpreted by the[facility] Phys[facility] who either signs or Co-signs this chart in the absence of a cardiologist.    Petey Kendrickhoward: NSR at 74, nonspecific st and t wave abnormality    RADIOLOGY: (none if blank)   Interpretation per the Radiologistbelow, if available at the time of this note:    CTA Chest W WO Contrast   Final Result      1. No evidence pulmonary embolism. 2. Atherosclerotic calcification aortic arch and left subclavian artery. 3. Areas of abnormal density in the right mid and lower lung fields suggestive off atelectasis or scarring. 4. Slightly increased density in the mediastinum possibly representing changes of radiation therapy. Please correlate clinically.    5. Postoperative changes in the right axilla. 6. Postoperative changes involving the thyroid gland. 7. Thoracic spondylosis. 8. Small hiatal hernia. 9. PET scan may be helpful for better evaluation of clinically indicated. **This report has been created using voice recognition software. It may contain minor errors which are inherent in voice recognition technology. **      Final report electronically signed by DR Leobardo Gatica on 10/5/2021 8:07 PM      XR CHEST (2 VW)   Final Result   1. Postoperative changes involving the right upper and lower lobes. 2. Thickening of the lung markings especially in the right mid and lower lung fields. 3. Atherosclerotic calcification in the aortic arch. 4. Thoracic spondylosis. 5. Otherwise negative chest x-ray. .               **This report has been created using voice recognition software. It may contain minor errors which are inherent in voice recognition technology. **      Final report electronically signed by DR Leobardo Gatica on 10/5/2021 4:51 PM          LABS:  Labs Reviewed   CBC WITH AUTO DIFFERENTIAL - Abnormal; Notable for the following components:       Result Value    Hematocrit 47.7 (*)     .9 (*)     MCHC 31.7 (*)     RDW-SD 53.4 (*)     All other components within normal limits   COMPREHENSIVE METABOLIC PANEL W/ REFLEX TO MG FOR LOW K - Abnormal; Notable for the following components:    Glucose 157 (*)     BUN 24 (*)     Alkaline Phosphatase 139 (*)     ALT 8 (*)     All other components within normal limits   GLOMERULAR FILTRATION RATE, ESTIMATED - Abnormal; Notable for the following components:    Est, Glom Filt Rate 49 (*)     All other components within normal limits   TROPONIN   ANION GAP   MAGNESIUM       All other labs were within normal range or not returned as of this dictation. Please note, any cultures that may have been sent were not resulted at the time of this patient visit.     EMERGENCY DEPARTMENT COURSE andMedical Decision Making:     MDM/ mis-transcribed.)      Randee Avendano MD,FACEP (electronically signed)  Attending Physician, Emergency Department        Talisha Thakkar MD  10/05/21 2030

## 2021-10-08 ENCOUNTER — TELEPHONE (OUTPATIENT)
Dept: FAMILY MEDICINE CLINIC | Age: 71
End: 2021-10-08

## 2021-11-01 ENCOUNTER — TELEPHONE (OUTPATIENT)
Dept: INFUSION THERAPY | Age: 71
End: 2021-11-01

## 2021-12-07 ENCOUNTER — OFFICE VISIT (OUTPATIENT)
Dept: ONCOLOGY | Age: 71
End: 2021-12-07
Payer: MEDICARE

## 2021-12-07 ENCOUNTER — HOSPITAL ENCOUNTER (OUTPATIENT)
Dept: INFUSION THERAPY | Age: 71
Discharge: HOME | End: 2021-12-07
Payer: MEDICARE

## 2021-12-07 VITALS
TEMPERATURE: 98 F | SYSTOLIC BLOOD PRESSURE: 127 MMHG | WEIGHT: 128 LBS | OXYGEN SATURATION: 95 % | HEIGHT: 64 IN | HEART RATE: 93 BPM | DIASTOLIC BLOOD PRESSURE: 81 MMHG | RESPIRATION RATE: 18 BRPM | BODY MASS INDEX: 21.85 KG/M2

## 2021-12-07 DIAGNOSIS — C34.91 PRIMARY LUNG ADENOCARCINOMA, RIGHT (HCC): ICD-10-CM

## 2021-12-07 DIAGNOSIS — R07.89 CHEST WALL PAIN: ICD-10-CM

## 2021-12-07 DIAGNOSIS — C34.91 PRIMARY LUNG ADENOCARCINOMA, RIGHT (HCC): Primary | ICD-10-CM

## 2021-12-07 LAB
ABSOLUTE IMMATURE GRANULOCYTE: 0.01 THOU/MM3 (ref 0–0.07)
ALBUMIN SERPL-MCNC: 4.4 G/DL (ref 3.5–5.1)
ALP BLD-CCNC: 170 U/L (ref 38–126)
ALT SERPL-CCNC: 8 U/L (ref 11–66)
AST SERPL-CCNC: 15 U/L (ref 5–40)
BASINOPHIL, AUTOMATED: 1 % (ref 0–3)
BASOPHILS ABSOLUTE: 0 THOU/MM3 (ref 0–0.1)
BILIRUB SERPL-MCNC: 0.4 MG/DL (ref 0.3–1.2)
BILIRUBIN DIRECT: < 0.2 MG/DL (ref 0–0.3)
BUN, WHOLE BLOOD: 12 MG/DL (ref 8–26)
CHLORIDE, WHOLE BLOOD: 108 MEQ/L (ref 98–109)
CREATININE, WHOLE BLOOD: 0.8 MG/DL (ref 0.5–1.2)
EOSINOPHILS ABSOLUTE: 0.2 THOU/MM3 (ref 0–0.4)
EOSINOPHILS RELATIVE PERCENT: 3 % (ref 0–4)
GFR, ESTIMATED: 75 ML/MIN/1.73M2
GLUCOSE, WHOLE BLOOD: 103 MG/DL (ref 70–108)
HCT VFR BLD CALC: 46.6 % (ref 37–47)
HEMOGLOBIN: 15 GM/DL (ref 12–16)
IMMATURE GRANULOCYTES: 0 %
IONIZED CALCIUM, WHOLE BLOOD: 1.12 MMOL/L (ref 1.12–1.32)
LYMPHOCYTES # BLD: 18 % (ref 15–47)
LYMPHOCYTES ABSOLUTE: 1.5 THOU/MM3 (ref 1–4.8)
MCH RBC QN AUTO: 33 PG (ref 26–33)
MCHC RBC AUTO-ENTMCNC: 32.2 GM/DL (ref 32.2–35.5)
MCV RBC AUTO: 102 FL (ref 81–99)
MONOCYTES ABSOLUTE: 0.3 THOU/MM3 (ref 0.4–1.3)
MONOCYTES: 4 % (ref 0–12)
PDW BLD-RTO: 16 % (ref 11.5–14.5)
PLATELET # BLD: 256 THOU/MM3 (ref 130–400)
PMV BLD AUTO: 10.4 FL (ref 9.4–12.4)
POTASSIUM, WHOLE BLOOD: 4.4 MEQ/L (ref 3.5–4.9)
RBC # BLD: 4.55 MILL/MM3 (ref 4.2–5.4)
SEG NEUTROPHILS: 75 % (ref 43–75)
SEGMENTED NEUTROPHILS ABSOLUTE COUNT: 5.9 THOU/MM3 (ref 1.8–7.7)
SODIUM, WHOLE BLOOD: 146 MEQ/L (ref 138–146)
TOTAL CO2, WHOLE BLOOD: 29 MEQ/L (ref 23–33)
TOTAL PROTEIN: 7.6 G/DL (ref 6.1–8)
WBC # BLD: 7.9 THOU/MM3 (ref 4.8–10.8)

## 2021-12-07 PROCEDURE — 4004F PT TOBACCO SCREEN RCVD TLK: CPT | Performed by: INTERNAL MEDICINE

## 2021-12-07 PROCEDURE — 1090F PRES/ABSN URINE INCON ASSESS: CPT | Performed by: INTERNAL MEDICINE

## 2021-12-07 PROCEDURE — 4040F PNEUMOC VAC/ADMIN/RCVD: CPT | Performed by: INTERNAL MEDICINE

## 2021-12-07 PROCEDURE — G8399 PT W/DXA RESULTS DOCUMENT: HCPCS | Performed by: INTERNAL MEDICINE

## 2021-12-07 PROCEDURE — 80076 HEPATIC FUNCTION PANEL: CPT

## 2021-12-07 PROCEDURE — 36415 COLL VENOUS BLD VENIPUNCTURE: CPT

## 2021-12-07 PROCEDURE — 85025 COMPLETE CBC W/AUTO DIFF WBC: CPT

## 2021-12-07 PROCEDURE — 3017F COLORECTAL CA SCREEN DOC REV: CPT | Performed by: INTERNAL MEDICINE

## 2021-12-07 PROCEDURE — 99214 OFFICE O/P EST MOD 30 MIN: CPT | Performed by: INTERNAL MEDICINE

## 2021-12-07 PROCEDURE — G8420 CALC BMI NORM PARAMETERS: HCPCS | Performed by: INTERNAL MEDICINE

## 2021-12-07 PROCEDURE — 99211 OFF/OP EST MAY X REQ PHY/QHP: CPT

## 2021-12-07 PROCEDURE — G8484 FLU IMMUNIZE NO ADMIN: HCPCS | Performed by: INTERNAL MEDICINE

## 2021-12-07 PROCEDURE — G8427 DOCREV CUR MEDS BY ELIG CLIN: HCPCS | Performed by: INTERNAL MEDICINE

## 2021-12-07 PROCEDURE — 1123F ACP DISCUSS/DSCN MKR DOCD: CPT | Performed by: INTERNAL MEDICINE

## 2021-12-07 PROCEDURE — 80047 BASIC METABLC PNL IONIZED CA: CPT

## 2021-12-07 RX ORDER — GABAPENTIN 300 MG/1
300 CAPSULE ORAL 2 TIMES DAILY
Qty: 60 CAPSULE | Refills: 5 | Status: SHIPPED | OUTPATIENT
Start: 2021-12-07 | End: 2022-07-20

## 2021-12-07 NOTE — PROGRESS NOTES
[address] CANCER [address] [place] [address]  Dept: [phone]  Loc: [phone]   Hematology/Oncology Consult (Clinic)      12/7/2021    Arnaldo Weinstein   1950     No ref. provider found   NITO Lowery - CNP       DIAGNOSIS:   -Squamous cell carcinoma RLL. Pathologic stage T2 a N0 M0 / stage Ib.  3.1 cm on the high risk feature grade 3.  - Remote history of squamous cell cancer of the breast in 2005. This current tumor is receptor negative versus been receptor positive in 2005    TREATMENT:  - Right lower lobectomy and node dissection per Dr. Liz Gallegos (6/14/2021)  -No adjuvant therapy recommended. PARAMETERS:  -Chest CT  -PET/CT      SUBJECTIVE: Patient had been doing well until last week when she developed  pain in the right anterolateral chest wall. Constant, severe, exacerbated by palpation with no shortness of breath, cough or pleuritic component. No leg swelling. Denies hemoptysis. Denies headaches or weight loss. No other focal or systemic complaints. No preceding postthoracotomy pain. HPI: Bong Venegas is a 68-year-old female who is at her baseline in terms of being symptomatic with  COPD . She denies hemoptysis but does admit to perhaps slight increased shortness of breath with activity over the past several months. Recently hospitalized at [facility] 3/12 through the 17th for syncope with no evidence of stroke or cardiac event with a CTA of the head and neck showed an occluded skull base left ICA. Smoking habit includes 2 pack/day for 48 years, ongoing and not on home O2. Still at 2ppd . Weight loss of 179 last November to 138 today; unintentional x much stress . Sister and  passed last year. On 12/13/2020 she had a CTA of the chest showed no PE but did show a 14 mm spiculated nodule that increased from 8 mm on the prior exam February 2019. No adenopathy reported.   Biopsy of the right lower lobe lung nodule revealed a poorly differentiated squamous cell carcinoma. P40 positive and TTF-1 negative. Remote history of breast cancer approximately 15 years ago (poor historian). Treated with upfront chemotherapy then a right mastectomy followed by radiation to the chest wall and several years of adjuvant hormone therapy. No history of recurrence. No close oncologic follow-up in recent history. ROS:  Review of Systems 14 point negative except as detailed above. PMH:   Past Medical History:   Diagnosis Date    Anxiety 2007    Arthritis     Breast cancer [facility]) 2005    right mastectomy, chemo and radiation history    CAD (coronary artery disease) 2001    sees Dr Oh San[place]ria of the skin 2004    Cerebral artery occlusion with cerebral infarction ([address]     Chronic UTI     COPD (chronic obstructive pulmonary disease) ([address]     sees RL Petersen    CVA (cerebral infarction) 2007, 2008    Depression 2007    DVT of lower extremity (deep venous thrombosis) ([address] 1976    Facial injury 2005    Fall on greasy ground, striking left periorbital region    History of stroke 2007, 2008, 2009    Patient relates a stroke with right weakness and speech in 2007; another in 2010 or 2012 (unsure), both with need to rehabilitation. Also \"2 mini-strokes\" (?)    Hx of blood clots 1977    hip, leg    Hyperlipidemia 2005    Hypertension 2005    Hypothyroidism     IBS (irritable bowel syndrome)     Low HDL (under 40) 2014    Lung cancer [facility])     sees Dr Aayush Sanderson    Prolonged emergence from general anesthesia     Recurrent UTI (urinary tract infection) 2015    Dr Herminia Perry finding difficulty 05/16/2017    work-up in progress    CVA x 3. Last in 2008    Denies MI,CHF ,arrythmia. .. Sherryle Moder Sherryle Moder Sherryle Moder Social HX:   Social History     Socioeconomic History    Marital status:       Spouse name: Not on file    Number of children: 3    Years of education: 5    Highest education level: Not on file Occupational History    Occupation: Disabled   Tobacco Use    Smoking status: Current Every Day Smoker     Packs/day: 2.00     Years: 48.00     Pack years: 96.00     Types: Cigarettes     Start date: 1967    Smokeless tobacco: Never Used    Tobacco comment: Currently at 1 pk/day   Vaping Use    Vaping Use: Never used   Substance and Sexual Activity    Alcohol use: No     Alcohol/week: 0.0 standard drinks    Drug use: No    Sexual activity: Yes     Partners: Male   Other Topics Concern    Not on file   Social History Narrative    Not on file     Social Determinants of Health     Financial Resource Strain:     Difficulty of Paying Living Expenses: Not on file   Food Insecurity:     Worried About Running Out of Food in the Last Year: Not on file    Beata of Food in the Last Year: Not on file   Transportation Needs:     Lack of Transportation (Medical): Not on file    Lack of Transportation (Non-Medical):  Not on file   Physical Activity:     Days of Exercise per Week: Not on file    Minutes of Exercise per Session: Not on file   Stress:     Feeling of Stress : Not on file   Social Connections:     Frequency of Communication with Friends and Family: Not on file    Frequency of Social Gatherings with Friends and Family: Not on file    Attends Yarsani Services: Not on file    Active Member of [address] [place] [address] [employer] or Organizations: Not on file    Attends Club or Organization Meetings: Not on file    Marital Status: Not on file   Intimate Partner Violence:     Fear of Current or Ex-Partner: Not on file    Emotionally Abused: Not on file    Physically Abused: Not on file    Sexually Abused: Not on file   Housing Stability:     Unable to Pay for Housing in the Last Year: Not on file    Number of [place] in the Last Year: Not on file    Unstable Housing in the Last Year: Not on file        Spouse: in   3 sons nearby and supportive  Marcio [phone]    Phone: [phone]    [facility] [id number]     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 Patient has had a hysterectomy. Health Maintenance Due   Topic Date Due    COVID-19 Vaccine (1) Never done    DTaP/Tdap/Td vaccine (1 - Tdap) Never done    Shingles Vaccine (1 of 2) Never done    Pneumococcal 65+ years Vaccine (2 of 2 - PPSV23) 2019    Annual Wellness Visit (AWV)  Never done    Breast cancer screen  2019    Flu vaccine (1) 2021        Interests:   Family time    Fam HX:   Family History   Problem Relation Age of Onset    Osteoporosis Mother     Hypertension Mother     High Cholesterol Mother     Stroke Mother     Colon Cancer Mother     Cancer Father         lung cancer    Heart Disease Father     Hypertension Father     High Cholesterol Father     Heart Disease Sister     High Cholesterol Sister     Hypertension Sister     Asthma Sister     Other Other         high arched feet    Lung Cancer Son    Dad- lung ca 61  Mom- colon ca  80  Son- lung ca      Hospitalizations:   Syncope 2021    Allergies:   Allergies   Allergen Reactions    Cipro Xr     Vicodin [Hydrocodone-Acetaminophen]      Weird dreams          Adult Illness:  Patient Active Problem List   Diagnosis    Hyperlipidemia    History of breast cancer    Vitamin D deficiency    Chronic headachess/p head injjury s/p fall    Depression    Smoker    Hypothyroidism    Allergic rhinitis    Incontinence    Noncompliance    Vasovagal syncope    History of CVA (cerebrovascular accident)    Stage 2 moderate COPD by GOLD classification ([address]    Carotid occlusion, left    OA (osteoarthritis) of hip    Essential hypertension    CAD (coronary artery disease)    Transient cerebral ischemia    Aortic insufficiency    Chest pain, atypical    Tobacco abuse    History of chest pain atypical    S/P cardiac cath nonobstructive lesion 2014    Lumbar radiculitis    Spinal stenosis of lumbar region without neurogenic claudication    Syncope and collapse    Solitary pulmonary nodule on lung CT    Severe malnutrition (HCC)    Primary malignant neoplasm of right lower lobe of lung (HCC)    SOB (shortness of breath) on exertion    Dizziness on standing    History of syncope    Cancer of lower lobe of right lung ([address]    S/P robotic assisted nonanatomic wedge resection of lateral basilar right lower lobe lung mass and mediastinal dissection    Postoperative urinary retention    Closed left hip fracture, initial encounter ([address]    Hip fracture requiring operative repair, left, closed, initial encounter ([address]    Breast ca 2004 R MRM . Chemo first monthly x 3, MRM then XRT then 1 pill x few years. We will attempt to get records.       Surgery:  Past Surgical History:   Procedure Laterality Date    APPENDECTOMY  1975    BREAST SURGERY Right 04/01/2005    evacuation of breast hematoma-Dr. Galvan Hallmark    BREAST SURGERY Right 11/30/2004    lymphatic mapping, SLN bx x2-Dr. Velez Colon    COLONOSCOPY  2009    Dr. Jazmyn Sales  03/16/2021    CT NEEDLE BIOPSY LUNG PERCUTANEOUS 3/16/2021 [facility] CT SCAN    FEMUR FRACTURE SURGERY Left 8/2/2021    FEMUR OPEN REDUCTION INTERNAL FIXATION performed by Binu Bianchi MD at [facility]  01/19/2015    HYSTERECTOMY  1976    JOINT REPLACEMENT Left 2011    HIP    JOINT REPLACEMENT Right 01/19/2015    Right Total Hip Replacement - Dr. Lenora Cuevas, TOTAL Right 6/14/2021    ROBOTIC ASSISTED RIGHT LOWER LOBE SUPERIOR SEGMENTECTOMY AND MEDIASTINAL DISSECTION, CRYOTHERAPY OF INTERCOSTAL NERVES performed by Maicol Russo MD at [address] Right 2005    Dr. Ortiz Learn  04/10/2018    Lumbar epidural steroid injection at L4    PA NJX DX/THER SBST INTRLMNR LMBR/SAC W/IMG GDN N/A 04/10/2018    LESI  @ L4 performed by Marlin Acosta MD at [facility], 2001    ovaries    THYROID LOBECTOMY Left 11/26/2010    left thyroid lobectomy with isthmusectomy-Dr. Fede He THYROID SURGERY  2007    right thyroid lobectomy-Dr. Ibanez        Medications:  Current Outpatient Medications   Medication Sig Dispense Refill    gabapentin (NEURONTIN) 300 MG capsule Take 1 capsule by mouth 2 times daily for 180 days. Intended supply: 30 days 60 capsule 5    docusate sodium (COLACE, DULCOLAX) 100 MG CAPS Take 100 mg by mouth 2 times daily as needed for Constipation      ferrous sulfate (IRON 325) 325 (65 Fe) MG tablet Take 1 tablet by mouth 2 times daily (with meals) 30 tablet 3    ondansetron (ZOFRAN-ODT) 4 MG disintegrating tablet Take 1 tablet by mouth every 8 hours as needed for Nausea or Vomiting      aspirin 325 MG EC tablet Take 1 tablet by mouth daily 30 tablet 3    acetaminophen (TYLENOL) 325 MG tablet Take 2 tablets by mouth every 6 hours 120 tablet 3    famotidine (PEPCID) 20 MG tablet Take 1 tablet by mouth daily 60 tablet 3    Cholecalciferol (VITAMIN D) 25 MCG TABS Take 1 tablet by mouth daily 60 tablet     guaiFENesin (MUCINEX) 600 MG extended release tablet Take 1 tablet by mouth 2 times daily 60 tablet 1    fluticasone-umeclidin-vilant (TRELEGY ELLIPTA) 100-62.5-25 MCG/INH AEPB Inhale 1 puff into the lungs daily Rinse mouth with water and spit without swallowing after each dose.  1 each 11    diphenhydrAMINE 6.5 10/05/2021    MONOSABS 0.4 10/05/2021    LYMPHSABS 1.0 10/05/2021    EOSABS 0.1 10/05/2021    BASOSABS 0.0 10/05/2021       CMP:    Lab Results   Component Value Date     10/05/2021    K 3.5 10/05/2021     10/05/2021    CO2 24 10/05/2021    BUN 24 10/05/2021    CREATININE 1.1 10/05/2021    LABGLOM 49 10/05/2021    GLUCOSE 157 10/05/2021    GLUCOSE 94 05/16/2012    PROT 7.7 10/05/2021    LABALBU 3.8 10/05/2021    LABALBU 4.4 05/16/2012    CALCIUM 8.9 10/05/2021    BILITOT 0.5 10/05/2021    ALKPHOS 139 10/05/2021    AST 13 10/05/2021    ALT 8 10/05/2021       BMP:    Lab Results   Component Value Date     10/05/2021    K 3.5 10/05/2021     10/05/2021    CO2 24 10/05/2021    BUN 24 10/05/2021    LABALBU 3.8 10/05/2021    LABALBU 4.4 05/16/2012    CREATININE 1.1 10/05/2021    CALCIUM 8.9 10/05/2021    LABGLOM 49 10/05/2021    GLUCOSE 157 10/05/2021    GLUCOSE 94 05/16/2012       Magnesium:    Lab Results   Component Value Date    MG 2.3 10/05/2021     PT/INR:    Lab Results   Component Value Date    INR 0.97 03/16/2021     TSH:    Lab Results   Component Value Date    TSH 3.220 08/03/2021     VITAMIN B12: No components found for: B12  FOLATE:    Lab Results   Component Value Date    FOLATE 7.3 08/31/2016       IMAGING:  Chest CTA with and without contrast 10/5/2021 done in the ER  PROCEDURE: CTA CHEST W WO CONTRAST       CLINICAL INFORMATION: chest pain.       COMPARISON: No prior study.       TECHNIQUE: 3 mm axial images were obtained through the chest after the administration of IV contrast.  A non-contrast localizer was obtained.  3D reconstructions were performed on the scanner to include MIP coronal and sagittal images through the chest.    Isovue was the intravenous contrast utilized.       All CT scans at this facility use dose modulation, iterative reconstruction, and/or weight-based dosing when appropriate to reduce radiation dose to as low as reasonably achievable.       FINDINGS:         There is adequate opacification of the pulmonary arterial system. No pulmonary emboli are present. Honora Glad is atherosclerotic calcification in the aortic arch and left subclavian artery. .           The heart size is normal. There is no pericardial or pleural effusion. Honora Glad is is increased density in the mediastinum possibly representing changes of radiation therapy. Please correlate clinically. .       There are areas of abnormal density especially in the right mid and lower lung fields suggestive of atelectasis or scarring. . There are no effusions there are postoperative changes in the right axillary region.       There is thoracic spondylosis. Cephus Cowing are postoperative changes involving the thyroid gland. There is a small hiatal hernia.            Impression       1. No evidence pulmonary embolism. 2. Atherosclerotic calcification aortic arch and left subclavian artery. 3. Areas of abnormal density in the right mid and lower lung fields suggestive off atelectasis or scarring. 4. Slightly increased density in the mediastinum possibly representing changes of radiation therapy. Please correlate clinically. 5. Postoperative changes in the right axilla. 6. Postoperative changes involving the thyroid gland. 7. Thoracic spondylosis. 8. Small hiatal hernia. 9. PET scan may be helpful for better evaluation of clinically indicated.           **This report has been created using voice recognition software. It may contain minor errors which are inherent in voice recognition technology. **       Final report electronically signed by DR Fariha Steinberg on 10/5/2021 8:07 PM         Brain MRI with and without contrast 4/19/2021  Impression       1. No evidence of metastatic disease in the brain. 2. Stable chronic small vessel ischemic changes. 3. No acute findings.        PROCEDURE: CTA CHEST W WO CONTRAST 12/13/2020       CLINICAL INFORMATION: cough, sob, elevated d-dimer, PE .       COMPARISON: 2/21/2019     TECHNIQUE: Postcontrast images of the chest with 3-D MIPS Isovue-370 IV contrast   All CT scans at this facility use dose modulation, iterative reconstruction, and/or weight-based dosing when appropriate to reduce radiation dose to as low as reasonably achievable.       FINDINGS: No PE. There is no aneurysm or dissection. No mediastinal or hilar adenopathy by size criteria. Postsurgical changes right axilla. No axillary lymphadenopathy   Heart size normal.   No infiltrates or pleural effusions. The 8 mm nodule seen on the prior CT now measures 14 mm and has spiculated borders highly suspicious of malignancy. There however are no other additional new masses. Scattered nodules are present in both lungs which are previously seen. There is a    cluster of nodular the anterior right upper lung stable. Small nodules posterior right lower lung stable. Tiny triangular nodular density lingula is stable.       Upper abdomen   Stable bilateral adrenal fullness likely hyperplasia this is stable dating back to June 2017.       Bones   No suspicious bone lesions           Impression   1. No PE. 2. No acute findings. 3. Interval increase in size of the patient's right lung nodule now measures 14 mm with spiculated borders and is highly suspicious for malignancy.               **This report has been created using voice recognition software.  It may contain minor errors which are inherent in voice recognition technology. **       Final report electronically signed by Dr. Zachary Syed on 12/13/2020 4:08 PM       Impression:  1. No PE. 2. No acute findings. 3. Interval increase in size of the patient's right lung nodule now measures 14 mm with spiculated borders and is highly suspicious for malignancy.        CT CHEST W CONTRAST       CTA head with contrast 3/12/2021/syncope evaluation  Impression   Impression:   1.  Occluded skull base left ICA, present previously.  Patent left    posterior communicating artery.    2.  Intact anterior, posterior, and middle cerebral circulations. 3.  No intracranial aneurysm.         Brain MRI without contrast 3/13/2021      Impression       1. No evidence of an acute infarct. 2. No antegrade flow in the left internal carotid artery. 3. Mild atrophy and probable ischemic changes in the white matter. 4. Inflammatory changes in the right mastoid air cells.             Ct Needle Biopsy Lung/mediastinum Percutaneous  Result Date: 3/16/2021   Successful CT-guided right lower lobe lung nodule biopsy with small postprocedural pneumothorax. Follow-up chest radiographs will be obtained. **This report has been created using voice recognition software. It may contain minor errors which are inherent in voice recognition technology. ** Final report electronically signed by Dr. Monalisa Chaney MD on 3/16/2021 10:50 AM    Pet Ct Skull Base To Mid Thigh 4/12/2021  FINDINGS:        Neck: A 0.7 cm nodule in the left parotid gland has a maximum SUV of 4.3 (image 39).     Chest: Cardiac size is normal. Atherosclerotic calcifications are present in the thoracic aorta and coronary arteries without evidence of aneurysm. There is no pleural or pericardial effusion. Emphysematous changes are noted in the bilateral lungs. A 1.5    cm nodule at the lateral right midlung is stable in size is associated with a maximum SUV of 9.5 (image 118). There is no FDG avid mediastinal, hilar or axillary lymphadenopathy. Surgical clips are noted in the right axilla. There are surgical changes    of prior right mastectomy. Activity associated with a small hiatal hernia may be infectious or inflammatory. Degenerative changes are seen in the thoracic spine without evidence of hypermetabolic osseous metastatic disease.       Abdomen/pelvis: Physiologic activity is present in the liver, spleen, urinary collecting system and gastrointestinal tract. The gallbladder is surgically absent. There is fatty replacement of the pancreas. Hypoattenuating lesions in the bilateral kidneys    are likely cysts but are incompletely characterized on the current study. A 1.5 cm hypoattenuating nodule in the right adrenal gland is associated with a maximum SUV of 7 (image 159). A 1.4 cm nodule left adrenal gland has a maximum SUV of 2.4 (image    154). Atherosclerotic calcifications are present in the abdominal aorta without evidence of aneurysm or the uterus is surgically absent. There is no FDG avid mesenteric, retroperitoneal, pelvic or inguinal lymphadenopathy. Degenerative changes are    present in the lumbar spine and pelvis without evidence of hypermetabolic osseous metastatic disease. There is extensive metallic artifact in the lower pelvis from bilateral hip prostheses.           Impression   1. FDG avid right mid lung nodule corresponding to known malignancy. 2. 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 Metastatic disease is considered less likely but not excluded. 3. Mildly FDG avid bilateral adrenal nodules, likely benign adenomas.         PROCEDURE: CTA CHEST W WO CONTRAST   10/5/21       CLINICAL INFORMATION: chest pain.       COMPARISON: No prior study.       TECHNIQUE: 3 mm axial images were obtained through the chest after the administration of IV contrast.  A non-contrast localizer was obtained.  3D reconstructions were performed on the scanner to include MIP coronal and sagittal images through the chest.    Isovue was the intravenous contrast utilized.       All CT scans at this facility use dose modulation, iterative reconstruction, and/or weight-based dosing when appropriate to reduce radiation dose to as low as reasonably achievable.       FINDINGS:           There is adequate opacification of the pulmonary arterial system. No pulmonary emboli are present. Daphane Flies is atherosclerotic calcification in the aortic arch and left subclavian artery. .           The heart size is normal. There is no pericardial or pleural effusion. Boby Rosalinda is is increased density in the mediastinum possibly representing changes of radiation therapy. Please correlate clinically. .       There are areas of abnormal density especially in the right mid and lower lung fields suggestive of atelectasis or scarring. . There are no effusions there are postoperative changes in the right axillary region.       There is thoracic spondylosis. Janeann Lockwood are postoperative changes involving the thyroid gland. There is a small hiatal hernia.            Impression       1. No evidence pulmonary embolism. 2. Atherosclerotic calcification aortic arch and left subclavian artery. 3. Areas of abnormal density in the right mid and lower lung fields suggestive off atelectasis or scarring. 4. Slightly increased density in the mediastinum possibly representing changes of radiation therapy. Please correlate clinically. 5. Postoperative changes in the right axilla. 6. Postoperative changes involving the thyroid gland. 7. Thoracic spondylosis. 8. Small hiatal hernia. 9. PET scan may be helpful for better evaluation of clinically indicated.           **This report has been created using voice recognition software. It may contain minor errors which are inherent in voice recognition technology. **       Final report electronically signed by DR Leobardo Gatica on 10/5/2021 8:07 PM         PROCEDURES:  3/16/2021 IR lung biopsy    PATHOLOGY:   The needle biopsy lung 3/16/2021  Clinical Information: RIGHT LOWER LOBE LUNG NODULE; PT HAS REMOTE   HISTORY OF BREAST CANCER 2005     FINAL DIAGNOSIS:   Lung, right lower lobe, biopsy:    Poorly differentiated squamous cell carcinoma.      Specimen:   BIOPSY OF LUNG, RIGHT LOWER LOBE      Intraoperative Consultation:   Touch Prep DX:  1.  Atypical epithelial cells present.  2.  Malignant   epithelial cells present.  MTK     Gross Examination:   The container is labeled Soy Sanders, RAMIROL, lung.  Received in formalin are several tiny bits of tan tissue, each less than 0.1 cm in   diameter.  The longest fragment measures about 0.3 cm.  The specimen is   entirely submitted in two cassettes.  ns.  PCF/DKR:[id number]     Microscopic Examination:   Sections show infiltrative nests of polygonal cells with foamy   cytoplasm and enlarged pleomorphic nuclei with variably prominent   nucleoli.  Rare dyskeratotic cells are noted.  Immunochemical stains   for TTF-1 and p40 are performed, with appropriate controls*.  Lesional   cells express p40 and lack expression of TTF-1.  The findings are   consistent with the above diagnosis. ER and OR both negative.                           PATHOLOGY REPORT                       ATTN: YUSRA FOY                       REQ: Pati Bautista       Copies To:   Johanna Mead       Clinical Information: RIGHT LUNG CANCER   6/14/2021    FINAL DIAGNOSIS:   A. Lymph nodes, level VII, resections:    Negative for neoplasm in 4 out of 4 lymph nodes. B. Lymph nodes, level IV R, resections:    Negative for neoplasm in 2 out of 2 lymph nodes. C. Lymph node, level IX, resection:    Negative for neoplasm in one out of one lymph node. D. Lymph nodes, level X R, resection:    Negative for neoplasm in 2 out of 2 lymph nodes. E. Lymph nodes, level XI R, resections:    Negative for neoplasm in 2 out of 2 lymph nodes. F. 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    Maximum tumor size equal 3.1 cm.    Margins of resection are free of neoplasia.    Moderate emphysema.    pT2, pN0.        Specimen:   A) LYMPH NODE(S), LEVEL 7   B) LYMPH NODE(S), LEVEL 4R   C) LYMPH NODE(S), LEVEL 9   D) LYMPH NODE(S), LEVEL 10R   E) LYMPH NODE(S), LEVEL 11R   F) WEDGE BIOPSY OF LUNG, RIGHT LOWER LOBE     Microscopic Examination:   Procedure   Wedge resection   Specimen Laterality   Right   Tumor Site   Lower lobe of lung   Tumor Size   2. 2 x 2 x 3.1 cm   Tumor Focality   Single focus   Histologic Type   Invasive squamous cell carcinoma, non-keratinizing   + Histologic Grade   G3: Poorly differentiated   + Spread Through Air Spaces (JESSICA)   Not identified   Visceral Pleura Invasion   Not identified   Lymphovascular Invasion   Not identified   Direct Invasion of Adjacent Structures   No adjacent structures present   Margins   All margins are uninvolved by tumor       Distance of invasive carcinoma from closest margin (centimeters):   0.1 cm       Specify closest margin: Visceral pleura   Bronchial Margin   Not applicable   Vascular Margin   Not applicable   Parenchymal Margin   Uninvolved by invasive carcinoma   Regional Lymph Nodes   Number of Lymph Nodes Involved: 0   Number of Lymph Nodes Examined: 11   Treatment Effect   No known presurgical therapy   Pathologic Stage Classification (pTNM) (AJCC 8th Edition)   Primary Tumor (pT)   pT2:  Tumor >3 cm but <=5 cm or having any of the following features:#   Regional Lymph Nodes (pN)   pN0: No regional lymph node metastasis   + Additional Pathologic Findings   Emphysema     [id number]   [id number]                                                     <Sign Out Dr. Karyl Biles Collette Reach, M.D., F.C.A.P. [facility]/ [address] [address]  Printed on:  6/16/2021   [address] [place] [address]     PFT: Ordered ASAP and will be done Monday 4/19  ( if she gets her Covid testing tomorrow )  FEV1 1.720 which is 59% predicted FEV1 1.11 which is 49% predicted with significant bronchodilator response spirometry was severe obstructive defect    GENETICS:  na    MOLECULAR:  na    ASSESSMENT/PLAN:    1: Diagnosis: 72-year-old female with significant COPD and comorbidities as detailed above with a fairly decent performance status. A lateral right middle lung nodule is increased since 2019 a recent biopsy of this 15 mm lesion shows a poorly differentiated squamous cell.   Staging including PET scan shows no other hypermetabolic foci. She did have a syncopal episode and reports slight increase in headaches from her baseline. Work-up included a MRI of the brain with and without contrast summarized above which shows no evidence of metastatic disease. Clinical stage [id number]  /  Stage I A2 . Right middle lobe squamous cell carcinoma, poorly differentiated. 6/14/21 right lower lobe lobectomy and mediastinal dissection reveals pathologic stage T2 a N0 stage Ib squamous cell carcinoma grade 3 right lower lobe. 2) Prognosis / Disease Status: Very good. Stage Ib with only high risk features being a grade 3.    3) Work-up:    Labs: No additional labs   Imaging: October chest CT referenced above. Procedures: No additional procedures needed   Consults: None new   Other: The right chest wall pain x1 week. 4) Symptom Management: None needed. Does report baseline minor neuropathy in her fingertips. 5) Supportive care provided. Level of care is appropriate. Teaching done today. Emphasized options at this point include surveillance versus adjuvant therapy. I think her risk is fairly low I would be concerned about toxicity with adjuvant chemotherapy. Recommend no adjuvant chemotherapy in this case. 6) Treatment goal:      Treatment plan: Case discussed at lung conference on June 1. Right lower lobectomy done. No adjuvant chemotherapy recommended. 7) Medications reviewed. Prescriptions today: Gabapentin              Orders Placed This Encounter   Medications    gabapentin (NEURONTIN) 300 MG capsule     Sig: Take 1 capsule by mouth 2 times daily for 180 days. Intended supply: 30 days     Dispense:  60 capsule     Refill:  5        [place]AR[place]:  Controlled Substance Monitoring:    Acute and Chronic Pain Monitoring:   RX Monitoring 3/23/2018   Attestation The Prescription Monitoring Report for this patient was reviewed today.    Periodic Controlled Substance Monitoring Possible medication side effects, risk of tolerance/dependence & alternative treatments discussed. ;Random urine drug screen sent today. ;No signs of potential drug abuse or diversion identified. 8) Research Options:       Not applicable      9) Other:       Strongly encouraged to attempt to stop smoking. Reviewed records detailing her breast cancer diagnosis 15 years ago and treatment. Added ER/AK testing to the recent lung biopsy; negative. New onset severe chest wall pain at the thoracotomy site. 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 Pain seems to be neurogenic. Trial of gabapentin 30 mg p.o. twice daily and check chest CT.    10) Follow Up: Follow-up with me in 3 weeks to review chest CT and reassess chest wall pain. Letty Engel MD     Addendum:  I obtained records regarding Shania's prior diagnosis of breast cancer. Abnormal mammogram in 2004 led to a biopsy that showed a grade 3 infiltrating ductal carcinoma (after reviewing all of the available data I believe this is a typographical error) that was ER positive and AK and HER-2 negative. No sentinel nodes involved. Initial dimension approximately 8.5 x 6 cm. No metastatic disease. Some mass in the left breast measuring 2.3 cm suspicious for malignancy as well PET scan did not light up in the lungs left breast or axilla. She underwent neoadjuvant chemotherapy and had a dramatic decrease in the size of the mass. Patient underwent radiation to the right chest wall treated to a total dose of 5040 cGy in 28 fractions with a boost with a total dose of 6120 cGy in 34 fractions.   Interestingly however when I look at the path report from 3/11/5 the simple mastectomy specimen from the right reveals an invasive moderately differentiated squamous cell carcinoma the breast.  ER was reported as positive and 34% of cells HER-2 negative review of this pathology is confusing with the discrepancy and reports it is significant and that raises the possibility that this lung lesion could be metastatic and not a primary lesion. A fairly completely note from Kassie Warren of radiation oncology references that the histology was squamous cell carcinoma and acknowledged the rarity of this type of histology of the breast.  Therefore I have little doubt that this in fact was squamous cell cancer of the breast.  Checked with pathology and all tissue was destroyed after 10 years therefore is not available. Patient's treatment was neoadjuvant chemotherapy by Dr. Elda Jay followed by right simple mastectomy and sentinel node procedure and radiation. I do note the path report the documents at the time of the right simple mastectomy invasive grade 2 squamous cell carcinoma of the breast tumor after neoadjuvant therapy measures 6 x 5 x 3 cm tumor is 2 mm from the deep resection margin and lymphatic invasion was present. No axillary contents identified. No ductal component to this neoplasm. This was called squamous cell carcinoma as opposed to metaplastic carcinoma. Purcell procedure 12/1/2004 the right axilla showed 2 axillary nodes negative for metastatic carcinoma. Interestingly the ER was reported as positive and 34% of cells TX was negative and HER-2 was negative.

## 2021-12-21 ENCOUNTER — HOSPITAL ENCOUNTER (OUTPATIENT)
Dept: CT IMAGING | Age: 71
Discharge: HOME | End: 2021-12-21
Payer: MEDICARE

## 2021-12-21 DIAGNOSIS — R07.89 CHEST WALL PAIN: ICD-10-CM

## 2021-12-21 DIAGNOSIS — C34.91 PRIMARY LUNG ADENOCARCINOMA, RIGHT (HCC): ICD-10-CM

## 2021-12-21 PROCEDURE — 6360000004 HC RX CONTRAST MEDICATION: Performed by: INTERNAL MEDICINE

## 2021-12-21 PROCEDURE — 71260 CT THORAX DX C+: CPT

## 2021-12-21 RX ADMIN — IOPAMIDOL 80 ML: 755 INJECTION, SOLUTION INTRAVENOUS at 14:53

## 2021-12-22 ENCOUNTER — TELEPHONE (OUTPATIENT)
Dept: ONCOLOGY | Age: 71
End: 2021-12-22

## 2021-12-22 NOTE — TELEPHONE ENCOUNTER
Patient was a no show for her appt today, called and left a voice message to return the call to reschedule.

## 2021-12-29 NOTE — TELEPHONE ENCOUNTER
LMTCB with patient.
OK to schedule appt? Please advise.
Patient was dismissed from Dr Poncho Medina 2019 due to a no show. Patient's son Joseline Talbot is a patient of Dr Aaron Warren. Patient's is requesting to see Dr Aaron Warren. Patient stated her  passed away recently and needs seen for depression.  Please advise
[Negative] : Heme/Lymph

## 2022-01-20 ENCOUNTER — HOSPITAL ENCOUNTER (OUTPATIENT)
Dept: INFUSION THERAPY | Age: 72
Discharge: HOME | End: 2022-01-20
Payer: MEDICARE

## 2022-01-20 ENCOUNTER — OFFICE VISIT (OUTPATIENT)
Dept: ONCOLOGY | Age: 72
End: 2022-01-20
Payer: MEDICARE

## 2022-01-20 VITALS
TEMPERATURE: 98 F | RESPIRATION RATE: 22 BRPM | OXYGEN SATURATION: 94 % | DIASTOLIC BLOOD PRESSURE: 70 MMHG | WEIGHT: 137.4 LBS | SYSTOLIC BLOOD PRESSURE: 163 MMHG | HEIGHT: 64 IN | BODY MASS INDEX: 23.46 KG/M2 | HEART RATE: 96 BPM

## 2022-01-20 DIAGNOSIS — C34.91 PRIMARY LUNG ADENOCARCINOMA, RIGHT (HCC): ICD-10-CM

## 2022-01-20 DIAGNOSIS — Z12.31 BREAST CANCER SCREENING BY MAMMOGRAM: ICD-10-CM

## 2022-01-20 DIAGNOSIS — Z85.3 HISTORY OF RIGHT BREAST CANCER: ICD-10-CM

## 2022-01-20 DIAGNOSIS — Z17.1 MALIGNANT NEOPLASM OF OVERLAPPING SITES OF RIGHT BREAST IN FEMALE, ESTROGEN RECEPTOR NEGATIVE (HCC): Primary | ICD-10-CM

## 2022-01-20 DIAGNOSIS — C50.811 MALIGNANT NEOPLASM OF OVERLAPPING SITES OF RIGHT BREAST IN FEMALE, ESTROGEN RECEPTOR NEGATIVE (HCC): Primary | ICD-10-CM

## 2022-01-20 PROCEDURE — 3017F COLORECTAL CA SCREEN DOC REV: CPT | Performed by: INTERNAL MEDICINE

## 2022-01-20 PROCEDURE — 4004F PT TOBACCO SCREEN RCVD TLK: CPT | Performed by: INTERNAL MEDICINE

## 2022-01-20 PROCEDURE — 1123F ACP DISCUSS/DSCN MKR DOCD: CPT | Performed by: INTERNAL MEDICINE

## 2022-01-20 PROCEDURE — G8399 PT W/DXA RESULTS DOCUMENT: HCPCS | Performed by: INTERNAL MEDICINE

## 2022-01-20 PROCEDURE — G8484 FLU IMMUNIZE NO ADMIN: HCPCS | Performed by: INTERNAL MEDICINE

## 2022-01-20 PROCEDURE — G8420 CALC BMI NORM PARAMETERS: HCPCS | Performed by: INTERNAL MEDICINE

## 2022-01-20 PROCEDURE — 1090F PRES/ABSN URINE INCON ASSESS: CPT | Performed by: INTERNAL MEDICINE

## 2022-01-20 PROCEDURE — 4040F PNEUMOC VAC/ADMIN/RCVD: CPT | Performed by: INTERNAL MEDICINE

## 2022-01-20 PROCEDURE — G8427 DOCREV CUR MEDS BY ELIG CLIN: HCPCS | Performed by: INTERNAL MEDICINE

## 2022-01-20 PROCEDURE — 99211 OFF/OP EST MAY X REQ PHY/QHP: CPT

## 2022-01-20 PROCEDURE — 99213 OFFICE O/P EST LOW 20 MIN: CPT | Performed by: INTERNAL MEDICINE

## 2022-01-20 NOTE — PROGRESS NOTES
[address] CANCER [address] [address]  Dept: [phone]  Loc: [phone]   Hematology/Oncology Consult (Clinic)      1/20/2022    Nik Raymundo   1950     No ref. provider found   NITO Garcia - CNP       DIAGNOSIS:   -Squamous cell carcinoma RLL. Pathologic stage T2 a N0 M0 / stage Ib.  3.1 cm on the high risk feature grade 3.  - Remote history of squamous cell cancer of the breast in 2005. This current tumor is receptor negative versus been receptor positive in 2005    TREATMENT:  - Right lower lobectomy and node dissection per Dr. Mary Kulkarni (6/14/2021)  -No adjuvant therapy recommended. PARAMETERS:  -Chest CT  -PET/CT      SUBJECTIVE: Patient is here for routine follow-up. Denies any cough or shortness of breath. Her chest wall pain is significantly improved. She is still taking gabapentin twice daily. No new complaints. HPI: Juan Pablo Hopson is a 53-year-old female who is at her baseline in terms of being symptomatic with  COPD . She denies hemoptysis but does admit to perhaps slight increased shortness of breath with activity over the past several months. Recently hospitalized at [facility] 3/12 through the 17th for syncope with no evidence of stroke or cardiac event with a CTA of the head and neck showed an occluded skull base left ICA. Smoking habit includes 2 pack/day for 48 years, ongoing and not on home O2. Still at 2ppd . Weight loss of 179 last November to 138 today; unintentional x much stress . Sister and  passed last year. On 12/13/2020 she had a CTA of the chest showed no PE but did show a 14 mm spiculated nodule that increased from 8 mm on the prior exam February 2019. No adenopathy reported. Biopsy of the right lower lobe lung nodule revealed a poorly differentiated squamous cell carcinoma. P40 positive and TTF-1 negative.   Remote history of breast cancer approximately 15 years ago (poor historian). Treated with upfront chemotherapy then a right mastectomy followed by radiation to the chest wall and several years of adjuvant hormone therapy. No history of recurrence. No close oncologic follow-up in recent history. ROS:  Review of Systems 14 point negative except as detailed above. PMH:   Past Medical History:   Diagnosis Date    Anxiety 2007    Arthritis     Breast cancer [facility]) 2005    right mastectomy, chemo and radiation history    CAD (coronary artery disease) 2001    sees Dr Wagner Gracia of the skin 2004    Cerebral artery occlusion with cerebral infarction ([address]     Chronic UTI     COPD (chronic obstructive pulmonary disease) ([address]     sees RL Petersen    CVA (cerebral infarction) 2007, 2008    Depression 2007    DVT of lower extremity (deep venous thrombosis) ([address] 1976    Facial injury 2005    Fall on greasy ground, striking left periorbital region    History of stroke 2007, 2008, 2009    Patient relates a stroke with right weakness and speech in 2007; another in 2010 or 2012 (unsure), both with need to rehabilitation. Also \"2 mini-strokes\" (?)    Hx of blood clots 1977    hip, leg    Hyperlipidemia 2005    Hypertension 2005    Hypothyroidism     IBS (irritable bowel syndrome)     Low HDL (under 40) 2014    Lung cancer [facility])     sees Dr Rasheed Payan    Prolonged emergence from general anesthesia     Recurrent UTI (urinary tract infection) 2015    Dr Jena Kennedy finding difficulty 05/16/2017    work-up in progress    CVA x 3. Last in 2008    Denies MI,CHF ,arrythmia. .. Chandler Whitaker Social HX:   Social History     Socioeconomic History    Marital status:       Spouse name: Not on file    Number of children: 3    Years of education: 5    Highest education level: Not on file   Occupational History    Occupation: Disabled   Tobacco Use    Smoking status: Current Every Day Smoker     Packs/day: 2.00     Years: 48.00     Pack years: 96.00     Types: Cigarettes     Start date: 1967    Smokeless tobacco: Never Used    Tobacco comment: Currently at 1 pk/day   Vaping Use    Vaping Use: Never used   Substance and Sexual Activity    Alcohol use: No     Alcohol/week: 0.0 standard drinks    Drug use: No    Sexual activity: Yes     Partners: Male   Other Topics Concern    Not on file   Social History Narrative    Not on file     Social Determinants of Health     Financial Resource Strain:     Difficulty of Paying Living Expenses: Not on file   Food Insecurity:     Worried About Running Out of Food in the Last Year: Not on file    Baeta of Food in the Last Year: Not on file   Transportation Needs:     Lack of Transportation (Medical): Not on file    Lack of Transportation (Non-Medical):  Not on file   Physical Activity:     Days of Exercise per Week: Not on file    Minutes of Exercise per Session: Not on file   Stress:     Feeling of Stress : Not on file   Social Connections:     Frequency of Communication with Friends and Family: Not on file    Frequency of Social Gatherings with Friends and Family: Not on file    Attends Episcopal Services: Not on file    Active Member of [address] [place] [address] or Organizations: Not on file    Attends Club or Organization Meetings: Not on file    Marital Status: Not on file   Intimate Partner Violence:     Fear of Current or Ex-Partner: Not on file    Emotionally Abused: Not on file    Physically Abused: Not on file    Sexually Abused: Not on file   Housing Stability:     Unable to Pay for Housing in the Last Year: Not on file    Number of [place] in the Last Year: Not on file    Unstable Housing in the Last Year: Not on file        Spouse: in   3 sons nearby and supportive  Annabelle Seguratania [phone]    Phone: [phone]     [employer]     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 Patient has had a hysterectomy. Health Maintenance Due   Topic Date Due    COVID-19 Vaccine (1) Never done    Depression Monitoring  Never done    DTaP/Tdap/Td vaccine (1 - Tdap) Never done    Shingles Vaccine (1 of 2) Never done    Pneumococcal 65+ years Vaccine (2 of 2 - PPSV23) 2019    Annual Wellness Visit (AWV)  Never done    Breast cancer screen  2019    Flu vaccine (1) 2021        Interests:   Family time    Fam HX:   Family History   Problem Relation Age of Onset    Osteoporosis Mother     Hypertension Mother     High Cholesterol Mother     Stroke Mother     Colon Cancer Mother     Cancer Father         lung cancer    Heart Disease Father     Hypertension Father     High Cholesterol Father     Heart Disease Sister     High Cholesterol Sister     Hypertension Sister     Asthma Sister     Other Other         high arched feet    Lung Cancer Son    Dad- lung ca 61  Mom- colon ca  80  Son- lung ca      Hospitalizations:   Syncope 2021    Allergies:   Allergies   Allergen Reactions    Cipro Xr     Vicodin [Hydrocodone-Acetaminophen]      Weird dreams          Adult Illness:  Patient Active Problem List   Diagnosis    Hyperlipidemia    History of breast cancer    Vitamin D deficiency    Chronic headachess/p head injjury s/p fall    Depression    Smoker    Hypothyroidism    Allergic rhinitis    Incontinence    Noncompliance    Vasovagal syncope    History of CVA (cerebrovascular accident)    Stage 2 moderate COPD by GOLD classification ([address]    Carotid occlusion, left    OA (osteoarthritis) of hip    Essential hypertension    CAD (coronary artery disease)    Transient cerebral ischemia    Aortic insufficiency    Chest pain, atypical    Tobacco abuse    History of chest pain atypical    S/P cardiac cath nonobstructive lesion 2014    Lumbar radiculitis    Spinal stenosis of lumbar region without neurogenic claudication    Syncope and collapse    Solitary pulmonary nodule on lung CT    Severe malnutrition (HCC)    Primary malignant neoplasm of right lower lobe of lung (HCC)    SOB (shortness of breath) on exertion    Dizziness on standing    History of syncope    Cancer of lower lobe of right lung ([address]    S/P robotic assisted nonanatomic wedge resection of lateral basilar right lower lobe lung mass and mediastinal dissection    Postoperative urinary retention    Closed left hip fracture, initial encounter ([address]    Hip fracture requiring operative repair, left, closed, initial encounter ([address]    Breast ca 2004 R MRM . Chemo first monthly x 3, MRM then XRT then 1 pill x few years. We will attempt to get records.       Surgery:  Past Surgical History:   Procedure Laterality Date    APPENDECTOMY  1975    BREAST SURGERY Right 04/01/2005    evacuation of breast hematoma-Dr. Mary Grace Franklin    BREAST SURGERY Right 11/30/2004    lymphatic mapping, SLN bx x2-Dr. Gurpreet Campbell    COLONOSCOPY  2009    Dr. Luciana Worrlel  03/16/2021    CT NEEDLE BIOPSY LUNG PERCUTANEOUS 3/16/2021 [facility] CT SCAN    FEMUR FRACTURE SURGERY Left 8/2/2021    FEMUR OPEN REDUCTION INTERNAL FIXATION performed by Benjamin Wilson MD at [address] [place] [address]  01/19/2015    HYSTERECTOMY  1976    JOINT REPLACEMENT Left 2011    HIP    JOINT REPLACEMENT Right 01/19/2015    Right Total Hip Replacement - Dr. Yoly Pizarro, TOTAL Right 6/14/2021    ROBOTIC ASSISTED RIGHT LOWER LOBE SUPERIOR SEGMENTECTOMY AND MEDIASTINAL DISSECTION, CRYOTHERAPY OF INTERCOSTAL NERVES performed by Curt Paige MD at [address] Right 2005    Dr. Jsa Delong  04/10/2018    Lumbar epidural steroid injection at L4    NC NJX DX/THER SBST INTRLMNR LMBR/SAC W/IMG GDN N/A 04/10/2018    LESI  @ L4 performed by Malik Corona MD at [facility], 2001    ovaries    THYROID LOBECTOMY Left 11/26/2010    left thyroid lobectomy with isthmusectomy-Dr. Jude Zazueta THYROID SURGERY  2007    right thyroid lobectomy-Dr. Ibanez        Medications:  Current Outpatient Medications   Medication Sig Dispense Refill    gabapentin (NEURONTIN) 300 MG capsule Take 1 capsule by mouth 2 times daily for 180 days. Intended supply: 30 days 60 capsule 5    ondansetron (ZOFRAN-ODT) 4 MG disintegrating tablet Take 1 tablet by mouth every 8 hours as needed for Nausea or Vomiting      aspirin 325 MG EC tablet Take 1 tablet by mouth daily 30 tablet 3    acetaminophen (TYLENOL) 325 MG tablet Take 2 tablets by mouth every 6 hours 120 tablet 3    guaiFENesin (MUCINEX) 600 MG extended release tablet Take 1 tablet by mouth 2 times daily 60 tablet 1    fluticasone-umeclidin-vilant (TRELEGY ELLIPTA) 100-62.5-25 MCG/INH AEPB Inhale 1 puff into the lungs daily Rinse mouth with water and spit without swallowing after each dose.  1 each 11    diphenhydrAMINE (BENADRYL) 25 MG tablet Take 25 mg by mouth every 6 hours as needed for Itching      albuterol sulfate HFA (PROVENTIL HFA) 108 (90 Base) MCG/ACT inhaler Inhale 2 puffs into the lungs every 6 hours as needed for Wheezing 1 Inhaler 3    docusate sodium (COLACE, DULCOLAX) 100 MG CAPS Take 100 mg by mouth 2 times daily as needed for Constipation (Patient not taking: Reported on 1/20/2022)      ferrous sulfate (IRON 325) 325 (65 Fe) MG tablet Take 1 tablet by mouth 2 times daily (with meals) (Patient not taking: Reported on 1/20/2022) 30 tablet 3    famotidine (PEPCID) 20 MG tablet Take 1 tablet by mouth daily (Patient not taking: Reported on 2022) 60 tablet 3    Cholecalciferol (VITAMIN D) 25 MCG TABS Take 1 tablet by mouth daily (Patient not taking: Reported on 2022) 60 tablet     levothyroxine (LEVOTHROID) 88 MCG tablet Take 1 tablet by mouth daily (Patient not taking: Reported on 2022) 30 tablet 5     No current facility-administered medications for this visit. EXAM:    BP (!) 163/70 (Site: Left Lower Arm, Position: Sitting, Cuff Size: Medium Adult)   Pulse 96   Temp 98 °F (36.7 °C) (Oral)   Resp 22   Ht 5' 4\" (1.626 m)   Wt 137 lb 6.4 oz (62.3 kg)   SpO2 94%   BMI 23.58 kg/m²      ECO  General: Non-ill appearing. .  She is alone today. Appears comfortable and in no distress  HEENT: NC/AT,nonicteric, perrla,eom intact, no mucosal lesions wearing upper dentures, no lesions seen. Neck: normal thyroid, no masses. pulses nl, no bruits,   Nodes: No adenopathy  Lungs/chest: clear, no rales,rhonchi or wheezing, lung bases clear, overall very diminished breath sounds throughout. CV: rrr, no rubs ,gallops or murmurs  Breasts: Right mastectomy site unremarkable, some pain with palpation in the right anterolateral chest wall. Abd/Rectal: soft, non-tender,bowel sounds normal , no HSM,no masses  Back: normal curvature, No midline tenderness. flanks nontender  : Not Examined  Extremities: no cyanosis,clubbing or edema. Skin: unremarkable  Neuro: A and O x 4, CN exam nonfocal, Motor- no deficits, Sensory- no deficits, gait-nl, speech- fluent, no ataxia.   Devices: none      DATA:    LAB:     CBC with Differential:      Lab Results   Component Value Date    WBC 7.9 2021    RBC 4.55 2021    RBC 4.49 2012    HGB 15.0 2021    HCT 46.6 2021     2021     2021    MCH 33.0 2021    MCHC 32.2 2021    RDW 16.0 2021    NRBC 0 10/05/2021    NRBC 0 05/16/2012    SEGSPCT 75.1 10/05/2021    MONOPCT 6.5 10/05/2021    MONOSABS 0.3 12/07/2021    LYMPHSABS 1.5 12/07/2021    EOSABS 0.2 12/07/2021    BASOSABS 0.0 12/07/2021       CMP:    Lab Results   Component Value Date     12/07/2021     10/05/2021    K 4.4 12/07/2021    K 3.5 10/05/2021     10/05/2021    CO2 24 10/05/2021    BUN 24 10/05/2021    CREATININE 0.8 12/07/2021    CREATININE 1.1 10/05/2021    LABGLOM 49 10/05/2021    GLUCOSE 157 10/05/2021    GLUCOSE 94 05/16/2012    PROT 7.6 12/07/2021    LABALBU 4.4 12/07/2021    LABALBU 4.4 05/16/2012    CALCIUM 8.9 10/05/2021    BILITOT 0.4 12/07/2021    ALKPHOS 170 12/07/2021    AST 15 12/07/2021    ALT 8 12/07/2021       BMP:    Lab Results   Component Value Date     12/07/2021     10/05/2021    K 4.4 12/07/2021    K 3.5 10/05/2021     10/05/2021    CO2 24 10/05/2021    BUN 24 10/05/2021    LABALBU 4.4 12/07/2021    LABALBU 4.4 05/16/2012    CREATININE 0.8 12/07/2021    CREATININE 1.1 10/05/2021    CALCIUM 8.9 10/05/2021    LABGLOM 49 10/05/2021    GLUCOSE 157 10/05/2021    GLUCOSE 94 05/16/2012       Magnesium:    Lab Results   Component Value Date    MG 2.3 10/05/2021     PT/INR:    Lab Results   Component Value Date    INR 0.97 03/16/2021     TSH:    Lab Results   Component Value Date    TSH 3.220 08/03/2021     VITAMIN B12: No components found for: B12  FOLATE:    Lab Results   Component Value Date    FOLATE 7.3 08/31/2016       IMAGING:  PROCEDURE: CT CHEST W CONTRAST       CLINICAL INFORMATION: Primary lung adenocarcinoma, right. Chest wall pain.       COMPARISON: CTA chest 10/5/2021.  CT angiography chest 12/13/2020.       TECHNIQUE:    5 mm axial imaging through the chest with IV contrast. Coronal and sagittal reconstruction were performed.        All CT scans at this facility use dose modulation, iterative reconstruction, and/or weight based dosing when appropriate to reduce the radiation dose to as low as reasonably achievable.       CONTRAST: 80 cc Isovue-370           FINDINGS:   Heart/mediastinum: The heart size is normal. Coronary artery calcifications are observed. No pericardial effusion is identified. No aortic aneurysm or dissection is present. No mediastinal, hilar, or axillary lymphadenopathy is observed.       Lungs: Centrilobular emphysema is present. Postoperative changes with volume loss in the right hemithorax and surgical clips in the right axilla appear stable. Areas of patchy groundglass opacity in the right lower lobe and right middle lobe have    improved. Areas of scarring in the right lower lobe appear stable. Subpleural left lower lobe pulmonary nodules measuring 3 to 4 mm are unchanged (series 2, image 37 and 40). No focal consolidation, pleural effusion, or pneumothorax is visualized.       Upper abdomen: The gallbladder is surgically absent. Simple cysts in the upper pole of the left kidney measuring up to 2 cm appear stable are visualized. The gallbladder is surgically absent. Hepatomegaly and hepatic steatosis are unchanged. No acute    findings are noted in the limited images through the upper abdomen.       Musculoskeletal: Chronic appearing right anterior fourth and fifth nondisplaced rib fractures contain calculus formation are observed (series 2, images 25 and 32). Multilevel degenerative disc disease in the thoracic spine appear stable. Diffuse    osteopenia is observed. The visualized skeletal structures appear intact.           Impression   1. Volume loss and postsurgical changes in the right hemithorax are stable. Centrilobular emphysema with an area of bandlike scarring in the right lower lobe medially is unchanged (series 4, image 29).       2. The previously seen patchy multifocal right lung airspace opacities have significantly improved. The small 3 to 4 mm subpleural left lower lobe pulmonary nodules are stable. No new pulmonary mass or nodules are observed.  Continued follow-up to ensure    resolution/stability is advised.       3. Right anterior nondisplaced fourth and fifth rib fractures (series 2, image 25 and 32) contain callus formation suggesting repair of changes, possibly related to recent injury. No underlying bony lesions are observed. If there is clinical concern for    skeletal metastasis, nuclear medicine whole body bone scan may be considered.       **This report has been created using voice recognition software.  It may contain minor errors which are inherent in voice recognition technology. **       Final report electronically signed by Dr Adan Lawrence on 12/21/2021 3:40 PM         Chest CTA with and without contrast 10/5/2021 done in the ER  PROCEDURE: CTA CHEST W WO CONTRAST       CLINICAL INFORMATION: chest pain.       COMPARISON: No prior study.       TECHNIQUE: 3 mm axial images were obtained through the chest after the administration of IV contrast.  A non-contrast localizer was obtained.  3D reconstructions were performed on the scanner to include MIP coronal and sagittal images through the chest.    Isovue was the intravenous contrast utilized.       All CT scans at this facility use dose modulation, iterative reconstruction, and/or weight-based dosing when appropriate to reduce radiation dose to as low as reasonably achievable.       FINDINGS:           There is adequate opacification of the pulmonary arterial system. No pulmonary emboli are present. Leobardo Tanya is atherosclerotic calcification in the aortic arch and left subclavian artery. .           The heart size is normal. There is no pericardial or pleural effusion. Leobardo Tanya is is increased density in the mediastinum possibly representing changes of radiation therapy. Please correlate clinically. .       There are areas of abnormal density especially in the right mid and lower lung fields suggestive of atelectasis or scarring. . There are no effusions there are postoperative changes in the right axillary region.     There is thoracic spondylosis. Dorethea Iha are postoperative changes involving the thyroid gland. There is a small hiatal hernia.            Impression       1. No evidence pulmonary embolism. 2. Atherosclerotic calcification aortic arch and left subclavian artery. 3. Areas of abnormal density in the right mid and lower lung fields suggestive off atelectasis or scarring. 4. Slightly increased density in the mediastinum possibly representing changes of radiation therapy. Please correlate clinically. 5. Postoperative changes in the right axilla. 6. Postoperative changes involving the thyroid gland. 7. Thoracic spondylosis. 8. Small hiatal hernia. 9. PET scan may be helpful for better evaluation of clinically indicated.           **This report has been created using voice recognition software. It may contain minor errors which are inherent in voice recognition technology. **       Final report electronically signed by DR Jeramy Weir on 10/5/2021 8:07 PM         Brain MRI with and without contrast 4/19/2021  Impression       1. No evidence of metastatic disease in the brain. 2. Stable chronic small vessel ischemic changes. 3. No acute findings. PROCEDURE: CTA CHEST W WO CONTRAST 12/13/2020       CLINICAL INFORMATION: cough, sob, elevated d-dimer, PE .       COMPARISON: 2/21/2019       TECHNIQUE: Postcontrast images of the chest with 3-D MIPS Isovue-370 IV contrast   All CT scans at this facility use dose modulation, iterative reconstruction, and/or weight-based dosing when appropriate to reduce radiation dose to as low as reasonably achievable.       FINDINGS: No PE. There is no aneurysm or dissection. No mediastinal or hilar adenopathy by size criteria. Postsurgical changes right axilla. No axillary lymphadenopathy   Heart size normal.   No infiltrates or pleural effusions. The 8 mm nodule seen on the prior CT now measures 14 mm and has spiculated borders highly suspicious of malignancy. There however are no other additional new masses. Scattered nodules are present in both lungs which are previously seen. There is a    cluster of nodular the anterior right upper lung stable. Small nodules posterior right lower lung stable. Tiny triangular nodular density lingula is stable.       Upper abdomen   Stable bilateral adrenal fullness likely hyperplasia this is stable dating back to June 2017.       Bones   No suspicious bone lesions           Impression   1. No PE. 2. No acute findings. 3. Interval increase in size of the patient's right lung nodule now measures 14 mm with spiculated borders and is highly suspicious for malignancy.               **This report has been created using voice recognition software.  It may contain minor errors which are inherent in voice recognition technology. **       Final report electronically signed by Dr. Milad Ford on 12/13/2020 4:08 PM       Impression:  1. No PE. 2. No acute findings. 3. Interval increase in size of the patient's right lung nodule now measures 14 mm with spiculated borders and is highly suspicious for malignancy.        CT CHEST W CONTRAST       CTA head with contrast 3/12/2021/syncope evaluation  Impression   Impression:   1.  Occluded skull base left ICA, present previously.  Patent left    posterior communicating artery. 2.  Intact anterior, posterior, and middle cerebral circulations. 3.  No intracranial aneurysm.         Brain MRI without contrast 3/13/2021      Impression       1. No evidence of an acute infarct. 2. No antegrade flow in the left internal carotid artery. 3. Mild atrophy and probable ischemic changes in the white matter. 4. Inflammatory changes in the right mastoid air cells.             Ct Needle Biopsy Lung/mediastinum Percutaneous  Result Date: 3/16/2021   Successful CT-guided right lower lobe lung nodule biopsy with small postprocedural pneumothorax. Follow-up chest radiographs will be obtained.  **This report has been created using voice recognition software. It may contain minor errors which are inherent in voice recognition technology. ** Final report electronically signed by Dr. Namita Rosales MD on 3/16/2021 10:50 AM    Pet Ct Skull Base To Mid Thigh 4/12/2021  FINDINGS:        Neck: A 0.7 cm nodule in the left parotid gland has a maximum SUV of 4.3 (image 39).     Chest: Cardiac size is normal. Atherosclerotic calcifications are present in the thoracic aorta and coronary arteries without evidence of aneurysm. There is no pleural or pericardial effusion. Emphysematous changes are noted in the bilateral lungs. A 1.5    cm nodule at the lateral right midlung is stable in size is associated with a maximum SUV of 9.5 (image 118). There is no FDG avid mediastinal, hilar or axillary lymphadenopathy. Surgical clips are noted in the right axilla. There are surgical changes    of prior right mastectomy. Activity associated with a small hiatal hernia may be infectious or inflammatory. Degenerative changes are seen in the thoracic spine without evidence of hypermetabolic osseous metastatic disease.       Abdomen/pelvis: Physiologic activity is present in the liver, spleen, urinary collecting system and gastrointestinal tract. The gallbladder is surgically absent. There is fatty replacement of the pancreas. Hypoattenuating lesions in the bilateral kidneys    are likely cysts but are incompletely characterized on the current study. A 1.5 cm hypoattenuating nodule in the right adrenal gland is associated with a maximum SUV of 7 (image 159). A 1.4 cm nodule left adrenal gland has a maximum SUV of 2.4 (image    154). Atherosclerotic calcifications are present in the abdominal aorta without evidence of aneurysm or the uterus is surgically absent. There is no FDG avid mesenteric, retroperitoneal, pelvic or inguinal lymphadenopathy.  Degenerative changes are    present in the lumbar spine and pelvis without evidence of hypermetabolic osseous metastatic disease. There is extensive metallic artifact in the lower pelvis from bilateral hip prostheses.           Impression   1. FDG avid right mid lung nodule corresponding to known malignancy. 2. 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 Metastatic disease is considered less likely but not excluded. 3. Mildly FDG avid bilateral adrenal nodules, likely benign adenomas.         PROCEDURE: CTA CHEST W WO CONTRAST   10/5/21       CLINICAL INFORMATION: chest pain.       COMPARISON: No prior study.       TECHNIQUE: 3 mm axial images were obtained through the chest after the administration of IV contrast.  A non-contrast localizer was obtained.  3D reconstructions were performed on the scanner to include MIP coronal and sagittal images through the chest.    Isovue was the intravenous contrast utilized.       All CT scans at this facility use dose modulation, iterative reconstruction, and/or weight-based dosing when appropriate to reduce radiation dose to as low as reasonably achievable.       FINDINGS:           There is adequate opacification of the pulmonary arterial system. No pulmonary emboli are present. Hina Gomez is atherosclerotic calcification in the aortic arch and left subclavian artery. .           The heart size is normal. There is no pericardial or pleural effusion. Hina Gomez is is increased density in the mediastinum possibly representing changes of radiation therapy. Please correlate clinically. .       There are areas of abnormal density especially in the right mid and lower lung fields suggestive of atelectasis or scarring. . There are no effusions there are postoperative changes in the right axillary region.       There is thoracic spondylosis. Rumalda Marsland are postoperative changes involving the thyroid gland. There is a small hiatal hernia.            Impression       1. No evidence pulmonary embolism.    2. Atherosclerotic calcification diagnosis. ER and GA both negative.                           PATHOLOGY REPORT                       ATTN: YUSRA FOY                       REQ: Anselmo Jessiac       Copies To:   Moustapha Reis       Clinical Information: RIGHT LUNG CANCER   6/14/2021    FINAL DIAGNOSIS:   A. Lymph nodes, level VII, resections:    Negative for neoplasm in 4 out of 4 lymph nodes. B. Lymph nodes, level IV R, resections:    Negative for neoplasm in 2 out of 2 lymph nodes. C. Lymph node, level IX, resection:    Negative for neoplasm in one out of one lymph node. D. Lymph nodes, level X R, resection:    Negative for neoplasm in 2 out of 2 lymph nodes. E. Lymph nodes, level XI R, resections:    Negative for neoplasm in 2 out of 2 lymph nodes. F. 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    Maximum tumor size equal 3.1 cm.    Margins of resection are free of neoplasia.    Moderate emphysema.    pT2, pN0.        Specimen:   A) LYMPH NODE(S), LEVEL 7   B) LYMPH NODE(S), LEVEL 4R   C) LYMPH NODE(S), LEVEL 9   D) LYMPH NODE(S), LEVEL 10R   E) LYMPH NODE(S), LEVEL 11R   F) WEDGE BIOPSY OF LUNG, RIGHT LOWER LOBE     Microscopic Examination:   Procedure   Wedge resection   Specimen Laterality   Right   Tumor Site   Lower lobe of lung   Tumor Size   2. 2 x 2 x 3.1 cm   Tumor Focality   Single focus   Histologic Type   Invasive squamous cell carcinoma, non-keratinizing   + Histologic Grade   G3: Poorly differentiated   + Spread Through Air Spaces (JESSICA)   Not identified   Visceral Pleura Invasion   Not identified   Lymphovascular Invasion   Not identified   Direct Invasion of Adjacent Structures   No adjacent structures present   Margins   All margins are uninvolved by tumor       Distance of invasive carcinoma from closest margin (centimeters):   0.1 cm       Specify closest margin: Visceral pleura   Bronchial Margin   Not applicable   Vascular Margin   Not applicable   Parenchymal Margin   Uninvolved by invasive carcinoma   Regional Lymph Nodes   Number of Lymph Nodes Involved: 0   Number of Lymph Nodes Examined: 11   Treatment Effect   No known presurgical therapy   Pathologic Stage Classification (pTNM) (AJCC 8th Edition)   Primary Tumor (pT)   pT2:  Tumor >3 cm but <=5 cm or having any of the following features:#   Regional Lymph Nodes (pN)   pN0: No regional lymph node metastasis   + Additional Pathologic Findings   Emphysema     [id number]   [id number]                                                     <Sign Out Dr. Maria R Daugherty M.D., F.C.A.P. NVML/ [address] [facility] deskwolfAdena [facility]  Printed on:  6/16/2021   [address] [place] [address]     PFT: Ordered ASAP and will be done Monday 4/19  ( if she gets her Covid testing tomorrow )  FEV1 1.720 which is 59% predicted FEV1 1.11 which is 49% predicted with significant bronchodilator response spirometry was severe obstructive defect    GENETICS:  na    MOLECULAR:  na    ASSESSMENT/PLAN:    1: Diagnosis: 78year-old female with significant COPD and comorbidities as detailed above with a fairly decent performance status. A lateral right middle lung nodule is increased since 2019 a recent biopsy of this 15 mm lesion shows a poorly differentiated squamous cell. Staging including PET scan shows no other hypermetabolic foci. She did have a syncopal episode and reports slight increase in headaches from her baseline. Work-up included a MRI of the brain with and without contrast summarized above which shows no evidence of metastatic disease. Clinical stage [id number]  /  Stage I A2 . Right middle lobe squamous cell carcinoma, poorly differentiated. 6/14/21 right lower lobe lobectomy and mediastinal dissection reveals pathologic stage T2 a N0 stage Ib squamous cell carcinoma grade 3 right lower lobe. 2) Prognosis / Disease Status: Very good.   Stage Ib with only high risk features being a grade 3.    3) Work-up:    Labs: No additional labs   Imaging: Recent chest CT of 12/21 shows postsurgical changes only with no signs of recurrent or worsening disease. Procedures: No additional procedures needed   Consults: None new   Other: The right chest wall pain much improved. 4) Symptom Management: None needed. Does report baseline minor neuropathy in her fingertips. 5) Supportive care provided. Level of care is appropriate. Teaching done today. Emphasized options at this point include surveillance versus adjuvant therapy. I think her risk is fairly low I would be concerned about toxicity with adjuvant chemotherapy. Recommend no adjuvant chemotherapy in this case. 6) Treatment goal:      Treatment plan: Case discussed at lung conference on June 1. Right lower lobectomy done. No adjuvant chemotherapy recommended. 7) Medications reviewed. Prescriptions today: Gabapentin              No orders of the defined types were placed in this encounter. [place]AR[place]:  Controlled Substance Monitoring:    Acute and Chronic Pain Monitoring:   RX Monitoring 3/23/2018   Attestation The Prescription Monitoring Report for this patient was reviewed today. Periodic Controlled Substance Monitoring Possible medication side effects, risk of tolerance/dependence & alternative treatments discussed. ;Random urine drug screen sent today. ;No signs of potential drug abuse or diversion identified. 8) Research Options:       Not applicable      9) Other:       Strongly encouraged to attempt to stop smoking. Currently down to about 1/4 pack/day. Reviewed records detailing her breast cancer diagnosis 15 years ago and treatment. Added ER/MA testing to the recent lung biopsy; negative. Recently had severe chest wall pain at the thoracotomy site. 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 Pain seems to be neurogenic. Trial of gabapentin 300 mg p.o. twice daily and check chest CT. This did help vascular decrease to 3 mg at night    10) Follow Up: Follow-up with me in late July and 1 week before she will have a CT of the chest with contrast    Adolfo Valle MD     Addendum:  I obtained records regarding Shania's prior diagnosis of R breast cancer. Abnormal mammogram in 2004 led to a biopsy that showed a grade 3 infiltrating ductal carcinoma (after reviewing all of the available data I believe this is a typographical error) that was ER positive and SC and HER-2 negative. No sentinel nodes involved. Initial dimension approximately 8.5 x 6 cm. No metastatic disease. Some mass in the left breast measuring 2.3 cm suspicious for malignancy as well PET scan did not light up in the lungs left breast or axilla. She underwent neoadjuvant chemotherapy and had a dramatic decrease in the size of the mass. Patient underwent radiation to the right chest wall treated to a total dose of 5040 cGy in 28 fractions with a boost with a total dose of 6120 cGy in 34 fractions. Interestingly however when I look at the path report from 3/11/5 the simple mastectomy specimen from the right reveals an invasive moderately differentiated squamous cell carcinoma the breast.  ER was reported as positive and 34% of cells HER-2 negative review of this pathology is confusing with the discrepancy and reports it is significant and that raises the possibility that this lung lesion could be metastatic and not a primary lesion. A fairly completely note from Kassie Balderas of radiation oncology references that the histology was squamous cell carcinoma and acknowledged the rarity of this type of histology of the breast.  Therefore I have little doubt that this in fact was squamous cell cancer of the breast.  Checked with pathology and all tissue was destroyed after 10 years therefore is not available.   Patient's treatment was neoadjuvant chemotherapy by Dr. Mirian Ayala followed by right simple mastectomy and sentinel node procedure and radiation. I do note the path report the documents at the time of the right simple mastectomy invasive grade 2 squamous cell carcinoma of the breast tumor after neoadjuvant therapy measures 6 x 5 x 3 cm tumor is 2 mm from the deep resection margin and lymphatic invasion was present. No axillary contents identified. No ductal component to this neoplasm. This was called squamous cell carcinoma as opposed to metaplastic carcinoma. [place] procedure 12/1/2004 the right axilla showed 2 axillary nodes negative for metastatic carcinoma. Interestingly the ER was reported as positive and 34% of cells NH was negative and HER-2 was negative.

## 2022-01-20 NOTE — PATIENT INSTRUCTIONS
Due for left digital screening mammogram.  Order placed. Please schedule  Routine follow-up with me in 6 months.   1 week before or CT of the chest with contrast

## 2022-03-17 ENCOUNTER — HOSPITAL ENCOUNTER (OUTPATIENT)
Dept: WOMENS IMAGING | Age: 72
Discharge: HOME | End: 2022-03-17
Payer: MEDICARE

## 2022-03-17 DIAGNOSIS — Z12.31 BREAST CANCER SCREENING BY MAMMOGRAM: ICD-10-CM

## 2022-03-17 PROCEDURE — 77063 BREAST TOMOSYNTHESIS BI: CPT

## 2022-07-09 NOTE — PLAN OF CARE
Problem: DISCHARGE BARRIERS  Goal: Patient's continuum of care needs are met  Note: SW met with patient to confirm discharge arrangements in place for transition to [facility] on Friday, 08/20/2021. Patient's son, Mallory Monet, to transport patient to facility. Son, Hamlet, aware to bring patient's portable oxygen tank. Plan for discharge at 3:30pm. SW contacted [facility] to inform of discharge time. Information provided to [facility] and confirmed facility aware of oxygen needs. SW to follow and maintain involvement in discharge planning. Patent

## 2022-07-18 ENCOUNTER — OFFICE VISIT (OUTPATIENT)
Dept: FAMILY MEDICINE CLINIC | Age: 72
End: 2022-07-18
Payer: MEDICARE

## 2022-07-18 VITALS
WEIGHT: 141 LBS | BODY MASS INDEX: 24.2 KG/M2 | TEMPERATURE: 98.2 F | DIASTOLIC BLOOD PRESSURE: 52 MMHG | SYSTOLIC BLOOD PRESSURE: 128 MMHG | RESPIRATION RATE: 20 BRPM | HEART RATE: 80 BPM

## 2022-07-18 DIAGNOSIS — I10 ESSENTIAL HYPERTENSION: Primary | ICD-10-CM

## 2022-07-18 DIAGNOSIS — R55 SYNCOPE, UNSPECIFIED SYNCOPE TYPE: ICD-10-CM

## 2022-07-18 DIAGNOSIS — E78.00 PURE HYPERCHOLESTEROLEMIA: ICD-10-CM

## 2022-07-18 DIAGNOSIS — I65.22 CAROTID OCCLUSION, LEFT: ICD-10-CM

## 2022-07-18 DIAGNOSIS — J44.9 CHRONIC OBSTRUCTIVE PULMONARY DISEASE, UNSPECIFIED COPD TYPE (HCC): ICD-10-CM

## 2022-07-18 DIAGNOSIS — E03.9 HYPOTHYROIDISM, UNSPECIFIED TYPE: ICD-10-CM

## 2022-07-18 DIAGNOSIS — Z86.73 HISTORY OF CVA (CEREBROVASCULAR ACCIDENT): ICD-10-CM

## 2022-07-18 DIAGNOSIS — Z91.81 AT HIGH RISK FOR FALLS: ICD-10-CM

## 2022-07-18 DIAGNOSIS — Z91.199 NONCOMPLIANCE: ICD-10-CM

## 2022-07-18 DIAGNOSIS — E55.9 VITAMIN D DEFICIENCY: ICD-10-CM

## 2022-07-18 DIAGNOSIS — C34.31 PRIMARY MALIGNANT NEOPLASM OF RIGHT LOWER LOBE OF LUNG (HCC): ICD-10-CM

## 2022-07-18 DIAGNOSIS — N18.30 STAGE 3 CHRONIC KIDNEY DISEASE, UNSPECIFIED WHETHER STAGE 3A OR 3B CKD (HCC): ICD-10-CM

## 2022-07-18 PROBLEM — E43 SEVERE MALNUTRITION (HCC): Chronic | Status: RESOLVED | Noted: 2021-03-15 | Resolved: 2022-07-18

## 2022-07-18 PROCEDURE — 1123F ACP DISCUSS/DSCN MKR DOCD: CPT | Performed by: NURSE PRACTITIONER

## 2022-07-18 PROCEDURE — 99214 OFFICE O/P EST MOD 30 MIN: CPT | Performed by: NURSE PRACTITIONER

## 2022-07-18 SDOH — ECONOMIC STABILITY: FOOD INSECURITY: WITHIN THE PAST 12 MONTHS, YOU WORRIED THAT YOUR FOOD WOULD RUN OUT BEFORE YOU GOT MONEY TO BUY MORE.: NEVER TRUE

## 2022-07-18 SDOH — ECONOMIC STABILITY: FOOD INSECURITY: WITHIN THE PAST 12 MONTHS, THE FOOD YOU BOUGHT JUST DIDN'T LAST AND YOU DIDN'T HAVE MONEY TO GET MORE.: NEVER TRUE

## 2022-07-18 ASSESSMENT — PATIENT HEALTH QUESTIONNAIRE - PHQ9
SUM OF ALL RESPONSES TO PHQ QUESTIONS 1-9: 11
SUM OF ALL RESPONSES TO PHQ QUESTIONS 1-9: 11
2. FEELING DOWN, DEPRESSED OR HOPELESS: 0
4. FEELING TIRED OR HAVING LITTLE ENERGY: 3
SUM OF ALL RESPONSES TO PHQ9 QUESTIONS 1 & 2: 0
8. MOVING OR SPEAKING SO SLOWLY THAT OTHER PEOPLE COULD HAVE NOTICED. OR THE OPPOSITE, BEING SO FIGETY OR RESTLESS THAT YOU HAVE BEEN MOVING AROUND A LOT MORE THAN USUAL: 2
9. THOUGHTS THAT YOU WOULD BE BETTER OFF DEAD, OR OF HURTING YOURSELF: 0
1. LITTLE INTEREST OR PLEASURE IN DOING THINGS: 0
5. POOR APPETITE OR OVEREATING: 3
SUM OF ALL RESPONSES TO PHQ QUESTIONS 1-9: 11
10. IF YOU CHECKED OFF ANY PROBLEMS, HOW DIFFICULT HAVE THESE PROBLEMS MADE IT FOR YOU TO DO YOUR WORK, TAKE CARE OF THINGS AT HOME, OR GET ALONG WITH OTHER PEOPLE: 3
SUM OF ALL RESPONSES TO PHQ QUESTIONS 1-9: 11
3. TROUBLE FALLING OR STAYING ASLEEP: 0
7. TROUBLE CONCENTRATING ON THINGS, SUCH AS READING THE NEWSPAPER OR WATCHING TELEVISION: 3
6. FEELING BAD ABOUT YOURSELF - OR THAT YOU ARE A FAILURE OR HAVE LET YOURSELF OR YOUR FAMILY DOWN: 0

## 2022-07-18 ASSESSMENT — ENCOUNTER SYMPTOMS
BOWEL INCONTINENCE: 0
VOMITING: 0
ABDOMINAL PAIN: 0
VISUAL CHANGE: 0
BACK PAIN: 0

## 2022-07-18 ASSESSMENT — SOCIAL DETERMINANTS OF HEALTH (SDOH): HOW HARD IS IT FOR YOU TO PAY FOR THE VERY BASICS LIKE FOOD, HOUSING, MEDICAL CARE, AND HEATING?: NOT HARD AT ALL

## 2022-07-18 NOTE — PROGRESS NOTES
[facility]  [address] [place] [id number]  Dept: [phone]  Dept Fax: [phone]: [phone]     Visit Date:  7/18/2022      Patient:  Indira Rivero  YOB: 1950    HPI:     Chief Complaint   Patient presents with    Other     Pt has been having some passing out spells and some pain on her right side as well. Pt has had pain in her right side intermittently for a few months. She was told her passing out spells were possibly seizures, she had one on Wednesday and Friday of last week. Saturday she was seen by a PCP that does home checks and she would like to go over what that PCP had filled out on a form she brought with her. Pt presents to the office today for issues with passing out at home. She has been having these spells for a few weeks, but in June of 2021- had possible seizure/syncope episode then and fell and broke her hip. She was in the hospital for that for a while, cardiology work up was normal at that time. No Neurology consult or work up was done. Pt also has a HX of CVA in the past.     Pt reports that she had 2 eposide of passing out last week that were witnessed by her boyfriend. Pt feels hazy before the episode and then passes out. She is not sure how long she was \"out\" for. Pt currently denies any symptoms. She is tired though. She has a HX of non compliance with medications and is currently not taking any medications because she \"ran out\" and did not call for a refill. She has not been seen in this office since 2020. She has not tried anything for these episodes and usually just goes back to what she was doing before. Pt does follow up with her cancer doctor and that appointment is this week. Loss of Consciousness  This is a new problem. The current episode started more than 1 year ago. The problem has been unchanged. She lost consciousness for a period of less than 1 minute.  Nothing aggravates the symptoms. Associated symptoms include malaise/fatigue and weakness. Pertinent negatives include no abdominal pain, auditory change, aura, back pain, bladder incontinence, bowel incontinence, chest pain, clumsiness, diaphoresis, dizziness, fever, focal sensory loss, focal weakness, headaches, nausea, palpitations, slurred speech, visual change or vomiting. She has tried nothing for the symptoms. Her past medical history is significant for CAD, CVA and HTN. There is no history of a clotting disorder, DM, seizures, a sudden death in family, TIA or vertigo. Pt does follow up with Dr Katherine Keith oncology:  DIAGNOSIS:  -Squamous cell carcinoma RLL. Pathologic stage T2 a N0 M0 / stage Ib.  3.1 cm on the high risk feature grade 3.  - Remote history of squamous cell cancer of the breast in 2005.   This current tumor is receptor negative versus been receptor positive in 2005     TREATMENT:  - Right lower lobectomy and node dissection per Dr. Tavia Marr (6/14/2021)    Patient Active Problem List   Diagnosis    Hyperlipidemia    History of breast cancer    Vitamin D deficiency    Chronic headachess/p head injjury s/p fall    Depression    Smoker    Hypothyroidism    Allergic rhinitis    Incontinence    Noncompliance    Vasovagal syncope    History of CVA (cerebrovascular accident)    Stage 2 moderate COPD by GOLD classification ([address]    Carotid occlusion, left    OA (osteoarthritis) of hip    Essential hypertension    CAD (coronary artery disease)    Transient cerebral ischemia    Aortic insufficiency    Chest pain, atypical    Tobacco abuse    History of chest pain atypical    S/P cardiac cath nonobstructive lesion 2014    Lumbar radiculitis    Spinal stenosis of lumbar region without neurogenic claudication    Syncope and collapse    Solitary pulmonary nodule on lung CT    Primary malignant neoplasm of right lower lobe of lung (HCC)    SOB (shortness of breath) on exertion    Dizziness on standing    History of syncope    Cancer of lower lobe of right lung ([facility]    S/P robotic assisted nonanatomic wedge resection of lateral basilar right lower lobe lung mass and mediastinal dissection    Postoperative urinary retention    Closed left hip fracture, initial encounter [facility])    Hip fracture requiring operative repair, left, closed, initial encounter [facility])    Chronic renal disease, stage III ([facility] [098725]         Medications    Current Outpatient Medications:     gabapentin (NEURONTIN) 300 MG capsule, Take 1 capsule by mouth 2 times daily for 180 days.  Intended supply: 30 days (Patient not taking: Reported on 7/18/2022), Disp: 60 capsule, Rfl: 5    docusate sodium (COLACE, DULCOLAX) 100 MG CAPS, Take 100 mg by mouth 2 times daily as needed for Constipation (Patient not taking: No sig reported), Disp: , Rfl:     ferrous sulfate (IRON 325) 325 (65 Fe) MG tablet, Take 1 tablet by mouth 2 times daily (with meals) (Patient not taking: No sig reported), Disp: 30 tablet, Rfl: 3    ondansetron (ZOFRAN-ODT) 4 MG disintegrating tablet, Take 1 tablet by mouth every 8 hours as needed for Nausea or Vomiting (Patient not taking: Reported on 7/18/2022), Disp: , Rfl:     aspirin 325 MG EC tablet, Take 1 tablet by mouth daily (Patient not taking: Reported on 7/18/2022), Disp: 30 tablet, Rfl: 3    acetaminophen (TYLENOL) 325 MG tablet, Take 2 tablets by mouth every 6 hours (Patient not taking: Reported on 7/18/2022), Disp: 120 tablet, Rfl: 3    famotidine (PEPCID) 20 MG tablet, Take 1 tablet by mouth daily (Patient not taking: No sig reported), Disp: 60 tablet, Rfl: 3    Cholecalciferol (VITAMIN D) 25 MCG TABS, Take 1 tablet by mouth daily (Patient not taking: No sig reported), Disp: 60 tablet, Rfl:     guaiFENesin (MUCINEX) 600 MG extended release tablet, Take 1 tablet by mouth 2 times daily (Patient not taking: Reported on 7/18/2022), Disp: 60 tablet, Rfl: 1    diphenhydrAMINE (BENADRYL) 25 MG tablet, Take 25 mg by mouth every 6 hours as needed for Itching (Patient not taking: Reported on 7/18/2022), Disp: , Rfl:     albuterol sulfate HFA (PROVENTIL HFA) 108 (90 Base) MCG/ACT inhaler, Inhale 2 puffs into the lungs every 6 hours as needed for Wheezing (Patient not taking: Reported on 7/18/2022), Disp: 1 Inhaler, Rfl: 3    levothyroxine (LEVOTHROID) 88 MCG tablet, Take 1 tablet by mouth daily (Patient not taking: No sig reported), Disp: 30 tablet, Rfl: 5    The patient is allergic to cipro xr and vicodin [hydrocodone-acetaminophen]. Past Medical History  Nerissa Herman  has a past medical history of Anxiety, Arthritis, Breast cancer ([address] CAD (coronary artery disease), Cancer of the skin, Cerebral artery occlusion with cerebral infarction ([address] Chronic UTI, COPD (chronic obstructive pulmonary disease) ([address] CVA (cerebral infarction), Depression, DVT of lower extremity (deep venous thrombosis) ([address] Facial injury, History of stroke, History of therapeutic radiation, Hx antineoplastic chemo, Hx of blood clots, Hyperlipidemia, Hypertension, Hypothyroidism, IBS (irritable bowel syndrome), Low HDL (under 40), Lung cancer ([address] Prolonged emergence from general anesthesia, Recurrent UTI (urinary tract infection), and Word finding difficulty. Subjective:      Review of Systems   Constitutional:  Positive for fatigue and malaise/fatigue. Negative for diaphoresis and fever. Eyes:  Positive for visual disturbance (during the syncope episodes). Negative for pain. Respiratory:  Negative for shortness of breath and wheezing. Cardiovascular:  Positive for syncope. Negative for chest pain, palpitations and leg swelling. Gastrointestinal:  Negative for abdominal pain, bowel incontinence, constipation, diarrhea, nausea and vomiting. Genitourinary:  Negative for bladder incontinence, difficulty urinating, dysuria and urgency. Musculoskeletal:  Negative for back pain, gait problem and joint swelling. Skin:  Negative for color change and rash. Neurological:  Positive for weakness. Negative for dizziness, focal weakness and headaches. Psychiatric/Behavioral:  Negative for agitation and sleep disturbance. The patient is not nervous/anxious and is not hyperactive. Objective:     BP (!) 128/52   Pulse 80   Temp 98.2 °F (36.8 °C) (Oral)   Resp 20   Wt 141 lb (64 kg)   BMI 24.20 kg/m²     Physical Exam  Vitals reviewed. Constitutional:       General: She is not in acute distress. Appearance: Normal appearance. She is well-developed. HENT:      Head: Normocephalic and atraumatic. Right Ear: Hearing, tympanic membrane, ear canal and external ear normal.      Left Ear: Hearing, tympanic membrane, ear canal and external ear normal.      Nose: Nose normal. No nasal tenderness. Mouth/Throat:      Lips: Pink. Mouth: Mucous membranes are moist. No oral lesions. Pharynx: Oropharynx is clear. Uvula midline. Eyes:      General:         Right eye: No discharge. Left eye: No discharge. Extraocular Movements: Extraocular movements intact. Conjunctiva/sclera: Conjunctivae normal.      Pupils: Pupils are equal, round, and reactive to light. Neck:      Vascular: No carotid bruit. Trachea: No tracheal deviation. Cardiovascular:      Rate and Rhythm: Normal rate and regular rhythm. Heart sounds: Normal heart sounds. No murmur heard. Pulmonary:      Effort: Pulmonary effort is normal. No respiratory distress. Breath sounds: Normal breath sounds. Abdominal:      General: Bowel sounds are normal.      Palpations: Abdomen is soft. Tenderness: There is no abdominal tenderness. Musculoskeletal:      Cervical back: Full passive range of motion without pain and neck supple. Right lower leg: No edema. Left lower leg: No edema. Lymphadenopathy:      Head:      Right side of head: No submental, submandibular, tonsillar, preauricular, posterior auricular or occipital adenopathy.       Left side of head: No submental, submandibular, tonsillar, preauricular, posterior auricular or occipital adenopathy. Cervical: No cervical adenopathy. Skin:     General: Skin is warm and dry. Findings: No rash. Neurological:      General: No focal deficit present. Mental Status: She is alert and oriented to person, place, and time. Cranial Nerves: No cranial nerve deficit. Motor: Weakness present. Coordination: Coordination normal.   Psychiatric:         Mood and Affect: Mood normal.         Behavior: Behavior normal.         Thought Content: Thought content normal.         Judgment: Judgment normal.       Assessment/Plan:      Joni Pettit was seen today for other. Diagnoses and all orders for this visit:    Essential hypertension    Stage 3 chronic kidney disease, unspecified whether stage 3a or 3b CKD (HCC)    Chronic obstructive pulmonary disease, unspecified COPD type ([facility]  -     XR CHEST (2 VW); Future    Primary malignant neoplasm of right lower lobe of lung (HCC)    Hypothyroidism, unspecified type  -     TSH; Future  -     T4, Free; Future    At high risk for falls    Pure hypercholesterolemia  -     Lipid Panel; Future    Vitamin D deficiency  -     Vitamin D 25 Hydroxy; Future    History of CVA (cerebrovascular accident)  -     XR CHEST (2 VW); Future    Carotid occlusion, left    Syncope, unspecified syncope type  -     CBC with Auto Differential; Future  -     Comprehensive Metabolic Panel; Future  -     Vitamin B12 & Folate; Future  -     EEG; Future  -     Concepción Diehl MD, Cardiology, Marcelino Mendoza MD, Neurology, [address] [place] [address]  -     VL DUP CAROTID BILATERAL; Future  -     ECHO 2D WO Color Doppler Complete; Future  -     EKG 12 lead; Future  -     XR CHEST (2 VW); Future    Noncompliance    - Discussed in detail the importance of medication and follow up compliance.  Pt agreeable to referral, labs and further testing and states she will follow up as planned. - ER for any more episodes!!  Pt agreeable. - Referral to Neurology with EEG ordered as well. HX of CVA and carotid stenosis. Repeat Carotid dopplers ordered. - Have labs completed after 12 hours fasting.   - Normal stress test in 2021, will refer back to cardiology for any further testing recommended. - Call office with any questions or concerns, or if symptoms are getting worse or changing    Return in about 3 weeks (around 8/8/2022), or if symptoms worsen or fail to improve, for Routine follow up, Wellness/Physical, Medication check. Patient given educational materials - see patient instructions. Discussed use, benefit, and side effects of prescribed medications. All patient questions answered. Pt voiced understanding. Electronically signed by NITO Resendiz CNP on 7/19/2022 at 7:59 AM        On the basis of positive falls risk screening, assessment and plan is as follows: home safety tips provided, referral to neurology provided for HX of CVA, referral to cardiology provided for evaluation, patient will follow up in 3  week(s) for further evaluation.

## 2022-07-19 ENCOUNTER — OFFICE VISIT (OUTPATIENT)
Dept: CARDIOLOGY CLINIC | Age: 72
End: 2022-07-19
Payer: MEDICARE

## 2022-07-19 VITALS
HEART RATE: 91 BPM | SYSTOLIC BLOOD PRESSURE: 141 MMHG | WEIGHT: 140.6 LBS | DIASTOLIC BLOOD PRESSURE: 66 MMHG | HEIGHT: 64 IN | BODY MASS INDEX: 24.01 KG/M2

## 2022-07-19 DIAGNOSIS — R42 DIZZINESS ON STANDING: ICD-10-CM

## 2022-07-19 DIAGNOSIS — Z86.73 HISTORY OF CVA (CEREBROVASCULAR ACCIDENT): ICD-10-CM

## 2022-07-19 DIAGNOSIS — Z98.890 S/P CARDIAC CATH: ICD-10-CM

## 2022-07-19 DIAGNOSIS — Z87.898 HISTORY OF SYNCOPE: Primary | ICD-10-CM

## 2022-07-19 DIAGNOSIS — R94.31 ABNORMAL EKG: ICD-10-CM

## 2022-07-19 DIAGNOSIS — R06.02 SOB (SHORTNESS OF BREATH) ON EXERTION: ICD-10-CM

## 2022-07-19 DIAGNOSIS — R06.02 SOB (SHORTNESS OF BREATH): ICD-10-CM

## 2022-07-19 PROCEDURE — 1123F ACP DISCUSS/DSCN MKR DOCD: CPT | Performed by: INTERNAL MEDICINE

## 2022-07-19 PROCEDURE — 93000 ELECTROCARDIOGRAM COMPLETE: CPT | Performed by: INTERNAL MEDICINE

## 2022-07-19 PROCEDURE — 99214 OFFICE O/P EST MOD 30 MIN: CPT | Performed by: INTERNAL MEDICINE

## 2022-07-19 ASSESSMENT — ENCOUNTER SYMPTOMS
NAUSEA: 0
CONSTIPATION: 0
EYE PAIN: 0
SHORTNESS OF BREATH: 0
WHEEZING: 0
COLOR CHANGE: 0
DIARRHEA: 0

## 2022-07-19 NOTE — PROGRESS NOTES
Chief Complaint   Patient presents with    Follow-up   Originally  patient here - ref.  by Dr. Farhana Flores - hypertenstion and mild CAD - last seen Dr. Tiesha Card in 2015     Last seen 03/2018  the 05/2021      Came today after being referred by pcp for LOC syncope versus siezure D/o    Duong Jesus had hx of siezure yrs back and used to take meds for siezure then and advised to stop it    Had two episode syncope in the last 1 week - 7 days and 5 days back  Predrome -dizziness, nausea  Postdrome - nausea, sweaty and feel tired  No injury but fall on side of the cauch as pat was sat then    Hx of Occasional dizziness on standing  Had balance issue    Hx of syncope intermittent     Last syncope march 2021 and evalutaed in hospital  predrom dizziness  Tired post syncope with nausea    Sob on exertion chronic on home O2    Occasional palpitation    No leg edema    EKG done today      Hx of chronic Chest pain once in a while once a months 10 min  No aggravating or relieving factor    Smoke 2ppd for 48 yrs    FHX  Father had mi in his 52's  Sister had MI at 72    Patient Active Problem List   Diagnosis    Hyperlipidemia    History of breast cancer    Vitamin D deficiency    Chronic headachess/p head injjury s/p fall    Depression    Smoker    Hypothyroidism    Allergic rhinitis    Incontinence    Noncompliance    Vasovagal syncope    History of CVA (cerebrovascular accident)    Stage 2 moderate COPD by GOLD classification ([address]    Carotid occlusion, left    OA (osteoarthritis) of hip    Essential hypertension    Transient cerebral ischemia    Aortic insufficiency    Chest pain, atypical    Tobacco abuse    History of chest pain atypical    S/P cardiac cath nonobstructive lesion 2014    Lumbar radiculitis    Spinal stenosis of lumbar region without neurogenic claudication    Syncope and collapse    Solitary pulmonary nodule on lung CT    Primary malignant neoplasm of right lower lobe of lung (HCC)    SOB (shortness of breath) on exertion Dizziness on standing    History of syncope    Cancer of lower lobe of right lung (HCC)    S/P robotic assisted nonanatomic wedge resection of lateral basilar right lower lobe lung mass and mediastinal dissection    Postoperative urinary retention    Closed left hip fracture, initial encounter ([facility]    Hip fracture requiring operative repair, left, closed, initial encounter ([facility]    Chronic renal disease, stage III ([facility] [facility] [[id number]]       Past Surgical History:   Procedure Laterality Date    [address] Right 04/01/2005    evacuation of breast hematoma-Dr. Pepe Sanchez    BREAST SURGERY Right 11/30/2004    lymphatic mapping, SLN bx x2-Dr. Margo Xiong  2009    Dr. Luanne Dejesus  03/16/2021    CT NEEDLE BIOPSY LUNG PERCUTANEOUS 3/16/2021 [facility] CT SCAN    FEMUR FRACTURE SURGERY Left 8/2/2021    FEMUR OPEN REDUCTION INTERNAL FIXATION performed by Quinton Power MD at [facility]  01/19/2015    HYSTERECTOMY (CERVIX STATUS UNKNOWN)  [facility] Left 2011    HIP    JOINT REPLACEMENT Right 01/19/2015    Right Total Hip Replacement - Dr. Benjamin Carrillo, TOTAL Right 6/14/2021    ROBOTIC ASSISTED RIGHT LOWER LOBE SUPERIOR SEGMENTECTOMY AND MEDIASTINAL DISSECTION, CRYOTHERAPY OF INTERCOSTAL NERVES performed by Maicol Russo MD at [facility] 142 Right 2005    Dr. Vicente Ponce  04/10/2018    Lumbar epidural steroid injection at L4    NC NJX DX/THER SBST INTRLMNR LMBR/SAC W/IMG GDN N/A 04/10/2018    LESI  @ L4 performed by Marlin Acosta MD at [address] [place] [address] ([address] [place] [address])  1976, 2001    ovaries    THYROID LOBECTOMY Left 11/26/2010    left thyroid lobectomy with isthmusectomy-Dr. Leyva [facility]    THYROID SURGERY  2007    right thyroid lobectomy-Dr. Ibanez     BREAST NEEDLE BIOPSY RIGHT Right 11/18/2004    invasive ductal carcinoma right breast Allergies   Allergen Reactions    Cipro Xr     Vicodin [Hydrocodone-Acetaminophen]      Weird dreams          Family History   Problem Relation Age of Onset    Osteoporosis Mother     Hypertension Mother     High Cholesterol Mother     Stroke Mother     Colon Cancer Mother     Cancer Father         lung cancer    Heart Disease Father     Hypertension Father     High Cholesterol Father     Heart Disease Sister     High Cholesterol Sister     Hypertension Sister     Asthma Sister     Other Other         high arched feet    Lung Cancer Son     Breast Cancer Niece 64    Ovarian Cancer Niece 43        Social History     Socioeconomic History    Marital status:       Spouse name: Not on file    Number of children: 3    Years of education: 9    Highest education level: Not on file   Occupational History    Occupation: Disabled   Tobacco Use    Smoking status: Every Day     Packs/day: 2.00     Years: 48.00     Pack years: 96.00     Types: Cigarettes     Start date: 11/13/1967    Smokeless tobacco: Never    Tobacco comments:     Currently at 1 pk/day   Vaping Use    Vaping Use: Never used   Substance and Sexual Activity    Alcohol use: No     Alcohol/week: 0.0 standard drinks    Drug use: No    Sexual activity: Yes     Partners: Male   Other Topics Concern    Not on file   Social History Narrative    Not on file     Social Determinants of Health     Financial Resource Strain: Low Risk     Difficulty of Paying Living Expenses: Not hard at all   Food Insecurity: No Food Insecurity    Worried About Running Out of Food in the Last Year: Never true    Ran Out of Food in the Last Year: Never true   Transportation Needs: Not on file   Physical Activity: Not on file   Stress: Not on file   Social Connections: Not on file   Intimate Partner Violence: Not on file   Housing Stability: Not on file       Current Outpatient Medications   Medication Sig Dispense Refill    gabapentin (NEURONTIN) 300 MG capsule Take 1 capsule by mouth 2 times daily for 180 days. Intended supply: 30 days (Patient not taking: No sig reported) 60 capsule 5    docusate sodium (COLACE, DULCOLAX) 100 MG CAPS Take 100 mg by mouth 2 times daily as needed for Constipation (Patient not taking: No sig reported)      ferrous sulfate (IRON 325) 325 (65 Fe) MG tablet Take 1 tablet by mouth 2 times daily (with meals) (Patient not taking: No sig reported) 30 tablet 3    ondansetron (ZOFRAN-ODT) 4 MG disintegrating tablet Take 1 tablet by mouth every 8 hours as needed for Nausea or Vomiting (Patient not taking: No sig reported)      aspirin 325 MG EC tablet Take 1 tablet by mouth daily (Patient not taking: No sig reported) 30 tablet 3    acetaminophen (TYLENOL) 325 MG tablet Take 2 tablets by mouth every 6 hours (Patient not taking: No sig reported) 120 tablet 3    famotidine (PEPCID) 20 MG tablet Take 1 tablet by mouth daily (Patient not taking: No sig reported) 60 tablet 3    Cholecalciferol (VITAMIN D) 25 MCG TABS Take 1 tablet by mouth daily (Patient not taking: No sig reported) 60 tablet     guaiFENesin (MUCINEX) 600 MG extended release tablet Take 1 tablet by mouth 2 times daily (Patient not taking: No sig reported) 60 tablet 1    diphenhydrAMINE (BENADRYL) 25 MG tablet Take 25 mg by mouth every 6 hours as needed for Itching (Patient not taking: No sig reported)      albuterol sulfate HFA (PROVENTIL HFA) 108 (90 Base) MCG/ACT inhaler Inhale 2 puffs into the lungs every 6 hours as needed for Wheezing (Patient not taking: No sig reported) 1 Inhaler 3    levothyroxine (LEVOTHROID) 88 MCG tablet Take 1 tablet by mouth daily (Patient not taking: No sig reported) 30 tablet 5     No current facility-administered medications for this visit. Review of Systems -     General ROS: negative  Psychological ROS: negative  Hematological and Lymphatic ROS: No history of blood clots or bleeding disorder.    Respiratory ROS: no cough, shortness of breath, or wheezing  Cardiovascular ROS: no chest pain or dyspnea on exertion  Gastrointestinal ROS: negative  Genito-Urinary ROS: no dysuria, trouble voiding, or hematuria  Musculoskeletal ROS: negative  Neurological ROS: no TIA or stroke symptoms  Dermatological ROS: negative      Blood pressure (!) 141/66, pulse 91, height 5' 4\" (1.626 m), weight 140 lb 9.6 oz (63.8 kg), not currently breastfeeding. Physical Examination:    General appearance - alert, well appearing, and in no distress  Mental status - alert, oriented to person, place, and time  Neck - supple, no significant adenopathy, no JVD, or carotid bruits  Chest - clear to auscultation, no wheezes, rales or rhonchi, symmetric air entry  Heart - normal rate, regular rhythm, normal S1, S2, no murmurs, rubs, clicks or gallops  Abdomen - soft, nontender, nondistended, no masses or organomegaly  Neurological - alert, oriented, normal speech, no focal findings or movement disorder noted  Musculoskeletal - no joint tenderness, deformity or swelling  Extremities - peripheral pulses normal, no pedal edema, no clubbing or cyanosis  Skin - normal coloration and turgor, no rashes, no suspicious skin lesions noted    Lab  No results for input(s): CKTOTAL, CKMB, CKMBINDEX, TROPONINI in the last 72 hours.   CBC:   Lab Results   Component Value Date/Time    WBC 7.9 12/07/2021 03:44 PM    RBC 4.55 12/07/2021 03:44 PM    RBC 4.49 05/16/2012 12:51 PM    HGB 15.0 12/07/2021 03:44 PM    HCT 46.6 12/07/2021 03:44 PM     12/07/2021 03:44 PM    MCH 33.0 12/07/2021 03:44 PM    MCHC 32.2 12/07/2021 03:44 PM    RDW 16.0 12/07/2021 03:44 PM     12/07/2021 03:44 PM    MPV 10.4 12/07/2021 03:44 PM     BMP:    Lab Results   Component Value Date/Time     12/07/2021 03:44 PM     10/05/2021 06:25 PM    K 4.4 12/07/2021 03:44 PM    K 3.5 10/05/2021 06:25 PM     10/05/2021 06:25 PM    CO2 24 10/05/2021 06:25 PM    BUN 24 10/05/2021 06:25 PM    LABALBU 4.4 12/07/2021 03:44 PM    LABALBU 4.4 05/16/2012 12:51 PM    CREATININE 0.8 12/07/2021 03:44 PM    CREATININE 1.1 10/05/2021 06:25 PM    CALCIUM 8.9 10/05/2021 06:25 PM    LABGLOM 49 10/05/2021 06:25 PM    GLUCOSE 157 10/05/2021 06:25 PM    GLUCOSE 94 05/16/2012 12:51 PM     Hepatic Function Panel:    Lab Results   Component Value Date/Time    ALKPHOS 170 12/07/2021 03:44 PM    ALT 8 12/07/2021 03:44 PM    AST 15 12/07/2021 03:44 PM    PROT 7.6 12/07/2021 03:44 PM    BILITOT 0.4 12/07/2021 03:44 PM    BILIDIR <0.2 12/07/2021 03:44 PM    LABALBU 4.4 12/07/2021 03:44 PM    LABALBU 4.4 05/16/2012 12:51 PM     Magnesium:    Lab Results   Component Value Date/Time    MG 2.3 10/05/2021 06:25 PM     Warfarin PT/INR:  No components found for: PTPATWAR, PTINRWAR  HgBA1c:    Lab Results   Component Value Date/Time    LABA1C 5.2 08/11/2020 12:28 PM     FLP:    Lab Results   Component Value Date/Time    TRIG 99 08/11/2020 12:28 PM    HDL 40 08/11/2020 12:28 PM    LDLCALC 115 08/11/2020 12:28 PM     TSH:    Lab Results   Component Value Date/Time    TSH 3.220 08/03/2021 05:51 AM     CONCLUSION:    1. Left main is a large caliber vessel, distally has about 20 percent          disease distally before the bifurcation. 2.    The circumflex about 20 percent proximal lesion and appears to be a          dominant vessel. 3.    The LAD has about 20 percent diffuse disease and large caliber          vessel. 4.    Right coronary artery is medium caliber vessel, nondominant vessel,          at least based on my views and there is no __________ critical          lesion, has mild lumen irregularity in it. 5.    Left ventriculography demonstrates ejection fraction of about 50 to          55 percent with mild global hypokinesia. 6.    No complications occurred.      Overall impression now is that it is possible that the patient has evidence  of syndrome X because of the nonobstructive disease, but the LVEDP was 19,  indicative of mild impaired relaxation of ventricle. Again, aggressive risk  factor modification is strongly encouraged. The patient is to avoid driving,  lifting for the next two days. MAYRA MckeonO.        D: 03/07/2014 12:15       EKG 2/6/18  NSR, no acute abn nonsp T wave abn          Impression:   1. Patent right carotid and bilateral vertebral arteries without    significant stenosis. Occluded left ICA at its origin, present previously. This document has been electronically signed by: Twyla Perkins MD on    03/12/2021 10:05 PM       All CTs at this facility use dose modulation techniques and iterative    reconstructions, and/or weight-based dosing   when appropriate to reduce radiation to a low as reasonably achievable. Carotid stenosis and measurements are in accordance with NASCET criteria                       Conclusions      Summary   Technically difficult examination. Left ventricular size and systolic function is normal. Ejection fraction   was estimated at 55-60%. LV wall thickness is within normal limits and   there are no obvious wall motion abnormalities. The right ventricular size was normal with normal systolic function and   wall thickness. Mild aortic regurgitation is noted. Mild mitral regurgitation is present. Signature      ----------------------------------------------------------------   Electronically signed by Scarlett Medina MD (Interpreting   physician) on 03/13/2021 at 02:48 PM                ekg 5/20/21   Sinus  Rhythm   Voltage criteria for LVH  (S(V1)+R(V6) exceeds 3.50 mV). -  Nonspecific T-abnormality. ABNORMAL                    Ekg 7/19/22  Sinus  Rhythm  - occasional PAC     # PACs = 1.   -Nonspecific ST depression   +   Nonspecific T-abnormality  -Nondiagnostic. ABNORMAL       Assessment     Diagnosis Orders   1. Dizziness on standing        2. History of syncope        3. SOB (shortness of breath) on exertion        4.  History of CVA (cerebrovascular accident)        5. S/P cardiac cath nonobstructive lesion 2014            Plan     The current meds and labs reviewed  \  Hx of cp for yrs atypical  Sob  Syncope  dizziness  Abn ekg  Jalen nuc negative 2021  Echo  TTT  Event monitor  Hydration advised       Hx of copd on home O2    Hx Hypertension, on medical treatment. Seems to be under good control. Off her meds for long time-Including lopressor and norvasc 5 mg po qd  Good without meds now    Hx of dizziness on standin and syncope  Off BP meds and  Stopped after syncope in march and feel better      Hx of Hyperlipidemia: off her statins, for long while      Smoking: discussed with the patient the importance of smoke cessation especially with the risk of CAD.   [facility] does not want to quit and she stated she love to smoke     D/w the andrew the plan of care      RTC in 2 months        Judson Ferreira, [facility]

## 2022-07-20 ENCOUNTER — HOSPITAL ENCOUNTER (OUTPATIENT)
Dept: INFUSION THERAPY | Age: 72
Discharge: HOME | End: 2022-07-20
Payer: MEDICARE

## 2022-07-20 ENCOUNTER — TELEPHONE (OUTPATIENT)
Dept: FAMILY MEDICINE CLINIC | Age: 72
End: 2022-07-20

## 2022-07-20 ENCOUNTER — TELEPHONE (OUTPATIENT)
Dept: CARDIOLOGY CLINIC | Age: 72
End: 2022-07-20

## 2022-07-20 ENCOUNTER — OFFICE VISIT (OUTPATIENT)
Dept: ONCOLOGY | Age: 72
End: 2022-07-20
Payer: MEDICARE

## 2022-07-20 ENCOUNTER — HOSPITAL ENCOUNTER (OUTPATIENT)
Age: 72
Discharge: HOME | End: 2022-07-20
Payer: MEDICARE

## 2022-07-20 VITALS
DIASTOLIC BLOOD PRESSURE: 54 MMHG | TEMPERATURE: 98.5 F | HEART RATE: 73 BPM | RESPIRATION RATE: 16 BRPM | SYSTOLIC BLOOD PRESSURE: 138 MMHG

## 2022-07-20 VITALS
OXYGEN SATURATION: 95 % | HEIGHT: 64 IN | WEIGHT: 140 LBS | DIASTOLIC BLOOD PRESSURE: 54 MMHG | SYSTOLIC BLOOD PRESSURE: 138 MMHG | BODY MASS INDEX: 23.9 KG/M2 | HEART RATE: 73 BPM | TEMPERATURE: 98.5 F | RESPIRATION RATE: 16 BRPM

## 2022-07-20 DIAGNOSIS — C50.811 MALIGNANT NEOPLASM OF OVERLAPPING SITES OF RIGHT BREAST IN FEMALE, ESTROGEN RECEPTOR NEGATIVE (HCC): Primary | ICD-10-CM

## 2022-07-20 DIAGNOSIS — E53.8 B12 DEFICIENCY: ICD-10-CM

## 2022-07-20 DIAGNOSIS — E78.00 PURE HYPERCHOLESTEROLEMIA: ICD-10-CM

## 2022-07-20 DIAGNOSIS — Z17.1 MALIGNANT NEOPLASM OF OVERLAPPING SITES OF RIGHT BREAST IN FEMALE, ESTROGEN RECEPTOR NEGATIVE (HCC): Primary | ICD-10-CM

## 2022-07-20 DIAGNOSIS — C34.91 PRIMARY LUNG SQUAMOUS CELL CARCINOMA, RIGHT (HCC): ICD-10-CM

## 2022-07-20 DIAGNOSIS — E03.9 HYPOTHYROIDISM, UNSPECIFIED TYPE: ICD-10-CM

## 2022-07-20 DIAGNOSIS — E55.9 VITAMIN D DEFICIENCY: ICD-10-CM

## 2022-07-20 DIAGNOSIS — E55.9 VITAMIN D DEFICIENCY: Primary | ICD-10-CM

## 2022-07-20 DIAGNOSIS — R55 SYNCOPE, UNSPECIFIED SYNCOPE TYPE: ICD-10-CM

## 2022-07-20 LAB
ALBUMIN SERPL-MCNC: 4.1 G/DL (ref 3.5–5.1)
ALP BLD-CCNC: 141 U/L (ref 38–126)
ALT SERPL-CCNC: 7 U/L (ref 11–66)
ANION GAP SERPL CALCULATED.3IONS-SCNC: 10 MEQ/L (ref 8–16)
AST SERPL-CCNC: 13 U/L (ref 5–40)
BASOPHILS # BLD: 0.8 %
BASOPHILS ABSOLUTE: 0.1 THOU/MM3 (ref 0–0.1)
BILIRUB SERPL-MCNC: 0.5 MG/DL (ref 0.3–1.2)
BUN BLDV-MCNC: 10 MG/DL (ref 7–22)
CALCIUM SERPL-MCNC: 9.2 MG/DL (ref 8.5–10.5)
CHLORIDE BLD-SCNC: 103 MEQ/L (ref 98–111)
CHOLESTEROL, TOTAL: 227 MG/DL (ref 100–199)
CO2: 25 MEQ/L (ref 23–33)
CREAT SERPL-MCNC: 1 MG/DL (ref 0.4–1.2)
EOSINOPHIL # BLD: 4.9 %
EOSINOPHILS ABSOLUTE: 0.3 THOU/MM3 (ref 0–0.4)
ERYTHROCYTE [DISTWIDTH] IN BLOOD BY AUTOMATED COUNT: 14.2 % (ref 11.5–14.5)
ERYTHROCYTE [DISTWIDTH] IN BLOOD BY AUTOMATED COUNT: 55.8 FL (ref 35–45)
FOLATE: 3.4 NG/ML (ref 4.8–24.2)
GFR SERPL CREATININE-BSD FRML MDRD: 55 ML/MIN/1.73M2
GLUCOSE BLD-MCNC: 85 MG/DL (ref 70–108)
HCT VFR BLD CALC: 47.5 % (ref 37–47)
HDLC SERPL-MCNC: 45 MG/DL
HEMOGLOBIN: 14.7 GM/DL (ref 12–16)
IMMATURE GRANS (ABS): 0.02 THOU/MM3 (ref 0–0.07)
IMMATURE GRANULOCYTES: 0.3 %
LDL CHOLESTEROL CALCULATED: 152 MG/DL
LYMPHOCYTES # BLD: 26.1 %
LYMPHOCYTES ABSOLUTE: 1.9 THOU/MM3 (ref 1–4.8)
MCH RBC QN AUTO: 32.6 PG (ref 26–33)
MCHC RBC AUTO-ENTMCNC: 30.9 GM/DL (ref 32.2–35.5)
MCV RBC AUTO: 105.3 FL (ref 81–99)
MONOCYTES # BLD: 4.8 %
MONOCYTES ABSOLUTE: 0.3 THOU/MM3 (ref 0.4–1.3)
NUCLEATED RED BLOOD CELLS: 0 /100 WBC
PLATELET # BLD: 232 THOU/MM3 (ref 130–400)
PMV BLD AUTO: 10.6 FL (ref 9.4–12.4)
POTASSIUM SERPL-SCNC: 4 MEQ/L (ref 3.5–5.2)
RBC # BLD: 4.51 MILL/MM3 (ref 4.2–5.4)
SEG NEUTROPHILS: 63.1 %
SEGMENTED NEUTROPHILS ABSOLUTE COUNT: 4.5 THOU/MM3 (ref 1.8–7.7)
SODIUM BLD-SCNC: 138 MEQ/L (ref 135–145)
T4 FREE: 0.9 NG/DL (ref 0.93–1.76)
TOTAL PROTEIN: 6.6 G/DL (ref 6.1–8)
TRIGL SERPL-MCNC: 151 MG/DL (ref 0–199)
TSH SERPL DL<=0.05 MIU/L-ACNC: 3.49 UIU/ML (ref 0.4–4.2)
VITAMIN B-12: 151 PG/ML (ref 211–911)
VITAMIN D 25-HYDROXY: 11 NG/ML (ref 30–100)
WBC # BLD: 7.1 THOU/MM3 (ref 4.8–10.8)

## 2022-07-20 PROCEDURE — 36415 COLL VENOUS BLD VENIPUNCTURE: CPT

## 2022-07-20 PROCEDURE — 82607 VITAMIN B-12: CPT

## 2022-07-20 PROCEDURE — 82306 VITAMIN D 25 HYDROXY: CPT

## 2022-07-20 PROCEDURE — 85025 COMPLETE CBC W/AUTO DIFF WBC: CPT

## 2022-07-20 PROCEDURE — 1123F ACP DISCUSS/DSCN MKR DOCD: CPT | Performed by: INTERNAL MEDICINE

## 2022-07-20 PROCEDURE — 82746 ASSAY OF FOLIC ACID SERUM: CPT

## 2022-07-20 PROCEDURE — 84443 ASSAY THYROID STIM HORMONE: CPT

## 2022-07-20 PROCEDURE — 80061 LIPID PANEL: CPT

## 2022-07-20 PROCEDURE — 99211 OFF/OP EST MAY X REQ PHY/QHP: CPT

## 2022-07-20 PROCEDURE — 99214 OFFICE O/P EST MOD 30 MIN: CPT | Performed by: INTERNAL MEDICINE

## 2022-07-20 PROCEDURE — 84439 ASSAY OF FREE THYROXINE: CPT

## 2022-07-20 PROCEDURE — 80053 COMPREHEN METABOLIC PANEL: CPT

## 2022-07-20 RX ORDER — GABAPENTIN 100 MG/1
100 CAPSULE ORAL 2 TIMES DAILY
Qty: 60 CAPSULE | Refills: 3 | Status: SHIPPED | OUTPATIENT
Start: 2022-07-20 | End: 2022-10-12

## 2022-07-20 NOTE — PATIENT INSTRUCTIONS
Dose reduced gabapentin new prescription for 100 mg twice daily  Brain MRI stat. CT with contrast  Follow-up with NP or other provider in 2 weeks.

## 2022-07-20 NOTE — PROGRESS NOTES
[address] [facility]  [address] [place] [address]  Dept: [phone]  Loc: [phone]   Hematology/Oncology Consult (Clinic)      7/20/2022    Suman Sanya   1950     No ref. provider found   Gail Kay, NITO - CNP       DIAGNOSIS:   -Squamous cell carcinoma RLL. Pathologic stage T2 a N0 M0 / stage Ib.  3.1 cm on the high risk feature grade 3.  - Remote history of squamous cell cancer of the R breast in 2005. This current tumor is receptor negative versus been receptor positive in 2005    TREATMENT:  - Right lower lobectomy and node dissection per Dr. Anne Jim (6/14/2021)  -No adjuvant therapy recommended. R mastectomy 2005. See below. PARAMETERS:  -Chest CT  -PET/CT      SUBJECTIVE: Patient is here for routine follow-up. Denies any cough or shortness of breath. Her chest wall pain is still problematic. She initially had some relief with gabapentin 300 milligrams but even decreased to once daily she had trouble with dizziness and imbalance. Chronic intermittent. R chest wall pain; intolerant with gabapentin w imbalance and dizziness. Imbalance resolved. She did not get her CAT scan that was scheduled before this follow-up visit. Does describe some unusual episodes actually 3 thus far over the last couple months where she felt spaced out and her eyes rolled back and she actually fell down on 1 occasion. No tonic-clonic seizure activity witnessed however. She denies any headaches however. HPI: Ismael Villalba is a 71-year-old female who is at her baseline in terms of being symptomatic with  COPD . She denies hemoptysis but does admit to perhaps slight increased shortness of breath with activity over the past several months. Recently hospitalized at [facility] 3/12 through the 17th for syncope with no evidence of stroke or cardiac event with a CTA of the head and neck showed an occluded skull base left ICA.   Smoking habit includes 2 pack/day for 48 years, ongoing and not on home O2. Still at 2ppd . Weight loss of 179 last November to 138 today; unintentional x much stress . Sister and  passed last year. On 12/13/2020 she had a CTA of the chest showed no PE but did show a 14 mm spiculated nodule that increased from 8 mm on the prior exam February 2019. No adenopathy reported. Biopsy of the right lower lobe lung nodule revealed a poorly differentiated squamous cell carcinoma. P40 positive and TTF-1 negative. Remote history of breast cancer approximately 15 years ago (poor historian). Treated with upfront chemotherapy then a right mastectomy followed by radiation to the chest wall and several years of adjuvant hormone therapy. No history of recurrence. No close oncologic follow-up in recent history. ROS:  Review of Systems 14 point negative except as detailed above. PMH:   Past Medical History:   Diagnosis Date    Anxiety 2007    Arthritis     Breast cancer ([facility] 11/18/2004    right mastectomy, chemo and radiation history. Right breast invasive ductal carcinoma    CAD (coronary artery disease) 2001    sees Dr Jb Gutiérrez of the skin 2004    Cerebral artery occlusion with cerebral infarction [facility])     Chronic UTI     COPD (chronic obstructive pulmonary disease) ([facility]     sees RL Petersen    CVA (cerebral infarction) 2007, 2008    Depression 2007    DVT of lower extremity (deep venous thrombosis) ([facility] 1976    Facial injury 2005    Fall on greasy ground, striking left periorbital region    History of stroke 2007, 2008, 2009    Patient relates a stroke with right weakness and speech in 2007; another in 2010 or 2012 (unsure), both with need to rehabilitation.   Also \"2 mini-strokes\" (?)    History of therapeutic radiation 2005    Right breast    Hx antineoplastic chemo 2005    Hx of blood clots 1977    hip, leg    Hyperlipidemia 2005    Hypertension 2005    Hypothyroidism     IBS (irritable bowel syndrome) Low HDL (under 40) 2014    Lung [facility]    Prolonged emergence from general anesthesia     Recurrent UTI (urinary tract infection)     Dr Daniel Vaughn     Word finding difficulty 2017    work-up in progress    CVA x 3. Last in     Denies MI,CHF ,arrythmia. .. Faraz Lou Social HX:   Social History     Socioeconomic History    Marital status:       Spouse name: Not on file    Number of children: 3    Years of education: 9    Highest education level: Not on file   Occupational History    Occupation: Disabled   Tobacco Use    Smoking status: Every Day     Packs/day: 2.00     Years: 48.00     Pack years: 96.00     Types: Cigarettes     Start date: 1967    Smokeless tobacco: Never    Tobacco comments:     Currently at 1 pk/day   Vaping Use    Vaping Use: Never used   Substance and Sexual Activity    Alcohol use: No     Alcohol/week: 0.0 standard drinks    Drug use: No    Sexual activity: Yes     Partners: Male   Other Topics Concern    Not on file   Social History Narrative    Not on file     Social Determinants of Health     Financial Resource Strain: Low Risk     Difficulty of Paying Living Expenses: Not hard at all   Food Insecurity: No Food Insecurity    Worried About Running Out of Food in the Last Year: Never true    [address] in the Last Year: Never true   Transportation Needs: Not on file   Physical Activity: Not on file   Stress: Not on file   Social Connections: Not on file   Intimate Partner Violence: Not on file   Housing Stability: Not on file        Spouse: in   3 sons nearby and supportive  Shabbir Lissa [phone]    Phone: [phone]     [place] [id number]     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    Immunizations:  Immunization History   Administered Date(s) Administered    Influenza Virus Vaccine 10/08/2014, 10/05/2015    Influenza, High Dose (Fluzone 65 yrs and older) 2018, 01/10/2019    Pneumococcal Conjugate 13-valent atypical    Tobacco abuse    History of chest pain atypical    S/P cardiac cath nonobstructive lesion 2014    Lumbar radiculitis    Spinal stenosis of lumbar region without neurogenic claudication    Syncope and collapse    Solitary pulmonary nodule on lung CT    Primary malignant neoplasm of right lower lobe of lung (HCC)    SOB (shortness of breath) on exertion    Dizziness on standing    History of syncope    Cancer of lower lobe of right lung (HCC)    S/P robotic assisted nonanatomic wedge resection of lateral basilar right lower lobe lung mass and mediastinal dissection    Postoperative urinary retention    Closed left hip fracture, initial encounter [facility])    Hip fracture requiring operative repair, left, closed, initial encounter ([facility]    Chronic renal disease, stage III ([facility] [[id number]]    Abnormal EKG    Breast ca 2004 R MRM . Chemo first monthly x 3, MRM then XRT then 1 pill x few years. We will attempt to get records.       Surgery:  Past Surgical History:   Procedure Laterality Date    APPENDECTOMY  1975    BREAST SURGERY Right 04/01/2005    evacuation of breast hematoma-Dr. Leila Vora    BREAST SURGERY Right 11/30/2004    lymphatic mapping, SLN bx x2-Dr. Reynaga Skill    COLONOSCOPY  2009    Dr. Gabino James    CT NEEDLE BIOPSY LUNG PERCUTANEOUS  03/16/2021    CT NEEDLE BIOPSY LUNG PERCUTANEOUS 3/16/2021 [facility] CT SCAN    FEMUR FRACTURE SURGERY Left 8/2/2021    FEMUR OPEN REDUCTION INTERNAL FIXATION performed by Alta Ng MD at [facility]  01/19/2015    HYSTERECTOMY (CERVIX STATUS UNKNOWN)  1976    JOINT REPLACEMENT Left 2011    HIP    JOINT REPLACEMENT Right 01/19/2015    Right Total Hip Replacement - Dr. Washington Membreno, TOTAL Right 6/14/2021    ROBOTIC ASSISTED RIGHT LOWER LOBE SUPERIOR SEGMENTECTOMY AND MEDIASTINAL DISSECTION, CRYOTHERAPY OF INTERCOSTAL NERVES performed by Shawn Lion MD at [facility] Right 2005    Dr. Danuta Bai 04/10/2018    Lumbar epidural steroid injection at L4    GA NJX DX/THER SBST INTRLMNR LMBR/SAC W/IMG GDN N/A 04/10/2018    LESI  @ L4 performed by Watson Esteban MD at [address] [place] [address] (CERVIX REMOVED)  1976, 2001    ovaries    THYROID LOBECTOMY Left 11/26/2010    left thyroid lobectomy with isthmusectomy-Dr. Raghav Pérez    THYROID SURGERY  2007    right thyroid lobectomy-Dr. Ibanez     BREAST NEEDLE BIOPSY RIGHT Right 11/18/2004    invasive ductal carcinoma right breast        Medications:  Current Outpatient Medications   Medication Sig Dispense Refill    gabapentin (NEURONTIN) 100 MG capsule Take 1 capsule by mouth in the morning and 1 capsule before bedtime. Do all this for 60 doses.  60 capsule 3    acetaminophen (TYLENOL) 325 MG tablet Take 2 tablets by mouth every 6 hours 120 tablet 3    docusate sodium (COLACE, DULCOLAX) 100 MG CAPS Take 100 mg by mouth 2 times daily as needed for Constipation (Patient not taking: No sig reported)      ferrous sulfate (IRON 325) 325 (65 Fe) MG tablet Take 1 tablet by mouth 2 times daily (with meals) (Patient not taking: No sig reported) 30 tablet 3    ondansetron (ZOFRAN-ODT) 4 MG disintegrating tablet Take 1 tablet by mouth every 8 hours as needed for Nausea or Vomiting (Patient not taking: No sig reported)      aspirin 325 MG EC tablet Take 1 tablet by mouth daily (Patient not taking: No sig reported) 30 tablet 3    famotidine (PEPCID) 20 MG tablet Take 1 tablet by mouth daily (Patient not taking: No sig reported) 60 tablet 3    Cholecalciferol (VITAMIN D) 25 MCG TABS Take 1 tablet by mouth daily (Patient not taking: No sig reported) 60 tablet     guaiFENesin (MUCINEX) 600 MG extended release tablet Take 1 tablet by mouth 2 times daily (Patient not taking: No sig reported) 60 tablet 1    diphenhydrAMINE (BENADRYL) 25 MG tablet Take 25 mg by mouth every 6 hours as needed for Itching (Patient not taking: No sig reported)      albuterol sulfate HFA (PROVENTIL HFA) 108 (90 Base) MCG/ACT inhaler Inhale 2 puffs into the lungs every 6 hours as needed for Wheezing (Patient not taking: No sig reported) 1 Inhaler 3    levothyroxine (LEVOTHROID) 88 MCG tablet Take 1 tablet by mouth daily (Patient not taking: No sig reported) 30 tablet 5     No current facility-administered medications for this visit. EXAM:    BP (!) 138/54 (Site: Left Upper Arm, Position: Sitting, Cuff Size: Medium Adult)   Pulse 73   Temp 98.5 °F (36.9 °C) (Oral)   Resp 16   Ht 5' 4\" (1.626 m)   Wt 140 lb (63.5 kg)   SpO2 95%   BMI 24.03 kg/m²      ECO  General: Non-ill appearing. .  She is alone today. Appears comfortable and in no distress  HEENT: NC/AT,nonicteric, perrla,eom intact, no mucosal lesions wearing upper dentures, no lesions seen. Neck: normal thyroid, no masses. pulses nl, no bruits,   Nodes: No adenopathy  Lungs/chest: clear, no rales,rhonchi or wheezing, lung bases clear, overall very diminished breath sounds throughout. CV: rrr, no rubs ,gallops or murmurs  Breasts: Right mastectomy site unremarkable, some pain with palpation in the right anterolateral chest wall. Abd/Rectal: soft, non-tender,bowel sounds normal , no HSM,no masses  Back: normal curvature, No midline tenderness. flanks nontender  : Not Examined  Extremities: no cyanosis,clubbing or edema. Skin: unremarkable  Neuro: A and O x 4, CN exam nonfocal, Motor- no deficits, Sensory- no deficits, gait-nl, speech- fluent, no ataxia.   Devices: none      DATA:    LAB:     CBC with Differential:      Lab Results   Component Value Date/Time    WBC 7.1 2022 11:50 AM    RBC 4.51 2022 11:50 AM    RBC 4.49 2012 12:51 PM    HGB 14.7 2022 11:50 AM    HCT 47.5 2022 11:50 AM     2022 11:50 AM    .3 2022 11:50 AM    MCH 32.6 2022 11:50 AM    MCHC 30.9 2022 11:50 AM    RDW 16.0 2021 03:44 PM    NRBC 0 2022 11:50 AM    NRBC 0 2012 12:51 PM    SEGSPCT 63.1 07/20/2022 11:50 AM    MONOPCT 4.8 07/20/2022 11:50 AM    MONOSABS 0.3 07/20/2022 11:50 AM    LYMPHSABS 1.9 07/20/2022 11:50 AM    EOSABS 0.3 07/20/2022 11:50 AM    BASOSABS 0.1 07/20/2022 11:50 AM       CMP:    Lab Results   Component Value Date/Time     07/20/2022 11:50 AM    K 4.0 07/20/2022 11:50 AM    K 3.5 10/05/2021 06:25 PM     07/20/2022 11:50 AM    CO2 25 07/20/2022 11:50 AM    BUN 10 07/20/2022 11:50 AM    CREATININE 1.0 07/20/2022 11:50 AM    LABGLOM 55 07/20/2022 11:50 AM    GLUCOSE 85 07/20/2022 11:50 AM    GLUCOSE 94 05/16/2012 12:51 PM    PROT 6.6 07/20/2022 11:50 AM    LABALBU 4.1 07/20/2022 11:50 AM    LABALBU 4.4 05/16/2012 12:51 PM    CALCIUM 9.2 07/20/2022 11:50 AM    BILITOT 0.5 07/20/2022 11:50 AM    ALKPHOS 141 07/20/2022 11:50 AM    AST 13 07/20/2022 11:50 AM    ALT 7 07/20/2022 11:50 AM       BMP:    Lab Results   Component Value Date/Time     07/20/2022 11:50 AM    K 4.0 07/20/2022 11:50 AM    K 3.5 10/05/2021 06:25 PM     07/20/2022 11:50 AM    CO2 25 07/20/2022 11:50 AM    BUN 10 07/20/2022 11:50 AM    LABALBU 4.1 07/20/2022 11:50 AM    LABALBU 4.4 05/16/2012 12:51 PM    CREATININE 1.0 07/20/2022 11:50 AM    CALCIUM 9.2 07/20/2022 11:50 AM    LABGLOM 55 07/20/2022 11:50 AM    GLUCOSE 85 07/20/2022 11:50 AM    GLUCOSE 94 05/16/2012 12:51 PM       Magnesium:    Lab Results   Component Value Date/Time    MG 2.3 10/05/2021 06:25 PM     PT/INR:    Lab Results   Component Value Date/Time    INR 0.97 03/16/2021 07:31 AM     TSH:    Lab Results   Component Value Date/Time    TSH 3.490 07/20/2022 11:50 AM     VITAMIN B12: No components found for: B12  FOLATE:    Lab Results   Component Value Date/Time    FOLATE 7.3 08/31/2016 02:33 PM       IMAGING:  PROCEDURE: CT CHEST W CONTRAST 12/7/21       CLINICAL INFORMATION: Primary lung adenocarcinoma, right. Chest wall pain. COMPARISON: CTA chest 10/5/2021. CT angiography chest 12/13/2020. TECHNIQUE:    5 mm axial imaging through the chest with IV contrast. Coronal and sagittal reconstruction were performed. All CT scans at this facility use dose modulation, iterative reconstruction, and/or weight based dosing when appropriate to reduce the radiation dose to as low as reasonably achievable. CONTRAST: 80 cc Isovue-370           FINDINGS:   Heart/mediastinum: The heart size is normal. Coronary artery calcifications are observed. No pericardial effusion is identified. No aortic aneurysm or dissection is present. No mediastinal, hilar, or axillary lymphadenopathy is observed. Lungs: Centrilobular emphysema is present. Postoperative changes with volume loss in the right hemithorax and surgical clips in the right axilla appear stable. Areas of patchy groundglass opacity in the right lower lobe and right middle lobe have    improved. Areas of scarring in the right lower lobe appear stable. Subpleural left lower lobe pulmonary nodules measuring 3 to 4 mm are unchanged (series 2, image 37 and 40). No focal consolidation, pleural effusion, or pneumothorax is visualized. Upper abdomen: The gallbladder is surgically absent. Simple cysts in the upper pole of the left kidney measuring up to 2 cm appear stable are visualized. The gallbladder is surgically absent. Hepatomegaly and hepatic steatosis are unchanged. No acute    findings are noted in the limited images through the upper abdomen. Musculoskeletal: Chronic appearing right anterior fourth and fifth nondisplaced rib fractures contain calculus formation are observed (series 2, images 25 and 32). Multilevel degenerative disc disease in the thoracic spine appear stable. Diffuse    osteopenia is observed. The visualized skeletal structures appear intact. Impression   1. Volume loss and postsurgical changes in the right hemithorax are stable.  Centrilobular emphysema with an area of bandlike scarring in the right lower lobe medially is unchanged (series 4, image 29). 2. The previously seen patchy multifocal right lung airspace opacities have significantly improved. The small 3 to 4 mm subpleural left lower lobe pulmonary nodules are stable. No new pulmonary mass or nodules are observed. Continued follow-up to ensure    resolution/stability is advised. 3. Right anterior nondisplaced fourth and fifth rib fractures (series 2, image 25 and 32) contain callus formation suggesting repair of changes, possibly related to recent injury. No underlying bony lesions are observed. If there is clinical concern for    skeletal metastasis, nuclear medicine whole body bone scan may be considered. **This report has been created using voice recognition software. It may contain minor errors which are inherent in voice recognition technology. **       Final report electronically signed by Dr Zully Workman on 12/21/2021 3:40 PM         Chest CTA with and without contrast 10/5/2021 done in the ER  PROCEDURE: CTA CHEST W WO CONTRAST       CLINICAL INFORMATION: chest pain. COMPARISON: No prior study. TECHNIQUE: 3 mm axial images were obtained through the chest after the administration of IV contrast.  A non-contrast localizer was obtained. 3D reconstructions were performed on the scanner to include MIP coronal and sagittal images through the chest.    Isovue was the intravenous contrast utilized. All CT scans at this facility use dose modulation, iterative reconstruction, and/or weight-based dosing when appropriate to reduce radiation dose to as low as reasonably achievable. FINDINGS:           There is adequate opacification of the pulmonary arterial system. No pulmonary emboli are present. There is atherosclerotic calcification in the aortic arch and left subclavian artery. .           The heart size is normal. There is no pericardial or pleural effusion.   There is is increased density in the mediastinum possibly representing changes of radiation therapy. Please correlate clinically. .       There are areas of abnormal density especially in the right mid and lower lung fields suggestive of atelectasis or scarring. . There are no effusions there are postoperative changes in the right axillary region. There is thoracic spondylosis. .  There are postoperative changes involving the thyroid gland. There is a small hiatal hernia. Impression       1. No evidence pulmonary embolism. 2. Atherosclerotic calcification aortic arch and left subclavian artery. 3. Areas of abnormal density in the right mid and lower lung fields suggestive off atelectasis or scarring. 4. Slightly increased density in the mediastinum possibly representing changes of radiation therapy. Please correlate clinically. 5. Postoperative changes in the right axilla. 6. Postoperative changes involving the thyroid gland. 7. Thoracic spondylosis. 8. Small hiatal hernia. 9. PET scan may be helpful for better evaluation of clinically indicated. **This report has been created using voice recognition software. It may contain minor errors which are inherent in voice recognition technology. **       Final report electronically signed by DR Camila Rivera on 10/5/2021 8:07 PM         Brain MRI with and without contrast 4/19/2021  Impression       1. No evidence of metastatic disease in the brain. 2. Stable chronic small vessel ischemic changes. 3. No acute findings. PROCEDURE: CTA CHEST W WO CONTRAST 12/13/2020       CLINICAL INFORMATION: cough, sob, elevated d-dimer, PE .       COMPARISON: 2/21/2019       TECHNIQUE: Postcontrast images of the chest with 3-D MIPS Isovue-370 IV contrast   All CT scans at this facility use dose modulation, iterative reconstruction, and/or weight-based dosing when appropriate to reduce radiation dose to as low as reasonably achievable. FINDINGS: No PE.  There is no aneurysm or dissection. No mediastinal or hilar adenopathy by size criteria. Postsurgical changes right axilla. No axillary lymphadenopathy   Heart size normal.   No infiltrates or pleural effusions. The 8 mm nodule seen on the prior CT now measures 14 mm and has spiculated borders highly suspicious of malignancy. There however are no other additional new masses. Scattered nodules are present in both lungs which are previously seen. There is a    cluster of nodular the anterior right upper lung stable. Small nodules posterior right lower lung stable. Tiny triangular nodular density lingula is stable. Upper abdomen   Stable bilateral adrenal fullness likely hyperplasia this is stable dating back to June 2017. Bones   No suspicious bone lesions           Impression   1. No PE. 2. No acute findings. 3. Interval increase in size of the patient's right lung nodule now measures 14 mm with spiculated borders and is highly suspicious for malignancy. **This report has been created using voice recognition software. It may contain minor errors which are inherent in voice recognition technology. **       Final report electronically signed by Dr. Mikhail Worthington on 12/13/2020 4:08 PM       Impression:  1. No PE. 2. No acute findings. 3. Interval increase in size of the patient's right lung nodule now measures 14 mm with spiculated borders and is highly suspicious for malignancy. CT CHEST W CONTRAST       CTA head with contrast 3/12/2021/syncope evaluation  Impression   Impression:   1. Occluded skull base left ICA, present previously. Patent left    posterior communicating artery. 2.  Intact anterior, posterior, and middle cerebral circulations. 3.  No intracranial aneurysm. Brain MRI without contrast 3/13/2021      Impression       1. No evidence of an acute infarct. 2. No antegrade flow in the left internal carotid artery.    3. Mild atrophy and probable ischemic changes in the white matter. 4. Inflammatory changes in the right mastoid air cells. Ct Needle Biopsy Lung/mediastinum Percutaneous  Result Date: 3/16/2021   Successful CT-guided right lower lobe lung nodule biopsy with small postprocedural pneumothorax. Follow-up chest radiographs will be obtained. **This report has been created using voice recognition software. It may contain minor errors which are inherent in voice recognition technology. ** Final report electronically signed by Dr. Emily Sanchez MD on 3/16/2021 10:50 AM    Pet Ct Skull Base To Mid Thigh 4/12/2021  FINDINGS:        Neck: A 0.7 cm nodule in the left parotid gland has a maximum SUV of 4.3 (image 39). Chest: Cardiac size is normal. Atherosclerotic calcifications are present in the thoracic aorta and coronary arteries without evidence of aneurysm. There is no pleural or pericardial effusion. Emphysematous changes are noted in the bilateral lungs. A 1.5    cm nodule at the lateral right midlung is stable in size is associated with a maximum SUV of 9.5 (image 118). There is no FDG avid mediastinal, hilar or axillary lymphadenopathy. Surgical clips are noted in the right axilla. There are surgical changes    of prior right mastectomy. Activity associated with a small hiatal hernia may be infectious or inflammatory. Degenerative changes are seen in the thoracic spine without evidence of hypermetabolic osseous metastatic disease. Abdomen/pelvis: Physiologic activity is present in the liver, spleen, urinary collecting system and gastrointestinal tract. The gallbladder is surgically absent. There is fatty replacement of the pancreas. Hypoattenuating lesions in the bilateral kidneys    are likely cysts but are incompletely characterized on the current study. A 1.5 cm hypoattenuating nodule in the right adrenal gland is associated with a maximum SUV of 7 (image 159). A 1.4 cm nodule left adrenal gland has a maximum SUV of 2.4 (image    154). Atherosclerotic calcifications are present in the abdominal aorta without evidence of aneurysm or the uterus is surgically absent. There is no FDG avid mesenteric, retroperitoneal, pelvic or inguinal lymphadenopathy. Degenerative changes are    present in the lumbar spine and pelvis without evidence of hypermetabolic osseous metastatic disease. There is extensive metallic artifact in the lower pelvis from bilateral hip prostheses. Impression   1. FDG avid right mid lung nodule corresponding to known malignancy. 2. 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 Metastatic disease is considered less likely but not excluded. 3. Mildly FDG avid bilateral adrenal nodules, likely benign adenomas. PROCEDURE: CTA CHEST W WO CONTRAST   10/5/21       CLINICAL INFORMATION: chest pain. COMPARISON: No prior study. TECHNIQUE: 3 mm axial images were obtained through the chest after the administration of IV contrast.  A non-contrast localizer was obtained. 3D reconstructions were performed on the scanner to include MIP coronal and sagittal images through the chest.    Isovue was the intravenous contrast utilized. All CT scans at this facility use dose modulation, iterative reconstruction, and/or weight-based dosing when appropriate to reduce radiation dose to as low as reasonably achievable. FINDINGS:           There is adequate opacification of the pulmonary arterial system. No pulmonary emboli are present. There is atherosclerotic calcification in the aortic arch and left subclavian artery. .           The heart size is normal. There is no pericardial or pleural effusion. There is is increased density in the mediastinum possibly representing changes of radiation therapy. Please correlate clinically. .       There are areas of abnormal density especially in the right mid and lower lung fields suggestive of atelectasis or scarring. . There are no effusions there are postoperative changes in the right axillary region. There is thoracic spondylosis. .  There are postoperative changes involving the thyroid gland. There is a small hiatal hernia. Impression       1. No evidence pulmonary embolism. 2. Atherosclerotic calcification aortic arch and left subclavian artery. 3. Areas of abnormal density in the right mid and lower lung fields suggestive off atelectasis or scarring. 4. Slightly increased density in the mediastinum possibly representing changes of radiation therapy. Please correlate clinically. 5. Postoperative changes in the right axilla. 6. Postoperative changes involving the thyroid gland. 7. Thoracic spondylosis. 8. Small hiatal hernia. 9. PET scan may be helpful for better evaluation of clinically indicated. **This report has been created using voice recognition software. It may contain minor errors which are inherent in voice recognition technology. **       Final report electronically signed by DR Ying Jones on 10/5/2021 8:07 PM         PROCEDURES:  3/16/2021 IR lung biopsy    PATHOLOGY:   The needle biopsy lung 3/16/2021  Clinical Information: RIGHT LOWER LOBE LUNG NODULE; PT HAS REMOTE   HISTORY OF BREAST CANCER 2005     FINAL DIAGNOSIS:   Lung, right lower lobe, biopsy:    Poorly differentiated squamous cell carcinoma. Specimen:   BIOPSY OF LUNG, RIGHT LOWER LOBE      Intraoperative Consultation:   Touch Prep DX:  1. Atypical epithelial cells present. 2.  Malignant   epithelial cells present. [facility]     Gross Examination:   The container is labeled Josesruthi North, RLL, lung. Received in   formalin are several tiny bits of tan tissue, each less than 0.1 cm in   diameter. The longest fragment measures about 0.3 cm. The specimen is   entirely submitted in two cassettes.   ns.  PCF/DKR:[id number]     Microscopic Examination:   Sections show infiltrative nests of polygonal cells with foamy   cytoplasm and enlarged pleomorphic nuclei with variably prominent   nucleoli. Rare dyskeratotic cells are noted. Immunochemical stains   for TTF-1 and p40 are performed, with appropriate controls*. Lesional   cells express p40 and lack expression of TTF-1. The findings are   consistent with the above diagnosis. ER and ND both negative. PATHOLOGY REPORT                       ATTN: YUSRA FOY                       REQ: Evelyn Negronk       Copies To:   Romayne Ravel       Clinical Information: RIGHT LUNG CANCER   6/14/2021    FINAL DIAGNOSIS:   A. Lymph nodes, level VII, resections:    Negative for neoplasm in 4 out of 4 lymph nodes. B. Lymph nodes, level IV R, resections:    Negative for neoplasm in 2 out of 2 lymph nodes. C. Lymph node, level IX, resection:    Negative for neoplasm in one out of one lymph node. D. Lymph nodes, level X R, resection:    Negative for neoplasm in 2 out of 2 lymph nodes. E. Lymph nodes, level XI R, resections:    Negative for neoplasm in 2 out of 2 lymph nodes. F. Right lower lobe of lung, wedge biopsy:    Invasive, moderately to poorly differentiated squamous cell carcinoma. Maximum tumor size equal 3.1 cm. Margins of resection are free of neoplasia. Moderate emphysema. pT2, pN0.        Specimen:   A) LYMPH NODE(S), LEVEL 7   B) LYMPH NODE(S), LEVEL 4R   C) LYMPH NODE(S), LEVEL 9   D) LYMPH NODE(S), LEVEL 10R   E) LYMPH NODE(S), LEVEL 11R   F) WEDGE BIOPSY OF LUNG, RIGHT LOWER LOBE     Microscopic Examination:   Procedure   Wedge resection   Specimen Laterality   Right   Tumor Site   Lower lobe of lung   Tumor Size   2. 2 x 2 x 3.1 cm   Tumor Focality   Single focus   Histologic Type   Invasive squamous cell carcinoma, non-keratinizing   + Histologic Grade   G3: Poorly differentiated   + Spread Through Air Spaces (JESSICA)   Not identified   Visceral Pleura Invasion   Not identified   Lymphovascular Invasion   Not identified   Direct Invasion of Adjacent Structures   No adjacent structures present   Margins   All margins are uninvolved by tumor       Distance of invasive carcinoma from closest margin (centimeters):   0.1 cm       Specify closest margin: Visceral pleura   Bronchial Margin   Not applicable   Vascular Margin   Not applicable   Parenchymal Margin   Uninvolved by invasive carcinoma   Regional Lymph Nodes   Number of Lymph Nodes Involved: 0   Number of Lymph Nodes Examined: 11   Treatment Effect   No known presurgical therapy   Pathologic Stage Classification (pTNM) (AJCC 8th Edition)   Primary Tumor (pT)   pT2:  Tumor >3 cm but <=5 cm or having any of the following features:#   Regional Lymph Nodes (pN)   pN0: No regional lymph node metastasis   + Additional Pathologic Findings   Emphysema     [id number]   [id number]                                                     <Sign Out Dr. Linda Guerrero M.D., F.C.A.P. [facility]/ [address] [address]  Printed on:  6/16/2021   [address] [place] [address]     PFT: Ordered ASAP and will be done Monday 4/19  ( if she gets her Covid testing tomorrow )  FEV1 1.720 which is 59% predicted FEV1 1.11 which is 49% predicted with significant bronchodilator response spirometry was severe obstructive defect    GENETICS:  na    MOLECULAR:  na    ASSESSMENT/PLAN:    1: Diagnosis: 79-year-old female with significant COPD and comorbidities as detailed above with a fairly decent performance status. A lateral right middle lung nodule is increased since 2019 a recent biopsy of this 15 mm lesion shows a poorly differentiated squamous cell. Staging including PET scan shows no other hypermetabolic foci. She did have a syncopal episode and reports slight increase in headaches from her baseline. Work-up included a MRI of the brain with and without contrast summarized above which shows no evidence of metastatic disease. Clinical stage [id number]  /  Stage I A2 .   Right middle lobe squamous cell carcinoma, poorly differentiated. 6/14/21 right lower lobe lobectomy and mediastinal dissection reveals pathologic stage T2 a N0 stage Ib squamous cell carcinoma grade 3 right lower lobe. 2) Prognosis / Disease Status: Very good. Stage Ib with only high risk features being a grade 3.    3) Work-up:    Labs: No additional labs   Imaging: Her last chest CT of 12/21 shows postsurgical changes only with no signs of recurrent or worsening disease. Overdue therefore reschedule chest CT to be done within the next 2 weeks. Procedures: No additional procedures needed   Consults: None new   Other: The right chest wall pain ongoing as described above and intolerant of 3 to milligrams of gabapentin. Trial of 1 mg dosing gabapentin. This pain sounds neurogenic from her thoracotomy. 4) Symptom Management: Gabapentin dose reduced to 100 mg nightly and then twice daily if tolerated. 5) Supportive care provided. Level of care is appropriate. 6) Treatment goal:      Treatment plan: Case discussed at lung conference on June 1. Right lower lobectomy done. No adjuvant chemotherapy recommended. 7) Medications reviewed. Prescriptions today: Gabapentin              Orders Placed This Encounter   Medications    gabapentin (NEURONTIN) 100 MG capsule     Sig: Take 1 capsule by mouth in the morning and 1 capsule before bedtime. Do all this for 60 doses. Dispense:  60 capsule     Refill:  3        [place]:  Controlled Substance Monitoring:    Acute and Chronic Pain Monitoring:   RX Monitoring 3/23/2018   Attestation The Prescription Monitoring Report for this patient was reviewed today. Periodic Controlled Substance Monitoring Possible medication side effects, risk of tolerance/dependence & alternative treatments discussed. ;Random urine drug screen sent today. ;No signs of potential drug abuse or diversion identified.           8) Research Options:       Not applicable      9) Other: Strongly encouraged to attempt to stop smoking. Reviewed records detailing her breast cancer diagnosis 15 years ago and treatment. Added ER/IN testing to the recent lung biopsy; negative. 10) Follow Up:  Get chest CT. Get brain MRI. Recent labs unremarkable. Follow-up in 2 weeks. After that routine follow-up in 6 months    Antwon Baker MD     Addendum:  I obtained records regarding Shania's prior diagnosis of R breast cancer. Abnormal mammogram in 2004 led to a biopsy that showed a grade 3 infiltrating ductal carcinoma (after reviewing all of the available data I believe this is a typographical error) that was ER positive and IN and HER-2 negative. No sentinel nodes involved. Initial dimension approximately 8.5 x 6 cm. No metastatic disease. Some mass in the left breast measuring 2.3 cm suspicious for malignancy as well PET scan did not light up in the lungs left breast or axilla. She underwent neoadjuvant chemotherapy and had a dramatic decrease in the size of the mass. Patient underwent radiation to the right chest wall treated to a total dose of 5040 cGy in 28 fractions with a boost with a total dose of 6120 cGy in 34 fractions. Interestingly however when I look at the path report from 3/11/5 the simple mastectomy specimen from the right reveals an invasive moderately differentiated squamous cell carcinoma the breast.  ER was reported as positive and 34% of cells HER-2 negative review of this pathology is confusing with the discrepancy and reports it is significant and that raises the possibility that this lung lesion could be metastatic and not a primary lesion. A fairly completely note from Drs. Leopoldo Aho of radiation oncology references that the histology was squamous cell carcinoma and acknowledged the rarity of this type of histology of the breast.  Therefore I have little doubt that this in fact was squamous cell cancer of the breast.  Checked with pathology and all tissue was destroyed after 10 years therefore is not available. Patient's treatment was neoadjuvant chemotherapy by Dr. Karla Schmidt followed by right simple mastectomy and sentinel node procedure and radiation. I do note the path report the documents at the time of the right simple mastectomy invasive grade 2 squamous cell carcinoma of the breast tumor after neoadjuvant therapy measures 6 x 5 x 3 cm tumor is 2 mm from the deep resection margin and lymphatic invasion was present. No axillary contents identified. No ductal component to this neoplasm. This was called squamous cell carcinoma as opposed to metaplastic carcinoma. [place] procedure 12/1/2004 the right axilla showed 2 axillary nodes negative for metastatic carcinoma. Interestingly the ER was reported as positive and 34% of cells OR was negative and HER-2 was negative.

## 2022-07-20 NOTE — TELEPHONE ENCOUNTER
----- Message from NITO Ramos CNP sent at 7/20/2022  3:59 PM EDT -----  Please let pt know that her B12, Folate and Vit D are all low. I would recommend restarting her Vit D Supplements. I would also recommend a B-12 and Folate supplement. Repeat labs in 3 months. TSH is ok, but T4 is low. Repeat TSH and T4 in 6 weeks to monitor. All other labs to be discussed at upcoming appt. Follow up as planned.  -WS

## 2022-07-21 NOTE — TELEPHONE ENCOUNTER
Per central scheduling, echo 07-25-22, tilt and event monitor scheduled 08-03-22    Dates, times and instructions mailed to pt.

## 2022-07-22 ENCOUNTER — HOSPITAL ENCOUNTER (OUTPATIENT)
Dept: MRI IMAGING | Age: 72
Discharge: HOME | End: 2022-07-22
Payer: MEDICARE

## 2022-07-22 DIAGNOSIS — C50.811 MALIGNANT NEOPLASM OF OVERLAPPING SITES OF RIGHT BREAST IN FEMALE, ESTROGEN RECEPTOR NEGATIVE (HCC): ICD-10-CM

## 2022-07-22 DIAGNOSIS — Z17.1 MALIGNANT NEOPLASM OF OVERLAPPING SITES OF RIGHT BREAST IN FEMALE, ESTROGEN RECEPTOR NEGATIVE (HCC): ICD-10-CM

## 2022-07-22 PROCEDURE — 6360000004 HC RX CONTRAST MEDICATION: Performed by: INTERNAL MEDICINE

## 2022-07-22 PROCEDURE — A9579 GAD-BASE MR CONTRAST NOS,1ML: HCPCS | Performed by: INTERNAL MEDICINE

## 2022-07-22 PROCEDURE — 70553 MRI BRAIN STEM W/O & W/DYE: CPT

## 2022-07-22 RX ADMIN — GADOTERIDOL 15 ML: 279.3 INJECTION, SOLUTION INTRAVENOUS at 09:01

## 2022-07-25 ENCOUNTER — HOSPITAL ENCOUNTER (OUTPATIENT)
Dept: NON INVASIVE DIAGNOSTICS | Age: 72
Discharge: HOME | End: 2022-07-25
Payer: MEDICARE

## 2022-07-25 DIAGNOSIS — R06.02 SOB (SHORTNESS OF BREATH): ICD-10-CM

## 2022-07-25 DIAGNOSIS — R55 SYNCOPE, UNSPECIFIED SYNCOPE TYPE: ICD-10-CM

## 2022-07-25 LAB
LV EF: 48 %
LVEF MODALITY: NORMAL

## 2022-07-25 PROCEDURE — 93306 TTE W/DOPPLER COMPLETE: CPT

## 2022-08-03 ENCOUNTER — HOSPITAL ENCOUNTER (OUTPATIENT)
Dept: NON INVASIVE DIAGNOSTICS | Age: 72
Discharge: HOME | End: 2022-08-03
Payer: MEDICARE

## 2022-08-03 ENCOUNTER — HOSPITAL ENCOUNTER (OUTPATIENT)
Dept: INFUSION THERAPY | Age: 72
Discharge: HOME | End: 2022-08-03
Payer: MEDICARE

## 2022-08-03 ENCOUNTER — OFFICE VISIT (OUTPATIENT)
Dept: ONCOLOGY | Age: 72
End: 2022-08-03
Payer: MEDICARE

## 2022-08-03 VITALS
TEMPERATURE: 98.4 F | RESPIRATION RATE: 18 BRPM | DIASTOLIC BLOOD PRESSURE: 62 MMHG | HEART RATE: 71 BPM | SYSTOLIC BLOOD PRESSURE: 122 MMHG

## 2022-08-03 VITALS
BODY MASS INDEX: 23.9 KG/M2 | WEIGHT: 140 LBS | HEART RATE: 71 BPM | SYSTOLIC BLOOD PRESSURE: 122 MMHG | TEMPERATURE: 98.4 F | HEIGHT: 64 IN | DIASTOLIC BLOOD PRESSURE: 62 MMHG | RESPIRATION RATE: 18 BRPM | OXYGEN SATURATION: 96 %

## 2022-08-03 DIAGNOSIS — R06.02 SOB (SHORTNESS OF BREATH): ICD-10-CM

## 2022-08-03 DIAGNOSIS — Z87.898 HISTORY OF SYNCOPE: ICD-10-CM

## 2022-08-03 DIAGNOSIS — Z86.73 HISTORY OF CVA (CEREBROVASCULAR ACCIDENT): ICD-10-CM

## 2022-08-03 DIAGNOSIS — Z98.890 S/P CARDIAC CATH: ICD-10-CM

## 2022-08-03 DIAGNOSIS — R94.31 ABNORMAL EKG: ICD-10-CM

## 2022-08-03 DIAGNOSIS — C34.31 PRIMARY MALIGNANT NEOPLASM OF RIGHT LOWER LOBE OF LUNG (HCC): Primary | ICD-10-CM

## 2022-08-03 DIAGNOSIS — R42 DIZZINESS ON STANDING: ICD-10-CM

## 2022-08-03 DIAGNOSIS — R06.02 SOB (SHORTNESS OF BREATH) ON EXERTION: ICD-10-CM

## 2022-08-03 PROCEDURE — 99211 OFF/OP EST MAY X REQ PHY/QHP: CPT

## 2022-08-03 PROCEDURE — 93660 TILT TABLE EVALUATION: CPT

## 2022-08-03 PROCEDURE — 93660 TILT TABLE EVALUATION: CPT | Performed by: INTERNAL MEDICINE

## 2022-08-03 PROCEDURE — 93270 REMOTE 30 DAY ECG REV/REPORT: CPT

## 2022-08-03 PROCEDURE — 99215 OFFICE O/P EST HI 40 MIN: CPT | Performed by: INTERNAL MEDICINE

## 2022-08-03 PROCEDURE — 1123F ACP DISCUSS/DSCN MKR DOCD: CPT | Performed by: INTERNAL MEDICINE

## 2022-08-03 RX ORDER — LANOLIN ALCOHOL/MO/W.PET/CERES
1000 CREAM (GRAM) TOPICAL DAILY
COMMUNITY

## 2022-08-03 ASSESSMENT — ENCOUNTER SYMPTOMS
SHORTNESS OF BREATH: 1
SINUS PRESSURE: 0
SORE THROAT: 0
DIARRHEA: 0
TROUBLE SWALLOWING: 0
ABDOMINAL PAIN: 0
CONSTIPATION: 0
COLOR CHANGE: 0
VOMITING: 0
NAUSEA: 0
ANAL BLEEDING: 0
COUGH: 1
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
## 2022-08-03 NOTE — PROCEDURES
The skin was prepped and a 30 day cardiac event monitor was applied. The patient was instructed on the documentation of symptoms and the purpose of the monitor as well as the things to avoid while wearing the monitor. The patient was instructed to remove and return the monitor on 09/02/2022.   The serial number of the monitor that was applied is [id number]

## 2022-08-04 NOTE — PROCEDURES
[address] [place] [address]                                TILT TABLE TEST    PATIENT NAME: Latasha Thomas                    :        1950  MED REC NO:   [id number]                           ROOM:  ACCOUNT NO:   [de-identified]                           ADMIT DATE: 2022  PROVIDER:     Allyn Dominguez. NEDA Ardon Loge:  2022    INDICATION:  Dizziness and syncope. Baseline blood pressure 172/67, pulse rate 76. Baseline EKG is sinus  rhythm. PROCEDURE DETAIL:  Under continuous EKG monitoring and intermittent  blood pressure monitoring, the patient was allowed to assume standing  position at 70-degree inclination. Three minutes into tilting, blood  pressure 172/67, pulse rate 79. Fifth minute in tilting, blood pressure  155/69, pulse rate 72.  10 minutes into tilting, blood pressure 150/66,  pulse rate 72.  20 minutes into tilting, blood pressure 147/67, pulse  rate 73.  30 minutes into tilting, blood pressure 149/65, pulse rate 75. After 30 minutes of tilting, the patient allowed to assume supine  position. 30 minutes in supine position, blood pressure 146/69, pulse  rate 67. Three minutes in supine position, blood pressure 152/69, pulse  rate 60. SYMPTOMATOLOGY:  No symptoms throughout tilting. EKG MONITORING:  No arrhythmia or pause. HEMODYNAMICS:  Heart rate well maintained throughout the tilting. No  tachy or bradyarrhythmia. No pause. Blood pressure, no initial blood  pressure drop and there is mild progressive blood pressure drop, but all  are dropping from _____ range. The patient's blood pressure has gone as  low as 133/63 at 24th minute, pulse rate of 73. Otherwise, most of the  blood pressures remain in the 140s to 150s and go back up and down and  at 28 minutes into tilting, blood pressure 143/53, pulse rate 73.  30  minutes into tilting, blood pressure 149/65, pulse rate 75. So,  basically, the patient is in the 170s and went in the 140s; overall, in  the recovery, the patient remained in the 140s. So, the progressive  blood pressure drop is because the patient was accustomed to the test  and progressively, blood pressure was going down, but never to the lower  range, remained in the hypertensive range. So, hemodynamically stable. So, the patient's symptomatology, no symptoms. CONCLUSION:  1. Tilt table test negative for vasovagal response. 2.  Tilt table test negative for orthostatic hypotension. 3.  Tilt table test negative for POTS (postural orthostatic tachycardia  syndrome). 4.  Overall, this is a benign tilt table study. Manan Hargrove.  Mayela Gentile M.D.    D: 08/03/2022 20:25:18       T: 08/03/2022 21:16:33     [email or web address]  Job#: [id number]     Doc#: [id number]    CC:

## 2022-08-05 ENCOUNTER — TELEPHONE (OUTPATIENT)
Dept: CASE MANAGEMENT | Age: 72
End: 2022-08-05

## 2022-08-05 NOTE — TELEPHONE ENCOUNTER
Name: Leatha Metzger  : 1950  MRN: [id number]    Oncology Navigation Follow-Up Note    Contact Type:  Telephone    Notes: Mirza phoned pt at Dr. Jayla Santo request to emphasize need for pt follow through to complete the CT scan that was ordered since pt missed the previous one ordered. Mirza shared with pt CT is ordered for  at 4p; arrive at 345p, NPO x4 hours. Pt was unaware of CT date. FU appt currently listed with Dr Jayla Santo is in 2022; message to Rolanda Mota with inquiry when she wants pt to return to the office to review the CT results. Await ONC reply. Mirza will follow up on . Message to Venkatesh Bah, pt's nurse navigator.      Electronically signed by Caty Alfonso RN on 2022 at 1:40 PM

## 2022-08-11 ENCOUNTER — HOSPITAL ENCOUNTER (OUTPATIENT)
Dept: CT IMAGING | Age: 72
Discharge: HOME | End: 2022-08-11
Payer: MEDICARE

## 2022-08-11 DIAGNOSIS — C34.31 PRIMARY MALIGNANT NEOPLASM OF RIGHT LOWER LOBE OF LUNG (HCC): ICD-10-CM

## 2022-08-11 LAB — POC CREATININE WHOLE BLOOD: 0.9 MG/DL (ref 0.5–1.2)

## 2022-08-11 PROCEDURE — 71260 CT THORAX DX C+: CPT

## 2022-08-11 PROCEDURE — 82565 ASSAY OF CREATININE: CPT

## 2022-08-11 PROCEDURE — 6360000004 HC RX CONTRAST MEDICATION: Performed by: INTERNAL MEDICINE

## 2022-08-11 RX ADMIN — IOPAMIDOL 85 ML: 755 INJECTION, SOLUTION INTRAVENOUS at 15:54

## 2022-08-12 ENCOUNTER — CLINICAL DOCUMENTATION (OUTPATIENT)
Dept: CASE MANAGEMENT | Age: 72
End: 2022-08-12

## 2022-08-12 NOTE — PROGRESS NOTES
Name: Jigna Park  : 1950  MRN: [id number]    Oncology Navigation Follow-Up Note    Contact Type:  Telephone    Notes:   Per Dr. Orin Bean to give her results of CT of Chest performed :  No evidence of any new masses. Very glad to have this information and verified she has appointment with Dr. Eloina Zazueta 10/12/22 @ 10:30.     Electronically signed by Sandeep Brower RN on 2022 at 12:29 PM

## 2022-08-16 ENCOUNTER — OFFICE VISIT (OUTPATIENT)
Dept: FAMILY MEDICINE CLINIC | Age: 72
End: 2022-08-16
Payer: MEDICARE

## 2022-08-16 ENCOUNTER — TELEPHONE (OUTPATIENT)
Dept: FAMILY MEDICINE CLINIC | Age: 72
End: 2022-08-16

## 2022-08-16 VITALS
SYSTOLIC BLOOD PRESSURE: 136 MMHG | BODY MASS INDEX: 25.06 KG/M2 | OXYGEN SATURATION: 95 % | DIASTOLIC BLOOD PRESSURE: 60 MMHG | HEART RATE: 72 BPM | TEMPERATURE: 98.2 F | WEIGHT: 146 LBS | RESPIRATION RATE: 28 BRPM

## 2022-08-16 DIAGNOSIS — Z72.0 TOBACCO ABUSE: ICD-10-CM

## 2022-08-16 DIAGNOSIS — N18.30 STAGE 3 CHRONIC KIDNEY DISEASE, UNSPECIFIED WHETHER STAGE 3A OR 3B CKD (HCC): ICD-10-CM

## 2022-08-16 DIAGNOSIS — E53.8 B12 DEFICIENCY: ICD-10-CM

## 2022-08-16 DIAGNOSIS — J44.9 CHRONIC OBSTRUCTIVE PULMONARY DISEASE, UNSPECIFIED COPD TYPE (HCC): ICD-10-CM

## 2022-08-16 DIAGNOSIS — E78.00 PURE HYPERCHOLESTEROLEMIA: ICD-10-CM

## 2022-08-16 DIAGNOSIS — E03.9 HYPOTHYROIDISM, UNSPECIFIED TYPE: Primary | ICD-10-CM

## 2022-08-16 DIAGNOSIS — Z91.199 NONCOMPLIANCE: ICD-10-CM

## 2022-08-16 DIAGNOSIS — I50.22 CHRONIC SYSTOLIC (CONGESTIVE) HEART FAILURE (HCC): ICD-10-CM

## 2022-08-16 DIAGNOSIS — I10 ESSENTIAL HYPERTENSION: ICD-10-CM

## 2022-08-16 DIAGNOSIS — F17.200 SMOKER: ICD-10-CM

## 2022-08-16 PROBLEM — S72.109A: Status: ACTIVE | Noted: 2021-08-06

## 2022-08-16 PROBLEM — Z96.642 HISTORY OF TOTAL LEFT HIP REPLACEMENT: Status: ACTIVE | Noted: 2022-08-16

## 2022-08-16 PROCEDURE — 99214 OFFICE O/P EST MOD 30 MIN: CPT | Performed by: NURSE PRACTITIONER

## 2022-08-16 PROCEDURE — 1123F ACP DISCUSS/DSCN MKR DOCD: CPT | Performed by: NURSE PRACTITIONER

## 2022-08-16 RX ORDER — LEVOTHYROXINE SODIUM 0.03 MG/1
25 TABLET ORAL DAILY
Qty: 90 TABLET | Refills: 1 | Status: SHIPPED | OUTPATIENT
Start: 2022-08-16

## 2022-08-16 RX ORDER — ROSUVASTATIN CALCIUM 5 MG/1
5 TABLET, COATED ORAL NIGHTLY
Qty: 90 TABLET | Refills: 3 | Status: SHIPPED | OUTPATIENT
Start: 2022-08-16

## 2022-08-16 RX ORDER — ALBUTEROL SULFATE 90 UG/1
2 AEROSOL, METERED RESPIRATORY (INHALATION) 4 TIMES DAILY PRN
Qty: 18 G | Refills: 5 | Status: SHIPPED | OUTPATIENT
Start: 2022-08-16

## 2022-08-16 ASSESSMENT — ENCOUNTER SYMPTOMS
FACIAL SWELLING: 0
COUGH: 0
BACK PAIN: 0
DIARRHEA: 0
COLOR CHANGE: 0
TROUBLE SWALLOWING: 0
SHORTNESS OF BREATH: 0
VOMITING: 0
SORE THROAT: 0
WHEEZING: 0
NAUSEA: 0
SINUS PAIN: 0
ABDOMINAL PAIN: 0
EYE PAIN: 0

## 2022-08-16 NOTE — TELEPHONE ENCOUNTER
[facility] from [address] [facility] equipment called regarding the pt appt she has today at 1:00pm with WS. She asked that the provider documents in their visit today that the patient uses and benefits from her oxygen because she is up for an oxygen recert. and they need it documented.

## 2022-08-16 NOTE — PROGRESS NOTES
[facility]  [address] [facility] [address]  Dept: [phone]  Dept Fax: [phone]  Loc: [phone]     2022     Anabella Eddy (:  1950) is a 81 y.o. female, here for evaluation of the following medical concerns:    Chief Complaint   Patient presents with    Follow-up     Here today for f/up of multiple medical issues. Pt states she has no new concerns. HPI    Pt presents to the office today for 1 month follow up for \"passing out \" spells. She went immediately to cardiology after her last visit and multiple tests were completed with normal results. Pt is currently wearing an event monitor for her heart. She also has an appt with neurology in September. Overall, she is feeling better and she denies any more episodes since her last visit. Treatment Adherence:   Medication compliance:  compliant all of the time  Diet compliance:  compliant most of the time  Weight trend: stable  Current exercise: no regular exercise  Barriers: none    Hypertension:  Home blood pressure monitoring: No. Patient denies shortness of breath, headache, lightheadedness, and peripheral edema. Antihypertensive medication side effects: no medication side effects noted. Use of agents associated with hypertension: none. Sodium (meq/L)   Date Value   2022 138    BUN (mg/dL)   Date Value   2022 10    Glucose   Date Value   2022 85 mg/dL   2012 94 mg/dl      Potassium (meq/L)   Date Value   2022 4.0     Potassium reflex Magnesium (meq/L)   Date Value   10/05/2021 3.5    Creatinine (mg/dL)   Date Value   2022 1.0         Hyperlipidemia:  No new myalgias or GI upset on diet controlled.       Lab Results   Component Value Date    CHOL 227 (H) 2022    TRIG 151 2022    HDL 45 2022    LDLCALC 152 2022     Lab Results   Component Value Date    ALT 7 (L) tablet by mouth nightly Yes NITO Samuel CNP   levothyroxine (SYNTHROID) 25 MCG tablet Take 1 tablet by mouth in the morning. Yes NITO Samuel CNP   albuterol sulfate HFA (VENTOLIN HFA) 108 (90 Base) MCG/ACT inhaler Inhale 2 puffs into the lungs 4 times daily as needed for Wheezing Yes NITO Samuel CNP   vitamin B-12 (CYANOCOBALAMIN) 1000 MCG tablet Take 1,000 mcg by mouth in the morning. Yes Historical Provider, MD   gabapentin (NEURONTIN) 100 MG capsule Take 1 capsule by mouth in the morning and 1 capsule before bedtime. Do all this for 60 doses.  Yes Yefri Lehman MD   ferrous sulfate (IRON 325) 325 (65 Fe) MG tablet Take 1 tablet by mouth 2 times daily (with meals) Yes NITO Clayton CNP   aspirin 325 MG EC tablet Take 1 tablet by mouth daily Yes NITO Clayton CNP   acetaminophen (TYLENOL) 325 MG tablet Take 2 tablets by mouth every 6 hours Yes NITO Clayton CNP   Cholecalciferol (VITAMIN D) 25 MCG TABS Take 1 tablet by mouth daily Yes NITO Clayton CNP   diphenhydrAMINE (BENADRYL) 25 MG tablet Take 25 mg by mouth every 6 hours as needed for Itching Yes Historical Provider, MD   ondansetron (ZOFRAN-ODT) 4 MG disintegrating tablet Take 1 tablet by mouth every 8 hours as needed for Nausea or Vomiting  Patient not taking: No sig reported  NITO Clayton CNP   famotidine (PEPCID) 20 MG tablet Take 1 tablet by mouth daily  Patient not taking: No sig reported  NITO Clayton CNP   guaiFENesin (MUCINEX) 600 MG extended release tablet Take 1 tablet by mouth 2 times daily  Patient not taking: No sig reported  Amy Rose MD        Social History     Tobacco Use    Smoking status: Every Day     Packs/day: 1.00     Years: 48.00     Pack years: 48.00     Types: Cigarettes     Start date: 11/13/1967    Smokeless tobacco: Never    Tobacco comments:     Currently at 1 pk/day-declines counseling 8/3/22   Substance Use Topics Alcohol use: No     Alcohol/week: 0.0 standard drinks        Vitals:    08/16/22 1313 08/16/22 1328   BP: (!) 148/58 136/60   Site: Left Upper Arm    Pulse: 72    Resp: 28    Temp: 98.2 °F (36.8 °C)    TempSrc: Oral    SpO2: 95%    Weight: 146 lb (66.2 kg)      Estimated body mass index is 25.06 kg/m² as calculated from the following:    Height as of 8/3/22: 5' 4\" (1.626 m). Weight as of this encounter: 146 lb (66.2 kg). Physical Exam  Vitals reviewed. Constitutional:       General: She is not in acute distress. Appearance: Normal appearance. She is well-developed. HENT:      Head: Normocephalic and atraumatic. Right Ear: Hearing normal.      Left Ear: Hearing normal.      Nose: Nose normal. No nasal tenderness. Mouth/Throat:      Lips: Pink. Mouth: Mucous membranes are moist. No oral lesions. Pharynx: Oropharynx is clear. Uvula midline. Eyes:      General:         Right eye: No discharge. Left eye: No discharge. Conjunctiva/sclera: Conjunctivae normal.   Neck:      Vascular: No carotid bruit. Trachea: No tracheal deviation. Cardiovascular:      Rate and Rhythm: Normal rate and regular rhythm. Heart sounds: Normal heart sounds. No murmur heard. Pulmonary:      Effort: Pulmonary effort is normal. No respiratory distress. Breath sounds: Wheezing present. Abdominal:      General: Bowel sounds are normal.      Palpations: Abdomen is soft. Tenderness: There is no abdominal tenderness. Musculoskeletal:      Cervical back: Full passive range of motion without pain and neck supple. Right lower leg: No edema. Left lower leg: No edema. Lymphadenopathy:      Head:      Right side of head: No submental, submandibular, tonsillar, preauricular, posterior auricular or occipital adenopathy. Left side of head: No submental, submandibular, tonsillar, preauricular, posterior auricular or occipital adenopathy.       Cervical: No cervical adenopathy. Skin:     General: Skin is warm and dry. Findings: No rash. Neurological:      General: No focal deficit present. Mental Status: She is alert and oriented to person, place, and time. Coordination: Coordination normal.   Psychiatric:         Behavior: Behavior normal. Behavior is cooperative. Thought Content: Thought content normal.         Judgment: Judgment normal.       ASSESSMENT/PLAN:  1. Hypothyroidism, unspecified type  - levothyroxine (SYNTHROID) 25 MCG tablet; Take 1 tablet by mouth in the morning. Dispense: 90 tablet; Refill: 1  - TSH; Future  - T4, Free; Future    2. Chronic systolic (congestive) heart failure    3. Stage 3 chronic kidney disease, unspecified whether stage 3a or 3b CKD ([facility] [address]    4. B12 deficiency    5. Pure hypercholesterolemia  - rosuvastatin (CRESTOR) 5 MG tablet; Take 1 tablet by mouth nightly  Dispense: 90 tablet; Refill: 3    6. Essential hypertension    7. Noncompliance    8. Tobacco abuse    9. Chronic obstructive pulmonary disease, unspecified COPD type (HCC)  - albuterol sulfate HFA (VENTOLIN HFA) 108 (90 Base) MCG/ACT inhaler; Inhale 2 puffs into the lungs 4 times daily as needed for Wheezing  Dispense: 18 g; Refill: 5    - Restart synthroid at 25 mcg daily. Repeat TSH and T4 in 6-8 weeks. - Follow up with cardiology next week as planned and neurology in September as planned. - Stop smoking  - Pt doing well on home O2. OK to continue and certify use for another year. - Refill of albuterol today. - Call office with any questions or concerns, or if symptoms are getting worse or changing      Return in about 3 months (around 11/16/2022), or if symptoms worsen or fail to improve, for Medicare AW, Routine follow up. Patient given educational materials - see patient instructions. Discussed use, benefit, and side effects of prescribed medications. All patient questions answered. Pt voiced understanding. Reviewed health maintenance. An electronic signature was used to authenticate this note.     --Martin Suazo, NITO - CNP on 8/17/2022 at 9:40 AM

## 2022-08-16 NOTE — TELEPHONE ENCOUNTER
Noted. Will add to note today. Can we call home medical equipment and see if they need the note faxed?  Thanks -WS

## 2022-09-06 NOTE — PROCEDURES
[address] [place] [address]                                 EVENT MONITOR    PATIENT NAME: Jacey Hopson                    :        1950  MED REC NO:   [id number]                           ROOM:  ACCOUNT NO:   [de-identified]                           ADMIT DATE: 2022  PROVIDER:     Mario Johnson. Emily Nagel M.D.    TEST TYPE:  Event monitor. DATE OF ENROLLMENT:  2022 to 2022    INDICATION:  Transient ischemic attack. FINDING:  The patient is in a predominantly normal sinus rhythm with  captured heart rate ranging from 53 to 121 beats per minute. There is  no AV block. No pause. No ventricular or supraventricular  tachycardias. No significant bradyarrhythmia to be addressed. There was no patient-triggered event. There are four autotriggered captures. Of the four, the first one is  sinus rhythm. The second autotriggered capture was atrial fibrillation  with rapid ventricular response of around 134 beats per minute, short  lasting that was on day 12. On day 14, the patient had another  autotriggered capture noted, characterized also with atrial fibrillation  with a rate of 84 beats per minute. On day 14, again at 03:51 p.m, the  patient autotriggered captures noted and that was associated with atrial  fibrillation with a ventricular rate of 110 beats per minute. So, at least there are three separate occasions that the patient had an  atrial fibrillation. The machine detected only one of them, but the  other two are also in atrial fibrillation. The one that the device  detected was only lasts 5 minutes of atrial fibrillation, less than 1%  of the cardiac cycle. So, certainly on this 1-month monitor, the  patient had three episodes of atrial fibrillation, some of them  predominantly with controlled ventricular rate between 84 to 134 beats  per minute.     Rare isolated supraventricular ectopic beat has been noted. CONCLUSION:  This is an abnormal event monitor finding, predominantly  normal sinus rhythm, captured heart rate ranging from 53 to 121 beats  per minute. Abnormal for paroxysmal atrial fibrillation and three  episodes of atrial fibrillation noted with predominantly controlled  ventricular rate and basically, the AFib burden will be less than 1%. All atrial fibrillation are autotriggered captures and there are four  autotriggered capture. Out of the four, three of them are atrial  fibrillation. There is no symptom-triggered capture. There is no AV  block. No pause. No ventricular or supraventricular tachycardia. RECOMMENDATION:  Need further clinical correlation. Consider oral  anticoagulation if clinically indicated. Radha Avila.  Rodrigue Perales M.D.    D: 09/06/2022 13:16:07       T: 09/06/2022 13:19:36     [email or web address]  Job#: [id number]     Doc#: [id number]    CC:

## 2022-09-08 ENCOUNTER — OFFICE VISIT (OUTPATIENT)
Dept: CARDIOLOGY CLINIC | Age: 72
End: 2022-09-08
Payer: MEDICARE

## 2022-09-08 VITALS
WEIGHT: 143.6 LBS | HEIGHT: 64 IN | BODY MASS INDEX: 24.52 KG/M2 | DIASTOLIC BLOOD PRESSURE: 60 MMHG | HEART RATE: 90 BPM | SYSTOLIC BLOOD PRESSURE: 138 MMHG

## 2022-09-08 DIAGNOSIS — R42 DIZZINESS ON STANDING: ICD-10-CM

## 2022-09-08 DIAGNOSIS — Z87.898 HISTORY OF CHEST PAIN: ICD-10-CM

## 2022-09-08 DIAGNOSIS — E78.00 PURE HYPERCHOLESTEROLEMIA: ICD-10-CM

## 2022-09-08 DIAGNOSIS — R06.02 SOB (SHORTNESS OF BREATH) ON EXERTION: ICD-10-CM

## 2022-09-08 DIAGNOSIS — Z86.73 HISTORY OF CVA (CEREBROVASCULAR ACCIDENT): ICD-10-CM

## 2022-09-08 DIAGNOSIS — Z98.890 S/P CARDIAC CATH: ICD-10-CM

## 2022-09-08 DIAGNOSIS — Z87.898 HISTORY OF SYNCOPE: ICD-10-CM

## 2022-09-08 DIAGNOSIS — I48.0 PAF (PAROXYSMAL ATRIAL FIBRILLATION) (HCC): Primary | ICD-10-CM

## 2022-09-08 PROBLEM — R07.89 CHEST PAIN, ATYPICAL: Status: RESOLVED | Noted: 2018-02-06 | Resolved: 2022-09-08

## 2022-09-08 PROCEDURE — 99214 OFFICE O/P EST MOD 30 MIN: CPT | Performed by: INTERNAL MEDICINE

## 2022-09-08 PROCEDURE — 1123F ACP DISCUSS/DSCN MKR DOCD: CPT | Performed by: INTERNAL MEDICINE

## 2022-09-08 RX ORDER — ASPIRIN 81 MG/1
81 TABLET ORAL DAILY
Qty: 90 TABLET | Refills: 1 | COMMUNITY
Start: 2022-09-08

## 2022-09-08 NOTE — PROGRESS NOTES
Chief Complaint   Patient presents with    Abnormal Test Results     Event monitor   Originally  patient here - ref. by Dr. Dinh Brooks - hypertenstion and mild CAD - last seen Dr. 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 EKG done 7-. Dizziness on standing getting better after better hydration    Hx of Occasional dizziness on standing  Had balance issue    Hx of syncope intermittent     Last syncope march 2021 and evalutaed in hospital  predrom dizziness  Tired post syncope with nausea    Sob on exertion chronic on home O2    Occasional palpitation    No leg edema  Hx of chronic Chest discomfort once in a while once a months 10 min- better  No definite chest pain  No aggravating or relieving factor        Past event  Hx of syncope July 2022 ?  Syncope versus siezure d/o  Had two episode syncope in the last 1 week - 7 days and 5 days back  Predrome -dizziness, nausea  Postdrome - nausea, sweaty and feel tired  No injury but fall on side of the cauch as pat was sat then    Smoke 2ppd for 48 yrs    FHX  Father had mi in his 52's  Sister had MI at 72    Patient Active Problem List   Diagnosis    Hyperlipidemia    History of breast cancer    Vitamin D deficiency    Chronic headachess/p head injjury s/p fall    Depression    Smoker    Hypothyroidism    Allergic rhinitis    Incontinence    Noncompliance    Vasovagal syncope    History of CVA (cerebrovascular accident)    Stage 2 moderate COPD by GOLD classification (HCC)    Carotid occlusion, left    OA (osteoarthritis) of hip    Transient cerebral ischemia    Aortic insufficiency    Tobacco abuse    History of chest pain atypical    S/P cardiac cath nonobstructive lesion 2014    Lumbar radiculitis    Spinal stenosis of lumbar region without neurogenic claudication    Syncope and collapse    Solitary pulmonary nodule on lung CT    Primary malignant neoplasm of right lower lobe of lung (HCC)    SOB (shortness of breath) on exertion    Dizziness on standing    History of syncope    Cancer of lower lobe of right lung (HCC)    S/P robotic assisted nonanatomic wedge resection of lateral basilar right lower lobe lung mass and mediastinal dissection    Postoperative urinary retention    Closed left hip fracture, initial encounter ([facility])    Closed fracture of trochanter of femur (HCC)    Chronic renal disease, stage III (HCC) [279373]    Abnormal EKG    Chronic systolic (congestive) heart failure    History of total left hip replacement    PAF (paroxysmal atrial fibrillation) ([facility] [address] Noted on event monitor       Past Surgical History:   Procedure Laterality Date    APPENDECTOMY  1975    BREAST SURGERY Right 04/01/2005    evacuation of breast hematoma-Dr. Genia Trejo    BREAST SURGERY Right 11/30/2004    lymphatic mapping, SLN bx x2-Dr. Kami Rodriguez    COLONOSCOPY  2009    Dr. Charley Jameson    CT NEEDLE BIOPSY LUNG PERCUTANEOUS  03/16/2021    CT NEEDLE BIOPSY LUNG PERCUTANEOUS 3/16/2021 [facility] CT SCAN    FEMUR FRACTURE SURGERY Left 8/2/2021    FEMUR OPEN REDUCTION INTERNAL FIXATION performed by Fabi Schmitt MD at [facility]  01/19/2015    HYSTERECTOMY (CERVIX STATUS UNKNOWN)  [address] Left 2011    HIP    JOINT REPLACEMENT Right 01/19/2015    Right Total Hip Replacement - Dr. Reggie Garcia, TOTAL Right 6/14/2021    ROBOTIC ASSISTED RIGHT LOWER LOBE SUPERIOR SEGMENTECTOMY AND MEDIASTINAL DISSECTION, CRYOTHERAPY OF INTERCOSTAL NERVES performed by Naye Mistry MD at [address] Right 2005    Dr. Dionna Gifford  04/10/2018    Lumbar epidural steroid injection at L4    CT NJX DX/THER SBST INTRLMNR LMBR/SAC W/IMG GDN N/A 04/10/2018    LESI  @ L4 performed by Dat Brizuela MD Jadiel at [address] [place] [address] (CERVIX REMOVED)  1976, 2001    ovaries    THYROID LOBECTOMY Left 11/26/2010    left thyroid lobectomy with isthmusectomy-Dr. Ivana Erickson    THYROID SURGERY  2007    right thyroid lobectomy-Dr. Ibanez     BREAST NEEDLE BIOPSY RIGHT Right 11/18/2004    invasive ductal carcinoma right breast       Allergies   Allergen Reactions    Cipro Xr     Vicodin [Hydrocodone-Acetaminophen]      Weird dreams          Family History   Problem Relation Age of Onset    Osteoporosis Mother     Hypertension Mother     High Cholesterol Mother     Stroke Mother     Colon Cancer Mother     Cancer Father         lung cancer    Heart Disease Father     Hypertension Father     High Cholesterol Father     Heart Disease Sister     High Cholesterol Sister     Hypertension Sister     Asthma Sister     Other Other         high arched feet    Lung Cancer Son     Breast Cancer Niece 64    Ovarian Cancer Niece 43        Social History     Socioeconomic History    Marital status:       Spouse name: Not on file    Number of children: 3    Years of education: 9    Highest education level: Not on file   Occupational History    Occupation: Disabled   Tobacco Use    Smoking status: Every Day     Packs/day: 1.00     Years: 48.00     Pack years: 48.00     Types: Cigarettes     Start date: 11/13/1967    Smokeless tobacco: Never    Tobacco comments:     Currently at 1 pk/day-declines counseling 8/3/22   Vaping Use    Vaping Use: Never used   Substance and Sexual Activity    Alcohol use: No     Alcohol/week: 0.0 standard drinks    Drug use: No    Sexual activity: Yes     Partners: Male   Other Topics Concern    Not on file   Social History Narrative    Not on file     Social Determinants of Health     Financial Resource Strain: Low Risk     Difficulty of Paying Living Expenses: Not hard at all   Food Insecurity: No Food Insecurity    Worried About [address] in the Last Year: Never true Ran Out of Food in the Last Year: Never true   Transportation Needs: Not on file   Physical Activity: Not on file   Stress: Not on file   Social Connections: Not on file   Intimate Partner Violence: Not on file   Housing Stability: Not on file       Current Outpatient Medications   Medication Sig Dispense Refill    apixaban (ELIQUIS) 5 MG TABS tablet Take 1 tablet by mouth 2 times daily 60 tablet 5    aspirin EC 81 MG EC tablet Take 1 tablet by mouth daily 90 tablet 1    rosuvastatin (CRESTOR) 5 MG tablet Take 1 tablet by mouth nightly 90 tablet 3    levothyroxine (SYNTHROID) 25 MCG tablet Take 1 tablet by mouth in the morning. 90 tablet 1    albuterol sulfate HFA (VENTOLIN HFA) 108 (90 Base) MCG/ACT inhaler Inhale 2 puffs into the lungs 4 times daily as needed for Wheezing 18 g 5    vitamin B-12 (CYANOCOBALAMIN) 1000 MCG tablet Take 1,000 mcg by mouth in the morning. ferrous sulfate (IRON 325) 325 (65 Fe) MG tablet Take 1 tablet by mouth 2 times daily (with meals) 30 tablet 3    ondansetron (ZOFRAN-ODT) 4 MG disintegrating tablet Take 1 tablet by mouth every 8 hours as needed for Nausea or Vomiting      acetaminophen (TYLENOL) 325 MG tablet Take 2 tablets by mouth every 6 hours 120 tablet 3    famotidine (PEPCID) 20 MG tablet Take 1 tablet by mouth daily 60 tablet 3    Cholecalciferol (VITAMIN D) 25 MCG TABS Take 1 tablet by mouth daily 60 tablet     guaiFENesin (MUCINEX) 600 MG extended release tablet Take 1 tablet by mouth 2 times daily 60 tablet 1    diphenhydrAMINE (BENADRYL) 25 MG tablet Take 25 mg by mouth every 6 hours as needed for Itching      gabapentin (NEURONTIN) 100 MG capsule Take 1 capsule by mouth in the morning and 1 capsule before bedtime. Do all this for 60 doses. 60 capsule 3     No current facility-administered medications for this visit.        Review of Systems -     General ROS: negative  Psychological ROS: negative  Hematological and Lymphatic ROS: No history of blood clots or bleeding disorder. Respiratory ROS: no cough, shortness of breath, or wheezing  Cardiovascular ROS: no chest pain or dyspnea on exertion  Gastrointestinal ROS: negative  Genito-Urinary ROS: no dysuria, trouble voiding, or hematuria  Musculoskeletal ROS: negative  Neurological ROS: no TIA or stroke symptoms  Dermatological ROS: negative      Blood pressure 138/60, pulse 90, height 5' 4\" (1.626 m), weight 143 lb 9.6 oz (65.1 kg), not currently breastfeeding. Physical Examination:    General appearance - alert, well appearing, and in no distress  Mental status - alert, oriented to person, place, and time  Neck - supple, no significant adenopathy, no JVD, or carotid bruits  Chest - clear to auscultation, no wheezes, rales or rhonchi, symmetric air entry  Heart - normal rate, regular rhythm, normal S1, S2, no murmurs, rubs, clicks or gallops  Abdomen - soft, nontender, nondistended, no masses or organomegaly  Neurological - alert, oriented, normal speech, no focal findings or movement disorder noted  Musculoskeletal - no joint tenderness, deformity or swelling  Extremities - peripheral pulses normal, no pedal edema, no clubbing or cyanosis  Skin - normal coloration and turgor, no rashes, no suspicious skin lesions noted    Lab  No results for input(s): CKTOTAL, CKMB, CKMBINDEX, TROPONINI in the last 72 hours.   CBC:   Lab Results   Component Value Date/Time    WBC 7.1 07/20/2022 11:50 AM    RBC 4.51 07/20/2022 11:50 AM    RBC 4.49 05/16/2012 12:51 PM    HGB 14.7 07/20/2022 11:50 AM    HCT 47.5 07/20/2022 11:50 AM    .3 07/20/2022 11:50 AM    MCH 32.6 07/20/2022 11:50 AM    MCHC 30.9 07/20/2022 11:50 AM    RDW 16.0 12/07/2021 03:44 PM     07/20/2022 11:50 AM    MPV 10.6 07/20/2022 11:50 AM     BMP:    Lab Results   Component Value Date/Time     07/20/2022 11:50 AM    K 4.0 07/20/2022 11:50 AM    K 3.5 10/05/2021 06:25 PM     07/20/2022 11:50 AM    CO2 25 07/20/2022 11:50 AM    BUN 10 mild impaired relaxation of ventricle. Again, aggressive risk  factor modification is strongly encouraged. The patient is to avoid driving,  lifting for the next two days. Karey Baez D.O.        D: 03/07/2014 12:15       EKG 2/6/18  NSR, no acute abn nonsp T wave abn          Impression:   1. Patent right carotid and bilateral vertebral arteries without    significant stenosis. Occluded left ICA at its origin, present previously. This document has been electronically signed by: Marti Mcgarry MD on    03/12/2021 10:05 PM       All CTs at this facility use dose modulation techniques and iterative    reconstructions, and/or weight-based dosing   when appropriate to reduce radiation to a low as reasonably achievable. Carotid stenosis and measurements are in accordance with NASCET criteria                       Conclusions      Summary   Technically difficult examination. Left ventricular size and systolic function is normal. Ejection fraction   was estimated at 55-60%. LV wall thickness is within normal limits and   there are no obvious wall motion abnormalities. The right ventricular size was normal with normal systolic function and   wall thickness. Mild aortic regurgitation is noted. Mild mitral regurgitation is present. Signature      ----------------------------------------------------------------   Electronically signed by Christina Baltazar MD (Interpreting   physician) on 03/13/2021 at 02:48 PM              Conclusions      Summary   Left ventricle size is normal.   Normal left ventricular wall thickness. There was mild global hypokinesis of the left ventricle. Systolic function was mildly reduced. Ejection fraction is visually estimated in the range of 45% to 50%. Mild aortic regurgitation is noted. Doppler parameters were consistent with abnormal left ventricular   relaxation (grade 1 diastolic dysfunction).       Signature ----------------------------------------------------------------   Electronically signed by Marlen Hernandez MD (Interpreting   physician) on 07/25/2022 at 07:52 PM   ----------------------------------------------------------------       CONCLUSION:  1. Tilt table test negative for vasovagal response. 2.  Tilt table test negative for orthostatic hypotension. 3.  Tilt table test negative for POTS (postural orthostatic tachycardia  syndrome). 4.  Overall, this is a benign tilt table study. Jenny Mejia M.D.     D: 08/03/2022 20:25:18        event monitor and hx of 2 cva 2007 and 2014  CONCLUSION:  This is an abnormal event monitor finding, predominantly  normal sinus rhythm, captured heart rate ranging from 53 to 121 beats  per minute. Abnormal for paroxysmal atrial fibrillation and three  episodes of atrial fibrillation noted with predominantly controlled  ventricular rate and basically, the AFib burden will be less than 1%. All atrial fibrillation are autotriggered captures and there are four  autotriggered capture. Out of the four, three of them are atrial  fibrillation. There is no symptom-triggered capture. There is no AV  block. No pause. No ventricular or supraventricular tachycardia. RECOMMENDATION:  Need further clinical correlation. Consider oral  anticoagulation if clinically indicated. Jenny Mejia M.D.     D: 09/06/2022 13:16:07           ekg 5/20/21   Sinus  Rhythm   Voltage criteria for LVH  (S(V1)+R(V6) exceeds 3.50 mV). -  Nonspecific T-abnormality. ABNORMAL                    Ekg 7/19/22  Sinus  Rhythm  - occasional PAC     # PACs = 1.   -Nonspecific ST depression   +   Nonspecific T-abnormality  -Nondiagnostic. ABNORMAL       Assessment     Diagnosis Orders   1. PAF (paroxysmal atrial fibrillation) (HCC) Noted on event monitor        2. Dizziness on standing        3. History of syncope        4. Pure hypercholesterolemia        5.  History of CVA (cerebrovascular accident)        6. History of chest pain atypical        7. S/P cardiac cath nonobstructive lesion 2014        8. SOB (shortness of breath) on exertion              Plan     The  most current meds and labs reviewed  \  Hx of cp for yrs atypical  Sob  Syncope  Dizziness better after better hydration  Abn ekg  Jalen nuc negative 2021  Echo EF 48%  TTT- negative  Event monitor- PAF  Hydration advised  To see neurology for hx of siezure ? Hx of copd on home O2    Hx of CVA  Abn event with PAF  PAF  Chadsvasc 4 - need OAC  Start apixaban 5 po bid   Pat verbalized understanding risk of bleeding including ICH and agreed to be on [address]  Decrease asa to 81 from 325    Hx Hypertension, on medical treatment. Seems to be under good control. Off her meds for long time-Including lopressor and norvasc 5 mg po qd  Good without meds now    Hx of dizziness on standin and syncope  Off BP meds and  Stopped after syncope in march and feel better      Hx of Hyperlipidemia: off her statins, for long while      Smoking: discussed with the patient the importance of smoke cessation especially with the risk of CAD. Lisa Morgan does not want to quit and she stated she love to smoke     D/w the patn the plan of care    Discussed use, benefit, and side effects of prescribed medications. All patient questions answered. Pt voiced understanding. Instructed to continue current medications, diet and exercise. Continue risk factor modification and medical management. Patient agreed with treatment plan. Follow up as directed.         RTC in 12 months        [facility]

## 2022-09-15 ENCOUNTER — INITIAL CONSULT (OUTPATIENT)
Dept: NEUROLOGY | Age: 72
End: 2022-09-15
Payer: MEDICARE

## 2022-09-15 VITALS
SYSTOLIC BLOOD PRESSURE: 120 MMHG | DIASTOLIC BLOOD PRESSURE: 58 MMHG | OXYGEN SATURATION: 97 % | BODY MASS INDEX: 23.73 KG/M2 | HEART RATE: 85 BPM | WEIGHT: 139 LBS | HEIGHT: 64 IN

## 2022-09-15 DIAGNOSIS — R55 SYNCOPE AND COLLAPSE: Primary | ICD-10-CM

## 2022-09-15 DIAGNOSIS — R56.9 SEIZURE-LIKE ACTIVITY (HCC): ICD-10-CM

## 2022-09-15 PROCEDURE — 99205 OFFICE O/P NEW HI 60 MIN: CPT | Performed by: PSYCHIATRY & NEUROLOGY

## 2022-09-15 PROCEDURE — 1123F ACP DISCUSS/DSCN MKR DOCD: CPT | Performed by: PSYCHIATRY & NEUROLOGY

## 2022-09-15 NOTE — PATIENT INSTRUCTIONS
EEG   Carotid US  Vitamin B12, folate  Vitamin B6  Continue following with cardiology re: abnormal event monitor showing a-fib. Call with any new symptoms or concerns.    Follow up as needed

## 2022-09-26 NOTE — PROGRESS NOTES
Chief Complaint   Patient presents with    Consultation     syncope         Santosh Sanches is a 81 y.o. female who presents today for evaluation of episodes of passing out for the past 3 months. She can feel an episode coming on and becomes dizzy, lightheaded, and then she passes out. This has occurred in all positions. She has never been told she has low blood pressure. She had a tilt table test done that showed no concerning findings, however her event monitor showed episodes of atrial fibrillation. She was started on eliquis  and has not passed out since. She reports she had previous history of seizure. She had been on medication, however she stopped the medication and has not had a seizure in the past 25 years ago. MRI brain W/WO contrast done 7/2022 showed No evidence of metastatic disease or acute intracranial abnormality. She  denies chest pain. No shortness of breath, no neck pain. No vision changes. No dysphagia. No fever. No rash. No weight loss. History provided by patient. Past Medical History:   Diagnosis Date    Anxiety 2007    Arthritis     Breast cancer ([facility] [address] 11/18/2004    right mastectomy, chemo and radiation history. Right breast invasive ductal carcinoma    CAD (coronary artery disease) 2001    sees Dr Lucinda Boss of the skin 2004    Cerebral artery occlusion with cerebral infarction [facility])     Chronic UTI     COPD (chronic obstructive pulmonary disease) ([facility] [address]     sees RL Petersen    CVA (cerebral infarction) 2007, 2008    Depression 2007    DVT of lower extremity (deep venous thrombosis) ([facility] [address] 1976    Facial injury 2005    Fall on greasy ground, striking left periorbital region    History of stroke 2007, 2008, 2009    Patient relates a stroke with right weakness and speech in 2007; another in 2010 or 2012 (unsure), both with need to rehabilitation.   Also \"2 mini-strokes\" (?)    History of therapeutic radiation 2005    Right breast    Hx antineoplastic chemo 2005    Hx of blood clots 1977    hip, leg    Hyperlipidemia 2005    Hypertension 2005    Hypothyroidism     IBS (irritable bowel syndrome)     Low HDL (under 40) 2014    Lung cancer ([facility] [address]     sees Dr Mirella Rodas    Prolonged emergence from general anesthesia     Recurrent UTI (urinary tract infection) 2015    Dr Brittney Pearson finding difficulty 05/16/2017    work-up in progress       Patient Active Problem List   Diagnosis    Hyperlipidemia    History of breast cancer    Vitamin D deficiency    Chronic headachess/p head injjury s/p fall    Depression    Smoker    Hypothyroidism    Allergic rhinitis    Incontinence    Noncompliance    Vasovagal syncope    History of CVA (cerebrovascular accident)    Stage 2 moderate COPD by GOLD classification ([facility] [address]    Carotid occlusion, left    OA (osteoarthritis) of hip    Transient cerebral ischemia    Aortic insufficiency    Tobacco abuse    History of chest pain atypical    S/P cardiac cath nonobstructive lesion 2014    Lumbar radiculitis    Spinal stenosis of lumbar region without neurogenic claudication    Syncope and collapse    Solitary pulmonary nodule on lung CT    Primary malignant neoplasm of right lower lobe of lung (HCC)    SOB (shortness of breath) on exertion    Dizziness on standing    History of syncope    Cancer of lower lobe of right lung (HCC)    S/P robotic assisted nonanatomic wedge resection of lateral basilar right lower lobe lung mass and mediastinal dissection    Postoperative urinary retention    Closed left hip fracture, initial encounter ([facility])    Closed fracture of trochanter of femur ([facility])    Chronic renal disease, stage III ([facility]) [169885]    Abnormal EKG    Chronic systolic (congestive) heart failure    History of total left hip replacement    PAF (paroxysmal atrial fibrillation) ([facility]) Noted on event monitor       Allergies   Allergen Reactions    Cipro Xr     Vicodin [Hydrocodone-Acetaminophen]      Weird dreams         Current Outpatient Medications   Medication Sig Dispense Refill    apixaban (ELIQUIS) 5 MG TABS tablet Take 1 tablet by mouth 2 times daily 60 tablet 5    aspirin EC 81 MG EC tablet Take 1 tablet by mouth daily 90 tablet 1    rosuvastatin (CRESTOR) 5 MG tablet Take 1 tablet by mouth nightly 90 tablet 3    levothyroxine (SYNTHROID) 25 MCG tablet Take 1 tablet by mouth in the morning. 90 tablet 1    albuterol sulfate HFA (VENTOLIN HFA) 108 (90 Base) MCG/ACT inhaler Inhale 2 puffs into the lungs 4 times daily as needed for Wheezing 18 g 5    vitamin B-12 (CYANOCOBALAMIN) 1000 MCG tablet Take 1,000 mcg by mouth in the morning.      gabapentin (NEURONTIN) 100 MG capsule Take 1 capsule by mouth in the morning and 1 capsule before bedtime. Do all this for 60 doses. 60 capsule 3    ferrous sulfate (IRON 325) 325 (65 Fe) MG tablet Take 1 tablet by mouth 2 times daily (with meals) 30 tablet 3    ondansetron (ZOFRAN-ODT) 4 MG disintegrating tablet Take 1 tablet by mouth every 8 hours as needed for Nausea or Vomiting      acetaminophen (TYLENOL) 325 MG tablet Take 2 tablets by mouth every 6 hours 120 tablet 3    famotidine (PEPCID) 20 MG tablet Take 1 tablet by mouth daily 60 tablet 3    Cholecalciferol (VITAMIN D) 25 MCG TABS Take 1 tablet by mouth daily 60 tablet     guaiFENesin (MUCINEX) 600 MG extended release tablet Take 1 tablet by mouth 2 times daily 60 tablet 1    diphenhydrAMINE (BENADRYL) 25 MG tablet Take 25 mg by mouth every 6 hours as needed for Itching       No current facility-administered medications for this visit. Social History     Socioeconomic History    Marital status:       Spouse name: None    Number of children: 3    Years of education: 9    Highest education level: None   Occupational History    Occupation: Disabled   Tobacco Use    Smoking status: Every Day     Packs/day: 1.00     Years: 48.00     Pack years: 48.00     Types: Cigarettes     Start date: 11/13/1967    Smokeless tobacco: Never    Tobacco comments:     Currently at 1 pk/day-declines counseling 8/3/22   Vaping Use    Vaping Use: Never used   Substance and Sexual Activity    Alcohol use: No     Alcohol/week: 0.0 standard drinks    Drug use: No    Sexual activity: Yes     Partners: Male     Social Determinants of Health     Financial Resource Strain: Low Risk     Difficulty of Paying Living Expenses: Not hard at all   Food Insecurity: No Food Insecurity    Worried About Running Out of Food in the Last Year: Never true    Ran Out of Food in the Last Year: Never true       Family History   Problem Relation Age of Onset    Osteoporosis Mother     Hypertension Mother     High Cholesterol Mother     Stroke Mother     Colon Cancer Mother     Cancer Father         lung cancer    Heart Disease Father     Hypertension Father     High Cholesterol Father     Heart Disease Sister     High Cholesterol Sister     Hypertension Sister     Asthma Sister     Other Other         high arched feet    Lung Cancer Son     Breast Cancer Niece 64    Ovarian Cancer Niece 43         I reviewed the past medical history, allergies, medications, social history and family history. Review of Systems   All systems reviewed, and are all negative, except what is mentioned in HPI      Vitals:    09/15/22 1251   BP: (!) 120/58   Site: Left Upper Arm   Position: Sitting   Cuff Size: Medium Adult   Pulse: 85   SpO2: 97%   Weight: 139 lb (63 kg)   Height: 5' 4\" (1.626 m)       Physical Examination:  General appearance - alert, well appearing, and in no distress, oriented to person, place, and time and normal weight  Mental status- Level of Alertness: awake  Orientation: person, place, time  Memory: normal  Fund of Knowledge: normal  Attention/Concentration: normal  Language: normal. Mood is normal.   Neck - supple, no significant adenopathy, carotids upstroke normal bilaterally. There is no axillary lymphadenopathy. There is no carotid bruit. No neck lymphadenopathy.  No thyroid enlargement   Neurological -   Cranial Bqaode-JA-WWK:.   Cranial nerve II: Normal   Cranial nerve III: Pupils: equal, round, reactive to light  Cranial nerves III, IV, VI: Extraocular Movements: intact   Cranial nerve V: Facial sensation: intact   Cranial nerve VII:Facial strength: intact   Cranial nerve VIII: Hearing: intact   Cranial nerve IX: Palate Elevation intact bilaterally  Cranial nerve XI: Shoulder shrug intact bilaterally  Cranial nerve XII: Tongue midline   neck supple without rigidity, there is no limitation of range of motion of the neck. DTR's are Intact distal and symmetric  Babinski sign negative  Motor exam is 5/5 in the upper and lower extremities. Normal muscle tone. There is no muscle atrophy. Sensory is intact for light touch   Coordination: finger to nose,  intact  Gait and station guarded, uses walker  Abnormal movement none, vibration reduced distally  Skin - warm, dry to touch, normal coloration, no rashes, no suspicious skin lesions  Superficial temporal artery pulses are normal.   There is no limitation of range of motion of the neck. There is no resting tremor, no pin rolling, no bradykinesia, no Hypohonia, normal blink rate. Musculoskeletal: Has no hand arthritis, no limitation of ROM in any of the four extremities. There is no leg edema . The Heart was regular in rate and rhythm. No heart murmur  Chest Clear, with  good effort. Abdomen soft, intact bowel sounds. Results for orders placed during the hospital encounter of 03/12/21    CTA HEAD W WO CONTRAST    Narrative  CTA head with contrast and 3-D postprocessing:    Comparison:  CT,SR  - CT HEAD WO CONTRAST  - 03/12/2021 08:50 PM EST  CT,KO  - CTA HEAD  - 05/16/2017 03:28 PM EDT    Findings:  Skull base and terminal internal carotid arteries: Normal caliber, patent  on the right. Occluded on the left with likely collateral opacification  of the left supraclinoid ICA. Anterior circulation patent, diminutive left A1 segment.   Small caliber left MCA arteries. Normal caliber right MCA and its  branches. Basilar artery within normal limits. Patent left posterior communicating artery. Posterior circulation intact. Impression  Impression:  1. Occluded skull base left ICA, present previously. Patent left  posterior communicating artery. 2.  Intact anterior, posterior, and middle cerebral circulations. 3.  No intracranial aneurysm. This document has been electronically signed by: Cyril Ibarra MD on  03/12/2021 10:17 PM    All CTs at this facility use dose modulation techniques and iterative  reconstructions, and/or weight-based dosing  when appropriate to reduce radiation to a low as reasonably achievable. Results for orders placed during the hospital encounter of 03/12/21    CTA NECK W WO CONTRAST    Narrative  CTA neck with contrast and 3-D postprocessing:    Comparison:  CT,SR  - CT HEAD WO CONTRAST  - 03/12/2021 08:50 PM EST  KO  - CTA NECK  - 05/16/2017 03:28 PM EDT    Findings:  Visualized aortic arch and great vessel takeoffs: Are widely patent,  moderate calcific atheromatous change without significant stenosis. Bilateral subclavian arteries are patent. Common carotid arteries are patent. Occluded left ICA at its origin. Widely patent right carotid bifurcation  and cervical internal carotid artery. Vertebral arteries are patent, without dissection or thrombosis. Visualized basilar artery is patent. Soft tissues of the neck are unremarkable. Thyroidectomy change. Visualized lung apices there is moderate centrilobular emphysema  Multilevel moderate spondylosis of the cervical spine. Impression  Impression:  1. Patent right carotid and bilateral vertebral arteries without  significant stenosis. Occluded left ICA at its origin, present previously.     This document has been electronically signed by: Cyril Ibarra MD on  03/12/2021 10:05 PM    All CTs at this facility use dose modulation techniques and iterative  reconstructions, and/or weight-based dosing  when appropriate to reduce radiation to a low as reasonably achievable. Carotid stenosis and measurements are in accordance with NASCET criteria. Results for orders placed during the hospital encounter of 03/12/21    MRI BRAIN WO CONTRAST    Narrative  PROCEDURE: MRI BRAIN WO CONTRAST    CLINICAL INFORMATION rule out CVA due to syncope. COMPARISON: CT scan of the brain and CTA brain dated 12 March 2021. TECHNIQUE: Multiplanar and multiple spin echo MRI images were obtained of the brain without contrast.    FINDINGS:        The diffusion-weighted images are normal.  The brain volume is reduced. There is a mild amount of signal hyperintensity on the FLAIR and T2-weighted sequences in the white matter of the brain. This is consistent with mild severity chronic small vessel  ischemic changes. There are no intra-or extra-axial collections. There is no hydrocephalus, midline shift or mass effect. There is mineralization in the medial aspects of the basal ganglia bilaterally. No other areas of susceptibility artifact are present. There is no antegrade flow in the left internal carotid artery. .    The midline craniocervical junction structures are normal.  The pituitary gland and brainstem are normal.    There is increased signal intensity in the right mastoid air cells consistent with inflammatory changes. Impression  1. No evidence of an acute infarct. 2. No antegrade flow in the left internal carotid artery. 3. Mild atrophy and probable ischemic changes in the white matter. 4. Inflammatory changes in the right mastoid air cells. **This report has been created using voice recognition software. It may contain minor errors which are inherent in voice recognition technology. **    Final report electronically signed by DR Luis Felipe Rangel on 3/13/2021 9:22 AM    Results for orders placed during the hospital encounter of 07/22/22    MRI BRAIN W WO CONTRAST    Narrative  PROCEDURE: MRI BRAIN W WO CONTRAST    INDICATION:Malignant neoplasm of overlapping sites of right breast in female, estrogen receptor negative ([facility] [address] Malignant neoplasm of overlapping sites of right breast in female, estrogen receptor negative ([facility] [address] History of lung cancer. Unusual  neurologic symptoms with possible seizure. COMPARISON: MR brain dated 4/19/2021. TECHNIQUE: Multiplanar and multiple spin echo T1 and T2-weighted images were obtained through the brain before and after the administration of intravenous contrast. 15 mL ProHance was injected in the right AC. FINDINGS:  There is moderate temporoparietal predominant volume loss. There is a small area of encephalomalacia in the left frontal lobe, stable compared to prior exam. There are mild scattered focal areas of T2/FLAIR prolongation in the subcortical and deep white  matter elsewhere, also stable compared to prior exam. No intra or extra-axial mass is identified. No focal areas restricted diffusion are present. Following contrast administration, there are no focal areas of abnormal parenchymal or meningeal enhancement identified. There is a small left internal carotid artery flow-void, stable compared to prior exam. The left middle cerebral artery flow-void is attenuated, also stable compared to prior exam. Orbits are unremarkable. There is mild mucosal thickening in the ethmoid  air cells. There is trace fluid in the right mastoid air cells. Impression  1. No evidence of metastatic disease or acute intracranial abnormality. 2. Stable small focal area of encephalomalacia in the left frontal lobe as evidence for chronic infarct. 3. Mild severity chronic microvascular angiopathy. 4. Attenuated flow void at the intracranial segments of the left internal carotid artery and left middle cerebral artery, stable compared to prior exam.          **This report has been created using voice recognition software.  It may contain minor errors which are inherent in voice recognition technology. **      Final report electronically signed by Dr. Deborah Saab MD on 7/22/2022 9:47 AM    No results found for this or any previous visit. Results for orders placed during the hospital encounter of 03/12/21    CT Head WO Contrast    Narrative  CT head without contrast    Comparison:  CT  - CT HEAD WO CONTR  - 05/16/2017 03:19 PM EDT    Findings:  Age-appropriate sulcation. No intracranial mass, midline shift, hydrocephalus, acute infarct or  hemorrhage. There is right ethmoid sinus disease. Mastoid air cells normal.  Orbits unremarkable. No skull fracture    Impression  Impression:  1. Negative for acute intracranial abnormality. 2.  Sinusitis. 3.  Stable exam.    This document has been electronically signed by: Lizet Miranda MD on  03/12/2021 09:40 PM    All CTs at this facility use dose modulation techniques and iterative  reconstructions, and/or weight-based dosing  when appropriate to reduce radiation to a low as reasonably achievable. Cardiac event monitor done 8/3/22:  CONCLUSION:  This is an abnormal event monitor finding, predominantly  normal sinus rhythm, captured heart rate ranging from 53 to 121 beats  per minute. Abnormal for paroxysmal atrial fibrillation and three  episodes of atrial fibrillation noted with predominantly controlled  ventricular rate and basically, the AFib burden will be less than 1%. All atrial fibrillation are autotriggered captures and there are four  autotriggered capture. Out of the four, three of them are atrial  fibrillation. There is no symptom-triggered capture. There is no AV  block. No pause. No ventricular or supraventricular tachycardia. RECOMMENDATION:  Need further clinical correlation. Consider oral  anticoagulation if clinically indicated. Jf Lemons M.D.     Tilt table test done 8/3/22:  HEMODYNAMICS:  Heart rate well maintained throughout the referring provider and available information in the EHR       ASSESSMENT:      Diagnosis Orders   1. Syncope and collapse        2. Seizure-like activity ([facility] [address]           This is a 81-year-old female who presents with episodes of passing out but been ongoing for the last 3 months. She describes her episodes as she can feel dizzy, lightheaded and then passes out. I reviewed the MRI Brain 7/2022, and agree with interpretation, I also reviewed the patient pertinent labs and records in the EHR and from other providers. She had a tilt table test done that showed no concerning findings, however her event monitor showed episodes of atrial fibrillation. She was started on eliquis  and has not passed out since. She reports she had previous history of seizure. She had been on medication, however she stopped the medication and has not had a seizure in the past 25 years ago. MRI brain W/WO contrast done 7/2022 showed No evidence of metastatic disease or acute intracranial abnormality. The patient was counseled about her symptoms and work up recommended. We will arrange for her to undergo neurologic work up for her symptoms, EEG to screen for cortical irritability as well as a carotid ultrasound to evaluate for carotid artery stenosis. After detailed discussion with the patient we agreed on the following plan. Plan    EEG   Carotid US  Vitamin B12, folate  Vitamin B6  Continue following with cardiology re: abnormal event monitor showing a-fib. Call with any new symptoms or concerns.    Follow up as needed    Total time 63 min    Tha Stockton MD

## 2022-10-11 ENCOUNTER — TELEPHONE (OUTPATIENT)
Dept: FAMILY MEDICINE CLINIC | Age: 72
End: 2022-10-11

## 2022-10-11 ENCOUNTER — HOSPITAL ENCOUNTER (OUTPATIENT)
Age: 72
Discharge: HOME | End: 2022-10-11
Payer: MEDICARE

## 2022-10-11 DIAGNOSIS — E55.9 VITAMIN D DEFICIENCY: ICD-10-CM

## 2022-10-11 DIAGNOSIS — E53.8 B12 DEFICIENCY: ICD-10-CM

## 2022-10-11 DIAGNOSIS — E03.9 HYPOTHYROIDISM, UNSPECIFIED TYPE: Primary | ICD-10-CM

## 2022-10-11 DIAGNOSIS — E03.9 HYPOTHYROIDISM, UNSPECIFIED TYPE: ICD-10-CM

## 2022-10-11 LAB
FOLATE: 3.1 NG/ML (ref 4.8–24.2)
T4 FREE: 0.92 NG/DL (ref 0.93–1.76)
TSH SERPL DL<=0.05 MIU/L-ACNC: 1.87 UIU/ML (ref 0.4–4.2)
VITAMIN B-12: 225 PG/ML (ref 211–911)
VITAMIN D 25-HYDROXY: 18 NG/ML (ref 30–100)

## 2022-10-11 PROCEDURE — 82607 VITAMIN B-12: CPT

## 2022-10-11 PROCEDURE — 84443 ASSAY THYROID STIM HORMONE: CPT

## 2022-10-11 PROCEDURE — 82306 VITAMIN D 25 HYDROXY: CPT

## 2022-10-11 PROCEDURE — 36415 COLL VENOUS BLD VENIPUNCTURE: CPT

## 2022-10-11 PROCEDURE — 84439 ASSAY OF FREE THYROXINE: CPT

## 2022-10-11 PROCEDURE — 82746 ASSAY OF FOLIC ACID SERUM: CPT

## 2022-10-11 NOTE — TELEPHONE ENCOUNTER
Pt notified. She is having fatigue and has been sleeping most of her days. She is agreeable to the referral. Referral placed. She is currently taking a Vit D supplement. Will forward message to neurology.

## 2022-10-11 NOTE — TELEPHONE ENCOUNTER
----- Message from Myrl Babinski, APRN - CNP sent at 10/11/2022  3:58 PM EDT -----  Please call pt and see how she is feeling. Her TSH is OK, but her T4 is still low. I would like to refer her to Dr Dominic Miles for evaluation of the thyroid. OK for referral if pt is willing. Her Vit D is low, is she taking the supplement? Please forward the Vit D, Vit B12 and folate results to [facility] J[facility] with neurology as she was the ordering provider for these.  -SAMM

## 2022-10-12 ENCOUNTER — OFFICE VISIT (OUTPATIENT)
Dept: ONCOLOGY | Age: 72
End: 2022-10-12
Payer: MEDICARE

## 2022-10-12 ENCOUNTER — HOSPITAL ENCOUNTER (OUTPATIENT)
Dept: INFUSION THERAPY | Age: 72
Discharge: HOME | End: 2022-10-12
Payer: MEDICARE

## 2022-10-12 VITALS
SYSTOLIC BLOOD PRESSURE: 145 MMHG | DIASTOLIC BLOOD PRESSURE: 63 MMHG | OXYGEN SATURATION: 94 % | HEART RATE: 77 BPM | HEIGHT: 64 IN | WEIGHT: 142.8 LBS | RESPIRATION RATE: 16 BRPM | TEMPERATURE: 97.5 F | BODY MASS INDEX: 24.38 KG/M2

## 2022-10-12 VITALS
RESPIRATION RATE: 16 BRPM | TEMPERATURE: 97.5 F | HEART RATE: 77 BPM | SYSTOLIC BLOOD PRESSURE: 145 MMHG | OXYGEN SATURATION: 94 % | DIASTOLIC BLOOD PRESSURE: 63 MMHG

## 2022-10-12 DIAGNOSIS — C34.31 PRIMARY MALIGNANT NEOPLASM OF RIGHT LOWER LOBE OF LUNG (HCC): Primary | ICD-10-CM

## 2022-10-12 PROCEDURE — 1123F ACP DISCUSS/DSCN MKR DOCD: CPT | Performed by: INTERNAL MEDICINE

## 2022-10-12 PROCEDURE — 99211 OFF/OP EST MAY X REQ PHY/QHP: CPT

## 2022-10-12 PROCEDURE — 99214 OFFICE O/P EST MOD 30 MIN: CPT | Performed by: INTERNAL MEDICINE

## 2022-10-12 ASSESSMENT — ENCOUNTER SYMPTOMS
SHORTNESS OF BREATH: 1
VOMITING: 0
DIARRHEA: 0
BLOOD IN STOOL: 0
NAUSEA: 0
SINUS PAIN: 0
COUGH: 1
TROUBLE SWALLOWING: 0

## 2022-10-12 NOTE — PATIENT INSTRUCTIONS
Reviewed labs and recent medical history. Discussed smoking cessation and reviewed tools available for assistance.   Return to see MD in 3 months
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
## 2022-10-12 NOTE — TELEPHONE ENCOUNTER
Patient needs to take vitamin B12 sublingual supplements, 3000 mcg daily, over the counter. Her vitamin D level is also low=18, please ask her to follow up with her PCP regarding this.   Please have her call the office in 2-3 months for repeat vitamin B12, and vitamin D levels  Kyung Perez, CNP

## 2022-10-13 NOTE — TELEPHONE ENCOUNTER
Noted. Pt needs to increase her Vit D to 50,000 units PO x 1 weekly for 12 weeks. RX pended and can be sent in if pt is agreeable. Have labs completed as directed.  -WS

## 2022-10-14 RX ORDER — ERGOCALCIFEROL 1.25 MG/1
50000 CAPSULE ORAL WEEKLY
Qty: 12 CAPSULE | Refills: 1 | Status: SHIPPED | OUTPATIENT
Start: 2022-10-14

## 2022-10-17 ENCOUNTER — HOSPITAL ENCOUNTER (OUTPATIENT)
Dept: NEUROLOGY | Age: 72
Discharge: HOME | End: 2022-10-17
Payer: MEDICARE

## 2022-10-17 ENCOUNTER — HOSPITAL ENCOUNTER (OUTPATIENT)
Dept: INTERVENTIONAL RADIOLOGY/VASCULAR | Age: 72
Discharge: HOME | End: 2022-10-17
Payer: MEDICARE

## 2022-10-17 DIAGNOSIS — R56.9 SEIZURE-LIKE ACTIVITY (HCC): ICD-10-CM

## 2022-10-17 DIAGNOSIS — R55 SYNCOPE AND COLLAPSE: ICD-10-CM

## 2022-10-17 PROCEDURE — 93880 EXTRACRANIAL BILAT STUDY: CPT

## 2022-10-17 PROCEDURE — 95819 EEG AWAKE AND ASLEEP: CPT | Performed by: PSYCHIATRY & NEUROLOGY

## 2022-10-17 PROCEDURE — 95819 EEG AWAKE AND ASLEEP: CPT

## 2022-10-17 NOTE — PROGRESS NOTES
[address] [facility] Laboratory Technician worksheet       EEG Date: 10/17/2022    Name: Teressa Morse   : 1950   Age: 81 y.o. SEX: female    Room: OP    MRN: [id number]     CSN: [id number]    Ordering Provider: LEOPOLD  EEG Number: [id number] Time of Test:  6351    Hand: Right   Sedation: No    H.V. Done: No  AE PROTOCOL  Photic: Yes    Sleep: yes   Drowsy: Yes   Sleep Deprived: No    Seizures observed: no    Mentality: alert       Clinical History: snycope,  dizzy, lightheaded, body shakes, \"breathing different\", during episode can not comprehend conversations, tired after  Mri 2022  Impression       1. No evidence of metastatic disease or acute intracranial abnormality. 2. Stable small focal area of encephalomalacia in the left frontal lobe as evidence for chronic infarct. 3. Mild severity chronic microvascular angiopathy. 4. Attenuated flow void at the intracranial segments of the left internal carotid artery and left middle cerebral artery, stable compared to prior exam.     Past Medical History:       Diagnosis Date    Anxiety     Arthritis     Breast cancer ([facility] [address] 2004    right mastectomy, chemo and radiation history. Right breast invasive ductal carcinoma    CAD (coronary artery disease)     sees Dr Tosha Jackson of the skin 2004    Cerebral artery occlusion with cerebral infarction [facility])     Chronic UTI     COPD (chronic obstructive pulmonary disease) ([facility] [address]     sees RL Petersen    CVA (cerebral infarction) ,     Depression     DVT of lower extremity (deep venous thrombosis) ([facility] [address] 1976    Facial injury 2005    Fall on greasy ground, striking left periorbital region    History of stroke , ,     Patient relates a stroke with right weakness and speech in ; another in  or  (unsure), both with need to rehabilitation.   Also \"2 mini-strokes\" (?)    History of therapeutic radiation 2005    Right breast    Hx antineoplastic chemo 2005    Hx of blood clots 1977    hip, leg    Hyperlipidemia 2005    Hypertension 2005    Hypothyroidism     IBS (irritable bowel syndrome)     Low HDL (under 40) 2014    Lung cancer [facility])     sees Dr Avilez    Prolonged emergence from general anesthesia     Recurrent UTI (urinary tract infection) 2015    Dr Eduardo Flowers finding difficulty 05/16/2017    work-up in progress         Prior to Admission medications    Medication Sig Start Date End Date Taking? Authorizing Provider   vitamin D (ERGOCALCIFEROL) 1.25 MG (50293 UT) CAPS capsule Take 1 capsule by mouth once a week 10/14/22   NITO Blevins CNP   apixaban (ELIQUIS) 5 MG TABS tablet Take 1 tablet by mouth 2 times daily 9/8/22   Mellisa Hogan MD   aspirin EC 81 MG EC tablet Take 1 tablet by mouth daily 9/8/22   Mellisa Hogan MD   rosuvastatin (CRESTOR) 5 MG tablet Take 1 tablet by mouth nightly 8/16/22   NITO Blevins CNP   levothyroxine (SYNTHROID) 25 MCG tablet Take 1 tablet by mouth in the morning. 8/16/22   NITO Blevins CNP   albuterol sulfate HFA (VENTOLIN HFA) 108 (90 Base) MCG/ACT inhaler Inhale 2 puffs into the lungs 4 times daily as needed for Wheezing 8/16/22   NITO Blevins CNP   vitamin B-12 (CYANOCOBALAMIN) 1000 MCG tablet Take 1,000 mcg by mouth in the morning. Historical Provider, MD   gabapentin (NEURONTIN) 100 MG capsule Take 1 capsule by mouth in the morning and 1 capsule before bedtime. Do all this for 60 doses.  7/20/22 10/12/22  Lakhwinder Alberto MD   ferrous sulfate (IRON 325) 325 (65 Fe) MG tablet Take 1 tablet by mouth 2 times daily (with meals) 8/19/21   RajaniNITO Jade CNP   ondansetron (ZOFRAN-ODT) 4 MG disintegrating tablet Take 1 tablet by mouth every 8 hours as needed for Nausea or Vomiting 8/19/21   Berle Eisenmenger, APRN - CNP   acetaminophen (TYLENOL) 325 MG tablet Take 2 tablets by mouth every 6 hours 8/19/21   Berle Eisenmenger, APRN - CNP   famotidine (PEPCID) 20 MG tablet Take 1 tablet by mouth daily 8/20/21   NITO Can - CNP   Cholecalciferol (VITAMIN D) 25 MCG TABS Take 1 tablet by mouth daily 8/20/21   NITO Can CNP   guaiFENesin (MUCINEX) 600 MG extended release tablet Take 1 tablet by mouth 2 times daily 6/18/21   Juanis White MD   diphenhydrAMINE (BENADRYL) 25 MG tablet Take 25 mg by mouth every 6 hours as needed for Itching    Historical Provider, MD       Technician: Lencho Cartagena 10/17/2022

## 2022-10-19 NOTE — PROCEDURES
EEG REPORT       Patient: Skinny George Age: 81 y.o. MRN: [id number]    Date: 10/17/2022  Referring Provider: NITO Bunch *    History: This routine 30 minute scalp EEG was recorded with video- monitoring for a 81 y.o. Francisco Schmidt female  who presented with encephalopathy. This EEG was performed to evaluate for focal and epileptiform abnormalities. Skinny George     Technical Description: This is a 21 channel digital EEG recording with time-locked video. Electrodes were placed in accordance with the 10-20 International System of Electrode Placement. Single lead EKG monitoring as well as temporal electrodes were included. The patient was not sleep deprived. This recording was obtained during wakefulness. EEG Description: The dominant background activity during maximal recorded wakefulness consisted of bioccipitally dominant 9-10 Hz, 25-35 uV symmetric, regular activity that was reactive to eye opening. During drowsiness, the background rhythm waxed and waned and there were periods of slowing. During stage II sleep symmetric V waves, K complexes, and sleep spindles were seen. There was appropriate diffuse delta activity during slow wave sleep. Photic stimulation - stepwise photic stimulation at 2-30 Hz was performed and there was a biposterior, symmetric, driving response. Hyperventilation - was not preformed. No abnormalities were activated by photic stimulation     The EKG channel demonstrated a normal sinus rhythm. Interpretation  This EEG was normal in wakefulness and sleep. Clinical correlation  This EEG was normal. No focal or epileptiform abnormalities were seen.     Abbey Baldwin MD
11:04:45       T: 10/19/2022 11:08:10     [email or web address]  Job#: [id number]     Doc#: [id number]    CC:

## 2022-10-28 ENCOUNTER — HOSPITAL ENCOUNTER (EMERGENCY)
Age: 72
Discharge: HOME | End: 2022-10-28
Payer: MEDICARE

## 2022-10-28 ENCOUNTER — APPOINTMENT (OUTPATIENT)
Dept: GENERAL RADIOLOGY | Age: 72
End: 2022-10-28
Payer: MEDICARE

## 2022-10-28 VITALS
TEMPERATURE: 97.8 F | RESPIRATION RATE: 18 BRPM | OXYGEN SATURATION: 97 % | DIASTOLIC BLOOD PRESSURE: 53 MMHG | BODY MASS INDEX: 24.37 KG/M2 | WEIGHT: 142 LBS | SYSTOLIC BLOOD PRESSURE: 158 MMHG | HEART RATE: 69 BPM

## 2022-10-28 DIAGNOSIS — S43.402A SPRAIN OF LEFT SHOULDER, UNSPECIFIED SHOULDER SPRAIN TYPE, INITIAL ENCOUNTER: Primary | ICD-10-CM

## 2022-10-28 PROCEDURE — 73030 X-RAY EXAM OF SHOULDER: CPT

## 2022-10-28 PROCEDURE — 99283 EMERGENCY DEPT VISIT LOW MDM: CPT

## 2022-10-28 ASSESSMENT — PAIN - FUNCTIONAL ASSESSMENT: PAIN_FUNCTIONAL_ASSESSMENT: 0-10

## 2022-10-28 ASSESSMENT — PAIN DESCRIPTION - FREQUENCY: FREQUENCY: CONTINUOUS

## 2022-10-28 ASSESSMENT — PAIN DESCRIPTION - DESCRIPTORS: DESCRIPTORS: ACHING

## 2022-10-28 ASSESSMENT — ENCOUNTER SYMPTOMS
SHORTNESS OF BREATH: 0
COLOR CHANGE: 0

## 2022-10-28 ASSESSMENT — PAIN SCALES - GENERAL: PAINLEVEL_OUTOF10: 8

## 2022-10-28 ASSESSMENT — PAIN DESCRIPTION - PAIN TYPE: TYPE: ACUTE PAIN

## 2022-10-28 ASSESSMENT — PAIN DESCRIPTION - LOCATION: LOCATION: SHOULDER

## 2022-10-28 ASSESSMENT — PAIN DESCRIPTION - ORIENTATION: ORIENTATION: LEFT
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
## 2022-11-16 ENCOUNTER — OFFICE VISIT (OUTPATIENT)
Dept: FAMILY MEDICINE CLINIC | Age: 72
End: 2022-11-16
Payer: MEDICARE

## 2022-11-16 ENCOUNTER — HOSPITAL ENCOUNTER (OUTPATIENT)
Age: 72
Discharge: HOME | End: 2022-11-16
Payer: MEDICARE

## 2022-11-16 VITALS
HEART RATE: 68 BPM | BODY MASS INDEX: 24.75 KG/M2 | SYSTOLIC BLOOD PRESSURE: 142 MMHG | DIASTOLIC BLOOD PRESSURE: 72 MMHG | WEIGHT: 145 LBS | RESPIRATION RATE: 16 BRPM | HEIGHT: 64 IN

## 2022-11-16 DIAGNOSIS — Z72.0 TOBACCO ABUSE: ICD-10-CM

## 2022-11-16 DIAGNOSIS — N18.30 STAGE 3 CHRONIC KIDNEY DISEASE, UNSPECIFIED WHETHER STAGE 3A OR 3B CKD (HCC): ICD-10-CM

## 2022-11-16 DIAGNOSIS — I10 ESSENTIAL HYPERTENSION: ICD-10-CM

## 2022-11-16 DIAGNOSIS — L29.9 ITCHING OF BOTH HANDS: ICD-10-CM

## 2022-11-16 DIAGNOSIS — Z01.00 EYE EXAM, ROUTINE: ICD-10-CM

## 2022-11-16 DIAGNOSIS — E78.00 PURE HYPERCHOLESTEROLEMIA: ICD-10-CM

## 2022-11-16 DIAGNOSIS — I50.22 CHRONIC SYSTOLIC (CONGESTIVE) HEART FAILURE (HCC): ICD-10-CM

## 2022-11-16 DIAGNOSIS — Z00.00 MEDICARE ANNUAL WELLNESS VISIT, SUBSEQUENT: Primary | ICD-10-CM

## 2022-11-16 DIAGNOSIS — E89.0 POSTOPERATIVE HYPOTHYROIDISM: ICD-10-CM

## 2022-11-16 DIAGNOSIS — E55.9 VITAMIN D DEFICIENCY: ICD-10-CM

## 2022-11-16 DIAGNOSIS — E03.9 HYPOTHYROIDISM, UNSPECIFIED TYPE: ICD-10-CM

## 2022-11-16 PROBLEM — R91.1 SOLITARY PULMONARY NODULE ON LUNG CT: Status: ACTIVE | Noted: 2021-03-13

## 2022-11-16 PROBLEM — M54.16 LUMBAR RADICULITIS: Status: ACTIVE | Noted: 2018-04-10

## 2022-11-16 PROBLEM — M48.061 SPINAL STENOSIS OF LUMBAR REGION WITHOUT NEUROGENIC CLAUDICATION: Status: ACTIVE | Noted: 2018-04-10

## 2022-11-16 LAB
T3 FREE: 2.69 PG/ML (ref 2.02–4.43)
T4 FREE: 0.96 NG/DL (ref 0.93–1.76)
TSH SERPL DL<=0.05 MIU/L-ACNC: 2.53 UIU/ML (ref 0.4–4.2)

## 2022-11-16 PROCEDURE — 1123F ACP DISCUSS/DSCN MKR DOCD: CPT | Performed by: NURSE PRACTITIONER

## 2022-11-16 PROCEDURE — 84439 ASSAY OF FREE THYROXINE: CPT

## 2022-11-16 PROCEDURE — G0439 PPPS, SUBSEQ VISIT: HCPCS | Performed by: NURSE PRACTITIONER

## 2022-11-16 PROCEDURE — 3074F SYST BP LT 130 MM HG: CPT | Performed by: NURSE PRACTITIONER

## 2022-11-16 PROCEDURE — 84481 FREE ASSAY (FT-3): CPT

## 2022-11-16 PROCEDURE — 3078F DIAST BP <80 MM HG: CPT | Performed by: NURSE PRACTITIONER

## 2022-11-16 PROCEDURE — 36415 COLL VENOUS BLD VENIPUNCTURE: CPT

## 2022-11-16 PROCEDURE — 84443 ASSAY THYROID STIM HORMONE: CPT

## 2022-11-16 RX ORDER — HYDROXYZINE HYDROCHLORIDE 25 MG/1
25 TABLET, FILM COATED ORAL EVERY 8 HOURS PRN
Qty: 30 TABLET | Refills: 0 | Status: CANCELLED | OUTPATIENT
Start: 2022-11-16 | End: 2022-11-26

## 2022-11-16 RX ORDER — HYDROXYZINE HYDROCHLORIDE 10 MG/1
10-20 TABLET, FILM COATED ORAL EVERY 6 HOURS PRN
Qty: 40 TABLET | Refills: 0 | Status: SHIPPED | OUTPATIENT
Start: 2022-11-16

## 2022-11-16 ASSESSMENT — PATIENT HEALTH QUESTIONNAIRE - PHQ9
SUM OF ALL RESPONSES TO PHQ QUESTIONS 1-9: 2
SUM OF ALL RESPONSES TO PHQ9 QUESTIONS 1 & 2: 2
6. FEELING BAD ABOUT YOURSELF - OR THAT YOU ARE A FAILURE OR HAVE LET YOURSELF OR YOUR FAMILY DOWN: 0
SUM OF ALL RESPONSES TO PHQ QUESTIONS 1-9: 2
SUM OF ALL RESPONSES TO PHQ QUESTIONS 1-9: 2
7. TROUBLE CONCENTRATING ON THINGS, SUCH AS READING THE NEWSPAPER OR WATCHING TELEVISION: 0
4. FEELING TIRED OR HAVING LITTLE ENERGY: 0
10. IF YOU CHECKED OFF ANY PROBLEMS, HOW DIFFICULT HAVE THESE PROBLEMS MADE IT FOR YOU TO DO YOUR WORK, TAKE CARE OF THINGS AT HOME, OR GET ALONG WITH OTHER PEOPLE: 0
1. LITTLE INTEREST OR PLEASURE IN DOING THINGS: 1
2. FEELING DOWN, DEPRESSED OR HOPELESS: 1
8. MOVING OR SPEAKING SO SLOWLY THAT OTHER PEOPLE COULD HAVE NOTICED. OR THE OPPOSITE, BEING SO FIGETY OR RESTLESS THAT YOU HAVE BEEN MOVING AROUND A LOT MORE THAN USUAL: 0
SUM OF ALL RESPONSES TO PHQ QUESTIONS 1-9: 2
9. THOUGHTS THAT YOU WOULD BE BETTER OFF DEAD, OR OF HURTING YOURSELF: 0
3. TROUBLE FALLING OR STAYING ASLEEP: 0
5. POOR APPETITE OR OVEREATING: 0

## 2022-11-16 ASSESSMENT — LIFESTYLE VARIABLES: HOW OFTEN DO YOU HAVE A DRINK CONTAINING ALCOHOL: NEVER

## 2022-11-16 NOTE — PATIENT INSTRUCTIONS
Personalized Preventive Plan for [place] - 11/16/2022  Medicare offers a range of preventive health benefits. Some of the tests and screenings are paid in full while other may be subject to a deductible, co-insurance, and/or copay. Some of these benefits include a comprehensive review of your medical history including lifestyle, illnesses that may run in your family, and various assessments and screenings as appropriate. After reviewing your medical record and screening and assessments performed today your provider may have ordered immunizations, labs, imaging, and/or referrals for you. A list of these orders (if applicable) as well as your Preventive Care list are included within your After Visit Summary for your review. Other Preventive Recommendations:    A preventive eye exam performed by an eye specialist is recommended every 1-2 years to screen for glaucoma; cataracts, macular degeneration, and other eye disorders. A preventive dental visit is recommended every 6 months. Try to get at least 150 minutes of exercise per week or 10,000 steps per day on a pedometer . Order or download the FREE \"Exercise & Physical Activity: Your Everyday Guide\" from [employer]. Call 7-214.305.1100 or search [employer] online. You need 1734-3360 mg of calcium and 9236-0279 IU of vitamin D per day. It is possible to meet your calcium requirement with diet alone, but a vitamin D supplement is usually necessary to meet this goal.  When exposed to the sun, use a sunscreen that protects against both UVA and UVB radiation with an SPF of 30 or greater. Reapply every 2 to 3 hours or after sweating, drying off with a towel, or swimming. Always wear a seat belt when traveling in a car. Always wear a helmet when riding a bicycle or motorcycle.

## 2022-11-16 NOTE — PROGRESS NOTES
[address] [facility] [address]  Dept: [phone]  Dept Fax: [phone]  Loc: [phone]     2022     Court Potts (:  1950) is a 81 y.o. female, here for evaluation of the following medical concerns:    Chief Complaint   Patient presents with    Medicare AWV    3 Month Follow-Up     Worsening of itching on hands and shoulders     Discuss Labs       Pt presents to the office today for AWV and bilateral hand itching and follow up on chronic conditions. Today she saw Dr Jackie Haley for her fatigue and endocrine. Has been dealing with her hands itching for many years. Hands feel like they area asleep. She is dealing with neuropathy as well and this contributes to the itching. She is taking gabapentin for the nerve pain, but the itching seems to be getting worse. Treatment Adherence:   Medication compliance:  compliant all of the time  Diet compliance:  compliant most of the time  Weight trend: stable  Current exercise: no regular exercise  Barriers: impairment:  physical: cancer and mobility issues. Tobacco history: She  reports that she has been smoking cigarettes. She started smoking about 55 years ago. She has a 48.00 pack-year smoking history. She has never used smokeless tobacco.     Hypertension:  Home blood pressure monitoring: No.  She is adherent to a low sodium diet. Patient denies chest pain, headache, lightheadedness, and peripheral edema. Antihypertensive medication side effects: no medication side effects noted. Use of agents associated with hypertension: none. Hyperlipidemia:  No new myalgias or GI upset on rosuvastatin (Crestor).        Lab Results   Component Value Date    LABA1C 5.2 2020    LABA1C 5.5 10/12/2012     Lab Results   Component Value Date    CREATININE 1.0 2022     Lab Results   Component Value Date    ALT 7 (L) 2022    AST 13 2022     Lab Results   Component Value Date    CHOL 227 (H) 07/20/2022    TRIG 151 07/20/2022    HDL 45 07/20/2022    LDLCALC 152 07/20/2022        Hypothyroidism: Recent symptoms: fatigue. She denies weight gain, weight loss, cold intolerance, and heat intolerance. Patient is  taking her medication consistently on an empty stomach. No results found for: [facility]  Lab Results   Component Value Date    TSH 2.530 11/16/2022    TSH 1.870 10/11/2022    TSH 3.490 07/20/2022       Review of Systems   Constitutional:  Positive for fatigue. Negative for chills and fever. HENT:  Negative for congestion, facial swelling, sinus pain, sore throat and trouble swallowing. Eyes:  Negative for pain and visual disturbance. Respiratory:  Positive for shortness of breath. Negative for cough and wheezing. Cardiovascular:  Negative for chest pain, palpitations and leg swelling. Gastrointestinal:  Negative for abdominal pain, diarrhea, nausea and vomiting. Musculoskeletal:  Positive for myalgias. Negative for back pain, gait problem and neck pain. Skin:  Negative for color change and rash. Neurological:  Negative for dizziness, weakness and headaches. Psychiatric/Behavioral:  Negative for agitation and sleep disturbance. The patient is not nervous/anxious. Prior to Visit Medications    Medication Sig Taking?  Authorizing Provider   hydrOXYzine HCl (ATARAX) 10 MG tablet Take 1-2 tablets by mouth every 6 hours as needed for Itching Yes NITO Amin CNP   diclofenac sodium (VOLTAREN) 1 % GEL Apply 2 g topically 2 times daily Yes NITO Madera CNP   vitamin D (ERGOCALCIFEROL) 1.25 MG (81022 UT) CAPS capsule Take 1 capsule by mouth once a week Yes NITO Amin CNP   apixaban (ELIQUIS) 5 MG TABS tablet Take 1 tablet by mouth 2 times daily Yes Humble Shah MD   aspirin EC 81 MG EC tablet Take 1 tablet by mouth daily Yes Humble Shah MD   rosuvastatin (CRESTOR) 5 MG tablet Take 1 tablet by mouth nightly Yes Sabra Ag NITO Noriega CNP   levothyroxine (SYNTHROID) 25 MCG tablet Take 1 tablet by mouth in the morning. Yes Bernadine PuffNITO CNP   albuterol sulfate HFA (VENTOLIN HFA) 108 (90 Base) MCG/ACT inhaler Inhale 2 puffs into the lungs 4 times daily as needed for Wheezing Yes Bernadine PuffNITO CNP   vitamin B-12 (CYANOCOBALAMIN) 1000 MCG tablet Take 1,000 mcg by mouth in the morning. Yes Historical Provider, MD   gabapentin (NEURONTIN) 100 MG capsule Take 1 capsule by mouth in the morning and 1 capsule before bedtime. Do all this for 60 doses.  Yes Kait Hernandez MD   ferrous sulfate (IRON 325) 325 (65 Fe) MG tablet Take 1 tablet by mouth 2 times daily (with meals) Yes NITO Clayton CNP   acetaminophen (TYLENOL) 325 MG tablet Take 2 tablets by mouth every 6 hours Yes NITO Clayton CNP   famotidine (PEPCID) 20 MG tablet Take 1 tablet by mouth daily Yes NITO Clayton CNP   Cholecalciferol (VITAMIN D) 25 MCG TABS Take 1 tablet by mouth daily Yes NITO Clayton CNP   diphenhydrAMINE (BENADRYL) 25 MG tablet Take 25 mg by mouth every 6 hours as needed for Itching Yes Historical Provider, MD   ondansetron (ZOFRAN-ODT) 4 MG disintegrating tablet Take 1 tablet by mouth every 8 hours as needed for Nausea or Vomiting  Patient not taking: Reported on 11/16/2022  NITO Mcgee CNP   guaiFENesin (MUCINEX) 600 MG extended release tablet Take 1 tablet by mouth 2 times daily  Patient not taking: Reported on 11/16/2022  Juan Miguel Garcia MD        Social History     Tobacco Use    Smoking status: Every Day     Packs/day: 1.00     Years: 48.00     Pack years: 48.00     Types: Cigarettes     Start date: 11/13/1967    Smokeless tobacco: Never    Tobacco comments:     Currently at 1 pk/day-declines counseling 8/3/22   Substance Use Topics    Alcohol use: No     Alcohol/week: 0.0 standard drinks        Vitals:    11/16/22 1454   BP: (!) 142/72   Pulse: 68   Resp: 16   Weight: 145 lb (65.8 kg)   Height: 5' 4\" (1.626 m)     Estimated body mass index is 24.89 kg/m² as calculated from the following:    Height as of this encounter: 5' 4\" (1.626 m). Weight as of this encounter: 145 lb (65.8 kg). Physical Exam  Vitals reviewed. Constitutional:       General: She is not in acute distress. Appearance: Normal appearance. She is well-developed. HENT:      Head: Normocephalic and atraumatic. Right Ear: Hearing, tympanic membrane, ear canal and external ear normal.      Left Ear: Hearing, tympanic membrane, ear canal and external ear normal.      Nose: Nose normal. No nasal tenderness. Mouth/Throat:      Lips: Pink. Mouth: Mucous membranes are moist. No oral lesions. Pharynx: Oropharynx is clear. Uvula midline. Eyes:      General:         Right eye: No discharge. Left eye: No discharge. Conjunctiva/sclera: Conjunctivae normal.   Neck:      Vascular: No carotid bruit. Trachea: No tracheal deviation. Cardiovascular:      Rate and Rhythm: Normal rate and regular rhythm. Heart sounds: Normal heart sounds. No murmur heard. Pulmonary:      Effort: Pulmonary effort is normal. No respiratory distress. Breath sounds: Rhonchi present. Abdominal:      General: Bowel sounds are normal.      Palpations: Abdomen is soft. Tenderness: There is no abdominal tenderness. Musculoskeletal:      Cervical back: Full passive range of motion without pain and neck supple. Lymphadenopathy:      Head:      Right side of head: No submental, submandibular, tonsillar, preauricular, posterior auricular or occipital adenopathy. Left side of head: No submental, submandibular, tonsillar, preauricular, posterior auricular or occipital adenopathy. Cervical: No cervical adenopathy. Skin:     General: Skin is warm and dry. Findings: No rash. Neurological:      General: No focal deficit present.       Mental Status: She is alert and oriented to person, place, and time. Coordination: Coordination normal.   Psychiatric:         Mood and Affect: Mood normal.         Behavior: Behavior normal.         Thought Content: Thought content normal.         Judgment: Judgment normal.       ASSESSMENT/PLAN:  1. Medicare annual wellness visit, subsequent    2. Hypothyroidism, unspecified type    3. Vitamin D deficiency    4. Chronic systolic (congestive) heart failure (HCC)    5. Stage 3 chronic kidney disease, unspecified whether stage 3a or 3b CKD ([facility] [address]    6. Essential hypertension    7. Pure hypercholesterolemia    8. Tobacco abuse    9. Itching of both hands  - hydrOXYzine HCl (ATARAX) 10 MG tablet; Take 1-2 tablets by mouth every 6 hours as needed for Itching  Dispense: 40 tablet; Refill: 0    10. Eye exam, routine  - Susie Fink O.D., Optometry, [place]    - Follow up with endocrinology, oncology, cardiology, neurology as planned. - Diabetic eye exam due, referral to Dr Malvin Howell today  - Will add atarax as needed for hand itching. Follow up with neurology as well. - Call office with any questions or concerns, or if symptoms are getting worse or changing      Return in 6 months (on 5/16/2023), or if symptoms worsen or fail to improve, for Medicare Annual Wellness Visit in 1 year, Routine follow up. Patient given educational materials - see patient instructions. Discussed use, benefit, and side effects of prescribed medications. All patient questions answered. Pt voiced understanding. Reviewed health maintenance. An electronic signature was used to authenticate this note.     --NITO Roman - CNP on 11/17/2022 at 7:57 AM

## 2022-11-16 NOTE — PROGRESS NOTES
Medicare Annual Wellness Visit    Wes Ferguson is here for Medicare AWV, 3 Month Follow-Up (Worsening of itching on hands and shoulders ), and Discuss Labs    Assessment & Plan   Medicare annual wellness visit, subsequent  Hypothyroidism, unspecified type  Vitamin D deficiency  Chronic systolic (congestive) heart failure (HCC)  Stage 3 chronic kidney disease, unspecified whether stage 3a or 3b CKD ([facility] [address]  Essential hypertension  Pure hypercholesterolemia  Tobacco abuse  Itching of both hands  -     hydrOXYzine HCl (ATARAX) 10 MG tablet; Take 1-2 tablets by mouth every 6 hours as needed for Itching, Disp-40 tablet, R-0Normal  Eye exam, routine  -     Miles Tellez OABBIE, Optometry, [place]    Recommendations for Preventive Services Due: see orders and patient instructions/AVS.  Recommended screening schedule for the next 5-10 years is provided to the patient in written form: see Patient Instructions/AVS.     Return in 6 months (on 5/16/2023), or if symptoms worsen or fail to improve, for Medicare Annual Wellness Visit in 1 year, Routine follow up. Subjective       Patient's complete Health Risk Assessment and screening values have been reviewed and are found in Flowsheets. The following problems were reviewed today and where indicated follow up appointments were made and/or referrals ordered.     Positive Risk Factor Screenings with Interventions:       Tobacco Use:  Tobacco Use: High Risk    Smoking Tobacco Use: Every Day    Smokeless Tobacco Use: Never    Passive Exposure: Not on file     E-cigarette/Vaping       Questions Responses    E-cigarette/Vaping Use Never User    Start Date     Passive Exposure     Quit Date     Counseling Given     Comments           Substance Use - Tobacco Interventions:  patient is not ready to work toward tobacco cessation at this time         General Health and ACP:  General  In general, how would you say your health is?: Fair  In the past 7 days, have you experienced any of the following: New or Increased Pain, New or Increased Fatigue, Loneliness, Social Isolation, Stress or Anger?: No  Do you get the social and emotional support that you need?: Yes  Do you have a Living Will?: (!) No    Advance Directives       Power of [address] Will ACP-Advance Directive ACP-Power of     Not on File Filed on 09/30/13 Filed Not on File        General Health Risk Interventions:  No Living Will: ACP documents already completed- patient asked to provide copy to the office    Health Habits/Nutrition:  Physical Activity: Inactive    Days of Exercise per Week: 0 days    Minutes of Exercise per Session: 0 min     Have you lost any weight without trying in the past 3 months?: No  Body mass index: 24.89  Have you seen the dentist within the past year?: N/A - wear dentures  Health Habits/Nutrition Interventions:  Inadequate physical activity:  patient is not ready to increase his/her physical activity level at this time    Hearing/Vision:  Do you or your family notice any trouble with your hearing that hasn't been managed with hearing aids?: (!) Yes  Do you have difficulty driving, watching TV, or doing any of your daily activities because of your eyesight?: (!) Yes  Have you had an eye exam within the past year?: (!) No  No results found. Hearing/Vision Interventions:  Hearing concerns:  patient declines any further evaluation/treatment for hearing issues  Vision concerns:  ophthalmology/optometry referral provided            Objective   Vitals:    11/16/22 1454   BP: (!) 142/72   Pulse: 68   Resp: 16   Weight: 145 lb (65.8 kg)   Height: 5' 4\" (1.626 m)      Body mass index is 24.89 kg/m². Allergies   Allergen Reactions    Cipro Xr     Vicodin [Hydrocodone-Acetaminophen]      Weird dreams       Prior to Visit Medications    Medication Sig Taking?  Authorizing Provider   hydrOXYzine HCl (ATARAX) 10 MG tablet Take 1-2 tablets by mouth every 6 hours as needed for Itching Yes Zulema Alexis Shutler, APRN - CNP   diclofenac sodium (VOLTAREN) 1 % GEL Apply 2 g topically 2 times daily Yes NITO Madera CNP   vitamin D (ERGOCALCIFEROL) 1.25 MG (68792 UT) CAPS capsule Take 1 capsule by mouth once a week Yes NITO Padron CNP   apixaban (ELIQUIS) 5 MG TABS tablet Take 1 tablet by mouth 2 times daily Yes Elly Cottrell MD   aspirin EC 81 MG EC tablet Take 1 tablet by mouth daily Yes Elly Cottrell MD   rosuvastatin (CRESTOR) 5 MG tablet Take 1 tablet by mouth nightly Yes NITO Padron CNP   levothyroxine (SYNTHROID) 25 MCG tablet Take 1 tablet by mouth in the morning. Yes NITO Padron CNP   albuterol sulfate HFA (VENTOLIN HFA) 108 (90 Base) MCG/ACT inhaler Inhale 2 puffs into the lungs 4 times daily as needed for Wheezing Yes NITO Padron CNP   vitamin B-12 (CYANOCOBALAMIN) 1000 MCG tablet Take 1,000 mcg by mouth in the morning. Yes Historical Provider, MD   gabapentin (NEURONTIN) 100 MG capsule Take 1 capsule by mouth in the morning and 1 capsule before bedtime. Do all this for 60 doses.  Yes Jonathon Hoang MD   ferrous sulfate (IRON 325) 325 (65 Fe) MG tablet Take 1 tablet by mouth 2 times daily (with meals) Yes NITO Clayton CNP   acetaminophen (TYLENOL) 325 MG tablet Take 2 tablets by mouth every 6 hours Yes NITO Clayton CNP   famotidine (PEPCID) 20 MG tablet Take 1 tablet by mouth daily Yes NITO Clayton CNP   Cholecalciferol (VITAMIN D) 25 MCG TABS Take 1 tablet by mouth daily Yes NITO Clayton CNP   diphenhydrAMINE (BENADRYL) 25 MG tablet Take 25 mg by mouth every 6 hours as needed for Itching Yes Historical Provider, MD   ondansetron (ZOFRAN-ODT) 4 MG disintegrating tablet Take 1 tablet by mouth every 8 hours as needed for Nausea or Vomiting  Patient not taking: Reported on 11/16/2022  NITO Clayton CNP   guaiFENesin (MUCINEX) 600 MG extended release tablet Take 1 tablet by mouth 2 times daily  Patient not taking: Reported on 11/16/2022  Flako Cuellar MD       [facility] (Including outside providers/suppliers regularly involved in providing care):   Patient Care Team:  NITO Wilkinson CNP as PCP - General (Family Nurse Practitioner)  NITO Wilkinson CNP as PCP - [facility]  Darla Paget, MD as Consulting Physician (Pulmonary Disease)  Jefferson Shah RN as Nurse Navigator (Oncology)  Homer Pope MD as Cardiologist (Cardiology)  Jasmine Moe RN as Ambulatory Care Manager     Reviewed and updated this visit:  Tobacco  Allergies  Meds  Problems  Med Hx  Surg Hx  Soc Hx  Fam Hx          Electronically signed by NITO Marquez CNP on 11/17/2022 at 7:59 AM

## 2022-11-17 ASSESSMENT — ENCOUNTER SYMPTOMS
SHORTNESS OF BREATH: 1
COLOR CHANGE: 0
BACK PAIN: 0
NAUSEA: 0
SORE THROAT: 0
EYE PAIN: 0
ABDOMINAL PAIN: 0
TROUBLE SWALLOWING: 0
WHEEZING: 0
COUGH: 0
VOMITING: 0
FACIAL SWELLING: 0
SINUS PAIN: 0
DIARRHEA: 0

## 2022-11-21 ENCOUNTER — CARE COORDINATION (OUTPATIENT)
Dept: CARE COORDINATION | Age: 72
End: 2022-11-21

## 2022-11-21 ASSESSMENT — ENCOUNTER SYMPTOMS: DYSPNEA ASSOCIATED WITH: EXERTION

## 2022-11-21 NOTE — CARE COORDINATION
Sergio Jiang have enrolled Christina Dowell into Care Coordination to provide additional support and education per [facility] eligible for enrollment. Please approve Plan of Care using smart phrase . Ccplanofcare. Thank you!
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
## 2022-11-28 ENCOUNTER — CARE COORDINATION (OUTPATIENT)
Dept: CARE COORDINATION | Age: 72
End: 2022-11-28

## 2022-11-28 ASSESSMENT — ENCOUNTER SYMPTOMS: DYSPNEA ASSOCIATED WITH: EXERTION
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
## 2022-12-02 NOTE — PLAN OF CARE
12/02/22 1503   Team Meeting   Meeting Type Daily Rounds   Initial Conference Date 12/02/22   Next Conference Date 12/05/22   Team Members Present   Team Members Present Physician;Nurse;;; Occupational Therapist   Physician Team Member Dr Wolfgang He Management Team Member Erlinda Work Team Member Puja   OT Team Member Kareen Bradshaw   Patient/Family Present   Patient Present No   Patient's Family Present No     Pleasant, cooperative, deny s/s, d/c today at 11:30 Problem: Nutrition  Goal: Optimal nutrition therapy  Outcome: Ongoing   Patient vomited with with AM meds

## 2022-12-06 ENCOUNTER — CARE COORDINATION (OUTPATIENT)
Dept: CARE COORDINATION | Age: 72
End: 2022-12-06

## 2022-12-06 NOTE — CARE COORDINATION
Attempted to reach patient for continued Care Coordination follow up and education. Patient was unavailable at the time of my call, and mailbox is full.

## 2022-12-13 ENCOUNTER — CARE COORDINATION (OUTPATIENT)
Dept: CARE COORDINATION | Age: 72
End: 2022-12-13

## 2022-12-13 DIAGNOSIS — L29.9 ITCHING OF BOTH HANDS: ICD-10-CM

## 2022-12-13 RX ORDER — HYDROXYZINE HYDROCHLORIDE 10 MG/1
10-20 TABLET, FILM COATED ORAL EVERY 6 HOURS PRN
Qty: 40 TABLET | Refills: 0 | Status: SHIPPED | OUTPATIENT
Start: 2022-12-13

## 2022-12-13 ASSESSMENT — ENCOUNTER SYMPTOMS: DYSPNEA ASSOCIATED WITH: EXERTION

## 2022-12-13 NOTE — CARE COORDINATION
Remote Patient Monitoring Enrollment Note      Date/Time:  12/13/2022 1:13 PM    Offered patient enrollment in the [facility] Remote Patient Monitoring (RPM) program for in home monitoring for CHF and COPD. Patient accepted RPM services. Patient will be monitoring the following daily:  blood pressure reading  pulse ox reading  survey response  weight    ACM reviewed the information below with patient:    Emergency Contact (name and contact number): Link [facility] [phone]    [x] A member from the care coordination team will reach out to notify the patient once the RPM kit is ordered. [x] Once the kit is delivered, the [facility] team will contact the patient after [employer] deliver to assist with set up. [x] Determined BP cuff size: regular (9.05\"-15.74\")      [] Determined weight scale: regular (<330lbs)                                                 [] Hours of ACM monitoring - Monday-Friday [id number]                         All questions about RPM program answered at this time.

## 2022-12-13 NOTE — PROGRESS NOTES
Refill for Atarax sent to pharmacy per pt request.  Will also refer to Dr Charlie Belle for further evaluation of hand itching. Please fax referral to that office. Jessica Schrader RN will update pt about meds and referral, we do not need to call pt unless more info is needed.  -WS    Orders Placed This Encounter    AFL - Heidi Cordova MD, Dermatology, [facility] [[email or web address]] [address]     Referral Priority:   Routine     Referral Type:   Eval and Treat     Referral Reason:   Specialty Services Required     Referred to Provider:   Justina Allen MD     Requested Specialty:   Dermatology     Number of Visits Requested:   1    hydrOXYzine HCl (ATARAX) 10 MG tablet     Sig: Take 1-2 tablets by mouth every 6 hours as needed for Itching     Dispense:  40 tablet     Refill:  0

## 2022-12-13 NOTE — CARE COORDINATION
Ralph Light continues to have the itching on her hands and back. No rash present, no redness. States just itching and very very dry skin. She is asking for Atarax to be refilled and called into [facility] on [address] and states she is also willing to see dermatology if PCP recommends. She states anyone you prefer is fine. Please advise. Thank you!

## 2022-12-13 NOTE — TELEPHONE ENCOUNTER
Noted.  Casandra Osuna will be sent and referral to Dr Jadiel Cavanaugh will be sent also. That office will call for appt.  -WS

## 2022-12-13 NOTE — CARE COORDINATION
Ambulatory Care Coordination Note  2022    ACC: Gogo Ghosh, RN    Spoke with Kylie Mitchell for continued Care Coordination followup  Continues to have itching on hands and back  Lots of dry skin present  Atarax is working and would like refill  Agreeable to Dermatology referral if PCP recommends  Discussed CHF initiative  Discussed RPM and in agreement to enroll    Plan  Ask PCP for refill of Atarax  Ask for derm referral  Enroll into RPM  Reinforce CHF initiative for [place]  Heart Failure Education outreach Date/Time: 2022 1:12 PM    Ambulatory Care Manager (ACM) contacted the patient by telephone to perform Ambulatory Care Coordination. Verified name and  with patient as identifiers. Provided introduction to self, and explanation of the Ambulatory Care Manager's role. ACM reviewed that a Health Healthy tips for the Holiday packet has been sent to [employer]. ACM reviewed CHF zones, daily weights, fluid restriction, the importance of low sodium diet, and healthy tips packet with the patient. Instructed patient to call their PCP if they have a weight gain of 3 lbs in 2 days or 5 lbs in a week. Patient reminded that there is a physician on call 24 hours a day / 7 days a week should the patient have questions or concerns. The patient verbalized understanding. Offered patient enrollment in the Remote Patient Monitoring (RPM) program for in-home monitoring: Yes, patient enrolled. Lab Results       None            Care Coordination Interventions    Referral from Primary Care Provider: No  Suggested Interventions and Community Resources  Home Health Services: Declined  Medication Assistance Program: Not Started  Occupational Therapy: Not Started  Palliative Care: Not Started  Physical Therapy: Not Started  Registered Dietician: Not Started  Senior Services: Modesta Lincoln Work: Not Started  Transportation Support: Not Started  Zone Management Tools:  In Process          Goals Addressed None         Prior to Admission medications    Medication Sig Start Date End Date Taking? Authorizing Provider   hydrOXYzine HCl (ATARAX) 10 MG tablet Take 1-2 tablets by mouth every 6 hours as needed for Itching 11/16/22  Yes NITO Saleem CNP   diclofenac sodium (VOLTAREN) 1 % GEL Apply 2 g topically 2 times daily 10/28/22  Yes NITO Pierce CNP   vitamin D (ERGOCALCIFEROL) 1.25 MG (13415 UT) CAPS capsule Take 1 capsule by mouth once a week 10/14/22  Yes NITO Saleem CNP   apixaban (ELIQUIS) 5 MG TABS tablet Take 1 tablet by mouth 2 times daily 9/8/22  Yes Chiqui Dangelo MD   aspirin EC 81 MG EC tablet Take 1 tablet by mouth daily 9/8/22  Yes Chiqui Dangelo MD   rosuvastatin (CRESTOR) 5 MG tablet Take 1 tablet by mouth nightly 8/16/22  Yes NITO Saleem CNP   levothyroxine (SYNTHROID) 25 MCG tablet Take 1 tablet by mouth in the morning. 8/16/22  Yes NITO Saleem CNP   albuterol sulfate HFA (VENTOLIN HFA) 108 (90 Base) MCG/ACT inhaler Inhale 2 puffs into the lungs 4 times daily as needed for Wheezing 8/16/22  Yes NITO Saleem CNP   vitamin B-12 (CYANOCOBALAMIN) 1000 MCG tablet Take 1,000 mcg by mouth in the morning.    Yes Historical Provider, MD   ferrous sulfate (IRON 325) 325 (65 Fe) MG tablet Take 1 tablet by mouth 2 times daily (with meals) 8/19/21  Yes NITO Clayton CNP   ondansetron (ZOFRAN-ODT) 4 MG disintegrating tablet Take 1 tablet by mouth every 8 hours as needed for Nausea or Vomiting 8/19/21  Yes NITO Clayton CNP   acetaminophen (TYLENOL) 325 MG tablet Take 2 tablets by mouth every 6 hours 8/19/21  Yes NITO Clayton CNP   famotidine (PEPCID) 20 MG tablet Take 1 tablet by mouth daily 8/20/21  Yes NITO Clayton CNP   Cholecalciferol (VITAMIN D) 25 MCG TABS Take 1 tablet by mouth daily 8/20/21  Yes Rajani L NITO Davila - CNP   guaiFENesin (MUCINEX) 600 MG extended release tablet Take 1 tablet by mouth 2 times daily 6/18/21  Yes Luigi Delgado MD   diphenhydrAMINE (BENADRYL) 25 MG tablet Take 25 mg by mouth every 6 hours as needed for Itching   Yes Historical Provider, MD   gabapentin (NEURONTIN) 100 MG capsule Take 1 capsule by mouth in the morning and 1 capsule before bedtime. Do all this for 60 doses.  7/20/22 11/16/22  Josh Caraballo MD       Future Appointments   Date Time Provider Zayra Upton   1/5/2023  3:30 PM Herlinda Harvey MD AFL APEX AFL APEX END   1/9/2023  2:00 PM Claribel Crocker MD N SRPX Heart [facility] [place] [address]   1/13/2023 11:30 AM Meet Seymour MD N Oncology [facility] AM [place] [address]   5/16/2023  1:00 PM NITO Gipson - CNP [facility] Medicine [facility] AM [place] [address]

## 2022-12-27 ENCOUNTER — CARE COORDINATION (OUTPATIENT)
Dept: CARE COORDINATION | Age: 72
End: 2022-12-27

## 2022-12-27 ASSESSMENT — ENCOUNTER SYMPTOMS: DYSPNEA ASSOCIATED WITH: EXERTION

## 2022-12-27 NOTE — CARE COORDINATION
Ambulatory Care Coordination Note  12/27/2022    ACC: Jayde Elizalde, RN    Spoke with Abner Nelson for continued Care Coordination  Discussed the loss of a family member suddenly, no additional support needed at this time  Discussed RPM monitoring, she has not received the equipment yet  Was able to get appt with Dr. Sigifredo Tirado and will be seeing them tomorrow to address rash and itching    Plan  Ensure no additional support needed for grief process  Ensure RPM equipment has been delivered  Encourage weight tracking and reporting  Reinforce importance of early symptom recognition and reporting to prevent exacerbation and unnecessary hospitalization  Congestive Heart Failure Assessment    Are you currently restricting fluids?: No Restriction  Do you understand a low sodium diet?: Yes  Do you salt your food before tasting it?: No         Symptoms:  CHF associated angina: Neg, CHF associated dyspnea on exertion: Pos, CHF associated fatigue: Neg, CHF associated orthostatic hypotension: Neg, CHF associated PND: Neg, CHF associated shortness of breath: Neg, CHF associated weakness: Neg      Symptom course: stable     ,   COPD Assessment    Does the patient understand envrionmental exposure?: Yes  Is the patient able to verbalize Rescue vs. Long Acting medications?: No  Does the patient have a nebulizer?: No  Does the patient use a space with inhaled medications?: No            Symptoms:     Symptom course: stable  Breathlessness: exertion  Change in chronic cough?: No/At Baseline  Change in sputum?: No/At Baseline     , and   General Assessment    Do you have any symptoms that are causing concern?: Yes  Progression since Onset: Unchanged  Reported Symptoms: Rash           Offered patient enrollment in the Remote Patient Monitoring (RPM) program for in-home monitoring:  enrolled but has not received equipment yet .     Lab Results       None            Care Coordination Interventions    Referral from Primary Care Provider: No  Suggested Interventions and Target [facility] Resources  Home Health Services: Declined  Medication Assistance Program: Not Started  Occupational Therapy: Not Started  Palliative Care: Not Started  Physical Therapy: Not Started  Registered Dietician: Not Started  Senior Services: Modesta Lincoln Work: Not Started  Transportation Support: Not Started  Zone Management Tools: In Process          Goals Addressed                      This Visit's Progress      Conditions and Symptoms (pt-stated)   On track      I will schedule office visits, as directed by my provider. I will keep my appointment or reschedule if I have to cancel. I will notify my provider of any barriers to my plan of care. I will follow my Zone Management tool to seek urgent or emergent care. I will notify my provider of any symptoms that indicate a worsening of my condition. Barriers: need for  additional support and education  Plan for overcoming my barriers: family and ACM support  Confidence: 9/10  Anticipated Goal Completion Date: 2/21/23                Prior to Admission medications    Medication Sig Start Date End Date Taking? Authorizing Provider   hydrOXYzine HCl (ATARAX) 10 MG tablet Take 1-2 tablets by mouth every 6 hours as needed for Itching 12/13/22  Yes NITO Soto CNP   diclofenac sodium (VOLTAREN) 1 % GEL Apply 2 g topically 2 times daily 10/28/22  Yes NITO Han CNP   vitamin D (ERGOCALCIFEROL) 1.25 MG (43326 UT) CAPS capsule Take 1 capsule by mouth once a week 10/14/22  Yes NITO Soto CNP   apixaban (ELIQUIS) 5 MG TABS tablet Take 1 tablet by mouth 2 times daily 9/8/22  Yes Madison Torrez MD   aspirin EC 81 MG EC tablet Take 1 tablet by mouth daily 9/8/22  Yes Madison Torrez MD   rosuvastatin (CRESTOR) 5 MG tablet Take 1 tablet by mouth nightly 8/16/22  Yes NITO Soto CNP   levothyroxine (SYNTHROID) 25 MCG tablet Take 1 tablet by mouth in the morning.  8/16/22  Yes NITO Soto CNP   albuterol sulfate HFA (VENTOLIN HFA) 108 (90 Base) MCG/ACT inhaler Inhale 2 puffs into the lungs 4 times daily as needed for Wheezing 8/16/22  Yes NITO Brownlee CNP   vitamin B-12 (CYANOCOBALAMIN) 1000 MCG tablet Take 1,000 mcg by mouth in the morning. Yes Historical Provider, MD   ferrous sulfate (IRON 325) 325 (65 Fe) MG tablet Take 1 tablet by mouth 2 times daily (with meals) 8/19/21  Yes NITO Clayton CNP   ondansetron (ZOFRAN-ODT) 4 MG disintegrating tablet Take 1 tablet by mouth every 8 hours as needed for Nausea or Vomiting 8/19/21  Yes NITO Clayton CNP   acetaminophen (TYLENOL) 325 MG tablet Take 2 tablets by mouth every 6 hours 8/19/21  Yes NITO Clayton CNP   famotidine (PEPCID) 20 MG tablet Take 1 tablet by mouth daily 8/20/21  Yes NITO Clayton CNP   Cholecalciferol (VITAMIN D) 25 MCG TABS Take 1 tablet by mouth daily 8/20/21  Yes NITO Clayton CNP   guaiFENesin (MUCINEX) 600 MG extended release tablet Take 1 tablet by mouth 2 times daily 6/18/21  Yes Isla Cheadle, MD   diphenhydrAMINE (BENADRYL) 25 MG tablet Take 25 mg by mouth every 6 hours as needed for Itching   Yes Historical Provider, MD   gabapentin (NEURONTIN) 100 MG capsule Take 1 capsule by mouth in the morning and 1 capsule before bedtime. Do all this for 60 doses.  7/20/22 11/16/22  Kennedi Cardona MD       Future Appointments   Date Time Provider Zayra Upton   1/5/2023  3:30 PM Lauro Romo MD AFL APEX AFL APEX END   1/9/2023  2:00 PM Beryl Olmos MD N SRPX Heart [facility] [place] [email or web address]   1/13/2023 11:30 AM Mary Alice Mccoy MD N [facility] [email or web address]   5/16/2023  1:00 PM [facility] [place] [email or web address]

## 2022-12-29 ENCOUNTER — CARE COORDINATION (OUTPATIENT)
Dept: CARE COORDINATION | Age: 72
End: 2022-12-29

## 2022-12-29 NOTE — CARE COORDINATION
Remote Patient Monitoring Note      Date/Time:  12/29/2022 11:56 AM    EMTP reviewed patients reported daily Remote Patient Monitoring metrics. All reported metrics are within alert parameters. Plan/Follow Up:  Will continue to review, monitor and address alerts with follow up based on severity of symptoms and risk factors Current Patient Metrics ---- Blood Pressure: 136/70, 65bpm Pulseox: 95%, 66bpm Survey: - Weight: 141.6lbs Note Created at: 12/29/2022 11:56 AM ET ---- Time-Spent: 2 minutes 0 seconds

## 2022-12-30 ENCOUNTER — CARE COORDINATION (OUTPATIENT)
Dept: CARE COORDINATION | Age: 72
End: 2022-12-30

## 2022-12-30 NOTE — CARE COORDINATION
Remote Patient Monitoring Note      Date/Time:  12/30/2022 12:14 PM    CCSS reviewed patients reported daily Remote Patient Monitoring metrics. All reported metrics are within alert parameters. Plan/Follow Up:  Will continue to review, monitor and address alerts with follow up based on severity of symptoms and risk factors  Current Patient Metrics ---- Blood Pressure: 133/74, 68bpm Pulseox: 96%, 75bpm Survey: C Weight: 140.2lbs Note Created at: 12/30/2022 12:13 PM ET ---- Time-Spent: 2 minutes 0 seconds

## 2023-01-03 ENCOUNTER — CARE COORDINATION (OUTPATIENT)
Dept: CASE MANAGEMENT | Age: 73
End: 2023-01-03

## 2023-01-03 NOTE — CARE COORDINATION
Remote Alert Monitoring Note      Date/Time:  1/3/2023 10:40 AM    LPN contacted patient by telephone regarding red alert received for weight increase (143.4). Attempted to reach patient for RPM Red Alert Call. Unable to reach patient. Left HIPAA Compliant message on Voice Mail to call to Son  Phone number left on Voice Mail to call back. Called patient and Mail box full - unable to leave messages. Will continue to follow. Wellington Navarro LPN    [phone]  [facility] Coordinator      Background: COPD, CHF  Refer to 911 immediately if:  Patient unresponsive or unable to provide history  Change in cognition or sudden confusion  Patient unable to respond in complete sentences  Intense chest pain/tightness  Any concern for any clinical emergency  Red Alert: Provider response time of 1 hr required for any red alert requiring intervention  Yellow Alert: Provider response time of 3hr required for any escalated yellow alert    Plan/Follow Up: Will continue to review, monitor and address alerts with follow up based on severity of symptoms and risk factors.      Current Patient Metrics ---- Blood Pressure: 141/77, 63bpm Pulseox: 96%, 64bpm Survey: C Weight: 143.4lbs Note Created at: 01/03/2023 01:00 PM ET ---- Time-Spent: 5 minutes 0 seconds

## 2023-01-04 ENCOUNTER — CARE COORDINATION (OUTPATIENT)
Dept: CARE COORDINATION | Age: 73
End: 2023-01-04

## 2023-01-05 ENCOUNTER — CARE COORDINATION (OUTPATIENT)
Dept: CARE COORDINATION | Age: 73
End: 2023-01-05

## 2023-01-05 NOTE — CARE COORDINATION
Remote Alert Monitoring Note            Date/Time:  2023 12:02 PM    Update: BP rechecked and wnl. Current Patient Metrics ---- Blood Pressure: 155/79, 75bpm Pulseox: 95%, 70bpm Survey: C Weight: 142.2lbs Note Created at: 2023 12:03 PM ET ---- Time-Spent: 10 minutes 0 seconds    Date/Time:  2023 11:53 AM    LPN contacted patient by telephone regarding yellow alert received for blood pressure reading (172/80). Verified patients name and  as identifiers. Background: COPD, CHF    Refer to 911 immediately if:  Patient unresponsive or unable to provide history  Change in cognition or sudden confusion  Patient unable to respond in complete sentences  Intense chest pain/tightness  Any concern for any clinical emergency  Red Alert: Provider response time of 1 hr required for any red alert requiring intervention  Yellow Alert: Provider response time of 3hr required for any escalated yellow alert    BP Triage  Are you having any Chest Pain? no   Are you having any Shortness of Breath? no   Do you have a headache or have any vision changes? no   Are you having any numbness or tingling? no   Are you having any other health concerns or issues? no     Have you taken your medications as instructed by your doctor today? Yes     Clinical Interventions: Reviewed and followed up on alerts and treatments-Patient is asymptomatic and is going to recheck in a few minutes. She states she did take all her medications this am 1hr prior to obtaining her metrics. Will await recheck. Plan/Follow Up: Will continue to review, monitor and address alerts with follow up based on severity of symptoms and risk factors.       Melissa Diaz LPN  [facility]/ Care Transition Nurse/RPM  [phone]

## 2023-01-05 NOTE — CARE COORDINATION
Quincy Nichole received a call from Halle Nicole today. She is enrolled in Remote Patient Monitoring and is concerned about how her blood pressures have been running. Her most recent readings include:  172/80  155/79  128/79  141/77  133/74  136/70    Halle Nicole would like to know if you feel she should be put on a blood pressure medication? She states she does have occasional dizziness and blurred vision at times. Pharmacy is [facility] on [address].    Please advise. Thank you!

## 2023-01-06 ENCOUNTER — CARE COORDINATION (OUTPATIENT)
Dept: CASE MANAGEMENT | Age: 73
End: 2023-01-06

## 2023-01-06 NOTE — CARE COORDINATION
Spoke to pt and notified her that [address] [place] [address] doesn't have any openings today and that I spoke to her and she said if her BP remains elevated and has any SOB or CP she needs to go to the ED. Pt verbalized understanding. She has a previously scheduled f/up appt with Dr. Alfredo Mancilla and will f/up with him as scheduled.

## 2023-01-06 NOTE — TELEPHONE ENCOUNTER
2 of the readings are elevated, the rest are OK. If she is having symptoms like that I would like to see her in the office for evaluation. OK to schedule an appt.  -WS

## 2023-01-06 NOTE — CARE COORDINATION
Remote Alert Monitoring Note      Date/Time:  2023 12:13 PM    LPN contacted patient by telephone regarding yellow alert received for blood pressure reading (175/67). Verified patients name and  as identifiers. Background: COPD, CHF  Refer to 911 immediately if:  Patient unresponsive or unable to provide history  Change in cognition or sudden confusion  Patient unable to respond in complete sentences  Intense chest pain/tightness  Any concern for any clinical emergency  Red Alert: Provider response time of 1 hr required for any red alert requiring intervention  Yellow Alert: Provider response time of 3hr required for any escalated yellow alert    BP Triage  Are you having any Chest Pain? yes   Are you having any Shortness of Breath? yes   Do you have a headache or have any vision changes? yes   Are you having any numbness or tingling? no   Are you having any other health concerns or issues? Yes  Fatigue       Clinical Interventions: Reviewed and followed up on alerts and treatments-Called patient for elevated BP of 175/67. Patient states not feeling well today. Is have occasional CP when sitting, NOT with exertion,  and is SOB with exertion. C/O feeling very tired and has a headache. No numbness or tingling. Patient states doctor was called this morning and told patient if she feel worse to call 911 and got to ER. BP rechecked and was 147/57 . Patient will go lie down and see how she feels when she wakes up. Education of patient/family/caregiver/guardian to support self-management-Instructed patient to see how she feels upon waking and call 911 if needed. Will escalate to PCP for new BP reading. Noted. Please call pt back and instruct her to go to ER for evaluation now. Thanks -WS  Called patient and she states she is feeling better. Will go to ER if she starts feeling bad again.     Amber Malone LPN    [phone]  [facility] Coordinator / RPM      Plan/Follow Up: Will continue to review, monitor and address alerts with follow up based on severity of symptoms and risk factors. Remote Patient Monitoring Welcome Note  Date/Time:  2023 12:31 PM   Verified patients name and  as identifiers. Completed and confirmed the following:   Emergency Contact: Emergency Contact: Kaylee Carolina [phone]  [x] Patient received all RPM equipment (tablet, scale, blood pressure device and cuff, and pulse oximeter)  Cuff Size: Small  []  Regular   [x]  Large  []   Weight Scale: Regular   [x]  Bariatric  []               [x] Instructed patient keep box for use when returning equipment                                                          [x] Reviewed Patient Welcome Letter with patient                         [x] Reviewed expectations for patient and care team  [x] Reviewed RPM consent form         [x] Instructed patient to keep scale on flat surface                                                         [x] Instructed patient to keep tablet plugged in at all times                         [x] Instructed how to contact IT support (number listed on welcome letter)  [x] Provided Remote Patient Monitoring care  information               All questions answered at this time.     Current Patient Metrics ---- Blood Pressure: 206/141, 77bpm Glucose: -mg/dl Pulseox: -%, -bpm Survey: - Weight: -lbs Note Created at: 2023 01:54 PM ET ---- Time-Spent: 1 hours 10 minutes 0 seconds

## 2023-01-06 NOTE — CARE COORDINATION
Spoke with Chato Adorno regarding PCP recommendations. Chato Tila states she has a headache today and her blood pressure was 177/53. She would like to see Leo Boone to address her sx's and higher readings. Could office staff please assist with setting up an appt for Roseliaroxanna Tila to see Leo Boone? Thank you!

## 2023-01-09 ENCOUNTER — OFFICE VISIT (OUTPATIENT)
Dept: CARDIOLOGY CLINIC | Age: 73
End: 2023-01-09
Payer: MEDICAID

## 2023-01-09 ENCOUNTER — CARE COORDINATION (OUTPATIENT)
Dept: CASE MANAGEMENT | Age: 73
End: 2023-01-09

## 2023-01-09 VITALS
SYSTOLIC BLOOD PRESSURE: 136 MMHG | BODY MASS INDEX: 24.21 KG/M2 | DIASTOLIC BLOOD PRESSURE: 62 MMHG | HEIGHT: 64 IN | HEART RATE: 73 BPM | WEIGHT: 141.8 LBS

## 2023-01-09 DIAGNOSIS — Z86.73 HISTORY OF CVA (CEREBROVASCULAR ACCIDENT): ICD-10-CM

## 2023-01-09 DIAGNOSIS — I48.0 PAF (PAROXYSMAL ATRIAL FIBRILLATION) (HCC): Primary | ICD-10-CM

## 2023-01-09 DIAGNOSIS — R42 DIZZINESS ON STANDING: ICD-10-CM

## 2023-01-09 DIAGNOSIS — R06.02 SOB (SHORTNESS OF BREATH) ON EXERTION: ICD-10-CM

## 2023-01-09 DIAGNOSIS — R94.31 ABNORMAL EKG: ICD-10-CM

## 2023-01-09 DIAGNOSIS — Z98.890 S/P CARDIAC CATH: ICD-10-CM

## 2023-01-09 DIAGNOSIS — Z87.898 HISTORY OF CHEST PAIN: ICD-10-CM

## 2023-01-09 DIAGNOSIS — E78.00 PURE HYPERCHOLESTEROLEMIA: ICD-10-CM

## 2023-01-09 PROBLEM — R55 SYNCOPE AND COLLAPSE: Status: RESOLVED | Noted: 2021-03-13 | Resolved: 2023-01-09

## 2023-01-09 PROCEDURE — 99214 OFFICE O/P EST MOD 30 MIN: CPT | Performed by: INTERNAL MEDICINE

## 2023-01-09 PROCEDURE — G8484 FLU IMMUNIZE NO ADMIN: HCPCS | Performed by: INTERNAL MEDICINE

## 2023-01-09 PROCEDURE — 1123F ACP DISCUSS/DSCN MKR DOCD: CPT | Performed by: INTERNAL MEDICINE

## 2023-01-09 PROCEDURE — G8427 DOCREV CUR MEDS BY ELIG CLIN: HCPCS | Performed by: INTERNAL MEDICINE

## 2023-01-09 PROCEDURE — 1090F PRES/ABSN URINE INCON ASSESS: CPT | Performed by: INTERNAL MEDICINE

## 2023-01-09 PROCEDURE — 4004F PT TOBACCO SCREEN RCVD TLK: CPT | Performed by: INTERNAL MEDICINE

## 2023-01-09 PROCEDURE — G8399 PT W/DXA RESULTS DOCUMENT: HCPCS | Performed by: INTERNAL MEDICINE

## 2023-01-09 PROCEDURE — 3017F COLORECTAL CA SCREEN DOC REV: CPT | Performed by: INTERNAL MEDICINE

## 2023-01-09 PROCEDURE — G8420 CALC BMI NORM PARAMETERS: HCPCS | Performed by: INTERNAL MEDICINE

## 2023-01-09 NOTE — PROGRESS NOTES
Chief Complaint   Patient presents with    Follow-up   Originally  patient here - ref. by Dr. Maricel Alicia - hypertenstion and mild CAD - last seen Dr. Noemí Rubio in 2015     Last seen 03/2018  then 05/2021 and Came 07/2022  after being referred by pcp for LOC syncope versus siezure D/o    Jazlyn Cavanaugh had hx of siezure yrs back and used to take meds for siezure then and advised to stop it          Patient presents here today for a 4 month follow up    Jazlyn Cavanaugh got concerned with her Home BP reading and reading in the 160 sbp and in office BP okay    EKG done 7-. Hx of Dizziness on standing getting better after better hydration    Hx of Occasional dizziness on standing  Had balance issue    Hx of syncope intermittent     Last syncope march 2021 and evalutaed in hospital  predrom dizziness  Tired post syncope with nausea    Sob on exertion chronic/copd  on home O2    Occasional palpitation    No leg edema  Hx of chronic Chest discomfort once in a while once a months 10 min- better  No definite chest pain  No aggravating or relieving factor      Past event  Hx of syncope July 2022 ?  Syncope versus siezure d/o  Had two episode syncope in the last 1 week - 7 days and 5 days back  Predrome -dizziness, nausea  Postdrome - nausea, sweaty and feel tired  No injury but fall on side of the cauch as pat was sat then    Smoke 2ppd for 48 yrs    FHX  Father had mi in his 52's  Sister had MI at 72    Patient Active Problem List   Diagnosis    Hyperlipidemia    History of breast cancer    Vitamin D deficiency    Chronic headachess/p head injjury s/p fall    Depression    Smoker    Hypothyroidism    Allergic rhinitis    Incontinence    Noncompliance    History of CVA (cerebrovascular accident)    Stage 2 moderate COPD by GOLD classification (HCC)    Carotid occlusion, left    OA (osteoarthritis) of hip    Transient cerebral ischemia    Aortic insufficiency    Tobacco abuse    History of chest pain atypical    S/P cardiac cath nonobstructive lesion 2014    Lumbar radiculitis    Spinal stenosis of lumbar region without neurogenic claudication    Solitary pulmonary nodule on lung CT    Primary malignant neoplasm of right lower lobe of lung (HCC)    SOB (shortness of breath) on exertion    Dizziness on standing    History of syncope    Cancer of lower lobe of right lung (HCC)    S/P robotic assisted nonanatomic wedge resection of lateral basilar right lower lobe lung mass and mediastinal dissection    Postoperative urinary retention    Closed left hip fracture, initial encounter ([facility])    Closed fracture of trochanter of femur (HCC)    Chronic renal disease, stage III (HCC) [565551]    Abnormal EKG    Chronic systolic (congestive) heart failure    History of total left hip replacement    PAF (paroxysmal atrial fibrillation) ([facility] [address] Noted on event monitor       Past Surgical History:   Procedure Laterality Date    APPENDECTOMY  1975    BREAST SURGERY Right 04/01/2005    evacuation of breast hematoma-Dr. Malik [employer] [place]    BREAST SURGERY Right 11/30/2004    lymphatic mapping, SLN bx x2-Dr. Pratima Noriega    COLONOSCOPY  2009    Dr. Glenroy Hassan    CT NEEDLE BIOPSY LUNG PERCUTANEOUS  03/16/2021    CT NEEDLE BIOPSY LUNG PERCUTANEOUS 3/16/2021 [facility] CT SCAN    FEMUR FRACTURE SURGERY Left 8/2/2021    FEMUR OPEN REDUCTION INTERNAL FIXATION performed by Edwin Cao MD at [facility]  01/19/2015    HYSTERECTOMY (CERVIX STATUS UNKNOWN)  [address] Left 2011    HIP    JOINT REPLACEMENT Right 01/19/2015    Right Total Hip Replacement - Dr. Alpheus Najjar, TOTAL Right 6/14/2021    ROBOTIC ASSISTED RIGHT LOWER LOBE SUPERIOR SEGMENTECTOMY AND MEDIASTINAL DISSECTION, CRYOTHERAPY OF INTERCOSTAL NERVES performed by Guilherme Zapata MD at [address] Right 2005    Dr. Velez [facility] HISTORY  04/10/2018    Lumbar epidural steroid injection at L4    ND NJX DX/THER SBST INTRLMNR LMBR/SAC W/IMG GDN N/A 04/10/2018    LESI  @ L4 performed by Marlin Acosta MD at [address] [place] [address] (CERVIX REMOVED)  1976, 2001    ovaries    THYROID LOBECTOMY Left 11/26/2010    left thyroid lobectomy with isthmusectomy-Dr. Leyva [facility]    THYROID SURGERY  2007    right thyroid lobectomy-Dr. Ibanez     BREAST NEEDLE BIOPSY RIGHT Right 11/18/2004    invasive ductal carcinoma right breast       Allergies   Allergen Reactions    Cipro Xr     Vicodin [Hydrocodone-Acetaminophen]      Weird dreams          Family History   Problem Relation Age of Onset    Osteoporosis Mother     Hypertension Mother     High Cholesterol Mother     Stroke Mother     Colon Cancer Mother     Cancer Father         lung cancer    Heart Disease Father     Hypertension Father     High Cholesterol Father     Heart Disease Sister     High Cholesterol Sister     Hypertension Sister     Asthma Sister     Other Other         high arched feet    Lung Cancer Son     Breast Cancer Niece 64    Ovarian Cancer Niece 43        Social History     Socioeconomic History    Marital status:       Spouse name: Not on file    Number of children: 3    Years of education: 9    Highest education level: Not on file   Occupational History    Occupation: Disabled   Tobacco Use    Smoking status: Every Day     Packs/day: 1.00     Years: 48.00     Pack years: 48.00     Types: Cigarettes     Start date: 11/13/1967    Smokeless tobacco: Never    Tobacco comments:     Currently at 1 pk/day-declines counseling 8/3/22   Vaping Use    Vaping Use: Never used   Substance and Sexual Activity    Alcohol use: No     Alcohol/week: 0.0 standard drinks    Drug use: No    Sexual activity: Yes     Partners: Male   Other Topics Concern    Not on file   Social History Narrative    Not on file     Social Determinants of Health     Financial Resource Strain: Low Risk     Difficulty of Paying Living Expenses: Not hard at all   Food Insecurity: No Food Insecurity    Worried About [facility] in the Last Year: Never true    Ran Out of Food in the Last Year: Never true   Transportation Needs: Not on file   Physical Activity: Inactive    Days of Exercise per Week: 0 days    Minutes of Exercise per Session: 0 min   Stress: Not on file   Social Connections: Not on file   Intimate Partner Violence: Not on file   Housing Stability: Not on file       Current Outpatient Medications   Medication Sig Dispense Refill    levothyroxine (SYNTHROID) 25 MCG tablet Take 1 tablet by mouth daily 90 tablet 1    hydrOXYzine HCl (ATARAX) 10 MG tablet Take 1-2 tablets by mouth every 6 hours as needed for Itching 40 tablet 0    diclofenac sodium (VOLTAREN) 1 % GEL Apply 2 g topically 2 times daily 100 g G    vitamin D (ERGOCALCIFEROL) 1.25 MG (04376 UT) CAPS capsule Take 1 capsule by mouth once a week 12 capsule 1    apixaban (ELIQUIS) 5 MG TABS tablet Take 1 tablet by mouth 2 times daily 60 tablet 5    aspirin EC 81 MG EC tablet Take 1 tablet by mouth daily 90 tablet 1    rosuvastatin (CRESTOR) 5 MG tablet Take 1 tablet by mouth nightly 90 tablet 3    albuterol sulfate HFA (VENTOLIN HFA) 108 (90 Base) MCG/ACT inhaler Inhale 2 puffs into the lungs 4 times daily as needed for Wheezing 18 g 5    vitamin B-12 (CYANOCOBALAMIN) 1000 MCG tablet Take 1,000 mcg by mouth in the morning.       ferrous sulfate (IRON 325) 325 (65 Fe) MG tablet Take 1 tablet by mouth 2 times daily (with meals) 30 tablet 3    ondansetron (ZOFRAN-ODT) 4 MG disintegrating tablet Take 1 tablet by mouth every 8 hours as needed for Nausea or Vomiting      acetaminophen (TYLENOL) 325 MG tablet Take 2 tablets by mouth every 6 hours 120 tablet 3    famotidine (PEPCID) 20 MG tablet Take 1 tablet by mouth daily 60 tablet 3    Cholecalciferol (VITAMIN D) 25 MCG TABS Take 1 tablet by mouth daily 60 tablet     guaiFENesin (MUCINEX) 600 MG extended release tablet Take 1 tablet by mouth 2 times daily 60 tablet 1    diphenhydrAMINE (BENADRYL) 25 MG tablet Take 25 mg by mouth every 6 hours as needed for Itching      gabapentin (NEURONTIN) 100 MG capsule Take 1 capsule by mouth in the morning and 1 capsule before bedtime. Do all this for 60 doses. 60 capsule 3     No current facility-administered medications for this visit. Review of Systems -     General ROS: negative  Psychological ROS: negative  Hematological and Lymphatic ROS: No history of blood clots or bleeding disorder. Respiratory ROS: no cough, shortness of breath, or wheezing  Cardiovascular ROS: no chest pain or dyspnea on exertion  Gastrointestinal ROS: negative  Genito-Urinary ROS: no dysuria, trouble voiding, or hematuria  Musculoskeletal ROS: negative  Neurological ROS: no TIA or stroke symptoms  Dermatological ROS: negative      Blood pressure 136/62, pulse 73, height 5' 4\" (1.626 m), weight 141 lb 12.8 oz (64.3 kg), not currently breastfeeding. Physical Examination:    General appearance - alert, well appearing, and in no distress  Mental status - alert, oriented to person, place, and time  Neck - supple, no significant adenopathy, no JVD, or carotid bruits  Chest - clear to auscultation, no wheezes, rales or rhonchi, symmetric air entry  Heart - normal rate, regular rhythm, normal S1, S2, no murmurs, rubs, clicks or gallops  Abdomen - soft, nontender, nondistended, no masses or organomegaly  Neurological - alert, oriented, normal speech, no focal findings or movement disorder noted  Musculoskeletal - no joint tenderness, deformity or swelling  Extremities - peripheral pulses normal, no pedal edema, no clubbing or cyanosis  Skin - normal coloration and turgor, no rashes, no suspicious skin lesions noted    Lab  No results for input(s): CKTOTAL, CKMB, CKMBINDEX, TROPONINI in the last 72 hours.   CBC:   Lab Results   Component Value Date/Time    WBC 7.1 07/20/2022 11:50 AM    RBC 4.51 07/20/2022 11:50 AM    RBC 4.49 05/16/2012 12:51 PM    HGB 14.7 07/20/2022 11:50 AM    HCT 47.5 07/20/2022 11:50 AM    .3 07/20/2022 11:50 AM    MCH 32.6 07/20/2022 11:50 AM    MCHC 30.9 07/20/2022 11:50 AM    RDW 16.0 12/07/2021 03:44 PM     07/20/2022 11:50 AM    MPV 10.6 07/20/2022 11:50 AM     BMP:    Lab Results   Component Value Date/Time     07/20/2022 11:50 AM    K 4.0 07/20/2022 11:50 AM    K 3.5 10/05/2021 06:25 PM     07/20/2022 11:50 AM    CO2 25 07/20/2022 11:50 AM    BUN 10 07/20/2022 11:50 AM    LABALBU 4.1 07/20/2022 11:50 AM    LABALBU 4.4 05/16/2012 12:51 PM    CREATININE 1.0 07/20/2022 11:50 AM    CALCIUM 9.2 07/20/2022 11:50 AM    LABGLOM 55 07/20/2022 11:50 AM    GLUCOSE 85 07/20/2022 11:50 AM    GLUCOSE 94 05/16/2012 12:51 PM     Hepatic Function Panel:    Lab Results   Component Value Date/Time    ALKPHOS 141 07/20/2022 11:50 AM    ALT 7 07/20/2022 11:50 AM    AST 13 07/20/2022 11:50 AM    PROT 6.6 07/20/2022 11:50 AM    BILITOT 0.5 07/20/2022 11:50 AM    BILIDIR <0.2 12/07/2021 03:44 PM    LABALBU 4.1 07/20/2022 11:50 AM    LABALBU 4.4 05/16/2012 12:51 PM     Magnesium:    Lab Results   Component Value Date/Time    MG 2.3 10/05/2021 06:25 PM     Warfarin PT/INR:  No components found for: PTPATWAR, PTINRWAR  HgBA1c:    Lab Results   Component Value Date/Time    LABA1C 5.2 08/11/2020 12:28 PM     FLP:    Lab Results   Component Value Date/Time    TRIG 151 07/20/2022 11:50 AM    HDL 45 07/20/2022 11:50 AM    LDLCALC 152 07/20/2022 11:50 AM     TSH:    Lab Results   Component Value Date/Time    TSH 2.530 11/16/2022 11:46 AM     CONCLUSION:    1. Left main is a large caliber vessel, distally has about 20 percent          disease distally before the bifurcation. 2.    The circumflex about 20 percent proximal lesion and appears to be a          dominant vessel. 3.    The LAD has about 20 percent diffuse disease and large caliber          vessel.     4.    Right coronary artery is medium caliber vessel, nondominant vessel,          at least based on my views and there is no __________ critical          lesion, has mild lumen irregularity in it. 5.    Left ventriculography demonstrates ejection fraction of about 50 to          55 percent with mild global hypokinesia. 6.    No complications occurred. Overall impression now is that it is possible that the patient has evidence  of syndrome X because of the nonobstructive disease, but the LVEDP was 19,  indicative of mild impaired relaxation of ventricle. Again, aggressive risk  factor modification is strongly encouraged. The patient is to avoid driving,  lifting for the next two days. Linus Wesley D.O.        D: 03/07/2014 12:15       EKG 2/6/18  NSR, no acute abn nonsp T wave abn          Impression:   1. Patent right carotid and bilateral vertebral arteries without    significant stenosis. Occluded left ICA at its origin, present previously. This document has been electronically signed by: Martin Pope MD on    03/12/2021 10:05 PM       All CTs at this facility use dose modulation techniques and iterative    reconstructions, and/or weight-based dosing   when appropriate to reduce radiation to a low as reasonably achievable. Carotid stenosis and measurements are in accordance with NASCET criteria                       Conclusions      Summary   Technically difficult examination. Left ventricular size and systolic function is normal. Ejection fraction   was estimated at 55-60%. LV wall thickness is within normal limits and   there are no obvious wall motion abnormalities. The right ventricular size was normal with normal systolic function and   wall thickness. Mild aortic regurgitation is noted. Mild mitral regurgitation is present.       Signature      ----------------------------------------------------------------   Electronically signed by Octavio Vasques MD (Interpreting   physician) on 03/13/2021 at 02:48 PM              Conclusions      Summary   Left ventricle size is normal.   Normal left ventricular wall thickness. There was mild global hypokinesis of the left ventricle. Systolic function was mildly reduced. Ejection fraction is visually estimated in the range of 45% to 50%. Mild aortic regurgitation is noted. Doppler parameters were consistent with abnormal left ventricular   relaxation (grade 1 diastolic dysfunction). Signature      ----------------------------------------------------------------   Electronically signed by Heather Upton MD (Interpreting   physician) on 07/25/2022 at 07:52 PM   ----------------------------------------------------------------       CONCLUSION:  1. Tilt table test negative for vasovagal response. 2.  Tilt table test negative for orthostatic hypotension. 3.  Tilt table test negative for POTS (postural orthostatic tachycardia  syndrome). 4.  Overall, this is a benign tilt table study. Gail Salomon. Carmen Bolanos M.D.     D: 08/03/2022 20:25:18        event monitor and hx of 2 cva 2007 and 2014  CONCLUSION:  This is an abnormal event monitor finding, predominantly  normal sinus rhythm, captured heart rate ranging from 53 to 121 beats  per minute. Abnormal for paroxysmal atrial fibrillation and three  episodes of atrial fibrillation noted with predominantly controlled  ventricular rate and basically, the AFib burden will be less than 1%. All atrial fibrillation are autotriggered captures and there are four  autotriggered capture. Out of the four, three of them are atrial  fibrillation. There is no symptom-triggered capture. There is no AV  block. No pause. No ventricular or supraventricular tachycardia. RECOMMENDATION:  Need further clinical correlation. Consider oral  anticoagulation if clinically indicated. Gail Bolanos M.D.     D: 09/06/2022 13:16:07           ekg 5/20/21   Sinus  Rhythm   Voltage criteria for LVH  (S(V1)+R(V6) exceeds 3.50 mV). -  Nonspecific T-abnormality.    ABNORMAL                    Ekg 7/19/22  Sinus  Rhythm  - occasional PAC     # PACs = 1.   -Nonspecific ST depression   +   Nonspecific T-abnormality  -Nondiagnostic. ABNORMAL       Assessment     Diagnosis Orders   1. PAF (paroxysmal atrial fibrillation) (HCC) Noted on event monitor        2. Pure hypercholesterolemia        3. Dizziness on standing        4. History of chest pain atypical        5. SOB (shortness of breath) on exertion        6. Abnormal EKG        7. History of CVA (cerebrovascular accident)        8. S/P cardiac cath nonobstructive lesion 2014                Plan     The  most current meds and labs reviewed  \  Hx of cp for yrs atypical  Sob  Syncope  Dizziness better after better hydration  Abn ekg  Jalen nuc negative 2021  Echo EF 48%  TTT- negative  Event monitor- PAF  Hydration advised     Hx of copd on home O2    Hx of CVA  Abn event with PAF  PAF  Chadsvasc 4 - need OAC  Start apixaban 5 po bid   Pat verbalized understanding risk of bleeding including ICH and agreed to be on [address]  Cont  asa to 81 f mg po qd    Hx Hypertension, on medical treatment. Seems to be under good control. Off her meds for long time-Including lopressor and norvasc 5 mg po qd  Good without meds now  Charlette Rhodes got concerned with her Home BP reading and reading in the 160 sbp and in office BP okay  No new med for BP as office bp today and 1/5 23 WNL  Hx of Occasional dizziness on standing- resolved after bp med off a while back      Hx of dizziness on standin and syncope  Off BP meds and  Stopped after syncope in march and feel better      Hx of Hyperlipidemia: off her statins, for long while    Smoking: discussed with the patient the importance of smoke cessation especially with the risk of CAD. Charlette Rhodse does not want to quit and she stated she love to smoke     D/w the patn the plan of care    Discussed use, benefit, and side effects of prescribed medications. All patient questions answered. Pt voiced understanding. Instructed to continue current medications, diet and exercise. Continue risk factor modification and medical management. Patient agreed with treatment plan. Follow up as directed.       RTC in 6 months        Deidra Hutchinson, [facility]

## 2023-01-09 NOTE — CARE COORDINATION
Remote Patient Monitoring Note      Date/Time:  1/9/2023 10:13 AM    LPN reviewed patients reported daily Remote Patient Monitoring metrics. All reported metrics are within alert parameters. Plan/Follow Up:  Will continue to review, monitor and address alerts with follow up based on severity of symptoms and risk factors  Current Patient Metrics ---- Blood Pressure: 164/81, 63bpm Pulseox: 95%, 64bpm Survey: C Weight: 140.4lbs Note Created at: 01/09/2023 10:14 AM ET ---- Time-Spent: 2 minutes 0 seconds

## 2023-01-10 ENCOUNTER — CARE COORDINATION (OUTPATIENT)
Dept: CARE COORDINATION | Age: 73
End: 2023-01-10

## 2023-01-10 NOTE — CARE COORDINATION
Remote Patient Monitoring Note      Date/Time:  1/10/2023 2:51 PM    EMTP reviewed patients reported daily Remote Patient Monitoring metrics. All reported metrics are within alert parameters. Plan/Follow Up:  Will continue to review, monitor and address alerts with follow up based on severity of symptoms and risk factors-- Current Patient Metrics ---- Blood Pressure: 143/71, 69bpm Pulseox: 95%, 78bpm Survey: C Weight: 140.0lbs Note Created at: 01/10/2023 02:51 PM ET ---- Time-Spent: 2 minutes 0 seconds

## 2023-01-11 ENCOUNTER — CARE COORDINATION (OUTPATIENT)
Dept: CARE COORDINATION | Age: 73
End: 2023-01-11

## 2023-01-11 NOTE — CARE COORDINATION
Remote Patient Monitoring Note      Date/Time:  1/11/2023 12:41 PM    EMTP reviewed patients reported daily Remote Patient Monitoring metrics. All reported metrics are within alert parameters. Plan/Follow Up:  Will continue to review, monitor and address alerts with follow up based on severity of symptoms and risk factors--- Current Patient Metrics ---- Blood Pressure: 117/70, 71bpm Pulseox: 94%, 70bpm Survey: C Weight: 138.4lbs Note Created at: 01/11/2023 12:42 PM ET ---- Time-Spent: 2 minutes 0 seconds

## 2023-01-12 ENCOUNTER — CARE COORDINATION (OUTPATIENT)
Dept: CARE COORDINATION | Age: 73
End: 2023-01-12

## 2023-01-12 ASSESSMENT — ENCOUNTER SYMPTOMS: DYSPNEA ASSOCIATED WITH: EXERTION

## 2023-01-12 NOTE — CARE COORDINATION
Remote Patient Monitoring Note      Date/Time:  1/12/2023 9:08 AM    EMTP reviewed patients reported daily Remote Patient Monitoring metrics.  All reported metrics are within alert parameters.    Plan/Follow Up: Will continue to review, monitor and address alerts with follow up based on severity of symptoms and risk factors-- Current Patient Metrics ---- Blood Pressure: 154/83, 77bpm Pulseox: 93%, 72bpm Survey: C Weight: 137.2lbs Note Created at: 01/12/2023 09:09 AM ET ---- Time-Spent: 2 minutes 0 seconds

## 2023-01-12 NOTE — CARE COORDINATION
Ambulatory Care Coordination Note  1/12/2023    ACC: Elbert Munzi, RN    Spoke with Christi Husamuel for continued Care Coordination follow up  States she is feeling better for this review  Completed follow up appt with cardiology  No new med changes  Continues to work with RPM  Blood pressures better controlled  No barriers with grief process with recent loss of step son  Weight stable    Plan  Reinforce education completed  Encourage weight, blood pressure and pulse tracking on RPM  Reinforce importance of early symptom recognition and reporting to prevent exacerbation and unnecessary hospitalization  Congestive Heart Failure Assessment    Are you currently restricting fluids?: No Restriction  Do you understand a low sodium diet?: Yes  Do you salt your food before tasting it?: No         Symptoms:  CHF associated angina: Neg, CHF associated dyspnea on exertion: Pos, CHF associated fatigue: Neg, CHF associated orthostatic hypotension: Neg, CHF associated PND: Neg, CHF associated shortness of breath: Neg, CHF associated weakness: Neg      Symptom course: stable  Patient-reported weight (lb): 137  Weight trend: stable     ,   COPD Assessment    Does the patient understand envrionmental exposure?: Yes  Is the patient able to verbalize Rescue vs. Long Acting medications?: No  Does the patient have a nebulizer?: No  Does the patient use a space with inhaled medications?: No            Symptoms:     Symptom course: stable  Breathlessness: exertion  Change in chronic cough?: No/At Baseline  Change in sputum?: No/At Baseline     , and   General Assessment    Do you have any symptoms that are causing concern?: Yes  Progression since Onset: Gradually Improving  Reported Symptoms: Other (Comment: blood pressure fluctuations)           Offered patient enrollment in the Remote Patient Monitoring (RPM) program for in-home monitoring: Yes, patient enrolled.     Lab Results       None            Care Coordination Interventions    Referral from Primary Care Provider: No  Suggested Interventions and Community Resources  Home Health Services: Declined  Medication Assistance Program: Not Started  Occupational Therapy: Not Started  Palliative Care: Not Started  Physical Therapy: Not Started  Registered Dietician: Not Started  Senior Services: Modesta Lincoln Work: Not Started  Transportation Support: Not Started  Zone Management Tools: In Process          Goals Addressed                      This Visit's Progress      Conditions and Symptoms (pt-stated)   On track      I will schedule office visits, as directed by my provider. I will keep my appointment or reschedule if I have to cancel. I will notify my provider of any barriers to my plan of care. I will follow my Zone Management tool to seek urgent or emergent care. I will notify my provider of any symptoms that indicate a worsening of my condition. Barriers: need for  additional support and education  Plan for overcoming my barriers: family and ACM support  Confidence: 9/10  Anticipated Goal Completion Date: 2/21/23                Prior to Admission medications    Medication Sig Start Date End Date Taking?  Authorizing Provider   levothyroxine (SYNTHROID) 25 MCG tablet Take 1 tablet by mouth daily 1/5/23  Yes Ray Burgos MD   hydrOXYzine HCl (ATARAX) 10 MG tablet Take 1-2 tablets by mouth every 6 hours as needed for Itching 12/13/22  Yes NITO Topete CNP   diclofenac sodium (VOLTAREN) 1 % GEL Apply 2 g topically 2 times daily 10/28/22  Yes NITO Madera CNP   vitamin D (ERGOCALCIFEROL) 1.25 MG (20498 UT) CAPS capsule Take 1 capsule by mouth once a week 10/14/22  Yes NITO Toptee CNP   apixaban (ELIQUIS) 5 MG TABS tablet Take 1 tablet by mouth 2 times daily 9/8/22  Yes Payton Sumner MD   aspirin EC 81 MG EC tablet Take 1 tablet by mouth daily 9/8/22  Yes Payton Sumner MD   rosuvastatin (CRESTOR) 5 MG tablet Take 1 tablet by mouth nightly 8/16/22  Yes Krista Olivares APRN - CNP   albuterol sulfate HFA (VENTOLIN HFA) 108 (90 Base) MCG/ACT inhaler Inhale 2 puffs into the lungs 4 times daily as needed for Wheezing 8/16/22  Yes NITO Brownlee CNP   vitamin B-12 (CYANOCOBALAMIN) 1000 MCG tablet Take 1,000 mcg by mouth in the morning. Yes Historical Provider, MD   ferrous sulfate (IRON 325) 325 (65 Fe) MG tablet Take 1 tablet by mouth 2 times daily (with meals) 8/19/21  Yes NITO Clayton CNP   ondansetron (ZOFRAN-ODT) 4 MG disintegrating tablet Take 1 tablet by mouth every 8 hours as needed for Nausea or Vomiting 8/19/21  Yes NITO Clayton CNP   acetaminophen (TYLENOL) 325 MG tablet Take 2 tablets by mouth every 6 hours 8/19/21  Yes NITO Clayton CNP   famotidine (PEPCID) 20 MG tablet Take 1 tablet by mouth daily 8/20/21  Yes NITO Clayton CNP   Cholecalciferol (VITAMIN D) 25 MCG TABS Take 1 tablet by mouth daily 8/20/21  Yes NITO Clayton CNP   guaiFENesin (MUCINEX) 600 MG extended release tablet Take 1 tablet by mouth 2 times daily 6/18/21  Yes Isla Cheadle, MD   diphenhydrAMINE (BENADRYL) 25 MG tablet Take 25 mg by mouth every 6 hours as needed for Itching   Yes Historical Provider, MD   gabapentin (NEURONTIN) 100 MG capsule Take 1 capsule by mouth in the morning and 1 capsule before bedtime. Do all this for 60 doses.  7/20/22 11/16/22  Kennedi Cardona MD       Future Appointments   Date Time Provider Zayra Upton   1/13/2023 11:15 AM STR OUT PT ONC CMA STRZ OP [place] [facility]   1/13/2023 11:30 AM Mary Alice Mccoy MD N Oncology P [facility] [place]   1/17/2023  2:00 PM STR ULTRASOUND RM 2 STRZ US STR Radiolog   4/5/2023  3:45 PM Lauro Romo MD AFL APEX AFL APEX END   5/16/2023  1:00 PM Isabel Gil, APRN - [address] Medicine [facility] [place] [address]   7/10/2023  2:45 PM Beryl Olmos MD N SRPX Heart [facility] [place] [address]

## 2023-01-13 ENCOUNTER — CARE COORDINATION (OUTPATIENT)
Dept: CARE COORDINATION | Age: 73
End: 2023-01-13

## 2023-01-13 NOTE — CARE COORDINATION
Remote Patient Monitoring Note      Date/Time:  1/13/2023 2:00 PM    EMTP reviewed patients reported daily Remote Patient Monitoring metrics. All reported metrics are within alert parameters. Plan/Follow Up:  Will continue to review, monitor and address alerts with follow up based on severity of symptoms and risk factors-- Current Patient Metrics ---- Blood Pressure: 164/90, 73bpm Pulseox: 96%, 79bpm Survey: - Weight: 136.6lbs Note Created at: 01/13/2023 02:00 PM ET ---- Time-Spent: 2 minutes 0 seconds

## 2023-01-16 ENCOUNTER — CARE COORDINATION (OUTPATIENT)
Dept: CARE COORDINATION | Age: 73
End: 2023-01-16

## 2023-01-16 NOTE — CARE COORDINATION
Remote Patient Monitoring Note      Date/Time:  1/16/2023 3:13 PM    CCSS reviewed patients reported daily Remote Patient Monitoring metrics. All reported metrics are within alert parameters. Plan/Follow Up:  Will continue to review, monitor and address alerts with follow up based on severity of symptoms and risk factors  Current Patient Metrics ---- Blood Pressure: 136/79, 70bpm Pulseox: 92%, 71bpm Survey: - Weight: 137.6lbs Note Created at: 01/16/2023 03:12 PM ET ---- Time-Spent: 2 minutes 0 seconds

## 2023-01-17 ENCOUNTER — CARE COORDINATION (OUTPATIENT)
Dept: CARE COORDINATION | Age: 73
End: 2023-01-17

## 2023-01-17 ENCOUNTER — HOSPITAL ENCOUNTER (OUTPATIENT)
Dept: ULTRASOUND IMAGING | Age: 73
Discharge: HOME | End: 2023-01-17
Payer: MEDICAID

## 2023-01-17 DIAGNOSIS — E89.0 POSTOPERATIVE HYPOTHYROIDISM: ICD-10-CM

## 2023-01-17 PROCEDURE — 76536 US EXAM OF HEAD AND NECK: CPT

## 2023-01-17 NOTE — CARE COORDINATION
Remote Patient Monitoring Note      Date/Time:  1/17/2023 3:14 PM    LPN reviewed patients reported daily Remote Patient Monitoring metrics. All reported metrics are within alert parameters. Plan/Follow Up:  Will continue to review, monitor and address alerts with follow up based on severity of symptoms and risk factors    -- Current Patient Metrics ---- Blood Pressure: 152/98, 80bpm Pulseox: 95%, 79bpm Survey: C Weight: 136.6lbs Note Created at: 01/17/2023 03:14 PM ET ---- Time-Spent: 3 minutes 0 seconds    Soheila Sullivan, [address] [facility] Transition Nurse/ RPM  [phone]

## 2023-01-18 ENCOUNTER — CARE COORDINATION (OUTPATIENT)
Dept: CARE COORDINATION | Age: 73
End: 2023-01-18

## 2023-01-18 NOTE — CARE COORDINATION
Remote Patient Monitoring Note      Date/Time:  1/18/2023 1:28 PM    EMTP reviewed patients reported daily Remote Patient Monitoring metrics. All reported metrics are within alert parameters. Plan/Follow Up:  Will continue to review, monitor and address alerts with follow up based on severity of symptoms and risk factors-- Current Patient Metrics ---- Blood Pressure: 116/76, 69bpm Pulseox: 93%, 76bpm Survey: C Weight: 137.2lbs Note Created at: 01/18/2023 01:28 PM ET ---- Time-Spent: 2 minutes 0 seconds

## 2023-01-19 ENCOUNTER — CARE COORDINATION (OUTPATIENT)
Dept: CARE COORDINATION | Age: 73
End: 2023-01-19

## 2023-01-19 NOTE — CARE COORDINATION
Remote Patient Monitoring Note      Date/Time:  1/19/2023 8:40 AM    LPN reviewed patients reported daily Remote Patient Monitoring metrics. All reported metrics are within alert parameters. Plan/Follow Up:  Will continue to review, monitor and address alerts with follow up based on severity of symptoms and risk factors    - Current Patient Metrics ---- Blood Pressure: 140/80, 73bpm Pulseox: 96%, 80bpm Survey: C Weight: 136.6lbs Note Created at: 01/19/2023 08:40 AM ET ---- Time-Spent: 3 minutes 0 seconds    Kiersten Vasquez, [address] [facility] Transition Nurse/ RPM  [phone]

## 2023-01-20 ENCOUNTER — CARE COORDINATION (OUTPATIENT)
Dept: CARE COORDINATION | Age: 73
End: 2023-01-20

## 2023-01-20 NOTE — CARE COORDINATION
Remote Patient Monitoring Note      Date/Time:  1/20/2023 9:09 AM    LPN reviewed patients reported daily Remote Patient Monitoring metrics.  All reported metrics are within alert parameters.      Plan/Follow Up: Will continue to review, monitor and address alerts with follow up based on severity of symptoms and risk factors    -- Current Patient Metrics ---- Blood Pressure: 144/79, 73bpm Pulseox: 98%, 73bpm Survey: C Weight: 137.8lbs Note Created at: 01/20/2023 09:09 AM ET ---- Time-Spent: 3 minutes 0 seconds    Christi Bojorquez LPN  [facility]/ Care Transition Nurse/ [facility]  [phone]

## 2023-01-23 ENCOUNTER — CARE COORDINATION (OUTPATIENT)
Dept: CARE COORDINATION | Age: 73
End: 2023-01-23

## 2023-01-23 NOTE — CARE COORDINATION
Remote Patient Monitoring Note      Date/Time:  1/23/2023 1:45 PM    CCSS reviewed patients reported daily Remote Patient Monitoring metrics. All reported metrics are within alert parameters. Plan/Follow Up:  Will continue to review, monitor and address alerts with follow up based on severity of symptoms and risk factors  Current Patient Metrics ---- Blood Pressure: 167/66, 80bpm Pulseox: 98%, 77bpm Survey: - Weight: 138.8lbs Note Created at: 01/23/2023 01:45 PM ET ---- Time-Spent: 2 minutes 0 seconds

## 2023-01-24 ENCOUNTER — CARE COORDINATION (OUTPATIENT)
Dept: CARE COORDINATION | Age: 73
End: 2023-01-24

## 2023-01-24 NOTE — CARE COORDINATION
Ambulatory Care Coordination Note  1/24/2023    ACC: Sunnybebo Downs    I spoke with the patient for continued Care Coordination follow up and education. Patient states she is doing well. Breathing is at baseline. Denies coughing or wheezing. No edema. We discussed Zone management tools for chronic disease(s) and patient denies current questions re: s/sx at this time. Patient remains active with RPM.  Educated on how to identify sx's that are worse than the baseline and the importance of early symptom recognition and reporting to prevent exacerbation which may lead to ED visits and hospital admissions. I advised patient to contact PCP office if needed. No further needs at this time. Lab Results       None            Care Coordination Interventions    Referral from Primary Care Provider: No  Suggested Interventions and Community Resources  Home Health Services: Declined  Medication Assistance Program: Not Started  Occupational Therapy: Not Started  Palliative Care: Not Started  Physical Therapy: Not Started  Registered Dietician: Not Started  Senior Services: Modesta Lincoln Work: Not Started  Transportation Support: Not Started  Zone Management Tools: In Process          Goals Addressed    None         Prior to Admission medications    Medication Sig Start Date End Date Taking?  Authorizing Provider   levothyroxine (SYNTHROID) 25 MCG tablet Take 1 tablet by mouth daily 1/5/23   Yovani Wiggins MD   hydrOXYzine HCl (ATARAX) 10 MG tablet Take 1-2 tablets by mouth every 6 hours as needed for Itching 12/13/22   Leobardo Burrell APRN - CNP   diclofenac sodium (VOLTAREN) 1 % GEL Apply 2 g topically 2 times daily 10/28/22   Tom Pall Counts APRN - CNP   vitamin D (ERGOCALCIFEROL) 1.25 MG (50376 UT) CAPS capsule Take 1 capsule by mouth once a week 10/14/22   Leobardo Solishmael APRN - CNP   apixaban (ELIQUIS) 5 MG TABS tablet Take 1 tablet by mouth 2 times daily 9/8/22   Ariadna Alatorre MD   aspirin EC 81 MG EC tablet Take 1 tablet by mouth daily 9/8/22   Sohan Vergara MD   rosuvastatin (CRESTOR) 5 MG tablet Take 1 tablet by mouth nightly 8/16/22   NITO Rosado CNP   albuterol sulfate HFA (VENTOLIN HFA) 108 (90 Base) MCG/ACT inhaler Inhale 2 puffs into the lungs 4 times daily as needed for Wheezing 8/16/22   NITO Rosado CNP   vitamin B-12 (CYANOCOBALAMIN) 1000 MCG tablet Take 1,000 mcg by mouth in the morning. Historical Provider, MD   gabapentin (NEURONTIN) 100 MG capsule Take 1 capsule by mouth in the morning and 1 capsule before bedtime. Do all this for 60 doses.  7/20/22 11/16/22  Chelsey Tariq MD   ferrous sulfate (IRON 325) 325 (65 Fe) MG tablet Take 1 tablet by mouth 2 times daily (with meals) 8/19/21   NITO Clayton CNP   ondansetron (ZOFRAN-ODT) 4 MG disintegrating tablet Take 1 tablet by mouth every 8 hours as needed for Nausea or Vomiting 8/19/21   NITO Alicea CNP   acetaminophen (TYLENOL) 325 MG tablet Take 2 tablets by mouth every 6 hours 8/19/21   NITO Alicea CNP   famotidine (PEPCID) 20 MG tablet Take 1 tablet by mouth daily 8/20/21   NITO Alicea CNP   Cholecalciferol (VITAMIN D) 25 MCG TABS Take 1 tablet by mouth daily 8/20/21   NITO Alicea CNP   guaiFENesin (MUCINEX) 600 MG extended release tablet Take 1 tablet by mouth 2 times daily 6/18/21   Omega Harden MD   diphenhydrAMINE (BENADRYL) 25 MG tablet Take 25 mg by mouth every 6 hours as needed for Itching    Historical Provider, MD       Future Appointments   Date Time Provider Zayra Upton   2/2/2023 10:30 AM Skylar Saenz MD  Oncology [facility] [address] [place] [address]   4/5/2023  3:45 PM Gerhardt Amato, MD AFL APEX AFL APEX END   5/16/2023  1:00 PM NITO Rosado - [address] [place] [address] [place] [address]   7/10/2023  2:45 PM Sohan Vergara MD N SRPX Heart [facility] [place] [address]

## 2023-01-25 ENCOUNTER — CARE COORDINATION (OUTPATIENT)
Dept: CARE COORDINATION | Age: 73
End: 2023-01-25

## 2023-01-25 NOTE — CARE COORDINATION
Remote Patient Monitoring Note      Date/Time:  1/25/2023 10:12 AM    EMTP reviewed patients reported daily Remote Patient Monitoring metrics. All reported metrics are within alert parameters. Plan/Follow Up:  Will continue to review, monitor and address alerts with follow up based on severity of symptoms and risk factors-- Current Patient Metrics ---- Blood Pressure: 168/61, 67bpm Pulseox: 97%, 74bpm Survey: C Weight: 138.2lbs Note Created at: 01/25/2023 10:12 AM ET ---- Time-Spent: 2 minutes 0 seconds

## 2023-01-26 ENCOUNTER — CARE COORDINATION (OUTPATIENT)
Dept: CARE COORDINATION | Age: 73
End: 2023-01-26

## 2023-01-26 NOTE — CARE COORDINATION
Remote Patient Monitoring Note      Date/Time:  1/26/2023 1:13 PM    EMTP reviewed patients reported daily Remote Patient Monitoring metrics. All reported metrics are within alert parameters. Plan/Follow Up:  Will continue to review, monitor and address alerts with follow up based on severity of symptoms and risk factors-- Current Patient Metrics ---- Blood Pressure: 140/73, 70bpm Pulseox: 97%, 70bpm Survey: C Weight: 138.6lbs Note Created at: 01/26/2023 01:13 PM ET ---- Time-Spent: 2 minutes 0 seconds

## 2023-01-27 ENCOUNTER — CARE COORDINATION (OUTPATIENT)
Dept: CARE COORDINATION | Age: 73
End: 2023-01-27

## 2023-01-27 NOTE — CARE COORDINATION
Remote Patient Monitoring Note            Date/Time:  1/27/2023 4:03 PM    Update: No return call received from patient. Date/Time:  1/27/2023 9:38 AM    --- Current Patient Metrics ---- Blood Pressure: 161/60, 81bpm Pulseox: 97%, 77bpm Survey: C Weight: 142.0lbs Note Created at: 01/27/2023 09:39 AM ET ---- Time-Spent: 5 minutes 0 seconds    LPN attempted to contact patient by telephone regarding red alert received for increased weight (142lb)    Background: COPD, CHF    Clinical Interventions:  VM full unable to LM. Plan/Follow Up: Will continue to review, monitor and address alerts with follow up based on severity of symptoms and risk factors.        Odette Durant LPN  [facility] Nurse/ RPM  [phone]

## 2023-01-30 ENCOUNTER — CARE COORDINATION (OUTPATIENT)
Dept: CARE COORDINATION | Age: 73
End: 2023-01-30

## 2023-01-30 NOTE — CARE COORDINATION
Remote Patient Monitoring Note            Date/Time:  1/30/2023 4:08 PM    Update: No return call received from patient. Date/Time:  1/30/2023 1:08 PM    - Current Patient Metrics ---- Blood Pressure: 172/94, 70bpm Pulseox: 96%, 69bpm Survey: C Weight: 139.8lbs Note Created at: 01/30/2023 01:09 PM ET ---- Time-Spent: 5 minutes 0 seconds    LPN attempted to contact patient by telephone regarding yellow alert received for blood pressure reading (172/94) received for no metrics for >2 days  . Background: COPD, CHF    Clinical Interventions:  Unable to LM, mailbox full. Plan/Follow Up: Will continue to review, monitor and address alerts with follow up based on severity of symptoms and risk factors.        Rama Williamson LPN  [facility] Nurse/ RPM  [phone]

## 2023-01-31 ENCOUNTER — CARE COORDINATION (OUTPATIENT)
Dept: CARE COORDINATION | Age: 73
End: 2023-01-31

## 2023-01-31 NOTE — CARE COORDINATION
Remote Patient Monitoring Note      Date/Time:  1/31/2023 1:13 PM    LPN reviewed patients reported daily Remote Patient Monitoring metrics. All reported metrics are within alert parameters. Plan/Follow Up:  Will continue to review, monitor and address alerts with follow up based on severity of symptoms and risk factors    -- Current Patient Metrics ---- Blood Pressure: 145/80, 75bpm Pulseox: 95%, 77bpm Survey: C Weight: 140.0lbs Note Created at: 01/31/2023 01:14 PM ET ---- Time-Spent: 3 minutes 0 seconds    Rama Williamson, [address] [facility] Transition Nurse/ RPM  [phone]

## 2023-02-01 ENCOUNTER — CARE COORDINATION (OUTPATIENT)
Dept: CARE COORDINATION | Age: 73
End: 2023-02-01

## 2023-02-01 NOTE — CARE COORDINATION
Remote Patient Monitoring Note      Date/Time:  2/1/2023 3:42 PM    EMTP reviewed patients reported daily Remote Patient Monitoring metrics. Patient has not updated daily metrics at this time. Plan/Follow Up:  Will continue to review, monitor and address alerts with follow up based on severity of symptoms and risk factors--- Current Patient Metrics ---- Blood Pressure: -/-, -bpm Pulseox: -%, -bpm Survey: - Weight: -lbs Note Created at: 02/01/2023 03:43 PM ET ---- Time-Spent: 2 minutes 0 seconds

## 2023-02-02 ENCOUNTER — CARE COORDINATION (OUTPATIENT)
Dept: CARE COORDINATION | Age: 73
End: 2023-02-02

## 2023-02-02 ENCOUNTER — TELEPHONE (OUTPATIENT)
Dept: ONCOLOGY | Age: 73
End: 2023-02-02

## 2023-02-02 NOTE — CARE COORDINATION
Remote Patient Monitoring Note      Date/Time:  2/2/2023 11:07 AM    LPN reviewed patients reported daily Remote Patient Monitoring metrics. All reported metrics are within alert parameters. Plan/Follow Up:  Will continue to review, monitor and address alerts with follow up based on severity of symptoms and risk factors     Current Patient Metrics ---- Blood Pressure: 144/66, 84bpm Pulseox: 98%, 100bpm Survey: C Weight: 138.8lbs Note Created at: 02/02/2023 11:08 AM ET ---- Time-Spent: 3 minutes 0 seconds    El Farmer, [address] [facility] Transition Nurse/ RPM  [phone]

## 2023-02-03 ENCOUNTER — CARE COORDINATION (OUTPATIENT)
Dept: CARE COORDINATION | Age: 73
End: 2023-02-03

## 2023-02-03 NOTE — CARE COORDINATION
Remote Patient Monitoring Note      Date/Time:  2/3/2023 11:38 AM    CCSS reviewed patients reported daily Remote Patient Monitoring metrics. All reported metrics are within alert parameters. Plan/Follow Up:  Will continue to review, monitor and address alerts with follow up based on severity of symptoms and risk factors   Current Patient Metrics ---- Blood Pressure: 149/60, 82bpm Pulseox: 96%, 91bpm Survey: C Weight: 136.2lbs Note Created at: 02/03/2023 11:39 AM ET ---- Time-Spent: 3 minutes 0 seconds

## 2023-02-06 ENCOUNTER — CARE COORDINATION (OUTPATIENT)
Dept: CARE COORDINATION | Age: 73
End: 2023-02-06

## 2023-02-06 NOTE — CARE COORDINATION
Remote Patient Monitoring Note      Date/Time:  2/6/2023 2:06 PM    EMTP reviewed patients reported daily Remote Patient Monitoring metrics. All reported metrics are within alert parameters. Plan/Follow Up:  Will continue to review, monitor and address alerts with follow up based on severity of symptoms and risk factors--- Current Patient Metrics ---- Blood Pressure: 136/52, 65bpm Pulseox: 92%, 64bpm Survey: C Weight: 135.8lbs Note Created at: 02/06/2023 02:07 PM ET ---- Time-Spent: 2 minutes 0 seconds

## 2023-02-07 ENCOUNTER — CARE COORDINATION (OUTPATIENT)
Dept: CARE COORDINATION | Age: 73
End: 2023-02-07

## 2023-02-07 ASSESSMENT — ENCOUNTER SYMPTOMS: DYSPNEA ASSOCIATED WITH: EXERTION

## 2023-02-07 NOTE — CARE COORDINATION
Ambulatory Care Coordination Note  2/7/2023    ACC: Leonila Kirby, RN    Spoke with Maine Sexton for continued Care Coordination follow up  Discussed RPM monitoring and red alert today  States her weight is going up because she is eating better and feeling better  States it's a therapeutic weight gain  States she is feeling better, sleeping better and eating better  Denies changes in breathing, denies edema  Discussed No Show appt with oncology  Informed her that they were trying to call her to reschedule  I have her the phone number to that office and she is going to call and reschedule appt    Plan  Ensure follow up set with oncology office  Encourage RPM participation  Reinforce importance of monitoring for changes in baseline including changes in breathing, increased edema for possible exacerbation and the importance of reporting those changes for early intervention  Congestive Heart Failure Assessment    Are you currently restricting fluids?: No Restriction  Do you understand a low sodium diet?: Yes  Do you salt your food before tasting it?: No         Symptoms:  CHF associated angina: Neg, CHF associated dyspnea on exertion: Pos, CHF associated fatigue: Neg, CHF associated leg swelling: Neg, CHF associated orthostatic hypotension: Neg, CHF associated PND: Neg, CHF associated shortness of breath: Neg, CHF associated weakness: Neg      Symptom course: stable  Patient-reported weight (lb): 137 (Comment: gradual weight gain is from improved eating)      and   COPD Assessment    Does the patient understand envrionmental exposure?: Yes  Is the patient able to verbalize Rescue vs. Long Acting medications?: No  Does the patient have a nebulizer?: No  Does the patient use a space with inhaled medications?: No            Symptoms:     Symptom course: stable  Breathlessness: exertion  Increase use of rapid acting/rescue inhaled medications?: No  Change in chronic cough?: No/At Baseline  Change in sputum?: No/At Baseline Offered patient enrollment in the Remote Patient Monitoring (RPM) program for in-home monitoring: Yes, patient enrolled. Lab Results       None            Care Coordination Interventions    Referral from Primary Care Provider: No  Suggested Interventions and Community Resources  Home Health Services: Declined  Medication Assistance Program: Not Started  Occupational Therapy: Not Started  Palliative Care: Not Started  Physical Therapy: Not Started  Registered Dietician: Not Started  Senior Services: Modesta Lincoln Work: Not Started  Transportation Support: Not Started  Zone Management Tools: In Process          Goals Addressed                      This Visit's Progress      Conditions and Symptoms (pt-stated)   On track      I will schedule office visits, as directed by my provider. I will keep my appointment or reschedule if I have to cancel. I will notify my provider of any barriers to my plan of care. I will follow my Zone Management tool to seek urgent or emergent care. I will notify my provider of any symptoms that indicate a worsening of my condition. Barriers: need for  additional support and education  Plan for overcoming my barriers: family and ACM support  Confidence: 9/10  Anticipated Goal Completion Date: 2/21/23                Prior to Admission medications    Medication Sig Start Date End Date Taking?  Authorizing Provider   levothyroxine (SYNTHROID) 25 MCG tablet Take 1 tablet by mouth daily 1/5/23  Yes Paulette Viera MD   hydrOXYzine HCl (ATARAX) 10 MG tablet Take 1-2 tablets by mouth every 6 hours as needed for Itching 12/13/22  Yes NITO Long CNP   diclofenac sodium (VOLTAREN) 1 % GEL Apply 2 g topically 2 times daily 10/28/22  Yes NITO Madera CNP   vitamin D (ERGOCALCIFEROL) 1.25 MG (52402 UT) CAPS capsule Take 1 capsule by mouth once a week 10/14/22  Yes NITO Long CNP   apixaban (ELIQUIS) 5 MG TABS tablet Take 1 tablet by mouth 2 times daily 9/8/22  Yes Cristal Lynn MD   aspirin EC 81 MG EC tablet Take 1 tablet by mouth daily 9/8/22  Yes Cristal Lynn MD   rosuvastatin (CRESTOR) 5 MG tablet Take 1 tablet by mouth nightly 8/16/22  Yes NITO Vieira CNP   albuterol sulfate HFA (VENTOLIN HFA) 108 (90 Base) MCG/ACT inhaler Inhale 2 puffs into the lungs 4 times daily as needed for Wheezing 8/16/22  Yes NITO Vieira CNP   vitamin B-12 (CYANOCOBALAMIN) 1000 MCG tablet Take 1,000 mcg by mouth in the morning. Yes Historical Provider, MD   ferrous sulfate (IRON 325) 325 (65 Fe) MG tablet Take 1 tablet by mouth 2 times daily (with meals) 8/19/21  Yes NITO Clayton CNP   ondansetron (ZOFRAN-ODT) 4 MG disintegrating tablet Take 1 tablet by mouth every 8 hours as needed for Nausea or Vomiting 8/19/21  Yes NITO Clayton CNP   acetaminophen (TYLENOL) 325 MG tablet Take 2 tablets by mouth every 6 hours 8/19/21  Yes NITO Clayton CNP   famotidine (PEPCID) 20 MG tablet Take 1 tablet by mouth daily 8/20/21  Yes NITO Clayton CNP   Cholecalciferol (VITAMIN D) 25 MCG TABS Take 1 tablet by mouth daily 8/20/21  Yes NITO Clayton CNP   guaiFENesin (MUCINEX) 600 MG extended release tablet Take 1 tablet by mouth 2 times daily 6/18/21  Yes Inez Habermann, MD   diphenhydrAMINE (BENADRYL) 25 MG tablet Take 25 mg by mouth every 6 hours as needed for Itching   Yes Historical Provider, MD   gabapentin (NEURONTIN) 100 MG capsule Take 1 capsule by mouth in the morning and 1 capsule before bedtime. Do all this for 60 doses.  7/20/22 11/16/22  Marcia Hernandez MD       Future Appointments   Date Time Provider Zayra Upton   4/5/2023  3:45 PM Rowan Johnson MD [facility]   5/16/2023  1:00 PM Luc Kingsley APRN - [id number] [facility]   7/10/2023  2:45 PM [facility]

## 2023-02-08 ENCOUNTER — CARE COORDINATION (OUTPATIENT)
Dept: CARE COORDINATION | Age: 73
End: 2023-02-08

## 2023-02-08 NOTE — CARE COORDINATION
Remote Patient Monitoring Note      Date/Time:  2/8/2023 1:27 PM  LPN noted  red alert received for weight inrease of 5lbs in 7 days. Background: pt enrolled for copd and chf    Clinical Interventions:  Weight increase noted but pt appears to be around her baseline of 137-140lbs, will cont to monitor,no outreach needed      Plan/Follow Up: Will continue to review, monitor and address alerts with follow up based on severity of symptoms and risk factors.       ---- Current Patient Metrics ---- Blood Pressure: 138/52, 73bpm Pulseox: 98%, 64bpm Survey: C Weight: 137.8lbs Note Created at: 02/08/2023 01:29 PM ET ---- Time-Spent: 3 minutes 0 seconds

## 2023-02-09 ENCOUNTER — CARE COORDINATION (OUTPATIENT)
Dept: CARE COORDINATION | Age: 73
End: 2023-02-09

## 2023-02-09 NOTE — CARE COORDINATION
Remote Patient Monitoring Note      Date/Time:  2/9/2023 9:07 AM    -- Current Patient Metrics ---- Blood Pressure: 123/51, 70bpm Pulseox: 95%, 72bpm Survey: C Weight: 137.6lbs Note Created at: 02/09/2023 09:08 AM ET ---- Time-Spent: 5 minutes 0 seconds    LPN noted red alert for weight increase (137.6lb)    Background: COPD, CHF    Clinical Interventions:   Weight increase noted but pt appears to be around her baseline of 137-140lbs, will cont to monitor,no outreach needed.    Plan/Follow Up: Will continue to review, monitor and address alerts with follow up based on severity of symptoms and risk factors.       Christi Bojorquez LPN  [facility]/ Care Transition Nurse/ [facility]  [phone]

## 2023-02-10 ENCOUNTER — HOSPITAL ENCOUNTER (OUTPATIENT)
Dept: INFUSION THERAPY | Age: 73
Discharge: HOME | End: 2023-02-10
Payer: MEDICARE

## 2023-02-10 ENCOUNTER — OFFICE VISIT (OUTPATIENT)
Dept: ONCOLOGY | Age: 73
End: 2023-02-10
Payer: MEDICARE

## 2023-02-10 ENCOUNTER — CARE COORDINATION (OUTPATIENT)
Dept: CARE COORDINATION | Age: 73
End: 2023-02-10

## 2023-02-10 VITALS
DIASTOLIC BLOOD PRESSURE: 82 MMHG | BODY MASS INDEX: 23.93 KG/M2 | RESPIRATION RATE: 20 BRPM | WEIGHT: 140.2 LBS | TEMPERATURE: 98.4 F | OXYGEN SATURATION: 92 % | SYSTOLIC BLOOD PRESSURE: 133 MMHG | HEIGHT: 64 IN | HEART RATE: 91 BPM

## 2023-02-10 VITALS
OXYGEN SATURATION: 92 % | WEIGHT: 140.2 LBS | BODY MASS INDEX: 23.93 KG/M2 | DIASTOLIC BLOOD PRESSURE: 82 MMHG | HEART RATE: 91 BPM | RESPIRATION RATE: 20 BRPM | SYSTOLIC BLOOD PRESSURE: 133 MMHG | HEIGHT: 64 IN | TEMPERATURE: 98.4 F

## 2023-02-10 DIAGNOSIS — C34.31 PRIMARY MALIGNANT NEOPLASM OF RIGHT LOWER LOBE OF LUNG (HCC): ICD-10-CM

## 2023-02-10 DIAGNOSIS — Z85.3 HISTORY OF RIGHT BREAST CANCER: ICD-10-CM

## 2023-02-10 DIAGNOSIS — Z12.31 ENCOUNTER FOR SCREENING MAMMOGRAM FOR MALIGNANT NEOPLASM OF BREAST: Primary | ICD-10-CM

## 2023-02-10 DIAGNOSIS — C50.811 MALIGNANT NEOPLASM OF OVERLAPPING SITES OF RIGHT BREAST IN FEMALE, ESTROGEN RECEPTOR NEGATIVE (HCC): ICD-10-CM

## 2023-02-10 DIAGNOSIS — Z17.1 MALIGNANT NEOPLASM OF OVERLAPPING SITES OF RIGHT BREAST IN FEMALE, ESTROGEN RECEPTOR NEGATIVE (HCC): ICD-10-CM

## 2023-02-10 DIAGNOSIS — Z72.0 TOBACCO ABUSE: ICD-10-CM

## 2023-02-10 PROCEDURE — 1123F ACP DISCUSS/DSCN MKR DOCD: CPT | Performed by: NURSE PRACTITIONER

## 2023-02-10 PROCEDURE — 99211 OFF/OP EST MAY X REQ PHY/QHP: CPT

## 2023-02-10 PROCEDURE — 1090F PRES/ABSN URINE INCON ASSESS: CPT | Performed by: NURSE PRACTITIONER

## 2023-02-10 PROCEDURE — G8399 PT W/DXA RESULTS DOCUMENT: HCPCS | Performed by: NURSE PRACTITIONER

## 2023-02-10 PROCEDURE — G8484 FLU IMMUNIZE NO ADMIN: HCPCS | Performed by: NURSE PRACTITIONER

## 2023-02-10 PROCEDURE — G8427 DOCREV CUR MEDS BY ELIG CLIN: HCPCS | Performed by: NURSE PRACTITIONER

## 2023-02-10 PROCEDURE — 3017F COLORECTAL CA SCREEN DOC REV: CPT | Performed by: NURSE PRACTITIONER

## 2023-02-10 PROCEDURE — G8420 CALC BMI NORM PARAMETERS: HCPCS | Performed by: NURSE PRACTITIONER

## 2023-02-10 PROCEDURE — 4004F PT TOBACCO SCREEN RCVD TLK: CPT | Performed by: NURSE PRACTITIONER

## 2023-02-10 PROCEDURE — 99215 OFFICE O/P EST HI 40 MIN: CPT | Performed by: NURSE PRACTITIONER

## 2023-02-10 NOTE — PATIENT INSTRUCTIONS
Return to clinic in 3 mos to see Dr. Lino Santillan due after 3/17/2023-order placed please schedule.
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
## 2023-02-10 NOTE — CARE COORDINATION
No metrics entered today.  ---- Current Patient Metrics ---- Blood Pressure: -/-, -bpm Pulseox: -%, -bpm Survey: - Weight: -lbs Note Created at: 02/10/2023 04:09 PM ET ---- Time-Spent: 3 minutes 0 seconds    Thierno Omer, [address] Transition Nurse/RPM  [phone]

## 2023-02-13 ENCOUNTER — CARE COORDINATION (OUTPATIENT)
Dept: CARE COORDINATION | Age: 73
End: 2023-02-13

## 2023-02-13 NOTE — CARE COORDINATION
Remote Patient Monitoring Note      Date/Time:  2/13/2023 3:08 PM    EMTP reviewed patients reported daily Remote Patient Monitoring metrics. Patient has not updated daily metrics at this time. Plan/Follow Up:  Will continue to review, monitor and address alerts with follow up based on severity of symptoms and risk factors-- Current Patient Metrics ---- Blood Pressure: -/-, -bpm Pulseox: -%, -bpm Survey: - Weight: -lbs Note Created at: 02/13/2023 03:08 PM ET ---- Time-Spent: 2 minutes 0 seconds

## 2023-02-14 ENCOUNTER — CARE COORDINATION (OUTPATIENT)
Dept: CARE COORDINATION | Age: 73
End: 2023-02-14

## 2023-02-14 NOTE — CARE COORDINATION
Remote Patient Monitoring Note      Date/Time:  2023 3:52 PM    --- Current Patient Metrics ---- Blood Pressure: -/-, -bpm Pulseox: -%, -bpm Survey: - Weight: -lbs Note Created at: 2023 03:54 PM ET ---- Time-Spent: 7 minutes 0 seconds    LPN contacted patient by telephone regarding yellow alert received for no metrics for several days. Verified patients name and  as identifiers. Background: COPD, CHF    Clinical Interventions:  Patient states they have been moving in to a new house and are hoping to restart monitoring tomorrow. Will continue to monitor. Plan/Follow Up: Will continue to review, monitor and address alerts with follow up based on severity of symptoms and risk factors.        Hu Lopez LPN  [facility] Nurse/ RPM  [phone]

## 2023-02-15 ENCOUNTER — CARE COORDINATION (OUTPATIENT)
Dept: CARE COORDINATION | Age: 73
End: 2023-02-15

## 2023-02-15 NOTE — CARE COORDINATION
No metrics entered today.  ---- Current Patient Metrics ---- Blood Pressure: -/-, -bpm Pulseox: -%, -bpm Survey: - Weight: -lbs Note Created at: 02/15/2023 02:03 PM ET ---- Time-Spent: 3 minutes 0 seconds    Isaias Vargas, [address] Transition Nurse/RPM  [phone]

## 2023-02-16 ENCOUNTER — CARE COORDINATION (OUTPATIENT)
Dept: CARE COORDINATION | Age: 73
End: 2023-02-16

## 2023-02-17 ENCOUNTER — CARE COORDINATION (OUTPATIENT)
Dept: CARE COORDINATION | Age: 73
End: 2023-02-17

## 2023-02-17 NOTE — CARE COORDINATION
Remote Patient Monitoring Note      Date/Time:  2/17/2023 9:03 AM    CCSS reviewed patients reported daily Remote Patient Monitoring metrics. All reported metrics are within alert parameters. Plan/Follow Up:  Will continue to review, monitor and address alerts with follow up based on severity of symptoms and risk factors  Current Patient Metrics ---- Blood Pressure: 136/62, 65bpm Pulseox: 97%, 69bpm Survey: C Weight: 138.2lbs Note Created at: 02/17/2023 09:04 AM ET ---- Time-Spent: 2 minutes 0 seconds

## 2023-02-20 ENCOUNTER — CARE COORDINATION (OUTPATIENT)
Dept: CARE COORDINATION | Age: 73
End: 2023-02-20

## 2023-02-20 NOTE — CARE COORDINATION
Remote Patient Monitoring Note      Date/Time:  2/20/2023 10:14 AM    CCSS reviewed patients reported daily Remote Patient Monitoring metrics. All reported metrics are within alert parameters. Plan/Follow Up:  Will continue to review, monitor and address alerts with follow up based on severity of symptoms and risk factors  Current Patient Metrics ---- Blood Pressure: 140/75, 68bpm Pulseox: 97%, 63bpm Survey: C Weight: 139.0lbs Note Created at: 02/20/2023 10:14 AM ET ---- Time-Spent: 2 minutes 0 seconds

## 2023-02-21 ENCOUNTER — CARE COORDINATION (OUTPATIENT)
Dept: CARE COORDINATION | Age: 73
End: 2023-02-21

## 2023-02-21 ASSESSMENT — ENCOUNTER SYMPTOMS: DYSPNEA ASSOCIATED WITH: EXERTION

## 2023-02-21 NOTE — CARE COORDINATION
Ambulatory Care Coordination Note  2023    Patient Current Location:  Home: [address]  [place] [address]     ACM contacted the patient by telephone. Verified name and  with patient as identifiers. Provided introduction to self, and explanation of the ACM role. Challenges to be reviewed by the provider   Additional needs identified to be addressed with provider: No  none               Method of communication with provider: none. ACM: Selena Hdz, RN    Spoke with Carmen Pierre  She remains stable for this review  Continues to participate in RPM monitoring  States her weight is gradually going up due to her eating better and not related to exacerbation of her chronic conditions  Remains at her baseline     Plan  Ensure follow up set with oncology office  Encourage RPM participation  Reinforce importance of monitoring for changes in baseline including changes in breathing, increased edema for possible exacerbation and the importance of reporting those changes for early intervention      Offered patient enrollment in the Remote Patient Monitoring (RPM) program for in-home monitoring: Yes, patient already enrolled.     Congestive Heart Failure Assessment    Are you currently restricting fluids?: No Restriction  Do you understand a low sodium diet?: Yes  Do you salt your food before tasting it?: No         Symptoms:  CHF associated angina: Neg, CHF associated dyspnea on exertion: Pos, CHF associated fatigue: Neg, CHF associated leg swelling: Neg, CHF associated orthostatic hypotension: Neg, CHF associated PND: Neg, CHF associated shortness of breath: Neg, CHF associated weakness: Neg      Symptom course: stable  Patient-reported weight (lb):  (Comment: daily weight monitoring on RPM)      and   COPD Assessment    Does the patient understand envrionmental exposure?: Yes  Is the patient able to verbalize Rescue vs. Long Acting medications?: No  Does the patient have a nebulizer?: No  Does the patient use a space with inhaled medications?: No            Symptoms:     Symptom course: stable  Breathlessness: exertion  Increase use of rapid acting/rescue inhaled medications?: No  Change in chronic cough?: No/At Baseline  Change in sputum?: No/At Baseline  Have you had a recent diagnosis of pneumonia either by PCP or at a hospital?: No           Lab Results       None            Care Coordination Interventions    Referral from Primary Care Provider: No  Suggested Interventions and [address]: Declined  Medication Assistance Program: Not Started  Occupational Therapy: Not Started  Palliative Care: Not Started  Physical Therapy: Not Started  Registered Dietician: Not Started  Senior Services: Modesta Lincoln Work: Not Started  Transportation Support: Not Started  Zone Management Tools: In Process          Goals Addressed                      This Visit's Progress      Conditions and Symptoms (pt-stated)         I will schedule office visits, as directed by my provider. I will keep my appointment or reschedule if I have to cancel. I will notify my provider of any barriers to my plan of care. I will follow my Zone Management tool to seek urgent or emergent care. I will notify my provider of any symptoms that indicate a worsening of my condition.     Barriers: need for  additional support and education  Plan for overcoming my barriers: family and ACM support  Confidence: 9/10  Anticipated Goal Completion Date: 2/21/23 Update 3-21-23          Stop Cigarette/Tobacco use   On track            Future Appointments   Date Time Provider Zayra Upton   3/20/2023  2:20 PM STR MAMMOGRAPHY RM2  Ariel Sella WOMEN STR Radiolog   4/5/2023  3:45 PM Zelda Mendoza MD AFL APEX AFL APEX END   5/10/2023  3:00 PM Jenn Briones MD N Oncology [address]   5/16/2023  1:00 PM Ruth Ellison APRN - CNP Fam Medicine [address]   7/10/2023  2:45 PM Dano Brandon MD N SRPX Heart [address]

## 2023-02-21 NOTE — CARE COORDINATION
Remote Patient Monitoring Note      Date/Time:  2/21/2023 10:53 AM    CCSS reviewed patients reported daily Remote Patient Monitoring metrics. All reported metrics are within alert parameters. Plan/Follow Up:  Will continue to review, monitor and address alerts with follow up based on severity of symptoms and risk factors  Current Patient Metrics ---- Blood Pressure: 136/69, 79bpm Pulseox: 92%, 78bpm Survey: - Weight: 139.2lbs Note Created at: 02/21/2023 10:54 AM ET ---- Time-Spent: 2 minutes 0 seconds

## 2023-02-22 ENCOUNTER — CARE COORDINATION (OUTPATIENT)
Dept: CARE COORDINATION | Age: 73
End: 2023-02-22

## 2023-02-22 NOTE — CARE COORDINATION
Remote Patient Monitoring Note      Date/Time:  2/22/2023 1:05 PM    CCSS reviewed patients reported daily Remote Patient Monitoring metrics. All reported metrics are within alert parameters. Plan/Follow Up:  Will continue to review, monitor and address alerts with follow up based on severity of symptoms and risk factors   Current Patient Metrics ---- Blood Pressure: 131/69, 76bpm Pulseox: 97%, 73bpm Survey: C Weight: 138.4lbs Note Created at: 02/22/2023 01:05 PM ET ---- Time-Spent: 2 minutes 0 seconds

## 2023-02-23 ENCOUNTER — CARE COORDINATION (OUTPATIENT)
Dept: CARE COORDINATION | Age: 73
End: 2023-02-23

## 2023-02-23 NOTE — CARE COORDINATION
Remote Patient Monitoring Note      Date/Time:  2/23/2023 12:29 PM    CCSS reviewed patients reported daily Remote Patient Monitoring metrics. All reported metrics are within alert parameters. Plan/Follow Up:  Will continue to review, monitor and address alerts with follow up based on severity of symptoms and risk factors  Current Patient Metrics ---- Blood Pressure: 124/62, 66bpm Pulseox: 94%, 68bpm Survey: C Weight: 139.0lbs Note Created at: 02/23/2023 12:29 PM ET ---- Time-Spent: 2 minutes 0 seconds

## 2023-02-24 ENCOUNTER — CARE COORDINATION (OUTPATIENT)
Dept: CARE COORDINATION | Age: 73
End: 2023-02-24

## 2023-02-24 NOTE — CARE COORDINATION
Remote Patient Monitoring Note      Date/Time:  2/24/2023 12:24 PM    CCSS reviewed patients reported daily Remote Patient Monitoring metrics. All reported metrics are within alert parameters. Plan/Follow Up:  Will continue to review, monitor and address alerts with follow up based on severity of symptoms and risk factors  Current Patient Metrics ---- Blood Pressure: 109/59, 82bpm Pulseox: 98%, 87bpm Survey: C Weight: 138.0lbs Note Created at: 02/24/2023 12:24 PM ET ---- Time-Spent: 2 minutes 0 seconds

## 2023-02-27 ENCOUNTER — CARE COORDINATION (OUTPATIENT)
Dept: CARE COORDINATION | Age: 73
End: 2023-02-27

## 2023-02-27 NOTE — CARE COORDINATION
No metrics entered today.  ---- Current Patient Metrics ---- Blood Pressure: -/-, -bpm Pulseox: -%, -bpm Survey: - Weight: -lbs Note Created at: 02/27/2023 03:19 PM ET ---- Time-Spent: 3 minutes 0 seconds    Toby Waldrop [address] [place] [address]/ CTN/ Remote Patient monitoring  [phone]

## 2023-02-28 ENCOUNTER — CARE COORDINATION (OUTPATIENT)
Dept: CARE COORDINATION | Age: 73
End: 2023-02-28

## 2023-02-28 NOTE — CARE COORDINATION
Remote Patient Monitoring Note      Date/Time:  2/28/2023 3:27 PM    EMTP reviewed patients reported daily Remote Patient Monitoring metrics. Patient has not updated daily metrics at this time. Plan/Follow Up:  Will continue to review, monitor and address alerts with follow up based on severity of symptoms and risk factors-- Current Patient Metrics ---- Blood Pressure: -/-, -bpm Pulseox: -%, -bpm Survey: - Weight: -lbs Note Created at: 02/28/2023 03:28 PM ET ---- Time-Spent: 2 minutes 0 seconds

## 2023-03-01 ENCOUNTER — CARE COORDINATION (OUTPATIENT)
Dept: CARE COORDINATION | Age: 73
End: 2023-03-01

## 2023-03-01 NOTE — CARE COORDINATION
Remote Patient Monitoring Note      Date/Time:  3/1/2023 3:37 PM  Patient Current Location: Home: [address]  [address]  [facility] [address]  - Current Patient Metrics ---- Blood Pressure: -/-, -bpm Pulseox: -%, -bpm Survey: - Weight: -lbs Note Created at: 2023 03:39 PM ET ---- Time-Spent: 5 minutes 0 seconds    LPN contacted patient by telephone regarding yellow alert received for no metrics for >2 days. Verified patients name and  as identifiers.      Background: COPD, CHF    Clinical Interventions: Patient states she has been moving and will resume monitoring tomorrow.  Plan/Follow Up: Will continue to review, monitor and address alerts with follow up based on severity of symptoms and risk factors.       FRANCES Mcbride [facility]/ CTN/ Remote Patient Monitoring  [phone]

## 2023-03-02 ENCOUNTER — CARE COORDINATION (OUTPATIENT)
Dept: CARE COORDINATION | Age: 73
End: 2023-03-02

## 2023-03-02 NOTE — CARE COORDINATION
No metrics entered today.  ---- Current Patient Metrics ---- Blood Pressure: -/-, -bpm Pulseox: -%, -bpm Survey: - Weight: -lbs Note Created at: 03/02/2023 01:54 PM ET ---- Time-Spent: 3 minutes 0 seconds    [facility] RossAs[facility] Hospital[facility]oss[facility] [address] [place] [address]/ CTN/ Remote Patient monitoring  [phone]

## 2023-03-03 ENCOUNTER — CARE COORDINATION (OUTPATIENT)
Dept: CARE COORDINATION | Age: 73
End: 2023-03-03

## 2023-03-03 ASSESSMENT — ENCOUNTER SYMPTOMS: DYSPNEA ASSOCIATED WITH: EXERTION

## 2023-03-03 NOTE — CARE COORDINATION
Ambulatory Care Coordination Note  3/3/2023    Patient Current Location:  Home: [address] [address]     ACM contacted the patient by telephone. Verified name and  with patient as identifiers. Provided introduction to self, and explanation of the ACM role. Challenges to be reviewed by the provider   Additional needs identified to be addressed with provider: No  none               Method of communication with provider: none.     ACM: Leigh Casey, RN    Spoke with Ronald Dixon for continued Care Coordination follow up  States she is stable for this review   Discussed RPM monitoring that she hasn't inputted any metrics lately  States she has been moving and will start inputting again tomorrow  Discussed the importance of putting in metrics and continued participation in program and she agreed  All chronic conditions at baseline    Plan  Encourage continued RPM participation  Ensure no changes from baseline of chronic conditions  Reinforce the importance of early symptom recognition and reporting to prevent exacerbation and unnecessary hospitalization  Congestive Heart Failure Assessment    Are you currently restricting fluids?: No Restriction  Do you understand a low sodium diet?: Yes  Do you salt your food before tasting it?: No         Symptoms:  CHF associated angina: Neg, CHF associated dyspnea on exertion: Pos, CHF associated fatigue: Neg, CHF associated leg swelling: Neg, CHF associated orthostatic hypotension: Neg, CHF associated PND: Neg, CHF associated shortness of breath: Neg, CHF associated weakness: Neg      Symptom course: stable  Patient-reported weight (lb):  (Comment: weighs daily with RPM monitoring)      and   COPD Assessment    Does the patient understand envrionmental exposure?: Yes  Is the patient able to verbalize Rescue vs. Long Acting medications?: No  Does the patient have a nebulizer?: No  Does the patient use a space with inhaled medications?: No            Symptoms: Symptom course: stable  Breathlessness: exertion  Increase use of rapid acting/rescue inhaled medications?: No  Change in chronic cough?: No/At Baseline  Change in sputum?: No/At Baseline           Offered patient enrollment in the Remote Patient Monitoring (RPM) program for in-home monitoring: Yes, patient already enrolled. Lab Results       None            Care Coordination Interventions    Referral from Primary Care Provider: No  Suggested Interventions and Community Resources  Home Health Services: Declined  Medication Assistance Program: Not Started  Occupational Therapy: Not Started  Palliative Care: Not Started  Physical Therapy: Not Started  Registered Dietician: Not Started  Senior Services: Modesta Lincoln Work: Not Started  Transportation Support: Not Started  Zone Management Tools:  In Process          Goals Addressed                   This Visit's Progress     Stop Cigarette/Tobacco use   On track            Future Appointments   Date Time Provider Zayra Upton   3/20/2023  2:20 PM STR MAMMOGRAPHY RM2  Emilie Alvine WOMEN STR Radiolog   4/5/2023  3:45 PM Margaret Navarro MD AFL APEX AFL APEX END   5/10/2023  3:00 PM Kamala Beltrán MD N Oncology [address]   5/16/2023  1:00 PM Elina Darden APRN - CNP Fam Medicine [address]   7/10/2023  2:45 PM Naveed Odell MD N SRPX Heart [address]

## 2023-03-03 NOTE — CARE COORDINATION
No metrics entered today.  ---- Current Patient Metrics ---- Blood Pressure: -/-, -bpm Pulseox: -%, -bpm Survey: - Weight: -lbs Note Created at: 03/03/2023 01:44 PM ET ---- Time-Spent: 3 minutes 0 seconds    [facility] ValenciaSt[facility] Hospital[facility] [address] [place] [address]/ CTN/ Remote Patient monitoring  [phone]

## 2023-03-06 ENCOUNTER — CARE COORDINATION (OUTPATIENT)
Dept: CARE COORDINATION | Age: 73
End: 2023-03-06

## 2023-03-06 NOTE — CARE COORDINATION
Remote Patient Monitoring Note      Date/Time:  3/6/2023 8:45 AM    CCSS reviewed patients reported daily Remote Patient Monitoring metrics. All reported metrics are within alert parameters. Plan/Follow Up:  Will continue to review, monitor and address alerts with follow up based on severity of symptoms and risk factors  Current Patient Metrics ---- Blood Pressure: 127/57, 75bpm Pulseox: 95%, 78bpm Survey: C Weight: 136.6lbs Note Created at: 03/06/2023 08:45 AM ET ---- Time-Spent: 2 minutes 0 seconds

## 2023-03-07 ENCOUNTER — CARE COORDINATION (OUTPATIENT)
Dept: CARE COORDINATION | Age: 73
End: 2023-03-07

## 2023-03-07 NOTE — CARE COORDINATION
Remote Patient Monitoring Note      Date/Time:  3/7/2023 3:33 PM    EMTP reviewed patients reported daily Remote Patient Monitoring metrics. Patient has not updated daily metrics at this time. Plan/Follow Up:  Will continue to review, monitor and address alerts with follow up based on severity of symptoms and risk factors--- Current Patient Metrics ---- Blood Pressure: -/-, -bpm Pulseox: -%, -bpm Survey: - Weight: -lbs Note Created at: 03/07/2023 03:34 PM ET ---- Time-Spent: 2 minutes 0 seconds

## 2023-03-08 ENCOUNTER — CARE COORDINATION (OUTPATIENT)
Dept: CARE COORDINATION | Age: 73
End: 2023-03-08

## 2023-03-08 NOTE — CARE COORDINATION
Remote Patient Monitoring Note      Date/Time:  3/8/2023 10:19 AM    CCSS reviewed patients reported daily Remote Patient Monitoring metrics. All reported metrics are within alert parameters. Plan/Follow Up:  Will continue to review, monitor and address alerts with follow up based on severity of symptoms and risk factors  Current Patient Metrics ---- Blood Pressure: 130/63, 75bpm Pulseox: 93%, 75bpm Survey: C Weight: 134.8lbs Note Created at: 03/08/2023 10:19 AM ET ---- Time-Spent: 2 minutes 0 seconds

## 2023-03-09 ENCOUNTER — CARE COORDINATION (OUTPATIENT)
Dept: CARE COORDINATION | Age: 73
End: 2023-03-09

## 2023-03-09 NOTE — CARE COORDINATION
Remote Patient Monitoring Note      Date/Time:  3/9/2023 3:15 PM    CCSS reviewed patients reported daily Remote Patient Monitoring metrics. Patient has not updated daily metrics at this time. Plan/Follow Up:  Will continue to review, monitor and address alerts with follow up based on severity of symptoms and risk factors

## 2023-03-10 ENCOUNTER — CARE COORDINATION (OUTPATIENT)
Dept: CARE COORDINATION | Age: 73
End: 2023-03-10

## 2023-03-10 NOTE — CARE COORDINATION
No metrics entered today.  ---- Current Patient Metrics ---- Blood Pressure: -/-, -bpm Pulseox: -%, -bpm Survey: - Weight: -lbs Note Created at: 03/10/2023 02:31 PM ET ---- Time-Spent: 4 minutes 0 seconds    Vilinda Duverney, [address] [place] [address]/ CTN/ Remote Patient monitoring  [phone]

## 2023-03-13 ENCOUNTER — CARE COORDINATION (OUTPATIENT)
Dept: CARE COORDINATION | Age: 73
End: 2023-03-13

## 2023-03-13 NOTE — CARE COORDINATION
Remote Patient Monitoring Note      Date/Time:  3/13/2023 3:10 PM  -- Time-Spent: 5 minutes 0 seconds  Patient Current Location: Home: [address] [address]    LPN contacted patient by telephone regarding yellow alert received for no metrics for 4 days. Verified patients name and  as identifiers. Background: COPD, CHF    Clinical Interventions:  Writer spoke with patient, she states she wishes to D/C the program.  Will update ACM for graduation order. Plan/Follow Up: Will continue to review, monitor and address alerts with follow up based on severity of symptoms and risk factors.        Corey Jensen LPN  [facility]/ CTN/ Remote Patient Monitoring  [phone]

## 2023-03-14 ENCOUNTER — CARE COORDINATION (OUTPATIENT)
Dept: CARE COORDINATION | Age: 73
End: 2023-03-14

## 2023-03-14 NOTE — CARE COORDINATION
Could RPM team please assist with turning back in RPM equipment. Leesavic Ashley is graduating from program.    Remote Patient Monitoring Graduation      Date/Time:  3/14/2023 10:07 AM  [place]  Patient has graduated from the Remote Patient Monitoring program on 3/14/2023. RPM goals have been met at this time. Patient has been provided instruction on process to return RPM equipment and RPM has been deactivated. Patient has ACM's contact information for any further questions, concerns, or needs.

## 2023-03-14 NOTE — CARE COORDINATION
EMTP placed call to patient to arrange RPM kit  through [facility]. Reviewed with patient how to pack equipment in original packing. Verified patient's availability to schedule UPS  time. UPS  time requested.  Anticipated  date range : Anytime

## 2023-03-14 NOTE — CARE COORDINATION
Ambulatory Care Coordination Note  3/14/2023    Patient Current Location:  Home: [address]  [place] [address]     ACM contacted the patient by telephone. Verified name and  with patient as identifiers. Provided introduction to self, and explanation of the ACM role. Challenges to be reviewed by the provider   Additional needs identified to be addressed with provider: No  none               Method of communication with provider: none. ACM: Aylin Gomez, RN    Spoke with Olga Short she is doing well with no additional barriers  Has a good understanding of chronic condition management  Discussed RPM and she is ready for graduation  Will send note to RPM team for graduation  Informed her I would also be discharging her from Care Coordination follow up due to goals met and education completed  Encouraged her to keep my number and call with any needs    Offered patient enrollment in the Remote Patient Monitoring (RPM) program for in-home monitoring:  graduating from RPM .    Lab Results       None            Care Coordination Interventions    Referral from Primary Care Provider: No  Suggested Interventions and [address] [place] [address] [address]: Declined  Medication Assistance Program: Not Started  Occupational Therapy: Not Started  Palliative Care: Not Started  Physical Therapy: Not Started  Registered Dietician: Not Started  Senior Services: Modesta Lincoln Work: Not Started  Transportation Support: Not Started  Zone Management Tools: In Process          Goals Addressed                      This Visit's Progress      COMPLETED: Conditions and Symptoms (pt-stated)         I will schedule office visits, as directed by my provider. I will keep my appointment or reschedule if I have to cancel. I will notify my provider of any barriers to my plan of care. I will follow my Zone Management tool to seek urgent or emergent care.   I will notify my provider of any symptoms that indicate a worsening of my condition.     Barriers: need for  additional support and education  Plan for overcoming my barriers: family and ACM support  Confidence: 9/10  Anticipated Goal Completion Date: 2/21/23 Update 3-21-23                Future Appointments   Date Time Provider Zayra Upton   3/20/2023  2:20 PM STR MAMMOGRAPHY RM2  Merline Route WOMEN STR Radiolog   4/5/2023  3:45 PM Faby Carolina MD AFL APEX AFL APEX END   5/10/2023  3:00 PM Jose Quinones MD N Oncology [facility]   5/16/2023  1:00 PM NITO Chou - CNP Fam Medicine [facility]   7/10/2023  2:45 PM Vinicio Sky MD N SRPX Heart [facility]

## 2023-03-29 ENCOUNTER — HOSPITAL ENCOUNTER (OUTPATIENT)
Dept: WOMENS IMAGING | Age: 73
Discharge: HOME | End: 2023-03-29
Payer: MEDICARE

## 2023-03-29 DIAGNOSIS — Z12.31 ENCOUNTER FOR SCREENING MAMMOGRAM FOR MALIGNANT NEOPLASM OF BREAST: ICD-10-CM

## 2023-03-29 PROCEDURE — 77063 BREAST TOMOSYNTHESIS BI: CPT

## 2023-04-11 PROBLEM — I63.9 ACUTE CVA (CEREBROVASCULAR ACCIDENT) (HCC): Status: ACTIVE | Noted: 2023-04-11

## 2023-04-12 ENCOUNTER — HOSPITAL ENCOUNTER (EMERGENCY)
Age: 73
Discharge: HOME | End: 2023-04-12
Payer: MEDICARE

## 2023-04-12 VITALS
HEIGHT: 64 IN | BODY MASS INDEX: 24.41 KG/M2 | OXYGEN SATURATION: 96 % | WEIGHT: 143 LBS | RESPIRATION RATE: 16 BRPM | SYSTOLIC BLOOD PRESSURE: 151 MMHG | DIASTOLIC BLOOD PRESSURE: 65 MMHG | HEART RATE: 75 BPM

## 2023-04-12 DIAGNOSIS — I63.50 RIGHT PONTINE CVA (HCC): ICD-10-CM

## 2023-04-12 DIAGNOSIS — I63.9 ACUTE CVA (CEREBROVASCULAR ACCIDENT) (HCC): Primary | ICD-10-CM

## 2023-04-12 LAB
CHOLEST SERPL-MCNC: 194 MG/DL (ref 100–199)
DEPRECATED MEAN GLUCOSE BLD GHB EST-ACNC: 96 MG/DL (ref 70–126)
HBA1C MFR BLD HPLC: 5.2 % (ref 4.4–6.4)
HDLC SERPL-MCNC: 46 MG/DL
LDLC SERPL CALC-MCNC: 131 MG/DL
TRIGL SERPL-MCNC: 87 MG/DL (ref 0–199)

## 2023-04-12 PROCEDURE — 36415 COLL VENOUS BLD VENIPUNCTURE: CPT

## 2023-04-12 PROCEDURE — 80061 LIPID PANEL: CPT

## 2023-04-12 PROCEDURE — 99283 EMERGENCY DEPT VISIT LOW MDM: CPT | Performed by: NURSE PRACTITIONER

## 2023-04-12 PROCEDURE — 83036 HEMOGLOBIN GLYCOSYLATED A1C: CPT

## 2023-04-12 RX ORDER — ROSUVASTATIN CALCIUM 40 MG/1
40 TABLET, COATED ORAL DAILY
Qty: 30 TABLET | Refills: 0 | Status: SHIPPED | OUTPATIENT
Start: 2023-04-12

## 2023-04-12 ASSESSMENT — PAIN - FUNCTIONAL ASSESSMENT: PAIN_FUNCTIONAL_ASSESSMENT: NONE - DENIES PAIN

## 2023-04-12 NOTE — ED NOTES
Pt to ER with c/o needing to be admitted for a stroke. Pt reports they were seen yesterday and left AMA. Pt VSS.       Vicki Taylor RN  04/12/23 [id number]

## 2023-04-12 NOTE — ED PROVIDER NOTES
presence, and it accurately records my words and actions.     Donte Davila, CNP 04/12/23 12:47 PM    Keron Lopez Counts, APRN - CNP         [employer], APRN - CNP  04/12/23 [id number]

## 2023-04-12 NOTE — PLAN OF CARE
Hospitalist consulted for possible admission of this patient. This is a 72-year-old female who presented to the emergency department yesterday and was diagnosed with a right pontine stroke. Admission was recommended at that time, the patient subsequently left AMA. Today, the patient states she came back as she promised her family and friends that she would. Admission was discussed with the patient and with neurology. The primary reason for admission at this point would be therapy evaluation. However, the patient adamantly stated that she would not undergo physical, occupational, or speech therapy. Outpatient therapy was also offered, but the patient declined these and states that she does not want to undergo therapy. She has not been experiencing any new deficits. A lipid panel and A1c were drawn in the emergency department. The patient's previous lipid panel was reviewed and her LDL was 152 at that time. Her Crestor will be escalated from 5 mg nightly to 40 mg nightly. An outpatient echocardiogram will be performed to complete the stroke work-up. The patient will follow-up in the interventional neurology clinic regarding her carotid artery disease. The patient was counseled extensively regarding her smoking, but she states she is not ready to quit at this time and that she would rather go home and smoke than be admitted to the hospital.  The patient was counseled extensively regarding adherence to medications and states that she will be compliant. She will follow-up with her PCP and outpatient neurology. Return precautions were discussed and the patient was understanding. The patient will be discharged from the emergency department today.     Electronically signed by Rosette Lindo PA-C on 4/12/23 at 9:57 AM EDT

## 2023-04-17 ENCOUNTER — APPOINTMENT (OUTPATIENT)
Dept: GENERAL RADIOLOGY | Age: 73
End: 2023-04-17
Payer: MEDICARE

## 2023-04-17 ENCOUNTER — HOSPITAL ENCOUNTER (EMERGENCY)
Age: 73
Discharge: HOME | End: 2023-04-17
Attending: EMERGENCY MEDICINE
Payer: MEDICARE

## 2023-04-17 ENCOUNTER — TELEPHONE (OUTPATIENT)
Dept: FAMILY MEDICINE CLINIC | Age: 73
End: 2023-04-17

## 2023-04-17 VITALS
SYSTOLIC BLOOD PRESSURE: 129 MMHG | RESPIRATION RATE: 21 BRPM | HEART RATE: 88 BPM | DIASTOLIC BLOOD PRESSURE: 56 MMHG | OXYGEN SATURATION: 93 % | TEMPERATURE: 98.1 F

## 2023-04-17 DIAGNOSIS — N30.00 ACUTE CYSTITIS WITHOUT HEMATURIA: Primary | ICD-10-CM

## 2023-04-17 LAB
ANION GAP SERPL CALC-SCNC: 14 MEQ/L (ref 8–16)
BACTERIA URNS QL MICRO: ABNORMAL /HPF
BASOPHILS ABSOLUTE: 0 THOU/MM3 (ref 0–0.1)
BASOPHILS NFR BLD AUTO: 0.3 %
BILIRUB UR QL STRIP.AUTO: NEGATIVE
BUN SERPL-MCNC: 22 MG/DL (ref 7–22)
CALCIUM SERPL-MCNC: 8.7 MG/DL (ref 8.5–10.5)
CASTS #/AREA URNS LPF: ABNORMAL /LPF
CASTS 2: ABNORMAL /LPF
CHARACTER UR: ABNORMAL
CHLORIDE SERPL-SCNC: 97 MEQ/L (ref 98–111)
CO2 SERPL-SCNC: 26 MEQ/L (ref 23–33)
COLOR: YELLOW
CREAT SERPL-MCNC: 1.3 MG/DL (ref 0.4–1.2)
CRYSTALS URNS MICRO: ABNORMAL
DEPRECATED RDW RBC AUTO: 54.9 FL (ref 35–45)
EOSINOPHIL NFR BLD AUTO: 7.9 %
EOSINOPHILS ABSOLUTE: 0.9 THOU/MM3 (ref 0–0.4)
EPITHELIAL CELLS, UA: ABNORMAL /HPF
ERYTHROCYTE [DISTWIDTH] IN BLOOD BY AUTOMATED COUNT: 13.8 % (ref 11.5–14.5)
FLUAV RNA RESP QL NAA+PROBE: NOT DETECTED
FLUBV RNA RESP QL NAA+PROBE: NOT DETECTED
GFR SERPL CREATININE-BSD FRML MDRD: 44 ML/MIN/1.73M2
GLUCOSE BLD STRIP.AUTO-MCNC: 151 MG/DL (ref 70–108)
GLUCOSE SERPL-MCNC: 147 MG/DL (ref 70–108)
GLUCOSE UR QL STRIP.AUTO: NEGATIVE MG/DL
HCT VFR BLD AUTO: 49.4 % (ref 37–47)
HGB BLD-MCNC: 16 GM/DL (ref 12–16)
HGB UR QL STRIP.AUTO: ABNORMAL
IMM GRANULOCYTES # BLD AUTO: 0.11 THOU/MM3 (ref 0–0.07)
IMM GRANULOCYTES NFR BLD AUTO: 1 %
KETONES UR QL STRIP.AUTO: NEGATIVE
LYMPHOCYTES ABSOLUTE: 0.7 THOU/MM3 (ref 1–4.8)
LYMPHOCYTES NFR BLD AUTO: 6.6 %
MAGNESIUM SERPL-MCNC: 2.1 MG/DL (ref 1.6–2.4)
MCH RBC QN AUTO: 34.9 PG (ref 26–33)
MCHC RBC AUTO-ENTMCNC: 32.4 GM/DL (ref 32.2–35.5)
MCV RBC AUTO: 107.6 FL (ref 81–99)
MISCELLANEOUS 2: ABNORMAL
MONOCYTES ABSOLUTE: 0.3 THOU/MM3 (ref 0.4–1.3)
MONOCYTES NFR BLD AUTO: 3.1 %
NEUTROPHILS NFR BLD AUTO: 81.1 %
NITRITE UR QL STRIP: POSITIVE
NRBC BLD AUTO-RTO: 0 /100 WBC
NT-PROBNP SERPL IA-MCNC: 794.4 PG/ML (ref 0–124)
OSMOLALITY SERPL CALC.SUM OF ELEC: 279.8 MOSMOL/KG (ref 275–300)
PH UR STRIP.AUTO: 7 [PH] (ref 5–9)
PLATELET # BLD AUTO: 247 THOU/MM3 (ref 130–400)
PMV BLD AUTO: 10.7 FL (ref 9.4–12.4)
POTASSIUM SERPL-SCNC: 3.6 MEQ/L (ref 3.5–5.2)
PROT UR STRIP.AUTO-MCNC: 30 MG/DL
RBC # BLD AUTO: 4.59 MILL/MM3 (ref 4.2–5.4)
RBC URINE: ABNORMAL /HPF
RENAL EPI CELLS #/AREA URNS HPF: ABNORMAL /[HPF]
SARS-COV-2 RNA RESP QL NAA+PROBE: NOT DETECTED
SEGMENTED NEUTROPHILS ABSOLUTE COUNT: 8.8 THOU/MM3 (ref 1.8–7.7)
SODIUM SERPL-SCNC: 137 MEQ/L (ref 135–145)
SP GR UR REFRACT.AUTO: 1.01 (ref 1–1.03)
TROPONIN T: < 0.01 NG/ML
UROBILINOGEN, URINE: 1 EU/DL (ref 0–1)
WBC # BLD AUTO: 10.8 THOU/MM3 (ref 4.8–10.8)
WBC #/AREA URNS HPF: ABNORMAL /HPF
WBC #/AREA URNS HPF: ABNORMAL /[HPF]
YEAST LIKE FUNGI URNS QL MICRO: ABNORMAL

## 2023-04-17 PROCEDURE — 6370000000 HC RX 637 (ALT 250 FOR IP)

## 2023-04-17 PROCEDURE — 94640 AIRWAY INHALATION TREATMENT: CPT

## 2023-04-17 PROCEDURE — 80048 BASIC METABOLIC PNL TOTAL CA: CPT

## 2023-04-17 PROCEDURE — 87636 SARSCOV2 & INF A&B AMP PRB: CPT

## 2023-04-17 PROCEDURE — 87186 SC STD MICRODIL/AGAR DIL: CPT

## 2023-04-17 PROCEDURE — 83880 ASSAY OF NATRIURETIC PEPTIDE: CPT

## 2023-04-17 PROCEDURE — 87086 URINE CULTURE/COLONY COUNT: CPT

## 2023-04-17 PROCEDURE — 83735 ASSAY OF MAGNESIUM: CPT

## 2023-04-17 PROCEDURE — 87077 CULTURE AEROBIC IDENTIFY: CPT

## 2023-04-17 PROCEDURE — 81001 URINALYSIS AUTO W/SCOPE: CPT

## 2023-04-17 PROCEDURE — 84484 ASSAY OF TROPONIN QUANT: CPT

## 2023-04-17 PROCEDURE — 85025 COMPLETE CBC W/AUTO DIFF WBC: CPT

## 2023-04-17 PROCEDURE — 71045 X-RAY EXAM CHEST 1 VIEW: CPT

## 2023-04-17 PROCEDURE — 82948 REAGENT STRIP/BLOOD GLUCOSE: CPT

## 2023-04-17 PROCEDURE — 99285 EMERGENCY DEPT VISIT HI MDM: CPT

## 2023-04-17 PROCEDURE — 36415 COLL VENOUS BLD VENIPUNCTURE: CPT

## 2023-04-17 PROCEDURE — 93005 ELECTROCARDIOGRAM TRACING: CPT | Performed by: EMERGENCY MEDICINE

## 2023-04-17 RX ORDER — ALBUTEROL SULFATE 90 UG/1
8 AEROSOL, METERED RESPIRATORY (INHALATION) ONCE
Status: COMPLETED | OUTPATIENT
Start: 2023-04-17 | End: 2023-04-17

## 2023-04-17 RX ORDER — GRANULES FOR ORAL 3 G/1
3 POWDER ORAL ONCE
Status: COMPLETED | OUTPATIENT
Start: 2023-04-17 | End: 2023-04-17

## 2023-04-17 RX ADMIN — GRANULES FOR ORAL SOLUTION 1 PACKET: 3 POWDER ORAL at 18:57

## 2023-04-17 RX ADMIN — ALBUTEROL SULFATE 8 PUFF: 90 AEROSOL, METERED RESPIRATORY (INHALATION) at 17:03

## 2023-04-17 ASSESSMENT — PAIN DESCRIPTION - FREQUENCY: FREQUENCY: INTERMITTENT

## 2023-04-17 ASSESSMENT — PAIN SCALES - GENERAL: PAINLEVEL_OUTOF10: 6

## 2023-04-17 ASSESSMENT — PAIN - FUNCTIONAL ASSESSMENT
PAIN_FUNCTIONAL_ASSESSMENT: 0-10
PAIN_FUNCTIONAL_ASSESSMENT: NONE - DENIES PAIN

## 2023-04-17 ASSESSMENT — PAIN DESCRIPTION - DESCRIPTORS: DESCRIPTORS: ACHING

## 2023-04-17 ASSESSMENT — PAIN DESCRIPTION - PAIN TYPE: TYPE: ACUTE PAIN

## 2023-04-17 ASSESSMENT — PAIN DESCRIPTION - LOCATION: LOCATION: CHEST

## 2023-04-17 NOTE — TELEPHONE ENCOUNTER
Call received from patient. States that her gait is \"off\" and she is slurring her words.   She has called a family member who is going to be transporting her to [facility] ED.

## 2023-04-17 NOTE — ED TRIAGE NOTES
Pt presents to the ED with complaints of dysplasia. Pt was seen in ED on 4/11/23 and was told she is having a stroke. Pt signed out AMA because she smokes. Pt retuned back to the ED on 4/12 with similar complaints, but signed out AMA again. Pt states she came back today because she needs admitted because she is not getting any better. Pt is complaining of dysphagia, dropping things, numbness and tinging, which is worse than normal, and CP.

## 2023-04-17 NOTE — DISCHARGE INSTRUCTIONS
Urine is still positive for urinary tract infection so we are giving you a dose of fosfomycin this is a single dose of medication to treat your urinary tract infection. Please keep your appointment on Wednesday with your primary care doctor for further work-up of your symptoms. Because we did not find any new life-threatening symptoms today does not mean that nothing is going on and will be important to follow-up with that appointment. No new medications being prescribed to take at home. Continue taking all medications as prescribed.

## 2023-04-17 NOTE — ED NOTES
RN assisted patient to the restroom via wheelchair to attempt to obtain urine sample.      Wilfredo Burr RN  04/17/23 3314

## 2023-04-17 NOTE — ED PROVIDER NOTES
MD  04/17/23 9622
standard drinks    Drug use: No       PHYSICAL EXAM       ED Triage Vitals [04/17/23 1505]   BP Temp Temp Source Heart Rate Resp SpO2 Height Weight   133/62 98.1 °F (36.7 °C) Oral 94 20 95 % -- --       Physical Exam  General: Lying in bed, no obvious distress  HEENT:  normocephalic and atraumatic. No scleral icterus. PERR. EOMI no dysarthria, aphasia, facial asymmetry appreciated  Neck: Supple. No JVD. No thyromegaly. Lungs:  Diffuse wheezing, no rhonchi or rales appreciated, no retractions  Cardiac: RRR without MGR, Bilateral Radial and DP pulses 2+  Abdomen: soft. Nontender. Nondistended, Bowel Sounds Present  Extremities:  No clubbing, cyanosis, or edema x 4. Cap Refill < 2 Seconds    Skin:  warm and dry. No rashes or bruises  Psych:  Alert and oriented x3. Affect appropriate  Lymph:  No supraclavicular adenopathy. Neurologic:   Strength 5 out of 5 bilateral upper and lower extremities, sensation proprioception gait intact. FORMAL DIAGNOSTIC RESULTS     RADIOLOGY: Interpretation per the Radiologist below, if available at the time of this note (none if blank):    XR CHEST PORTABLE   Final Result   Stable appearance of the chest no acute cardiopulmonary process. **This report has been created using voice recognition software. It may contain minor errors which are inherent in voice recognition technology. **      Final report electronically signed by Dr. Lane Adler on 4/17/2023 3:31 PM          LABS: (none if blank)  Labs Reviewed   CBC WITH AUTO DIFFERENTIAL - Abnormal; Notable for the following components:       Result Value    Hematocrit 49.4 (*)     .6 (*)     MCH 34.9 (*)     RDW-SD 54.9 (*)     Segs Absolute 8.8 (*)     Lymphocytes Absolute 0.7 (*)     Monocytes Absolute 0.3 (*)     Eosinophils Absolute 0.9 (*)     Immature Grans (Abs) 0.11 (*)     All other components within normal limits   BASIC METABOLIC PANEL - Abnormal; Notable for the following components:    Chloride 97 (*)

## 2023-04-18 ENCOUNTER — TELEPHONE (OUTPATIENT)
Dept: FAMILY MEDICINE CLINIC | Age: 73
End: 2023-04-18

## 2023-04-18 ENCOUNTER — CARE COORDINATION (OUTPATIENT)
Dept: CARE COORDINATION | Age: 73
End: 2023-04-18

## 2023-04-18 LAB
EKG ATRIAL RATE: 103 BPM
EKG P AXIS: 78 DEGREES
EKG P-R INTERVAL: 132 MS
EKG Q-T INTERVAL: 370 MS
EKG QRS DURATION: 80 MS
EKG QTC CALCULATION (BAZETT): 484 MS
EKG R AXIS: 62 DEGREES
EKG T AXIS: 49 DEGREES
EKG VENTRICULAR RATE: 103 BPM

## 2023-04-18 PROCEDURE — 93010 ELECTROCARDIOGRAM REPORT: CPT | Performed by: INTERNAL MEDICINE

## 2023-04-18 ASSESSMENT — ENCOUNTER SYMPTOMS: DYSPNEA ASSOCIATED WITH: EXERTION

## 2023-04-18 NOTE — CARE COORDINATION
Remote Patient Kit Ordering Note      Date/Time:  4/18/2023 2:27 PM      [x] CCSS confirmed patient shipping address  [x] Patient will receive package over the next 2-4 business days. Someone 21 years or older must be present to sign for UPS delivery. [x] Patient to contact virtual installation-specific phone number listed in the patient instructions. [x] If the patient does not contact HRS within 24 hours, an [employer] [facility] will call the patient directly: If the patient does not answer, HRS will follow up with the clinical team notifying them about the unsuccessful attempt to contact the patient. HRS will make three call attempts to the patient. [x] LPN will contact patient once equipment is active to welcome them to the program.                                                         [x] Hours of RPM monitoring - Monday-Friday [phone]                     All questions answered at this time. LPN made aware the RPM kit has been ordered. EMTP notified patient of RPM equipment order.

## 2023-04-18 NOTE — CARE COORDINATION
RPM team,  This is a re enrollment for RPM monitoring. Fernando Peña graduated from [facility] in March but has had some acute changes. I have discussed this with Alireza Jefferson, Manager and she is in agreement with re enrollment. Thank you! Remote Patient Monitoring Enrollment Note      Date/Time:  4/18/2023 1:53 PM    Offered patient enrollment in the [employer] Remote Patient Monitoring (RPM) program for in home monitoring for Kidney Disease , CHF, and COPD. Patient accepted RPM services. Patient will be monitoring the following daily:  survey response  , blood pressure reading  , pulse ox reading  , and weight      ACM reviewed the information below with patient:    Emergency Contact (name and contact number): Maryuri Eugenie [phone]    [x] A member from the care coordination team will reach out to notify the patient once the RPM kit is ordered. [x] Once the kit is delivered, the [facility] team will contact the patient after UPS deliver to assist with set up. [x] Determined BP cuff size: regular (9.05\"-15.74\")      [] Determined weight scale: regular (<330lbs)                                                 [] Hours of ACM monitoring - Monday-Friday [phone]                         All questions about RPM program answered at this time.

## 2023-04-18 NOTE — CARE COORDINATION
Meryl Morales have enrolled Jocy Marks back into Care Coordination following acute conditions that increased utilization. Please approve Plan of Care using smart phrase . Ccplanofcare. Thank you!
associated orthostatic hypotension: Neg, CHF associated PND: Neg, CHF associated shortness of breath: Neg, CHF associated weakness: Neg      Symptom course: stable     ,   COPD Assessment    Does the patient understand envrionmental exposure?: Yes  Is the patient able to verbalize Rescue vs. Long Acting medications?: No  Does the patient have a nebulizer?: No  Does the patient use a space with inhaled medications?: No            Symptoms:     Symptom course: stable  Breathlessness: exertion  Increase use of rapid acting/rescue inhaled medications?: No  Change in chronic cough?: No/At Baseline  Change in sputum?: No/At Baseline     , and   General Assessment    Do you have any symptoms that are causing concern?: Yes  Progression since Onset: Gradually Improving  Reported Symptoms: Other (Comment: acute stroke/ UTI)           Offered patient enrollment in the Remote Patient Monitoring (RPM) program for in-home monitoring:  recently graduation . Ambulatory Care Coordination Assessment    Care Coordination Protocol  Referral from Primary Care Provider: No  Week 1 - Initial Assessment     Do you have all of your prescriptions and are they filled?: Yes  Barriers to medication adherence: None  Are you able to afford your medications?: Yes  How often do you have trouble taking your medications the way you have been told to take them?: I always take them as prescribed. Do you have Home O2 Therapy?: No      Ability to seek help/take action for Emergent Urgent situations i.e. fire, crime, inclement weather or health crisis. : Independent  Ability to ambulate to restroom: Independent  Ability handle personal hygeine needs (bathing/dressing/grooming): Independent  Ability to manage Medications: Independent  Ability to prepare Food Preparation: Independent  Ability to maintain home (clean home, laundry):  Independent  Ability to drive and/or has transportation: Independent  Ability to do shopping: Independent  Ability to

## 2023-04-19 ENCOUNTER — OFFICE VISIT (OUTPATIENT)
Dept: FAMILY MEDICINE CLINIC | Age: 73
End: 2023-04-19
Payer: MEDICARE

## 2023-04-19 VITALS
BODY MASS INDEX: 23.56 KG/M2 | HEART RATE: 96 BPM | DIASTOLIC BLOOD PRESSURE: 76 MMHG | WEIGHT: 138 LBS | HEIGHT: 64 IN | OXYGEN SATURATION: 96 % | SYSTOLIC BLOOD PRESSURE: 118 MMHG

## 2023-04-19 DIAGNOSIS — E03.9 HYPOTHYROIDISM, UNSPECIFIED TYPE: ICD-10-CM

## 2023-04-19 DIAGNOSIS — Z86.73 HISTORY OF CVA (CEREBROVASCULAR ACCIDENT): ICD-10-CM

## 2023-04-19 DIAGNOSIS — Z87.891 PERSONAL HISTORY OF TOBACCO USE: ICD-10-CM

## 2023-04-19 DIAGNOSIS — C50.811 MALIGNANT NEOPLASM OF OVERLAPPING SITES OF RIGHT BREAST IN FEMALE, ESTROGEN RECEPTOR NEGATIVE (HCC): ICD-10-CM

## 2023-04-19 DIAGNOSIS — N18.30 STAGE 3 CHRONIC KIDNEY DISEASE, UNSPECIFIED WHETHER STAGE 3A OR 3B CKD (HCC): ICD-10-CM

## 2023-04-19 DIAGNOSIS — Z01.818 PRE-PROCEDURAL EXAMINATION: Primary | ICD-10-CM

## 2023-04-19 DIAGNOSIS — J44.9 CHRONIC OBSTRUCTIVE PULMONARY DISEASE, UNSPECIFIED COPD TYPE (HCC): ICD-10-CM

## 2023-04-19 DIAGNOSIS — N39.0 RECURRENT UTI: ICD-10-CM

## 2023-04-19 DIAGNOSIS — Z17.1 MALIGNANT NEOPLASM OF OVERLAPPING SITES OF RIGHT BREAST IN FEMALE, ESTROGEN RECEPTOR NEGATIVE (HCC): ICD-10-CM

## 2023-04-19 DIAGNOSIS — N28.1 RENAL CYST: ICD-10-CM

## 2023-04-19 DIAGNOSIS — N30.01 ACUTE CYSTITIS WITH HEMATURIA: Primary | ICD-10-CM

## 2023-04-19 DIAGNOSIS — I10 ESSENTIAL HYPERTENSION: ICD-10-CM

## 2023-04-19 LAB
BACTERIA UR CULT: ABNORMAL
ORGANISM: ABNORMAL

## 2023-04-19 PROCEDURE — 4004F PT TOBACCO SCREEN RCVD TLK: CPT | Performed by: NURSE PRACTITIONER

## 2023-04-19 PROCEDURE — G0296 VISIT TO DETERM LDCT ELIG: HCPCS | Performed by: NURSE PRACTITIONER

## 2023-04-19 PROCEDURE — 3074F SYST BP LT 130 MM HG: CPT | Performed by: NURSE PRACTITIONER

## 2023-04-19 PROCEDURE — 1111F DSCHRG MED/CURRENT MED MERGE: CPT | Performed by: NURSE PRACTITIONER

## 2023-04-19 PROCEDURE — 3017F COLORECTAL CA SCREEN DOC REV: CPT | Performed by: NURSE PRACTITIONER

## 2023-04-19 PROCEDURE — G8427 DOCREV CUR MEDS BY ELIG CLIN: HCPCS | Performed by: NURSE PRACTITIONER

## 2023-04-19 PROCEDURE — 1090F PRES/ABSN URINE INCON ASSESS: CPT | Performed by: NURSE PRACTITIONER

## 2023-04-19 PROCEDURE — 1123F ACP DISCUSS/DSCN MKR DOCD: CPT | Performed by: NURSE PRACTITIONER

## 2023-04-19 PROCEDURE — 99214 OFFICE O/P EST MOD 30 MIN: CPT | Performed by: NURSE PRACTITIONER

## 2023-04-19 PROCEDURE — G8420 CALC BMI NORM PARAMETERS: HCPCS | Performed by: NURSE PRACTITIONER

## 2023-04-19 PROCEDURE — 3023F SPIROM DOC REV: CPT | Performed by: NURSE PRACTITIONER

## 2023-04-19 PROCEDURE — G8399 PT W/DXA RESULTS DOCUMENT: HCPCS | Performed by: NURSE PRACTITIONER

## 2023-04-19 PROCEDURE — 3078F DIAST BP <80 MM HG: CPT | Performed by: NURSE PRACTITIONER

## 2023-04-19 RX ORDER — GABAPENTIN 100 MG/1
100 CAPSULE ORAL 2 TIMES DAILY
Qty: 60 CAPSULE | Refills: 3 | Status: SHIPPED | OUTPATIENT
Start: 2023-04-19 | End: 2023-05-19

## 2023-04-19 ASSESSMENT — ENCOUNTER SYMPTOMS
ABDOMINAL PAIN: 0
WHEEZING: 0
TROUBLE SWALLOWING: 0
FACIAL SWELLING: 0
COUGH: 0
SHORTNESS OF BREATH: 0
BACK PAIN: 0
VOMITING: 0
COLOR CHANGE: 0
SINUS PAIN: 0
DIARRHEA: 0
NAUSEA: 0
EYE PAIN: 0
SORE THROAT: 0

## 2023-04-19 NOTE — PROGRESS NOTES
11/13/1965    Smokeless tobacco: Never    Tobacco comments:     Currently at 1 pk/day-declines counseling 8/3/22   Substance Use Topics    Alcohol use: No     Alcohol/week: 0.0 standard drinks        Vitals:    04/19/23 1013   BP: 118/76   Pulse: 96   SpO2: 96%   Weight: 138 lb (62.6 kg)   Height: 5' 4\" (1.626 m)     Estimated body mass index is 23.69 kg/m² as calculated from the following:    Height as of this encounter: 5' 4\" (1.626 m). Weight as of this encounter: 138 lb (62.6 kg). Physical Exam  Vitals reviewed. Constitutional:       General: She is not in acute distress. Appearance: Normal appearance. She is well-developed. HENT:      Head: Normocephalic and atraumatic. Right Ear: Hearing, ear canal and external ear normal.      Left Ear: Hearing, ear canal and external ear normal.      Nose: Nose normal. No nasal tenderness. Mouth/Throat:      Lips: Pink. Mouth: Mucous membranes are moist. No oral lesions. Pharynx: Oropharynx is clear. Uvula midline. Eyes:      General:         Right eye: No discharge. Left eye: No discharge. Conjunctiva/sclera: Conjunctivae normal.   Neck:      Vascular: No carotid bruit. Trachea: No tracheal deviation. Cardiovascular:      Rate and Rhythm: Normal rate and regular rhythm. Heart sounds: Normal heart sounds. No murmur heard. Pulmonary:      Effort: Pulmonary effort is normal. No respiratory distress. Breath sounds: Normal breath sounds. Abdominal:      General: Bowel sounds are normal.      Palpations: Abdomen is soft. Tenderness: There is no abdominal tenderness. Musculoskeletal:      Cervical back: Full passive range of motion without pain and neck supple. Lymphadenopathy:      Head:      Right side of head: No submental, submandibular, tonsillar, preauricular, posterior auricular or occipital adenopathy.       Left side of head: No submental, submandibular, tonsillar, preauricular, posterior

## 2023-04-19 NOTE — PATIENT INSTRUCTIONS
follow-up, he or she will help you understand what to do next. After a lung cancer screening, you can go back to your usual activities right away. A lung cancer screening test can't tell if you have lung cancer. If your results are positive, your doctor can't tell whether an abnormal finding is a harmless nodule, cancer, or something else without doing more tests. What can you do to help prevent lung cancer? Some lung cancers can't be prevented. But if you smoke, quitting smoking is the best step you can take to prevent lung cancer. If you want to quit, your doctor can recommend medicines or other ways to help. Follow-up care is a key part of your treatment and safety. Be sure to make and go to all appointments, and call your doctor if you are having problems. It's also a good idea to know your test results and keep a list of the medicines you take. Where can you learn more? Go to http://www.dukes.com/ and enter Q940 to learn more about \"Learning About Lung Cancer Screening. \"  Current as of: May 4, 2022               Content Version: 13.6  © 9459-7082 Healthwise, Incorporated. Care instructions adapted under license by [facility] ([facility]). If you have questions about a medical condition or this instruction, always ask your healthcare professional. [address] any warranty or liability for your use of this information.

## 2023-04-20 ENCOUNTER — HOSPITAL ENCOUNTER (OUTPATIENT)
Age: 73
Discharge: HOME | End: 2023-04-20
Payer: MEDICARE

## 2023-04-20 LAB
BACTERIA URNS QL MICRO: ABNORMAL /HPF
BILIRUB UR QL STRIP.AUTO: NEGATIVE
CASTS #/AREA URNS LPF: ABNORMAL /LPF
CASTS 2: ABNORMAL /LPF
CHARACTER UR: CLEAR
COLOR: YELLOW
CRYSTALS URNS MICRO: ABNORMAL
EPITHELIAL CELLS, UA: ABNORMAL /HPF
GLUCOSE UR QL STRIP.AUTO: NEGATIVE MG/DL
HGB UR QL STRIP.AUTO: ABNORMAL
KETONES UR QL STRIP.AUTO: NEGATIVE
MISCELLANEOUS 2: ABNORMAL
NITRITE UR QL STRIP: NEGATIVE
PH UR STRIP.AUTO: 6.5 [PH] (ref 5–9)
PROT UR STRIP.AUTO-MCNC: 30 MG/DL
RBC URINE: ABNORMAL /HPF
RENAL EPI CELLS #/AREA URNS HPF: ABNORMAL /[HPF]
SP GR UR REFRACT.AUTO: 1.01 (ref 1–1.03)
UROBILINOGEN, URINE: 1 EU/DL (ref 0–1)
WBC #/AREA URNS HPF: ABNORMAL /HPF
WBC #/AREA URNS HPF: NEGATIVE /[HPF]
YEAST LIKE FUNGI URNS QL MICRO: ABNORMAL

## 2023-04-20 PROCEDURE — 81001 URINALYSIS AUTO W/SCOPE: CPT

## 2023-04-20 NOTE — PROGRESS NOTES
Pharmacy Note  ED Culture Follow-up    Lance Rehman is a 72 y.o. female. Allergies: Cipro xr and Vicodin [hydrocodone-acetaminophen]     Labs:  Lab Results   Component Value Date    BUN 22 04/17/2023    CREATININE 1.3 (H) 04/17/2023    WBC 10.8 04/17/2023     Estimated Creatinine Clearance: 34 mL/min (A) (based on SCr of 1.3 mg/dL (H)). Current antimicrobials:   Fosfomycin 3 g x 1 and nitrofurantoin    ASSESSMENT:  Micro results:   Urine culture: positive for Morganella morganii     PLAN:  Need for intervention: No 2/2 Patient seen by PCP. No longer having symptoms and collected UA to ensure resolution. Discussed with: Mark Mendez MD  Chosen treatment:    Patient will be treated by PCP    Patient response:   No need to contact patient    Called/sent in prescription to: Not applicable    Please call with any questions.  [address] [facility] [place], PharmD 3:10 PM 4/20/2023

## 2023-04-26 ENCOUNTER — CARE COORDINATION (OUTPATIENT)
Dept: CARE COORDINATION | Age: 73
End: 2023-04-26

## 2023-04-26 NOTE — CARE COORDINATION
Ambulatory Care Coordination Note  2023    Patient Current Location:  Home: [address]     [facility], [address] contacted the patient by telephone. Verified name and  with patient as identifiers. Provided introduction to self, and explanation of the RUBEN GIBBS role. Challenges to be reviewed by the provider   Additional needs identified to be addressed with provider: No  none               I spoke with the patient for continued Care Coordination follow up and education. Patient states she is doing good. Patient did have PCP appointment on . Denies follow up questions or concerns. Breathing is at baseline. Denies increased SOB, cough or edema. We discussed Zone management tools for chronic disease(s) and patient denies current questions re: s/sx at this time. Patient states she has not received RPM equipment. She states UPS left one note on door when she was not home, but has not been back. Will notify ACM of this. Educated on how to identify sx's that are worse than the baseline and the importance of early symptom recognition and reporting to prevent exacerbation which may lead to ED visits and hospital admissions. I advised patient to contact PCP office if needed. No further needs at this time.      Lab Results       None            Care Coordination Interventions    Referral from Primary Care Provider: No  Suggested Interventions and [address]: Declined  Medication Assistance Program: Not Started  Occupational Therapy: Not Started  Physical Therapy: Not Started  Senior Services: Not Started  Smoking Cessation: Declined  Social Work: Not Started  Zone Management Tools: Completed          Goals Addressed    None         Future Appointments   Date Time Provider Zayra Upton   5/10/2023  3:00 PM Otilia Dover MD N Oncology [facility]   2023 10:40 AM STR CT IMAGING RM1  [facility] OUT EXP STR Radiolog   2023 11:00 AM STR

## 2023-05-08 ENCOUNTER — CARE COORDINATION (OUTPATIENT)
Dept: CARE COORDINATION | Age: 73
End: 2023-05-08

## 2023-05-08 ASSESSMENT — ENCOUNTER SYMPTOMS: DYSPNEA ASSOCIATED WITH: EXERTION

## 2023-05-08 NOTE — CARE COORDINATION
Ambulatory Care Coordination Note  2023    Patient Current Location:  Home: [address] [place] [address]     ACM contacted the patient by telephone. Verified name and  with patient as identifiers. Provided introduction to self, and explanation of the ACM role. Challenges to be reviewed by the provider   Additional needs identified to be addressed with provider: No  none               Method of communication with provider: none.     ACM: Shea Sultana, RN    Spoke with Kale Lee for continued Care Coordination follow up  States she has received RPM equipment and is setting it up today  She is familiar with use as she has been enrolled in the past  States feeling better  Completed PCP appt  Has CT of lungs and US of kidney scheduled  Appt made with neurology and scheduled in  no barriers with weakness or swallowing  Smoking cessation efforts are still being considered  Discussed [employer] Jj marquez in via [employer] equipment participation  Reinforce CHF Jj Marquez in  Ensure no barriers with appts scheduled  Continue to support any smoking cessation thoughts/efforts  Reinforce importance of early symptom recognition and reporting to prevent exacerbation and unnecessary hospitalization  Congestive Heart Failure Assessment    Are you currently restricting fluids?: No Restriction  Do you understand a low sodium diet?: Yes  How many restaurant meals do you eat per week?: 1-2  Do you salt your food before tasting it?: No         Symptoms:  CHF associated angina: Neg, CHF associated dyspnea on exertion: Pos, CHF associated fatigue: Neg, CHF associated leg swelling: Neg, CHF associated orthostatic hypotension: Neg, CHF associated PND: Neg, CHF associated shortness of breath: Neg, CHF associated weakness: Neg      Symptom course: stable  Patient-reported weight (lb):  (Comment: weight to be monitored by RPM team)      and   COPD Assessment    Does the patient understand

## 2023-05-10 ENCOUNTER — OFFICE VISIT (OUTPATIENT)
Dept: ONCOLOGY | Age: 73
End: 2023-05-10
Payer: MEDICARE

## 2023-05-10 ENCOUNTER — HOSPITAL ENCOUNTER (OUTPATIENT)
Dept: INFUSION THERAPY | Age: 73
Discharge: HOME | End: 2023-05-10
Payer: MEDICARE

## 2023-05-10 VITALS
DIASTOLIC BLOOD PRESSURE: 73 MMHG | HEART RATE: 75 BPM | SYSTOLIC BLOOD PRESSURE: 131 MMHG | TEMPERATURE: 98 F | RESPIRATION RATE: 18 BRPM

## 2023-05-10 VITALS
WEIGHT: 140 LBS | RESPIRATION RATE: 18 BRPM | HEIGHT: 64 IN | TEMPERATURE: 98 F | OXYGEN SATURATION: 95 % | HEART RATE: 75 BPM | BODY MASS INDEX: 23.9 KG/M2 | SYSTOLIC BLOOD PRESSURE: 131 MMHG | DIASTOLIC BLOOD PRESSURE: 73 MMHG

## 2023-05-10 DIAGNOSIS — C34.31 PRIMARY MALIGNANT NEOPLASM OF RIGHT LOWER LOBE OF LUNG (HCC): Primary | ICD-10-CM

## 2023-05-10 PROCEDURE — 99211 OFF/OP EST MAY X REQ PHY/QHP: CPT

## 2023-05-10 PROCEDURE — 99214 OFFICE O/P EST MOD 30 MIN: CPT | Performed by: INTERNAL MEDICINE

## 2023-05-10 PROCEDURE — 3017F COLORECTAL CA SCREEN DOC REV: CPT | Performed by: INTERNAL MEDICINE

## 2023-05-10 PROCEDURE — 1123F ACP DISCUSS/DSCN MKR DOCD: CPT | Performed by: INTERNAL MEDICINE

## 2023-05-10 PROCEDURE — 1111F DSCHRG MED/CURRENT MED MERGE: CPT | Performed by: INTERNAL MEDICINE

## 2023-05-10 PROCEDURE — G8427 DOCREV CUR MEDS BY ELIG CLIN: HCPCS | Performed by: INTERNAL MEDICINE

## 2023-05-10 PROCEDURE — G8399 PT W/DXA RESULTS DOCUMENT: HCPCS | Performed by: INTERNAL MEDICINE

## 2023-05-10 PROCEDURE — 4004F PT TOBACCO SCREEN RCVD TLK: CPT | Performed by: INTERNAL MEDICINE

## 2023-05-10 PROCEDURE — 1090F PRES/ABSN URINE INCON ASSESS: CPT | Performed by: INTERNAL MEDICINE

## 2023-05-10 PROCEDURE — G8420 CALC BMI NORM PARAMETERS: HCPCS | Performed by: INTERNAL MEDICINE

## 2023-05-10 NOTE — PROGRESS NOTES
finding. She is on oxygen at 2 L at home but she does not take it outside of the house. Says that she has gained weight, low of 121 pounds to her current weight of 140 pounds and that her appetite is good. 8/3/2022. Negative tilt table test.    8/3/2022. Holter monitor showed predominantly normal sinus rhythm with no ventricular or supraventricular tachycardias or significant bradycardia arrhythmias. She did have some small bursts of atrial fibrillation. 8/11/2019 CAT scan of the chest stable with no new masses identified. 9/15/2022. Seen by Dr. Lucrecia Jimenez neurology because of her syncopal episodes. She has a prodrome of becoming dizzy lightheaded and then passes out. This had occurred in all positions. She was started on Eliquis for paroxysmal atrial fibrillation. Further work-up included EEG and carotid ultrasound. 10/12/2022. Mrs. Ariela Riley returns to the office today in follow-up of her lung cancer and breast cancer. She has followed up with cardiology and also neurology for her spells. She tells me that she has had a heart monitor which shows that she has atrial fibrillation and other arrhythmias and she will be following up with neurology. She also has an EEG scheduled. She is quite fatigued and sleeps a lot. I understand that her primary care for provider has referred her to endocrinology for her hypothyroidism. She is on a low-dose of levothyroxine at 25 mcg. She has been able to gain 2 pounds. She says that her appetite is good. She has no pain has had no bleeding and has had no lumps or bumps. 11/16/2022. Follow-up with Dr. Josiane Reyes for hypothyroidism, status post thyroidectomy on 25 mcg of Synthroid daily. In October 2022 her free T4 was low at 0.92 but her TSH was normal.  Testing on that date came back normal as well. 1/9/2023. Seen in consultation by [facility] for hypertension and occasional syncopal episodes with dizziness, nausea and diapheresis.   She had paroxysmal

## 2023-05-10 NOTE — PATIENT INSTRUCTIONS
Reviewed labs and recent medical history. Discussed her follow up for her lung cancer including need for follow up CT Scan of chest.   Reviewed with patient that we will change her scheduled CT Screening scheduled for Friday to a regular CT Scan of the chest. We called her family provider and let them know about the change. Encouraged her to push fluids in prep for her testing on Friday. The doctor will call with her results, She will call us if she does not hear from us. Return to see MD in 3 months.

## 2023-05-12 ENCOUNTER — HOSPITAL ENCOUNTER (OUTPATIENT)
Dept: CT IMAGING | Age: 73
Discharge: HOME | End: 2023-05-12
Payer: MEDICARE

## 2023-05-12 ENCOUNTER — HOSPITAL ENCOUNTER (OUTPATIENT)
Dept: ULTRASOUND IMAGING | Age: 73
Discharge: HOME | End: 2023-05-12
Payer: MEDICARE

## 2023-05-12 DIAGNOSIS — C34.31 PRIMARY MALIGNANT NEOPLASM OF RIGHT LOWER LOBE OF LUNG (HCC): ICD-10-CM

## 2023-05-12 DIAGNOSIS — N28.1 RENAL CYST: ICD-10-CM

## 2023-05-12 DIAGNOSIS — N39.0 RECURRENT UTI: ICD-10-CM

## 2023-05-12 PROCEDURE — 76770 US EXAM ABDO BACK WALL COMP: CPT

## 2023-05-12 PROCEDURE — 6360000004 HC RX CONTRAST MEDICATION: Performed by: INTERNAL MEDICINE

## 2023-05-12 PROCEDURE — 71260 CT THORAX DX C+: CPT

## 2023-05-12 RX ADMIN — IOPAMIDOL 75 ML: 755 INJECTION, SOLUTION INTRAVENOUS at 10:43

## 2023-05-13 ASSESSMENT — ENCOUNTER SYMPTOMS
TROUBLE SWALLOWING: 0
CONSTIPATION: 0
SORE THROAT: 0
DIARRHEA: 0
ABDOMINAL PAIN: 0
NAUSEA: 0
SHORTNESS OF BREATH: 1
VOMITING: 0
COUGH: 1
RHINORRHEA: 0

## 2023-05-14 DIAGNOSIS — C34.31 PRIMARY MALIGNANT NEOPLASM OF RIGHT LOWER LOBE OF LUNG (HCC): Primary | ICD-10-CM

## 2023-05-15 ENCOUNTER — TELEPHONE (OUTPATIENT)
Dept: FAMILY MEDICINE CLINIC | Age: 73
End: 2023-05-15

## 2023-05-15 DIAGNOSIS — N28.1 BILATERAL RENAL CYSTS: Primary | ICD-10-CM

## 2023-05-15 DIAGNOSIS — N28.9 KIDNEY LESION: ICD-10-CM

## 2023-05-15 NOTE — TELEPHONE ENCOUNTER
Pt informed and verbalized understanding with no further questions at this time. 5/31 @11AM NPO 4 Hours prior. No metal. Main radiology first floor. Referral placed.

## 2023-05-15 NOTE — TELEPHONE ENCOUNTER
----- Message from NITO Angeles CNP sent at 5/12/2023 12:02 PM EDT -----  Please call pt and let her know that her renal US did show bilateral cysts. It also showed a lesion on her left kidney that needs further evaluation. I would like to order an MRI of the left kidney without contrast.  I would also like to refer pt to Urology for further evaluation, OK for [facility] referral if willing. Can we also send Dr Yasmin Branch with oncology a copy of these results.   Thanks -WS

## 2023-05-16 ENCOUNTER — OFFICE VISIT (OUTPATIENT)
Dept: FAMILY MEDICINE CLINIC | Age: 73
End: 2023-05-16
Payer: MEDICARE

## 2023-05-16 VITALS
DIASTOLIC BLOOD PRESSURE: 62 MMHG | HEART RATE: 84 BPM | WEIGHT: 141 LBS | RESPIRATION RATE: 16 BRPM | BODY MASS INDEX: 24.2 KG/M2 | SYSTOLIC BLOOD PRESSURE: 144 MMHG

## 2023-05-16 DIAGNOSIS — Z17.1 MALIGNANT NEOPLASM OF OVERLAPPING SITES OF RIGHT BREAST IN FEMALE, ESTROGEN RECEPTOR NEGATIVE (HCC): ICD-10-CM

## 2023-05-16 DIAGNOSIS — L29.9 ITCHING OF BOTH HANDS: ICD-10-CM

## 2023-05-16 DIAGNOSIS — J44.9 CHRONIC OBSTRUCTIVE PULMONARY DISEASE, UNSPECIFIED COPD TYPE (HCC): ICD-10-CM

## 2023-05-16 DIAGNOSIS — Z86.73 HISTORY OF CVA (CEREBROVASCULAR ACCIDENT): ICD-10-CM

## 2023-05-16 DIAGNOSIS — N18.30 STAGE 3 CHRONIC KIDNEY DISEASE, UNSPECIFIED WHETHER STAGE 3A OR 3B CKD (HCC): ICD-10-CM

## 2023-05-16 DIAGNOSIS — C50.811 MALIGNANT NEOPLASM OF OVERLAPPING SITES OF RIGHT BREAST IN FEMALE, ESTROGEN RECEPTOR NEGATIVE (HCC): ICD-10-CM

## 2023-05-16 DIAGNOSIS — N28.1 BILATERAL RENAL CYSTS: Primary | ICD-10-CM

## 2023-05-16 DIAGNOSIS — E89.0 POSTOPERATIVE HYPOTHYROIDISM: ICD-10-CM

## 2023-05-16 PROCEDURE — 4004F PT TOBACCO SCREEN RCVD TLK: CPT | Performed by: NURSE PRACTITIONER

## 2023-05-16 PROCEDURE — 99214 OFFICE O/P EST MOD 30 MIN: CPT | Performed by: NURSE PRACTITIONER

## 2023-05-16 PROCEDURE — G8427 DOCREV CUR MEDS BY ELIG CLIN: HCPCS | Performed by: NURSE PRACTITIONER

## 2023-05-16 PROCEDURE — G8399 PT W/DXA RESULTS DOCUMENT: HCPCS | Performed by: NURSE PRACTITIONER

## 2023-05-16 PROCEDURE — 1123F ACP DISCUSS/DSCN MKR DOCD: CPT | Performed by: NURSE PRACTITIONER

## 2023-05-16 PROCEDURE — 3023F SPIROM DOC REV: CPT | Performed by: NURSE PRACTITIONER

## 2023-05-16 PROCEDURE — G8420 CALC BMI NORM PARAMETERS: HCPCS | Performed by: NURSE PRACTITIONER

## 2023-05-16 PROCEDURE — 1090F PRES/ABSN URINE INCON ASSESS: CPT | Performed by: NURSE PRACTITIONER

## 2023-05-16 PROCEDURE — 3017F COLORECTAL CA SCREEN DOC REV: CPT | Performed by: NURSE PRACTITIONER

## 2023-05-16 RX ORDER — HYDROXYZINE HYDROCHLORIDE 10 MG/1
10-20 TABLET, FILM COATED ORAL EVERY 6 HOURS PRN
Qty: 40 TABLET | Refills: 5 | Status: SHIPPED | OUTPATIENT
Start: 2023-05-16

## 2023-05-16 RX ORDER — LEVOTHYROXINE SODIUM 0.03 MG/1
25 TABLET ORAL DAILY
Qty: 90 TABLET | Refills: 1 | Status: SHIPPED | OUTPATIENT
Start: 2023-05-16

## 2023-05-16 NOTE — PROGRESS NOTES
[address] [facility] [address]  Dept: [phone]  Dept Fax: [phone]  Loc: [phone]     2023     Cathleen Malhotra (:  1950) is a 72 y.o. female, here for evaluation of the following medical concerns:    Chief Complaint   Patient presents with    6 Month Follow-Up       HPI  Pt presents to the office today for 6 month follow up on chronic conditions. Pt has been following up with Oncology for her HX of lung cancer and has a PET scan ordered and will follow up with that office after the scan. Pt also had a recent referral to Urology for renal cysts, and plans on getting that scheduled for when they call. Treatment Adherence:   Medication compliance:  compliant most of the time  Diet compliance:  compliant most of the time  Weight trend: stable    Hypertension:  Home blood pressure monitoring: No. Patient denies chest pain, shortness of breath, headache, and lightheadedness. Antihypertensive medication side effects: no medication side effects noted. Use of agents associated with hypertension: none. Sodium (meq/L)   Date Value   2023 137    BUN (mg/dL)   Date Value   2023 22    Glucose   Date Value   2023 147 mg/dL (H)   2012 94 mg/dl      Potassium (meq/L)   Date Value   2023 3.6     Potassium reflex Magnesium (meq/L)   Date Value   10/05/2021 3.5    Creatinine (mg/dL)   Date Value   2023 1.3 (H)         Hyperlipidemia:  No new myalgias or GI upset on rosuvastatin (Crestor). Lab Results   Component Value Date    CHOL 194 2023    TRIG 87 2023    HDL 46 2023    LDLCALC 131 2023     Lab Results   Component Value Date    ALT 10 (L) 2023    AST 14 2023        Hypothyroidism: Recent symptoms: none. She denies weight gain, weight loss, cold intolerance, and heat intolerance.  Patient is  taking her

## 2023-05-17 ASSESSMENT — ENCOUNTER SYMPTOMS
TROUBLE SWALLOWING: 0
WHEEZING: 0
NAUSEA: 0
SHORTNESS OF BREATH: 0
COLOR CHANGE: 0
VOMITING: 0
DIARRHEA: 0
SINUS PAIN: 0
FACIAL SWELLING: 0
SORE THROAT: 0
COUGH: 0
EYE PAIN: 0
ABDOMINAL PAIN: 0
BACK PAIN: 0

## 2023-05-18 ENCOUNTER — CARE COORDINATION (OUTPATIENT)
Dept: CARE COORDINATION | Age: 73
End: 2023-05-18

## 2023-05-18 NOTE — CARE COORDINATION
Unable to reach and unable to leave voicemail to complete Care Coordination follow up. Active with RPM.  No alerts noted. Will try again at another time.

## 2023-05-19 ENCOUNTER — CARE COORDINATION (OUTPATIENT)
Dept: CARE COORDINATION | Age: 73
End: 2023-05-19

## 2023-05-19 ASSESSMENT — ENCOUNTER SYMPTOMS: DYSPNEA ASSOCIATED WITH: EXERTION

## 2023-05-19 NOTE — CARE COORDINATION
Ambulatory Care Coordination Note  2023    Patient Current Location:  Home: [address] [place] [address]     ACM contacted the patient by telephone. Verified name and  with patient as identifiers. Provided introduction to self, and explanation of the ACM role. Challenges to be reviewed by the provider   Additional needs identified to be addressed with provider: No  none               Method of communication with provider: none. ACM: Sybil Grimes, RN    Spoke with Pavel Krishnan for continued Care Coordination  Discussed that there are no vitals being reporting in RPM  States that her pulse ox machine is not working and she has not had time to contact HRS  I  gave her the IT support number for HRS to assist with pulse ox machine mal function  Pavel Eulogio states she will call them to take care of issue and start RPM monitoring  Has completed follow up appts and having new issues including cysts on kidney's and more issue with lungs  Scheduled to have testing completed as well as follow up appts  New pt appt scheduled with Dr. Lam Mustafa.   Gave her address of office  Does not want smoking cessation efforts in place    Plan  Ensure RPM equipment working  Encourage completion of testing and follow up appts  Encourage daily weight tracking and reporting  Congestive Heart Failure Assessment    Are you currently restricting fluids?: No Restriction  Do you understand a low sodium diet?: Yes  How many restaurant meals do you eat per week?: 1-2  Do you salt your food before tasting it?: No         Symptoms:  CHF associated angina: Neg, CHF associated dyspnea on exertion: Pos, CHF associated fatigue: Neg, CHF associated leg swelling: Neg, CHF associated orthostatic hypotension: Neg, CHF associated PND: Neg, CHF associated shortness of breath: Neg, CHF associated weakness: Neg      Symptom course: stable      and   COPD Assessment    Does the patient understand envrionmental exposure?: Yes  Is the patient able to

## 2023-05-24 ENCOUNTER — CARE COORDINATION (OUTPATIENT)
Dept: CARE COORDINATION | Age: 73
End: 2023-05-24

## 2023-05-24 NOTE — CARE COORDINATION
Agustín Ramiro is still \"pre activated\" under RPM program.  Attempted to call today to continue to try to get her re established with RPM monitoring. Unable to reach and unable to leave voicemail. Will continue to try.

## 2023-05-25 ENCOUNTER — CARE COORDINATION (OUTPATIENT)
Dept: CARE COORDINATION | Age: 73
End: 2023-05-25

## 2023-05-25 NOTE — CARE COORDINATION
EMTP placed call to patient to arrange RPM kit  through [facility]. Reviewed with patient how to pack equipment in original packing. Verified patient's availability to schedule UPS  time. UPS  time requested.  Anticipated  date range : Anytime

## 2023-05-25 NOTE — CARE COORDINATION
RPM team,      Bravo Rodriguez has not activated the RPM equipment yet and states she would like to turn equipment back in. Could you please reach out and provide support to help get equipment turned in. Thank you! Remote Patient Monitoring Graduation      Date/Time:  5/25/2023 11:14 AM  Patient Current Location: Home: [address] [place] [address]  Patient has graduated from the Remote Patient Monitoring program on 5/25/2023. RPM goals have been met at this time. Patient has been provided instruction on process to return RPM equipment and RPM has been deactivated. Patient has ACM's contact information for any further questions, concerns, or needs.   Time-Spent: 5 minutes 0 seconds

## 2023-05-31 ENCOUNTER — HOSPITAL ENCOUNTER (OUTPATIENT)
Dept: MRI IMAGING | Age: 73
Discharge: HOME | End: 2023-05-31
Payer: MEDICARE

## 2023-05-31 DIAGNOSIS — N28.1 BILATERAL RENAL CYSTS: ICD-10-CM

## 2023-05-31 DIAGNOSIS — N28.9 KIDNEY LESION: ICD-10-CM

## 2023-05-31 LAB — POC CREATININE WHOLE BLOOD: 1.1 MG/DL (ref 0.5–1.2)

## 2023-05-31 PROCEDURE — 74183 MRI ABD W/O CNTR FLWD CNTR: CPT

## 2023-05-31 PROCEDURE — 82565 ASSAY OF CREATININE: CPT

## 2023-05-31 PROCEDURE — 6360000004 HC RX CONTRAST MEDICATION: Performed by: NURSE PRACTITIONER

## 2023-05-31 PROCEDURE — A9579 GAD-BASE MR CONTRAST NOS,1ML: HCPCS | Performed by: NURSE PRACTITIONER

## 2023-05-31 RX ADMIN — GADOTERIDOL 10 ML: 279.3 INJECTION, SOLUTION INTRAVENOUS at 12:31

## 2023-06-01 ENCOUNTER — TELEPHONE (OUTPATIENT)
Dept: FAMILY MEDICINE CLINIC | Age: 73
End: 2023-06-01

## 2023-06-01 DIAGNOSIS — I77.811 ECTATIC ABDOMINAL AORTA (HCC): Primary | ICD-10-CM

## 2023-06-02 ENCOUNTER — CARE COORDINATION (OUTPATIENT)
Dept: CARE COORDINATION | Age: 73
End: 2023-06-02

## 2023-06-02 ASSESSMENT — ENCOUNTER SYMPTOMS: DYSPNEA ASSOCIATED WITH: EXERTION

## 2023-06-05 RX ORDER — ROSUVASTATIN CALCIUM 40 MG/1
40 TABLET, COATED ORAL DAILY
Qty: 90 TABLET | Refills: 1 | Status: SHIPPED | OUTPATIENT
Start: 2023-06-05

## 2023-06-06 NOTE — CARE COORDINATION
Following up to see if a new appt can be made for Doe Law after missing her previously scheduled appt. Thank you!

## 2023-06-19 ENCOUNTER — OFFICE VISIT (OUTPATIENT)
Dept: NEUROLOGY | Age: 73
End: 2023-06-19

## 2023-06-19 VITALS
BODY MASS INDEX: 23.9 KG/M2 | HEART RATE: 76 BPM | OXYGEN SATURATION: 97 % | HEIGHT: 64 IN | WEIGHT: 140 LBS | SYSTOLIC BLOOD PRESSURE: 140 MMHG | DIASTOLIC BLOOD PRESSURE: 65 MMHG

## 2023-06-19 DIAGNOSIS — R26.89 BALANCE PROBLEM: ICD-10-CM

## 2023-06-19 DIAGNOSIS — Z91.199 NONCOMPLIANCE: ICD-10-CM

## 2023-06-19 DIAGNOSIS — I63.50 RIGHT PONTINE STROKE (HCC): Primary | ICD-10-CM

## 2023-06-19 DIAGNOSIS — I63.89 OTHER CEREBRAL INFARCTION (HCC): ICD-10-CM

## 2023-06-19 NOTE — PROGRESS NOTES
at this facility use dose modulation, iterative reconstruction, and/or weight-based dosing when appropriate to reduce radiation dose to as low as reasonably achievable. FINDINGS:    CTA HEAD:  There is no flow in the petrous, cavernous and clinoid segments of the left internal carotid artery with partial reconstitution of the ICA terminus and left middle cerebral artery through left A1 segment and patent anterior cerebral artery. This is  stable compared to prior exam. There is minimal mural calcification in the cavernous segment of the right internal carotid artery without flow-limiting stenosis or aneurysm. The right middle cerebral artery is patent without focal abnormality identified. There is an azygos anterior cerebral artery which is patent. The basilar artery is patent without focal stenosis or visualized aneurysm. The bilateral posterior cerebral and superior cerebellar arteries are patent without focal abnormality identified. No focal areas of abnormal parenchymal enhancement are identified. Dural venous sinuses appear patent without focal filling defect. CTA NECK:  There is a typical 3 vessel arch. There is mild stenosis in the brachiocephalic artery. There is moderate stenosis in the proximal left subclavian artery, stable compared to prior exam. There is a focal area of moderate stenosis in the mid segment of  left common carotid artery, stable compared to prior exam. There are mild areas of stenosis elsewhere in the bilateral common carotid arteries. 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  There is calcified and noncalcified mural plaque at the left carotid bulb/proximal internal carotid artery with narrowest luminal diameter measuring 3 mm and a point more distal, where the walls are parallel, measuring 3.7 mm. Cervical segments of the  right internal carotid artery are otherwise patent without focal stenosis.  The

## 2023-06-19 NOTE — PATIENT INSTRUCTIONS
Continue with Eliquis and aspirin consistently  Continue with lipid lowering medication, target LDL below 70  Homocystine level  Cardiac echo  Referral to physical therapy re: balance trouble  Modify risk factors for stroke (blood pressure, cholesterol, diabetes, smoking cessation etc.. Control)  You need to quit smoking as discussed. Follow up in 1 month or sooner if needed. Call if any questions or concerns.

## 2023-06-20 ENCOUNTER — CARE COORDINATION (OUTPATIENT)
Dept: CARE COORDINATION | Age: 73
End: 2023-06-20

## 2023-06-22 ENCOUNTER — HOSPITAL ENCOUNTER (OUTPATIENT)
Age: 73
Discharge: HOME | End: 2023-06-22
Payer: MEDICARE

## 2023-06-22 DIAGNOSIS — Z91.199 NONCOMPLIANCE: ICD-10-CM

## 2023-06-22 DIAGNOSIS — I63.50 RIGHT PONTINE STROKE (HCC): ICD-10-CM

## 2023-06-22 DIAGNOSIS — I63.89 OTHER CEREBRAL INFARCTION (HCC): ICD-10-CM

## 2023-06-22 DIAGNOSIS — R26.89 BALANCE PROBLEM: ICD-10-CM

## 2023-06-22 PROCEDURE — 83090 ASSAY OF HOMOCYSTEINE: CPT

## 2023-06-22 PROCEDURE — 36415 COLL VENOUS BLD VENIPUNCTURE: CPT

## 2023-06-23 ENCOUNTER — TELEPHONE (OUTPATIENT)
Dept: NEUROLOGY | Age: 73
End: 2023-06-23

## 2023-06-23 ENCOUNTER — HOSPITAL ENCOUNTER (OUTPATIENT)
Dept: NON INVASIVE DIAGNOSTICS | Age: 73
Discharge: HOME | End: 2023-06-23
Payer: MEDICARE

## 2023-06-23 DIAGNOSIS — Z91.199 NONCOMPLIANCE: ICD-10-CM

## 2023-06-23 DIAGNOSIS — I63.50 RIGHT PONTINE STROKE (HCC): ICD-10-CM

## 2023-06-23 DIAGNOSIS — I63.89 OTHER CEREBRAL INFARCTION (HCC): ICD-10-CM

## 2023-06-23 DIAGNOSIS — R26.89 BALANCE PROBLEM: ICD-10-CM

## 2023-06-23 LAB
HOMOCYSTEINE: 46.6 UMOL/L
LV EF: 50 %
LVEF MODALITY: NORMAL

## 2023-06-23 PROCEDURE — 93306 TTE W/DOPPLER COMPLETE: CPT

## 2023-06-23 RX ORDER — FOLIC ACID 1 MG/1
1 TABLET ORAL DAILY
Qty: 90 TABLET | Refills: 3 | Status: SHIPPED | OUTPATIENT
Start: 2023-06-23

## 2023-06-27 ENCOUNTER — CARE COORDINATION (OUTPATIENT)
Dept: CARE COORDINATION | Age: 73
End: 2023-06-27

## 2023-06-28 ENCOUNTER — CARE COORDINATION (OUTPATIENT)
Dept: CARE COORDINATION | Age: 73
End: 2023-06-28

## 2023-06-28 ASSESSMENT — ENCOUNTER SYMPTOMS: DYSPNEA ASSOCIATED WITH: EXERTION

## 2023-07-06 ENCOUNTER — OFFICE VISIT (OUTPATIENT)
Dept: CARDIOTHORACIC SURGERY | Age: 73
End: 2023-07-06
Payer: MEDICARE

## 2023-07-06 VITALS
BODY MASS INDEX: 22.36 KG/M2 | SYSTOLIC BLOOD PRESSURE: 122 MMHG | HEART RATE: 78 BPM | HEIGHT: 64 IN | WEIGHT: 131 LBS | DIASTOLIC BLOOD PRESSURE: 70 MMHG

## 2023-07-06 DIAGNOSIS — I70.213 ATHEROSCLEROSIS OF NATIVE ARTERIES OF EXTREMITIES WITH INTERMITTENT CLAUDICATION, BILATERAL LEGS (HCC): ICD-10-CM

## 2023-07-06 DIAGNOSIS — I73.9 PAD (PERIPHERAL ARTERY DISEASE) (HCC): ICD-10-CM

## 2023-07-06 DIAGNOSIS — I71.40 ABDOMINAL AORTIC ANEURYSM (AAA) WITHOUT RUPTURE, UNSPECIFIED PART (HCC): Primary | ICD-10-CM

## 2023-07-06 PROCEDURE — 1090F PRES/ABSN URINE INCON ASSESS: CPT | Performed by: THORACIC SURGERY (CARDIOTHORACIC VASCULAR SURGERY)

## 2023-07-06 PROCEDURE — 3017F COLORECTAL CA SCREEN DOC REV: CPT | Performed by: THORACIC SURGERY (CARDIOTHORACIC VASCULAR SURGERY)

## 2023-07-06 PROCEDURE — 4004F PT TOBACCO SCREEN RCVD TLK: CPT | Performed by: THORACIC SURGERY (CARDIOTHORACIC VASCULAR SURGERY)

## 2023-07-06 PROCEDURE — G8420 CALC BMI NORM PARAMETERS: HCPCS | Performed by: THORACIC SURGERY (CARDIOTHORACIC VASCULAR SURGERY)

## 2023-07-06 PROCEDURE — 1123F ACP DISCUSS/DSCN MKR DOCD: CPT | Performed by: THORACIC SURGERY (CARDIOTHORACIC VASCULAR SURGERY)

## 2023-07-06 PROCEDURE — G8427 DOCREV CUR MEDS BY ELIG CLIN: HCPCS | Performed by: THORACIC SURGERY (CARDIOTHORACIC VASCULAR SURGERY)

## 2023-07-06 PROCEDURE — 99204 OFFICE O/P NEW MOD 45 MIN: CPT | Performed by: THORACIC SURGERY (CARDIOTHORACIC VASCULAR SURGERY)

## 2023-07-06 PROCEDURE — G8399 PT W/DXA RESULTS DOCUMENT: HCPCS | Performed by: THORACIC SURGERY (CARDIOTHORACIC VASCULAR SURGERY)

## 2023-07-06 NOTE — PROGRESS NOTES
Colonoscopy (2009); Thyroid surgery (2007); Hysterectomy (1976); joint replacement (Left, 2011); joint replacement (Right, 01/19/2015); hip surgery (01/19/2015); other surgical history (04/10/2018); pr njx dx/ther sbst intrlmnr lmbr/sac w/img gdn (N/A, 04/10/2018); CT NEEDLE BIOPSY LUNG PERCUTANEOUS (03/16/2021); Thyroid lobectomy (Left, 11/26/2010); Breast surgery (Right, 04/01/2005); Breast surgery (Right, 11/30/2004); Lung removal, total (Right, 6/14/2021); Femur fracture surgery (Left, 8/2/2021); Mastectomy (Right, 2005); and US BREAST BIOPSY W LOC DEVICE 1ST LESION RIGHT (Right, 11/18/2004). Allergies: The patient is allergic to cipro xr and vicodin [hydrocodone-acetaminophen].     Medications:    Current Outpatient Medications:     folic acid (FOLVITE) 1 MG tablet, Take 1 tablet by mouth daily, Disp: 90 tablet, Rfl: 3    rosuvastatin (CRESTOR) 40 MG tablet, Take 1 tablet by mouth daily, Disp: 90 tablet, Rfl: 1    levothyroxine (SYNTHROID) 25 MCG tablet, Take 1 tablet by mouth daily, Disp: 90 tablet, Rfl: 1    hydrOXYzine HCl (ATARAX) 10 MG tablet, Take 1-2 tablets by mouth every 6 hours as needed for Itching, Disp: 40 tablet, Rfl: 5    diclofenac sodium (VOLTAREN) 1 % GEL, Apply 2 g topically 2 times daily, Disp: 100 g, Rfl: G    vitamin D (ERGOCALCIFEROL) 1.25 MG (76579 UT) CAPS capsule, Take 1 capsule by mouth once a week, Disp: 12 capsule, Rfl: 1    apixaban (ELIQUIS) 5 MG TABS tablet, Take 1 tablet by mouth 2 times daily, Disp: 60 tablet, Rfl: 5    aspirin EC 81 MG EC tablet, Take 1 tablet by mouth daily, Disp: 90 tablet, Rfl: 1    albuterol sulfate HFA (VENTOLIN HFA) 108 (90 Base) MCG/ACT inhaler, Inhale 2 puffs into the lungs 4 times daily as needed for Wheezing, Disp: 18 g, Rfl: 5    vitamin B-12 (CYANOCOBALAMIN) 1000 MCG tablet, Take 1 tablet by mouth daily, Disp: , Rfl:     ferrous sulfate (IRON 325) 325 (65 Fe) MG tablet, Take 1 tablet by mouth 2 times daily (with meals), Disp: 30 tablet, Rfl: 3

## 2023-07-06 NOTE — PATIENT INSTRUCTIONS
If you receive a survey asking about your care experience, please respond. Your answers will help ensure you receive high-quality care at this office. Thank you! Your Medical Assistant today: Bertin Real. Thank you for coming to our office! It was a pleasure to serve you.

## 2023-07-10 ENCOUNTER — OFFICE VISIT (OUTPATIENT)
Dept: CARDIOLOGY CLINIC | Age: 73
End: 2023-07-10
Payer: MEDICARE

## 2023-07-10 VITALS
SYSTOLIC BLOOD PRESSURE: 138 MMHG | WEIGHT: 142.2 LBS | DIASTOLIC BLOOD PRESSURE: 68 MMHG | HEART RATE: 71 BPM | HEIGHT: 64 IN | BODY MASS INDEX: 24.28 KG/M2

## 2023-07-10 DIAGNOSIS — I50.22 CHRONIC SYSTOLIC (CONGESTIVE) HEART FAILURE (HCC): ICD-10-CM

## 2023-07-10 DIAGNOSIS — Z72.0 TOBACCO ABUSE: ICD-10-CM

## 2023-07-10 DIAGNOSIS — E78.00 PURE HYPERCHOLESTEROLEMIA: ICD-10-CM

## 2023-07-10 DIAGNOSIS — Z98.890 S/P CARDIAC CATH: ICD-10-CM

## 2023-07-10 DIAGNOSIS — R06.02 SOB (SHORTNESS OF BREATH) ON EXERTION: ICD-10-CM

## 2023-07-10 DIAGNOSIS — R42 DIZZINESS ON STANDING: ICD-10-CM

## 2023-07-10 DIAGNOSIS — Z86.73 HISTORY OF CVA (CEREBROVASCULAR ACCIDENT): ICD-10-CM

## 2023-07-10 DIAGNOSIS — R60.0 BILATERAL LEG EDEMA: ICD-10-CM

## 2023-07-10 DIAGNOSIS — I48.0 PAF (PAROXYSMAL ATRIAL FIBRILLATION) (HCC): Primary | ICD-10-CM

## 2023-07-10 PROCEDURE — 4004F PT TOBACCO SCREEN RCVD TLK: CPT | Performed by: INTERNAL MEDICINE

## 2023-07-10 PROCEDURE — 1123F ACP DISCUSS/DSCN MKR DOCD: CPT | Performed by: INTERNAL MEDICINE

## 2023-07-10 PROCEDURE — 3017F COLORECTAL CA SCREEN DOC REV: CPT | Performed by: INTERNAL MEDICINE

## 2023-07-10 PROCEDURE — G8427 DOCREV CUR MEDS BY ELIG CLIN: HCPCS | Performed by: INTERNAL MEDICINE

## 2023-07-10 PROCEDURE — 1090F PRES/ABSN URINE INCON ASSESS: CPT | Performed by: INTERNAL MEDICINE

## 2023-07-10 PROCEDURE — G8399 PT W/DXA RESULTS DOCUMENT: HCPCS | Performed by: INTERNAL MEDICINE

## 2023-07-10 PROCEDURE — G8420 CALC BMI NORM PARAMETERS: HCPCS | Performed by: INTERNAL MEDICINE

## 2023-07-10 PROCEDURE — 99214 OFFICE O/P EST MOD 30 MIN: CPT | Performed by: INTERNAL MEDICINE

## 2023-07-10 RX ORDER — TRIAMTERENE AND HYDROCHLOROTHIAZIDE 37.5; 25 MG/1; MG/1
0.5 TABLET ORAL DAILY
Qty: 30 TABLET | Refills: 3 | Status: SHIPPED | OUTPATIENT
Start: 2023-07-10

## 2023-07-10 NOTE — PROGRESS NOTES
enhancing solid renal mass is noted. 2. Hepatic steatosis. 3. The abdominal aorta is ectatic at 2.8 cm. **This report has been created using voice recognition software. It may contain minor errors which are inherent in voice recognition technology. **     Final report electronically signed by Dr Jazz Bah on 5/31/2023 5:32 PM         CONCLUSION:  1. Tilt table test negative for vasovagal response. 2.  Tilt table test negative for orthostatic hypotension. 3.  Tilt table test negative for POTS (postural orthostatic tachycardia  syndrome). 4.  Overall, this is a benign tilt table study. Saturnino Mathew. Tyler Avalos M.D.     D: 08/03/2022 20:25:18        event monitor and hx of 2 cva 2007 and 2014  CONCLUSION:  This is an abnormal event monitor finding, predominantly  normal sinus rhythm, captured heart rate ranging from 53 to 121 beats  per minute. Abnormal for paroxysmal atrial fibrillation and three  episodes of atrial fibrillation noted with predominantly controlled  ventricular rate and basically, the AFib burden will be less than 1%. All atrial fibrillation are autotriggered captures and there are four  autotriggered capture. Out of the four, three of them are atrial  fibrillation. There is no symptom-triggered capture. There is no AV  block. No pause. No ventricular or supraventricular tachycardia. RECOMMENDATION:  Need further clinical correlation. Consider oral  anticoagulation if clinically indicated. Saturnino Mathew. Tyler Avalos M.D.     D: 09/06/2022 13:16:07           ekg 5/20/21   Sinus  Rhythm   Voltage criteria for LVH  (S(V1)+R(V6) exceeds 3.50 mV). -  Nonspecific T-abnormality. ABNORMAL                    Ekg 7/19/22  Sinus  Rhythm  - occasional PAC     # PACs = 1.   -Nonspecific ST depression   +   Nonspecific T-abnormality  -Nondiagnostic. ABNORMAL           Assessment     Diagnosis Orders   1.  PAF (paroxysmal atrial fibrillation) ([address]) Noted on event monitor  Basic

## 2023-07-11 ENCOUNTER — HOSPITAL ENCOUNTER (OUTPATIENT)
Dept: CT IMAGING | Age: 73
Discharge: HOME | End: 2023-07-11
Payer: MEDICARE

## 2023-07-11 ENCOUNTER — HOSPITAL ENCOUNTER (OUTPATIENT)
Age: 73
Discharge: HOME | End: 2023-07-11
Payer: MEDICARE

## 2023-07-11 ENCOUNTER — CARE COORDINATION (OUTPATIENT)
Dept: CARE COORDINATION | Age: 73
End: 2023-07-11

## 2023-07-11 DIAGNOSIS — I50.22 CHRONIC SYSTOLIC (CONGESTIVE) HEART FAILURE (HCC): ICD-10-CM

## 2023-07-11 DIAGNOSIS — R06.02 SOB (SHORTNESS OF BREATH) ON EXERTION: ICD-10-CM

## 2023-07-11 DIAGNOSIS — Z98.890 S/P CARDIAC CATH: ICD-10-CM

## 2023-07-11 DIAGNOSIS — R60.0 BILATERAL LEG EDEMA: ICD-10-CM

## 2023-07-11 DIAGNOSIS — I48.0 PAF (PAROXYSMAL ATRIAL FIBRILLATION) (HCC): ICD-10-CM

## 2023-07-11 DIAGNOSIS — Z86.73 HISTORY OF CVA (CEREBROVASCULAR ACCIDENT): ICD-10-CM

## 2023-07-11 DIAGNOSIS — E78.00 PURE HYPERCHOLESTEROLEMIA: ICD-10-CM

## 2023-07-11 DIAGNOSIS — I65.23 CAROTID STENOSIS, BILATERAL: ICD-10-CM

## 2023-07-11 DIAGNOSIS — I63.50 RIGHT PONTINE STROKE (HCC): ICD-10-CM

## 2023-07-11 DIAGNOSIS — Z72.0 TOBACCO ABUSE: ICD-10-CM

## 2023-07-11 DIAGNOSIS — R42 DIZZINESS ON STANDING: ICD-10-CM

## 2023-07-11 LAB
ANION GAP SERPL CALC-SCNC: 12 MEQ/L (ref 8–16)
BUN SERPL-MCNC: 10 MG/DL (ref 7–22)
CALCIUM SERPL-MCNC: 8.7 MG/DL (ref 8.5–10.5)
CHLORIDE SERPL-SCNC: 102 MEQ/L (ref 98–111)
CO2 SERPL-SCNC: 26 MEQ/L (ref 23–33)
CREAT SERPL-MCNC: 0.8 MG/DL (ref 0.4–1.2)
GFR SERPL CREATININE-BSD FRML MDRD: > 60 ML/MIN/1.73M2
GLUCOSE SERPL-MCNC: 98 MG/DL (ref 70–108)
MAGNESIUM SERPL-MCNC: 2.2 MG/DL (ref 1.6–2.4)
POC CREATININE WHOLE BLOOD: 0.9 MG/DL (ref 0.5–1.2)
POTASSIUM SERPL-SCNC: 4.5 MEQ/L (ref 3.5–5.2)
SODIUM SERPL-SCNC: 140 MEQ/L (ref 135–145)

## 2023-07-11 PROCEDURE — 6360000004 HC RX CONTRAST MEDICATION: Performed by: SOCIAL WORKER

## 2023-07-11 PROCEDURE — 70498 CT ANGIOGRAPHY NECK: CPT

## 2023-07-11 PROCEDURE — 70496 CT ANGIOGRAPHY HEAD: CPT

## 2023-07-11 PROCEDURE — 83735 ASSAY OF MAGNESIUM: CPT

## 2023-07-11 PROCEDURE — 80048 BASIC METABOLIC PNL TOTAL CA: CPT

## 2023-07-11 PROCEDURE — 82565 ASSAY OF CREATININE: CPT

## 2023-07-11 PROCEDURE — 36415 COLL VENOUS BLD VENIPUNCTURE: CPT

## 2023-07-11 RX ADMIN — IOPAMIDOL 80 ML: 755 INJECTION, SOLUTION INTRAVENOUS at 11:22

## 2023-07-26 ENCOUNTER — CARE COORDINATION (OUTPATIENT)
Dept: CARE COORDINATION | Age: 73
End: 2023-07-26

## 2023-07-26 NOTE — CARE COORDINATION
Willow Dolan have been working with Xavier Patel  on Care Coordination program to provide support and education to help manage chronic conditions. Pt has met those goals and all education has been completed with no new barriers noted. I would like to graduate pt from Care Coordination at this time pending PCP approval.  Do you approve of this discharge? Please advise. Thank you.

## 2023-07-27 ENCOUNTER — HOSPITAL ENCOUNTER (OUTPATIENT)
Dept: CT IMAGING | Age: 73
Discharge: HOME | End: 2023-07-27
Attending: THORACIC SURGERY (CARDIOTHORACIC VASCULAR SURGERY)
Payer: MEDICARE

## 2023-07-27 DIAGNOSIS — I73.9 PAD (PERIPHERAL ARTERY DISEASE) (HCC): ICD-10-CM

## 2023-07-27 DIAGNOSIS — I71.40 ABDOMINAL AORTIC ANEURYSM (AAA) WITHOUT RUPTURE, UNSPECIFIED PART (HCC): ICD-10-CM

## 2023-07-27 DIAGNOSIS — I70.213 ATHEROSCLEROSIS OF NATIVE ARTERIES OF EXTREMITIES WITH INTERMITTENT CLAUDICATION, BILATERAL LEGS (HCC): ICD-10-CM

## 2023-07-27 PROCEDURE — 75635 CT ANGIO ABDOMINAL ARTERIES: CPT

## 2023-07-27 PROCEDURE — 6360000004 HC RX CONTRAST MEDICATION: Performed by: THORACIC SURGERY (CARDIOTHORACIC VASCULAR SURGERY)

## 2023-07-27 RX ADMIN — IOPAMIDOL 110 ML: 755 INJECTION, SOLUTION INTRAVENOUS at 09:56

## 2023-08-09 ENCOUNTER — OFFICE VISIT (OUTPATIENT)
Dept: CARDIOTHORACIC SURGERY | Age: 73
End: 2023-08-09

## 2023-08-09 VITALS
HEART RATE: 74 BPM | SYSTOLIC BLOOD PRESSURE: 128 MMHG | WEIGHT: 142 LBS | HEIGHT: 64 IN | BODY MASS INDEX: 24.24 KG/M2 | DIASTOLIC BLOOD PRESSURE: 74 MMHG

## 2023-08-09 DIAGNOSIS — I73.9 PAD (PERIPHERAL ARTERY DISEASE) (HCC): Primary | ICD-10-CM

## 2023-08-09 RX ORDER — CILOSTAZOL 50 MG/1
50 TABLET ORAL DAILY
Qty: 3 TABLET | Refills: 0 | Status: SHIPPED | OUTPATIENT
Start: 2023-08-09 | End: 2023-08-12

## 2023-08-09 RX ORDER — CILOSTAZOL 100 MG/1
100 TABLET ORAL 2 TIMES DAILY
Qty: 60 TABLET | Refills: 0 | Status: SHIPPED | OUTPATIENT
Start: 2023-08-09 | End: 2023-09-08

## 2023-08-09 RX ORDER — CILOSTAZOL 50 MG/1
50 TABLET ORAL 2 TIMES DAILY
Qty: 8 TABLET | Refills: 0 | Status: SHIPPED | OUTPATIENT
Start: 2023-08-09 | End: 2023-08-13

## 2023-08-09 NOTE — PROGRESS NOTES
bandlike scarring or atelectasis is noted at the medial right lower lobe. 4. There is fusiform aneurysmal of the aorta measuring 2.8 x 2.7 cm. **This report has been created using voice recognition software. It may contain minor errors which are inherent in voice recognition technology. ** Final report electronically signed by Dr. Elgin Malik on 7/28/2023 11:09 AM       PATHOLOGY:   -11/18/2004.  - biopsy which showed DCIS with focal microinvasion. The tumor was ER positive HI negative HER2 negative MIB 4 was between 6 and 14%. Axillary lymph nodes were negative. -    -3/11/2005. Simple mastectomy for 6 x 5 x 3 cm squamous cell carcinoma the breast, grade 3 with lymphatic invasion. It was said that the 2 specimens were of the same histology. Full path report unavailable for review. 3/16/2021 she had biopsy right lower lobe showed poorly differentiated squamous cell carcinoma.    -6/14/2023 Right lower lobe of lung wedge biopsy and lymph nodes by Dr. 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 11 lymph nodes examined (7, 4R, 9, 10 R, 11R) with the 0 containing malignancy. Michel Claudia   pT2, pN0, MX.     GENETICS:  none    MOLECULAR:  none    ASSESSMENT/PLAN:    Patient Active Problem List   Diagnosis    Hyperlipidemia    History of breast cancer    Vitamin D deficiency    Chronic headachess/p head injjury s/p fall    Depression    Smoker    Hypothyroidism    Allergic rhinitis    Incontinence    Noncompliance    History of CVA (cerebrovascular accident)    Stage 2 moderate COPD by GOLD classification ([address])    Carotid occlusion, left    OA (osteoarthritis) of hip    Transient cerebral ischemia    Aortic insufficiency

## 2023-08-09 NOTE — PATIENT INSTRUCTIONS
If you receive a survey asking about your care experience, please respond. Your answers will help ensure you receive high-quality care at this office. Thank you! Your Medical Assistant today: Isabelle Jones. Thank you for coming to our office! It was a pleasure to serve you.

## 2023-08-09 NOTE — PROGRESS NOTES
[facility]      Patient - Skyla Vergara   MRN -  [id number]   Formerly [facility] # - [[id number]]   - 1950      Date of Admission -  2021  6:13 AM  Date of evaluation -  2021  Room - 4-011-A   Hospital Day - 4  Consulting - Sravanthi Martinez MD Primary Care Physician - Andreas Hillman, NITO - CNP     Problem List      Active Hospital Problems    Diagnosis Date Noted    Postoperative urinary retention [N99.89, R33.8] 06/15/2021    Cancer of lower lobe of right lung (HCC) [C34.31] 2021    S/P robotic assisted nonanatomic wedge resection of lateral basilar right lower lobe lung mass and mediastinal dissection [Z90.2] 2021    SOB (shortness of breath) on exertion [R06.02] 2021    Primary malignant neoplasm of right lower lobe of lung (HCC) [C34.31] 2021    Stage 2 moderate COPD by GOLD classification ([facility] [J44.9] 2013     Reason for Consult    Pulmonary management s/p lobectomy  HPI   History Obtained From: Patient and electronic medical record. Skyla Vergara is a 73 y.o. female who presented to [facility] on 2021 for robotic assisted right lower lobe superior segment dissection. 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 Patient seen and evaluated at bedside following surgery. Patient reports minimal post procedural discomfort or pain. Patient does report worsening shortness of breath for the last several months unchanged at time of interview. Denies cough, shortness of breath or chest pain.  Patient does admit to continued hx of smoking with last cigarette yesteday Spoke to patient and he is ok with following up with pulmonologist about the request prior to surgery. Patient reports she has been smoking 2 ppd for the past 48 years. Patient was last seen and evaluated by hematology and oncology on 6/3/2021 with Dr. Nica Bro. Noted to have a 14 mm spiculated nodule on CTA of the chest in 12/13/2020 with interval enlargement from previous CT in 2/19. Biopsy of the right lower lobe demonstrated poorly differentiated squamous cell carcinoma. P40 positive and TTF-1 negative. Patient does have a remote history of breast cancer roughly 15 years ago (2005?) treated with chemotherapy followed by right mastectomy and radiation to the chest wall. Patient received several years of adjuvant hormonal therapy. There was no history of recurrence. This was thought to be unrelated to current tumor as previous tumor was receptor positive and this tumor is receptor negative. She is having shortness of breath: No  She is having cough: No  She is having chest pain:No      Past 24 hrs   -Sitting up in chair on 1 L NC, states that she is feeling well and that pain is well controlled  -Reports some hemoptysis that is unchanged from yesterday, denies SOB, wheezing, or dyspnea  -Chest Tube removed by Dr. Jeremi Carmen - 280 mL out last 24 hours, dressing in place was saturated will have primary nurse re-dress  All other systems reviewed  PMHx   Past Medical History      Diagnosis Date    Anxiety 2007    Arthritis     Breast cancer [facility]) 2005    right mastectomy, chemo and radiation history    CAD (coronary artery disease) 2001    sees Dr Cheyenne Saleh of the skin 2004    Cerebral artery occlusion with cerebral infarction ([address]     Chronic UTI     COPD (chronic obstructive pulmonary disease) ([address]     sees RL Petersen    CVA (cerebral infarction) 2007, 2008    Depression 2007    DVT of lower extremity (deep venous thrombosis) ([address] 1976    Facial injury 2005    Fall on greasy ground, striking left periorbital region    History of stroke 2007, 2008, 2009    Patient relates a stroke with right weakness and speech in 2007; another in 2010 or 2012 (unsure), both with need to rehabilitation.   Also \"2 mini-strokes\" (?)    Hx of blood clots 1977    hip, leg    Hyperlipidemia 2005    Hypertension 2005    Hypothyroidism     IBS (irritable bowel syndrome)     Low HDL (under 40) 2014    Lung cancer [facility])     sees Dr Marvin Luna    Prolonged emergence from general anesthesia     Recurrent UTI (urinary tract infection) 2015    Dr Pat Powers finding difficulty 05/16/2017    work-up in progress      Past Surgical History        Procedure Laterality Date   [address] Right 04/01/2005    evacuation of breast hematoma-Dr. Dev Devine    BREAST SURGERY Right 11/30/2004    lymphatic mapping, SLN bx x2-Dr. Catherine Ariza    COLONOSCOPY  2009    Dr. Bety Bull  03/16/2021    CT NEEDLE BIOPSY LUNG PERCUTANEOUS 3/16/2021 [facility] CT SCAN    HIP SURGERY  01/19/2015    HYSTERECTOMY  1976    JOINT REPLACEMENT Left 2011    HIP    JOINT REPLACEMENT Right 01/19/2015    Right Total Hip Replacement - Dr. Marilynn Ballard, TOTAL Right 6/14/2021    ROBOTIC ASSISTED RIGHT LOWER LOBE SUPERIOR SEGMENTECTOMY AND MEDIASTINAL DISSECTION, CRYOTHERAPY OF INTERCOSTAL NERVES performed by Donavon Mancia MD at [address] Right 2005    Dr. Sam Telles  04/10/2018    Lumbar epidural steroid injection at L4    MS NJX DX/THER SBST INTRLMNR LMBR/SAC W/IMG GDN N/A 04/10/2018    LESI  @ L4 performed by Emili Tapia MD at [facility]    ovaries    THYROID LOBECTOMY Left 11/26/2010    left thyroid lobectomy with isthmusectomy-Dr. [facility] THYROID SURGERY  2007    right thyroid lobectomy-Dr. Ibanez     Meds    Current Medications    guaiFENesin  600 mg Oral BID    levothyroxine  88 mcg Oral Daily    sodium chloride flush  5-40 mL Intravenous 2 times per day    bacitracin   Topical Daily    enoxaparin  40 mg Subcutaneous Daily    polyethylene glycol  17 g Oral Daily    lidocaine  2 patch Transdermal Nightly    budesonide-formoterol  2 puff Inhalation BID    And    tiotropium  2 puff Inhalation Daily     diphenhydrAMINE, albuterol sulfate HFA, sodium chloride flush, sodium chloride, acetaminophen, ondansetron **OR** ondansetron, oxyCODONE-acetaminophen **OR** oxyCODONE-acetaminophen, HYDROmorphone **OR** HYDROmorphone  IV Drips/Infusions   sodium chloride       Home Medications  Medications Prior to Admission: diphenhydrAMINE (BENADRYL) 25 MG tablet, Take 25 mg by mouth every 6 hours as needed for Itching  albuterol sulfate HFA (PROVENTIL HFA) 108 (90 Base) MCG/ACT inhaler, Inhale 2 puffs into the lungs every 6 hours as needed for Wheezing  levothyroxine (LEVOTHROID) 88 MCG tablet, Take 1 tablet by mouth daily  [DISCONTINUED] fluticasone-umeclidin-vilant (TRELEGY ELLIPTA) 100-62.5-25 MCG/INH AEPB, Inhale 1 puff into the lungs daily  [DISCONTINUED] venlafaxine (EFFEXOR XR) 37.5 MG extended release capsule, Take 1 capsule by mouth daily (Patient not taking: Reported on 6/1/2021)  Diet    ADULT DIET; Regular  Allergies    Cipro xr and Vicodin [hydrocodone-acetaminophen]  Social History     Social History     Socioeconomic History    Marital status:       Spouse name: Enrike Steele    Number of children: 3    Years of education: 5    Highest education level: Not on file   Occupational History    Occupation: Disabled   Tobacco Use    Smoking status: Current Every Day Smoker     Packs/day: 2.00     Years: 48.00     Pack years: 96.00     Types: Cigarettes     Start date: 11/13/1967    Smokeless tobacco: Never Used   Vaping Use    Vaping Use: Never used   Substance and Sexual Activity    Alcohol use: No     Alcohol/week: 0.0 standard drinks    Drug use: No    Sexual activity: Yes     Partners: Male   Other Topics Concern    Not on file   Social History Narrative    Not on file     Social Determinants of Health     Financial Resource Strain:     Difficulty of Paying Living Expenses:    Food Insecurity:     Worried About Running Out of Food in the Last Year:     [address] in the Last Year:    Transportation Needs:     Lack of Transportation (Medical):  Lack of Transportation (Non-Medical):    Physical Activity:     Days of Exercise per Week:     Minutes of Exercise per Session:    Stress:     Feeling of Stress :    Social Connections:     Frequency of Communication with Friends and Family:     Frequency of Social Gatherings with Friends and Family:     Attends Zoroastrianism Services:     Active Member of Clubs or Organizations:     Attends Club or Organization Meetings:     Marital Status:    Intimate Partner Violence:     Fear of Current or Ex-Partner:     Emotionally Abused:     Physically Abused:     Sexually Abused:      Family History          Problem Relation Age of Onset    Osteoporosis Mother     Hypertension Mother     High Cholesterol Mother     Stroke Mother     Colon Cancer Mother     Cancer Father         lung cancer    Heart Disease Father     Hypertension Father     High Cholesterol Father     Heart Disease Sister     High Cholesterol Sister     Hypertension Sister     Asthma Sister     Other Other         high arched feet    Lung Cancer Son      Sleep History    Never diagnosed with sleep apnea in the past.  Occupational history   Occupation:  She is current working: No  Type of profession: retired. History of tobacco smoking:Yes  Amount of tobacco smokin.0 PPD. Years of tobacco smokin. Quit smoking: No.              Quit YDUE:[id number]   Current smoker: Yes. Amount of current tobacco smokin.0 PPD  .   History of recreational or IV drug use in the past:NO      History of pulmonary embolism in the past: No History of DVT in the past:Yes        [x] Right lower extremity   [] Left lower extremity. Vitals     height is 5' 4\" (1.626 m) and weight is 141 lb 9.6 oz (64.2 kg). Her oral temperature is 98.2 °F (36.8 °C). Her blood pressure is 172/67 (abnormal) and her pulse is 56. Her respiration is 20 and oxygen saturation is 92%. Body mass index is 24.31 kg/m². SUPPLEMENTAL O2: O2 Flow Rate (L/min): 1 L/min     I/O        Intake/Output Summary (Last 24 hours) at 6/18/2021 0830  Last data filed at 6/18/2021 0326  Gross per 24 hour   Intake 620 ml   Output 1530 ml   Net -910 ml     I/O last 3 completed shifts: In: 620 [P.O.:620]  Out: 4597 [Urine:1250; Drains:280]   Patient Vitals for the past 96 hrs (Last 3 readings):   Weight   06/18/21 0326 141 lb 9.6 oz (64.2 kg)   06/17/21 0542 145 lb 14.4 oz (66.2 kg)   06/16/21 0346 145 lb 3.2 oz (65.9 kg)       Exam   Physical Exam   Constitutional: No distress on O2 per 1 L NC. Patient appears well built and well nourished. Head: Normocephalic and atraumatic. Mouth/Throat: Oropharynx is clear and moist.  No oral thrush. Eyes: Conjunctivae are normal. Pupils are equal, round. No scleral icterus. Neck: Neck supple. No tracheal deviation present. Cardiovascular: S1 and S2 with no murmur. No peripheral edema  Pulmonary/Chest: Normal effort with bilateral air entry, diminished breath sounds. No stridor. No respiratory distress. Patient exhibits no tenderness. Chest tube was removed, skin surrounding site intact. Dressing was saturated with serosanguinous fluid. Abdominal: Soft. Bowel sounds audible. No distension or tenderness to palp. Musculoskeletal: Moves all extremities  Neurological: Patient is alert and able to follow simple commands. Skin: Warm and dry.      Labs  - Old records and notes have been reviewed in [facility]   ABG  Lab Results   Component Value Date    PH 7.35 09/29/2013    PO2 53 09/29/2013    PCO2 45 09/29/2013    HCO3 25 09/29/2013 O2SAT 85 09/29/2013     No results found for: IFIO2, MODE, SETTIDVOL, SETPEEP  CBC  No results for input(s): WBC, RBC, HGB, HCT, MCV, MCH, MCHC, RDW, PLT, MPV in the last 72 hours. BMP  No results for input(s): NA, K, CL, CO2, BUN, CREATININE, GLUCOSE, MG, PHOS, CALCIUM, IONCA, MG in the last 72 hours. LFT  No results for input(s): AST, ALT, ALB, BILITOT, ALKPHOS, LIPASE in the last 72 hours. Invalid input(s): AMYLASE  TROP  Lab Results   Component Value Date    TROPONINT < 0.010 03/12/2021    TROPONINT < 0.010 12/13/2020    TROPONINT < 0.010 05/16/2017     BNP  No results for input(s): BNP in the last 72 hours. Lactic Acid  No results for input(s): LACTA in the last 72 hours. INR  No results for input(s): INR, PROTIME in the last 72 hours. PTT  No results for input(s): APTT in the last 72 hours. Glucose  No results for input(s): POCGLU in the last 72 hours. UA No results for input(s): SPECGRAV, PHUR, COLORU, CLARITYU, MUCUS, PROTEINU, BLOODU, RBCUA, WBCUA, BACTERIA, NITRU, GLUCOSEU, BILIRUBINUR, UROBILINOGEN, KETUA, LABCAST, LABCASTTY, AMORPHOS in the last 72 hours. Invalid input(s): CRYSTALS. PFTs               Sleep studies   Non on file    Cultures    None on file    EKG         NSR w/ LVH    Echocardiogram       TTE procedure:ECHOCARDIOGRAM COMPLETE 2D W DOPPLER W COLOR. Procedure Date  Date: 03/13/2021 Start: 10:00 AM     Study Location: Bedside  Technical Quality: Adequate visualization     Indications:Syncope.     Additional Medical History:Coronary artery disease, Hypertension,  Hyperlipidemia, Anxiety, stroke, Skin cancer, COPD, Breast cancer, Right  mastectomy, DVT, Family history of heart disease, Tobacco use     Patient Status: Routine     Height: 63.78 inches Weight: 136 pounds BSA: 1.66 m^2 BMI: 23.51 kg/m^2     BP: 120/41 mmHg      Conclusions      Summary   Technically difficult examination.    Left ventricular size and systolic function is normal. Ejection fraction   was initiate supplemental O2 to maintain SpO2 >90% if needed  -Continue guaifenesin 600 mg PO BID for congested cough  -Chest tube removed per surgery  -Acapella Q1H while awake  -Recommend f/u w/ tobacco cessation clinic on discharge  -Pain control per primary service  -Encourage early ambulation  -Eval with PT/OT  -VTE prophylaxis lovenox 40mg daily per primary  -Dressing change prior to discharge  -Will need follow up CT Chest scan in 6 months for surveillance CT  -Resume Trelegy Ellipta at discharge  -Home O2 evaluation prior to discharge-Pending  -Stable from Pulmonary standpoint, schedule patient for follow-up at [facility]. [facility] in 3 months with Full PFT approximately 3 days prior to appointment with Lewis Preciado CNP or Dr. Elli Hunter MD      Questions and concerns addressed. Electronically signed by   NITO Torres CNP on 6/18/2021 at 8:30 AM     Addendum     Home O2 eval completed by RT Shiv Li    A home oxygen evaluation has been completed.      [x]? Patient is an inpatient. It is expected that the patient will be discharged within the next 48 hours. Qualified provider to write order for home prescription if patient qualifies. Social service/care managers will arrange for home oxygen. If patient is active, arrange for Home Medical supplier to assess for Oxygen Conserving Device per pulse oximetry. []? Patient is an outpatient. Results will be faxed to the ordering provider. Qualified provider to write order for home prescription if patient qualifies and arranges for home oxygen.        Patient was placed on room air for 2 minutes. SpO2 was 88 % on room air at rest. Patients SpO2 was below 89% and qualified for home oxygen. Oxygen was applied at 1 lpm via nasal cannula to maintain a SpO2 between 90-92% while at rest. Actual SpO2 was 92 %. Patient can ambulate, using a walker, for exercise flow rate.  Patients was ambulated, SpO2 was 92% on 2 lpm to maintain SpO2 between 90-92% while exercising. .      Note: For any SpO2 at 35% see policy and procedure for possible qualifications. Order placed ofr 1 LPM at rest and 2 LPM with exertion     Patient was evaluated today for the diagnosis of COPD. I entered a DME order for home oxygen because the diagnosis and testing requires the patient to have supplemental oxygen. Condition will improve or be benefited by oxygen use. The patient is  able to perform good mobility in a home setting and therefore does require the use of a portable oxygen system. The need for this equipment was discussed with the patient and she understands and is in agreement. Electronically signed by NITO Molina CNP on 6/18/2021 at 1:59 PM    Afebrile  Chest tube out  Wishing to go home  Home oxygen eval completed, orders in EPIC  Will not resume smoking    Patient seen and examined independently by me. Above discussed and I agree with  CNP note Also see my additional comments. Labs, cultures, and radiographs when available were reviewed. Changes were made in the orders as necessary. I discussed patient concerns with Fabby QUEZADA and instructions were given. Respiratory care issues addressed. Please see our orders for the updated patient care plan.     Electronically signed by     Kat Santiago MD on 6/18/2021 at 3:14 PM

## 2023-08-09 NOTE — PROGRESS NOTES
CT/CV Surgery Follow Up Office Visit      Patient's Name/Date of Birth: Jena Gracia / 1950 (69 y.o.)    PCP: NITO Cueto CNP    Date: August 9, 2023    We had the pleasure of seeing Jena Gracia in the office today, as you know this is a very pleasant 72y.o. year old female with a history of hypertension, hyperlipidemia, CVA x 4, coronary artery disease, COPD on 2 L oxygen at night, right mastectomy for cancer, peripheral arterial disease, and a small abdominal aortic aneurysm. She returned to the cardiovascular surgery clinic for follow-up. She reports much improved in terms of claudication. She had a CTA of the abdomen 7/28/2023, which demonstrated a 2.8 cm fusiform abdominal aortic aneurysm with occluded midportion of the right posterior descending artery with reconstitution via collateral circulations. PastMedical History:  Roman Stubbs  has a past medical history of Anxiety, Arthritis, Breast cancer ([address]), CAD (coronary artery disease), Cancer of the skin, Cerebral artery occlusion with cerebral infarction ([address]), Chronic UTI, COPD (chronic obstructive pulmonary disease) ([address]), CVA (cerebral infarction), Depression, DVT of lower extremity (deep venous thrombosis) ([address]), Facial injury, History of stroke, History of therapeutic radiation, Hx antineoplastic chemo, Hx of blood clots, Hyperlipidemia, Hypertension, Hypothyroidism, IBS (irritable bowel syndrome), Low HDL (under 40), Lung cancer ([address]), Prolonged emergence from general anesthesia, Recurrent UTI (urinary tract infection), and Word finding difficulty. Past Surgical History:  The patient  has a past surgical history that includes Appendectomy (1975); Cholecystectomy (1992); Total abdominal hysterectomy w/ bilateral salpingoophorectomy (1976, 2001); Colonoscopy (2009); Thyroid surgery (2007);  Hysterectomy (1976); joint replacement (Left, 2011); joint replacement (Right, 01/19/2015); hip surgery (01/19/2015); other surgical

## 2023-08-10 ENCOUNTER — OFFICE VISIT (OUTPATIENT)
Dept: ONCOLOGY | Age: 73
End: 2023-08-10

## 2023-08-10 ENCOUNTER — HOSPITAL ENCOUNTER (OUTPATIENT)
Dept: INFUSION THERAPY | Age: 73
Discharge: HOME | End: 2023-08-10
Payer: MEDICARE

## 2023-08-10 ENCOUNTER — TELEPHONE (OUTPATIENT)
Dept: CARDIOTHORACIC SURGERY | Age: 73
End: 2023-08-10

## 2023-08-10 VITALS
HEART RATE: 74 BPM | SYSTOLIC BLOOD PRESSURE: 125 MMHG | RESPIRATION RATE: 18 BRPM | OXYGEN SATURATION: 95 % | DIASTOLIC BLOOD PRESSURE: 62 MMHG | TEMPERATURE: 97.9 F

## 2023-08-10 VITALS
HEIGHT: 64 IN | HEART RATE: 74 BPM | WEIGHT: 142 LBS | DIASTOLIC BLOOD PRESSURE: 62 MMHG | BODY MASS INDEX: 24.24 KG/M2 | RESPIRATION RATE: 18 BRPM | OXYGEN SATURATION: 95 % | TEMPERATURE: 97.9 F | SYSTOLIC BLOOD PRESSURE: 125 MMHG

## 2023-08-10 DIAGNOSIS — F17.219 NICOTINE DEPENDENCE, CIGARETTES, WITH UNSPECIFIED NICOTINE-INDUCED DISORDERS: ICD-10-CM

## 2023-08-10 DIAGNOSIS — C50.811 MALIGNANT NEOPLASM OF OVERLAPPING SITES OF RIGHT BREAST IN FEMALE, ESTROGEN RECEPTOR NEGATIVE (HCC): Primary | ICD-10-CM

## 2023-08-10 DIAGNOSIS — Z17.1 MALIGNANT NEOPLASM OF OVERLAPPING SITES OF RIGHT BREAST IN FEMALE, ESTROGEN RECEPTOR NEGATIVE (HCC): Primary | ICD-10-CM

## 2023-08-10 DIAGNOSIS — C34.31 PRIMARY MALIGNANT NEOPLASM OF RIGHT LOWER LOBE OF LUNG (HCC): ICD-10-CM

## 2023-08-10 DIAGNOSIS — Z72.0 TOBACCO ABUSE: ICD-10-CM

## 2023-08-10 PROCEDURE — 99211 OFF/OP EST MAY X REQ PHY/QHP: CPT

## 2023-08-10 NOTE — PATIENT INSTRUCTIONS
Talk to your pulmonary person about getting inhalers refilled  smoking cessation counseling/program (pt should get flier)  Please schedule the PET Dr Divine Hernandez ordered in May- pt said she didn't get called  RTC approx 4 weeks to review

## 2023-08-10 NOTE — TELEPHONE ENCOUNTER
Spoke with Ramses Sultana at [facility], she informed me that they do have a prescription for Pletal 50 mg 1 tab po daily for 3 days, they will have that ready for the patient to , Ramses Sultana stated the medication was held because a prescription for Pletal 50 mg po bid for 4 days came in, and they thought the 1st one was an error. Patient will get the 60 tablets after her loading dose. Tried to reach patient to inform her, no answer, no option to leave a vm, I will try to reach patient again.

## 2023-08-10 NOTE — TELEPHONE ENCOUNTER
Patient states the pharmacy only gave her 8 tablets of Pletal 50mg, patient should have got 11 tablets of 50mg, and 60 tablets of 100 mg     Dr. Peg Amin ordered 50mg tablets of Pletal and 100 mg, patient to take 50 mg po daily for 3 days, then 50 mg po BID, then 100 mg po bid. I tried to call [facility], they are closed for lunch from 1:30 pm- 2:00 pm.    I will try to call them back at 2 pm to see clarify order.

## 2023-08-11 NOTE — TELEPHONE ENCOUNTER
Spoke with patient, and informed her that she can pick the additional 3 tabs up at [email or web address], and once she has completed the loading dose, she can  the prescription for Pletal 100mg tab bid, 60 tabs. Patient voiced understanding, no further questions voiced at this time.

## 2023-08-30 ENCOUNTER — TELEPHONE (OUTPATIENT)
Dept: UROLOGY | Age: 73
End: 2023-08-30

## 2023-08-30 NOTE — TELEPHONE ENCOUNTER
Patient scheduled for US RENAL COMPLETE  at [address] on 6/11/24 ARRIVAL  AM FOR A 80 AM SCAN TIME NO CARBONATED BEVERAGES THE DAY OF AND BE WELL HYDRATED  .     Order mailed with instructions or given to the patient in the office

## 2023-08-31 ENCOUNTER — HOSPITAL ENCOUNTER (OUTPATIENT)
Dept: PET IMAGING | Age: 73
Discharge: HOME | End: 2023-08-31
Attending: INTERNAL MEDICINE
Payer: MEDICARE

## 2023-08-31 DIAGNOSIS — C34.31 PRIMARY MALIGNANT NEOPLASM OF RIGHT LOWER LOBE OF LUNG (HCC): ICD-10-CM

## 2023-08-31 PROCEDURE — 78815 PET IMAGE W/CT SKULL-THIGH: CPT

## 2023-08-31 PROCEDURE — A9552 F18 FDG: HCPCS | Performed by: INTERNAL MEDICINE

## 2023-08-31 PROCEDURE — 3430000000 HC RX DIAGNOSTIC RADIOPHARMACEUTICAL: Performed by: INTERNAL MEDICINE

## 2023-08-31 RX ORDER — FLUDEOXYGLUCOSE F 18 200 MCI/ML
10.8 INJECTION, SOLUTION INTRAVENOUS
Status: COMPLETED | OUTPATIENT
Start: 2023-08-31 | End: 2023-08-31

## 2023-08-31 RX ADMIN — FLUDEOXYGLUCOSE F 18 10.8 MILLICURIE: 200 INJECTION, SOLUTION INTRAVENOUS at 10:32

## 2023-09-01 NOTE — PROGRESS NOTES
talk, and they came with a pamphlet about it. 4) Multiple comorbidities. She will follow-up with her usual providers and stay on her usual medication regimen for her atherosclerosis with coronary artery disease,TIA manifested as aphasia with essential hypertension, noncompliance with treatment, continued tobacco abuse with a previous CVA and hyperlipidemia. 5) Medications reviewed. Prescriptions today:            No orders of the defined types were placed in this encounter. [place]ARRS:  Controlled Substance Monitoring:    Acute and Chronic Pain Monitoring:   RX Monitoring 3/23/2018   Attestation The Prescription Monitoring Report for this patient was reviewed today. Periodic Controlled Substance Monitoring Possible medication side effects, risk of tolerance/dependence & alternative treatments discussed. ;Random urine drug screen sent today. ;No signs of potential drug abuse or diversion identified. 6) Follow Up:  Return in about 3 weeks (around 9/26/2023) for RTC 1-2 weeks after IR biopsy to review results Dr Nyla Ortiz.      [facility]

## 2023-09-05 ENCOUNTER — HOSPITAL ENCOUNTER (OUTPATIENT)
Dept: INFUSION THERAPY | Age: 73
Discharge: HOME | End: 2023-09-05
Payer: MEDICARE

## 2023-09-05 ENCOUNTER — OFFICE VISIT (OUTPATIENT)
Dept: ONCOLOGY | Age: 73
End: 2023-09-05
Payer: MEDICARE

## 2023-09-05 VITALS
RESPIRATION RATE: 20 BRPM | SYSTOLIC BLOOD PRESSURE: 139 MMHG | TEMPERATURE: 98.4 F | BODY MASS INDEX: 23.56 KG/M2 | WEIGHT: 138 LBS | OXYGEN SATURATION: 90 % | HEIGHT: 64 IN | HEART RATE: 91 BPM | DIASTOLIC BLOOD PRESSURE: 62 MMHG

## 2023-09-05 VITALS
RESPIRATION RATE: 20 BRPM | HEART RATE: 91 BPM | SYSTOLIC BLOOD PRESSURE: 139 MMHG | DIASTOLIC BLOOD PRESSURE: 62 MMHG | TEMPERATURE: 98.4 F

## 2023-09-05 DIAGNOSIS — C34.31 PRIMARY MALIGNANT NEOPLASM OF RIGHT LOWER LOBE OF LUNG (HCC): Primary | ICD-10-CM

## 2023-09-05 DIAGNOSIS — Z17.1 MALIGNANT NEOPLASM OF OVERLAPPING SITES OF RIGHT BREAST IN FEMALE, ESTROGEN RECEPTOR NEGATIVE (HCC): ICD-10-CM

## 2023-09-05 DIAGNOSIS — R91.1 NODULE OF UPPER LOBE OF LEFT LUNG: ICD-10-CM

## 2023-09-05 DIAGNOSIS — C34.31 PRIMARY MALIGNANT NEOPLASM OF RIGHT LOWER LOBE OF LUNG (HCC): ICD-10-CM

## 2023-09-05 DIAGNOSIS — C50.811 MALIGNANT NEOPLASM OF OVERLAPPING SITES OF RIGHT BREAST IN FEMALE, ESTROGEN RECEPTOR NEGATIVE (HCC): ICD-10-CM

## 2023-09-05 LAB
ABSOLUTE IMMATURE GRANULOCYTE: 0.01 THOU/MM3 (ref 0–0.07)
ALBUMIN SERPL BCG-MCNC: 3.5 G/DL (ref 3.5–5.1)
ALP SERPL-CCNC: 129 U/L (ref 38–126)
ALT SERPL W/O P-5'-P-CCNC: 6 U/L (ref 11–66)
AST SERPL-CCNC: 14 U/L (ref 5–40)
BASOPHILS ABSOLUTE: 0 THOU/MM3 (ref 0–0.1)
BASOPHILS NFR BLD AUTO: 0 % (ref 0–3)
BILIRUB CONJ SERPL-MCNC: < 0.2 MG/DL (ref 0–0.3)
BILIRUB SERPL-MCNC: 0.4 MG/DL (ref 0.3–1.2)
BUN BLDP-MCNC: 6 MG/DL (ref 8–26)
CHLORIDE BLD-SCNC: 103 MEQ/L (ref 98–109)
CREAT BLD-MCNC: 0.9 MG/DL (ref 0.5–1.2)
EOSINOPHIL NFR BLD AUTO: 4 % (ref 0–4)
EOSINOPHILS ABSOLUTE: 0.3 THOU/MM3 (ref 0–0.4)
ERYTHROCYTE [DISTWIDTH] IN BLOOD BY AUTOMATED COUNT: 13.6 % (ref 11.5–14.5)
GFR SERPL CREATININE-BSD FRML MDRD: > 60 ML/MIN/1.73M2
GLUCOSE BLD-MCNC: 92 MG/DL (ref 70–108)
HCT VFR BLD AUTO: 43 % (ref 37–47)
HGB BLD-MCNC: 14.1 GM/DL (ref 12–16)
IMMATURE GRANULOCYTES: 0 %
IONIZED CALCIUM, WHOLE BLOOD: 1.06 MMOL/L (ref 1.12–1.32)
LYMPHOCYTES ABSOLUTE: 1.3 THOU/MM3 (ref 1–4.8)
LYMPHOCYTES NFR BLD AUTO: 20 % (ref 15–47)
MCH RBC QN AUTO: 33.1 PG (ref 26–33)
MCHC RBC AUTO-ENTMCNC: 32.8 GM/DL (ref 32.2–35.5)
MCV RBC AUTO: 101 FL (ref 81–99)
MONOCYTES ABSOLUTE: 0.3 THOU/MM3 (ref 0.4–1.3)
MONOCYTES NFR BLD AUTO: 5 % (ref 0–12)
NEUTROPHILS NFR BLD AUTO: 70 % (ref 43–75)
PLATELET # BLD AUTO: 212 THOU/MM3 (ref 130–400)
PMV BLD AUTO: 10.2 FL (ref 9.4–12.4)
POTASSIUM BLD-SCNC: 3.5 MEQ/L (ref 3.5–4.9)
PROT SERPL-MCNC: 6.2 G/DL (ref 6.1–8)
RBC # BLD AUTO: 4.26 MILL/MM3 (ref 4.2–5.4)
SEGMENTED NEUTROPHILS ABSOLUTE COUNT: 4.6 THOU/MM3 (ref 1.8–7.7)
SODIUM BLD-SCNC: 140 MEQ/L (ref 138–146)
TOTAL CO2, WHOLE BLOOD: 29 MEQ/L (ref 23–33)
WBC # BLD AUTO: 6.5 THOU/MM3 (ref 4.8–10.8)

## 2023-09-05 PROCEDURE — 99214 OFFICE O/P EST MOD 30 MIN: CPT | Performed by: INTERNAL MEDICINE

## 2023-09-05 PROCEDURE — 99211 OFF/OP EST MAY X REQ PHY/QHP: CPT

## 2023-09-05 PROCEDURE — 80047 BASIC METABLC PNL IONIZED CA: CPT

## 2023-09-05 PROCEDURE — 85025 COMPLETE CBC W/AUTO DIFF WBC: CPT

## 2023-09-05 PROCEDURE — G8399 PT W/DXA RESULTS DOCUMENT: HCPCS | Performed by: INTERNAL MEDICINE

## 2023-09-05 PROCEDURE — G8427 DOCREV CUR MEDS BY ELIG CLIN: HCPCS | Performed by: INTERNAL MEDICINE

## 2023-09-05 PROCEDURE — G8420 CALC BMI NORM PARAMETERS: HCPCS | Performed by: INTERNAL MEDICINE

## 2023-09-05 PROCEDURE — 1090F PRES/ABSN URINE INCON ASSESS: CPT | Performed by: INTERNAL MEDICINE

## 2023-09-05 PROCEDURE — 36415 COLL VENOUS BLD VENIPUNCTURE: CPT

## 2023-09-05 PROCEDURE — 80076 HEPATIC FUNCTION PANEL: CPT

## 2023-09-05 PROCEDURE — 3017F COLORECTAL CA SCREEN DOC REV: CPT | Performed by: INTERNAL MEDICINE

## 2023-09-05 PROCEDURE — 1123F ACP DISCUSS/DSCN MKR DOCD: CPT | Performed by: INTERNAL MEDICINE

## 2023-09-05 PROCEDURE — 4004F PT TOBACCO SCREEN RCVD TLK: CPT | Performed by: INTERNAL MEDICINE

## 2023-09-05 NOTE — PATIENT INSTRUCTIONS
Please schedule IR CT guided biopsy in the next week   Please have patient RTC approximately 1-2 weeks after the biopsy to review results.  Dr Otilia Moe

## 2023-09-08 RX ORDER — CILOSTAZOL 100 MG/1
TABLET ORAL
Qty: 60 TABLET | Refills: 0 | Status: SHIPPED | OUTPATIENT
Start: 2023-09-08

## 2023-09-18 ENCOUNTER — APPOINTMENT (OUTPATIENT)
Dept: GENERAL RADIOLOGY | Age: 73
End: 2023-09-18
Payer: MEDICARE

## 2023-09-18 ENCOUNTER — HOSPITAL ENCOUNTER (EMERGENCY)
Age: 73
Discharge: LEFT BEFORE DISCHARGE | End: 2023-09-18
Attending: EMERGENCY MEDICINE
Payer: MEDICARE

## 2023-09-18 ENCOUNTER — HOSPITAL ENCOUNTER (OUTPATIENT)
Dept: CT IMAGING | Age: 73
Discharge: HOME | End: 2023-09-18
Payer: MEDICARE

## 2023-09-18 VITALS
HEART RATE: 73 BPM | OXYGEN SATURATION: 93 % | BODY MASS INDEX: 23.69 KG/M2 | SYSTOLIC BLOOD PRESSURE: 150 MMHG | TEMPERATURE: 97.1 F | DIASTOLIC BLOOD PRESSURE: 67 MMHG | WEIGHT: 138 LBS | RESPIRATION RATE: 18 BRPM

## 2023-09-18 VITALS
TEMPERATURE: 97.6 F | BODY MASS INDEX: 23.56 KG/M2 | SYSTOLIC BLOOD PRESSURE: 159 MMHG | HEIGHT: 64 IN | HEART RATE: 63 BPM | RESPIRATION RATE: 18 BRPM | OXYGEN SATURATION: 97 % | DIASTOLIC BLOOD PRESSURE: 54 MMHG | WEIGHT: 138 LBS

## 2023-09-18 DIAGNOSIS — C34.31 PRIMARY MALIGNANT NEOPLASM OF RIGHT LOWER LOBE OF LUNG (HCC): ICD-10-CM

## 2023-09-18 DIAGNOSIS — J93.9 PNEUMOTHORAX ON LEFT: Primary | ICD-10-CM

## 2023-09-18 DIAGNOSIS — J95.811 PNEUMOTHORAX AFTER BIOPSY: Primary | ICD-10-CM

## 2023-09-18 DIAGNOSIS — R91.1 NODULE OF UPPER LOBE OF LEFT LUNG: ICD-10-CM

## 2023-09-18 LAB
DEPRECATED RDW RBC AUTO: 54.4 FL (ref 35–45)
ERYTHROCYTE [DISTWIDTH] IN BLOOD BY AUTOMATED COUNT: 14 % (ref 11.5–14.5)
HCT VFR BLD AUTO: 48.9 % (ref 37–47)
HGB BLD-MCNC: 15.7 GM/DL (ref 12–16)
MCH RBC QN AUTO: 33.5 PG (ref 26–33)
MCHC RBC AUTO-ENTMCNC: 32.1 GM/DL (ref 32.2–35.5)
MCV RBC AUTO: 104.3 FL (ref 81–99)
PLATELET # BLD AUTO: 226 THOU/MM3 (ref 130–400)
PMV BLD AUTO: 10.7 FL (ref 9.4–12.4)
RBC # BLD AUTO: 4.69 MILL/MM3 (ref 4.2–5.4)
WBC # BLD AUTO: 5.6 THOU/MM3 (ref 4.8–10.8)

## 2023-09-18 PROCEDURE — 77012 CT SCAN FOR NEEDLE BIOPSY: CPT

## 2023-09-18 PROCEDURE — 88333 PATH CONSLTJ SURG CYTO XM 1: CPT

## 2023-09-18 PROCEDURE — 32551 INSERTION OF CHEST TUBE: CPT

## 2023-09-18 PROCEDURE — 6360000002 HC RX W HCPCS: Performed by: RADIOLOGY

## 2023-09-18 PROCEDURE — 71046 X-RAY EXAM CHEST 2 VIEWS: CPT

## 2023-09-18 PROCEDURE — 88341 IMHCHEM/IMCYTCHM EA ADD ANTB: CPT

## 2023-09-18 PROCEDURE — 99284 EMERGENCY DEPT VISIT MOD MDM: CPT

## 2023-09-18 PROCEDURE — 88305 TISSUE EXAM BY PATHOLOGIST: CPT

## 2023-09-18 PROCEDURE — 88342 IMHCHEM/IMCYTCHM 1ST ANTB: CPT

## 2023-09-18 PROCEDURE — 85027 COMPLETE CBC AUTOMATED: CPT

## 2023-09-18 PROCEDURE — 2580000003 HC RX 258: Performed by: RADIOLOGY

## 2023-09-18 PROCEDURE — 32408 CORE NDL BX LNG/MED PERQ: CPT

## 2023-09-18 PROCEDURE — 99283 EMERGENCY DEPT VISIT LOW MDM: CPT

## 2023-09-18 RX ORDER — SODIUM CHLORIDE 450 MG/100ML
INJECTION, SOLUTION INTRAVENOUS CONTINUOUS
Status: DISCONTINUED | OUTPATIENT
Start: 2023-09-18 | End: 2023-09-19 | Stop reason: HOSPADM

## 2023-09-18 RX ORDER — MIDAZOLAM HYDROCHLORIDE 1 MG/ML
1 INJECTION INTRAMUSCULAR; INTRAVENOUS ONCE
Status: COMPLETED | OUTPATIENT
Start: 2023-09-18 | End: 2023-09-18

## 2023-09-18 RX ORDER — FENTANYL CITRATE 50 UG/ML
50 INJECTION, SOLUTION INTRAMUSCULAR; INTRAVENOUS ONCE
Status: COMPLETED | OUTPATIENT
Start: 2023-09-18 | End: 2023-09-18

## 2023-09-18 RX ADMIN — FENTANYL CITRATE 50 MCG: 50 INJECTION, SOLUTION INTRAMUSCULAR; INTRAVENOUS at 09:40

## 2023-09-18 RX ADMIN — FENTANYL CITRATE 50 MCG: 50 INJECTION, SOLUTION INTRAMUSCULAR; INTRAVENOUS at 09:05

## 2023-09-18 RX ADMIN — MIDAZOLAM 1 MG: 1 INJECTION INTRAMUSCULAR; INTRAVENOUS at 09:05

## 2023-09-18 RX ADMIN — MIDAZOLAM 1 MG: 1 INJECTION INTRAMUSCULAR; INTRAVENOUS at 09:40

## 2023-09-18 RX ADMIN — SODIUM CHLORIDE: 4.5 INJECTION, SOLUTION INTRAVENOUS at 07:48

## 2023-09-18 ASSESSMENT — PAIN SCALES - GENERAL
PAINLEVEL_OUTOF10: 5
PAINLEVEL_OUTOF10: 2
PAINLEVEL_OUTOF10: 7

## 2023-09-18 ASSESSMENT — PAIN - FUNCTIONAL ASSESSMENT
PAIN_FUNCTIONAL_ASSESSMENT: NONE - DENIES PAIN
PAIN_FUNCTIONAL_ASSESSMENT: NONE - DENIES PAIN
PAIN_FUNCTIONAL_ASSESSMENT: 0-10

## 2023-09-18 ASSESSMENT — PAIN DESCRIPTION - LOCATION
LOCATION: CHEST
LOCATION: CHEST

## 2023-09-18 NOTE — OP NOTE
Department of Radiology  Post Procedure Progress Note      Pre-Procedure Diagnosis:  FDG avid left sided pulmonary nodules    Procedure Performed:  CT guided lung biopsy    Anesthesia: local / versed and fentanyl    Findings: successful    Immediate Complications:  Patient developed a pneumothorax at conclusion of the procedure. A chest tube was placed. Estimated Blood Loss: minimal    SEE DICTATED PROCEDURE NOTE FOR COMPLETE DETAILS.     Electronically signed by Stewart Parr MD on 9/18/2023 at 10:06 AM

## 2023-09-18 NOTE — PROGRESS NOTES
__M__ Safety:       (Environmental)  Frannie to environment  Ensure ID band is correct and in place/ allergy band as needed  Assess for fall risk  Initiate fall precautions as applicable (fall band, side rails, etc.)  Call light within reach  Bed in low position/ wheels locked    _M___ Pain:       Assess pain level and characteristics  Administer analgesics as ordered  Assess effectiveness of pain management and report to MD as needed    __M__ Knowledge Deficit:  Assess baseline knowledge  Provide teaching at level of understanding  Provide teaching via preferred learning method  Evaluate teaching effectiveness    __M__ Hemodynamic/Respiratory Status:       (Pre and Post Procedure Monitoring)  Assess/Monitor vital signs and LOC  Assess Baseline SpO2 prior to any sedation  Obtain weight/height  Assess vital signs/ LOC until patient meets discharge criteria  Monitor procedure site and notify MD of any issues

## 2023-09-18 NOTE — PROGRESS NOTES
6499 Patient received in CT scanning for pre-procedure assessment with family at bedside. Monitor applied. 200 Dr. Tosha Blakely here; spoke to patient. This procedure has been fully reviewed with the patient and written informed consent has been obtained. 3105 Patient assisted to CT table and pre scan started.  states he is going home for a while and be back \"around 2pm\" to pick pt up for discharge. 0862 Pathology called to CT area for procedure. 3295 Pathology arrived to CT.   0909 Procedure started with Dr. Tosha Blakely. 0920 Samples collected and given to pathologist for further review. 9402 Procedure completed; patient tolerated well. Post scan obtained. Blood patch. 9293 Preparing for left chest tube insertion. 1236 Patient on cart; comfort ensured. 5806 Report called to OPN and patient taken to OPN via cart.

## 2023-09-18 NOTE — H&P
[facility]  Sedation/Analgesia History & Physical    Pt Name: Maru Chapman  MRN: [id number]  YOB: 1950  Provider Performing Procedure: Frankie Lerma MD, MD  Primary Care Physician: NITO Bailey CNP    Formulation and discussion of sedation / procedure plans, risks, benefits, side effects and alternatives with patient and/or responsible adult completed. PRE-PROCEDURE   DNR-CCA/DNR-CC []Yes [x]No  Brief History/Pre-Procedure Diagnosis: FDG avid pulmonary nodules          MEDICAL HISTORY  []CAD/Valve  []Liver Disease  []Lung Disease []Diabetes  []Hypertension []Renal Disease  [x]Additional information:       has a past medical history of Anxiety, Arthritis, Breast cancer ([address]), CAD (coronary artery disease), Cancer of the skin, Cerebral artery occlusion with cerebral infarction ([address]), Chronic UTI, COPD (chronic obstructive pulmonary disease) ([address]), CVA (cerebral infarction), Depression, DVT of lower extremity (deep venous thrombosis) ([address]), Facial injury, History of stroke, History of therapeutic radiation, Hx antineoplastic chemo, Hx of blood clots, Hyperlipidemia, Hypertension, Hypothyroidism, IBS (irritable bowel syndrome), Low HDL (under 40), Lung cancer ([address]), Prolonged emergence from general anesthesia, Recurrent UTI (urinary tract infection), and Word finding difficulty. SURGICAL HISTORY   has a past surgical history that includes Appendectomy (1975); Cholecystectomy (1992); Total abdominal hysterectomy w/ bilateral salpingoophorectomy (1976, 2001); Colonoscopy (2009); Thyroid surgery (2007); Hysterectomy (1976); joint replacement (Left, 2011); joint replacement (Right, 01/19/2015); hip surgery (01/19/2015); other surgical history (04/10/2018); pr njx dx/ther sbst intrlmnr lmbr/sac w/img gdn (N/A, 04/10/2018); CT NEEDLE BIOPSY LUNG PERCUTANEOUS (03/16/2021); Thyroid lobectomy (Left, 11/26/2010); Breast surgery (Right, 04/01/2005);  Breast surgery (Right,

## 2023-09-18 NOTE — DISCHARGE INSTRUCTIONS
POST BIOPSY DISCHARGE INSTRUCTION SHEET    DIET:  As tolerated    ACTIVITY:  Rest at home on sofa, bed or recliner today. Bathroom privileges only today. Limit any exertion (pushing or pulling) today. No lifting for 3 days. No driving today. Check biopsy site frequently today. Resume any blood thinners in 24 hours. Keep band aid clean & dry - replace as needed, may remove in 48 hours. RETURN TO NEAREST EMERGENCY ROOM IF YOU HAVE ANY OF THE FOLLOWING:  Sign of bleeding, swelling, drainage from biopsy site or severe pain (slight discomfort to be expected) around biopsy site. Repeated nausea/vomiting/abdominal pain. Elevated temperature above 101 degrees. Shortness of breath. Chest pain. Keep scheduled appointment with your physician. If sedation given, follow post sedation instruction sheet. Sedation: 2 mg Versed. 100 mcg Fentanyl.            _

## 2023-09-19 ENCOUNTER — HOSPITAL ENCOUNTER (OUTPATIENT)
Age: 73
Discharge: HOME | End: 2023-09-19
Payer: MEDICARE

## 2023-09-19 ENCOUNTER — HOSPITAL ENCOUNTER (OUTPATIENT)
Dept: GENERAL RADIOLOGY | Age: 73
Discharge: HOME | End: 2023-09-19
Payer: MEDICARE

## 2023-09-19 DIAGNOSIS — J95.811 PNEUMOTHORAX AFTER BIOPSY: ICD-10-CM

## 2023-09-19 PROCEDURE — 71045 X-RAY EXAM CHEST 1 VIEW: CPT

## 2023-09-19 NOTE — ED PROVIDER NOTES
[facility] DEPT  EMERGENCY DEPARTMENT ENCOUNTER          Pt Name: Milton Chavarria  MRN: [id number]  [address] [facility] 1950  Date of evaluation: 9/18/2023  Resident Physician: Azra Larsen MD EM Resident PGY-2  Attending Physician: Jed Marmolejo       Chief Complaint   Patient presents with    Post-op Problem         HISTORY OF PRESENT ILLNESS    HPI  Milton Chavarria is a 72 y.o. female who presents to the emergency department from home, as a walk in to the ED lobby for evaluation of chest tube drainage. Patient underwent lung biopsy at [facility] earlier today with complication of pneumothorax for which a pigtail catheter anteriorly was placed patient was discharged home. Patient states that she was instructed to return to emergency department if any blood was to come out of tubing. Few drops of bright red blood within tubing currently. Acute respiratory distress denies any trauma or other changes. The patient has no other acute complaints at this time. ROS negative except as stated above. PAST MEDICAL AND SURGICAL HISTORY     Past Medical History:   Diagnosis Date    Anxiety 2007    Arthritis     Breast cancer ([address]) 11/18/2004    right mastectomy, chemo and radiation history. Right breast invasive ductal carcinoma    CAD (coronary artery disease) 2001    sees Dr Darrell Luke of the skin 2004    Cerebral artery occlusion with cerebral infarction [facility])     Chronic UTI     COPD (chronic obstructive pulmonary disease) ([address])     sees RL Petersen    CVA (cerebral infarction) 2007, 2008    Depression 2007    DVT of lower extremity (deep venous thrombosis) ([address]) 1976    Facial injury 2005    Fall on greasy ground, striking left periorbital region    History of stroke 2007, 2008, 2009    Patient relates a stroke with right weakness and speech in 2007; another in 2010 or 2012 (unsure), both with need to rehabilitation.   Also \"2 mini-strokes\" (?) tablet, Take 1 tablet by mouth daily, Disp: 60 tablet, Rfl: 3    Cholecalciferol (VITAMIN D) 25 MCG TABS, Take 1 tablet by mouth daily, Disp: 60 tablet, Rfl:     guaiFENesin (MUCINEX) 600 MG extended release tablet, Take 1 tablet by mouth 2 times daily, Disp: 60 tablet, Rfl: 1    diphenhydrAMINE (BENADRYL) 25 MG tablet, Take 1 tablet by mouth every 6 hours as needed for Itching, Disp: , Rfl:     Facility-Administered Medications Ordered in Other Encounters:     0.45 % sodium chloride infusion, , IntraVENous, Continuous, Eric Lopez MD, Last Rate: 20 mL/hr at 09/18/23 0748, New Bag at 09/18/23 0748    Discharge Medication List as of 9/18/2023 10:11 PM        CONTINUE these medications which have NOT CHANGED    Details   triamterene-hydroCHLOROthiazide (MAXZIDE-25) 37.5-25 MG per tablet Take 0.5 tablets by mouth daily, Disp-30 tablet, [id number]      folic acid (FOLVITE) 1 MG tablet Take 1 tablet by mouth daily, Disp-90 tablet, R-3Normal      rosuvastatin (CRESTOR) 40 MG tablet Take 1 tablet by mouth daily, Disp-90 tablet, R-1Normal      levothyroxine (SYNTHROID) 25 MCG tablet Take 1 tablet by mouth daily, Disp-90 tablet, R-1Normal      hydrOXYzine HCl (ATARAX) 10 MG tablet Take 1-2 tablets by mouth every 6 hours as needed for Itching, Disp-40 tablet, R-5Normal      diclofenac sodium (VOLTAREN) 1 % GEL Apply 2 g topically 2 times daily, Topical, 2 TIMES DAILY Starting Fri 10/28/2022, Disp-100 g, R-G, Print      vitamin D (ERGOCALCIFEROL) 1.25 MG (94555 UT) CAPS capsule Take 1 capsule by mouth once a week, Disp-12 capsule, R-1Normal      apixaban (ELIQUIS) 5 MG TABS tablet Take 1 tablet by mouth 2 times daily, Disp-60 tablet, R-5Normal      aspirin EC 81 MG EC tablet Take 1 tablet by mouth daily, Disp-90 tablet, R-1OTC      albuterol sulfate HFA (VENTOLIN HFA) 108 (90 Base) MCG/ACT inhaler Inhale 2 puffs into the lungs 4 times daily as needed for Wheezing, Disp-18 g, R-5Normal      vitamin B-12 (CYANOCOBALAMIN) 1000 dictated by use of voice recognition software and electronically transcribed. The transcription may contain errors not detected in proofreading.        Gaetano Mitchell MD  Resident  09/19/23 2587

## 2023-09-19 NOTE — ED TRIAGE NOTES
Pt presents to the ED with C/O blood in chest tube drainage system. Pt states she had a Left Lung Biopsy at 0800 this morning by Dr. Erika Holcomb. Pt states she was having pain and took tylenol and advil about 4 hours ago. Pt states that it now only hurts when she \"coughs real bad\". Pt family noticed blood in the drainage system about an hour ago and states the paper works said to go to the nearest emergency center.

## 2023-09-19 NOTE — ED NOTES
Went to patient room for hourly rounding. Pt not in room at this time.       Alice Qureshi RN  09/18/23 5867

## 2023-09-19 NOTE — ED NOTES
Attempted to call patient at this time. Pt states her and her family left.  Provider notified     Carmine Mccarthy RN  09/18/23 2357

## 2023-09-20 ENCOUNTER — HOSPITAL ENCOUNTER (OUTPATIENT)
Age: 73
Discharge: HOME | End: 2023-09-20
Payer: MEDICARE

## 2023-09-20 ENCOUNTER — HOSPITAL ENCOUNTER (OUTPATIENT)
Dept: GENERAL RADIOLOGY | Age: 73
Discharge: HOME | End: 2023-09-20
Payer: MEDICARE

## 2023-09-20 DIAGNOSIS — J95.811 POSTPROCEDURAL PNEUMOTHORAX: ICD-10-CM

## 2023-09-20 PROCEDURE — 71045 X-RAY EXAM CHEST 1 VIEW: CPT

## 2023-10-02 NOTE — PROGRESS NOTES
She has had no bleeding. She has had no nausea or vomiting and she says that her appetite is normal.  She has had no constipation or diarrhea. She continues to smoke consuming about 2 packs of cigarettes a day. Says that she does not have any new pain. She is up-to-date on her mammogram having had it on 3/30/2023.    -7/11/2023 BMP within normal limits with creatinine of 0.8, last CBC showed normal WBCs, normal hemoglobin with an elevated MCV, and platelets of 722 K last LFTs were 4/11/2013 with an elevated alk phos of 153 (slightly up for that patient), otherwise normal LFTs. - She is up-to-date on her mammogram having had it on 3/30/2023.    -8/31/23 PET/CT showed: Neck: A 0.7 cm nodule in the left parotid gland now has a maximum SUV of 3.7 (image 17; prior mSUV 4.3). There is no FDG avid cervical lymphadenopathy. No pleural or pericardial effusion. Scarring and emphysematous changes are again noted in the bilateral lungs. Stable 0.9 cm SOURAV nodule with mSUV of 4.0. Stable 1.0 cm cavitary SOURAV and has a mSUV of 8.3. These nodules were not present on the prior PET/CT examination. No FDG avid mediastinal, hilar or axillary lymphadenopathy. Surgical clips are present in the right axilla. S/p right mastectomy with minimal activity associated with a small calcification in the right axilla. A small hiatal hernia is present. Activity in the esophagus may be infectious, inflammatory or physiologic. S/p tamara. There is fatty replacement of the pancreas. Hypoattenuating lesions in the bilateral kidneys may be cysts. A stable 1.5 cm hypoattenuating nodule in the right adrenal gland is now associated with a mSUV of 3.6 (image 135; prior m SUV 2.7). A stable 1.4 cm hypoattenuating nodule in the left adrenal gland is now associated with a mSUV of 2.8 (image 134; prior mSUV 2.4). s/p BARBRA. There is no FDG avid mesenteric, retroperitoneal, pelvic or inguinal lymphadenopathy.  Degenerative changes are seen in the spine and

## 2023-10-06 ENCOUNTER — HOSPITAL ENCOUNTER (OUTPATIENT)
Dept: INFUSION THERAPY | Age: 73
Discharge: HOME | End: 2023-10-06
Payer: MEDICARE

## 2023-10-06 ENCOUNTER — OFFICE VISIT (OUTPATIENT)
Dept: ONCOLOGY | Age: 73
End: 2023-10-06
Payer: MEDICARE

## 2023-10-06 VITALS
OXYGEN SATURATION: 94 % | TEMPERATURE: 98.4 F | SYSTOLIC BLOOD PRESSURE: 129 MMHG | DIASTOLIC BLOOD PRESSURE: 77 MMHG | RESPIRATION RATE: 16 BRPM | HEART RATE: 72 BPM

## 2023-10-06 VITALS
OXYGEN SATURATION: 94 % | BODY MASS INDEX: 23.9 KG/M2 | DIASTOLIC BLOOD PRESSURE: 77 MMHG | TEMPERATURE: 98.4 F | HEART RATE: 72 BPM | WEIGHT: 140 LBS | HEIGHT: 64 IN | RESPIRATION RATE: 16 BRPM | SYSTOLIC BLOOD PRESSURE: 129 MMHG

## 2023-10-06 DIAGNOSIS — Z17.1 MALIGNANT NEOPLASM OF OVERLAPPING SITES OF RIGHT BREAST IN FEMALE, ESTROGEN RECEPTOR NEGATIVE (HCC): ICD-10-CM

## 2023-10-06 DIAGNOSIS — C50.811 MALIGNANT NEOPLASM OF OVERLAPPING SITES OF RIGHT BREAST IN FEMALE, ESTROGEN RECEPTOR NEGATIVE (HCC): ICD-10-CM

## 2023-10-06 DIAGNOSIS — C34.31 PRIMARY MALIGNANT NEOPLASM OF RIGHT LOWER LOBE OF LUNG (HCC): Primary | ICD-10-CM

## 2023-10-06 PROCEDURE — 1090F PRES/ABSN URINE INCON ASSESS: CPT | Performed by: INTERNAL MEDICINE

## 2023-10-06 PROCEDURE — 99211 OFF/OP EST MAY X REQ PHY/QHP: CPT

## 2023-10-06 PROCEDURE — G8420 CALC BMI NORM PARAMETERS: HCPCS | Performed by: INTERNAL MEDICINE

## 2023-10-06 PROCEDURE — G8399 PT W/DXA RESULTS DOCUMENT: HCPCS | Performed by: INTERNAL MEDICINE

## 2023-10-06 PROCEDURE — G8427 DOCREV CUR MEDS BY ELIG CLIN: HCPCS | Performed by: INTERNAL MEDICINE

## 2023-10-06 PROCEDURE — G8484 FLU IMMUNIZE NO ADMIN: HCPCS | Performed by: INTERNAL MEDICINE

## 2023-10-06 PROCEDURE — 4004F PT TOBACCO SCREEN RCVD TLK: CPT | Performed by: INTERNAL MEDICINE

## 2023-10-06 PROCEDURE — 3017F COLORECTAL CA SCREEN DOC REV: CPT | Performed by: INTERNAL MEDICINE

## 2023-10-06 PROCEDURE — 1123F ACP DISCUSS/DSCN MKR DOCD: CPT | Performed by: INTERNAL MEDICINE

## 2023-10-06 PROCEDURE — 99214 OFFICE O/P EST MOD 30 MIN: CPT | Performed by: INTERNAL MEDICINE

## 2023-10-06 NOTE — PATIENT INSTRUCTIONS
Refer to RadOnc for possible SBRT to lung spots  Refer to Pulmonology for eval for possible EBUS/bronch given her hemoptysis  Continue with motrin, consider tylenol adding on for the chest tube placement pain; if pain/shortness of breath worsening please report to emergency department  RTC in approx 2 months

## 2023-10-09 ENCOUNTER — OFFICE VISIT (OUTPATIENT)
Dept: CARDIOLOGY CLINIC | Age: 73
End: 2023-10-09
Payer: MEDICARE

## 2023-10-09 VITALS
DIASTOLIC BLOOD PRESSURE: 76 MMHG | WEIGHT: 140.8 LBS | BODY MASS INDEX: 24.04 KG/M2 | SYSTOLIC BLOOD PRESSURE: 156 MMHG | HEIGHT: 64 IN | HEART RATE: 88 BPM

## 2023-10-09 DIAGNOSIS — R42 DIZZINESS ON STANDING: ICD-10-CM

## 2023-10-09 DIAGNOSIS — R60.0 BILATERAL LEG EDEMA: ICD-10-CM

## 2023-10-09 DIAGNOSIS — R94.31 ABNORMAL EKG: ICD-10-CM

## 2023-10-09 DIAGNOSIS — I50.32 CHRONIC DIASTOLIC CONGESTIVE HEART FAILURE (HCC): ICD-10-CM

## 2023-10-09 DIAGNOSIS — Z98.890 S/P CARDIAC CATH: ICD-10-CM

## 2023-10-09 DIAGNOSIS — I48.0 PAF (PAROXYSMAL ATRIAL FIBRILLATION) (HCC): Primary | ICD-10-CM

## 2023-10-09 DIAGNOSIS — E78.00 PURE HYPERCHOLESTEROLEMIA: ICD-10-CM

## 2023-10-09 DIAGNOSIS — Z86.73 HISTORY OF CVA (CEREBROVASCULAR ACCIDENT): ICD-10-CM

## 2023-10-09 PROBLEM — I50.22 CHRONIC SYSTOLIC (CONGESTIVE) HEART FAILURE (HCC): Status: RESOLVED | Noted: 2022-08-16 | Resolved: 2023-10-09

## 2023-10-09 PROCEDURE — 3017F COLORECTAL CA SCREEN DOC REV: CPT | Performed by: INTERNAL MEDICINE

## 2023-10-09 PROCEDURE — 1090F PRES/ABSN URINE INCON ASSESS: CPT | Performed by: INTERNAL MEDICINE

## 2023-10-09 PROCEDURE — G8427 DOCREV CUR MEDS BY ELIG CLIN: HCPCS | Performed by: INTERNAL MEDICINE

## 2023-10-09 PROCEDURE — 4004F PT TOBACCO SCREEN RCVD TLK: CPT | Performed by: INTERNAL MEDICINE

## 2023-10-09 PROCEDURE — G8484 FLU IMMUNIZE NO ADMIN: HCPCS | Performed by: INTERNAL MEDICINE

## 2023-10-09 PROCEDURE — G8399 PT W/DXA RESULTS DOCUMENT: HCPCS | Performed by: INTERNAL MEDICINE

## 2023-10-09 PROCEDURE — 1123F ACP DISCUSS/DSCN MKR DOCD: CPT | Performed by: INTERNAL MEDICINE

## 2023-10-09 PROCEDURE — 99214 OFFICE O/P EST MOD 30 MIN: CPT | Performed by: INTERNAL MEDICINE

## 2023-10-09 PROCEDURE — G8420 CALC BMI NORM PARAMETERS: HCPCS | Performed by: INTERNAL MEDICINE

## 2023-10-09 NOTE — PROGRESS NOTES
Apply 2 g topically 2 times daily 100 g G    vitamin D (ERGOCALCIFEROL) 1.25 MG (01800 UT) CAPS capsule Take 1 capsule by mouth once a week 12 capsule 1    apixaban (ELIQUIS) 5 MG TABS tablet Take 1 tablet by mouth 2 times daily 60 tablet 5    aspirin EC 81 MG EC tablet Take 1 tablet by mouth daily 90 tablet 1    albuterol sulfate HFA (VENTOLIN HFA) 108 (90 Base) MCG/ACT inhaler Inhale 2 puffs into the lungs 4 times daily as needed for Wheezing 18 g 5    vitamin B-12 (CYANOCOBALAMIN) 1000 MCG tablet Take 1 tablet by mouth daily      ferrous sulfate (IRON 325) 325 (65 Fe) MG tablet Take 1 tablet by mouth 2 times daily (with meals) 30 tablet 3    ondansetron (ZOFRAN-ODT) 4 MG disintegrating tablet Take 1 tablet by mouth every 8 hours as needed for Nausea or Vomiting      acetaminophen (TYLENOL) 325 MG tablet Take 2 tablets by mouth every 6 hours 120 tablet 3    famotidine (PEPCID) 20 MG tablet Take 1 tablet by mouth daily 60 tablet 3    Cholecalciferol (VITAMIN D) 25 MCG TABS Take 1 tablet by mouth daily 60 tablet     guaiFENesin (MUCINEX) 600 MG extended release tablet Take 1 tablet by mouth 2 times daily 60 tablet 1    diphenhydrAMINE (BENADRYL) 25 MG tablet Take 1 tablet by mouth every 6 hours as needed for Itching       No current facility-administered medications for this visit. Review of Systems -     General ROS: negative  Psychological ROS: negative  Hematological and Lymphatic ROS: No history of blood clots or bleeding disorder. Respiratory ROS: no cough, shortness of breath, or wheezing  Cardiovascular ROS: no chest pain or dyspnea on exertion  Gastrointestinal ROS: negative  Genito-Urinary ROS: no dysuria, trouble voiding, or hematuria  Musculoskeletal ROS: negative  Neurological ROS: no TIA or stroke symptoms  Dermatological ROS: negative      Blood pressure (!) 156/76, pulse 88, height 5' 4\" (1.626 m), weight 140 lb 12.8 oz (63.9 kg), not currently breastfeeding.         Physical

## 2023-10-10 ENCOUNTER — HOSPITAL ENCOUNTER (OUTPATIENT)
Dept: RADIATION ONCOLOGY | Age: 73
Discharge: HOME | End: 2023-10-10
Payer: MEDICARE

## 2023-10-10 VITALS
SYSTOLIC BLOOD PRESSURE: 147 MMHG | WEIGHT: 140.8 LBS | RESPIRATION RATE: 18 BRPM | DIASTOLIC BLOOD PRESSURE: 67 MMHG | BODY MASS INDEX: 24.17 KG/M2 | HEART RATE: 94 BPM | OXYGEN SATURATION: 93 % | TEMPERATURE: 98.4 F

## 2023-10-10 DIAGNOSIS — C34.32 MALIGNANT NEOPLASM OF LOWER LOBE OF LEFT LUNG (HCC): ICD-10-CM

## 2023-10-10 DIAGNOSIS — C34.31 CANCER OF LOWER LOBE OF RIGHT LUNG (HCC): ICD-10-CM

## 2023-10-10 DIAGNOSIS — C34.32 MALIGNANT NEOPLASM OF LOWER LOBE OF LEFT LUNG (HCC): Primary | ICD-10-CM

## 2023-10-10 PROCEDURE — 99215 OFFICE O/P EST HI 40 MIN: CPT

## 2023-10-10 PROCEDURE — 99212 OFFICE O/P EST SF 10 MIN: CPT | Performed by: RADIOLOGY

## 2023-10-10 ASSESSMENT — ENCOUNTER SYMPTOMS
SORE THROAT: 0
SHORTNESS OF BREATH: 1
COUGH: 1
BLOOD IN STOOL: 0
CHEST TIGHTNESS: 0
VOMITING: 0
APNEA: 0
ABDOMINAL PAIN: 0
BACK PAIN: 1
NAUSEA: 0
TROUBLE SWALLOWING: 0
WHEEZING: 1

## 2023-10-10 ASSESSMENT — PAIN DESCRIPTION - LOCATION: LOCATION: CHEST

## 2023-10-10 ASSESSMENT — PAIN SCALES - GENERAL: PAINLEVEL_OUTOF10: 1

## 2023-10-11 ENCOUNTER — APPOINTMENT (OUTPATIENT)
Dept: RADIATION ONCOLOGY | Age: 73
End: 2023-10-11
Payer: MEDICARE

## 2023-10-11 DIAGNOSIS — C34.32 MALIGNANT NEOPLASM OF LOWER LOBE OF LEFT LUNG (HCC): Primary | ICD-10-CM

## 2023-10-16 ENCOUNTER — APPOINTMENT (OUTPATIENT)
Dept: RADIATION ONCOLOGY | Age: 73
End: 2023-10-16
Payer: MEDICARE

## 2023-10-19 ENCOUNTER — TELEPHONE (OUTPATIENT)
Dept: PHARMACY | Age: 73
End: 2023-10-19

## 2023-10-19 NOTE — TELEPHONE ENCOUNTER
Referral to [address] [facility]. [facility] received from KAREY EPPERSON for Smoking Cessation Counseling and Medication Management per Consult Agreement. Called patient, Michelle Holstein.

## 2023-11-09 ENCOUNTER — HOSPITAL ENCOUNTER (OUTPATIENT)
Dept: INTERVENTIONAL RADIOLOGY/VASCULAR | Age: 73
Discharge: HOME | End: 2023-11-11
Attending: THORACIC SURGERY (CARDIOTHORACIC VASCULAR SURGERY)
Payer: MEDICARE

## 2023-11-09 DIAGNOSIS — I73.9 PAD (PERIPHERAL ARTERY DISEASE) (HCC): ICD-10-CM

## 2023-11-09 PROCEDURE — 93925 LOWER EXTREMITY STUDY: CPT

## 2023-11-14 ENCOUNTER — OFFICE VISIT (OUTPATIENT)
Dept: CARDIOTHORACIC SURGERY | Age: 73
End: 2023-11-14

## 2023-11-14 VITALS
BODY MASS INDEX: 23.22 KG/M2 | DIASTOLIC BLOOD PRESSURE: 82 MMHG | SYSTOLIC BLOOD PRESSURE: 148 MMHG | HEIGHT: 64 IN | WEIGHT: 136 LBS | HEART RATE: 92 BPM

## 2023-11-14 DIAGNOSIS — I73.9 PAD (PERIPHERAL ARTERY DISEASE) (HCC): Primary | ICD-10-CM

## 2023-11-14 RX ORDER — NICOTINE 21 MG/24HR
1 PATCH, TRANSDERMAL 24 HOURS TRANSDERMAL EVERY 24 HOURS
Qty: 30 PATCH | Refills: 3 | Status: SHIPPED | OUTPATIENT
Start: 2023-11-14 | End: 2024-11-13

## 2023-11-14 NOTE — PROGRESS NOTES
CT/CV Surgery Follow Up Office Visit      Patient's Name/Date of Birth: Jena Gracia / 1950 (50 y.o.)    PCP: NITO Cueto - CNP    Date: November 14, 2023    We had the pleasure of seeing Jena Gracia in the office today, as you know this is a very pleasant 72y.o. year old female with a history of hypertension, hyperlipidemia, CVA x4, coronary artery disease, COPD, on 2 L of supplemental oxygen at night, status post right mastectomy for cancer, peripheral artery disease, and a small abdominal aortic aneurysm. She returned to the cardiovascular surgery clinic for follow-up. She reports improved her claudication symptoms since she started with Pletal.  But states that her walking distance has not improved  because of unstable gaits. She admits that she still smokes 2 packs/day. She had an arterial Doppler study on 11/9/2023, which demonstrated near normal ABIs bilaterally. PastMedical History:  Roman Stubbs  has a past medical history of Anxiety, Arthritis, Breast cancer ([address]), CAD (coronary artery disease), Cancer of the skin, Cerebral artery occlusion with cerebral infarction ([address]), Chronic UTI, COPD (chronic obstructive pulmonary disease) ([address]), CVA (cerebral infarction), Depression, DVT of lower extremity (deep venous thrombosis) ([address]), Facial injury, History of stroke, History of therapeutic radiation, Hx antineoplastic chemo, Hx of blood clots, Hyperlipidemia, Hypertension, Hypothyroidism, IBS (irritable bowel syndrome), Low HDL (under 40), Lung cancer ([address]), Prolonged emergence from general anesthesia, Recurrent UTI (urinary tract infection), and Word finding difficulty. Past Surgical History:  The patient  has a past surgical history that includes Appendectomy (1975); Cholecystectomy (1992); Total abdominal hysterectomy w/ bilateral salpingoophorectomy (1976, 2001); Colonoscopy (2009); Thyroid surgery (2007);  Hysterectomy (1976); joint replacement (Left, 2011); joint replacement

## 2023-11-14 NOTE — PATIENT INSTRUCTIONS
If you receive a survey asking about your care experience, please respond. Your answers will help ensure you receive high-quality care at this office. Thank you! Your Medical Assistant today: Alex Cota. Thank you for coming to our office! It was a pleasure to serve you.

## 2023-11-30 NOTE — PROGRESS NOTES
pulmonology.  -Directed her to radiation oncology for them to be able to reschedule SIM as long as there are no other issues or complications from their end.    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 Axillary lymph nodes were negative. s/p neoadjuvant chemotherapy x 3 cycles s/p Simple mastectomy 3/11/2005, adjuvant radiation and adjuvant endocrine therapy for 5 years.  -She is up-to-date on her mammogram (3/30/2023, next due end of 3/2024, as long as she is willing to potentially get surgery, radiation, or systemic therapy for any new cancers). 3).  continued tobacco abuse  -  As noted above she is not willing to give up her cigarettes and says that she enjoys smoking.  -had previously agreed to tobacco cessation program as she was told this summer if she keeps smoking she could lose her leg, that scared her. Previously asked pt navigation to come talk, and they came with a pamphlet about it. Continues to smoke heavily    4) Multiple comorbidities. She will follow-up with her usual providers and stay on her usual medication regimen for her atherosclerosis with coronary artery disease,TIA manifested as aphasia with essential hypertension, noncompliance with treatment, continued tobacco abuse with a previous CVA and hyperlipidemia. 5) Symptom Management: Post CT-guided biopsy and CT chest tube pleuritic pain. -PET/CT done in the last month with no sign of pleurisy or bone metastasis in back or ribs  -improved. 6) Medications reviewed. Prescriptions today:            No orders of the defined types were placed in this encounter. [place]ARRS:  Controlled Substance Monitoring:    Acute and Chronic Pain Monitoring:   RX Monitoring Attestation Periodic Controlled Substance Monitoring   3/23/2018  11:35 AM The Prescription Monitoring Report for this patient was reviewed today.

## 2023-12-06 ENCOUNTER — OFFICE VISIT (OUTPATIENT)
Dept: ONCOLOGY | Age: 73
End: 2023-12-06
Payer: MEDICARE

## 2023-12-06 ENCOUNTER — TELEPHONE (OUTPATIENT)
Dept: ONCOLOGY | Age: 73
End: 2023-12-06

## 2023-12-06 ENCOUNTER — HOSPITAL ENCOUNTER (OUTPATIENT)
Dept: INFUSION THERAPY | Age: 73
Discharge: HOME | End: 2023-12-06
Payer: MEDICARE

## 2023-12-06 VITALS
DIASTOLIC BLOOD PRESSURE: 65 MMHG | OXYGEN SATURATION: 97 % | SYSTOLIC BLOOD PRESSURE: 134 MMHG | HEART RATE: 80 BPM | TEMPERATURE: 98.3 F | HEIGHT: 64 IN | RESPIRATION RATE: 20 BRPM | BODY MASS INDEX: 22.47 KG/M2 | WEIGHT: 131.6 LBS

## 2023-12-06 VITALS
HEART RATE: 80 BPM | DIASTOLIC BLOOD PRESSURE: 65 MMHG | BODY MASS INDEX: 22.47 KG/M2 | RESPIRATION RATE: 20 BRPM | SYSTOLIC BLOOD PRESSURE: 134 MMHG | TEMPERATURE: 98.3 F | WEIGHT: 131.6 LBS | OXYGEN SATURATION: 97 % | HEIGHT: 64 IN

## 2023-12-06 DIAGNOSIS — C34.31 PRIMARY MALIGNANT NEOPLASM OF RIGHT LOWER LOBE OF LUNG (HCC): Primary | ICD-10-CM

## 2023-12-06 DIAGNOSIS — C50.811 MALIGNANT NEOPLASM OF OVERLAPPING SITES OF RIGHT BREAST IN FEMALE, ESTROGEN RECEPTOR NEGATIVE (HCC): ICD-10-CM

## 2023-12-06 DIAGNOSIS — Z17.1 MALIGNANT NEOPLASM OF OVERLAPPING SITES OF RIGHT BREAST IN FEMALE, ESTROGEN RECEPTOR NEGATIVE (HCC): ICD-10-CM

## 2023-12-06 PROCEDURE — 4004F PT TOBACCO SCREEN RCVD TLK: CPT | Performed by: INTERNAL MEDICINE

## 2023-12-06 PROCEDURE — G8399 PT W/DXA RESULTS DOCUMENT: HCPCS | Performed by: INTERNAL MEDICINE

## 2023-12-06 PROCEDURE — 3017F COLORECTAL CA SCREEN DOC REV: CPT | Performed by: INTERNAL MEDICINE

## 2023-12-06 PROCEDURE — 99211 OFF/OP EST MAY X REQ PHY/QHP: CPT

## 2023-12-06 PROCEDURE — G8420 CALC BMI NORM PARAMETERS: HCPCS | Performed by: INTERNAL MEDICINE

## 2023-12-06 PROCEDURE — 1123F ACP DISCUSS/DSCN MKR DOCD: CPT | Performed by: INTERNAL MEDICINE

## 2023-12-06 PROCEDURE — 99213 OFFICE O/P EST LOW 20 MIN: CPT | Performed by: INTERNAL MEDICINE

## 2023-12-06 PROCEDURE — G8484 FLU IMMUNIZE NO ADMIN: HCPCS | Performed by: INTERNAL MEDICINE

## 2023-12-06 PROCEDURE — G8427 DOCREV CUR MEDS BY ELIG CLIN: HCPCS | Performed by: INTERNAL MEDICINE

## 2023-12-06 PROCEDURE — 1090F PRES/ABSN URINE INCON ASSESS: CPT | Performed by: INTERNAL MEDICINE

## 2023-12-06 NOTE — PATIENT INSTRUCTIONS
Pt has some transportation issues; had agreed to Radiation but then didn't set up [facility]; please help her get connected with Rad Onc so that can be set up  Tentative f/u in 3 months; at that point if seeing RadOnc in follow up around the same time we can try to coordinate to see on same day for patient convenience 2/2 ride issues

## 2023-12-08 ENCOUNTER — HOSPITAL ENCOUNTER (OUTPATIENT)
Dept: CT IMAGING | Age: 73
Discharge: HOME | End: 2023-12-08

## 2023-12-08 ENCOUNTER — HOSPITAL ENCOUNTER (OUTPATIENT)
Dept: RADIATION ONCOLOGY | Age: 73
Discharge: HOME | End: 2023-12-08
Payer: MEDICARE

## 2023-12-08 ENCOUNTER — OFFICE VISIT (OUTPATIENT)
Dept: FAMILY MEDICINE CLINIC | Age: 73
End: 2023-12-08
Payer: MEDICARE

## 2023-12-08 VITALS
HEART RATE: 95 BPM | HEIGHT: 64 IN | OXYGEN SATURATION: 97 % | WEIGHT: 133.38 LBS | BODY MASS INDEX: 22.77 KG/M2 | SYSTOLIC BLOOD PRESSURE: 126 MMHG | DIASTOLIC BLOOD PRESSURE: 82 MMHG

## 2023-12-08 DIAGNOSIS — N18.30 STAGE 3 CHRONIC KIDNEY DISEASE, UNSPECIFIED WHETHER STAGE 3A OR 3B CKD (HCC): ICD-10-CM

## 2023-12-08 DIAGNOSIS — C34.82 MALIGNANT NEOPLASM OF OVERLAPPING SITES OF LEFT LUNG (HCC): ICD-10-CM

## 2023-12-08 DIAGNOSIS — J44.9 CHRONIC OBSTRUCTIVE PULMONARY DISEASE, UNSPECIFIED COPD TYPE (HCC): ICD-10-CM

## 2023-12-08 DIAGNOSIS — C50.811 MALIGNANT NEOPLASM OF OVERLAPPING SITES OF RIGHT BREAST IN FEMALE, ESTROGEN RECEPTOR NEGATIVE (HCC): ICD-10-CM

## 2023-12-08 DIAGNOSIS — C34.32 MALIGNANT NEOPLASM OF LOWER LOBE, LEFT BRONCHUS OR LUNG (HCC): ICD-10-CM

## 2023-12-08 DIAGNOSIS — Z17.1 MALIGNANT NEOPLASM OF OVERLAPPING SITES OF RIGHT BREAST IN FEMALE, ESTROGEN RECEPTOR NEGATIVE (HCC): ICD-10-CM

## 2023-12-08 DIAGNOSIS — I48.0 PAF (PAROXYSMAL ATRIAL FIBRILLATION) (HCC): ICD-10-CM

## 2023-12-08 DIAGNOSIS — Z00.00 MEDICARE ANNUAL WELLNESS VISIT, SUBSEQUENT: Primary | ICD-10-CM

## 2023-12-08 PROCEDURE — 3017F COLORECTAL CA SCREEN DOC REV: CPT | Performed by: NURSE PRACTITIONER

## 2023-12-08 PROCEDURE — 3209999900 CT GUIDE RADIATION THERAPY NO CHARGE

## 2023-12-08 PROCEDURE — G0439 PPPS, SUBSEQ VISIT: HCPCS | Performed by: NURSE PRACTITIONER

## 2023-12-08 PROCEDURE — G8484 FLU IMMUNIZE NO ADMIN: HCPCS | Performed by: NURSE PRACTITIONER

## 2023-12-08 PROCEDURE — 77334 RADIATION TREATMENT AID(S): CPT | Performed by: RADIOLOGY

## 2023-12-08 PROCEDURE — 77332 RADIATION TREATMENT AID(S): CPT | Performed by: RADIOLOGY

## 2023-12-08 PROCEDURE — 77470 SPECIAL RADIATION TREATMENT: CPT | Performed by: RADIOLOGY

## 2023-12-08 PROCEDURE — 1123F ACP DISCUSS/DSCN MKR DOCD: CPT | Performed by: NURSE PRACTITIONER

## 2023-12-08 ASSESSMENT — PATIENT HEALTH QUESTIONNAIRE - PHQ9
3. TROUBLE FALLING OR STAYING ASLEEP: 0
5. POOR APPETITE OR OVEREATING: 0
SUM OF ALL RESPONSES TO PHQ QUESTIONS 1-9: 0
7. TROUBLE CONCENTRATING ON THINGS, SUCH AS READING THE NEWSPAPER OR WATCHING TELEVISION: 0
10. IF YOU CHECKED OFF ANY PROBLEMS, HOW DIFFICULT HAVE THESE PROBLEMS MADE IT FOR YOU TO DO YOUR WORK, TAKE CARE OF THINGS AT HOME, OR GET ALONG WITH OTHER PEOPLE: 0
2. FEELING DOWN, DEPRESSED OR HOPELESS: 0
4. FEELING TIRED OR HAVING LITTLE ENERGY: 0
6. FEELING BAD ABOUT YOURSELF - OR THAT YOU ARE A FAILURE OR HAVE LET YOURSELF OR YOUR FAMILY DOWN: 0
SUM OF ALL RESPONSES TO PHQ QUESTIONS 1-9: 0
9. THOUGHTS THAT YOU WOULD BE BETTER OFF DEAD, OR OF HURTING YOURSELF: 0
SUM OF ALL RESPONSES TO PHQ QUESTIONS 1-9: 0
8. MOVING OR SPEAKING SO SLOWLY THAT OTHER PEOPLE COULD HAVE NOTICED. OR THE OPPOSITE, BEING SO FIGETY OR RESTLESS THAT YOU HAVE BEEN MOVING AROUND A LOT MORE THAN USUAL: 0
1. LITTLE INTEREST OR PLEASURE IN DOING THINGS: 0
SUM OF ALL RESPONSES TO PHQ QUESTIONS 1-9: 0
SUM OF ALL RESPONSES TO PHQ9 QUESTIONS 1 & 2: 0

## 2023-12-08 ASSESSMENT — COLUMBIA-SUICIDE SEVERITY RATING SCALE - C-SSRS
7. DID THIS OCCUR IN THE LAST THREE MONTHS: NO
4. HAVE YOU HAD THESE THOUGHTS AND HAD SOME INTENTION OF ACTING ON THEM?: NO
5. HAVE YOU STARTED TO WORK OUT OR WORKED OUT THE DETAILS OF HOW TO KILL YOURSELF? DO YOU INTEND TO CARRY OUT THIS PLAN?: NO
3. HAVE YOU BEEN THINKING ABOUT HOW YOU MIGHT KILL YOURSELF?: NO

## 2023-12-08 ASSESSMENT — LIFESTYLE VARIABLES
HOW OFTEN DO YOU HAVE A DRINK CONTAINING ALCOHOL: NEVER
HOW MANY STANDARD DRINKS CONTAINING ALCOHOL DO YOU HAVE ON A TYPICAL DAY: PATIENT DOES NOT DRINK

## 2023-12-08 NOTE — PROGRESS NOTES
Empaneled Provider  Kelly Grier MD as Consulting Physician (Pulmonary Disease)  Ami Eric RN as Nurse Navigator (Oncology)  Lily Lafleur MD as Cardiologist (Cardiology)     Reviewed and updated this visit:  Allergies  Meds  Problems          Milton Chavarria was evaluated today and a DME order was entered for a wheeled walker with seat because she requires this to successfully complete daily living tasks of eating, bathing, toileting, personal cares, ambulating, grooming, hygiene, dressing upper body, dressing lower body, meal preparation, and taking own medications. A wheeled walker with seat is necessary due to the patient's unsteady gait, upper body weakness, inability to  and ambulation device, ambulating only short distances by pushing a walker, and the need to sit for a short time before resuming ambulation. These tasks cannot be completed with a lesser ambulation device such as a cane, crutch, or standard walker. The need for this equipment was discussed with the patient and she understands and is in agreement. Milton Chavarria was evaluated today and a DME order was entered for a lightweight wheelchair because she requires this to successfully complete daily living tasks of eating, bathing, toileting, personal cares, ambulating, grooming, hygiene, dressing upper body, dressing lower body, meal preparation, and taking own medications. A lightweight wheelchair is necessary due to the patient's impaired ambulation and mobility restrictions. The patient would not be able to resolve these daily living tasks using a cane or walker. The patient can self-propel a lightweight wheelchair safely in their home and can maneuver within their home with adequate access. The patient cannot self-propel in a standard wheelchair.   The need for this equipment was discussed with the patient and she understands, is in agreement, and has not expressed an unwillingness to use the

## 2024-01-02 ENCOUNTER — APPOINTMENT (OUTPATIENT)
Dept: RADIATION ONCOLOGY | Age: 74
End: 2024-01-02
Payer: MEDICARE

## 2024-01-03 RX ORDER — ROSUVASTATIN CALCIUM 40 MG/1
40 TABLET, COATED ORAL DAILY
Qty: 90 TABLET | Refills: 1 | Status: SHIPPED | OUTPATIENT
Start: 2024-01-03

## 2024-01-03 NOTE — TELEPHONE ENCOUNTER
This medication refill is regarding a electronic request. Refill requested by  [facility] [employer] [facility] .    Requested Prescriptions     Pending Prescriptions Disp Refills    rosuvastatin (CRESTOR) 40 MG tablet [Pharmacy Med Name: ROSUVASTATIN CALCIUM 40 MG TAB] 90 tablet 1     Sig: take 1 tablet by mouth once daily     Date of last visit: 12/8/2023   Date of next visit: None  Date of last refill: 6/5/23 #90/1    Last Lipid Panel:    Lab Results   Component Value Date/Time    CHOL 194 04/12/2023 09:50 AM    TRIG 87 04/12/2023 09:50 AM    HDL 46 04/12/2023 09:50 AM    LDLCALC 131 04/12/2023 09:50 AM     Last CMP:   Lab Results   Component Value Date     09/05/2023    K 3.5 09/05/2023     07/11/2023    CO2 26 07/11/2023    BUN 10 07/11/2023    CREATININE 0.9 09/05/2023    GLUCOSE 98 07/11/2023    CALCIUM 8.7 07/11/2023    PROT 6.2 09/05/2023    LABALBU 3.5 09/05/2023    BILITOT 0.4 09/05/2023    ALKPHOS 129 (H) 09/05/2023    AST 14 09/05/2023    ALT 6 (L) 09/05/2023    LABGLOM >60 09/05/2023     Rx verified, ordered and set to EP.

## 2024-01-05 ENCOUNTER — APPOINTMENT (OUTPATIENT)
Dept: RADIATION ONCOLOGY | Age: 74
End: 2024-01-05
Payer: MEDICARE

## 2024-01-08 ENCOUNTER — HOSPITAL ENCOUNTER (OUTPATIENT)
Dept: RADIATION ONCOLOGY | Age: 74
Discharge: HOME | End: 2024-01-08
Payer: MEDICARE

## 2024-01-08 PROCEDURE — 77336 RADIATION PHYSICS CONSULT: CPT | Performed by: RADIOLOGY

## 2024-01-08 PROCEDURE — 77373 STRTCTC BDY RAD THER TX DLVR: CPT | Performed by: RADIOLOGY

## 2024-01-08 NOTE — DISCHARGE INSTRUCTIONS
PATIENT DISCHARGE INSTRUCTIONS    Remember that side effects present at the end of your treatments will improve within a few weeks after the last treatment.  Eat well balanced meals even though your treatments are finished.  This will help speed the healing process. Continue any special diets prescribed to control side effects until these side effects have been resolved.  Get plenty of rest.  If you have experienced fatigue and/or weakness, this may continue for several weeks after your last treatment.  Continue with your daily activities according to the way you feel.  Continue to be gentle with your skin.  Follow your present skin care instructions until your follow-up visit.  IF YOU DEVELOP ANY CHANGES IN YOUR SKIN IN THE AREA TREATED WITH RADIATION, PLEASE CALL THE RADIATION ONCOLOGY NURSE -[phone].  Protect your skin from any injury and avoid direct sun exposure in the treatment area.  The skin in the treated area may always be more sensitive than the rest of your skin.  Always use SPF 30 or higher sun block if you will be in the sun and cannot avoid exposure.  Please contact your referring physician for a follow-up appointment in addition to your Radiation Oncology appointment.  You may receive a survey via text message or e-mail at some point after treatment. We would appreciate your time in filling out this survey and giving us your honest feedback about your experience with us. We strive for excellence and hope that you were \"Very Satisfied\" with your care and our service.    Presence of pain:   Medication Taper: No    See Instructions Dated: N/A  Follow up orders: Will be discussed at Follow-Up

## 2024-01-10 ENCOUNTER — OFFICE VISIT (OUTPATIENT)
Dept: PULMONOLOGY | Age: 74
End: 2024-01-10
Payer: MEDICARE

## 2024-01-10 ENCOUNTER — HOSPITAL ENCOUNTER (OUTPATIENT)
Dept: RADIATION ONCOLOGY | Age: 74
Discharge: HOME | End: 2024-01-10
Payer: MEDICARE

## 2024-01-10 VITALS
DIASTOLIC BLOOD PRESSURE: 78 MMHG | BODY MASS INDEX: 22.2 KG/M2 | HEART RATE: 73 BPM | SYSTOLIC BLOOD PRESSURE: 118 MMHG | WEIGHT: 130 LBS | HEIGHT: 64 IN | OXYGEN SATURATION: 99 % | TEMPERATURE: 97.4 F

## 2024-01-10 DIAGNOSIS — D11.9 WARTHIN'S TUMOR: ICD-10-CM

## 2024-01-10 DIAGNOSIS — J96.11 CHRONIC RESPIRATORY FAILURE WITH HYPOXIA (HCC): ICD-10-CM

## 2024-01-10 DIAGNOSIS — F17.210 CIGARETTE SMOKER MOTIVATED TO QUIT: ICD-10-CM

## 2024-01-10 DIAGNOSIS — J44.9 STAGE 2 MODERATE COPD BY GOLD CLASSIFICATION (HCC): Primary | ICD-10-CM

## 2024-01-10 DIAGNOSIS — J44.9 STAGE 2 MODERATE COPD BY GOLD CLASSIFICATION (HCC): ICD-10-CM

## 2024-01-10 DIAGNOSIS — C34.80 MALIGNANT NEOPLASM OF OVERLAPPING SITES OF LUNG, UNSPECIFIED LATERALITY (HCC): ICD-10-CM

## 2024-01-10 PROCEDURE — G8484 FLU IMMUNIZE NO ADMIN: HCPCS | Performed by: INTERNAL MEDICINE

## 2024-01-10 PROCEDURE — 4004F PT TOBACCO SCREEN RCVD TLK: CPT | Performed by: INTERNAL MEDICINE

## 2024-01-10 PROCEDURE — 99214 OFFICE O/P EST MOD 30 MIN: CPT | Performed by: INTERNAL MEDICINE

## 2024-01-10 PROCEDURE — 1090F PRES/ABSN URINE INCON ASSESS: CPT | Performed by: INTERNAL MEDICINE

## 2024-01-10 PROCEDURE — 3017F COLORECTAL CA SCREEN DOC REV: CPT | Performed by: INTERNAL MEDICINE

## 2024-01-10 PROCEDURE — 1123F ACP DISCUSS/DSCN MKR DOCD: CPT | Performed by: INTERNAL MEDICINE

## 2024-01-10 PROCEDURE — 3023F SPIROM DOC REV: CPT | Performed by: INTERNAL MEDICINE

## 2024-01-10 PROCEDURE — G8399 PT W/DXA RESULTS DOCUMENT: HCPCS | Performed by: INTERNAL MEDICINE

## 2024-01-10 PROCEDURE — G8427 DOCREV CUR MEDS BY ELIG CLIN: HCPCS | Performed by: INTERNAL MEDICINE

## 2024-01-10 PROCEDURE — G8420 CALC BMI NORM PARAMETERS: HCPCS | Performed by: INTERNAL MEDICINE

## 2024-01-10 PROCEDURE — 77373 STRTCTC BDY RAD THER TX DLVR: CPT | Performed by: RADIOLOGY

## 2024-01-10 RX ORDER — ALBUTEROL SULFATE 2.5 MG/3ML
2.5 SOLUTION RESPIRATORY (INHALATION) EVERY 6 HOURS PRN
Qty: 360 EACH | Refills: 3 | Status: SHIPPED | OUTPATIENT
Start: 2024-01-10 | End: 2025-01-09

## 2024-01-10 RX ORDER — VITAMIN E 268 MG
400 CAPSULE ORAL DAILY
COMMUNITY

## 2024-01-10 NOTE — PROGRESS NOTES
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    Blood Gas, Arterial     Standing Status:   Future     Standing Expiration Date:   1/10/2025     Order Specific Question:   Method/ Setting/ Vol/ % O2     Answer:   room air    [facility] Medication Mgmt (Smoking Cessation - Clinical Pharmacy) - [place]     Referral Priority:   Routine     Referral Type:   Other     Referral Reason:   Specialty Services Required     Requested Specialty:   Pharmacist     Number of Visits Requested:   1    [facility]     Referral Priority:   Routine     Referral Type:   Eval and Treat     Referral Reason:   Specialty Services Required     Number of Visits Requested:   1    6 Minute Walk Test     Standing Status:   Future     Number of Occurrences:   1     Standing Expiration Date:   1/10/2025    Full PFT Study With Bronchodilator     If an ABG is needed along with this PFT procedure, please place the appropriate lab order     Standing Status:   Future     Standing Expiration Date:   1/10/2025    DME Order for Nebulizer as OP     You must complete the order parameters below and add the medical necessity documentation for this DME in a separate note.    Nebulizer with compressor  Disposable Med Nebs 2 per month  Reusable Med Nebs 1 per 6 months  Aerosol Mask 1 per month  Replacement Filters 2 per month  All other related supplies as needed per month    Frequency:  Three times daily    Diagnosis: COPD, Moderate    Length of Need: 12 months     Order Specific Question:   Medications being used:     Answer:   Albuterol .083 unit dose vial      Orders Placed This Encounter   Medications    albuterol (PROVENTIL) (2.5 MG/3ML) 0.083% nebulizer solution     Sig: Take 3 mLs by nebulization every 6 hours as needed for Wheezing or Shortness of Breath     Dispense:  360 each     Refill:  3        RTC 4 weeks

## 2024-01-11 ENCOUNTER — TELEPHONE (OUTPATIENT)
Dept: PULMONOLOGY | Age: 74
End: 2024-01-11

## 2024-01-11 NOTE — TELEPHONE ENCOUNTER
Spoke with patient about the US parotid that Dr Khoury ordered yesterday evening... I scheduled the test and spoke with patient and she was okay with day and time (1/18/24)

## 2024-01-12 ENCOUNTER — HOSPITAL ENCOUNTER (OUTPATIENT)
Dept: RADIATION ONCOLOGY | Age: 74
Discharge: HOME | End: 2024-01-12
Payer: MEDICARE

## 2024-01-12 PROCEDURE — 77373 STRTCTC BDY RAD THER TX DLVR: CPT | Performed by: RADIOLOGY

## 2024-01-15 ENCOUNTER — HOSPITAL ENCOUNTER (OUTPATIENT)
Dept: RADIATION ONCOLOGY | Age: 74
Discharge: HOME | End: 2024-01-15
Payer: MEDICARE

## 2024-01-15 PROCEDURE — 77373 STRTCTC BDY RAD THER TX DLVR: CPT | Performed by: RADIOLOGY

## 2024-01-17 ENCOUNTER — HOSPITAL ENCOUNTER (OUTPATIENT)
Dept: PULMONOLOGY | Age: 74
Discharge: HOME | End: 2024-01-17
Attending: INTERNAL MEDICINE
Payer: MEDICARE

## 2024-01-17 ENCOUNTER — TELEPHONE (OUTPATIENT)
Dept: PHARMACY | Age: 74
End: 2024-01-17

## 2024-01-17 ENCOUNTER — HOSPITAL ENCOUNTER (OUTPATIENT)
Dept: RADIATION ONCOLOGY | Age: 74
Discharge: HOME | End: 2024-01-17
Payer: MEDICARE

## 2024-01-17 ENCOUNTER — HOSPITAL ENCOUNTER (OUTPATIENT)
Dept: RESPIRATORY THERAPY | Age: 74
Discharge: HOME | End: 2024-01-17
Payer: MEDICARE

## 2024-01-17 VITALS
DIASTOLIC BLOOD PRESSURE: 65 MMHG | HEART RATE: 67 BPM | SYSTOLIC BLOOD PRESSURE: 148 MMHG | RESPIRATION RATE: 20 BRPM | TEMPERATURE: 97.9 F | OXYGEN SATURATION: 97 % | WEIGHT: 131.17 LBS | BODY MASS INDEX: 22.52 KG/M2

## 2024-01-17 DIAGNOSIS — J44.9 STAGE 2 MODERATE COPD BY GOLD CLASSIFICATION (HCC): ICD-10-CM

## 2024-01-17 DIAGNOSIS — J96.11 CHRONIC RESPIRATORY FAILURE WITH HYPOXIA (HCC): ICD-10-CM

## 2024-01-17 LAB
ARTERIAL PATENCY WRIST A: POSITIVE
BASE EXCESS BLDA CALC-SCNC: 2.5 MMOL/L (ref -2.5–2.5)
BDY SITE: ABNORMAL
COLLECTED BY:: ABNORMAL
DEVICE: ABNORMAL
HCO3 BLDA-SCNC: 26 MMOL/L (ref 23–28)
PCO2 BLDA: 37 MMHG (ref 35–45)
PH BLDA: 7.46 [PH] (ref 7.35–7.45)
PO2 BLDA: 67 MMHG (ref 71–104)
SAO2 % BLDA: 94 %

## 2024-01-17 PROCEDURE — 82803 BLOOD GASES ANY COMBINATION: CPT

## 2024-01-17 PROCEDURE — 77373 STRTCTC BDY RAD THER TX DLVR: CPT | Performed by: RADIOLOGY

## 2024-01-17 PROCEDURE — 94726 PLETHYSMOGRAPHY LUNG VOLUMES: CPT

## 2024-01-17 PROCEDURE — 94729 DIFFUSING CAPACITY: CPT

## 2024-01-17 PROCEDURE — 94060 EVALUATION OF WHEEZING: CPT

## 2024-01-17 PROCEDURE — 36600 WITHDRAWAL OF ARTERIAL BLOOD: CPT

## 2024-01-17 NOTE — TELEPHONE ENCOUNTER
Referral to [facility] Medication Management Smoking Cessation Clinic received from Dr. Khoury for Smoking Cessation Counseling and Medication Management per Consult Agreement.      I spoke with pt and explained our 12 week smoking cessation program and pt is interested in starting the program.  We set up patients first appointment for 1/24/24 at 11am to be done in the office.  Pt notified to come to the Medication Management Department located in [facility] on the first floor of [facility].  Pt had no questions at this time.

## 2024-01-17 NOTE — PROGRESS NOTES
[facility]          [address], [address]        [place] [id number]        O: [phone]        F: [phone]       [employer]            Dr. Montrell Pratt MD MS          Dr. Arabella Dover MD PhD    ON TREATMENT VISIT (OTV) NOTE     Date of Service: 2024  Patient ID: Shania Hernandez   : 1950  MRN: [id number]   Acct Number: [id number]     RADIATION ONCOLOGY ATTENDING:  Montrell Pratt MD MS    DIAGNOSIS:   Cancer Staging   Primary malignant neoplasm of right lower lobe of lung (HCC)  Staging form: Lung, AJCC 8th Edition  - Clinical stage from 2021: Stage IA2 (cT1b, cN0, cM0) - Signed by Montrell Pratt MD on 2021  - Pathologic stage from 2021: Stage IB (pT2a, pN0, cM0) - Signed by Montrell Tang MD on 2021      Treatment Area: Thoracic    Current Total Dose(cGy): 5000  Current Fraction: 5/5  Final/Cumulative Rx. Dose (cGy): 5000    Patient was seen today for weekly visit.     Wt Readings from Last 3 Encounters:   24 59.5 kg (131 lb 2.8 oz)   01/10/24 59 kg (130 lb)   23 60.5 kg (133 lb 6 oz)       BP (!) 148/65   Pulse 67   Temp 97.9 °F (36.6 °C) (Temporal)   Resp 20   Wt 59.5 kg (131 lb 2.8 oz)   SpO2 97%   BMI 22.52 kg/m²     Lab Results   Component Value Date    WBC 5.6 2023    HGB 15.7 2023    HCT 48.9 (H) 2023     2023       Comfort Alteration  Fatigue:Must curtail daily activities even with rest periods and early bedtime    Pain Location: None  Pain Intensity (Current): 0 No Pain  Pain Treatment: N/A  Pain Relief: n/a    Emotional Alteration:   Coping: somewhat effective    Nutritional Alteration  Anorexia: Loss of appetite but able to eat smaller portions of food and/or liquids  Nausea: No nausea noted  Vomiting: No vomiting   Dyspepsia/Heartburn: Mild  Dysphagia/Esophagitis: Mild dysphagia, but can eat regular diet    Skin Alteration

## 2024-01-19 NOTE — PROGRESS NOTES
[facility]           [address]        [place], [place] [address]        O: [phone]        F: [phone]       [employer]     END OF TREATMENT SUMMARY    Date of Service: 2024  Patient ID: Shania Hernandez   : 1950  MRN: [id number]   Acct Number: [id number]           DIAGNOSIS:    Cancer Staging   Primary malignant neoplasm of right lower lobe of lung (HCC)  Staging form: Lung, AJCC 8th Edition  - Clinical stage from 2021: Stage IA2 (cT1b, cN0, cM0) - Signed by Montrell Pratt MD on 2021  - Pathologic stage from 2021: Stage IB (pT2a, pN0, cM0) - Signed by Montrell Tang MD on 2021      HISTORY OF PRESENT ILLNESS:  Oncology History Overview Note   Shania Hernandez is a 73 y.o. female with past medical history significant for atherosclerosis with coronary artery disease, TIA (aphasia),essential hypertension, hyperlipidemia, noncompliance with treatment, continued tobacco abuse (100+ pack years) who presents for her squamous cell carcinoma of the right lower lobe.  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     -2004.  Abnormal mammogram followed by biopsy which showed DCIS with focal microinvasion.  The tumor was ER positive NE negative HER2 negative MIB 4 was between 6 and 14%.  Axillary lymph nodes were negative.  She was treated with neoadjuvant chemotherapy x 3 cycles     -3/11/2005.  Simple mastectomy for 6 x 5 x 3 cm squamous cell carcinoma the breast, grade 3 with lymphatic invasion.  It was said that the 2 specimens were of the same histology. Then XRT.  She took a pill a day for 5 years...    3/12/2021 through 3/17/2021 admitted with syncope and collapse, acute hypoxic respiratory failure secondary to COPD exacerbation and progressive lung mass.While hospitalized, on 3/16/2021 she had biopsy right lower lobe showed poorly

## 2024-02-14 ENCOUNTER — OFFICE VISIT (OUTPATIENT)
Dept: PULMONOLOGY | Age: 74
End: 2024-02-14
Payer: MEDICARE

## 2024-02-14 VITALS
BODY MASS INDEX: 21.75 KG/M2 | TEMPERATURE: 97.8 F | SYSTOLIC BLOOD PRESSURE: 122 MMHG | DIASTOLIC BLOOD PRESSURE: 60 MMHG | WEIGHT: 127.4 LBS | HEART RATE: 88 BPM | OXYGEN SATURATION: 98 % | HEIGHT: 64 IN

## 2024-02-14 DIAGNOSIS — D49.0 TUMOR OF PAROTID GLAND: ICD-10-CM

## 2024-02-14 DIAGNOSIS — F17.200 SMOKER UNMOTIVATED TO QUIT: ICD-10-CM

## 2024-02-14 DIAGNOSIS — J96.11 CHRONIC RESPIRATORY FAILURE WITH HYPOXIA (HCC): ICD-10-CM

## 2024-02-14 DIAGNOSIS — C34.90 SQUAMOUS CELL LUNG CANCER, UNSPECIFIED LATERALITY (HCC): ICD-10-CM

## 2024-02-14 DIAGNOSIS — J44.9 STAGE 2 MODERATE COPD BY GOLD CLASSIFICATION (HCC): Primary | ICD-10-CM

## 2024-02-14 PROCEDURE — 99214 OFFICE O/P EST MOD 30 MIN: CPT | Performed by: INTERNAL MEDICINE

## 2024-02-14 PROCEDURE — 4004F PT TOBACCO SCREEN RCVD TLK: CPT | Performed by: INTERNAL MEDICINE

## 2024-02-14 PROCEDURE — G8484 FLU IMMUNIZE NO ADMIN: HCPCS | Performed by: INTERNAL MEDICINE

## 2024-02-14 PROCEDURE — 1123F ACP DISCUSS/DSCN MKR DOCD: CPT | Performed by: INTERNAL MEDICINE

## 2024-02-14 PROCEDURE — G8420 CALC BMI NORM PARAMETERS: HCPCS | Performed by: INTERNAL MEDICINE

## 2024-02-14 PROCEDURE — G8399 PT W/DXA RESULTS DOCUMENT: HCPCS | Performed by: INTERNAL MEDICINE

## 2024-02-14 PROCEDURE — G8427 DOCREV CUR MEDS BY ELIG CLIN: HCPCS | Performed by: INTERNAL MEDICINE

## 2024-02-14 PROCEDURE — 1090F PRES/ABSN URINE INCON ASSESS: CPT | Performed by: INTERNAL MEDICINE

## 2024-02-14 PROCEDURE — 3023F SPIROM DOC REV: CPT | Performed by: INTERNAL MEDICINE

## 2024-02-14 PROCEDURE — 3017F COLORECTAL CA SCREEN DOC REV: CPT | Performed by: INTERNAL MEDICINE

## 2024-02-14 RX ORDER — BUDESONIDE, GLYCOPYRROLATE, AND FORMOTEROL FUMARATE 160; 9; 4.8 UG/1; UG/1; UG/1
2 AEROSOL, METERED RESPIRATORY (INHALATION) 2 TIMES DAILY
Qty: 1 EACH | Refills: 5 | Status: SHIPPED | OUTPATIENT
Start: 2024-02-14 | End: 2025-02-13

## 2024-02-14 NOTE — PROGRESS NOTES
Subjective:      Patient ID: Shania Hernandez is a 74 y.o. female.      CC: COPD    HPI     Ride did not show to go get the US of the parotid area and ENT appointment and missed both.  PFTs revealed Moderate COPD with air trapping and decreased diffusion capacity.  Using Proventil nebs three times a day, still SOB but doing much better    Continues smoking 2 ppd, did not go to smoking cessation clinic that I referred last visit.  Completed SBRT at [facility].    PFTs findings discussed, agreeable to step up therapy for COPD.    Using supplemental oxygen 2L to sleep      ABGs: do not show hypercarbia    Previous notes.  Last time seen in pulmonary clinic was 2-1/2 years ago referred back by participating providers due to worsening overall condition.    History of squamous cell carcinoma of the right lower lobe status wedge resection now with either progression or recurrence in the contralateral lung in the form of lung nodule status post CT-guided lung biopsy confirming squamous cell carcinoma under treatment with SBRT by Dr. Dover.  H/O Moderate COPD (last PFTs 2021), Pneumothorax, CAD, PAF, HTN, HLP, TIA, Breast cancer    Unfortunately Shania continues smoking heavily however a pack cigarettes a day her condition is frail she is on supplemental oxygen and albuterol HFA, she uses a wheelchair for any mobility outside the residence walks slowly and very unsteadily, 6-minute walk test was completed in my office today and guarding could only walk short distances before having to stop to rest leaning on the wall, BMI 22    Denies much coughing or sputum production, denies hemoptysis, denies chest pain, wheezing her main complaint is generalized weakness and dyspnea with minimal activities she is comfortable at rest.          Current Outpatient Medications on File Prior to Visit   Medication Sig Dispense Refill    vitamin E 400 UNIT capsule Take 1 capsule by mouth daily      albuterol (PROVENTIL) (2.5

## 2024-02-14 NOTE — PROGRESS NOTES
Neck Circumference -   14  Mallampati - 1    Lung Nodule Screening     [x] Qualifies    [] Does not qualify   [] Declined    [] Completed

## 2024-02-19 RX ORDER — ROSUVASTATIN CALCIUM 40 MG/1
40 TABLET, COATED ORAL DAILY
Qty: 90 TABLET | Refills: 3 | Status: SHIPPED | OUTPATIENT
Start: 2024-02-19

## 2024-02-19 NOTE — TELEPHONE ENCOUNTER
This medication refill is regarding a fax request. Refill requested by  [facility] .    Requested Prescriptions     Pending Prescriptions Disp Refills    rosuvastatin (CRESTOR) 40 MG tablet 90 tablet 0     Sig: Take 1 tablet by mouth daily     Date of last visit: 12/8/2023   Date of next visit: None  Date of last refill: 1/3/24 #90/1 - to local pharmacy    Last Lipid Panel:    Lab Results   Component Value Date/Time    CHOL 194 04/12/2023 09:50 AM    TRIG 87 04/12/2023 09:50 AM    HDL 46 04/12/2023 09:50 AM    LDLCALC 131 04/12/2023 09:50 AM     Last CMP:   Lab Results   Component Value Date     09/05/2023    K 3.5 09/05/2023     07/11/2023    CO2 26 07/11/2023    BUN 10 07/11/2023    CREATININE 0.9 09/05/2023    GLUCOSE 98 07/11/2023    CALCIUM 8.7 07/11/2023    PROT 6.2 09/05/2023    LABALBU 3.5 09/05/2023    BILITOT 0.4 09/05/2023    ALKPHOS 129 (H) 09/05/2023    AST 14 09/05/2023    ALT 6 (L) 09/05/2023    LABGLOM >60 09/05/2023     Rx verified, ordered and set to EP.      Paperwork for [facility] with patient's verbal consent scanned into chart.

## 2024-03-01 ENCOUNTER — SOCIAL WORK (OUTPATIENT)
Dept: INFUSION THERAPY | Age: 74
End: 2024-03-01

## 2024-03-01 NOTE — PROGRESS NOTES
Oncology Social Work    Date: 3/1/2024  Time: 10:27 AM  Name: Shania Hernandez  MRN: [id number]     Contact Type: Transportation Request    Note:   Situation: Shania Hernandez called the Oncology Social Worker to assist with transportation to and from appts.     Background:  Shania Hernandez is not able to provide transportation for themselves and has asked the  for assistance.     Assessment: - Shania Hernandez requested transportation for her March appointments [facility]  - Patient will be picked up by [facility] about 30-45 mins prior to her appts as follows:  Appt Date Appt Time Dept  Transport Company Location Notes   6-Mar 12:45 PM Radiation  [facility] - Radiation       7-Mar 12:45 PM Med ONC  [facility] - Med ONC - Seeing Dr Fine   11-Mar 2:15 AM Provider [facility] - [facility]   - Patient has been notified of the arrangements provided.    Recommendation: Appt changes will be initiated by Shania Hernandez based on need.  provided Shania Hernandez with my contact information and will remain available for support.      MORRIS Mccabe, TC, PENNY  Oncology Social Worker      Electronically signed by MORRIS Mccabe LSW, PENNY on 3/1/2024 at 10:27 AM

## 2024-03-05 PROBLEM — Z17.1 MALIGNANT NEOPLASM OF OVERLAPPING SITES OF RIGHT BREAST IN FEMALE, ESTROGEN RECEPTOR NEGATIVE (HCC): Status: ACTIVE | Noted: 2024-03-05

## 2024-03-05 PROBLEM — Z12.31 ENCOUNTER FOR SCREENING MAMMOGRAM FOR MALIGNANT NEOPLASM OF BREAST: Status: ACTIVE | Noted: 2024-03-05

## 2024-03-05 PROBLEM — C50.811 MALIGNANT NEOPLASM OF OVERLAPPING SITES OF RIGHT BREAST IN FEMALE, ESTROGEN RECEPTOR NEGATIVE (HCC): Status: ACTIVE | Noted: 2024-03-05

## 2024-03-05 PROBLEM — R91.1 NODULE OF UPPER LOBE OF LEFT LUNG: Status: ACTIVE | Noted: 2024-03-05

## 2024-03-06 ENCOUNTER — HOSPITAL ENCOUNTER (OUTPATIENT)
Dept: RADIATION ONCOLOGY | Age: 74
Discharge: HOME | End: 2024-03-06
Payer: MEDICARE

## 2024-03-06 VITALS
BODY MASS INDEX: 22.49 KG/M2 | HEART RATE: 66 BPM | SYSTOLIC BLOOD PRESSURE: 118 MMHG | TEMPERATURE: 98.1 F | RESPIRATION RATE: 18 BRPM | OXYGEN SATURATION: 97 % | WEIGHT: 131 LBS | DIASTOLIC BLOOD PRESSURE: 56 MMHG

## 2024-03-06 DIAGNOSIS — C34.32 MALIGNANT NEOPLASM OF LOWER LOBE OF LEFT LUNG (HCC): Primary | ICD-10-CM

## 2024-03-06 DIAGNOSIS — Z17.1 MALIGNANT NEOPLASM OF OVERLAPPING SITES OF RIGHT BREAST IN FEMALE, ESTROGEN RECEPTOR NEGATIVE (HCC): ICD-10-CM

## 2024-03-06 DIAGNOSIS — C50.811 MALIGNANT NEOPLASM OF OVERLAPPING SITES OF RIGHT BREAST IN FEMALE, ESTROGEN RECEPTOR NEGATIVE (HCC): ICD-10-CM

## 2024-03-06 PROCEDURE — 99212 OFFICE O/P EST SF 10 MIN: CPT

## 2024-03-06 ASSESSMENT — ENCOUNTER SYMPTOMS
NAUSEA: 0
SORE THROAT: 0
TROUBLE SWALLOWING: 0
BACK PAIN: 0
SHORTNESS OF BREATH: 0
WHEEZING: 0
CHEST TIGHTNESS: 0
APNEA: 0
BLOOD IN STOOL: 0
VOMITING: 0
ABDOMINAL PAIN: 0
COUGH: 0

## 2024-03-06 NOTE — PROGRESS NOTES
Abdominal:      General: Abdomen is flat.   Neurological:      Mental Status: She is alert and oriented to person, place, and time.      Gait: Gait abnormal (in wheelchair).       ATTESTATION: 20 minutes were spent with the patient at today's visit reviewing pertinent information related to their oncologic diagnosis, including any recent labs, imaging, follow ups and plan of care going forward. >50% of time spent in counseling and coordinating care.    CC:Dr. Meseret Fine ([facility]) Dr. Tyrese Khoury ([facility])  ACC:[facility]

## 2024-03-06 NOTE — PROGRESS NOTES
steps.    She continues to smoke.  She has been smoking 2 packs of cigarettes a day for many years.  Today she said that she is willing to talk to someone about smoking cessation.  She says that her bones hurt all the time which is not a new finding.  She is on oxygen at 2 L at home but she does not take it outside of the house.  Says that she has gained weight, low of 121 pounds to her current weight of 140 pounds and that her appetite is good.    8/3/2022.  Negative tilt table test.    8/3/2022.  Holter monitor showed predominantly normal sinus rhythm with no ventricular or supraventricular tachycardias or significant bradycardia arrhythmias.  She did have some small bursts of atrial fibrillation.    8/11/2019 CAT scan of the chest stable with no new masses identified.    9/15/2022.  Seen by Dr. Yen neurology because of her syncopal episodes.  She has a prodrome of becoming dizzy lightheaded and then passes out.  This had occurred in all positions.  She was started on Eliquis for paroxysmal atrial fibrillation.  Further work-up included EEG and carotid ultrasound.     10/12/2022.  Mrs. Hernandez returns to the office today in follow-up of her lung cancer and breast cancer.  She has followed up with cardiology and also neurology for her spells.  She tells me that she has had a heart monitor which shows that she has atrial fibrillation and other arrhythmias and she will be following up with neurology.  She also has an EEG scheduled.  She is quite fatigued and sleeps a lot.  I understand that her primary care for provider has referred her to endocrinology for her hypothyroidism.  She is on a low-dose of levothyroxine at 25 mcg.  She has been able to gain 2 pounds.  She says that her appetite is good.  She has no pain has had no bleeding and has had no lumps or bumps.    2/10/2023.  Follow-up in our office by Abi RAINEY for breast cancer and lung cancer.  She reports chronic shortness of breath and

## 2024-03-07 ENCOUNTER — HOSPITAL ENCOUNTER (OUTPATIENT)
Dept: INFUSION THERAPY | Age: 74
Discharge: HOME | End: 2024-03-07
Payer: MEDICARE

## 2024-03-07 ENCOUNTER — OFFICE VISIT (OUTPATIENT)
Dept: ONCOLOGY | Age: 74
End: 2024-03-07
Payer: MEDICARE

## 2024-03-07 VITALS
HEIGHT: 64 IN | HEART RATE: 77 BPM | OXYGEN SATURATION: 94 % | WEIGHT: 128.6 LBS | TEMPERATURE: 97.7 F | BODY MASS INDEX: 21.95 KG/M2 | SYSTOLIC BLOOD PRESSURE: 147 MMHG | DIASTOLIC BLOOD PRESSURE: 65 MMHG | RESPIRATION RATE: 16 BRPM

## 2024-03-07 VITALS
OXYGEN SATURATION: 94 % | TEMPERATURE: 97.7 F | HEART RATE: 77 BPM | SYSTOLIC BLOOD PRESSURE: 147 MMHG | DIASTOLIC BLOOD PRESSURE: 65 MMHG | RESPIRATION RATE: 16 BRPM

## 2024-03-07 DIAGNOSIS — C34.31 PRIMARY MALIGNANT NEOPLASM OF RIGHT LOWER LOBE OF LUNG (HCC): Primary | ICD-10-CM

## 2024-03-07 DIAGNOSIS — R91.1 NODULE OF UPPER LOBE OF LEFT LUNG: ICD-10-CM

## 2024-03-07 DIAGNOSIS — C50.811 MALIGNANT NEOPLASM OF OVERLAPPING SITES OF RIGHT BREAST IN FEMALE, ESTROGEN RECEPTOR NEGATIVE (HCC): ICD-10-CM

## 2024-03-07 DIAGNOSIS — Z12.31 ENCOUNTER FOR SCREENING MAMMOGRAM FOR MALIGNANT NEOPLASM OF BREAST: ICD-10-CM

## 2024-03-07 DIAGNOSIS — Z17.1 MALIGNANT NEOPLASM OF OVERLAPPING SITES OF RIGHT BREAST IN FEMALE, ESTROGEN RECEPTOR NEGATIVE (HCC): ICD-10-CM

## 2024-03-07 PROCEDURE — G8427 DOCREV CUR MEDS BY ELIG CLIN: HCPCS | Performed by: INTERNAL MEDICINE

## 2024-03-07 PROCEDURE — G8484 FLU IMMUNIZE NO ADMIN: HCPCS | Performed by: INTERNAL MEDICINE

## 2024-03-07 PROCEDURE — G8420 CALC BMI NORM PARAMETERS: HCPCS | Performed by: INTERNAL MEDICINE

## 2024-03-07 PROCEDURE — 1090F PRES/ABSN URINE INCON ASSESS: CPT | Performed by: INTERNAL MEDICINE

## 2024-03-07 PROCEDURE — 99211 OFF/OP EST MAY X REQ PHY/QHP: CPT

## 2024-03-07 PROCEDURE — 1123F ACP DISCUSS/DSCN MKR DOCD: CPT | Performed by: INTERNAL MEDICINE

## 2024-03-07 PROCEDURE — G8399 PT W/DXA RESULTS DOCUMENT: HCPCS | Performed by: INTERNAL MEDICINE

## 2024-03-07 PROCEDURE — 3017F COLORECTAL CA SCREEN DOC REV: CPT | Performed by: INTERNAL MEDICINE

## 2024-03-07 PROCEDURE — 99214 OFFICE O/P EST MOD 30 MIN: CPT | Performed by: INTERNAL MEDICINE

## 2024-03-07 PROCEDURE — 4004F PT TOBACCO SCREEN RCVD TLK: CPT | Performed by: INTERNAL MEDICINE

## 2024-03-07 NOTE — PATIENT INSTRUCTIONS
1) mammogram at the end of the March  2) RTC 5/7 (same day as RadOnc f/u) to review CTs rad onc have scheduled on 5/1 with Dr Fine, labs same day

## 2024-03-11 ENCOUNTER — OFFICE VISIT (OUTPATIENT)
Dept: CARDIOLOGY CLINIC | Age: 74
End: 2024-03-11
Payer: MEDICARE

## 2024-03-11 VITALS
HEIGHT: 64 IN | HEART RATE: 73 BPM | WEIGHT: 128 LBS | BODY MASS INDEX: 21.85 KG/M2 | SYSTOLIC BLOOD PRESSURE: 117 MMHG | DIASTOLIC BLOOD PRESSURE: 58 MMHG

## 2024-03-11 DIAGNOSIS — R60.0 BILATERAL LEG EDEMA: ICD-10-CM

## 2024-03-11 DIAGNOSIS — Z87.898 HISTORY OF CHEST PAIN: ICD-10-CM

## 2024-03-11 DIAGNOSIS — Z98.890 S/P CARDIAC CATH: ICD-10-CM

## 2024-03-11 DIAGNOSIS — E78.00 PURE HYPERCHOLESTEROLEMIA: ICD-10-CM

## 2024-03-11 DIAGNOSIS — I50.32 CHRONIC DIASTOLIC CONGESTIVE HEART FAILURE (HCC): Primary | ICD-10-CM

## 2024-03-11 DIAGNOSIS — I48.0 PAF (PAROXYSMAL ATRIAL FIBRILLATION) (HCC): ICD-10-CM

## 2024-03-11 DIAGNOSIS — R94.31 ABNORMAL EKG: ICD-10-CM

## 2024-03-11 PROCEDURE — 3017F COLORECTAL CA SCREEN DOC REV: CPT | Performed by: INTERNAL MEDICINE

## 2024-03-11 PROCEDURE — 1123F ACP DISCUSS/DSCN MKR DOCD: CPT | Performed by: INTERNAL MEDICINE

## 2024-03-11 PROCEDURE — G8484 FLU IMMUNIZE NO ADMIN: HCPCS | Performed by: INTERNAL MEDICINE

## 2024-03-11 PROCEDURE — 4004F PT TOBACCO SCREEN RCVD TLK: CPT | Performed by: INTERNAL MEDICINE

## 2024-03-11 PROCEDURE — 99214 OFFICE O/P EST MOD 30 MIN: CPT | Performed by: INTERNAL MEDICINE

## 2024-03-11 PROCEDURE — G8399 PT W/DXA RESULTS DOCUMENT: HCPCS | Performed by: INTERNAL MEDICINE

## 2024-03-11 PROCEDURE — 1090F PRES/ABSN URINE INCON ASSESS: CPT | Performed by: INTERNAL MEDICINE

## 2024-03-11 PROCEDURE — G8420 CALC BMI NORM PARAMETERS: HCPCS | Performed by: INTERNAL MEDICINE

## 2024-03-11 PROCEDURE — G8427 DOCREV CUR MEDS BY ELIG CLIN: HCPCS | Performed by: INTERNAL MEDICINE

## 2024-03-11 NOTE — PROGRESS NOTES
Chief Complaint   Patient presents with    Follow-up    Atrial Fibrillation   Originally  patient here - ref. by Dr. Khoury - hypertenstion and mild CAD - last seen Dr. Quinteros in 2015     Last seen 03/2018  then 05/2021 and Came 07/2022  after being referred by pcp for LOC syncope versus siezure D/o    Pat had hx of siezure yrs back and used to take meds for siezure then and advised to stop it            5 month follow up.    EKG done 4-.    Denied  dizziness, palpitation or edema  No more edema    Hx of atypical chest pain on two occasions last only few seconds and resolved   Previous hx of aty[pical cp chronic  Previous ischemia work up nonrevealing    No intermittent cluadication    No wt gain    Hx of CVA April 2023    Hx of Dizziness on standing getting better after better hydration    Hx of Occasional dizziness on standing  Had balance issue    Hx of has partial lobectomy of right lung.     Hx of syncope intermittent     Last syncope march 2021 and evalutaed in hospital  predrom dizziness  Tired post syncope with nausea    Sob on exertion chronic/copd  on home O2    Occasional palpitation    No leg edema  Hx of chronic Chest discomfort once in a while once a months 10 min- better  No definite chest pain  No aggravating or relieving factor      Past event  Hx of syncope July 2022 ? Syncope versus siezure d/o  Had two episode syncope in the last 1 week - 7 days and 5 days back  Predrome -dizziness, nausea  Postdrome - nausea, sweaty and feel tired  No injury but fall on side of the cauch as pat was sat then    Smoke 2ppd for 50 yrs    Under f/u By Dr. Keyes for  Abdominal aortic ectasia with a 2.8 cm in diameter.     FHX  Father had mi in his 50's  Sister had MI at 65    Patient Active Problem List   Diagnosis    Hyperlipidemia    History of breast cancer    Vitamin D deficiency    Chronic headachess/p head injjury s/p fall    Depression    Smoker    Hypothyroidism    Allergic rhinitis    Incontinence

## 2024-03-12 ENCOUNTER — SOCIAL WORK (OUTPATIENT)
Dept: INFUSION THERAPY | Age: 74
End: 2024-03-12

## 2024-03-12 NOTE — PROGRESS NOTES
Oncology Social Work    Date: 3/12/2024  Time: 10:27 AM  Name: Shania Hernandez  MRN: [id number]     Contact Type: Transportation Request    Note:   Situation: Shania Hernandez called the Oncology Social Worker to assist with transportation to and from appts.     Background:  Shania Hernandez is not able to provide transportation for herself and has asked the  for assistance.     Assessment: - Shania Hernandez requested transportation for her March and April appointment located at various provider office  - Patient will be picked up by [facility] [employer] approx :30-:45 mins prior to her appt time. She will be taken to and from (will call) her scheduled appt oin the following days:    Appt Date Appt Time Dept  Agency Used Transport Company Location Notes   19-Mar 6:30p MRI [facility]  [facility]                            1-Apr 1:40 PM [facility] - Mammogram   12-Apr 8:30a Provider [facility]  Bld[facility] - ENT         - Patient has been notified of the arrangements provided.    Recommendation: Appt changes will be initiated by Shania Hernandez based on need.  provided Shania Hernandez with my contact information and will remain available for support.      MORRIS Mccabe, TC, PENNY  Oncology Social Worker      Electronically signed by MORRIS Mccabe LSW, ACHP-SW on 3/12/2024 at 10:27 AM

## 2024-04-04 PROBLEM — Z12.31 ENCOUNTER FOR SCREENING MAMMOGRAM FOR MALIGNANT NEOPLASM OF BREAST: Status: RESOLVED | Noted: 2024-03-05 | Resolved: 2024-04-04

## 2024-05-06 ENCOUNTER — SOCIAL WORK (OUTPATIENT)
Dept: INFUSION THERAPY | Age: 74
End: 2024-05-06

## 2024-05-06 NOTE — PROGRESS NOTES
- All future transportation for Shania has been cancelled per the request of her son. She has moved to [place] and will plan to receive her treatments in that location until further notice.   - This staff notified the carrier and updated [facility] rep.  - This staff will remain available for support as needed.

## 2024-05-07 ENCOUNTER — TELEPHONE (OUTPATIENT)
Dept: ONCOLOGY | Age: 74
End: 2024-05-07

## 2024-06-07 NOTE — CARE COORDINATION
Remote Patient Monitoring Note      Date/Time:  1/4/2023 1:31 PM    EMTP reviewed patients reported daily Remote Patient Monitoring metrics. All reported metrics are within alert parameters. Plan/Follow Up:  Will continue to review, monitor and address alerts with follow up based on severity of symptoms and risk factors--- Current Patient Metrics ---- Blood Pressure: 128/79, 61bpm Pulseox: 95%, 63bpm Survey: C Weight: 142.4lbs Note Created at: 01/04/2023 01:31 PM ET ---- Time-Spent: 2 minutes 0 seconds
No

## 2024-06-17 RX ORDER — CILOSTAZOL 100 MG/1
TABLET ORAL
Qty: 60 TABLET | Refills: 11 | Status: SHIPPED | OUTPATIENT
Start: 2024-06-17

## 2024-07-10 DIAGNOSIS — N39.0 RECURRENT UTI: ICD-10-CM

## 2024-07-10 DIAGNOSIS — N28.1 RENAL CYST: Primary | ICD-10-CM

## 2024-07-25 NOTE — PROGRESS NOTES
Echo complete  Dr Espinoza Horn to read Quality 130: Documentation Of Current Medications In The Medical Record: Current Medications Documented Quality 110: Preventive Care And Screening: Influenza Immunization: Influenza Immunization Administered during Influenza season Detail Level: Detailed Quality 226: Preventive Care And Screening: Tobacco Use: Screening And Cessation Intervention: Patient screened for tobacco use and is an ex/non-smoker

## 2024-08-09 NOTE — TELEPHONE ENCOUNTER
Received refill request for Breztri. Medication was last ordered by Dr Khoury. Medication was last ordered on 2/14/24 with 11 refills.   Patient was last seen in the office 2/14/24. Patient has a scheduled follow up no scheduled f/u.   Medication needs to be sent to [facility].     Needs to be 90 day supply w/refills for mail order.  Thank you.

## 2024-08-13 RX ORDER — BUDESONIDE, GLYCOPYRROLATE, AND FORMOTEROL FUMARATE 160; 9; 4.8 UG/1; UG/1; UG/1
AEROSOL, METERED RESPIRATORY (INHALATION)
Qty: 10.7 G | Refills: 11 | Status: SHIPPED | OUTPATIENT
Start: 2024-08-13

## 2024-08-14 NOTE — TELEPHONE ENCOUNTER
Attempted to call pt to inform of med refill sent to pharmacy but her phone number is not working.  Called Debra per note on pt charge but her mailbox was full and couldn't take message.

## 2024-10-10 ENCOUNTER — TELEPHONE (OUTPATIENT)
Dept: PULMONOLOGY | Age: 74
End: 2024-10-10

## 2024-10-11 RX ORDER — BUDESONIDE, GLYCOPYRROLATE, AND FORMOTEROL FUMARATE 160; 9; 4.8 UG/1; UG/1; UG/1
2 AEROSOL, METERED RESPIRATORY (INHALATION) 2 TIMES DAILY
Qty: 1 EACH | Refills: 5 | Status: SHIPPED | OUTPATIENT
Start: 2024-10-11 | End: 2025-10-11

## 2024-10-14 NOTE — TELEPHONE ENCOUNTER
Shania has changed pharmacies. She needs her scripts that we prescribe to go to [facility] please. Thank you  
Sorry about that. She needs her Albuterol & Breztri to go to [facility]. Thank you   
Statement Selected

## 2024-10-29 ENCOUNTER — OFFICE VISIT (OUTPATIENT)
Dept: FAMILY MEDICINE CLINIC | Age: 74
End: 2024-10-29

## 2024-10-29 VITALS
RESPIRATION RATE: 18 BRPM | HEART RATE: 100 BPM | OXYGEN SATURATION: 97 % | BODY MASS INDEX: 19.05 KG/M2 | WEIGHT: 111 LBS | DIASTOLIC BLOOD PRESSURE: 58 MMHG | SYSTOLIC BLOOD PRESSURE: 108 MMHG

## 2024-10-29 DIAGNOSIS — C50.811 MALIGNANT NEOPLASM OF OVERLAPPING SITES OF RIGHT BREAST IN FEMALE, ESTROGEN RECEPTOR NEGATIVE (HCC): ICD-10-CM

## 2024-10-29 DIAGNOSIS — Z72.0 TOBACCO ABUSE: ICD-10-CM

## 2024-10-29 DIAGNOSIS — I77.811 ECTATIC ABDOMINAL AORTA (HCC): ICD-10-CM

## 2024-10-29 DIAGNOSIS — I73.9 PAD (PERIPHERAL ARTERY DISEASE) (HCC): ICD-10-CM

## 2024-10-29 DIAGNOSIS — F41.9 ANXIETY: ICD-10-CM

## 2024-10-29 DIAGNOSIS — Z00.00 MEDICARE ANNUAL WELLNESS VISIT, SUBSEQUENT: Primary | ICD-10-CM

## 2024-10-29 DIAGNOSIS — Z87.891 PERSONAL HISTORY OF TOBACCO USE: ICD-10-CM

## 2024-10-29 DIAGNOSIS — I48.0 PAF (PAROXYSMAL ATRIAL FIBRILLATION) (HCC): ICD-10-CM

## 2024-10-29 DIAGNOSIS — E89.0 POSTOPERATIVE HYPOTHYROIDISM: ICD-10-CM

## 2024-10-29 DIAGNOSIS — E78.00 PURE HYPERCHOLESTEROLEMIA: ICD-10-CM

## 2024-10-29 DIAGNOSIS — Z17.1 MALIGNANT NEOPLASM OF OVERLAPPING SITES OF RIGHT BREAST IN FEMALE, ESTROGEN RECEPTOR NEGATIVE (HCC): ICD-10-CM

## 2024-10-29 DIAGNOSIS — J44.9 CHRONIC OBSTRUCTIVE PULMONARY DISEASE, UNSPECIFIED COPD TYPE (HCC): ICD-10-CM

## 2024-10-29 DIAGNOSIS — N18.30 STAGE 3 CHRONIC KIDNEY DISEASE, UNSPECIFIED WHETHER STAGE 3A OR 3B CKD (HCC): ICD-10-CM

## 2024-10-29 PROBLEM — C34.31 CANCER OF LOWER LOBE OF RIGHT LUNG (HCC): Status: RESOLVED | Noted: 2021-06-14 | Resolved: 2024-10-29

## 2024-10-29 PROBLEM — R91.1 NODULE OF UPPER LOBE OF LEFT LUNG: Status: RESOLVED | Noted: 2024-03-05 | Resolved: 2024-10-29

## 2024-10-29 PROBLEM — R94.31 ABNORMAL EKG: Status: RESOLVED | Noted: 2022-07-19 | Resolved: 2024-10-29

## 2024-10-29 PROBLEM — Z87.898 HISTORY OF CHEST PAIN: Status: RESOLVED | Noted: 2018-03-15 | Resolved: 2024-10-29

## 2024-10-29 PROBLEM — S72.109A: Status: RESOLVED | Noted: 2021-08-06 | Resolved: 2024-10-29

## 2024-10-29 PROBLEM — S72.002A CLOSED LEFT HIP FRACTURE, INITIAL ENCOUNTER (HCC): Status: RESOLVED | Noted: 2021-07-31 | Resolved: 2024-10-29

## 2024-10-29 PROBLEM — Z87.898 HISTORY OF SYNCOPE: Status: RESOLVED | Noted: 2021-05-20 | Resolved: 2024-10-29

## 2024-10-29 PROBLEM — I63.9 ACUTE CVA (CEREBROVASCULAR ACCIDENT) (HCC): Status: RESOLVED | Noted: 2023-04-11 | Resolved: 2024-10-29

## 2024-10-29 PROBLEM — R91.1 SOLITARY PULMONARY NODULE ON LUNG CT: Status: RESOLVED | Noted: 2021-03-13 | Resolved: 2024-10-29

## 2024-10-29 PROBLEM — R60.0 BILATERAL LEG EDEMA: Status: RESOLVED | Noted: 2023-07-10 | Resolved: 2024-10-29

## 2024-10-29 PROBLEM — R42 DIZZINESS ON STANDING: Status: RESOLVED | Noted: 2021-05-20 | Resolved: 2024-10-29

## 2024-10-29 PROBLEM — M54.16 LUMBAR RADICULITIS: Status: RESOLVED | Noted: 2018-04-10 | Resolved: 2024-10-29

## 2024-10-29 RX ORDER — HYDROXYZINE HYDROCHLORIDE 10 MG/1
10-20 TABLET, FILM COATED ORAL EVERY 6 HOURS PRN
Qty: 40 TABLET | Refills: 5 | Status: SHIPPED | OUTPATIENT
Start: 2024-10-29

## 2024-10-29 RX ORDER — LEVOTHYROXINE SODIUM 25 UG/1
25 TABLET ORAL DAILY
Qty: 90 TABLET | Refills: 1 | Status: SHIPPED | OUTPATIENT
Start: 2024-10-29

## 2024-10-29 SDOH — ECONOMIC STABILITY: FOOD INSECURITY: WITHIN THE PAST 12 MONTHS, YOU WORRIED THAT YOUR FOOD WOULD RUN OUT BEFORE YOU GOT MONEY TO BUY MORE.: PATIENT DECLINED

## 2024-10-29 SDOH — ECONOMIC STABILITY: INCOME INSECURITY: HOW HARD IS IT FOR YOU TO PAY FOR THE VERY BASICS LIKE FOOD, HOUSING, MEDICAL CARE, AND HEATING?: PATIENT DECLINED

## 2024-10-29 SDOH — ECONOMIC STABILITY: FOOD INSECURITY: WITHIN THE PAST 12 MONTHS, THE FOOD YOU BOUGHT JUST DIDN'T LAST AND YOU DIDN'T HAVE MONEY TO GET MORE.: PATIENT DECLINED

## 2024-10-29 ASSESSMENT — PATIENT HEALTH QUESTIONNAIRE - PHQ9
8. MOVING OR SPEAKING SO SLOWLY THAT OTHER PEOPLE COULD HAVE NOTICED. OR THE OPPOSITE, BEING SO FIGETY OR RESTLESS THAT YOU HAVE BEEN MOVING AROUND A LOT MORE THAN USUAL: SEVERAL DAYS
7. TROUBLE CONCENTRATING ON THINGS, SUCH AS READING THE NEWSPAPER OR WATCHING TELEVISION: SEVERAL DAYS
SUM OF ALL RESPONSES TO PHQ QUESTIONS 1-9: 10
1. LITTLE INTEREST OR PLEASURE IN DOING THINGS: SEVERAL DAYS
5. POOR APPETITE OR OVEREATING: SEVERAL DAYS
SUM OF ALL RESPONSES TO PHQ9 QUESTIONS 1 & 2: 4
3. TROUBLE FALLING OR STAYING ASLEEP: SEVERAL DAYS
6. FEELING BAD ABOUT YOURSELF - OR THAT YOU ARE A FAILURE OR HAVE LET YOURSELF OR YOUR FAMILY DOWN: SEVERAL DAYS
2. FEELING DOWN, DEPRESSED OR HOPELESS: NEARLY EVERY DAY
SUM OF ALL RESPONSES TO PHQ QUESTIONS 1-9: 10
4. FEELING TIRED OR HAVING LITTLE ENERGY: SEVERAL DAYS
SUM OF ALL RESPONSES TO PHQ QUESTIONS 1-9: 10
SUM OF ALL RESPONSES TO PHQ QUESTIONS 1-9: 10

## 2024-10-29 ASSESSMENT — LIFESTYLE VARIABLES: HOW OFTEN DO YOU HAVE A DRINK CONTAINING ALCOHOL: NEVER

## 2024-10-29 NOTE — PATIENT INSTRUCTIONS
problems.  Where can you learn more?  Go to [email or web address]/patientEd and enter F075 to learn more about \"A Healthy Heart: Care Instructions.\"  Current as of: June 24, 2023  Content Version: 14.2  © 2024 [employer].   Care instructions adapted under license by [employer]. If you have questions about a medical condition or this instruction, always ask your healthcare professional. [employer] disclaims any warranty or liability for your use of this information.      Personalized Preventive Plan for Shania Hernandez - 10/29/2024  Medicare offers a range of preventive health benefits. Some of the tests and screenings are paid in full while other may be subject to a deductible, co-insurance, and/or copay.    Some of these benefits include a comprehensive review of your medical history including lifestyle, illnesses that may run in your family, and various assessments and screenings as appropriate.    After reviewing your medical record and screening and assessments performed today your provider may have ordered immunizations, labs, imaging, and/or referrals for you.  A list of these orders (if applicable) as well as your Preventive Care list are included within your After Visit Summary for your review.    Other Preventive Recommendations:    A preventive eye exam performed by an eye specialist is recommended every 1-2 years to screen for glaucoma; cataracts, macular degeneration, and other eye disorders.  A preventive dental visit is recommended every 6 months.  Try to get at least 150 minutes of exercise per week or 10,000 steps per day on a pedometer .  Order or download the FREE \"Exercise & Physical Activity: Your Everyday Guide\" from [employer]. Call 1-909.919.6441 or search [employer] online.  You need 2842-3880 mg of calcium and 1075-1447 IU of vitamin D per day. It is possible to meet your calcium requirement with diet alone, but a

## 2024-10-29 NOTE — PROGRESS NOTES
patient in written form: see Patient Instructions/AVS.     Return in about 6 months (around 4/29/2025), or if symptoms worsen or fail to improve, for Routine follow up, Medication check.     Subjective   The following acute and/or chronic problems were also addressed today:    Pt presents to the office today for AWV and 6 month follow up on chronic conditions.  Pt has been following up with Pulmonary and Oncology for her HX of lung cancer, cancer is back and now on the left lung with possible mets. Has not been to follow up appts with pulmonary or oncology recently.  She moved to [place] with her son and doctors are not covered by her insurance there. She is working on finding doctors to help her there.        Patient's complete Health Risk Assessment and screening values have been reviewed and are found in Flowsheets. The following problems were reviewed today and where indicated follow up appointments were made and/or referrals ordered.    Positive Risk Factor Screenings with Interventions:    Fall Risk:  Do you feel unsteady or are you worried about falling? : (!) yes  2 or more falls in past year?: (!) yes  Fall with injury in past year?: (!) yes     Interventions:    Reviewed medications, home hazards, visual acuity, and co-morbidities that can increase risk for falls  See AVS for additional education material     Depression:  PHQ-2 Score: 4  PHQ-9 Total Score: 10  Total Score Interpretation: 10-14 = moderate depression  Interventions:  Medications adjusted: refill atarax          Self-assessment of health:  In general, how would you say your health is?: (!) Poor  Interventions:  Working on finding doctors in [place] to cover her cancer and COPD.   See AVS for additional education material     Interventions Fatigue:  See AVS for additional education material  Interventions - Loneliness:  See AVS for additional education material  Interventions - Stress:  See AVS for additional education material  Interventions -

## 2025-01-13 NOTE — CARE COORDINATION
"Daily Note     Today's date: 2025  Patient name: Lucas Gibson  : 1966  MRN: [id number]  Referring provider: Fernando Pinto PA-C  Dx: No diagnosis found.               Subjective: ***      Objective: See treatment diary below      Assessment: Tolerated treatment {Tolerated treatment :}. Patient {assessment:}      Plan: {PLAN:}     Insurance:  [employer]    Precautions: LBP, Hx DVT     Re-eval Date: 25    Date        Visit Count   3     FOTO        Pain In   0/10 LBP  Min tightness      Pain Out            Manuals       L/S STM   ML IAST/STM to LB Lspine/PSIS     L/S P-A mobs   Grade II-III ML                      Neuro Re-Ed        Repeated motions         EIS    @ wall  3x30\"     PPU  X10  2x10 10x 5\"  Cues breath    NATE 1 min     PPU sag         EIS dowel         PPT  3\"x10 Review  DC HEP      PPT w/march        PPT w/SLR  10x William 10x BL     PPT w/BKFO        PPT w/heel slides        Posture re-education                 Ther Ex        Row  Blue TB 3x10 Blue TB 3\" 3x10 Gina  20# 3x10, 3\"  W/ TA     LPD  Blue TB 3x10 Blue TB 3\" 3x10 Gina  20# 3x10  W/ TA     Paloff Press  Blue TB 3x10 Blue TB 5-10\"  10x William Gina  10#  15x 5\"     Trunk rotation    Gina       NuStep   L6 10' L6 10 min     Bridges  3\"x10 10x 5\"     S/L Hip Abd        Clamshells                         Ther Activity        Suitcase carry         DL / lifting mechanics         Gait Training                        Modalities                                 " Remote Patient Kit Ordering Note      Date/Time:  12/13/2022 3:44 PM      [] CCSS confirmed patient shipping address  [] Patient will receive package over the next 2-4 business days. Someone 21 years or older must be present to sign for UPS delivery. [] Patient to contact virtual installation-specific phone number listed in the patient instructions. [] If the patient does not contact HRS within 24 hours, an [id number] will call the patient directly: If the patient does not answer, HRS will follow up with the clinical team notifying them about the unsuccessful attempt to contact the patient. HRS will make three call attempts to the patient. [] LPN will contact patient once equipment is active to welcome them to the program.                                                         [] Hours of RPM monitoring - Monday-Friday 5831-1544                     All questions answered at this time. LPN made aware the RPM kit has been ordered. Paramedic  notified patient of RPM equipment order. Paramedic attempted to contact patient in regards to RPM Kit order. Patient is unavailable at this time. Unable to leave a message due to voicemail box being full. Will expect a return call. If no call back is received, Paramedic will attempt to reach out again tomorrow. RPM Kit Order is completed successfully.

## 2025-01-28 NOTE — CARE COORDINATION
Continue these medications all unchanged: Prednisolone (pink cap) once daily in both eyes, Latanoprost (teal cap) at bedtime in both eyes, Dorzolamide (orange cap) daily in both eyes, Combigan (dark blue cap) daily in both eyes  Continue oral  Methotrexate  25 mg (10 pills), Folic acid 1 mg daily   No new treatments or changes. No urgency for Humira but we can consider next time based on the course and evaluation with Dr. Jordan   Remote Patient Monitoring Note      Date/Time:  2/7/2023 10:05 AM    - Current Patient Metrics ---- Blood Pressure: 145/60, 70bpm Pulseox: 94%, 74bpm Survey: C Weight: 137.4lbs Note Created at: 02/07/2023 10:07 AM ET ---- Time-Spent: 5 minutes 0 seconds    LPN noted red alert for Weight Increase of 5 lbs In Last 7 Days. Background:  COPD, CHF    Clinical Interventions: Reviewed and followed up on alerts and treatments-.  False alert noted for weight gain. Patient is at baseline, no outreach needed. Plan/Follow Up: Will continue to review, monitor and address alerts with follow up based on severity of symptoms and risk factors.        Easton Roman LPN  [facility] Nurse/ RPM  [phone]

## 2025-05-12 NOTE — CARE COORDINATION
Question: Ok for second opinion consult with with Dr. Main?    Remote Patient Monitoring Note      Date/Time:  1/24/2023 9:49 AM    LPN reviewed patients reported daily Remote Patient Monitoring metrics. All reported metrics are within alert parameters. Plan/Follow Up:  Will continue to review, monitor and address alerts with follow up based on severity of symptoms and risk factors    - Current Patient Metrics ---- Blood Pressure: 143/73, 85bpm Pulseox: 94%, 85bpm Survey: C Weight: 138.4lbs Note Created at: 01/24/2023 09:50 AM ET ---- Time-Spent: 3 minutes 0 seconds    Kameron Cartagena, [address] [facility] Transition Nurse/ RPM  [phone]

## 2025-05-26 ENCOUNTER — HOSPITAL ENCOUNTER (EMERGENCY)
Age: 75
Discharge: HOME | DRG: 981 | End: 2025-05-26
Payer: MEDICARE

## 2025-05-26 ENCOUNTER — APPOINTMENT (OUTPATIENT)
Dept: CT IMAGING | Age: 75
DRG: 981 | End: 2025-05-26
Payer: MEDICARE

## 2025-05-26 VITALS
OXYGEN SATURATION: 98 % | HEART RATE: 83 BPM | RESPIRATION RATE: 18 BRPM | DIASTOLIC BLOOD PRESSURE: 70 MMHG | SYSTOLIC BLOOD PRESSURE: 149 MMHG | TEMPERATURE: 97.7 F

## 2025-05-26 DIAGNOSIS — E87.6 HYPOKALEMIA: ICD-10-CM

## 2025-05-26 DIAGNOSIS — N30.00 ACUTE CYSTITIS WITHOUT HEMATURIA: Primary | ICD-10-CM

## 2025-05-26 LAB
ALBUMIN SERPL BCG-MCNC: 3.6 G/DL (ref 3.4–4.9)
ALP SERPL-CCNC: 140 U/L (ref 38–126)
ALT SERPL W/O P-5'-P-CCNC: 7 U/L (ref 10–35)
ANION GAP SERPL CALC-SCNC: 11 MEQ/L (ref 8–16)
AST SERPL-CCNC: 19 U/L (ref 10–35)
BACTERIA URNS QL MICRO: ABNORMAL /HPF
BASOPHILS ABSOLUTE: 0 THOU/MM3 (ref 0–0.1)
BASOPHILS NFR BLD AUTO: 0.5 %
BILIRUB SERPL-MCNC: 0.5 MG/DL (ref 0.3–1.2)
BILIRUB UR QL STRIP.AUTO: ABNORMAL
BUN SERPL-MCNC: 16 MG/DL (ref 8–23)
CALCIUM SERPL-MCNC: 8.6 MG/DL (ref 8.8–10.2)
CASTS #/AREA URNS LPF: ABNORMAL /LPF
CASTS 2: ABNORMAL /LPF
CHARACTER UR: ABNORMAL
CHLORIDE SERPL-SCNC: 102 MEQ/L (ref 98–111)
CO2 SERPL-SCNC: 28 MEQ/L (ref 22–29)
COLOR, UA: ABNORMAL
CREAT SERPL-MCNC: 0.9 MG/DL (ref 0.5–0.9)
CRYSTALS URNS MICRO: ABNORMAL
DEPRECATED RDW RBC AUTO: 51.6 FL (ref 35–45)
EOSINOPHIL NFR BLD AUTO: 8.2 %
EOSINOPHILS ABSOLUTE: 0.8 THOU/MM3 (ref 0–0.4)
EPITHELIAL CELLS, UA: ABNORMAL /HPF
ERYTHROCYTE [DISTWIDTH] IN BLOOD BY AUTOMATED COUNT: 13.9 % (ref 11.5–14.5)
GFR SERPL CREATININE-BSD FRML MDRD: 67 ML/MIN/1.73M2
GLUCOSE SERPL-MCNC: 105 MG/DL (ref 74–109)
GLUCOSE UR QL STRIP.AUTO: NEGATIVE MG/DL
HCT VFR BLD AUTO: 44.3 % (ref 37–47)
HGB BLD-MCNC: 14.5 GM/DL (ref 12–16)
HGB UR QL STRIP.AUTO: NEGATIVE
IMM GRANULOCYTES # BLD AUTO: 0.05 THOU/MM3 (ref 0–0.07)
IMM GRANULOCYTES NFR BLD AUTO: 0.5 %
KETONES UR QL STRIP.AUTO: ABNORMAL
LYMPHOCYTES ABSOLUTE: 1.3 THOU/MM3 (ref 1–4.8)
LYMPHOCYTES NFR BLD AUTO: 14.6 %
MCH RBC QN AUTO: 33 PG (ref 26–33)
MCHC RBC AUTO-ENTMCNC: 32.7 GM/DL (ref 32.2–35.5)
MCV RBC AUTO: 100.9 FL (ref 81–99)
MISCELLANEOUS 2: ABNORMAL
MONOCYTES ABSOLUTE: 0.4 THOU/MM3 (ref 0.4–1.3)
MONOCYTES NFR BLD AUTO: 3.9 %
NEUTROPHILS ABSOLUTE: 6.7 THOU/MM3 (ref 1.8–7.7)
NEUTROPHILS NFR BLD AUTO: 72.3 %
NITRITE UR QL STRIP: POSITIVE
NRBC BLD AUTO-RTO: 0 /100 WBC
OSMOLALITY SERPL CALC.SUM OF ELEC: 282.8 MOSMOL/KG (ref 275–300)
PH UR STRIP.AUTO: 5.5 [PH] (ref 5–9)
PLATELET # BLD AUTO: 251 THOU/MM3 (ref 130–400)
PMV BLD AUTO: 9.8 FL (ref 9.4–12.4)
POTASSIUM SERPL-SCNC: 3.1 MEQ/L (ref 3.5–5.2)
PROT SERPL-MCNC: 6.6 G/DL (ref 6.4–8.3)
PROT UR STRIP.AUTO-MCNC: 30 MG/DL
RBC # BLD AUTO: 4.39 MILL/MM3 (ref 4.2–5.4)
RBC URINE: ABNORMAL /HPF
RENAL EPI CELLS #/AREA URNS HPF: ABNORMAL /[HPF]
SODIUM SERPL-SCNC: 141 MEQ/L (ref 135–145)
SP GR UR REFRACT.AUTO: 1.02 (ref 1–1.03)
UROBILINOGEN, URINE: 1 EU/DL (ref 0–1)
WBC # BLD AUTO: 9.2 THOU/MM3 (ref 4.8–10.8)
WBC #/AREA URNS HPF: > 100 /HPF
WBC #/AREA URNS HPF: ABNORMAL /[HPF]
YEAST LIKE FUNGI URNS QL MICRO: ABNORMAL

## 2025-05-26 PROCEDURE — 87077 CULTURE AEROBIC IDENTIFY: CPT

## 2025-05-26 PROCEDURE — 87086 URINE CULTURE/COLONY COUNT: CPT

## 2025-05-26 PROCEDURE — 6360000002 HC RX W HCPCS: Performed by: NURSE PRACTITIONER

## 2025-05-26 PROCEDURE — 81001 URINALYSIS AUTO W/SCOPE: CPT

## 2025-05-26 PROCEDURE — 74177 CT ABD & PELVIS W/CONTRAST: CPT

## 2025-05-26 PROCEDURE — 36415 COLL VENOUS BLD VENIPUNCTURE: CPT

## 2025-05-26 PROCEDURE — 80053 COMPREHEN METABOLIC PANEL: CPT

## 2025-05-26 PROCEDURE — 2500000003 HC RX 250 WO HCPCS: Performed by: NURSE PRACTITIONER

## 2025-05-26 PROCEDURE — 6370000000 HC RX 637 (ALT 250 FOR IP): Performed by: NURSE PRACTITIONER

## 2025-05-26 PROCEDURE — 6360000004 HC RX CONTRAST MEDICATION: Performed by: NURSE PRACTITIONER

## 2025-05-26 PROCEDURE — 87186 SC STD MICRODIL/AGAR DIL: CPT

## 2025-05-26 PROCEDURE — 85025 COMPLETE CBC W/AUTO DIFF WBC: CPT

## 2025-05-26 RX ORDER — ONDANSETRON 4 MG/1
4 TABLET, ORALLY DISINTEGRATING ORAL EVERY 8 HOURS PRN
Qty: 20 TABLET | Refills: 0 | Status: ON HOLD | OUTPATIENT
Start: 2025-05-26 | End: 2025-06-06 | Stop reason: HOSPADM

## 2025-05-26 RX ORDER — IOPAMIDOL 755 MG/ML
80 INJECTION, SOLUTION INTRAVASCULAR
Status: COMPLETED | OUTPATIENT
Start: 2025-05-26 | End: 2025-05-26

## 2025-05-26 RX ORDER — CEPHALEXIN 500 MG/1
500 CAPSULE ORAL 2 TIMES DAILY
Qty: 14 CAPSULE | Refills: 0 | Status: ON HOLD | OUTPATIENT
Start: 2025-05-26 | End: 2025-06-06 | Stop reason: HOSPADM

## 2025-05-26 RX ADMIN — IOPAMIDOL 80 ML: 755 INJECTION, SOLUTION INTRAVENOUS at 04:01

## 2025-05-26 RX ADMIN — WATER 1000 MG: 1 INJECTION INTRAMUSCULAR; INTRAVENOUS; SUBCUTANEOUS at 03:15

## 2025-05-26 ASSESSMENT — PAIN - FUNCTIONAL ASSESSMENT
PAIN_FUNCTIONAL_ASSESSMENT: 0-10
PAIN_FUNCTIONAL_ASSESSMENT: 0-10

## 2025-05-26 ASSESSMENT — PAIN SCALES - GENERAL
PAINLEVEL_OUTOF10: 9
PAINLEVEL_OUTOF10: 9

## 2025-05-26 NOTE — ED NOTES
D/C instructions reviewed. Medication and follow up discussed. Vitals assessed. Pt to lobby with visitor at this time.

## 2025-05-26 NOTE — ED NOTES
Pt  brought to restroom in wheelchair with this RN for urine sample. Upon using the restroom, pt stated her brief needed changed. This RN helped take brief off of pt. Brief noted to be extremely soiled and appeared to be there for hours. Pt asked when the last time she changed her brief in which she states, \"He hasn't changed me in a long time. He doesn't take good care of me. I can't do it my self.\" Pt provided with fresh brief and educated on need to change brief after soiling. Pt able to pull brief and pants up when being asked to show what she is able to do by herself. Urine sample obtained and sent.

## 2025-05-26 NOTE — ED PROVIDER NOTES
[facility] EMERGENCY DEPARTMENT      EMERGENCY MEDICINE     Pt Name: Shania Hernandez  MRN: [id number]  Birthdate 1950  Date of evaluation: 5/26/2025  Provider: NITO Lockett CNP    CHIEF COMPLAINT       Chief Complaint   Patient presents with    Back Pain     HISTORY OF PRESENT ILLNESS   Shania Hernandez is a pleasant 75 y.o. female with a past medical history of anxiety, CAD, skin cancer, COPD, prior CVA, hyperlipidemia, hypertension.  She is presenting tonight for complaint of 1 weeks worth of right flank pain, right lower quadrant abdominal pain, and dysuria as well as increased urinary frequency and low volumes of urine with each void.  Denies any fever or chills.  No diarrhea or constipation.    PASTMEDICAL HISTORY     Past Medical History:   Diagnosis Date    Anxiety 2007    Arthritis     Breast cancer (HCC) 11/18/2004    right mastectomy, chemo and radiation history. Right breast invasive ductal carcinoma    CAD (coronary artery disease) 2001    sees Dr Omalley    Cancer of the skin 2004    Cerebral artery occlusion with cerebral infarction ([facility])     Chronic UTI     COPD (chronic obstructive pulmonary disease) ([facility])     sees Dolores    CVA (cerebral infarction) 2007, 2008    Depression 2007    DVT of lower extremity (deep venous thrombosis) ([facility]) 1976    Facial injury 2005    Fall on greasy ground, striking left periorbital region    History of stroke 2007, 2008, 2009    Patient relates a stroke with right weakness and speech in 2007; another in 2010 or 2012 (unsure), both with need to rehabilitation.  Also \"2 mini-strokes\" (?)    History of therapeutic radiation 2005    Right breast    Hx antineoplastic chemo 2005    Hx of blood clots 1977    hip, leg    Hyperlipidemia 2005    Hypertension 2005    Hypothyroidism     IBS (irritable bowel syndrome)     Low HDL (under 40) 2014    Lung cancer (HCC)     sees Dr Eckert    Prolonged emergence from general anesthesia     Recurrent UTI (urinary       Diagnoses that have been ruled out:   None   Diagnoses that are still under consideration:   None   Final diagnoses:   Acute cystitis without hematuria   Hypokalemia         DISPOSITION/PLAN   Discharge               OUTPATIENT FOLLOW UP THE PATIENT:  No follow-up provider specified.    NITO Lockett CNP, [place], APRN - CNP  05/26/25 [id number]

## 2025-05-26 NOTE — ED NOTES
Pt educated on need for potassium replacement. Pt refusing potassium. Pt states \"I don't take pills well and I wont take that. I can't do anything with water it has to be pop. And I'm not taking that to begin with so no. No! No! No!\". Ed CNP made aware. Vitals assessed. Call light in reach.

## 2025-05-26 NOTE — ED NOTES
Pt medicated per MAR. Vitals assessed. RR easy and unlabored. Pt denies additional needs. Call light in reach.

## 2025-05-26 NOTE — ED TRIAGE NOTES
Pt to ED from home through lobby. Pt c/c right sided back pain for the past week. Pt also reports right sided abdomen pain starting around the same time. Pt rates pain 9/10. Vitals assessed. RR easy and unlabored.

## 2025-05-28 ENCOUNTER — TELEPHONE (OUTPATIENT)
Dept: FAMILY MEDICINE CLINIC | Age: 75
End: 2025-05-28

## 2025-05-28 ENCOUNTER — APPOINTMENT (OUTPATIENT)
Dept: CT IMAGING | Age: 75
DRG: 981 | End: 2025-05-28
Payer: MEDICARE

## 2025-05-28 ENCOUNTER — OFFICE VISIT (OUTPATIENT)
Dept: FAMILY MEDICINE CLINIC | Age: 75
End: 2025-05-28
Payer: MEDICARE

## 2025-05-28 ENCOUNTER — HOSPITAL ENCOUNTER (INPATIENT)
Age: 75
LOS: 9 days | Discharge: NURSING, REHAB OR LONG-TERM CARE | DRG: 981 | End: 2025-06-06
Attending: EMERGENCY MEDICINE
Payer: MEDICARE

## 2025-05-28 VITALS
HEART RATE: 104 BPM | RESPIRATION RATE: 22 BRPM | DIASTOLIC BLOOD PRESSURE: 60 MMHG | TEMPERATURE: 98.2 F | SYSTOLIC BLOOD PRESSURE: 102 MMHG | BODY MASS INDEX: 19.05 KG/M2 | HEIGHT: 64 IN

## 2025-05-28 DIAGNOSIS — C34.31 PRIMARY MALIGNANT NEOPLASM OF RIGHT LOWER LOBE OF LUNG (HCC): ICD-10-CM

## 2025-05-28 DIAGNOSIS — I48.0 PAF (PAROXYSMAL ATRIAL FIBRILLATION) (HCC): ICD-10-CM

## 2025-05-28 DIAGNOSIS — R41.0 CONFUSION: Primary | ICD-10-CM

## 2025-05-28 DIAGNOSIS — J96.11 CHRONIC RESPIRATORY FAILURE WITH HYPOXIA (HCC): ICD-10-CM

## 2025-05-28 DIAGNOSIS — I50.32 CHRONIC DIASTOLIC CONGESTIVE HEART FAILURE (HCC): ICD-10-CM

## 2025-05-28 DIAGNOSIS — J44.9 CHRONIC OBSTRUCTIVE PULMONARY DISEASE, UNSPECIFIED COPD TYPE (HCC): ICD-10-CM

## 2025-05-28 PROBLEM — N39.0 UTI (URINARY TRACT INFECTION): Status: ACTIVE | Noted: 2025-05-28

## 2025-05-28 LAB
ALBUMIN SERPL BCG-MCNC: 3.6 G/DL (ref 3.4–4.9)
ALP SERPL-CCNC: 137 U/L (ref 38–126)
ALT SERPL W/O P-5'-P-CCNC: 6 U/L (ref 10–35)
ANION GAP SERPL CALC-SCNC: 14 MEQ/L (ref 8–16)
AST SERPL-CCNC: 18 U/L (ref 10–35)
BACTERIA UR CULT: ABNORMAL
BASOPHILS ABSOLUTE: 0.1 THOU/MM3 (ref 0–0.1)
BASOPHILS NFR BLD AUTO: 0.9 %
BILIRUB SERPL-MCNC: 0.6 MG/DL (ref 0.3–1.2)
BUN SERPL-MCNC: 16 MG/DL (ref 8–23)
CALCIUM SERPL-MCNC: 8.8 MG/DL (ref 8.8–10.2)
CHLORIDE SERPL-SCNC: 103 MEQ/L (ref 98–111)
CO2 SERPL-SCNC: 25 MEQ/L (ref 22–29)
CREAT SERPL-MCNC: 0.7 MG/DL (ref 0.5–0.9)
DEPRECATED RDW RBC AUTO: 51.9 FL (ref 35–45)
EOSINOPHIL NFR BLD AUTO: 4.3 %
EOSINOPHILS ABSOLUTE: 0.3 THOU/MM3 (ref 0–0.4)
ERYTHROCYTE [DISTWIDTH] IN BLOOD BY AUTOMATED COUNT: 14.1 % (ref 11.5–14.5)
GFR SERPL CREATININE-BSD FRML MDRD: > 90 ML/MIN/1.73M2
GLUCOSE SERPL-MCNC: 99 MG/DL (ref 74–109)
HCT VFR BLD AUTO: 45.8 % (ref 37–47)
HGB BLD-MCNC: 15 GM/DL (ref 12–16)
IMM GRANULOCYTES # BLD AUTO: 0.04 THOU/MM3 (ref 0–0.07)
IMM GRANULOCYTES NFR BLD AUTO: 0.6 %
LYMPHOCYTES ABSOLUTE: 0.9 THOU/MM3 (ref 1–4.8)
LYMPHOCYTES NFR BLD AUTO: 13.8 %
MCH RBC QN AUTO: 33 PG (ref 26–33)
MCHC RBC AUTO-ENTMCNC: 32.8 GM/DL (ref 32.2–35.5)
MCV RBC AUTO: 100.7 FL (ref 81–99)
MONOCYTES ABSOLUTE: 0.3 THOU/MM3 (ref 0.4–1.3)
MONOCYTES NFR BLD AUTO: 4 %
NEUTROPHILS ABSOLUTE: 5.1 THOU/MM3 (ref 1.8–7.7)
NEUTROPHILS NFR BLD AUTO: 76.4 %
NRBC BLD AUTO-RTO: 0 /100 WBC
ORGANISM: ABNORMAL
OSMOLALITY SERPL CALC.SUM OF ELEC: 284.3 MOSMOL/KG (ref 275–300)
PLATELET # BLD AUTO: 263 THOU/MM3 (ref 130–400)
PMV BLD AUTO: 10.2 FL (ref 9.4–12.4)
POTASSIUM SERPL-SCNC: 3.7 MEQ/L (ref 3.5–5.2)
PROT SERPL-MCNC: 8.2 G/DL (ref 6.4–8.3)
RBC # BLD AUTO: 4.55 MILL/MM3 (ref 4.2–5.4)
SODIUM SERPL-SCNC: 142 MEQ/L (ref 135–145)
WBC # BLD AUTO: 6.7 THOU/MM3 (ref 4.8–10.8)

## 2025-05-28 PROCEDURE — 80053 COMPREHEN METABOLIC PANEL: CPT

## 2025-05-28 PROCEDURE — 99223 1ST HOSP IP/OBS HIGH 75: CPT | Performed by: INTERNAL MEDICINE

## 2025-05-28 PROCEDURE — 1159F MED LIST DOCD IN RCRD: CPT | Performed by: NURSE PRACTITIONER

## 2025-05-28 PROCEDURE — 2500000003 HC RX 250 WO HCPCS: Performed by: EMERGENCY MEDICINE

## 2025-05-28 PROCEDURE — 4004F PT TOBACCO SCREEN RCVD TLK: CPT | Performed by: NURSE PRACTITIONER

## 2025-05-28 PROCEDURE — 6360000002 HC RX W HCPCS: Performed by: INTERNAL MEDICINE

## 2025-05-28 PROCEDURE — 85025 COMPLETE CBC W/AUTO DIFF WBC: CPT

## 2025-05-28 PROCEDURE — G8420 CALC BMI NORM PARAMETERS: HCPCS | Performed by: NURSE PRACTITIONER

## 2025-05-28 PROCEDURE — 51798 US URINE CAPACITY MEASURE: CPT

## 2025-05-28 PROCEDURE — 99214 OFFICE O/P EST MOD 30 MIN: CPT | Performed by: NURSE PRACTITIONER

## 2025-05-28 PROCEDURE — 1200000000 HC SEMI PRIVATE

## 2025-05-28 PROCEDURE — 3017F COLORECTAL CA SCREEN DOC REV: CPT | Performed by: NURSE PRACTITIONER

## 2025-05-28 PROCEDURE — G8399 PT W/DXA RESULTS DOCUMENT: HCPCS | Performed by: NURSE PRACTITIONER

## 2025-05-28 PROCEDURE — 36415 COLL VENOUS BLD VENIPUNCTURE: CPT

## 2025-05-28 PROCEDURE — 3023F SPIROM DOC REV: CPT | Performed by: NURSE PRACTITIONER

## 2025-05-28 PROCEDURE — 1090F PRES/ABSN URINE INCON ASSESS: CPT | Performed by: NURSE PRACTITIONER

## 2025-05-28 PROCEDURE — 99285 EMERGENCY DEPT VISIT HI MDM: CPT

## 2025-05-28 PROCEDURE — G8427 DOCREV CUR MEDS BY ELIG CLIN: HCPCS | Performed by: NURSE PRACTITIONER

## 2025-05-28 PROCEDURE — 6360000002 HC RX W HCPCS: Performed by: EMERGENCY MEDICINE

## 2025-05-28 PROCEDURE — 1123F ACP DISCUSS/DSCN MKR DOCD: CPT | Performed by: NURSE PRACTITIONER

## 2025-05-28 RX ORDER — POLYETHYLENE GLYCOL 3350 17 G/17G
17 POWDER, FOR SOLUTION ORAL DAILY PRN
Status: DISCONTINUED | OUTPATIENT
Start: 2025-05-28 | End: 2025-06-06 | Stop reason: HOSPADM

## 2025-05-28 RX ORDER — SODIUM CHLORIDE 9 MG/ML
INJECTION, SOLUTION INTRAVENOUS PRN
Status: DISCONTINUED | OUTPATIENT
Start: 2025-05-28 | End: 2025-06-06 | Stop reason: HOSPADM

## 2025-05-28 RX ORDER — SODIUM CHLORIDE 0.9 % (FLUSH) 0.9 %
5-40 SYRINGE (ML) INJECTION PRN
Status: DISCONTINUED | OUTPATIENT
Start: 2025-05-28 | End: 2025-06-06 | Stop reason: HOSPADM

## 2025-05-28 RX ORDER — SODIUM CHLORIDE 0.9 % (FLUSH) 0.9 %
5-40 SYRINGE (ML) INJECTION EVERY 12 HOURS SCHEDULED
Status: DISCONTINUED | OUTPATIENT
Start: 2025-05-28 | End: 2025-06-06 | Stop reason: HOSPADM

## 2025-05-28 RX ORDER — POTASSIUM CHLORIDE 7.45 MG/ML
10 INJECTION INTRAVENOUS PRN
Status: DISCONTINUED | OUTPATIENT
Start: 2025-05-28 | End: 2025-06-06 | Stop reason: HOSPADM

## 2025-05-28 RX ORDER — POTASSIUM CHLORIDE 1500 MG/1
40 TABLET, EXTENDED RELEASE ORAL PRN
Status: DISCONTINUED | OUTPATIENT
Start: 2025-05-28 | End: 2025-06-06 | Stop reason: HOSPADM

## 2025-05-28 RX ORDER — MORPHINE SULFATE 2 MG/ML
2 INJECTION, SOLUTION INTRAMUSCULAR; INTRAVENOUS ONCE
Status: COMPLETED | OUTPATIENT
Start: 2025-05-28 | End: 2025-05-28

## 2025-05-28 RX ORDER — ONDANSETRON 4 MG/1
4 TABLET, ORALLY DISINTEGRATING ORAL EVERY 8 HOURS PRN
Status: DISCONTINUED | OUTPATIENT
Start: 2025-05-28 | End: 2025-06-06 | Stop reason: HOSPADM

## 2025-05-28 RX ORDER — ONDANSETRON 2 MG/ML
4 INJECTION INTRAMUSCULAR; INTRAVENOUS EVERY 6 HOURS PRN
Status: DISCONTINUED | OUTPATIENT
Start: 2025-05-28 | End: 2025-06-06 | Stop reason: HOSPADM

## 2025-05-28 RX ORDER — ENOXAPARIN SODIUM 100 MG/ML
30 INJECTION SUBCUTANEOUS DAILY
Status: DISCONTINUED | OUTPATIENT
Start: 2025-05-28 | End: 2025-05-30

## 2025-05-28 RX ADMIN — ENOXAPARIN SODIUM 30 MG: 100 INJECTION SUBCUTANEOUS at 16:34

## 2025-05-28 RX ADMIN — MORPHINE SULFATE 2 MG: 2 INJECTION, SOLUTION INTRAMUSCULAR; INTRAVENOUS at 14:35

## 2025-05-28 RX ADMIN — WATER 2000 MG: 1 INJECTION INTRAMUSCULAR; INTRAVENOUS; SUBCUTANEOUS at 15:21

## 2025-05-28 SDOH — ECONOMIC STABILITY: FOOD INSECURITY: WITHIN THE PAST 12 MONTHS, THE FOOD YOU BOUGHT JUST DIDN'T LAST AND YOU DIDN'T HAVE MONEY TO GET MORE.: NEVER TRUE

## 2025-05-28 SDOH — ECONOMIC STABILITY: FOOD INSECURITY: WITHIN THE PAST 12 MONTHS, YOU WORRIED THAT YOUR FOOD WOULD RUN OUT BEFORE YOU GOT MONEY TO BUY MORE.: NEVER TRUE

## 2025-05-28 ASSESSMENT — PAIN DESCRIPTION - ORIENTATION
ORIENTATION: LOWER
ORIENTATION: LOWER

## 2025-05-28 ASSESSMENT — PATIENT HEALTH QUESTIONNAIRE - PHQ9: DEPRESSION UNABLE TO ASSESS: PT REFUSES

## 2025-05-28 ASSESSMENT — PAIN DESCRIPTION - PAIN TYPE
TYPE: CHRONIC PAIN
TYPE: CHRONIC PAIN

## 2025-05-28 ASSESSMENT — PAIN DESCRIPTION - LOCATION
LOCATION: BACK
LOCATION: BACK

## 2025-05-28 ASSESSMENT — PAIN SCALES - GENERAL
PAINLEVEL_OUTOF10: 0
PAINLEVEL_OUTOF10: 10
PAINLEVEL_OUTOF10: 4
PAINLEVEL_OUTOF10: 10

## 2025-05-28 ASSESSMENT — PAIN - FUNCTIONAL ASSESSMENT
PAIN_FUNCTIONAL_ASSESSMENT: PREVENTS OR INTERFERES SOME ACTIVE ACTIVITIES AND ADLS
PAIN_FUNCTIONAL_ASSESSMENT: 0-10
PAIN_FUNCTIONAL_ASSESSMENT: PREVENTS OR INTERFERES SOME ACTIVE ACTIVITIES AND ADLS

## 2025-05-28 ASSESSMENT — PAIN DESCRIPTION - DESCRIPTORS
DESCRIPTORS: ACHING;DISCOMFORT
DESCRIPTORS: ACHING

## 2025-05-28 NOTE — PLAN OF CARE
Problem: Pain  Goal: Verbalizes/displays adequate comfort level or baseline comfort level  Flowsheets (Taken 5/28/2025 5503)  Verbalizes/displays adequate comfort level or baseline comfort level:   Encourage patient to monitor pain and request assistance   Assess pain using appropriate pain scale   Administer analgesics based on type and severity of pain and evaluate response   Implement non-pharmacological measures as appropriate and evaluate response   Consider cultural and social influences on pain and pain management   Notify Licensed Independent Practitioner if interventions unsuccessful or patient reports new pain

## 2025-05-28 NOTE — ED NOTES
Pt and vs reassessed. RR unlabored. Pt resting in bed with eyes closed and responds to voice. Medicated per MAR. Admitting provider at bedside to assess pt. Waiting for antibiotic from pharmacy

## 2025-05-28 NOTE — ED NOTES
Spoke with Heriberto Amato from Adult protective services. Updated on POC for patient and he states that he will be following up on the case today and reach out tomorrow.

## 2025-05-28 NOTE — H&P
GLUCOSEU NEGATIVE 05/26/2025 02:00 AM       Radiology:   CT HEAD WO CONTRAST    (Results Pending)     No results found.    Electronically signed by Jb Shook DO on 5/28/2025 at 2:33 PM

## 2025-05-28 NOTE — ED NOTES
Assumed care at this time. Pt resting in bed with eyes closed and responds to voice. Oxygen sat 89% on room air. Pt states she wears 2 L NC at home. Pt placed on 2 L NC and tolerating well. Denies any needs and stable at this time

## 2025-05-28 NOTE — PROGRESS NOTES
Called lori, patient's son, to get updated med list and background of patient. Per son, patient had stayed with him temporarily to finish antibiotic that was prescribed her for previous uti. While at his house patient did not have any medications beside antibiotics and he was unaware of any meds she is taking.

## 2025-05-28 NOTE — ED NOTES
Pt refusing all lab work, CT scan and bladder scan. RN attempted to inform pt what testing needs done for. Pt repeatedly states that she wants to smoke and eat. RN attempted to inform pt why scans need complete first. Pt refuses to verbalize understanding and continues to refuse all tests right now. RN informed Dr. Reich who verbalized understanding

## 2025-05-28 NOTE — PROGRESS NOTES
[facility]  [facility]  [address] [facility] [address]  Dept: [phone]  Dept Fax: [phone]  Loc: [phone]     Visit Date:  5/28/2025      Patient:  Shania Hernandez  YOB: 1950    HPI:     Chief Complaint   Patient presents with    Urinary Tract Infection     Very confused. For about three weeks.       Pt presents to the office today for confusion.  Pt talking, but not in coherent sentences, talking about missing her grandchildren.  2 men brought her to the appt, but did not come into the office with patient.  pt did not have shoes on and she is still in a hospital gown.  She was agreeable to transfer to the hospital by EMS.      Past medical HX as follows.   Patient Active Problem List   Diagnosis    Hyperlipidemia    History of breast cancer    Vitamin D deficiency    Depression    Allergic rhinitis    Incontinence    Noncompliance    History of CVA (cerebrovascular accident)    Stage 2 moderate COPD by GOLD classification (HCC)    Carotid occlusion, left    OA (osteoarthritis) of hip    Aortic insufficiency    Tobacco abuse    S/P cardiac cath nonobstructive lesion 2014    Spinal stenosis of lumbar region without neurogenic claudication    Primary malignant neoplasm of right lower lobe of lung (HCC)    SOB (shortness of breath) on exertion    S/P robotic assisted nonanatomic wedge resection of lateral basilar right lower lobe lung mass and mediastinal dissection    Postoperative urinary retention    History of total left hip arthroplasty    PAF (paroxysmal atrial fibrillation) (HCC) Noted on event monitor    Chronic diastolic congestive heart failure (HCC)    Malignant neoplasm of lower lobe of left lung (HCC)    Malignant neoplasm of overlapping sites of right breast in female, estrogen receptor negative (HCC)    PAD (peripheral artery disease)    Chronic respiratory failure with hypoxia (HCC)         Medications    Current

## 2025-05-28 NOTE — ED NOTES
Pt and vs reassessed. RR easy and unlabored. Pt resting in bed with eyes closed and responds to voice. Provided with additional pillow and blankets. Posey alarm in place. Denies any other needs. Medicated per MAR

## 2025-05-28 NOTE — ED NOTES
Pt and vs reassessed. RR easy and unlabored. Pt resting in bed alert. Family at bedside. Dr. Reich at bedside assessing pt

## 2025-05-28 NOTE — ED NOTES
Pt and vs reassessed. Attempted to obtain urine sample. Pt originally willing and then refused and states she wants to be left alone. Pt resting in bed alert and stable at this time. RN informed Dr. Reich who verbalized understanding

## 2025-05-28 NOTE — ED PROVIDER NOTES
[facility] Emergency Department  [address] [address]  Phone: [phone]      CHIEF COMPLAINT       Chief Complaint   Patient presents with    Frequent/Recurrent UTI       Nurses Notes reviewed and I agree except as noted in the HPI.      HISTORY OF PRESENT ILLNESS    Shania Hernandez is a 75 y.o. female.    History is largely obtained from the family and old records.    It looks like the patient was seen in the ER 2 days ago when she was more coherent and was diagnosed with a UTI and received a prescription for Keflex.  It sounds like the prescription never got filled.  Per family she has had increased confusion and has been soiling herself.  They got her to a urgent care NP this morning and they sent her into the ER due to mental status changes.  It looks like the patient is unable to care for herself at home.    At times the patient does become agitated but she seems to do better when the family is there.  At other times is very uncooperative.    REVIEW OF SYSTEMS       Review of systems is not obtainable due to altered mental status        PAST MEDICAL HISTORY    has a past medical history of Anxiety, Arthritis, Breast cancer (HCC), CAD (coronary artery disease), Cancer of the skin, Cerebral artery occlusion with cerebral infarction (HCC), Chronic UTI, COPD (chronic obstructive pulmonary disease) (HCC), CVA (cerebral infarction), Depression, DVT of lower extremity (deep venous thrombosis) (HCC), Facial injury, History of stroke, History of therapeutic radiation, Hx antineoplastic chemo, Hx of blood clots, Hyperlipidemia, Hypertension, Hypothyroidism, IBS (irritable bowel syndrome), Low HDL (under 40), Lung cancer (HCC), Prolonged emergence from general anesthesia, Recurrent UTI (urinary tract infection), and Word finding difficulty.    SURGICAL HISTORY      has a past surgical history that includes Appendectomy (1975); Cholecystectomy (1992); Total abdominal hysterectomy w/ bilateral  culture from the visit 2 days ago which shows sensitivity to basically everything.  However there is no evidence that the patient ever filled her prescription.  It looks like she is not able to manage that at home and has not been able to care for herself.  We are likely going to have to admit her to the hospital.  I had ordered a CT of the head, IV antibiotics.  I have also ordered a bladder scan to look for evidence of retention.  Patient is not being very cooperative with any of this.  Patient does not seem capable of decision-making at this time and the family does clearly seem to want her admitted.  Case discussed with the hospitalist to arrange for admission.      I am told that the charge nurse has contacted Adult Protective Services to make a report.    CRITICAL CARE:   none        FINAL IMPRESSION    Altered mental status, UTI -noncompliance with antibiotics    DISPOSITION/PLAN   Admitted    DISCHARGE MEDICATIONS:  New Prescriptions    No medications on file       (Please note that portions of this note were completed with a voice recognition program.  Efforts were made to edit the dictations but occasionally words are mis-transcribed.)    DO Damir Medrano Lawrence, DO  05/28/25 1501       Milton Reich DO  05/28/25 1502

## 2025-05-28 NOTE — ED NOTES
Pt to ED via [facility] from her PCP with c/o UTI. Pt states she lives at home with her son and everyone in the house refuses to clean her up when she has a BM/urinates. EMS states she has had an increase in her confusion. Pt alert and oriented when asked questions, but frequently repeating herself. Peripheral IV inserted by EMS and specimen collected and sent to lab. VSS

## 2025-05-28 NOTE — ED NOTES
ED to inpatient nurses report      Chief Complaint:  Chief Complaint   Patient presents with    Frequent/Recurrent UTI     Present to ED from: home    MOA:     LOC:alert and oriented to name and place  Mobility: Requires assistance * 2  Oxygen Baseline: 2 L NC    Current needs required: 2 L NC     Code Status:   Full Code    What abnormal results were found and what did you give/do to treat them?   Any procedures or intervention occur? rocephin    Mental Status:  Level of Consciousness: Responds to voice (1)    Psych Assessment:        Vitals:  Patient Vitals for the past 24 hrs:   BP Temp Temp src Pulse Resp SpO2 Height Weight   05/28/25 1524 (!) 151/73 -- -- 87 21 95 % -- --   05/28/25 1435 (!) 145/51 -- -- 90 25 93 % -- --   05/28/25 1342 (!) 148/52 -- -- 73 23 96 % -- --   05/28/25 1313 -- -- -- 85 19 99 % -- --   05/28/25 1241 (!) 151/65 -- -- 81 15 99 % -- --   05/28/25 1213 (!) 142/77 -- -- 93 23 95 % -- --   05/28/25 1117 (!) 136/57 -- -- 74 21 94 % -- --   05/28/25 1111 (!) 145/57 -- -- 78 23 (!) 89 % -- --   05/28/25 1058 -- 98.6 °F (37 °C) Oral -- -- -- -- --   05/28/25 1057 138/61 -- -- 94 15 95 % 1.626 m (5' 4\") 50.3 kg (111 lb)        LDAs:   Peripheral IV 05/28/25 Left Antecubital (Active)   Site Assessment Clean, dry & intact 05/28/25 1435   Line Status Normal saline locked;Flushed 05/28/25 1435   Phlebitis Assessment No symptoms 05/28/25 1435   Infiltration Assessment 0 05/28/25 1435   Alcohol Cap Used Yes 05/28/25 1435   Dressing Status Clean, dry & intact 05/28/25 1435   Dressing Type Transparent 05/28/25 1435       Ambulatory Status:  Presents to emergency department  because of falls (Syncope, seizure, or loss of consciousness): No, Age > 70: Yes, Altered Mental Status, Intoxication with alcohol or substance confusion (Disorientation, impaired judgment, poor safety awaremess, or inability to follow instructions): Yes, Impaired Mobility: Ambulates or transfers with assistive devices or  RIGHT LOWER LOBE SUPERIOR SEGMENTECTOMY AND MEDIASTINAL DISSECTION, CRYOTHERAPY OF INTERCOSTAL NERVES performed by Montrell Tang MD at [facility] OR    MASTECTOMY Right 2005    Dr. Moreno    OTHER SURGICAL HISTORY  04/10/2018    Lumbar epidural steroid injection at L4    [place] NJX DX/THER SBST INTRLMNR LMBR/SAC W/IMG GDN N/A 04/10/2018    LESI  @ L4 performed by Montrell Duvall MD at [facility] OR    BARBRA AND BSO (CERVIX REMOVED)  1976, 2001    ovaries    THYROID LOBECTOMY Left 11/26/2010    left thyroid lobectomy with isthmusectomy-Dr. Coronel    THYROID SURGERY  2007    right thyroid lobectomy-Dr. Ibanez    US BREAST BIOPSY W LOC DEVICE 1ST LESION RIGHT Right 11/18/2004    invasive ductal carcinoma right breast       PAST MEDICAL HISTORY       Past Medical History:   Diagnosis Date    Anxiety 2007    Arthritis     Breast cancer (HCC) 11/18/2004    right mastectomy, chemo and radiation history. Right breast invasive ductal carcinoma    CAD (coronary artery disease) 2001    sees Dr Omalley    Cancer of the skin 2004    Cerebral artery occlusion with cerebral infarction (HCC)     Chronic UTI     COPD (chronic obstructive pulmonary disease) (HCC)     sees Dolores    CVA (cerebral infarction) 2007, 2008    Depression 2007    DVT of lower extremity (deep venous thrombosis) (HCC) 1976    Facial injury 2005    Fall on greasy ground, striking left periorbital region    History of stroke 2007, 2008, 2009    Patient relates a stroke with right weakness and speech in 2007; another in 2010 or 2012 (unsure), both with need to rehabilitation.  Also \"2 mini-strokes\" (?)    History of therapeutic radiation 2005    Right breast    Hx antineoplastic chemo 2005    Hx of blood clots 1977    hip, leg    Hyperlipidemia 2005    Hypertension 2005    Hypothyroidism     IBS (irritable bowel syndrome)     Low HDL (under 40) 2014    Lung cancer (HCC)     sees Dr Eckert    Prolonged emergence from general anesthesia     Recurrent

## 2025-05-28 NOTE — PROGRESS NOTES
Patient refusing labs at this time. Education completed on importance of labwork. Patient continues to refuse. Provider notified

## 2025-05-28 NOTE — TELEPHONE ENCOUNTER
TAMMY:    Heriberto Amato from Adult protective services left message on nurse VM at 1:15pm today stating that he received a call for pt and that she was in the ED not cooperating well. Heriberto stated that he would reach out to ED to see what was going on and see where things go from there and if a home visit is needed. Any questions we can reach his office at [phone].

## 2025-05-28 NOTE — ED NOTES
Per Dr. Reich, continue to administer antibiotics without receiving blood cultures due to pt refusing. RN verbalized understanding. Pharmacy messaged regarding sending medication- waiting for response at this time

## 2025-05-28 NOTE — ED NOTES
Pt and vs reassessed. RR unlabored. Pt resting in bed with eyes closed and responds to voice. No distress noted. Posey alarm remains in place

## 2025-05-28 NOTE — ED NOTES
Pt transported to [facility] in stable condition. Called and spoke to floor prior to transport.

## 2025-05-28 NOTE — ED NOTES
Pt and vs reassessed. RR easy and unlabored. Pt resting in bed alert and medicated per MAR. Pt stable at this time. Posey alarm in place. Lights dimmed for comfort. Pt resting comfortably in bed. 394 noted on bladder scan

## 2025-05-29 PROBLEM — R41.0 ACUTE DELIRIUM: Status: ACTIVE | Noted: 2025-05-29

## 2025-05-29 LAB
ANION GAP SERPL CALC-SCNC: 11 MEQ/L (ref 8–16)
BUN SERPL-MCNC: 15 MG/DL (ref 8–23)
CALCIUM SERPL-MCNC: 8.4 MG/DL (ref 8.8–10.2)
CHLORIDE SERPL-SCNC: 107 MEQ/L (ref 98–111)
CO2 SERPL-SCNC: 25 MEQ/L (ref 22–29)
CREAT SERPL-MCNC: 0.6 MG/DL (ref 0.5–0.9)
DEPRECATED RDW RBC AUTO: 53 FL (ref 35–45)
ERYTHROCYTE [DISTWIDTH] IN BLOOD BY AUTOMATED COUNT: 14.2 % (ref 11.5–14.5)
GFR SERPL CREATININE-BSD FRML MDRD: > 90 ML/MIN/1.73M2
GLUCOSE SERPL-MCNC: 88 MG/DL (ref 74–109)
HCT VFR BLD AUTO: 38.6 % (ref 37–47)
HGB BLD-MCNC: 12.3 GM/DL (ref 12–16)
MAGNESIUM SERPL-MCNC: 2.3 MG/DL (ref 1.6–2.6)
MCH RBC QN AUTO: 32.5 PG (ref 26–33)
MCHC RBC AUTO-ENTMCNC: 31.9 GM/DL (ref 32.2–35.5)
MCV RBC AUTO: 102.1 FL (ref 81–99)
PLATELET # BLD AUTO: 215 THOU/MM3 (ref 130–400)
PMV BLD AUTO: 10.2 FL (ref 9.4–12.4)
POTASSIUM SERPL-SCNC: 3.6 MEQ/L (ref 3.5–5.2)
RBC # BLD AUTO: 3.78 MILL/MM3 (ref 4.2–5.4)
SODIUM SERPL-SCNC: 143 MEQ/L (ref 135–145)
WBC # BLD AUTO: 5.5 THOU/MM3 (ref 4.8–10.8)

## 2025-05-29 PROCEDURE — 85027 COMPLETE CBC AUTOMATED: CPT

## 2025-05-29 PROCEDURE — 6370000000 HC RX 637 (ALT 250 FOR IP): Performed by: NURSE PRACTITIONER

## 2025-05-29 PROCEDURE — 80048 BASIC METABOLIC PNL TOTAL CA: CPT

## 2025-05-29 PROCEDURE — 36415 COLL VENOUS BLD VENIPUNCTURE: CPT

## 2025-05-29 PROCEDURE — 83735 ASSAY OF MAGNESIUM: CPT

## 2025-05-29 PROCEDURE — 6360000002 HC RX W HCPCS: Performed by: INTERNAL MEDICINE

## 2025-05-29 PROCEDURE — 2500000003 HC RX 250 WO HCPCS: Performed by: INTERNAL MEDICINE

## 2025-05-29 PROCEDURE — 87040 BLOOD CULTURE FOR BACTERIA: CPT

## 2025-05-29 PROCEDURE — 90792 PSYCH DIAG EVAL W/MED SRVCS: CPT | Performed by: PSYCHIATRY & NEUROLOGY

## 2025-05-29 PROCEDURE — 1200000000 HC SEMI PRIVATE

## 2025-05-29 PROCEDURE — 99233 SBSQ HOSP IP/OBS HIGH 50: CPT | Performed by: NURSE PRACTITIONER

## 2025-05-29 RX ORDER — ACETAMINOPHEN 325 MG/1
650 TABLET ORAL EVERY 4 HOURS PRN
Status: DISCONTINUED | OUTPATIENT
Start: 2025-05-29 | End: 2025-06-06 | Stop reason: HOSPADM

## 2025-05-29 RX ORDER — TRAMADOL HYDROCHLORIDE 50 MG/1
25 TABLET ORAL EVERY 6 HOURS PRN
Status: DISCONTINUED | OUTPATIENT
Start: 2025-05-29 | End: 2025-06-04 | Stop reason: SDUPTHER

## 2025-05-29 RX ORDER — HYDROXYZINE HYDROCHLORIDE 10 MG/1
10 TABLET, FILM COATED ORAL 3 TIMES DAILY PRN
Status: DISCONTINUED | OUTPATIENT
Start: 2025-05-29 | End: 2025-05-30

## 2025-05-29 RX ADMIN — SODIUM CHLORIDE, PRESERVATIVE FREE 10 ML: 5 INJECTION INTRAVENOUS at 20:45

## 2025-05-29 RX ADMIN — HYDROXYZINE HYDROCHLORIDE 10 MG: 10 TABLET ORAL at 10:18

## 2025-05-29 RX ADMIN — TRAMADOL HYDROCHLORIDE 25 MG: 50 TABLET, COATED ORAL at 17:47

## 2025-05-29 RX ADMIN — ACETAMINOPHEN 650 MG: 325 TABLET ORAL at 20:45

## 2025-05-29 RX ADMIN — TRAMADOL HYDROCHLORIDE 25 MG: 50 TABLET, COATED ORAL at 10:43

## 2025-05-29 RX ADMIN — ACETAMINOPHEN 650 MG: 325 TABLET ORAL at 10:44

## 2025-05-29 RX ADMIN — STERILE WATER FOR INJECTION 1000 MG: 100 INJECTION, SOLUTION INTRAMUSCULAR; INTRAVENOUS; SUBCUTANEOUS at 14:43

## 2025-05-29 RX ADMIN — SODIUM CHLORIDE, PRESERVATIVE FREE 10 ML: 5 INJECTION INTRAVENOUS at 07:56

## 2025-05-29 RX ADMIN — HYDROXYZINE HYDROCHLORIDE 10 MG: 10 TABLET ORAL at 17:47

## 2025-05-29 ASSESSMENT — PAIN SCALES - WONG BAKER
WONGBAKER_NUMERICALRESPONSE: NO HURT

## 2025-05-29 ASSESSMENT — PAIN SCALES - GENERAL
PAINLEVEL_OUTOF10: 0
PAINLEVEL_OUTOF10: 10
PAINLEVEL_OUTOF10: 8
PAINLEVEL_OUTOF10: 10
PAINLEVEL_OUTOF10: 0
PAINLEVEL_OUTOF10: 10
PAINLEVEL_OUTOF10: 0

## 2025-05-29 ASSESSMENT — PAIN DESCRIPTION - LOCATION
LOCATION: BACK
LOCATION: BACK

## 2025-05-29 NOTE — PROGRESS NOTES
Spiritual Health History and Assessment/Progress Note  [facility]    Initial Encounter, Crisis, Spiritual/Emotional Needs, Grief, Loss, and Adjustments,  (Show of Support),  ,      Name: Shania Hernandez MRN: [id number]    Age: 75 y.o.     Sex: female   Language: English   Alevism: Hindu   UTI (urinary tract infection)     Date: 5/29/2025            Total Time Calculated: 30 min              Spiritual Assessment began in [facility]AB        Referral/Consult From: Nurse, Nursing Supervisor/Manager   Encounter Overview/Reason: Initial Encounter, Crisis, Spiritual/Emotional Needs, Grief, Loss, and Adjustments  Service Provided For: Patient    Thelma, Belief, Meaning:   Patient identifies as spiritual  Family/Friends No family/friends present      Importance and Influence:  Patient has spiritual/personal beliefs that influence decisions regarding their health and Other: The patient shared a desire to be cremated right now so she can be with her .   Family/Friends No family/friends present    Community:  Patient expresses feelings of isolation: disconnected from family/friends, feeling there is no one to turn to for help, and feeling no one understands and Other: The patient shared her agents were not acting according to her desires. The  checked on her POA documents and discovered that the agent's listed were different from the agents on the patient's medical file. One of these agents is listed as the active agent and the patient complained they were non responsive. The  discovered this individual listed as the primary agent was not listed on the POA document. The information was attempted to be updated but due to the active agent status the chart was locked. Nursing informed of this information.   Family/Friends No family/friends present    Assessment and Plan of Care:     Patient Interventions include: Other: The patient was encountered due to a request.  The patient was yelling she wanted to go as the door was knocked on. The encounter began with the patient shared her desire to  her boyfriend/ and be mixed with his ashes. The conversation leaned heavily on a desire to die and share an urn as quickly as possible. The  shared that he could not  the  and the patient became despondent and shared she wanted to leave. The  encouraged waiting for a nurse or physician to share her desire to leave. The  left and notified the nursing staff.  The patient had a show of support called and the  ensured the agent's were correct as the patient listed multiple names for who she was angry at for her agents.    Family/Friends Interventions include: No family/friends present    Patient Plan of Care: Spiritual Care available upon further referral  Family/Friends Plan of Care: No family/friends present    Electronically signed by Chaplain Vilma on 2025 at 9:36 AM

## 2025-05-29 NOTE — PLAN OF CARE
Problem: Chronic Conditions and Co-morbidities  Goal: Patient's chronic conditions and co-morbidity symptoms are monitored and maintained or improved  5/29/2025 0535 by Eyal Wilson RN  Outcome: Progressing  5/29/2025 0535 by Eyal Wilson RN  Outcome: Progressing     Problem: Discharge Planning  Goal: Discharge to home or other facility with appropriate resources  5/29/2025 0535 by Eyal Wilson RN  Outcome: Progressing  5/29/2025 0535 by Eyal Wilson RN  Outcome: Progressing     Problem: Pain  Goal: Verbalizes/displays adequate comfort level or baseline comfort level  5/29/2025 0535 by Eyal Wilson RN  Outcome: Progressing  5/29/2025 0535 by Eyal Wilson RN  Outcome: Progressing  5/28/2025 1758 by Jennifer Hardy RN  Flowsheets (Taken 5/28/2025 1758)  Verbalizes/displays adequate comfort level or baseline comfort level:   Encourage patient to monitor pain and request assistance   Assess pain using appropriate pain scale   Administer analgesics based on type and severity of pain and evaluate response   Implement non-pharmacological measures as appropriate and evaluate response   Consider cultural and social influences on pain and pain management   Notify Licensed Independent Practitioner if interventions unsuccessful or patient reports new pain     Problem: Confusion  Goal: Confusion, delirium, dementia, or psychosis is improved or at baseline  Description: INTERVENTIONS:1. Assess for possible contributors to thought disturbance, including medications, impaired vision or hearing, underlying metabolic abnormalities, dehydration, psychiatric diagnoses, and notify attending LIP2. [place] high risk fall precautions, as indicated3. Provide frequent short contacts to provide reality reorientation, refocusing and direction4. Decrease environmental stimuli, including noise as appropriate5. Monitor and intervene to maintain adequate nutrition, hydration, elimination, sleep and activity6. If unable to ensure

## 2025-05-29 NOTE — PALLIATIVE CARE
Initial Evaluation        Patient:   Shania Hernandez  YOB: 1950  Age:  75 y.o.  Room:  [facility]022-  MRN:  [id number]   Acct: [id number]    Date of Admission:  2025 10:51 AM  Date of Service:  2025  Completed By:  Cecilia Licona RN        Reason for Palliative Care Evaluation:-   Wants no life support     Current Concerns   agitation and confusion     Palliative Performance Scale   60%  Ambulation reduced; Significant disease; Can't do hobbies/housework; intake normal or reduced; occasional assist; LOC full/confusion     History    Patient with history of CAD, COPD (on 2 lpm ATC), CHF, lung cancer, breast cancer, DVT, CVA and PAFib.  Patient admitted with UTI/metabolic encephalopathy.     Goals of Care Discussions and Plan         Family/Patient Discussion:- Patient with confusion and only oriented to name.  Human sitter is at the bedside.    Plan/Follow-Up:-   Palliative care will hold off on goals of care/code status discussion pending patient's return to a clear mentation.  Per documentation, APS has been consulted along with psychiatry.  Will await the recommendations.    Code Status:Full Code     ACP documents on file:  -Power of  for Healthcare  -Living Will    Healthcare Power of /Healthcare Surrogate Decision Makers:  Per healthcare POA dated 2005, Zafar Hernandez is DPOA (? If this is  whom is ?), 1st alt: Hamlet Darden, 2nd alternate Skylar Bernstein.            Electronically signed by Cecilia Licona RN on 2025 at 1:18 PM           Palliative Care Office: [phone]

## 2025-05-29 NOTE — CARE COORDINATION
Case Management Assessment Initial Evaluation    Date/Time of Evaluation: 5/29/2025 6:20 PM  Assessment Completed by: Aide Najera RN    If patient is discharged prior to next notation, then this note serves as note for discharge by case management.    Patient Name: Shania Hernandez                   YOB: 1950  Diagnosis: UTI (urinary tract infection) [N39.0]                   Date / Time: 5/28/2025 10:51 AM  Location: [address] [place] [address]     Patient Admission Status: Inpatient   Readmission Risk Low 0-14, Mod 15-19), High > 20: Readmission Risk Score: 16.8    Current PCP: Hannah Noriega APRN - CNP  Health Care Decision Makers:   Primary Decision Maker (Active): Marcio Darden - Child - [phone]    Secondary Decision Maker: Hamlet Darden - Child - [phone]    Additional Case Management Notes: Patient was brought to ED from PCP office where she presented with right flank pain, hematuria, dysuria and AMS. She was seen 5/26 in ED and prescribed PO abx for UTI, but reports not taking due to abdominal pain. Admitted by hospitalist with consult to psychiatry. IV rocephin. Blood cultures pending. PT/OT eval. Psych adjusting meds. Hopeful for improvement in mentation w UTI tx.     Procedures: n/a    Imaging: n/a    Patient Goals/Plan/Treatment Preferences: Shania is from home with boyfriend. She reportedly was living with son in [place] but recently moved here with her boyfriend. Son reported to attending that he wanted her to stay and be taken care of, but that she refused. Discharge planning pending PT/OT evals and patient orientation. CM and SW following.        05/29/25 1815   Service Assessment   Patient Orientation Person;Place   Cognition Other (see comment)  (see above)   History Provided By Medical Record  (attending spoke with son, patient unable to properly assist in interview)   Primary Caregiver Self  (was living with son until recently)   Accompanied By/Relationship n/a   Support Systems Family

## 2025-05-29 NOTE — CARE COORDINATION
5/29/25, 2:24 PM EDT    DISCHARGE PLANNING EVALUATION    Received call from Heriberto from APS, he is involved with patient.  He did not have any family contact information, gave him children's name and numbers.  Advised Hospitalist had spoke with son Marcio who lives in [place].  Patient had lived with him but wanted to return to [place] with her boyfriend. Advised patient has been uncooperative.  Psychiatrist consulted.  Patient will mostly likely be here over the weekend. SW will see tomorrow or Monday depending on how cooperative she is and Psych recommendations.

## 2025-05-29 NOTE — PLAN OF CARE
Problem: Chronic Conditions and Co-morbidities  Goal: Patient's chronic conditions and co-morbidity symptoms are monitored and maintained or improved  5/29/2025 0800 by Isi Grimes RN  Outcome: Not Progressing  Flowsheets (Taken 5/29/2025 0800)  Care Plan - Patient's Chronic Conditions and Co-Morbidity Symptoms are Monitored and Maintained or Improved:   Monitor and assess patient's chronic conditions and comorbid symptoms for stability, deterioration, or improvement   Collaborate with multidisciplinary team to address chronic and comorbid conditions and prevent exacerbation or deterioration   Update acute care plan with appropriate goals if chronic or comorbid symptoms are exacerbated and prevent overall improvement and discharge

## 2025-05-29 NOTE — PROGRESS NOTES
Pharmacy Note  ED Culture Follow-up    Shania Hernandez is a 75 y.o. female.     Allergies: Cipro xr and Vicodin [hydrocodone-acetaminophen]     Current antimicrobials:   Reviewed patient's urine culture - culture positive for E. Coli.  Patient was discharged on cephalexin, and culture is sensitive to prescribed medication.  Antibiotic prescribed at discharge is appropriate - no changes made to antibiotic regimen.    Please call with any questions. Ext. [phone]    Misa Medrano RPH, PharmD 2:25 PM 5/29/2025

## 2025-05-29 NOTE — PROGRESS NOTES
Hospitalist Progress Note    Patient:  Shania Hernandez      Unit/Bed:8B-22/022-A    YOB: 1950    MRN: [id number]       Acct: [id number]     PCP: Hannah Noriega APRN - CNP    Date of Admission: 5/28/2025    Assessment/Plan:    UTI (POA) from May 26, 2025--upon review of ER note patient was given Rocephin x 1 dose on 5/26 and was discharged home, on 5/28 repeat urine was unable to be collected however was restarted on Rocephin  Acute metabolic encephalopathy--possibly secondary to #1 versus progressive dementia, electrolytes are normal, LFTs are normal, normal white count; Per PCP note from May 28 she was brought to the office for confusion, she was talking but not in coherent sentences, to  brought her to the appointment but did not come into the office, patient did not have shoes on who was still in a hospital gown so she was transferred to the emergency department; will involve psychiatry for evaluation  Chronic hypoxic respiratory failure--on 2 L of oxygen around-the-clock  Chronic diastolic heart failure  Right lower lobe squamous cell carcinoma, diagnosed in 2021 and managed with right lower lobe wedge resection and lymph node dissection and received radiation  History of right sided ductal breast cancer in 2005 status postchemotherapy and radiation  CAD  COPD  History of DVT  History of stroke  PAF with probable secondary hypercoagulable state--unsure what medications patient is to be on as RN that had the patient on admission called son Marcio and did not have any medications besides antibiotics and he was unaware of any meds that she is taking      Expected discharge date: Pending clinical course    Disposition:    [] Home       [] TCU       [] Rehab       [] Psych       [] SNF       [] Long Term Care Facility       [] Other-    Chief Complaint: Confusion    Hospital Course: Per H&P done 5/28/2025: \"Shania Hernandez is a(n) 75 y.o. female presenting to [facility] for evaluation    Peripheral Pulses: +2 palpable, equal bilaterally       Labs:   Recent Labs     05/28/25  1055 05/29/25  0615   WBC 6.7 5.5   HGB 15.0 12.3   HCT 45.8 38.6    215     Recent Labs     05/28/25  1055 05/29/25  0615    143   K 3.7 3.6    107   CO2 25 25   BUN 16 15   CREATININE 0.7 0.6   CALCIUM 8.8 8.4*     Recent Labs     05/28/25  1055   AST 18   ALT 6*   BILITOT 0.6   ALKPHOS 137*     Microbiology:    2 blood cultures are pending    Urinalysis:      Lab Results   Component Value Date/Time    NITRU POSITIVE 05/26/2025 02:00 AM    WBCUA > 100 05/26/2025 02:00 AM    WBCUA 15-25 05/16/2012 01:00 PM    BACTERIA MANY 05/26/2025 02:00 AM    RBCUA 0-2 05/26/2025 02:00 AM    BLOODU NEGATIVE 05/26/2025 02:00 AM    GLUCOSEU NEGATIVE 05/26/2025 02:00 AM       Radiology:  No results found.    Code Status: Full Code    Tele:   [x] yes             [] no    LDA: []CVC / []PICC / []Midline / []Reilly / []Drains / []Mediport / [x]None  Antibiotics: Rocephin  Steroids: None  Labs (still needed?): []Yes / [x]No  IVF (still needed?): []Yes / [x]No    Level of care: []Step Down / [x]Med-Surg  Bed Status: [x]Inpatient / []Observation  PT/OT: [x]Yes / []No    DVT Prophylaxis: [x] Lovenox / [] Heparin / [] SCDs / [] Already on Systemic Anticoagulation / [] None       Active Hospital Problems    Diagnosis Date Noted    UTI (urinary tract infection) [N39.0] 05/28/2025       Electronically signed by NITO Swain CNP on 5/29/2025 at 7:20 AM

## 2025-05-29 NOTE — PROGRESS NOTES
Primary nurse called to bedside by charge nurse and another staff nurse. Patient noted to be dangling feet off side of bed and swinging her arms at the nurses. Patient screaming that she is getting out of here. Unable to redirect patient, show of support called. Patient c/o pain, heat pack noted on floor. Patient mood improved while talking with staff, states she won't hurt anyone so long as they do what she says. Patient now lying in bed, reapplied heating pack. Per provider ordering in person sitter instead of video sitter. Gwendolyn PCT at bedside.
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
## 2025-05-29 NOTE — PROGRESS NOTES
[facility]  OCCUPATIONAL THERAPY MISSED TREATMENT NOTE  [facility] 8AB  8B-22/022-A      Date: 2025  Patient Name: Shania Hernandez        CSN: [id number]   : 1950  (75 y.o.)  Gender: female                REASON FOR MISSED TREATMENT: Hold Treatment per Nursing  Pt experiencing various episodes of agitation and behaviors towards staff throughout this AM. Show of Support called at one point. Pt not appropriate for OT eval at this time. Will check back next available date.

## 2025-05-30 ENCOUNTER — APPOINTMENT (OUTPATIENT)
Dept: MRI IMAGING | Age: 75
DRG: 981 | End: 2025-05-30
Payer: MEDICARE

## 2025-05-30 ENCOUNTER — APPOINTMENT (OUTPATIENT)
Dept: CT IMAGING | Age: 75
DRG: 981 | End: 2025-05-30
Payer: MEDICARE

## 2025-05-30 LAB
ANION GAP SERPL CALC-SCNC: 10 MEQ/L (ref 8–16)
BUN SERPL-MCNC: 16 MG/DL (ref 8–23)
CALCIUM SERPL-MCNC: 8.2 MG/DL (ref 8.8–10.2)
CHLORIDE SERPL-SCNC: 108 MEQ/L (ref 98–111)
CO2 SERPL-SCNC: 25 MEQ/L (ref 22–29)
CREAT SERPL-MCNC: 0.6 MG/DL (ref 0.5–0.9)
DEPRECATED RDW RBC AUTO: 53.1 FL (ref 35–45)
ERYTHROCYTE [DISTWIDTH] IN BLOOD BY AUTOMATED COUNT: 14 % (ref 11.5–14.5)
GFR SERPL CREATININE-BSD FRML MDRD: > 90 ML/MIN/1.73M2
GLUCOSE SERPL-MCNC: 87 MG/DL (ref 74–109)
HCT VFR BLD AUTO: 37.8 % (ref 37–47)
HGB BLD-MCNC: 12 GM/DL (ref 12–16)
MAGNESIUM SERPL-MCNC: 2.3 MG/DL (ref 1.6–2.6)
MCH RBC QN AUTO: 32.8 PG (ref 26–33)
MCHC RBC AUTO-ENTMCNC: 31.7 GM/DL (ref 32.2–35.5)
MCV RBC AUTO: 103.3 FL (ref 81–99)
PLATELET # BLD AUTO: 211 THOU/MM3 (ref 130–400)
PMV BLD AUTO: 10.5 FL (ref 9.4–12.4)
POTASSIUM SERPL-SCNC: 3.4 MEQ/L (ref 3.5–5.2)
RBC # BLD AUTO: 3.66 MILL/MM3 (ref 4.2–5.4)
SODIUM SERPL-SCNC: 143 MEQ/L (ref 135–145)
WBC # BLD AUTO: 4.6 THOU/MM3 (ref 4.8–10.8)

## 2025-05-30 PROCEDURE — 97535 SELF CARE MNGMENT TRAINING: CPT

## 2025-05-30 PROCEDURE — 99233 SBSQ HOSP IP/OBS HIGH 50: CPT | Performed by: NURSE PRACTITIONER

## 2025-05-30 PROCEDURE — 6370000000 HC RX 637 (ALT 250 FOR IP): Performed by: INTERNAL MEDICINE

## 2025-05-30 PROCEDURE — 97166 OT EVAL MOD COMPLEX 45 MIN: CPT

## 2025-05-30 PROCEDURE — 83735 ASSAY OF MAGNESIUM: CPT

## 2025-05-30 PROCEDURE — 74177 CT ABD & PELVIS W/CONTRAST: CPT

## 2025-05-30 PROCEDURE — 94669 MECHANICAL CHEST WALL OSCILL: CPT

## 2025-05-30 PROCEDURE — 6370000000 HC RX 637 (ALT 250 FOR IP): Performed by: NURSE PRACTITIONER

## 2025-05-30 PROCEDURE — 1200000000 HC SEMI PRIVATE

## 2025-05-30 PROCEDURE — 85027 COMPLETE CBC AUTOMATED: CPT

## 2025-05-30 PROCEDURE — 80048 BASIC METABOLIC PNL TOTAL CA: CPT

## 2025-05-30 PROCEDURE — 6360000004 HC RX CONTRAST MEDICATION: Performed by: INTERNAL MEDICINE

## 2025-05-30 PROCEDURE — 6360000002 HC RX W HCPCS: Performed by: INTERNAL MEDICINE

## 2025-05-30 PROCEDURE — 94761 N-INVAS EAR/PLS OXIMETRY MLT: CPT

## 2025-05-30 PROCEDURE — 97530 THERAPEUTIC ACTIVITIES: CPT

## 2025-05-30 PROCEDURE — 2700000000 HC OXYGEN THERAPY PER DAY

## 2025-05-30 PROCEDURE — 2500000003 HC RX 250 WO HCPCS: Performed by: INTERNAL MEDICINE

## 2025-05-30 PROCEDURE — 36415 COLL VENOUS BLD VENIPUNCTURE: CPT

## 2025-05-30 RX ORDER — QUETIAPINE FUMARATE 25 MG/1
25 TABLET, FILM COATED ORAL 2 TIMES DAILY PRN
Status: DISCONTINUED | OUTPATIENT
Start: 2025-05-30 | End: 2025-06-06 | Stop reason: HOSPADM

## 2025-05-30 RX ORDER — ENOXAPARIN SODIUM 100 MG/ML
30 INJECTION SUBCUTANEOUS DAILY
Status: DISCONTINUED | OUTPATIENT
Start: 2025-05-30 | End: 2025-06-06 | Stop reason: HOSPADM

## 2025-05-30 RX ORDER — IOPAMIDOL 755 MG/ML
80 INJECTION, SOLUTION INTRAVASCULAR
Status: COMPLETED | OUTPATIENT
Start: 2025-05-30 | End: 2025-05-30

## 2025-05-30 RX ADMIN — IOPAMIDOL 80 ML: 755 INJECTION, SOLUTION INTRAVENOUS at 09:51

## 2025-05-30 RX ADMIN — QUETIAPINE FUMARATE 25 MG: 25 TABLET ORAL at 12:43

## 2025-05-30 RX ADMIN — TRAMADOL HYDROCHLORIDE 25 MG: 50 TABLET, COATED ORAL at 02:17

## 2025-05-30 RX ADMIN — STERILE WATER FOR INJECTION 1000 MG: 100 INJECTION, SOLUTION INTRAMUSCULAR; INTRAVENOUS; SUBCUTANEOUS at 16:52

## 2025-05-30 RX ADMIN — ACETAMINOPHEN 650 MG: 325 TABLET ORAL at 20:09

## 2025-05-30 RX ADMIN — POTASSIUM CHLORIDE 40 MEQ: 1500 TABLET, EXTENDED RELEASE ORAL at 09:30

## 2025-05-30 RX ADMIN — TRAMADOL HYDROCHLORIDE 25 MG: 50 TABLET, COATED ORAL at 16:52

## 2025-05-30 RX ADMIN — SODIUM CHLORIDE, PRESERVATIVE FREE 10 ML: 5 INJECTION INTRAVENOUS at 20:09

## 2025-05-30 RX ADMIN — TRAMADOL HYDROCHLORIDE 25 MG: 50 TABLET, COATED ORAL at 09:30

## 2025-05-30 RX ADMIN — ONDANSETRON 4 MG: 2 INJECTION, SOLUTION INTRAMUSCULAR; INTRAVENOUS at 12:43

## 2025-05-30 RX ADMIN — TRAMADOL HYDROCHLORIDE 25 MG: 50 TABLET, COATED ORAL at 22:23

## 2025-05-30 RX ADMIN — QUETIAPINE FUMARATE 25 MG: 25 TABLET ORAL at 22:23

## 2025-05-30 ASSESSMENT — PAIN SCALES - GENERAL
PAINLEVEL_OUTOF10: 0
PAINLEVEL_OUTOF10: 0
PAINLEVEL_OUTOF10: 4
PAINLEVEL_OUTOF10: 6
PAINLEVEL_OUTOF10: 10

## 2025-05-30 ASSESSMENT — PAIN DESCRIPTION - ORIENTATION
ORIENTATION: RIGHT;LEFT
ORIENTATION: LOWER
ORIENTATION: RIGHT;LEFT

## 2025-05-30 ASSESSMENT — PAIN DESCRIPTION - LOCATION
LOCATION: BACK
LOCATION: LEG
LOCATION: LEG

## 2025-05-30 ASSESSMENT — PAIN DESCRIPTION - FREQUENCY: FREQUENCY: CONTINUOUS

## 2025-05-30 ASSESSMENT — PAIN DESCRIPTION - ONSET: ONSET: ON-GOING

## 2025-05-30 ASSESSMENT — PAIN DESCRIPTION - DESCRIPTORS
DESCRIPTORS: ACHING;DISCOMFORT
DESCRIPTORS: ACHING
DESCRIPTORS: ACHING

## 2025-05-30 ASSESSMENT — PAIN DESCRIPTION - PAIN TYPE: TYPE: CHRONIC PAIN

## 2025-05-30 ASSESSMENT — PAIN SCALES - WONG BAKER
WONGBAKER_NUMERICALRESPONSE: NO HURT

## 2025-05-30 NOTE — PROGRESS NOTES
Patient continuing to refuse vital signs and medications at this time. As I entered patient room with scheduled rocephin patient threw her Hannah's iced sweet tea on the floor and said that she was going to refuse all medication until we let her go home.

## 2025-05-30 NOTE — PLAN OF CARE
Problem: Chronic Conditions and Co-morbidities  Goal: Patient's chronic conditions and co-morbidity symptoms are monitored and maintained or improved  Outcome: Progressing     Problem: Discharge Planning  Goal: Discharge to home or other facility with appropriate resources  Outcome: Progressing     Problem: Pain  Goal: Verbalizes/displays adequate comfort level or baseline comfort level  Outcome: Progressing     Problem: Confusion  Goal: Confusion, delirium, dementia, or psychosis is improved or at baseline  Description: INTERVENTIONS:1. Assess for possible contributors to thought disturbance, including medications, impaired vision or hearing, underlying metabolic abnormalities, dehydration, psychiatric diagnoses, and notify attending LIP2. [place] high risk fall precautions, as indicated3. Provide frequent short contacts to provide reality reorientation, refocusing and direction4. Decrease environmental stimuli, including noise as appropriate5. Monitor and intervene to maintain adequate nutrition, hydration, elimination, sleep and activity6. If unable to ensure safety without constant attention obtain sitter and review sitter guidelines with assigned personnel7. Initiate Psychosocial CNS and Spiritual Care consult, as indicated  Outcome: Progressing     Problem: Skin/Tissue Integrity  Goal: Skin integrity remains intact  Description: 1.  Monitor for areas of redness and/or skin breakdown2.  Assess vascular access sites hourly3.  Every 4-6 hours minimum:  Change oxygen saturation probe site4.  Every 4-6 hours:  If on nasal continuous positive airway pressure, respiratory therapy assess nares and determine need for appliance change or resting period  Outcome: Progressing     Problem: Safety - Adult  Goal: Free from fall injury  Outcome: Progressing

## 2025-05-30 NOTE — PROGRESS NOTES
Opened door to check on patient's pain level.  Patient states she is in pain but will only take pain medication if I \"get her out of here\". Patient is unable to vocalize where she will go when she leaves here.  She continually states that she wants to buy a double wide trailer and go there.

## 2025-05-30 NOTE — PROGRESS NOTES
Went back and evaluate patient and explained to her about her CAT scan and possible compression fracture in her thoracic and lumbar spine, patient started going off on tangents about property she is going to buy, Occupational Therapy was in the room as well, she started yelling, I am unable to redirect her, I do not feel she has the ability to understand her medical condition nor does she understand the risk of leaving the hospital on her own, she has no place to go she has no one to help care for her, I do not feel she has the capacity to make an informed decision and I fear for her safety, a medical hold order was placed in the chart to ensure patient's safety; I feel she is a high fall risk as well which could further exacerbate her underlying compression fractures

## 2025-05-30 NOTE — PROGRESS NOTES
Patient heard yelling that she \"is going home\"  Upon entering room this RN over heard patient telling son that she was leaving and he could not tell her what to do.  Patient continued to tell son Faisal that she would not go to a nursing home and to get out.  Patient's son left at 3:30.

## 2025-05-30 NOTE — PROGRESS NOTES
Hospitalist Progress Note    Patient:  Shania Hernandez      Unit/Bed:8B-22/022-A    YOB: 1950    MRN: [id number]       Acct: [id number]     PCP: Hannah Noriega APRN - CNP    Date of Admission: 5/28/2025    Assessment/Plan:    UTI (POA) from May 26, 2025--upon review of ER note patient was given Rocephin x 1 dose on 5/26 and was discharged home, on 5/28 repeat urine was unable to be collected however was restarted on Rocephin; no culture was sent on May 26; CT abdomen pelvis showed no evidence of pyelonephritis  Acute metabolic encephalopathy likely secondary to acute delirium and #1 versus progressive dementia--LFTs are normal, normal white count; Per PCP note from May 28 she was brought to the office for confusion, she was talking but not in coherent sentences, to  brought her to the appointment but did not come into the office, patient did not have shoes on who was still in a hospital gown so she was transferred to the emergency department; appreciate psychiatry input, plan is not to use Atarax, recommends frequent reorientation and delirium precautions and can utilize Seroquel 25 mg twice daily as needed for break through agitation so ordered; patient is calm and cooperative at this time  Hypokalemia--replace per protocol, magnesium at 2.3; check in the morning  Mild L1 compression fracture, age-indeterminate--will obtain MRI lumbar spine to evaluate further  Moderate T12 compression fracture--obtain MRI thoracic spine to evaluate further  Atelectasis--add Acapella and incentive spirometry, patient does not have pneumonia  Chronic hypoxic respiratory failure--on 2 L of oxygen around-the-clock  Chronic diastolic heart failure  Right lower lobe squamous cell carcinoma, diagnosed in 2021 and managed with right lower lobe wedge resection and lymph node dissection and received radiation  History of right sided ductal breast cancer in 2005 status postchemotherapy and  replacement **OR** potassium chloride, ondansetron **OR** ondansetron, polyethylene glycol      Intake/Output Summary (Last 24 hours) at 5/30/2025 0733  Last data filed at 5/30/2025 0637  Gross per 24 hour   Intake 170 ml   Output 25 ml   Net 145 ml       Diet:  ADULT DIET; Regular  ADULT ORAL NUTRITION SUPPLEMENT; Breakfast, Lunch, Dinner; Standard High Calorie/High Protein Oral Supplement    Exam:  /75   Pulse 92   Temp 97.6 °F (36.4 °C) (Oral)   Resp 16   Ht 1.626 m (5' 4\")   Wt 50.3 kg (111 lb)   SpO2 98%   BMI 19.05 kg/m²     General appearance: No apparent distress, appears stated age and cooperative.  HEENT: Pupils equal, round, and reactive to light. Conjunctivae/corneas clear.  Neck: Supple, with full range of motion. No jugular venous distention. Trachea midline.  Respiratory:  Normal respiratory effort.  Diminished to auscultation, bilaterally without Rales/Wheezes/Rhonchi.  Cardiovascular: Regular rate and rhythm with normal S1/S2 without murmurs, rubs or gallops.  Abdomen: Soft, non-tender, non-distended with normal bowel sounds.  Musculoskeletal: passive and active ROM x 4 extremities.  Skin: Skin color, texture, turgor normal.    Neurologic:  Neurovascularly intact without any focal sensory/motor deficits. Cranial nerves: II-XII intact, grossly non-focal.  Psychiatric: Alert and oriented x 4 but cannot recall the month, thought content appropriate  Capillary Refill: Brisk,< 3 seconds   Peripheral Pulses: +2 palpable, equal bilaterally       Labs:   Recent Labs     05/28/25  1055 05/29/25  0615 05/30/25  0607   WBC 6.7 5.5 4.6*   HGB 15.0 12.3 12.0   HCT 45.8 38.6 37.8    215 211     Recent Labs     05/28/25  1055 05/29/25  0615 05/30/25  0607    143 143   K 3.7 3.6 3.4*    107 108   CO2 25 25 25   BUN 16 15 16   CREATININE 0.7 0.6 0.6   CALCIUM 8.8 8.4* 8.2*     Recent Labs     05/28/25  1055   AST 18   ALT 6*   BILITOT 0.6   ALKPHOS 137*     Microbiology:    2 blood

## 2025-05-30 NOTE — CARE COORDINATION
5/30/25, 2:20 PM EDT    DISCHARGE PLANNING EVALUATION       Awaiting therapy recommendation to determine if patient is in need of a rehab stay.  Unsure of the plan at this time, patient's mental status is reportedly making improvements.

## 2025-05-30 NOTE — PROGRESS NOTES
[facility]  INPATIENT OCCUPATIONAL THERAPY  STRZ MED SURG 8AB  EVALUATION      Discharge Recommendations: Subacute/Skilled Nursing Facility, 24 hour supervision or assist  Equipment Recommendations: Yes Continue to monitor pending discharge disposition      Time In: 1311  Time Out: 1357  Timed Code Treatment Minutes: 38 Minutes  Minutes: 46          Date: 2025  Patient Name: Shania Hernandez,   Gender: female      MRN: [id number]  : 1950  (75 y.o.)  Referring Practitioner: Jb Shook DO  Diagnosis: UTI (urinary tract infection)  Additional Pertinent Hx: Per EMR: \"Shania Hernandez is a(n) 75 y.o. female presenting to [facility] for evaluation of confusion.      Patient was brought into the ED by family on 2025 for right-sided flank pain, dysuria and hematuria.  UA showed positive nitrites, moderate leukocyte esterase, positive pyuria and bacteriuria.  Patient was given a gram of ceftriaxone and discharged on p.o. Keflex 500 twice daily.  Unfortunately, patient states that she did not take her oral antibiotics, due to p.o. intolerance.  She was seen in family medicine clinic prior to her admission, found to be confused and agitated.  Sent to the ED for further evaluation.\"    Restrictions/Precautions:  Restrictions/Precautions: Fall Risk    Subjective  Chart Reviewed: Yes, Orders, Progress Notes, History and Physical  Patient assessed for rehabilitation services?: Yes  Family / Caregiver Present: No    Subjective: Pt seated upright in bed oriented to self, , and place. Pt requiring much education/encouragement for OT participation and preservation on many unrelated circumstances to her hospitalization.    Pain: Did not rate: Back    Vitals: Oxygen: Patient met at start of session on RA with nasal cannula tangled around pt. Pt O2 sats reading 87% at this time and was prompted to reapply nasal cannula after stating, \"I'm supposed to wear it all the time.\" And \"I'll wear it if you tell me

## 2025-05-31 ENCOUNTER — APPOINTMENT (OUTPATIENT)
Dept: CT IMAGING | Age: 75
DRG: 981 | End: 2025-05-31
Payer: MEDICARE

## 2025-05-31 LAB
ANION GAP SERPL CALC-SCNC: 8 MEQ/L (ref 8–16)
BUN SERPL-MCNC: 17 MG/DL (ref 8–23)
CALCIUM SERPL-MCNC: 8.3 MG/DL (ref 8.8–10.2)
CHLORIDE SERPL-SCNC: 105 MEQ/L (ref 98–111)
CO2 SERPL-SCNC: 28 MEQ/L (ref 22–29)
CREAT SERPL-MCNC: 0.8 MG/DL (ref 0.5–0.9)
DEPRECATED RDW RBC AUTO: 54.4 FL (ref 35–45)
ERYTHROCYTE [DISTWIDTH] IN BLOOD BY AUTOMATED COUNT: 14.1 % (ref 11.5–14.5)
GFR SERPL CREATININE-BSD FRML MDRD: 78 ML/MIN/1.73M2
GLUCOSE SERPL-MCNC: 89 MG/DL (ref 74–109)
HCT VFR BLD AUTO: 42.1 % (ref 37–47)
HGB BLD-MCNC: 13.5 GM/DL (ref 12–16)
MAGNESIUM SERPL-MCNC: 2.3 MG/DL (ref 1.6–2.6)
MCH RBC QN AUTO: 33.6 PG (ref 26–33)
MCHC RBC AUTO-ENTMCNC: 32.1 GM/DL (ref 32.2–35.5)
MCV RBC AUTO: 104.7 FL (ref 81–99)
PLATELET # BLD AUTO: 221 THOU/MM3 (ref 130–400)
PMV BLD AUTO: 10.4 FL (ref 9.4–12.4)
POTASSIUM SERPL-SCNC: 4.1 MEQ/L (ref 3.5–5.2)
RBC # BLD AUTO: 4.02 MILL/MM3 (ref 4.2–5.4)
SODIUM SERPL-SCNC: 141 MEQ/L (ref 135–145)
WBC # BLD AUTO: 6.3 THOU/MM3 (ref 4.8–10.8)

## 2025-05-31 PROCEDURE — 36415 COLL VENOUS BLD VENIPUNCTURE: CPT

## 2025-05-31 PROCEDURE — 6370000000 HC RX 637 (ALT 250 FOR IP): Performed by: NURSE PRACTITIONER

## 2025-05-31 PROCEDURE — 2500000003 HC RX 250 WO HCPCS: Performed by: INTERNAL MEDICINE

## 2025-05-31 PROCEDURE — 72128 CT CHEST SPINE W/O DYE: CPT

## 2025-05-31 PROCEDURE — 80048 BASIC METABOLIC PNL TOTAL CA: CPT

## 2025-05-31 PROCEDURE — 83735 ASSAY OF MAGNESIUM: CPT

## 2025-05-31 PROCEDURE — 1200000000 HC SEMI PRIVATE

## 2025-05-31 PROCEDURE — 85027 COMPLETE CBC AUTOMATED: CPT

## 2025-05-31 PROCEDURE — 99233 SBSQ HOSP IP/OBS HIGH 50: CPT | Performed by: NURSE PRACTITIONER

## 2025-05-31 PROCEDURE — 72131 CT LUMBAR SPINE W/O DYE: CPT

## 2025-05-31 PROCEDURE — 6360000002 HC RX W HCPCS: Performed by: NURSE PRACTITIONER

## 2025-05-31 PROCEDURE — 6360000002 HC RX W HCPCS: Performed by: INTERNAL MEDICINE

## 2025-05-31 PROCEDURE — 94669 MECHANICAL CHEST WALL OSCILL: CPT

## 2025-05-31 RX ADMIN — QUETIAPINE FUMARATE 25 MG: 25 TABLET ORAL at 11:49

## 2025-05-31 RX ADMIN — STERILE WATER FOR INJECTION 1000 MG: 100 INJECTION, SOLUTION INTRAMUSCULAR; INTRAVENOUS; SUBCUTANEOUS at 15:38

## 2025-05-31 RX ADMIN — TRAMADOL HYDROCHLORIDE 25 MG: 50 TABLET, COATED ORAL at 09:08

## 2025-05-31 RX ADMIN — SODIUM CHLORIDE, PRESERVATIVE FREE 10 ML: 5 INJECTION INTRAVENOUS at 20:30

## 2025-05-31 RX ADMIN — ACETAMINOPHEN 650 MG: 325 TABLET ORAL at 20:27

## 2025-05-31 RX ADMIN — ENOXAPARIN SODIUM 30 MG: 100 INJECTION SUBCUTANEOUS at 09:09

## 2025-05-31 RX ADMIN — QUETIAPINE FUMARATE 25 MG: 25 TABLET ORAL at 20:27

## 2025-05-31 RX ADMIN — ACETAMINOPHEN 650 MG: 325 TABLET ORAL at 13:27

## 2025-05-31 RX ADMIN — TRAMADOL HYDROCHLORIDE 25 MG: 50 TABLET, COATED ORAL at 15:38

## 2025-05-31 ASSESSMENT — PAIN DESCRIPTION - DESCRIPTORS: DESCRIPTORS: ACHING;DISCOMFORT

## 2025-05-31 ASSESSMENT — PAIN SCALES - GENERAL
PAINLEVEL_OUTOF10: 6
PAINLEVEL_OUTOF10: 7
PAINLEVEL_OUTOF10: 6
PAINLEVEL_OUTOF10: 6

## 2025-05-31 ASSESSMENT — PAIN DESCRIPTION - LOCATION
LOCATION: ABDOMEN
LOCATION: BACK
LOCATION: BACK

## 2025-05-31 ASSESSMENT — PAIN SCALES - WONG BAKER: WONGBAKER_NUMERICALRESPONSE: NO HURT

## 2025-05-31 ASSESSMENT — PAIN DESCRIPTION - ORIENTATION: ORIENTATION: RIGHT;LEFT

## 2025-05-31 NOTE — PROGRESS NOTES
[facility]   PROGRESS NOTE      Patient: Shania Hernandez  Room #: 8B-22/022-A            YOB: 1950  Age: 75 y.o.  Gender: female            Admit Date & Time: 5/28/2025 10:51 AM    Assessment:    The patient was attempted to be visited and is currently in active care for a code violet. The care is such to inhibit a spiritual care visit at this time.     Interventions:  A the patient was unable to be visited at this time. A prayer was provided outside the patient's room.     Outcomes:  The patient continue to be followed by spiritual care.     Plan:  1.Spiritual care will continue to follow the patient according to [facility] spiritual care SOP.       Electronically signed by Chaplain Vilma, on 5/31/2025 at 10:20 AM.  Spiritual Care Department  [facility]  [phone]

## 2025-05-31 NOTE — PROGRESS NOTES
MRI can not be completed until POA or reliable family member who knows patient's entire hx can fill out the MRI screening.  Patient also must be cooperative and hold still for at least 30 minutes.

## 2025-05-31 NOTE — PROGRESS NOTES
Patient asking to be discharge later today. Explained to patient that she is not ready and not safe to go home yet. Able to re-direct patient on another subject, and patient agreed to take medication for pain

## 2025-05-31 NOTE — PROGRESS NOTES
Hospitalist Progress Note    Patient:  Shania Hernandez      Unit/Bed:8B-22/022-A    YOB: 1950    MRN: [id number]       Acct: [id number]     PCP: Hannah Noriega APRN - CNP    Date of Admission: 5/28/2025    Assessment/Plan:    UTI (POA) from May 26, 2025--upon review of ER note patient was given Rocephin x 1 dose on 5/26 and was discharged home, on 5/28 repeat urine was unable to be collected however was restarted on Rocephin; no culture was sent on May 26; CT abdomen pelvis showed no evidence of pyelonephritis  Acute metabolic encephalopathy likely secondary to acute delirium and #1 versus progressive dementia--LFTs are normal, normal white count; Per PCP note from May 28 she was brought to the office for confusion, she was talking but not in coherent sentences, to  brought her to the appointment but did not come into the office, patient did not have shoes on who was still in a hospital gown so she was transferred to the emergency department; appreciate psychiatry input, plan is not to use Atarax, recommends frequent reorientation and delirium precautions and can utilize Seroquel 25 mg twice daily as needed for break through agitation so ordered  Hypokalemia--replaced per protocol and resolved, magnesium at 2.3  Mild L1 compression fracture, age-indeterminate--will obtain MRI lumbar spine to evaluate further however unable to fill out the screening for  Moderate T12 compression fracture--obtain MRI thoracic spine to evaluate further however unable to fill out the screening for  Atelectasis--Acapella and incentive spirometry, patient does not have pneumonia  Chronic hypoxic respiratory failure--on 2 L of oxygen around-the-clock  Chronic diastolic heart failure  Right lower lobe squamous cell carcinoma, diagnosed in 2021 and managed with right lower lobe wedge resection and lymph node dissection and received radiation  History of right sided ductal breast cancer in 2005 status

## 2025-05-31 NOTE — PLAN OF CARE
Problem: Chronic Conditions and Co-morbidities  Goal: Patient's chronic conditions and co-morbidity symptoms are monitored and maintained or improved  Outcome: Progressing  Flowsheets (Taken 5/30/2025 2157)  Care Plan - Patient's Chronic Conditions and Co-Morbidity Symptoms are Monitored and Maintained or Improved:   Monitor and assess patient's chronic conditions and comorbid symptoms for stability, deterioration, or improvement   Collaborate with multidisciplinary team to address chronic and comorbid conditions and prevent exacerbation or deterioration   Update acute care plan with appropriate goals if chronic or comorbid symptoms are exacerbated and prevent overall improvement and discharge     Problem: Discharge Planning  Goal: Discharge to home or other facility with appropriate resources  Outcome: Progressing  Flowsheets (Taken 5/30/2025 2157)  Discharge to home or other facility with appropriate resources:   Identify barriers to discharge with patient and caregiver   Arrange for needed discharge resources and transportation as appropriate   Identify discharge learning needs (meds, wound care, etc)   Arrange for interpreters to assist at discharge as needed   Refer to discharge planning if patient needs post-hospital services based on physician order or complex needs related to functional status, cognitive ability or social support system     Problem: Pain  Goal: Verbalizes/displays adequate comfort level or baseline comfort level  Outcome: Progressing  Flowsheets (Taken 5/30/2025 2157)  Verbalizes/displays adequate comfort level or baseline comfort level:   Encourage patient to monitor pain and request assistance   Administer analgesics based on type and severity of pain and evaluate response   Assess pain using appropriate pain scale   Implement non-pharmacological measures as appropriate and evaluate response   Consider cultural and social influences on pain and pain management   Notify Licensed  family/caregiver to ask for assistance with transferring infant if caregiver noted to have fall risk factors     Problem: Risk for Elopement  Goal: Patient will not exit the unit/facility without proper excort  Outcome: Progressing  Flowsheets (Taken 5/30/2025 2157)  Nursing Interventions for Elopement Risk:   Assist with personal care needs such as toileting, eating, dressing, as needed to reduce the risk of wandering   Collaborate with family members/caregivers to mitigate the elopement risk   Collaborate with treatment team for nicotine replacement   Communicate/escalate to /other team member the risk of elopement   Communicate to physician the risk for elopement   Make sure patient has all necessary personal care items   Shoes and clothing collected and placed in gown attire

## 2025-06-01 LAB
ANION GAP SERPL CALC-SCNC: 7 MEQ/L (ref 8–16)
BUN SERPL-MCNC: 16 MG/DL (ref 8–23)
CALCIUM SERPL-MCNC: 8.1 MG/DL (ref 8.8–10.2)
CHLORIDE SERPL-SCNC: 104 MEQ/L (ref 98–111)
CO2 SERPL-SCNC: 28 MEQ/L (ref 22–29)
CREAT SERPL-MCNC: 0.7 MG/DL (ref 0.5–0.9)
DEPRECATED RDW RBC AUTO: 54.2 FL (ref 35–45)
ERYTHROCYTE [DISTWIDTH] IN BLOOD BY AUTOMATED COUNT: 14.3 % (ref 11.5–14.5)
GFR SERPL CREATININE-BSD FRML MDRD: > 90 ML/MIN/1.73M2
GLUCOSE SERPL-MCNC: 82 MG/DL (ref 74–109)
HCT VFR BLD AUTO: 40.7 % (ref 37–47)
HGB BLD-MCNC: 13.1 GM/DL (ref 12–16)
MAGNESIUM SERPL-MCNC: 2.2 MG/DL (ref 1.6–2.6)
MCH RBC QN AUTO: 33.4 PG (ref 26–33)
MCHC RBC AUTO-ENTMCNC: 32.2 GM/DL (ref 32.2–35.5)
MCV RBC AUTO: 103.8 FL (ref 81–99)
PLATELET # BLD AUTO: 209 THOU/MM3 (ref 130–400)
PMV BLD AUTO: 10.4 FL (ref 9.4–12.4)
POTASSIUM SERPL-SCNC: 4 MEQ/L (ref 3.5–5.2)
RBC # BLD AUTO: 3.92 MILL/MM3 (ref 4.2–5.4)
SODIUM SERPL-SCNC: 139 MEQ/L (ref 135–145)
WBC # BLD AUTO: 6.4 THOU/MM3 (ref 4.8–10.8)

## 2025-06-01 PROCEDURE — 2500000003 HC RX 250 WO HCPCS: Performed by: INTERNAL MEDICINE

## 2025-06-01 PROCEDURE — 83735 ASSAY OF MAGNESIUM: CPT

## 2025-06-01 PROCEDURE — 85027 COMPLETE CBC AUTOMATED: CPT

## 2025-06-01 PROCEDURE — 6370000000 HC RX 637 (ALT 250 FOR IP): Performed by: NURSE PRACTITIONER

## 2025-06-01 PROCEDURE — 99232 SBSQ HOSP IP/OBS MODERATE 35: CPT | Performed by: NURSE PRACTITIONER

## 2025-06-01 PROCEDURE — 1200000000 HC SEMI PRIVATE

## 2025-06-01 PROCEDURE — 2580000003 HC RX 258: Performed by: NURSE PRACTITIONER

## 2025-06-01 PROCEDURE — 36415 COLL VENOUS BLD VENIPUNCTURE: CPT

## 2025-06-01 PROCEDURE — 6370000000 HC RX 637 (ALT 250 FOR IP)

## 2025-06-01 PROCEDURE — 80048 BASIC METABOLIC PNL TOTAL CA: CPT

## 2025-06-01 PROCEDURE — 6360000002 HC RX W HCPCS: Performed by: NURSE PRACTITIONER

## 2025-06-01 PROCEDURE — 6360000002 HC RX W HCPCS: Performed by: INTERNAL MEDICINE

## 2025-06-01 RX ORDER — SODIUM CHLORIDE 9 MG/ML
INJECTION, SOLUTION INTRAVENOUS CONTINUOUS
Status: DISCONTINUED | OUTPATIENT
Start: 2025-06-01 | End: 2025-06-02

## 2025-06-01 RX ADMIN — Medication 3 MG: at 21:47

## 2025-06-01 RX ADMIN — STERILE WATER FOR INJECTION 1000 MG: 100 INJECTION, SOLUTION INTRAMUSCULAR; INTRAVENOUS; SUBCUTANEOUS at 17:46

## 2025-06-01 RX ADMIN — DICLOFENAC SODIUM 2 G: 10 GEL TOPICAL at 17:46

## 2025-06-01 RX ADMIN — ENOXAPARIN SODIUM 30 MG: 100 INJECTION SUBCUTANEOUS at 09:17

## 2025-06-01 RX ADMIN — QUETIAPINE FUMARATE 25 MG: 25 TABLET ORAL at 16:44

## 2025-06-01 RX ADMIN — DICLOFENAC SODIUM 2 G: 10 GEL TOPICAL at 09:17

## 2025-06-01 RX ADMIN — ONDANSETRON 4 MG: 2 INJECTION, SOLUTION INTRAMUSCULAR; INTRAVENOUS at 15:57

## 2025-06-01 RX ADMIN — TRAMADOL HYDROCHLORIDE 25 MG: 50 TABLET, COATED ORAL at 20:03

## 2025-06-01 RX ADMIN — SODIUM CHLORIDE, PRESERVATIVE FREE 10 ML: 5 INJECTION INTRAVENOUS at 10:04

## 2025-06-01 RX ADMIN — TRAMADOL HYDROCHLORIDE 25 MG: 50 TABLET, COATED ORAL at 02:12

## 2025-06-01 RX ADMIN — SODIUM CHLORIDE: 0.9 INJECTION, SOLUTION INTRAVENOUS at 14:12

## 2025-06-01 ASSESSMENT — PAIN DESCRIPTION - LOCATION
LOCATION: BACK
LOCATION: BACK
LOCATION: GENERALIZED
LOCATION: BACK

## 2025-06-01 ASSESSMENT — PAIN SCALES - GENERAL
PAINLEVEL_OUTOF10: 4
PAINLEVEL_OUTOF10: 7
PAINLEVEL_OUTOF10: 10
PAINLEVEL_OUTOF10: 10
PAINLEVEL_OUTOF10: 4

## 2025-06-01 ASSESSMENT — PAIN DESCRIPTION - ORIENTATION
ORIENTATION: RIGHT;LEFT
ORIENTATION: LOWER
ORIENTATION: RIGHT;LEFT
ORIENTATION: LEFT

## 2025-06-01 ASSESSMENT — PAIN DESCRIPTION - DESCRIPTORS
DESCRIPTORS: ACHING;CRUSHING;DISCOMFORT
DESCRIPTORS: ACHING
DESCRIPTORS: ACHING;DISCOMFORT
DESCRIPTORS: ACHING

## 2025-06-01 ASSESSMENT — PAIN SCALES - WONG BAKER: WONGBAKER_NUMERICALRESPONSE: NO HURT

## 2025-06-01 ASSESSMENT — PAIN DESCRIPTION - ONSET: ONSET: ON-GOING

## 2025-06-01 ASSESSMENT — PAIN DESCRIPTION - FREQUENCY: FREQUENCY: CONTINUOUS

## 2025-06-01 ASSESSMENT — PAIN DESCRIPTION - PAIN TYPE: TYPE: CHRONIC PAIN

## 2025-06-01 NOTE — PLAN OF CARE
Problem: Chronic Conditions and Co-morbidities  Goal: Patient's chronic conditions and co-morbidity symptoms are monitored and maintained or improved  Outcome: Progressing  Flowsheets (Taken 5/31/2025 2111)  Care Plan - Patient's Chronic Conditions and Co-Morbidity Symptoms are Monitored and Maintained or Improved:   Monitor and assess patient's chronic conditions and comorbid symptoms for stability, deterioration, or improvement   Collaborate with multidisciplinary team to address chronic and comorbid conditions and prevent exacerbation or deterioration   Update acute care plan with appropriate goals if chronic or comorbid symptoms are exacerbated and prevent overall improvement and discharge     Problem: Discharge Planning  Goal: Discharge to home or other facility with appropriate resources  Outcome: Progressing  Flowsheets (Taken 5/31/2025 2111)  Discharge to home or other facility with appropriate resources:   Identify barriers to discharge with patient and caregiver   Arrange for needed discharge resources and transportation as appropriate   Identify discharge learning needs (meds, wound care, etc)   Arrange for interpreters to assist at discharge as needed     Problem: Pain  Goal: Verbalizes/displays adequate comfort level or baseline comfort level  Outcome: Progressing  Flowsheets (Taken 5/31/2025 2111)  Verbalizes/displays adequate comfort level or baseline comfort level:   Encourage patient to monitor pain and request assistance   Assess pain using appropriate pain scale   Administer analgesics based on type and severity of pain and evaluate response   Implement non-pharmacological measures as appropriate and evaluate response

## 2025-06-01 NOTE — PROGRESS NOTES
Hospitalist Progress Note    Patient:  Shania Hernandez      Unit/Bed:8B-22/022-A    YOB: 1950    MRN: [id number]       Acct: [id number]     PCP: Hannah Noriega APRN - CNP    Date of Admission: 5/28/2025    Assessment/Plan:    UTI (POA) from May 26, 2025--upon review of ER note patient was given Rocephin x 1 dose on 5/26 and was discharged home, on 5/28 repeat urine was unable to be collected however was restarted on Rocephin; no culture was sent on May 26; CT abdomen pelvis showed no evidence of pyelonephritis  Acute metabolic encephalopathy likely secondary to acute delirium and #1 versus progressive dementia--LFTs are normal, normal white count; Per PCP note from May 28 she was brought to the office for confusion, she was talking but not in coherent sentences, to  brought her to the appointment but did not come into the office, patient did not have shoes on who was still in a hospital gown so she was transferred to the emergency department; appreciate psychiatry input, plan is not to use Atarax, recommends frequent reorientation and delirium precautions and can utilize Seroquel 25 mg twice daily as needed for break through agitation   Hypokalemia--replaced per protocol and resolved, magnesium at 2.3  Acute fracture involving the superior endplate of L1 with 15% compression secondary to diffuse osteopenia--will have IR evaluate to see if anything can be done, unable to obtain an MRI at this time  Old compression fracture involving T10 with 30% compression--will have IR evaluate tomorrow  Multiple lung nodules suspicious for metastatic disease--please see #10  Atelectasis--Acapella and incentive spirometry, patient does not have pneumonia  Chronic hypoxic respiratory failure--on 2 L of oxygen around-the-clock  Chronic diastolic heart failure  Right lower lobe squamous cell carcinoma, diagnosed in 2021 and managed with right lower lobe wedge resection and lymph node dissection  []Observation  PT/OT: [x]Yes / []No    DVT Prophylaxis: [x] Lovenox / [] Heparin / [] SCDs / [] Already on Systemic Anticoagulation / [] None       Active Hospital Problems    Diagnosis Date Noted    Acute delirium [R41.0] 05/29/2025    UTI (urinary tract infection) [N39.0] 05/28/2025       Electronically signed by NITO Swain CNP on 6/1/2025 at 10:28 AM

## 2025-06-02 ENCOUNTER — APPOINTMENT (OUTPATIENT)
Dept: CT IMAGING | Age: 75
DRG: 981 | End: 2025-06-02
Payer: MEDICARE

## 2025-06-02 ENCOUNTER — APPOINTMENT (OUTPATIENT)
Dept: NUCLEAR MEDICINE | Age: 75
DRG: 981 | End: 2025-06-02
Payer: MEDICARE

## 2025-06-02 ENCOUNTER — APPOINTMENT (OUTPATIENT)
Dept: GENERAL RADIOLOGY | Age: 75
DRG: 981 | End: 2025-06-02
Payer: MEDICARE

## 2025-06-02 PROBLEM — E44.0 MODERATE MALNUTRITION: Status: ACTIVE | Noted: 2025-06-02

## 2025-06-02 PROCEDURE — 6370000000 HC RX 637 (ALT 250 FOR IP): Performed by: NURSE PRACTITIONER

## 2025-06-02 PROCEDURE — 1200000000 HC SEMI PRIVATE

## 2025-06-02 PROCEDURE — 71260 CT THORAX DX C+: CPT

## 2025-06-02 PROCEDURE — 3430000000 HC RX DIAGNOSTIC RADIOPHARMACEUTICAL: Performed by: NURSE PRACTITIONER

## 2025-06-02 PROCEDURE — 74018 RADEX ABDOMEN 1 VIEW: CPT

## 2025-06-02 PROCEDURE — 78306 BONE IMAGING WHOLE BODY: CPT | Performed by: NURSE PRACTITIONER

## 2025-06-02 PROCEDURE — 70450 CT HEAD/BRAIN W/O DYE: CPT

## 2025-06-02 PROCEDURE — 6360000002 HC RX W HCPCS: Performed by: NURSE PRACTITIONER

## 2025-06-02 PROCEDURE — 6360000002 HC RX W HCPCS: Performed by: INTERNAL MEDICINE

## 2025-06-02 PROCEDURE — 6360000004 HC RX CONTRAST MEDICATION: Performed by: STUDENT IN AN ORGANIZED HEALTH CARE EDUCATION/TRAINING PROGRAM

## 2025-06-02 PROCEDURE — 6360000002 HC RX W HCPCS

## 2025-06-02 PROCEDURE — A9503 TC99M MEDRONATE: HCPCS | Performed by: NURSE PRACTITIONER

## 2025-06-02 PROCEDURE — 2500000003 HC RX 250 WO HCPCS: Performed by: INTERNAL MEDICINE

## 2025-06-02 PROCEDURE — 99233 SBSQ HOSP IP/OBS HIGH 50: CPT | Performed by: NURSE PRACTITIONER

## 2025-06-02 RX ORDER — AZITHROMYCIN 250 MG/1
500 TABLET, FILM COATED ORAL DAILY
Status: COMPLETED | OUTPATIENT
Start: 2025-06-02 | End: 2025-06-04

## 2025-06-02 RX ORDER — MORPHINE SULFATE 2 MG/ML
2 INJECTION, SOLUTION INTRAMUSCULAR; INTRAVENOUS EVERY 4 HOURS PRN
Refills: 0 | Status: DISCONTINUED | OUTPATIENT
Start: 2025-06-02 | End: 2025-06-06 | Stop reason: HOSPADM

## 2025-06-02 RX ORDER — PROCHLORPERAZINE EDISYLATE 5 MG/ML
10 INJECTION INTRAMUSCULAR; INTRAVENOUS EVERY 6 HOURS PRN
Status: DISCONTINUED | OUTPATIENT
Start: 2025-06-02 | End: 2025-06-06 | Stop reason: HOSPADM

## 2025-06-02 RX ORDER — SODIUM CHLORIDE 9 MG/ML
INJECTION, SOLUTION INTRAVENOUS CONTINUOUS
Status: DISCONTINUED | OUTPATIENT
Start: 2025-06-02 | End: 2025-06-03

## 2025-06-02 RX ORDER — LISINOPRIL 5 MG/1
5 TABLET ORAL DAILY
Status: DISCONTINUED | OUTPATIENT
Start: 2025-06-02 | End: 2025-06-06 | Stop reason: HOSPADM

## 2025-06-02 RX ORDER — IOPAMIDOL 755 MG/ML
80 INJECTION, SOLUTION INTRAVASCULAR
Status: COMPLETED | OUTPATIENT
Start: 2025-06-02 | End: 2025-06-02

## 2025-06-02 RX ORDER — MULTIVITAMIN WITH IRON
1 TABLET ORAL DAILY
Status: DISCONTINUED | OUTPATIENT
Start: 2025-06-02 | End: 2025-06-06 | Stop reason: HOSPADM

## 2025-06-02 RX ORDER — TC 99M MEDRONATE 20 MG/10ML
28.4 INJECTION, POWDER, LYOPHILIZED, FOR SOLUTION INTRAVENOUS
Status: COMPLETED | OUTPATIENT
Start: 2025-06-02 | End: 2025-06-02

## 2025-06-02 RX ADMIN — TRAMADOL HYDROCHLORIDE 25 MG: 50 TABLET, COATED ORAL at 02:52

## 2025-06-02 RX ADMIN — LISINOPRIL 5 MG: 5 TABLET ORAL at 09:05

## 2025-06-02 RX ADMIN — Medication 1 TABLET: at 14:33

## 2025-06-02 RX ADMIN — PROCHLORPERAZINE EDISYLATE 10 MG: 5 INJECTION INTRAMUSCULAR; INTRAVENOUS at 00:42

## 2025-06-02 RX ADMIN — ENOXAPARIN SODIUM 30 MG: 100 INJECTION SUBCUTANEOUS at 09:04

## 2025-06-02 RX ADMIN — AZITHROMYCIN DIHYDRATE 500 MG: 250 TABLET ORAL at 14:33

## 2025-06-02 RX ADMIN — IOPAMIDOL 80 ML: 755 INJECTION, SOLUTION INTRAVENOUS at 09:51

## 2025-06-02 RX ADMIN — ONDANSETRON 4 MG: 2 INJECTION, SOLUTION INTRAMUSCULAR; INTRAVENOUS at 10:13

## 2025-06-02 RX ADMIN — SODIUM CHLORIDE, PRESERVATIVE FREE 10 ML: 5 INJECTION INTRAVENOUS at 09:09

## 2025-06-02 RX ADMIN — TC 99M MEDRONATE 28.4 MILLICURIE: 20 INJECTION, POWDER, LYOPHILIZED, FOR SOLUTION INTRAVENOUS at 11:15

## 2025-06-02 RX ADMIN — TRAMADOL HYDROCHLORIDE 25 MG: 50 TABLET, COATED ORAL at 09:05

## 2025-06-02 RX ADMIN — MORPHINE SULFATE 2 MG: 2 INJECTION, SOLUTION INTRAMUSCULAR; INTRAVENOUS at 22:06

## 2025-06-02 RX ADMIN — ONDANSETRON 4 MG: 2 INJECTION, SOLUTION INTRAMUSCULAR; INTRAVENOUS at 22:06

## 2025-06-02 ASSESSMENT — PAIN SCALES - GENERAL
PAINLEVEL_OUTOF10: 1
PAINLEVEL_OUTOF10: 4
PAINLEVEL_OUTOF10: 7
PAINLEVEL_OUTOF10: 10
PAINLEVEL_OUTOF10: 0
PAINLEVEL_OUTOF10: 7
PAINLEVEL_OUTOF10: 0

## 2025-06-02 ASSESSMENT — PAIN DESCRIPTION - LOCATION
LOCATION: BACK
LOCATION: BACK

## 2025-06-02 ASSESSMENT — PAIN DESCRIPTION - DESCRIPTORS
DESCRIPTORS: ACHING
DESCRIPTORS: DISCOMFORT

## 2025-06-02 ASSESSMENT — PAIN DESCRIPTION - PAIN TYPE: TYPE: CHRONIC PAIN

## 2025-06-02 ASSESSMENT — PAIN DESCRIPTION - ORIENTATION
ORIENTATION: LOWER;INNER
ORIENTATION: MID

## 2025-06-02 ASSESSMENT — PAIN DESCRIPTION - FREQUENCY: FREQUENCY: CONTINUOUS

## 2025-06-02 ASSESSMENT — PAIN DESCRIPTION - ONSET: ONSET: ON-GOING

## 2025-06-02 ASSESSMENT — PAIN SCALES - WONG BAKER: WONGBAKER_NUMERICALRESPONSE: NO HURT

## 2025-06-02 ASSESSMENT — PAIN - FUNCTIONAL ASSESSMENT: PAIN_FUNCTIONAL_ASSESSMENT: ACTIVITIES ARE NOT PREVENTED

## 2025-06-02 NOTE — PLAN OF CARE
Problem: Pain  Goal: Verbalizes/displays adequate comfort level or baseline comfort level  6/2/2025 0950 by Dahiana Sunshine RN  Outcome: Not Progressing  6/2/2025 0950 by Dahiana Sunshine RN  Outcome: Progressing  6/2/2025 0138 by Kim Dominguez RN  Outcome: Progressing     Problem: Confusion  Goal: Confusion, delirium, dementia, or psychosis is improved or at baseline  Description: INTERVENTIONS:1. Assess for possible contributors to thought disturbance, including medications, impaired vision or hearing, underlying metabolic abnormalities, dehydration, psychiatric diagnoses, and notify attending LIP2. [place] high risk fall precautions, as indicated3. Provide frequent short contacts to provide reality reorientation, refocusing and direction4. Decrease environmental stimuli, including noise as appropriate5. Monitor and intervene to maintain adequate nutrition, hydration, elimination, sleep and activity6. If unable to ensure safety without constant attention obtain sitter and review sitter guidelines with assigned personnel7. Initiate Psychosocial CNS and Spiritual Care consult, as indicated  6/2/2025 0950 by Dahiana Sunshine RN  Outcome: Not Progressing  6/2/2025 0950 by Dahiana Sunshine RN  Outcome: Progressing  6/2/2025 0138 by Kim Dominguez RN  Outcome: Progressing     Problem: Risk for Elopement  Goal: Patient will not exit the unit/facility without proper excort  6/2/2025 0950 by Dahiana Sunshine RN  Outcome: Not Progressing  6/2/2025 0950 by Dahiana Sunshine RN  Outcome: Progressing     Problem: Pain  Goal: Verbalizes/displays adequate comfort level or baseline comfort level  6/2/2025 0950 by Dahiana Sunshine RN  Outcome: Not Progressing  6/2/2025 0950 by Dahiana Sunshine RN  Outcome: Progressing  6/2/2025 0138 by Kim Dominguez RN  Outcome: Progressing     Problem: Confusion  Goal: Confusion, delirium, dementia, or psychosis is improved or at baseline  Description:

## 2025-06-02 NOTE — PROGRESS NOTES
[facility]  PHYSICAL THERAPY MISSED TREATMENT NOTE  STRZ MED SURG 8AB    Date: 2025  Patient Name: Shania Hernandez        MRN: [id number]   : 1950  (75 y.o.)  Gender: female   Referring Practitioner: Jb Shook DO            REASON FOR MISSED TREATMENT:  Orders received for PT evaluation and treatment. RN approved session, but upon entry to room patient holding emesis bag and reporting she felt \"so sick\". Live sitter also present in room. Pt refused therapy at this time due to feeling ill and wanting to rest. Pt did have 2 bouts of emesis while therapist was in the room. RN notified.     Per chart review, patient is to be evaluated for a vertebroplasty due to spinal compression fractures.      Mandi Moser DPT.

## 2025-06-02 NOTE — PALLIATIVE CARE
Follow Up / Progress Note        Patient:   Shania Hernandez  YOB: 1950  Age:  75 y.o.  Room:  [address] [place] [address]  MRN:  [id number]         Family/Patient Discussion:  Patient resting quietly in bed.  Human sitter is at the bedside.  Patient awakens to name.  Patient is oriented to name, place and reason for admission of UTI. Patient does not maintain her eyes open with conversation.  Palliative care introduced.  Patient shares that her  Zafar Hernandez is .  Discussed patient's son, Hamlet as her medical POA and patient states \"on this side but on the other side it's Marcio.\"  Patient shares that Marcio lives in [place].  Attempted to discuss patient's wishes if her heart were to stop and patient states, \"I want you to do everything to keep me alive as long I am asleep while you are doing it.\"  Uncertain as to patient's insight into this conversation as this RN discussed with the patient that these measures would be done once her heart/breathing stop.  Patient shares that she is \"full of cancer\" and when asked where, she states, \"In my kidney, esophagus, thyroid and brain.\"  When asked where she was diagnosed she states, \"In [place] and I came up here to find help for this.\"  Patient is encouraged to follow doctor/nurses' instructions as it is important for her care.        Plan/Follow-Up:  Discussed with Abi Watts CNP.  2755  Phone call made to patient's son, Hamlet (medical POA according to DPOA dated 2005 due to Zafar as ) and voicemail left for him to return a call to this RN.  Will await a return phone call.         Electronically signed by Cecilia Licona RN on 2025 at 9:48 AM             Palliative Care Office: [phone]

## 2025-06-02 NOTE — PROGRESS NOTES
Comprehensive Nutrition Assessment    Type and Reason for Visit: Initial, Positive nutrition screen (MST score 2)    Nutrition Recommendations/Plan:   Advance diet as tolerated.   When diet advances initiate Magic Cups BID.   Consider MVI.      Malnutrition Assessment:  Malnutrition Status: Moderate malnutrition  Context: Acute Illness (suspect acute on chronic)       Findings of the 6 clinical characteristics of malnutrition:  Energy Intake:  75% or less of estimated energy requirements for 7 or more days  Weight Loss:  No weight loss     Body Fat Loss:  Mild body fat loss Orbital, Triceps   Muscle Mass Loss:  Mild muscle mass loss Temples (temporalis), Clavicles (pectoralis & deltoids)  Fluid Accumulation:  Mild Extremities   Strength:  Not Performed    Nutrition Assessment:    Pt. moderately malnourished AEB criteria as listed above. At risk for further nutrition compromise r/t admitted d/t confusion- palliative care following, now DNR CC. Noted underlying medical condition (PMHx anxiety, arthritis, breast cancer, CAD, COPD, DVT, HTN, HLD, IBS, lung CA 2014).    Nutrition Related Findings:    Pt. Report/Treatments/Miscellaneous: Patient seen, sitting up in bed with emesis bag in lap, sitter at bedside. Per patient her appetite is currently very bad- she is nauseous. She was eating well PTA- 3 meals per day. She reports recent constipation.   GI Status: Last BM PTA  Wound: None   Edema:  non-pitting b/l LE edema, per flowsheet   Pertinent Labs:   Lab Results   Component Value Date    GLUF 99 01/09/2019    LABA1C 5.2 04/12/2023    LABA1C 5.2 08/11/2020    LABA1C 5.5 10/12/2012     Recent Labs     05/31/25  0620 06/01/25  0737    139   K 4.1 4.0    104   GLUCOSE 89 82   BUN 17 16   CREATININE 0.8 0.7   MG 2.3 2.2     Pertinent Medications: lisinopril     Current Nutrition Intake & Therapies:    Recent PO intake: NPO  Recent Supplement Intake: NPO    - Current intake does not meet estimated needs.    Diet NPO Exceptions are: Sips of Water with Meds, Ice Chips    Anthropometric Measures:  Height: 162.6 cm (5' 4\")  Ideal Body Weight (IBW): 120 lbs  Admission Body Indira: 50.3 kg (111 lb) (5/28 (stated), non-pitting edema)  Current Body Weight: 50.3 kg (111 lb) (5/28 (stated), non-pitting edema)  Current BMI: Body mass index is 19.05 kg/m².  Usual Body Weight:  (10/29/24: 111 lb; 3/11/24: 128 lb)  Weight Adjustment for: No Adjustment  BMI Categories: Underweight for Age    Estimated Daily Nutrient Needs:  Energy (kcal/day): 4851-2649 kcal (20-30 kcal/kg)  Weight Used for energy calculation:  Admission (50.3 kg)  Protein (g/day):  g (1.2-2 g/kg)    Weight Used for protein calculation: Admission (50.3 kg)  Fluid (ml/day): per provider (Defer to provider)    Nutrition Diagnosis:   Moderate malnutrition (acute on chronic, non-illness related) related to inadequate protein-energy intake as evidenced by criteria as identified in malnutrition assessment    Nutrition Interventions:   Nutrition and/ or Nutrient Delivery: Start Oral Diet, Start Oral Nutrition Supplement (when diet advances initiate magic cups)   Nutrition Education/ Counseling: Education/Counseling not indicated   Coordination of Nutrition Care: Continue to monitor while inpatient, Interdisciplinary Rounds     Goals:  Goals: Initiate PO diet, by next RD assessment     Nutrition Monitoring and Evaluation:   Food/ Nutrient Intake Outcomes: Progression of Nutrition, Diet Advancement/Tolerance, Food and Nutrient Intake, Supplement Intake   Physical Sings/ Symptoms Outcomes: Biochemical Data, GI Status, Fluid Status or Edema, Hemodynamic Status, Nutrition Focused Physical Findings, Weight     Discharge Planning:    Too soon to determine      Dyan Chilel MS, RD, LD  Contact: [phone]

## 2025-06-02 NOTE — PROGRESS NOTES
Hospitalist Progress Note    Patient:  Shania Hernandez      Unit/Bed:8B-22/022-A    YOB: 1950    MRN: [id number]       Acct: [id number]     PCP: Hannah Noriega APRN - CNP    Date of Admission: 5/28/2025    Assessment/Plan:    UTI (POA) from May 26, 2025--upon review of ER note patient was given Rocephin x 1 dose on 5/26 and was discharged home, on 5/28 repeat urine was unable to be collected however was restarted on Rocephin and had a total of 5 doses so we will stop; no culture was sent on May 26; CT abdomen pelvis showed no evidence of pyelonephritis  Acute metabolic encephalopathy likely secondary to acute delirium and #1 versus progressive dementia--LFTs are normal, normal white count; Per PCP note from May 28 she was brought to the office for confusion, she was talking but not in coherent sentences, to  brought her to the appointment but did not come into the office, patient did not have shoes on who was still in a hospital gown so she was transferred to the emergency department; appreciate psychiatry input, plan is not to use Atarax, recommends frequent reorientation and delirium precautions and can utilize Seroquel 25 mg twice daily as needed for break through agitation; will order CT head without to evaluate for possibility of any underlying pathology/mets  Hypokalemia--replaced per protocol and resolved, magnesium at 2.3  Acute fracture involving the superior endplate of L1 with 15% compression secondary to diffuse osteopenia--unable to obtain an MRI at this time; IR requested either MRI or bone scan to evaluate so nuclear medicine bone scan order placed  Old compression fracture involving T10 with 30% compression--please see #4  Multiple lung nodules suspicious for metastatic disease--please see #10; will order CT chest to evaluate further  Atelectasis--Acapella and incentive spirometry, patient does not have pneumonia  Chronic hypoxic respiratory failure--on 2 L of

## 2025-06-02 NOTE — PALLIATIVE CARE
Follow Up / Progress Note        Patient:   Shania Hernandez  YOB: 1950  Age:  75 y.o.  Room:  [address] [place] [address]  MRN:  [id number]               Plan/Follow-Up:  Received a phone call from Abi Watts CNP and she has received a return phone call from family for confirmation of DNR CC for goals of care.  Palliative care will remain available and staff may call prn if needs arise.         Electronically signed by Cecilia Licona RN on 6/2/2025 at 10:30 AM             Palliative Care Office: [phone]

## 2025-06-02 NOTE — PLAN OF CARE
Problem: Chronic Conditions and Co-morbidities  Goal: Patient's chronic conditions and co-morbidity symptoms are monitored and maintained or improved  Outcome: Progressing     Problem: Discharge Planning  Goal: Discharge to home or other facility with appropriate resources  Outcome: Progressing     Problem: Pain  Goal: Verbalizes/displays adequate comfort level or baseline comfort level  Outcome: Progressing     Problem: Confusion  Goal: Confusion, delirium, dementia, or psychosis is improved or at baseline  Description: INTERVENTIONS:1. Assess for possible contributors to thought disturbance, including medications, impaired vision or hearing, underlying metabolic abnormalities, dehydration, psychiatric diagnoses, and notify attending LIP2. [place] high risk fall precautions, as indicated3. Provide frequent short contacts to provide reality reorientation, refocusing and direction4. Decrease environmental stimuli, including noise as appropriate5. Monitor and intervene to maintain adequate nutrition, hydration, elimination, sleep and activity6. If unable to ensure safety without constant attention obtain sitter and review sitter guidelines with assigned personnel7. Initiate Psychosocial CNS and Spiritual Care consult, as indicated  Outcome: Progressing

## 2025-06-02 NOTE — PROGRESS NOTES
[facility]  OCCUPATIONAL THERAPY MISSED TREATMENT NOTE  [facility] MED SURG 8AB  8B-22/022-A      Date: 2025  Patient Name: Shania Hernandez        CSN: [id number]   : 1950  (75 y.o.)  Gender: female   Referring Practitioner: Jb Shook DO            REASON FOR MISSED TREATMENT: Pt sleeping soundly upon arrival of therapist-was nauseated & having pain earlier. Pt to have nuclear scan at 1400 to be evaluated for possible vertebroplasty. Will check back in am.

## 2025-06-03 ENCOUNTER — APPOINTMENT (OUTPATIENT)
Dept: INTERVENTIONAL RADIOLOGY/VASCULAR | Age: 75
DRG: 981 | End: 2025-06-03
Payer: MEDICARE

## 2025-06-03 LAB
ANION GAP SERPL CALC-SCNC: 8 MEQ/L (ref 8–16)
BACTERIA BLD AEROBE CULT: NORMAL
BACTERIA URNS QL MICRO: ABNORMAL /HPF
BILIRUB UR QL STRIP.AUTO: NEGATIVE
BUN SERPL-MCNC: 19 MG/DL (ref 8–23)
CALCIUM SERPL-MCNC: 8 MG/DL (ref 8.8–10.2)
CASTS #/AREA URNS LPF: ABNORMAL /LPF
CASTS 2: ABNORMAL /LPF
CHARACTER UR: CLEAR
CHLORIDE SERPL-SCNC: 111 MEQ/L (ref 98–111)
CO2 SERPL-SCNC: 27 MEQ/L (ref 22–29)
COLOR, UA: ABNORMAL
CREAT SERPL-MCNC: 0.7 MG/DL (ref 0.5–0.9)
CRYSTALS URNS MICRO: ABNORMAL
DEPRECATED RDW RBC AUTO: 57.1 FL (ref 35–45)
EPITHELIAL CELLS, UA: ABNORMAL /HPF
ERYTHROCYTE [DISTWIDTH] IN BLOOD BY AUTOMATED COUNT: 14.7 % (ref 11.5–14.5)
FOLATE SERPL-MCNC: 5 NG/ML (ref 4.6–34.8)
GFR SERPL CREATININE-BSD FRML MDRD: > 90 ML/MIN/1.73M2
GLUCOSE SERPL-MCNC: 78 MG/DL (ref 74–109)
GLUCOSE UR QL STRIP.AUTO: NEGATIVE MG/DL
HCT VFR BLD AUTO: 36.1 % (ref 37–47)
HGB BLD-MCNC: 11.4 GM/DL (ref 12–16)
HGB UR QL STRIP.AUTO: NEGATIVE
KETONES UR QL STRIP.AUTO: ABNORMAL
MCH RBC QN AUTO: 33.7 PG (ref 26–33)
MCHC RBC AUTO-ENTMCNC: 31.6 GM/DL (ref 32.2–35.5)
MCV RBC AUTO: 106.8 FL (ref 81–99)
MISCELLANEOUS 2: ABNORMAL
NITRITE UR QL STRIP: NEGATIVE
PH UR STRIP.AUTO: 6.5 [PH] (ref 5–9)
PLATELET # BLD AUTO: 208 THOU/MM3 (ref 130–400)
PMV BLD AUTO: 10.8 FL (ref 9.4–12.4)
POTASSIUM SERPL-SCNC: 4 MEQ/L (ref 3.5–5.2)
PROT UR STRIP.AUTO-MCNC: ABNORMAL MG/DL
RBC # BLD AUTO: 3.38 MILL/MM3 (ref 4.2–5.4)
RBC URINE: ABNORMAL /HPF
RENAL EPI CELLS #/AREA URNS HPF: ABNORMAL /[HPF]
SODIUM SERPL-SCNC: 146 MEQ/L (ref 135–145)
SP GR UR REFRACT.AUTO: > 1.03 (ref 1–1.03)
UROBILINOGEN, URINE: 1 EU/DL (ref 0–1)
VIT B12 SERPL-MCNC: 152 PG/ML (ref 232–1245)
WBC # BLD AUTO: 5.6 THOU/MM3 (ref 4.8–10.8)
WBC #/AREA URNS HPF: ABNORMAL /HPF
WBC #/AREA URNS HPF: NEGATIVE /[HPF]
YEAST LIKE FUNGI URNS QL MICRO: ABNORMAL

## 2025-06-03 PROCEDURE — 6360000002 HC RX W HCPCS

## 2025-06-03 PROCEDURE — 2580000003 HC RX 258: Performed by: NURSE PRACTITIONER

## 2025-06-03 PROCEDURE — 76000 FLUOROSCOPY <1 HR PHYS/QHP: CPT

## 2025-06-03 PROCEDURE — 6360000002 HC RX W HCPCS: Performed by: RADIOLOGY

## 2025-06-03 PROCEDURE — 6360000002 HC RX W HCPCS: Performed by: INTERNAL MEDICINE

## 2025-06-03 PROCEDURE — 22511 PERQ LUMBOSACRAL INJECTION: CPT

## 2025-06-03 PROCEDURE — 36415 COLL VENOUS BLD VENIPUNCTURE: CPT

## 2025-06-03 PROCEDURE — C1713 ANCHOR/SCREW BN/BN,TIS/BN: HCPCS

## 2025-06-03 PROCEDURE — 99233 SBSQ HOSP IP/OBS HIGH 50: CPT | Performed by: NURSE PRACTITIONER

## 2025-06-03 PROCEDURE — 6360000002 HC RX W HCPCS: Performed by: NURSE PRACTITIONER

## 2025-06-03 PROCEDURE — 80048 BASIC METABOLIC PNL TOTAL CA: CPT

## 2025-06-03 PROCEDURE — 82746 ASSAY OF FOLIC ACID SERUM: CPT

## 2025-06-03 PROCEDURE — 82607 VITAMIN B-12: CPT

## 2025-06-03 PROCEDURE — 6370000000 HC RX 637 (ALT 250 FOR IP): Performed by: NURSE PRACTITIONER

## 2025-06-03 PROCEDURE — 81001 URINALYSIS AUTO W/SCOPE: CPT

## 2025-06-03 PROCEDURE — 0QU03JZ SUPPLEMENT LUMBAR VERTEBRA WITH SYNTHETIC SUBSTITUTE, PERCUTANEOUS APPROACH: ICD-10-PCS | Performed by: RADIOLOGY

## 2025-06-03 PROCEDURE — 1200000000 HC SEMI PRIVATE

## 2025-06-03 PROCEDURE — 6370000000 HC RX 637 (ALT 250 FOR IP): Performed by: RADIOLOGY

## 2025-06-03 PROCEDURE — 85027 COMPLETE CBC AUTOMATED: CPT

## 2025-06-03 RX ORDER — LIDOCAINE HYDROCHLORIDE 20 MG/ML
INJECTION, SOLUTION INFILTRATION; PERINEURAL PRN
Status: COMPLETED | OUTPATIENT
Start: 2025-06-03 | End: 2025-06-03

## 2025-06-03 RX ORDER — NEOMYCIN/BACITRACIN/POLYMYXINB 3.5-400-5K
OINTMENT (GRAM) TOPICAL PRN
Status: COMPLETED | OUTPATIENT
Start: 2025-06-03 | End: 2025-06-03

## 2025-06-03 RX ORDER — MIDAZOLAM HYDROCHLORIDE 1 MG/ML
INJECTION, SOLUTION INTRAMUSCULAR; INTRAVENOUS PRN
Status: COMPLETED | OUTPATIENT
Start: 2025-06-03 | End: 2025-06-03

## 2025-06-03 RX ORDER — FENTANYL CITRATE 50 UG/ML
INJECTION, SOLUTION INTRAMUSCULAR; INTRAVENOUS PRN
Status: COMPLETED | OUTPATIENT
Start: 2025-06-03 | End: 2025-06-03

## 2025-06-03 RX ORDER — 0.9 % SODIUM CHLORIDE 0.9 %
250 INTRAVENOUS SOLUTION INTRAVENOUS ONCE
Status: COMPLETED | OUTPATIENT
Start: 2025-06-03 | End: 2025-06-03

## 2025-06-03 RX ORDER — PROMETHAZINE HYDROCHLORIDE 25 MG/ML
12.5 INJECTION, SOLUTION INTRAMUSCULAR; INTRAVENOUS ONCE
Status: COMPLETED | OUTPATIENT
Start: 2025-06-03 | End: 2025-06-03

## 2025-06-03 RX ADMIN — PROCHLORPERAZINE EDISYLATE 10 MG: 5 INJECTION INTRAMUSCULAR; INTRAVENOUS at 20:45

## 2025-06-03 RX ADMIN — MORPHINE SULFATE 2 MG: 2 INJECTION, SOLUTION INTRAMUSCULAR; INTRAVENOUS at 17:38

## 2025-06-03 RX ADMIN — LISINOPRIL 5 MG: 5 TABLET ORAL at 10:25

## 2025-06-03 RX ADMIN — BACITRACIN ZINC NEOMYCIN SULFATE POLYMYXIN B SULFATE 1 EACH: 400; 3.5; 5 OINTMENT TOPICAL at 10:02

## 2025-06-03 RX ADMIN — MIDAZOLAM 1 MG: 1 INJECTION INTRAMUSCULAR; INTRAVENOUS at 09:39

## 2025-06-03 RX ADMIN — ONDANSETRON 4 MG: 2 INJECTION, SOLUTION INTRAMUSCULAR; INTRAVENOUS at 19:44

## 2025-06-03 RX ADMIN — PROMETHAZINE HYDROCHLORIDE 12.5 MG: 25 INJECTION, SOLUTION INTRAMUSCULAR; INTRAVENOUS at 22:52

## 2025-06-03 RX ADMIN — SODIUM CHLORIDE 250 ML: 9 INJECTION, SOLUTION INTRAVENOUS at 16:05

## 2025-06-03 RX ADMIN — MIDAZOLAM 0.5 MG: 1 INJECTION INTRAMUSCULAR; INTRAVENOUS at 09:49

## 2025-06-03 RX ADMIN — MORPHINE SULFATE 2 MG: 2 INJECTION, SOLUTION INTRAMUSCULAR; INTRAVENOUS at 06:57

## 2025-06-03 RX ADMIN — AZITHROMYCIN DIHYDRATE 500 MG: 250 TABLET ORAL at 10:25

## 2025-06-03 RX ADMIN — FENTANYL CITRATE 25 MCG: 50 INJECTION, SOLUTION INTRAMUSCULAR; INTRAVENOUS at 09:49

## 2025-06-03 RX ADMIN — SODIUM CHLORIDE: 0.9 INJECTION, SOLUTION INTRAVENOUS at 03:30

## 2025-06-03 RX ADMIN — LIDOCAINE HYDROCHLORIDE 14 ML: 20 INJECTION, SOLUTION INFILTRATION; PERINEURAL at 09:53

## 2025-06-03 RX ADMIN — FENTANYL CITRATE 50 MCG: 50 INJECTION, SOLUTION INTRAMUSCULAR; INTRAVENOUS at 09:39

## 2025-06-03 RX ADMIN — Medication 1 TABLET: at 10:25

## 2025-06-03 ASSESSMENT — PAIN SCALES - WONG BAKER: WONGBAKER_NUMERICALRESPONSE: NO HURT

## 2025-06-03 ASSESSMENT — PAIN SCALES - GENERAL
PAINLEVEL_OUTOF10: 4
PAINLEVEL_OUTOF10: 6
PAINLEVEL_OUTOF10: 4
PAINLEVEL_OUTOF10: 2
PAINLEVEL_OUTOF10: 7

## 2025-06-03 ASSESSMENT — PAIN DESCRIPTION - DESCRIPTORS
DESCRIPTORS: ACHING;DISCOMFORT
DESCRIPTORS: ACHING
DESCRIPTORS: ACHING

## 2025-06-03 ASSESSMENT — PAIN DESCRIPTION - ORIENTATION
ORIENTATION: MID
ORIENTATION: MID

## 2025-06-03 ASSESSMENT — PAIN DESCRIPTION - LOCATION
LOCATION: ABDOMEN
LOCATION: BACK
LOCATION: ABDOMEN;CHEST

## 2025-06-03 ASSESSMENT — PAIN DESCRIPTION - FREQUENCY: FREQUENCY: INTERMITTENT

## 2025-06-03 ASSESSMENT — PAIN DESCRIPTION - ONSET: ONSET: ON-GOING

## 2025-06-03 ASSESSMENT — PAIN DESCRIPTION - PAIN TYPE: TYPE: CHRONIC PAIN

## 2025-06-03 NOTE — PROGRESS NOTES
Hospitalist Progress Note    Patient:  Shania Hernandez      Unit/Bed:8B-22/022-A    YOB: 1950    MRN: [id number]       Acct: [id number]     PCP: Hannah Noriega APRN - CNP    Date of Admission: 5/28/2025    Assessment/Plan:    UTI (POA) from May 26, 2025--upon review of ER note patient was given Rocephin x 1 dose on 5/26 and was discharged home, on 5/28 repeat urine was unable to be collected however was restarted on Rocephin and had a total of 5 doses; no culture was sent on May 26; CT abdomen pelvis showed no evidence of pyelonephritis  Acute metabolic encephalopathy likely secondary to acute delirium and #1 versus progressive dementia--LFTs are normal, normal white count; Per PCP note from May 28 she was brought to the office for confusion, she was talking but not in coherent sentences, she was taken to PCPs office and patient did not have shoes and was in a hospital gown so she was transferred to the emergency department; appreciate psychiatry input, plan is not to use Atarax, recommends frequent reorientation and delirium precautions and can utilize Seroquel 25 mg twice daily as needed for break through agitation; CT head shows mild atrophy and probable ischemic changes; remove sitter on 6/3 in the morning  Hypokalemia--replaced per protocol and resolved, magnesium at 2.3  Acute pathologic fracture involving the superior endplate of L1 with 15% compression S/P vertebroplasty on 6/30/2025--unable to obtain an MRI at this time; bone scan completed that showed abnormal radiotracer accumulation at L1 consistent with an acute compression fracture  Old compression fracture involving T10 with 30% compression  Multiple lung nodules suspicious for metastatic disease--please see #10  Atelectasis--Acapella and incentive spirometry, patient does not have pneumonia  Suspect diffuse esophageal mucosal thickening--patient states she has known esophageal cancer  Chronic hypoxic respiratory  needed?): []Yes / [x]No    Level of care: []Step Down / [x]Med-Surg  Bed Status: [x]Inpatient / []Observation  PT/OT: [x]Yes / []No    DVT Prophylaxis: [x] Lovenox / [] Heparin / [] SCDs / [] Already on Systemic Anticoagulation / [] None       Active Hospital Problems    Diagnosis Date Noted    Moderate malnutrition [E44.0] 06/02/2025    Acute delirium [R41.0] 05/29/2025    UTI (urinary tract infection) [N39.0] 05/28/2025       Electronically signed by NITO Swain CNP on 6/3/2025 at 4:07 AM

## 2025-06-03 NOTE — CARE COORDINATION
6/3/25, 10:15 AM EDT    DISCHARGE ON GOING EVALUATION    [facility] day: 6  Location: [facility]22/022-A Reason for admit: UTI (urinary tract infection) [N39.0]     Procedures: 5/30: MBS: pending  6/3 Kyphoplasty: pending    Imaging since last note:     6/2 Bone Scan: Abnormal radiotracer accumulation at L1 consistent with an acute compression  fracture.    6/2 CT Head: 1. Although the anterior left upper lobe pulmonary nodule is no longer visualized, an 8 mm nodule in the lingula appears larger (previously measured 3 to 4 mm. A left perihilar nodule is larger measuring 12 x 16 mm (previously measured 3 to 4 mm). PET/CT examination versus histopathologic correlation may be considered.     2. Small bilateral pleural effusions and bilateral lower lobe consolidation   suggest pneumonia in the appropriate clinical setting. Follow-up to ensure   resolution is advised.     6/2 CT Chest: 1. Mild atrophy and probable ischemic changes in the white matter and nola.   2. Inflammatory changes in the right ethmoid air cells with sclerosis involving the right mastoid tip.   3. Diffusion MRI scan of the brain would be helpful for better evaluation in   this patient.     6/2 XR KUB: 1. No evidence of a bowel obstruction or free air.  2. Contrast is suspected within the urinary bladder. This can be   correlated with patient history.  3. Mild right basilar atelectasis is suspected.    6/1 CT Thoracic:   1. Diffuse osteopenia.   2. Old compression fracture involving T10 with 30% compression.   3. Mild thoracic spondylosis.   4. Multiple lung nodules suspicious for metastatic disease.   5. Postoperative changes involving the left lobe of the thyroid gland.     6/1 CT Lumbar: 1. Diffuse osteopenia.   2. Old compression fracture involving T10 with 30% compression.   3. Mild thoracic spondylosis.   4. Multiple lung nodules suspicious for metastatic disease.   5. Postoperative changes involving the left lobe of the thyroid  gland.     5/31 CT ABD/PEL:   1. A mild L1 compression fracture deformity is age-indeterminate. A moderate T10 compression fracture deformity is incompletely visualized. Noncontrast MRI of the thoracic and lumbar spine may be obtained for further characterization if clinically indicated.     2. No CT evidence for pyelonephritis. No renal calculus or obstructive uropathy is visualized. The urinary bladder is mildly distended. Correlate clinically for urinary retention. Correlate with urinalysis for urinary tract infection.     3. Trace left pleural effusion and mild left lower lobe consolidation. Correlate clinically for airspace infection/inflammation. Follow-up to ensure resolution is advised.     4. Stable aneurysmal dilatation of the infrarenal abdominal aorta measuring up to 30 mm in diameter. Chronic findings are discussed.      Barriers to Discharge:  Hospitalist, Palliative, Psych following. ST/PT/OT on board. 2LNC , Base RA. IVF.     PCP: Hannah Noriega APRN - CNP  Readmission Risk Score: 16.5    Patient Goals/Plan/Treatment Preferences: Shania is from home with boyfriend. [place] Son ( JUAN M) lives in [place].   Discharge planning pending PT/OT evals and patient orientation. SW following.

## 2025-06-03 NOTE — PROGRESS NOTES
[facility]  PHYSICAL THERAPY MISSED TREATMENT NOTE  STRZ MED SURG 8AB    Date: 6/3/2025  Patient Name: Shania Hernandez        MRN: [id number]   : 1950  (75 y.o.)  Gender: female   Referring Practitioner: Jb Shook DO            REASON FOR MISSED TREATMENT:  Hold treatment per nursing request.  Pt just returning from having vertebroplasty. Will check back later or next therapy date.    Kristy Parry, MPT [id number]

## 2025-06-03 NOTE — H&P
Shania Hernandez is a 75 y.o. female   Was evaluated for possible vertebroplasty/augmentation and is an appropriate candidate for vertebroplasty of L1.     SEE DICTATED REPORT FOR COMPLETE DETAILS    Dave Colorado MD 6/3/2025 8:57 AM

## 2025-06-03 NOTE — PROGRESS NOTES
0905 Pt in specials radiology for vertebroplasty consult. Dr Colorado discussed procedure with pt and is a candidate for the procedure.  0913 H Stefanie NP messaged for order. OK to proceed with procedure.  0925 Consent signed and pt attached to monitor.  0941 Pt positioned prone on table.  0946 Dr Colorado to jose pt.  0947 Procedure started.  1001 Vertebroplasty L1 complete. PT tolerated well. Post images taken.  1002 Triple antibiotic ointment applied to site on left lower back with band-aid. Site without redness, swelling or hematoma.  1005 Pt positioned on bed for comfort.  1009 Report called to Dahiana PIERRE.  1010 Pt transferred to  per bed. Pt offers no complaints.

## 2025-06-03 NOTE — H&P
Formulation and discussion of sedation / procedure plans, risks, benefits, side effects and alternatives with patient and/or responsible adult completed.    History and Physical reviewed and unchanged.    Electronically signed by Dave Colorado MD on 6/3/25 at 10:02 AM EDT

## 2025-06-03 NOTE — OP NOTE
Department of Radiology  Post Procedure Progress Note      Pre-Procedure Diagnosis:  pathologic Fx L1    Procedure Performed:  .vertebroplasty     Anesthesia: local / versed and fentanyl    Findings: successful    Immediate Complications:  None    Estimated Blood Loss: minimal    SEE DICTATED PROCEDURE NOTE FOR COMPLETE DETAILS.    Dave Colorado MD   6/3/2025 10:03 AM

## 2025-06-03 NOTE — PLAN OF CARE
Problem: Chronic Conditions and Co-morbidities  Goal: Patient's chronic conditions and co-morbidity symptoms are monitored and maintained or improved  6/3/2025 0915 by Dahiana Sunshine RN  Outcome: Progressing  Flowsheets (Taken 6/2/2025 2056 by Tavia Guadarrama RN)  Care Plan - Patient's Chronic Conditions and Co-Morbidity Symptoms are Monitored and Maintained or Improved: Monitor and assess patient's chronic conditions and comorbid symptoms for stability, deterioration, or improvement  6/2/2025 2055 by Tavia Guadarrama RN  Outcome: Progressing     Problem: Discharge Planning  Goal: Discharge to home or other facility with appropriate resources  6/3/2025 0915 by Dahiana Sunshine RN  Outcome: Progressing  Flowsheets (Taken 6/2/2025 2056 by Tavia Guadarrama RN)  Discharge to home or other facility with appropriate resources: Identify barriers to discharge with patient and caregiver  6/2/2025 2055 by Tavia Guadarrama RN  Outcome: Progressing     Problem: Pain  Goal: Verbalizes/displays adequate comfort level or baseline comfort level  6/3/2025 0915 by Dahiana Sunshine RN  Outcome: Progressing  6/2/2025 2055 by Tavia Guadarrama RN  Outcome: Progressing     Problem: Confusion  Goal: Confusion, delirium, dementia, or psychosis is improved or at baseline  Description: INTERVENTIONS:1. Assess for possible contributors to thought disturbance, including medications, impaired vision or hearing, underlying metabolic abnormalities, dehydration, psychiatric diagnoses, and notify attending LIP2. [place] high risk fall precautions, as indicated3. Provide frequent short contacts to provide reality reorientation, refocusing and direction4. Decrease environmental stimuli, including noise as appropriate5. Monitor and intervene to maintain adequate nutrition, hydration, elimination, sleep and activity6. If unable to ensure safety without constant attention obtain sitter and review sitter guidelines with assigned personnel7. Initiate  elopement   Communicate to physician the risk for elopement   Make sure patient has all necessary personal care items   Shoes and clothing collected and placed in gown attire  6/2/2025 2055 by Tavia Guadarrama, RN  Outcome: Progressing     Problem: Nutrition Deficit:  Goal: Optimize nutritional status  6/3/2025 0915 by Dahiana Sunshine, RN  Outcome: Progressing  6/2/2025 2055 by Tavia Guadarrama, RN  Outcome: Progressing

## 2025-06-03 NOTE — H&P
[facility]  Sedation/Analgesia History & Physical    Pt Name: Shania Hernandez  MRN: [id number]  YOB: 1950  Provider Performing Procedure: Dave Colorado MD, MD  Primary Care Physician: Hannah Noriega APRN - CNP    Formulation and discussion of sedation / procedure plans, risks, benefits, side effects and alternatives with patient and/or responsible adult completed.    PRE-PROCEDURE   DNR-CCA/DNR-CC []Yes [x]No  Brief History/Pre-Procedure Diagnosis: pathologic fracture L1          MEDICAL HISTORY  []CAD/Valve  []Liver Disease  []Lung Disease []Diabetes  []Hypertension []Renal Disease  []Additional information:       has a past medical history of Anxiety, Arthritis, Breast cancer (HCC), CAD (coronary artery disease), Cancer of the skin, Cerebral artery occlusion with cerebral infarction (HCC), Chronic UTI, COPD (chronic obstructive pulmonary disease) (HCC), CVA (cerebral infarction), Depression, DVT of lower extremity (deep venous thrombosis) (HCC), Facial injury, History of stroke, History of therapeutic radiation, Hx antineoplastic chemo, Hx of blood clots, Hyperlipidemia, Hypertension, Hypothyroidism, IBS (irritable bowel syndrome), Low HDL (under 40), Lung cancer (HCC), Prolonged emergence from general anesthesia, Recurrent UTI (urinary tract infection), and Word finding difficulty.    SURGICAL HISTORY   has a past surgical history that includes Appendectomy (1975); Cholecystectomy (1992); Total abdominal hysterectomy w/ bilateral salpingoophorectomy (1976, 2001); Colonoscopy (2009); Thyroid surgery (2007); Hysterectomy (1976); joint replacement (Left, 2011); joint replacement (Right, 01/19/2015); hip surgery (01/19/2015); other surgical history (04/10/2018); pr njx dx/ther sbst intrlmnr lmbr/sac w/img gdn (N/A, 04/10/2018); CT NEEDLE BIOPSY LUNG PERCUTANEOUS (03/16/2021); Thyroid lobectomy (Left, 11/26/2010); Breast surgery (Right, 04/01/2005); Breast surgery (Right, 11/30/2004);  25 mg, 25 mg, Oral, BID PRN, Rethman, Abi A, APRN - CNP, 25 mg at 06/01/25 1644    diclofenac sodium (VOLTAREN) 1 % gel 2 g, 2 g, Topical, 4x Daily PRN, Rethman, Abi A, APRN - CNP, 2 g at 06/01/25 1746    enoxaparin Sodium (LOVENOX) injection 30 mg, 30 mg, SubCUTAneous, Daily, Rethman, Abi A, APRN - CNP, 30 mg at 06/02/25 0904    traMADol (ULTRAM) tablet 25 mg, 25 mg, Oral, Q6H PRN, Rethman, Abi A, APRN - CNP, 25 mg at 06/02/25 0905    acetaminophen (TYLENOL) tablet 650 mg, 650 mg, Oral, Q4H PRN, Rethman, Abi A, APRN - CNP, 650 mg at 05/31/25 2027    sodium chloride flush 0.9 % injection 5-40 mL, 5-40 mL, IntraVENous, 2 times per day, Boone, Jb, DO, 10 mL at 06/02/25 0909    sodium chloride flush 0.9 % injection 5-40 mL, 5-40 mL, IntraVENous, PRN, Boone, Jb, DO    0.9 % sodium chloride infusion, , IntraVENous, PRN, Boone, Jb, DO    potassium chloride (KLOR-CON M) extended release tablet 40 mEq, 40 mEq, Oral, PRN, 40 mEq at 05/30/25 0930 **OR** potassium bicarb-citric acid (EFFER-K) effervescent tablet 40 mEq, 40 mEq, Oral, PRN **OR** potassium chloride 10 mEq/100 mL IVPB (Peripheral Line), 10 mEq, IntraVENous, PRN, Boone, Jb, DO    ondansetron (ZOFRAN-ODT) disintegrating tablet 4 mg, 4 mg, Oral, Q8H PRN **OR** ondansetron (ZOFRAN) injection 4 mg, 4 mg, IntraVENous, Q6H PRN, Boone, Jb, DO, 4 mg at 06/02/25 2206    polyethylene glycol (GLYCOLAX) packet 17 g, 17 g, Oral, Daily PRN, Boone, Jb, DO  Prior to Admission medications    Medication Sig Start Date End Date Taking? Authorizing Provider   ondansetron (ZOFRAN-ODT) 4 MG disintegrating tablet Take 1 tablet by mouth every 8 hours as needed for Nausea or Vomiting  Patient not taking: Reported on 5/28/2025 5/26/25   Ed Vallejo APRN - CNP   hydrOXYzine HCl (ATARAX) 10 MG tablet Take 1-2 tablets by mouth every 6 hours as needed for Itching  Patient not taking: Reported on 5/28/2025 10/29/24   Hannah Noriega, NITO - CNP

## 2025-06-03 NOTE — PROGRESS NOTES
[facility]  OCCUPATIONAL THERAPY MISSED TREATMENT NOTE  STRZ MED SURG 8AB  8B-22/022-A      Date: 6/3/2025  Patient Name: Shania Hernandez        CSN: [id number]   : 1950  (75 y.o.)  Gender: female   Referring Practitioner: Jb Shook DO            REASON FOR MISSED TREATMENT: Started to attempt session and patient taken for kyphoplasty

## 2025-06-03 NOTE — PLAN OF CARE
Problem: Pain  Goal: Verbalizes/displays adequate comfort level or baseline comfort level  6/2/2025 2055 by Tavia Guadarrama RN  Outcome: Progressing     Problem: Confusion  Goal: Confusion, delirium, dementia, or psychosis is improved or at baseline  Description: INTERVENTIONS:1. Assess for possible contributors to thought disturbance, including medications, impaired vision or hearing, underlying metabolic abnormalities, dehydration, psychiatric diagnoses, and notify attending LIP2. [place] high risk fall precautions, as indicated3. Provide frequent short contacts to provide reality reorientation, refocusing and direction4. Decrease environmental stimuli, including noise as appropriate5. Monitor and intervene to maintain adequate nutrition, hydration, elimination, sleep and activity6. If unable to ensure safety without constant attention obtain sitter and review sitter guidelines with assigned personnel7. Initiate Psychosocial CNS and Spiritual Care consult, as indicated  6/2/2025 2055 by Tavia Guadarrama RN  Outcome: Progressing    Problem: Risk for Elopement  Goal: Patient will not exit the unit/facility without proper excort  6/2/2025 2055 by Tavia Guadarrama RN  Outcome: Progressing     Problem: Safety - Adult  Goal: Free from fall injury  6/2/2025 2055 by Tavia Guadarrama RN  Outcome: Progressing     Problem: Skin/Tissue Integrity  Goal: Skin integrity remains intact  Description: 1.  Monitor for areas of redness and/or skin breakdown2.  Assess vascular access sites hourly3.  Every 4-6 hours minimum:  Change oxygen saturation probe site4.  Every 4-6 hours:  If on nasal continuous positive airway pressure, respiratory therapy assess nares and determine need for appliance change or resting period  6/2/2025 2055 by Tavia Guadarrama RN  Outcome: Progressing     Problem: Nutrition Deficit:  Goal: Optimize nutritional status  Outcome: Progressing     Problem: Pain  Goal: Verbalizes/displays adequate comfort level or baseline  comfort level  6/2/2025 2055 by Tavia Guadarrama, RN  Outcome: Progressing     Problem: Discharge Planning  Goal: Discharge to home or other facility with appropriate resources  6/2/2025 2055 by Tavia Guadarrama, RN  Outcome: Progressing     Problem: Risk for Elopement  Goal: Patient will not exit the unit/facility without proper excort  6/2/2025 2055 by Tavia Guadarrama, RN  Outcome: Progressing

## 2025-06-04 PROCEDURE — 92523 SPEECH SOUND LANG COMPREHEN: CPT

## 2025-06-04 PROCEDURE — 6360000002 HC RX W HCPCS: Performed by: NURSE PRACTITIONER

## 2025-06-04 PROCEDURE — 6370000000 HC RX 637 (ALT 250 FOR IP): Performed by: NURSE PRACTITIONER

## 2025-06-04 PROCEDURE — 99232 SBSQ HOSP IP/OBS MODERATE 35: CPT | Performed by: NURSE PRACTITIONER

## 2025-06-04 PROCEDURE — 1200000000 HC SEMI PRIVATE

## 2025-06-04 RX ORDER — PROMETHAZINE HYDROCHLORIDE 25 MG/ML
12.5 INJECTION, SOLUTION INTRAMUSCULAR; INTRAVENOUS EVERY 6 HOURS PRN
Status: DISCONTINUED | OUTPATIENT
Start: 2025-06-04 | End: 2025-06-06 | Stop reason: HOSPADM

## 2025-06-04 RX ORDER — OXYCODONE HYDROCHLORIDE 5 MG/1
5 TABLET ORAL EVERY 4 HOURS PRN
Refills: 0 | Status: DISCONTINUED | OUTPATIENT
Start: 2025-06-04 | End: 2025-06-06 | Stop reason: HOSPADM

## 2025-06-04 RX ORDER — LIDOCAINE 4 G/G
3 PATCH TOPICAL DAILY
Status: DISCONTINUED | OUTPATIENT
Start: 2025-06-04 | End: 2025-06-06 | Stop reason: HOSPADM

## 2025-06-04 RX ADMIN — ENOXAPARIN SODIUM 30 MG: 100 INJECTION SUBCUTANEOUS at 09:24

## 2025-06-04 RX ADMIN — AZITHROMYCIN DIHYDRATE 500 MG: 250 TABLET ORAL at 09:24

## 2025-06-04 RX ADMIN — PROMETHAZINE HYDROCHLORIDE 12.5 MG: 25 INJECTION, SOLUTION INTRAMUSCULAR; INTRAVENOUS at 12:41

## 2025-06-04 RX ADMIN — Medication 1 TABLET: at 09:24

## 2025-06-04 ASSESSMENT — PAIN DESCRIPTION - LOCATION: LOCATION: BACK

## 2025-06-04 ASSESSMENT — PAIN DESCRIPTION - FREQUENCY: FREQUENCY: INTERMITTENT

## 2025-06-04 ASSESSMENT — PAIN DESCRIPTION - ORIENTATION: ORIENTATION: MID

## 2025-06-04 ASSESSMENT — PAIN DESCRIPTION - DESCRIPTORS: DESCRIPTORS: ACHING

## 2025-06-04 ASSESSMENT — PAIN DESCRIPTION - PAIN TYPE: TYPE: CHRONIC PAIN

## 2025-06-04 ASSESSMENT — PAIN SCALES - GENERAL: PAINLEVEL_OUTOF10: 2

## 2025-06-04 NOTE — PLAN OF CARE
Chief Complaint   Patient presents with   • Physical     Pt has paperwork for her work to be filled out. Pt is not fasting today. Pt is doing well.        History Of Present Illness  Araceli is a 21 year old female presenting for physical .   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      Past Medical History  Past Medical History:   Diagnosis Date   • Fracture of nasal bone         Surgical History  Past Surgical History:   Procedure Laterality Date   • Nose surgery  12/2019   • [place] tooth extraction          Social History  Social History     Tobacco Use   • Smoking status: Never Smoker   • Smokeless tobacco: Never Used   Vaping Use   • Vaping Use: never used   Substance Use Topics   • Alcohol use: Never   • Drug use: Never       Family History  History reviewed. No pertinent family history.     Allergies  ALLERGIES:  Cefdinir    Medications  Current Outpatient Medications   Medication   • desogestrel-ethinyl estradiol (APRI) 0.15-30 MG-MCG per tablet   • spironolactone (ALDACTONE) 25 MG tablet   • amitriptyline (ELAVIL) 10 MG tablet   • Multiple Vitamins-Iron (MULTIVITAMIN/IRON PO)   • norethindrone-ethinyl estradiol-FE (JUNEL FE 1/20) 1-20 MG-MCG per tablet   • tretinoin (RETIN-A) 0.05 % cream     No current facility-administered medications for this visit.       Patient Active Problem List   Diagnosis   • Closed fracture of nasal bones   • Deviated septum   • Epistaxis   • Hypertrophy, nasal, turbinate   • Recurrent sinusitis   • Acne   • Menorrhagia with regular cycle        Review of Systems    CONSTITUTIONAL: No unwanted weight change , fever , chills or malaise .  EYES: No change in vision , blurred or double vision.  ENT:No problem  hearing,chewing, swallowing.  RESPIRATORY:No cough ,SOB,H/of lung disorder  CV:No chest pain or pressure,ECHAVARRIA,Orthopnea, PND ,or palpitations.  GI:No change in bowel habits, no nausea ,vomiting or diarrhea  :No  Consult received see sw note dated 6/4/2025.   dysuria, hematuria, nocturia,urgency or frequency  MUSCULO-SKELETAL:no acute complaints   SKIN:No rash  CNS:No problems with co ordination , balance , vision , strength, numbness or tingling .  PSYCH:No depression /anxiety. Suicidal thoughts.    Last Recorded Vitals    Blood pressure 108/58, temperature 98.3 °F (36.8 °C), temperature source Temporal, resp. rate 14, height 5' 5\" (1.651 m), weight 62.1 kg (137 lb), last menstrual period 08/17/2021.  Physical  General:Well oriented in time /place /person    HEENT: Conjunctiva clear,PERLLA, EOMI.NECK:Supple, no tenderness , no adenopathy, Thyroid symmetrical, normal size , no JVD .  LUNGS:LUNGS CLEAR , NORMAL BREATH SOUNDS , No wheezing ,rales or Rhonchi noted .  CARDIOVASCULAR:RRR, Heart sounds normal   ABDOMEN: Abdomen Soft , non tender , positive bowel sounds in all quadrant noted   EXTREMITIES:No clubbing , cyanosis or edema noted  MUSCULOSKELETAL:ROM seems ok  SKIN:NO RASHES  NEUROLOGICAL:Intact , Mental status normal , Gait normal ,, Muscle strength 5/5 throughout , sensation grossly intact    Assessment and Plan:    Annual physical exam  rec blood work   - CBC WITH DIFFERENTIAL; Future  - COMPREHENSIVE METABOLIC PANEL; Future  - THYROID STIMULATING HORMONE; Future    Encounter for screening for depression  Over the last 2 weeks, how often have you been bothered by the following problems?          PHQ2 Score: 0  PHQ2 Score Interpretation: No further screening needed  1. Little interest or pleasure in activity?: 0  2. Feeling down, depressed, or hopeless?: 0         Issue of medical certificate  rec immunization record / once will get it will fill form   - QUANTIFERON TB PLUS; Future            Time spent with patient 55  minutes.Duration of counseling and/or coordination of care (greater than 50%).Specifically,we discussed the patient's diagnosis,prognosis,and treatment/management options as documented in the A/P.    Diamond Prather MD  Internal Medicine  [facility]  Phone: [phone]  Fax: [phone]

## 2025-06-04 NOTE — PLAN OF CARE
6/4/2025 0802)  Nursing Interventions for Elopement Risk:   Assist with personal care needs such as toileting, eating, dressing, as needed to reduce the risk of wandering   Collaborate with family members/caregivers to mitigate the elopement risk   Collaborate with treatment team for nicotine replacement   Communicate/escalate to /other team member the risk of elopement   Communicate to physician the risk for elopement   Make sure patient has all necessary personal care items   Shoes and clothing collected and placed in gown attire     Problem: Safety - Adult  Goal: Free from fall injury  Outcome: Progressing     Problem: Nutrition Deficit:  Goal: Optimize nutritional status  Outcome: Progressing

## 2025-06-04 NOTE — PROGRESS NOTES
[facility]  PHYSICAL THERAPY MISSED TREATMENT NOTE  STRZ MED SURG 8AB    Date: 2025  Patient Name: Shania Hernandez        MRN: [id number]   : 1950  (75 y.o.)  Gender: female   Referring Practitioner: Jb Shook DO            REASON FOR MISSED TREATMENT:  Nurse approved visit.  Patient in sidelying with niece Sandra present.  Patient refused physical therapy stating she was too tired and nauseated to participate.  Educated importance of mobility as well as need to assess physical status for discharge planning.  Patient continued to refuse and promised her niece she would get up for breakfast tomorrow.  Will check back as able .

## 2025-06-04 NOTE — PROGRESS NOTES
[facility]  SPEECH THERAPY  STRZ MED SURG 8AB  Speech - Language - Cognitive Evaluation    Discharge Recommendations: SNF    SLP Individual Minutes  Time In: 1210  Time Out: 1232  Minutes: 22  Timed Code Treatment Minutes: 0 Minutes       Date: 2025  Patient Name: Shania Hernandez      CSN: [id number]   : 1950  (75 y.o.)  Gender: female   Referring Physician:  Abi Watts APRN - CNP   Diagnosis: UTI (urinary tract infection)  Precautions: Fall precautions  History of Present Illness/Injury: Patient admitted to [facility] with aforementioned diagnosis. Per physician H&P, \"Shania Hernandez is a(n) 75 y.o. female presenting to [facility] for evaluation of confusion.      Patient was brought into the ED by family on 2025 for right-sided flank pain, dysuria and hematuria.  UA showed positive nitrites, moderate leukocyte esterase, positive pyuria and bacteriuria.  Patient was given a gram of ceftriaxone and discharged on p.o. Keflex 500 twice daily.  Unfortunately, patient states that she did not take her oral antibiotics, due to p.o. intolerance.  She was seen in family medicine clinic prior to her admission, found to be confused and agitated.  Sent to the ED for further evaluation.     On arrival, patient afebrile, nontachycardic, normotensive.  She is currently on 2 L, which she states is her baseline.  Labs without any acute derangements.  Has chronically elevated ALP.  Review of urine micro from 2025 shows pansensitive E. coli.  Patient was started on ceftriaxone in the ED and admitted to medicine.\"    CT head on 2025 yielded the following impressions:    1. Mild atrophy and probable ischemic changes in the white matter and nola.  2. Inflammatory changes in the right ethmoid air cells with sclerosis involving  the right mastoid tip.  3. Diffusion MRI scan of the brain would be helpful for better evaluation in  this patient.    Past Medical History:   Diagnosis Date    Anxiety      to support patient ability to complete pertinent ADLS/iADLs (i.e. finance/medication management) with assistance. Patient reports being independent with ADLs and finance/medication management prior to current hospital admission. Given patient's cognitive deficits and complex medical status, suspect patient will require assistance/supervision with ADL completion upon discharge from hospital.  Rehabilitation Potential: fair    EDUCATION:  Learner: Patient  Education:  Reviewed results and recommendations of this evaluation, Reviewed ST goals and Plan of Care, and Reviewed recommendations for follow-up  Evaluation of Education: Needs further instruction    PLAN:  Skilled SLP intervention on acute care 3-5 x per week or until goals met and/or patient plateaus in function.  Specific interventions for next session may include: cognitive therapy.    PATIENT GOAL:    Did not state.  Will further assess during treatment.    SHORT TERM GOALS:  Short Term Goals  Time Frame for Short Term Goals: 2 weeks  Goal 1: Patient will complete immediate/delayed recall tasks containing 4+ units information with 70% accuracy, min cues to support memory retention of pertinent information.  Goal 2: Patient will complete basic reasoning, problem solving, and executive functioning tasks with 70% accuracy, mod cues to support ability to complete pertinent ADLs/iADLs with assistance..  Goal 3: Patient willl complete thought organization and sequencing tasks with 70% accuracy, mod cues to support appropriate task sequencing within ADL/iADL completion.  Goal 4: Patient will complete structured attention tasks with fewer than 4 errors to support safety within ADL completion.    LONG TERM GOALS:  No LTGs established d/t short ELOS.    Nandini Dai M.S., -SLP, [id number]

## 2025-06-04 NOTE — PROGRESS NOTES
[facility]  OCCUPATIONAL THERAPY MISSED TREATMENT NOTE  STRZ MED SURG 8AB  8B-22/022-A      Date: 2025  Patient Name: Shania Hernandez        CSN: [id number]   : 1950  (75 y.o.)  Gender: female   Referring Practitioner: Jb Shook DO  Diagnosis: UTI         REASON FOR MISSED TREATMENT: Patient Refused.  Pt adamantly refuses OOB activity at this time. Pt reports she will not do any therapy before lunch. Pt education provided on importance of OOB activity as well as need for assessment in order to progress to next level of care. Pt demos poor insight and understanding into need for DC planning.  Pt becomes increasingly agitated when education attempted and begins raising voice at therapist. Will check back as time allows.

## 2025-06-04 NOTE — PROGRESS NOTES
(still needed?): [x]Yes / []No  IVF (still needed?): []Yes / [x]No    Level of care: []Step Down / [x]Med-Surg  Bed Status: [x]Inpatient / []Observation  PT/OT: [x]Yes / []No    DVT Prophylaxis: [x] Lovenox / [] Heparin / [] SCDs / [] Already on Systemic Anticoagulation / [] None       Active Hospital Problems    Diagnosis Date Noted    Moderate malnutrition [E44.0] 06/02/2025    Acute delirium [R41.0] 05/29/2025    UTI (urinary tract infection) [N39.0] 05/28/2025       Electronically signed by NITO Roach CNP on 6/4/2025 at 2:14 PM

## 2025-06-04 NOTE — PLAN OF CARE
Problem: Chronic Conditions and Co-morbidities  Goal: Patient's chronic conditions and co-morbidity symptoms are monitored and maintained or improved  6/3/2025 2055 by Tavia Guadarrama RN  Outcome: Progressing  Flowsheets (Taken 6/3/2025 1941)  Care Plan - Patient's Chronic Conditions and Co-Morbidity Symptoms are Monitored and Maintained or Improved: Monitor and assess patient's chronic conditions and comorbid symptoms for stability, deterioration, or improvement     Problem: Discharge Planning  Goal: Discharge to home or other facility with appropriate resources  6/3/2025 2055 by Tavia Guadarrama RN  Outcome: Progressing  Flowsheets (Taken 6/3/2025 1941)  Discharge to home or other facility with appropriate resources: Identify barriers to discharge with patient and caregiver     Problem: Pain  Goal: Verbalizes/displays adequate comfort level or baseline comfort level  6/3/2025 2055 by Tavia Guadarrama RN  Outcome: Progressing     Problem: Confusion  Goal: Confusion, delirium, dementia, or psychosis is improved or at baseline  Description: INTERVENTIONS:1. Assess for possible contributors to thought disturbance, including medications, impaired vision or hearing, underlying metabolic abnormalities, dehydration, psychiatric diagnoses, and notify attending LIP2. [place] high risk fall precautions, as indicated3. Provide frequent short contacts to provide reality reorientation, refocusing and direction4. Decrease environmental stimuli, including noise as appropriate5. Monitor and intervene to maintain adequate nutrition, hydration, elimination, sleep and activity6. If unable to ensure safety without constant attention obtain sitter and review sitter guidelines with assigned personnel7. Initiate Psychosocial CNS and Spiritual Care consult, as indicated  6/3/2025 2055 by Tavia Guadarrama RN  Outcome: Progressing  Flowsheets (Taken 6/3/2025 1941)  Effect of thought disturbance (confusion, delirium, dementia, or psychosis) are managed

## 2025-06-04 NOTE — CARE COORDINATION
DISCHARGE PLANNING EVALUATION  6/4/25, 11:20 AM EDT    Reason for Referral: Discharge needs  Decision Maker: Patient son JUAN M Mcnulty is making decisions, patient refusing to talk with SW and confusion at times  Current Services: none  New Services Requested: SNF  Family/ Social/ Home environment: Patient refused to talk with SW, Spoke with AYO Carl on the phone.  Patient has no where to go, not able to return to where she was staying prior to admission and son/JOSHUA not able to care fro her.  SNF is only option, long term for comfort care or possible rehab with precert if patient is agreeable to therapy. Patient has [facility] Medicaid will need switched to long term Medicaid. [facility]ferences are [facility], [facility].  Payment Source:[facility] Medicare - dual, [facility] Medicaid  Transportation at Discharge: ambulance  Post-acute (PAC) provider list was provided to patient. Patient was informed of their freedom to choose PAC provider. Discussed and offered to show the patient the relevant PAC Providers quality and resource use measures on Medicare Compare web site via computer based on patient's goals of care and treatment preferences. Questions regarding selection process were answered.      Teach Back Method used with Meseret ROLLINS regarding care plan and discharge planning.  AYO Carl verbalized understanding of the plan of care and contribute to goal setting.       Patient preferences and discharge plan: Plan SNF at discharge, referrals will be made.    Electronically signed by TC Edwards on 6/4/2025 at 11:20 AM

## 2025-06-05 LAB
ANION GAP SERPL CALC-SCNC: 11 MEQ/L (ref 8–16)
BUN SERPL-MCNC: 18 MG/DL (ref 8–23)
CALCIUM SERPL-MCNC: 8 MG/DL (ref 8.8–10.2)
CHLORIDE SERPL-SCNC: 107 MEQ/L (ref 98–111)
CO2 SERPL-SCNC: 25 MEQ/L (ref 22–29)
CREAT SERPL-MCNC: 1.1 MG/DL (ref 0.5–0.9)
GFR SERPL CREATININE-BSD FRML MDRD: 53 ML/MIN/1.73M2
GLUCOSE SERPL-MCNC: 76 MG/DL (ref 74–109)
POTASSIUM SERPL-SCNC: 3.5 MEQ/L (ref 3.5–5.2)
SODIUM SERPL-SCNC: 143 MEQ/L (ref 135–145)

## 2025-06-05 PROCEDURE — 6360000002 HC RX W HCPCS: Performed by: NURSE PRACTITIONER

## 2025-06-05 PROCEDURE — 6370000000 HC RX 637 (ALT 250 FOR IP): Performed by: NURSE PRACTITIONER

## 2025-06-05 PROCEDURE — 2580000003 HC RX 258: Performed by: NURSE PRACTITIONER

## 2025-06-05 PROCEDURE — 80048 BASIC METABOLIC PNL TOTAL CA: CPT

## 2025-06-05 PROCEDURE — 6370000000 HC RX 637 (ALT 250 FOR IP)

## 2025-06-05 PROCEDURE — 1200000000 HC SEMI PRIVATE

## 2025-06-05 PROCEDURE — 97110 THERAPEUTIC EXERCISES: CPT

## 2025-06-05 PROCEDURE — 36415 COLL VENOUS BLD VENIPUNCTURE: CPT

## 2025-06-05 PROCEDURE — 2500000003 HC RX 250 WO HCPCS: Performed by: INTERNAL MEDICINE

## 2025-06-05 PROCEDURE — 97535 SELF CARE MNGMENT TRAINING: CPT

## 2025-06-05 PROCEDURE — 99232 SBSQ HOSP IP/OBS MODERATE 35: CPT | Performed by: NURSE PRACTITIONER

## 2025-06-05 PROCEDURE — 97162 PT EVAL MOD COMPLEX 30 MIN: CPT

## 2025-06-05 RX ORDER — SODIUM CHLORIDE 9 MG/ML
INJECTION, SOLUTION INTRAVENOUS CONTINUOUS
Status: DISCONTINUED | OUTPATIENT
Start: 2025-06-05 | End: 2025-06-06 | Stop reason: HOSPADM

## 2025-06-05 RX ADMIN — SODIUM CHLORIDE, PRESERVATIVE FREE 10 ML: 5 INJECTION INTRAVENOUS at 20:08

## 2025-06-05 RX ADMIN — OXYCODONE HYDROCHLORIDE 5 MG: 5 TABLET ORAL at 20:08

## 2025-06-05 RX ADMIN — Medication 3 MG: at 20:07

## 2025-06-05 RX ADMIN — Medication 1 TABLET: at 08:25

## 2025-06-05 RX ADMIN — ENOXAPARIN SODIUM 30 MG: 100 INJECTION SUBCUTANEOUS at 09:00

## 2025-06-05 RX ADMIN — SODIUM CHLORIDE: 9 INJECTION, SOLUTION INTRAVENOUS at 11:32

## 2025-06-05 ASSESSMENT — PAIN DESCRIPTION - ORIENTATION: ORIENTATION: RIGHT;LEFT

## 2025-06-05 ASSESSMENT — PAIN DESCRIPTION - DESCRIPTORS: DESCRIPTORS: ACHING;DISCOMFORT;CRAMPING

## 2025-06-05 ASSESSMENT — PAIN DESCRIPTION - LOCATION: LOCATION: BUTTOCKS

## 2025-06-05 ASSESSMENT — PAIN SCALES - WONG BAKER: WONGBAKER_NUMERICALRESPONSE: NO HURT

## 2025-06-05 ASSESSMENT — PAIN SCALES - GENERAL
PAINLEVEL_OUTOF10: 5
PAINLEVEL_OUTOF10: 5

## 2025-06-05 NOTE — CARE COORDINATION
6/5/25, 11:25 AM EDT    DISCHARGE PLANNING EVALUATION    Patient worked with both PT & OT this morning. Very cooperative.    Representatives from [facility] and [facility] spoke with patient in hospital room today.  Both facilities agreeable to accept.    Spoke with patient, she was pleasant and apologized for her behavior the past week.  Her preference is [facility].  Confirmed [facility] with AYO Carl.    Sent [employer]port message to Christopher at [place] patient agreeable, she will start precert today 6/5.    3:26 PM  Precert approved.  Not medically stable for discharge today.  Informed Christopher at [facility], will update tomorrow.

## 2025-06-05 NOTE — PLAN OF CARE
Problem: Chronic Conditions and Co-morbidities  Goal: Patient's chronic conditions and co-morbidity symptoms are monitored and maintained or improved  Outcome: Progressing     Problem: Pain  Goal: Verbalizes/displays adequate comfort level or baseline comfort level  Outcome: Progressing     Problem: Confusion  Goal: Confusion, delirium, dementia, or psychosis is improved or at baseline  Description: INTERVENTIONS:1. Assess for possible contributors to thought disturbance, including medications, impaired vision or hearing, underlying metabolic abnormalities, dehydration, psychiatric diagnoses, and notify attending LIP2. [place] high risk fall precautions, as indicated3. Provide frequent short contacts to provide reality reorientation, refocusing and direction4. Decrease environmental stimuli, including noise as appropriate5. Monitor and intervene to maintain adequate nutrition, hydration, elimination, sleep and activity6. If unable to ensure safety without constant attention obtain sitter and review sitter guidelines with assigned personnel7. Initiate Psychosocial CNS and Spiritual Care consult, as indicated  Outcome: Progressing   Care plan reviewed with patient.  Patient verbalized understanding of the plan of care and contribute to goal setting.

## 2025-06-05 NOTE — PROGRESS NOTES
Hospitalist Progress Note    Patient:  Shania Hernandez      Unit/Bed:8B-22/022-A    YOB: 1950    MRN: [id number]       Acct: [id number]     PCP: Hannah Noriega APRN - CNP    Date of Admission: 5/28/2025    Assessment/Plan:    Mild EDGARD. Creatinine 0.7 yesterday, up to 1.1. Likely due to poor oral intake. Start gentle hydration, if patient will allow. Hold ACE. Encouraged oral intake.   UTI (POA), treated and resolved. UTI from May 26, 2025--upon review of ER note patient was given Rocephin x 1 dose on 5/26 and was discharged home, on 5/28 repeat urine was unable to be collected however was restarted on Rocephin and had a total of 5 doses; no culture was sent on May 26; CT abdomen pelvis showed no evidence of pyelonephritis.   Acute metabolic encephalopathy likely secondary to acute delirium and #1 versus progressive dementia. Mentation has improved. LFTs are normal, normal white count; Per PCP note from May 28 she was brought to the office for confusion, she was talking but not in coherent sentences, she was taken to PCPs office and patient did not have shoes and was in a hospital gown so she was transferred to the emergency department; Psychiatry seen. Plan is not to use Atarax, recommends frequent reorientation and delirium precautions and can utilize Seroquel 25 mg twice daily as needed for break through agitation; CT head shows mild atrophy and probable ischemic changes; removed sitter on 6/3 in the morning  Hypokalemia--Replaced and resolved.   Hypernatremia, resolved. .  Primary HTN. Had soft BP 6/3. ACE held. Now HTN. Restart Lisinopril with holding parameters.   Intractable back pain due to acute pathologic fracture involving the superior endplate of L1 with 15% compression S/P vertebroplasty on 6/30/2025--unable to obtain an MRI at this time; bone scan completed that showed abnormal radiotracer accumulation at L1 consistent with an acute compression fracture. PT/OT is ordered,

## 2025-06-05 NOTE — PROGRESS NOTES
[facility]  INPATIENT PHYSICAL THERAPY  EVALUATION  [facility] MED SURG 8AB - 8B-22/022-A    Discharge Recommendations: Subacute/Skilled Nursing Facility  Equipment Recommendations: No (defer to next level of care)           Time In: 0755  Time Out: 0820  Timed Code Treatment Minutes: 10 Minutes  Minutes: 25        Date: 2025  Patient Name: Shania Hernandez,  Gender:  female        MRN: [id number]  : 1950  (75 y.o.)      Referring Practitioner: Jb Shook DO  Diagnosis: UTI (urinary tract infection)  Additional Pertinent Hx: Per EMR:Per H&P done 2025: \"Shania Hernandez is a(n) 75 y.o. female presenting to [facility] for evaluation of confusion.      Patient was brought into the ED by family on 2025 for right-sided flank pain, dysuria and hematuria.  UA showed positive nitrites, moderate leukocyte esterase, positive pyuria and bacteriuria.  Patient was given a gram of ceftriaxone and discharged on p.o. Keflex 500 twice daily.  Unfortunately, patient states that she did not take her oral antibiotics, due to p.o. intolerance.  She was seen in family medicine clinic prior to her admission, found to be confused and agitated.  Sent to the ED for further evaluation.  On arrival, patient afebrile, nontachycardic, normotensive.  She is currently on 2 L, which she states is her baseline.  Labs without any acute derangements.  Has chronically elevated ALP.  Review of urine micro from 2025 shows pansensitive E. coli.  Patient was started on ceftriaxone in the ED and admitted to medicine.\"CT abdomen pelvis completed that did not reveal evidence of pyelonephritis however showed possible compression fractures in the T and L-spine so MRI was ordered for further evaluation, also showed atelectasis so incentive spirometry and Acapella ordered. Acute fracture involving the superior endplate of L1,Old compression fracture involving T10 with 30% compression. Pt underwent a vertebroplasty for L1 on 6/3/25.      Restrictions/Precautions:  Restrictions/Precautions: Fall Risk       Spinal Precautions: No Bending, No Lifting, No Twisting  Other Position/Activity Restrictions: vertebroplasty 6/3-be mindful for BLT precautions.  Patient to gradually increase activity (this is very important so patient will not develop new fractures).  Patient to notify Radiology Department of any rib or chest pain (would suggest possible rib fracture or PE), patient will have discomfort at puncture site and muscle pain.         Subjective:  Chart Reviewed: Yes  Patient assessed for rehabilitation services?: Yes  Family/Caregiver Present: Yes  Subjective: RN approved session, patient resting in bed and agreeable to session. Wanted to try to get up to chair as her tailbone was sore in the bed but no complaints of back or chest pain. Pt on 2L 02, Sp02 95%. Neice present, but left at end of evaluation.    General:  Overall Orientation Status: Within Functional Limits  Vision: Within Functional Limits  Hearing: Within functional limits       Pain: 5/10: Tailbone     Vitals:   Vitals:    06/05/25 0320   BP: (!) 126/56   Pulse: 58   Resp: 18   Temp: 98.2 °F (36.8 °C)   SpO2: 99%     Social/Functional History:    Lives With: Son (Pt doesn't have a residence currently, son Salvador is in [place] but cannot live there. Other son in [place] (was living there 2 weeks ago))  Home Equipment: Walker - Rolling, Walker - 4-Wheeled, Oxygen (Pt was on 2L 02)             Prior Level of Assist for ADLs: Needs assistance  Prior Level of Assist for Homemaking: Needs assistance  Prior Level of Assist for Transfers: Independent  Prior Level of Assist for Ambulation: Independent household ambulator, with or without device  Has the patient had two or more falls in the past year or any fall with injury in the past year?: Yes (Pt reports, \"she falls all the time\" Pt reports multiple fx after these falls)    Active : No     Additional Comments: Pt is a questionable

## 2025-06-05 NOTE — PROGRESS NOTES
[facility]  [facility] MED SURG 8AB  Occupational Therapy  Daily Note    Discharge Recommendations: ECF with OT  Equipment Recommendations: Yes Continue to monitor pending discharge disposition      Time In: 08  Time Out: 0849  Timed Code Treatment Minutes: 26 Minutes  Minutes: 26          Date: 2025  Patient Name: Shania Hernandez,   Gender: female      Room: [address] [place] [address]  MRN: [id number]  : 1950  (75 y.o.)  Referring Practitioner: Jb Shook DO  Diagnosis: UTI (urinary tract infection)  Additional Pertinent Hx: Per EMR: \"Shania Hernandez is a(n) 75 y.o. female presenting to [facility] for evaluation of confusion.      Patient was brought into the ED by family on 2025 for right-sided flank pain, dysuria and hematuria.  UA showed positive nitrites, moderate leukocyte esterase, positive pyuria and bacteriuria.  Patient was given a gram of ceftriaxone and discharged on p.o. Keflex 500 twice daily.  Unfortunately, patient states that she did not take her oral antibiotics, due to p.o. intolerance.  She was seen in family medicine clinic prior to her admission, found to be confused and agitated.  Sent to the ED for further evaluation.\" DX: Acute metabolic encephalopathy in setting of cystitis -Etiology encephalopathy likely from cystitis.  Review of urine culture from  shows pansensitive E. coli. Pt also found to have Acute fracture involving the superior endplate of L1 with 15% compression secondary to diffuse osteopenia. Pt underwent vertebroplasty 6/3. Pt also with Right lower lobe squamous cell carcinoma, diagnosed in  and managed with right lower lobe wedge resection and lymph node dissection and received radiation. per JOSHUA, pt has been having odd behavior recently with increase in confusion.    Restrictions/Precautions:  Restrictions/Precautions: Fall Risk  Position Activity Restriction  Spinal Precautions: No Bending, No Lifting, No Twisting  Other Position/Activity Restrictions:

## 2025-06-05 NOTE — PLAN OF CARE
severity of pain and evaluate response   Consider cultural and social influences on pain and pain management   Notify Licensed Independent Practitioner if interventions unsuccessful or patient reports new pain     Problem: Confusion  Goal: Confusion, delirium, dementia, or psychosis is improved or at baseline  Description: INTERVENTIONS:1. Assess for possible contributors to thought disturbance, including medications, impaired vision or hearing, underlying metabolic abnormalities, dehydration, psychiatric diagnoses, and notify attending LIP2. [place] high risk fall precautions, as indicated3. Provide frequent short contacts to provide reality reorientation, refocusing and direction4. Decrease environmental stimuli, including noise as appropriate5. Monitor and intervene to maintain adequate nutrition, hydration, elimination, sleep and activity6. If unable to ensure safety without constant attention obtain sitter and review sitter guidelines with assigned personnel7. Initiate Psychosocial CNS and Spiritual Care consult, as indicated  6/5/2025 1948 by Tiffany Fuentes RN  Outcome: Progressing  Flowsheets (Taken 6/5/2025 1948)  Effect of thought disturbance (confusion, delirium, dementia, or psychosis) are managed with adequate functional status:   Assess for contributors to thought disturbance, including medications, impaired vision or hearing, underlying metabolic abnormalities, dehydration, psychiatric diagnoses, notify LIP   [place] high risk fall precautions, as indicated   Provide frequent short contacts to provide reality reorientation, refocusing and direction   Decrease environmental stimuli, including noise as appropriate   Monitor and intervene to maintain adequate nutrition, hydration, elimination, sleep and activity   If unable to ensure safety without constant attention obtain sitter and review sitter guidelines with assigned personnel   Initiate Psychosocial Clinical Nurse Specialist and Spiritual  Care consult, as indicated  6/5/2025 1417 by Naheed Persaud, RN  Outcome: Progressing  6/5/2025 1141 by Naheed Persaud, RN  Outcome: Progressing

## 2025-06-06 VITALS
RESPIRATION RATE: 16 BRPM | HEART RATE: 63 BPM | WEIGHT: 111 LBS | SYSTOLIC BLOOD PRESSURE: 118 MMHG | TEMPERATURE: 97.9 F | DIASTOLIC BLOOD PRESSURE: 56 MMHG | BODY MASS INDEX: 18.95 KG/M2 | HEIGHT: 64 IN | OXYGEN SATURATION: 94 %

## 2025-06-06 LAB
ANION GAP SERPL CALC-SCNC: 9 MEQ/L (ref 8–16)
BUN SERPL-MCNC: 12 MG/DL (ref 8–23)
CALCIUM SERPL-MCNC: 7.7 MG/DL (ref 8.8–10.2)
CHLORIDE SERPL-SCNC: 105 MEQ/L (ref 98–111)
CO2 SERPL-SCNC: 25 MEQ/L (ref 22–29)
CREAT SERPL-MCNC: 0.6 MG/DL (ref 0.5–0.9)
GFR SERPL CREATININE-BSD FRML MDRD: > 90 ML/MIN/1.73M2
GLUCOSE SERPL-MCNC: 81 MG/DL (ref 74–109)
POTASSIUM SERPL-SCNC: 3.4 MEQ/L (ref 3.5–5.2)
SODIUM SERPL-SCNC: 139 MEQ/L (ref 135–145)

## 2025-06-06 PROCEDURE — 6370000000 HC RX 637 (ALT 250 FOR IP): Performed by: NURSE PRACTITIONER

## 2025-06-06 PROCEDURE — 80048 BASIC METABOLIC PNL TOTAL CA: CPT

## 2025-06-06 PROCEDURE — 6370000000 HC RX 637 (ALT 250 FOR IP): Performed by: INTERNAL MEDICINE

## 2025-06-06 PROCEDURE — 99239 HOSP IP/OBS DSCHRG MGMT >30: CPT | Performed by: NURSE PRACTITIONER

## 2025-06-06 PROCEDURE — 2580000003 HC RX 258: Performed by: NURSE PRACTITIONER

## 2025-06-06 PROCEDURE — 6360000002 HC RX W HCPCS: Performed by: NURSE PRACTITIONER

## 2025-06-06 PROCEDURE — 36415 COLL VENOUS BLD VENIPUNCTURE: CPT

## 2025-06-06 RX ORDER — LIDOCAINE 4 G/G
3 PATCH TOPICAL DAILY
DISCHARGE
Start: 2025-06-06

## 2025-06-06 RX ORDER — QUETIAPINE FUMARATE 25 MG/1
25 TABLET, FILM COATED ORAL 2 TIMES DAILY PRN
DISCHARGE
Start: 2025-06-06

## 2025-06-06 RX ORDER — MULTIVITAMIN WITH IRON
1 TABLET ORAL DAILY
DISCHARGE
Start: 2025-06-06

## 2025-06-06 RX ADMIN — POTASSIUM CHLORIDE 40 MEQ: 1500 TABLET, EXTENDED RELEASE ORAL at 10:56

## 2025-06-06 RX ADMIN — Medication 1 TABLET: at 09:32

## 2025-06-06 RX ADMIN — ENOXAPARIN SODIUM 30 MG: 100 INJECTION SUBCUTANEOUS at 09:27

## 2025-06-06 RX ADMIN — SODIUM CHLORIDE: 9 INJECTION, SOLUTION INTRAVENOUS at 09:29

## 2025-06-06 NOTE — DISCHARGE INSTR - COC
Continuity of Care Form    Patient Name: Shania Hernandez   :  1950  MRN:  [id number]    Admit date:  2025  Discharge date:  2025    Code Status Order: DNR-CC   Advance Directives:     Admitting Physician:  No admitting provider for patient encounter.  PCP: Hannah Noriega APRN - CNP    Discharging Nurse: Montserrat PIERRE  Discharging Hospital Unit/Room#: 8B-22/022-A  Discharging Unit Phone Number: [phone]    Emergency Contact:   Extended Emergency Contact Information  Primary Emergency Contact: Marcio Darden  Home Phone: [phone]  Relation: Child  Secondary Emergency Contact: Hamlet Darden  Home Phone: [phone]  Relation: Child   needed? No    Past Surgical History:  Past Surgical History:   Procedure Laterality Date    APPENDECTOMY  1975    BREAST SURGERY Right 2005    evacuation of breast hematoma-Dr. Moreno    BREAST SURGERY Right 2004    lymphatic mapping, SLN bx x2-Dr. Tang    CHOLECYSTECTOMY      COLONOSCOPY      Dr. Snider    CT GUIDED CHEST TUBE  2023    CT GUIDED CHEST TUBE 2023 [facility] CT SCAN    CT NEEDLE BIOPSY LUNG PERCUTANEOUS W IMAGING GUIDANCE  2021    CT NEEDLE BIOPSY LUNG PERCUTANEOUS 3/16/2021 [facility] CT SCAN    CT NEEDLE BIOPSY LUNG PERCUTANEOUS W IMAGING GUIDANCE  2023    CT NEEDLE BIOPSY LUNG PERCUTANEOUS 2023 [facility] CT SCAN    FEMUR FRACTURE SURGERY Left 2021    FEMUR OPEN REDUCTION INTERNAL FIXATION performed by Loy Renae MD at [facility] OR    HIP SURGERY  2015    HYSTERECTOMY (CERVIX STATUS UNKNOWN)  1976    IR VERTEBROPLASTY LUMBOSACRAL  6/3/2025    IR VERTEBROPLASTY LUMBOSACRAL 6/3/2025 [facility] SPECIAL PROCEDURES    JOINT REPLACEMENT Left     HIP    JOINT REPLACEMENT Right 2015    Right Total Hip Replacement - Dr. Yeung    LUNG REMOVAL, TOTAL Right 2021    ROBOTIC ASSISTED RIGHT LOWER LOBE SUPERIOR SEGMENTECTOMY AND MEDIASTINAL DISSECTION, CRYOTHERAPY OF INTERCOSTAL NERVES performed by  NITO Doshi - CNP on 6/6/25 at 8:38 AM EDT

## 2025-06-06 NOTE — DISCHARGE SUMMARY
to 4 mm). PET/CT examination versus histopathologic correlation may   be considered.         2. Small bilateral pleural effusions and bilateral lower lobe consolidation   suggest pneumonia in the appropriate clinical setting. Follow-up to ensure   resolution is advised.         3. Suspect diffuse esophageal mucosal thickening. Correlate clinically for   esophagitis. Further evaluation with EGD is advised.      4. A moderate T10 compression fracture deformity is age-indeterminate. Further   evaluation with MRI may be considered if clinically indicated.      5. Chronic findings are discussed.            **This report has been created using voice recognition software.  It may contain   minor errors which are inherent in voice recognition technology.**      Electronically signed by Dr. Priya Borrero      CT HEAD WO CONTRAST   Final Result      1. Mild atrophy and probable ischemic changes in the white matter and nola.   2. Inflammatory changes in the right ethmoid air cells with sclerosis involving   the right mastoid tip.   3. Diffusion MRI scan of the brain would be helpful for better evaluation in   this patient.               **This report has been created using voice recognition software. It may contain   minor errors which are inherent in voice recognition technology.**      Electronically signed by Dr. Mansoor Lewis      XR ABDOMEN (KUB) (SINGLE AP VIEW)   Final Result   1. No evidence of a bowel obstruction or free air.   2. Contrast is suspected within the urinary bladder. This can be    correlated with patient history.   3. Mild right basilar atelectasis is suspected.      This document has been electronically signed by: Alex Bridges M.D. on    06/02/2025 05:02 AM      CT LUMBAR SPINE WO CONTRAST   Final Result      1. Diffuse osteopenia.   2. Old compression fracture involving T10 with 30% compression.   3. Mild thoracic spondylosis.   4. Multiple lung nodules suspicious for metastatic disease.   5.    Handicap Placard Misc  by Does not apply route Valid from 12/8/23-12/8/2028           * This list has 1 medication(s) that are the same as other medications prescribed for you. Read the directions carefully, and ask your doctor or other care provider to review them with you.                STOP taking these medications      cephALEXin 500 MG capsule  Commonly known as: KEFLEX     diclofenac sodium 1 % Gel  Commonly known as: VOLTAREN     diphenhydrAMINE 25 MG tablet  Commonly known as: BENADRYL     ferrous sulfate 325 (65 Fe) MG tablet  Commonly known as: IRON 325     folic acid 1 MG tablet  Commonly known as: FOLVITE     guaiFENesin 600 MG extended release tablet  Commonly known as: MUCINEX     ondansetron 4 MG disintegrating tablet  Commonly known as: ZOFRAN-ODT     triamterene-hydroCHLOROthiazide 37.5-25 MG per tablet  Commonly known as: Maxzide-25     vitamin B-12 1000 MCG tablet  Commonly known as: CYANOCOBALAMIN     vitamin E 400 UNIT capsule            ASK your doctor about these medications      acetaminophen 325 MG tablet  Commonly known as: TYLENOL  Take 2 tablets by mouth every 6 hours     * albuterol sulfate  (90 Base) MCG/ACT inhaler  Commonly known as: Ventolin HFA  Inhale 2 puffs into the lungs 4 times daily as needed for Wheezing     apixaban 5 MG Tabs tablet  Commonly known as: Eliquis  Take 1 tablet by mouth 2 times daily     aspirin EC 81 MG EC tablet  Take 1 tablet by mouth daily     Breztri Aerosphere 160-9-4.8 MCG/ACT Aero  Generic drug: Budeson-Glycopyrrol-Formoterol  Inhale 2 puffs into the lungs 2 times daily     cilostazol 100 MG tablet  Commonly known as: PLETAL  TAKE ONE TABLET BY MOUTH TWICE DAILY @ 9AM-5PM     famotidine 20 MG tablet  Commonly known as: PEPCID  Take 1 tablet by mouth daily     hydrOXYzine HCl 10 MG tablet  Commonly known as: ATARAX  Take 1-2 tablets by mouth every 6 hours as needed for Itching     levothyroxine 25 MCG tablet  Commonly known as: SYNTHROID  Take

## 2025-06-06 NOTE — PLAN OF CARE
Problem: Chronic Conditions and Co-morbidities  Goal: Patient's chronic conditions and co-morbidity symptoms are monitored and maintained or improved  Outcome: Adequate for Discharge     Problem: Discharge Planning  Goal: Discharge to home or other facility with appropriate resources  6/6/2025 1119 by Montserrat Le RN  Outcome: Adequate for Discharge  6/6/2025 1018 by Montserrat Le RN  Outcome: Progressing     Problem: Pain  Goal: Verbalizes/displays adequate comfort level or baseline comfort level  6/6/2025 1119 by Montserrat Le RN  Outcome: Adequate for Discharge  6/6/2025 1018 by Montserrat Le RN  Outcome: Progressing     Problem: Confusion  Goal: Confusion, delirium, dementia, or psychosis is improved or at baseline  Description: INTERVENTIONS:1. Assess for possible contributors to thought disturbance, including medications, impaired vision or hearing, underlying metabolic abnormalities, dehydration, psychiatric diagnoses, and notify attending LIP2. [place] high risk fall precautions, as indicated3. Provide frequent short contacts to provide reality reorientation, refocusing and direction4. Decrease environmental stimuli, including noise as appropriate5. Monitor and intervene to maintain adequate nutrition, hydration, elimination, sleep and activity6. If unable to ensure safety without constant attention obtain sitter and review sitter guidelines with assigned personnel7. Initiate Psychosocial CNS and Spiritual Care consult, as indicated  Outcome: Adequate for Discharge     Problem: Skin/Tissue Integrity  Goal: Skin integrity remains intact  Description: 1.  Monitor for areas of redness and/or skin breakdown2.  Assess vascular access sites hourly3.  Every 4-6 hours minimum:  Change oxygen saturation probe site4.  Every 4-6 hours:  If on nasal continuous positive airway pressure, respiratory therapy assess nares and determine need for appliance change or resting period  Outcome: Adequate for Discharge      Problem: Safety - Adult  Goal: Free from fall injury  6/6/2025 1119 by Montserrat Le RN  Outcome: Adequate for Discharge  6/6/2025 1018 by Montserrat Le RN  Outcome: Progressing     Problem: Risk for Elopement  Goal: Patient will not exit the unit/facility without proper excort  Outcome: Adequate for Discharge     Problem: Nutrition Deficit:  Goal: Optimize nutritional status  Outcome: Adequate for Discharge

## 2025-06-06 NOTE — CARE COORDINATION
6/6/25, 11:58 AM EDT    Patient goals/plan/ treatment preferences discussed by  and .  Patient goals/plan/ treatment preferences reviewed with patient/ family.  Patient/ family verbalize understanding of discharge plan and are in agreement with goal/plan/treatment preferences.  Understanding was demonstrated using the teach back method.  AVS provided by RN at time of discharge, which includes all necessary medical information pertaining to the patients current course of illness, treatment, post-discharge goals of care, and treatment preferences.     Services At/After Discharge: Skilled Nursing Facility (SNF), Aide services, In ambulance, Nursing service, OT, and PT       Patient discharged to [facility] skilled medicare bed.  Christopher at [facility] informed of discharge through [facility] with [facility].  Faxed AVS and MAR. Spoke with AYO Carl on the phone and son at the hospital, informed of discharge and [facility].

## 2025-06-24 NOTE — CARE COORDINATION
Ambulatory Care Coordination Note  12/6/2018  CM Risk Score: 2  Trevor Mortality Risk Score: 3.15    ACC: Tevin Fofana RN    Summary Note: Spoke with Angelica Tammy. Breathing stable. Ongoing issues with hand redness, itching, and numbness. Ongoing since June. Labs were ordered and discussed last call to get lab work ordered by PCP for pruritis. Instructed to get labs done asap. Has PCP appointment 1/10/19. Discussed avoiding scratching skin. Verbalizes understanding. Discussed reporting any changes in breathing. Reminded of same day appointments. Encouraged to call with any issues. COPD Assessment    Does the patient understand envrionmental exposure?:  Yes  Is the patient able to verbalize Rescue vs. Long Acting medications?:  No  Does the patient have a nebulizer?:  No  Does the patient use a space with inhaled medications?:  No     No patient-reported symptoms         Symptoms:   None:  Yes      Symptom course:  stable  Breathlessness:  none  Increase use of rapid acting/rescue inhaled medications?:  No  Change in chronic cough?:  No/At Baseline  Change in sputum?:  No/At Baseline  Have you had a recent diagnosis of pneumonia either by PCP or at a hospital?:  No               Care Coordination Interventions    Program Enrollment:  Rising Risk  Referral from Primary Care Provider:  No  Suggested Interventions and Community Resources  Transportation Support:  Completed  Zone Management Tools:  Completed (Comment: Mailed COPD 6/21/17)         Goals Addressed             Most Recent     Conditions and Symptoms   On track (12/6/2018)             I will schedule office visits, as directed by my provider. I will keep my appointment or reschedule if I have to cancel. I will notify my provider of any barriers to my plan of care. I will follow my Zone Management tool to seek urgent or emergent care. I will notify my provider of any symptoms that indicate a worsening of my condition.     Barriers: none  Plan
no

## 2025-06-27 PROBLEM — N39.0 UTI (URINARY TRACT INFECTION): Status: RESOLVED | Noted: 2025-05-28 | Resolved: 2025-06-27

## 2025-08-03 ENCOUNTER — HOSPITAL ENCOUNTER (EMERGENCY)
Age: 75
Discharge: HOME | End: 2025-08-03
Attending: EMERGENCY MEDICINE
Payer: MEDICARE

## 2025-08-03 ENCOUNTER — APPOINTMENT (OUTPATIENT)
Dept: CT IMAGING | Age: 75
End: 2025-08-03
Payer: MEDICARE

## 2025-08-03 VITALS
DIASTOLIC BLOOD PRESSURE: 55 MMHG | RESPIRATION RATE: 18 BRPM | WEIGHT: 111 LBS | HEART RATE: 76 BPM | OXYGEN SATURATION: 96 % | SYSTOLIC BLOOD PRESSURE: 110 MMHG | TEMPERATURE: 99.2 F | BODY MASS INDEX: 19.05 KG/M2

## 2025-08-03 DIAGNOSIS — R52 GENERALIZED BODY ACHES: ICD-10-CM

## 2025-08-03 DIAGNOSIS — R11.2 NAUSEA AND VOMITING, UNSPECIFIED VOMITING TYPE: Primary | ICD-10-CM

## 2025-08-03 LAB
ALBUMIN SERPL BCG-MCNC: 2.7 G/DL (ref 3.4–4.9)
ALP SERPL-CCNC: 121 U/L (ref 38–126)
ALT SERPL W/O P-5'-P-CCNC: 6 U/L (ref 10–35)
ANION GAP SERPL CALC-SCNC: 9 MEQ/L (ref 8–16)
AST SERPL-CCNC: 17 U/L (ref 10–35)
BASOPHILS ABSOLUTE: 0.1 THOU/MM3 (ref 0–0.1)
BASOPHILS NFR BLD AUTO: 0.9 %
BILIRUB CONJ SERPL-MCNC: 0.3 MG/DL (ref 0–0.2)
BILIRUB SERPL-MCNC: 0.3 MG/DL (ref 0.3–1.2)
BUN SERPL-MCNC: 12 MG/DL (ref 8–23)
CALCIUM SERPL-MCNC: 8.2 MG/DL (ref 8.8–10.2)
CHLORIDE SERPL-SCNC: 107 MEQ/L (ref 98–111)
CO2 SERPL-SCNC: 25 MEQ/L (ref 22–29)
CREAT SERPL-MCNC: 0.9 MG/DL (ref 0.5–0.9)
DEPRECATED RDW RBC AUTO: 53.9 FL (ref 35–45)
EKG ATRIAL RATE: 91 BPM
EKG P AXIS: 75 DEGREES
EKG P-R INTERVAL: 146 MS
EKG Q-T INTERVAL: 382 MS
EKG QRS DURATION: 74 MS
EKG QTC CALCULATION (BAZETT): 469 MS
EKG R AXIS: 49 DEGREES
EKG T AXIS: 70 DEGREES
EKG VENTRICULAR RATE: 91 BPM
EOSINOPHIL NFR BLD AUTO: 8.5 %
EOSINOPHILS ABSOLUTE: 0.6 THOU/MM3 (ref 0–0.4)
ERYTHROCYTE [DISTWIDTH] IN BLOOD BY AUTOMATED COUNT: 14.7 % (ref 11.5–14.5)
GFR SERPL CREATININE-BSD FRML MDRD: 67 ML/MIN/1.73M2
GLUCOSE SERPL-MCNC: 90 MG/DL (ref 74–109)
HCT VFR BLD AUTO: 32.5 % (ref 37–47)
HGB BLD-MCNC: 10.3 GM/DL (ref 12–16)
IMM GRANULOCYTES # BLD AUTO: 0.03 THOU/MM3 (ref 0–0.07)
IMM GRANULOCYTES NFR BLD AUTO: 0.4 %
INR PPP: 1.49 (ref 0.85–1.13)
LIPASE SERPL-CCNC: 7 U/L (ref 13–60)
LYMPHOCYTES ABSOLUTE: 1 THOU/MM3 (ref 1–4.8)
LYMPHOCYTES NFR BLD AUTO: 15.6 %
MAGNESIUM SERPL-MCNC: 2.3 MG/DL (ref 1.6–2.6)
MCH RBC QN AUTO: 31.6 PG (ref 26–33)
MCHC RBC AUTO-ENTMCNC: 31.7 GM/DL (ref 32.2–35.5)
MCV RBC AUTO: 99.7 FL (ref 81–99)
MONOCYTES ABSOLUTE: 0.4 THOU/MM3 (ref 0.4–1.3)
MONOCYTES NFR BLD AUTO: 5.7 %
NEUTROPHILS ABSOLUTE: 4.6 THOU/MM3 (ref 1.8–7.7)
NEUTROPHILS NFR BLD AUTO: 68.9 %
NRBC BLD AUTO-RTO: 0 /100 WBC
OSMOLALITY SERPL CALC.SUM OF ELEC: 280.5 MOSMOL/KG (ref 275–300)
PLATELET # BLD AUTO: 431 THOU/MM3 (ref 130–400)
PMV BLD AUTO: 10.1 FL (ref 9.4–12.4)
POTASSIUM SERPL-SCNC: 3.8 MEQ/L (ref 3.5–5.2)
PROCALCITONIN SERPL IA-MCNC: 0.07 NG/ML (ref 0.01–0.09)
PROT SERPL-MCNC: 5.7 G/DL (ref 6.4–8.3)
PROTHROMBIN TIME: 17.4 SECONDS (ref 10–13.5)
RBC # BLD AUTO: 3.26 MILL/MM3 (ref 4.2–5.4)
SODIUM SERPL-SCNC: 141 MEQ/L (ref 135–145)
WBC # BLD AUTO: 6.7 THOU/MM3 (ref 4.8–10.8)

## 2025-08-03 PROCEDURE — 93005 ELECTROCARDIOGRAM TRACING: CPT | Performed by: EMERGENCY MEDICINE

## 2025-08-03 PROCEDURE — 93010 ELECTROCARDIOGRAM REPORT: CPT | Performed by: INTERNAL MEDICINE

## 2025-08-03 PROCEDURE — 83735 ASSAY OF MAGNESIUM: CPT

## 2025-08-03 PROCEDURE — 85025 COMPLETE CBC W/AUTO DIFF WBC: CPT

## 2025-08-03 PROCEDURE — 36415 COLL VENOUS BLD VENIPUNCTURE: CPT

## 2025-08-03 PROCEDURE — 84145 PROCALCITONIN (PCT): CPT

## 2025-08-03 PROCEDURE — 74177 CT ABD & PELVIS W/CONTRAST: CPT

## 2025-08-03 PROCEDURE — 80053 COMPREHEN METABOLIC PANEL: CPT

## 2025-08-03 PROCEDURE — 85610 PROTHROMBIN TIME: CPT

## 2025-08-03 PROCEDURE — 83690 ASSAY OF LIPASE: CPT

## 2025-08-03 PROCEDURE — 82248 BILIRUBIN DIRECT: CPT

## 2025-08-03 PROCEDURE — 6360000004 HC RX CONTRAST MEDICATION: Performed by: EMERGENCY MEDICINE

## 2025-08-03 PROCEDURE — 99285 EMERGENCY DEPT VISIT HI MDM: CPT

## 2025-08-03 RX ORDER — IOPAMIDOL 755 MG/ML
80 INJECTION, SOLUTION INTRAVASCULAR
Status: COMPLETED | OUTPATIENT
Start: 2025-08-03 | End: 2025-08-03

## 2025-08-03 RX ORDER — GABAPENTIN 600 MG/1
300 TABLET ORAL ONCE
Status: DISCONTINUED | OUTPATIENT
Start: 2025-08-03 | End: 2025-08-03

## 2025-08-03 RX ORDER — GABAPENTIN 100 MG/1
100 CAPSULE ORAL 3 TIMES DAILY
Qty: 30 CAPSULE | Refills: 0 | Status: SHIPPED | OUTPATIENT
Start: 2025-08-03 | End: 2025-08-03

## 2025-08-03 RX ADMIN — IOPAMIDOL 80 ML: 755 INJECTION, SOLUTION INTRAVENOUS at 07:43

## 2025-08-03 ASSESSMENT — PAIN - FUNCTIONAL ASSESSMENT
PAIN_FUNCTIONAL_ASSESSMENT: NONE - DENIES PAIN
PAIN_FUNCTIONAL_ASSESSMENT: NONE - DENIES PAIN

## (undated) DEVICE — TROCAR: Brand: KII FIOS FIRST ENTRY

## (undated) DEVICE — PROBE CRYOSURGERY 18IN BALL TIP 8MM ERGO HNDL BEND DST SHFT

## (undated) DEVICE — TUBING INSUFFLATOR HEAT HUMIDIFIED SMK EVAC SET PNEUMOCLEAR

## (undated) DEVICE — TOWEL,OR,DSP,ST,BLUE,DLX,4/PK,20PK/CS: Brand: MEDLINE

## (undated) DEVICE — DRAINAGE CATHETER SYSTEM: Brand: VANSONNENBERG™

## (undated) DEVICE — Device

## (undated) DEVICE — ARM DRAPE

## (undated) DEVICE — THIN OFFSET (13.3 X 0.38 X 42.0MM)

## (undated) DEVICE — SUTURE MCRYL + SZ 3-0 L27IN ABSRB UD PS1 L24MM 3/8 CIR REV MCP936H

## (undated) DEVICE — C-ARMOR C-ARM EQUIPMENT COVERS CLEAR STERILE UNIVERSAL FIT 12 PER CASE: Brand: C-ARMOR

## (undated) DEVICE — BANDAGE ADH W1XL3IN NAT FAB WVN FLX DURABLE N ADH PD SEAL

## (undated) DEVICE — APPLICATOR PREP 26ML 0.7% IOD POVACRYLEX 74% ISO ALC ST

## (undated) DEVICE — SOLUTION ANTIFOG VIS SYS CLEARIFY LAPSCP

## (undated) DEVICE — SPONGE DRN W4XL4IN RAYON/POLYESTER 6 PLY NONWOVEN PRECUT

## (undated) DEVICE — PACK PROCEDURE SURG SET UP SRMC

## (undated) DEVICE — BAG,BANDED,W/RUBBERBAND,STERILE,30X36: Brand: MEDLINE

## (undated) DEVICE — GOWN,SIRUS,NONRNF,SETINSLV,XL,20/CS: Brand: MEDLINE

## (undated) DEVICE — 6 ML SYRINGE LUER-LOCK TIP: Brand: MONOJECT

## (undated) DEVICE — REDUCER: Brand: ENDOWRIST

## (undated) DEVICE — 3M™ STERI-DRAPE™ INSTRUMENT POUCH 1018: Brand: STERI-DRAPE™

## (undated) DEVICE — SEALANT TISS GLUE 4ML PLEUR AIR LEAK PROGEL

## (undated) DEVICE — SEAL

## (undated) DEVICE — GLOVE ORANGE PI 8   MSG9080

## (undated) DEVICE — DUAL CUT SAGITTAL BLADE

## (undated) DEVICE — SUTURE VIC + LEN CP1 0 70CM VCP267H

## (undated) DEVICE — VINYL CONNECTING TUBE: Brand: COOK

## (undated) DEVICE — TROCARS: Brand: KII® OPTICAL ACCESS SYSTEM

## (undated) DEVICE — SPONGE LAP W18XL18IN WHT COT 4 PLY FLD STRUNG RADPQ DISP ST

## (undated) DEVICE — SUTURE STRATAFIX SPRL SZ 1 L14IN ABSRB VLT L48CM CTX 1/2 SXPD2B405

## (undated) DEVICE — 3 ML SYRINGE LUER-LOCK TIP: Brand: MONOJECT

## (undated) DEVICE — PACK-MAJOR

## (undated) DEVICE — SUTURE VCRL + SZ 4-0 L27IN ABSRB WHT FS-2 3/8 CIR REV CUT VCP422H

## (undated) DEVICE — GLOVE ORANGE PI 7 1/2   MSG9075

## (undated) DEVICE — GAUZE,SPONGE,8"X4",12PLY,XRAY,STRL,LF: Brand: MEDLINE

## (undated) DEVICE — PUMP SUC IRR TBNG L10FT W/ HNDPC ASSEMB STRYKEFLOW 2

## (undated) DEVICE — SUTURE VCRL + SZ 2-0 L27IN ABSRB CLR CT-1 1/2 CIR TAPERCUT VCP259H

## (undated) DEVICE — CANNULA SEAL

## (undated) DEVICE — STAPLER 45 RELOAD BLUE: Brand: ENDOWRIST

## (undated) DEVICE — DRAIN SURG SGL COLL PT TB FOR ATS BG OASIS

## (undated) DEVICE — BLADE SAW  6 13X90X1.27MM

## (undated) DEVICE — VESSEL LOOPS,MAXI, RED: Brand: DEVON

## (undated) DEVICE — NEEDLE SPNL L3.5IN PNK HUB S STL REG WALL FIT STYL W/ QNCKE

## (undated) DEVICE — C-ARM: Brand: UNBRANDED

## (undated) DEVICE — GLOVE SURG SZ 8 L12IN THK75MIL DK GRN LTX FREE

## (undated) DEVICE — BASIC SINGLE BASIN BTC-LF: Brand: MEDLINE INDUSTRIES, INC.

## (undated) DEVICE — SUTURE ETHLN SZ 2-0 L30IN NONABSORBABLE BLK L36MM FSLX 3/8 1674H

## (undated) DEVICE — SUTURE VCRL SZ 1 L36IN ABSRB UD CTX L48MM 1/2 CIR J977H

## (undated) DEVICE — CONTAINER,SPECIMEN,PNEU TUBE,4OZ,OR STRL: Brand: MEDLINE

## (undated) DEVICE — COLUMN DRAPE

## (undated) DEVICE — PENCIL SMK EVAC 15FT BLADE ELECTRD ROCKER F/TELSCP

## (undated) DEVICE — TIP COVER ACCESSORY

## (undated) DEVICE — SUTURE VCRL + SZ 2-0 L27IN ABSRB UD CP-1 1/2 CIR REV CUT VCP266H

## (undated) DEVICE — NEEDLE SPNL 18GA L3.5IN W/ QNCKE SHARPER BVL DURA CLICK

## (undated) DEVICE — TOTAL TRAY, DB, 100% SILI FOLEY, 16FR 10: Brand: MEDLINE

## (undated) DEVICE — SUTURE PERMAHAND SZ 0 L30IN NONABSORBABLE BLK L30MM PSL 3/8 590H

## (undated) DEVICE — SYRINGE, LUER LOCK, 60ML: Brand: MEDLINE

## (undated) DEVICE — PACK,UNIVERSAL,NO GOWNS: Brand: MEDLINE

## (undated) DEVICE — ELECTRO LUBE IS A SINGLE PATIENT USE DEVICE THAT IS INTENDED TO BE USED ON ELECTROSURGICAL ELECTRODES TO REDUCE STICKING.: Brand: KEY SURGICAL ELECTRO LUBE

## (undated) DEVICE — SUTURE STRATAFIX SPRL SZ 2-0 L9IN ABSRB VLT MH L36MM 1/2 SXPD2B408

## (undated) DEVICE — GAUZE,SPONGE,4"X4",12PLY,STERILE,LF,2'S: Brand: MEDLINE

## (undated) DEVICE — DRESSING,GAUZE,XEROFORM,CURAD,5"X9",ST: Brand: CURAD

## (undated) DEVICE — SYRINGE MED 10ML LUERLOCK TIP W/O SFTY DISP

## (undated) DEVICE — DRAPE,EXTREMITY,89X128,STERILE: Brand: MEDLINE

## (undated) DEVICE — HYPODERMIC SAFETY NEEDLE: Brand: MAGELLAN